# Patient Record
Sex: FEMALE | Race: BLACK OR AFRICAN AMERICAN | NOT HISPANIC OR LATINO | Employment: OTHER | URBAN - METROPOLITAN AREA
[De-identification: names, ages, dates, MRNs, and addresses within clinical notes are randomized per-mention and may not be internally consistent; named-entity substitution may affect disease eponyms.]

---

## 2017-01-02 ENCOUNTER — HOSPITAL ENCOUNTER (EMERGENCY)
Facility: OTHER | Age: 82
Discharge: HOME | End: 2017-01-02
Attending: EMERGENCY MEDICINE
Payer: MEDICARE

## 2017-01-02 ENCOUNTER — NURSE TRIAGE (OUTPATIENT)
Dept: ADMINISTRATIVE | Facility: CLINIC | Age: 82
End: 2017-01-02

## 2017-01-02 VITALS
SYSTOLIC BLOOD PRESSURE: 179 MMHG | RESPIRATION RATE: 18 BRPM | OXYGEN SATURATION: 100 % | BODY MASS INDEX: 28.22 KG/M2 | TEMPERATURE: 98 F | WEIGHT: 140 LBS | HEIGHT: 59 IN | HEART RATE: 80 BPM | DIASTOLIC BLOOD PRESSURE: 84 MMHG

## 2017-01-02 DIAGNOSIS — R05.9 COUGH: Primary | ICD-10-CM

## 2017-01-02 LAB
FLUAV AG SPEC QL IA: NEGATIVE
FLUBV AG SPEC QL IA: NEGATIVE
SPECIMEN SOURCE: NORMAL

## 2017-01-02 PROCEDURE — 25000242 PHARM REV CODE 250 ALT 637 W/ HCPCS: Performed by: EMERGENCY MEDICINE

## 2017-01-02 PROCEDURE — 94640 AIRWAY INHALATION TREATMENT: CPT

## 2017-01-02 PROCEDURE — 87400 INFLUENZA A/B EACH AG IA: CPT

## 2017-01-02 PROCEDURE — 99284 EMERGENCY DEPT VISIT MOD MDM: CPT | Mod: 25

## 2017-01-02 PROCEDURE — 25000242 PHARM REV CODE 250 ALT 637 W/ HCPCS: Performed by: PHYSICIAN ASSISTANT

## 2017-01-02 RX ORDER — BENZONATATE 100 MG/1
100 CAPSULE ORAL 3 TIMES DAILY PRN
Qty: 20 CAPSULE | Refills: 0 | Status: SHIPPED | OUTPATIENT
Start: 2017-01-02 | End: 2017-01-12

## 2017-01-02 RX ORDER — ALBUTEROL SULFATE 90 UG/1
1-2 AEROSOL, METERED RESPIRATORY (INHALATION) EVERY 6 HOURS PRN
Qty: 1 INHALER | Refills: 0 | Status: SHIPPED | OUTPATIENT
Start: 2017-01-02 | End: 2017-01-25

## 2017-01-02 RX ORDER — IPRATROPIUM BROMIDE AND ALBUTEROL SULFATE 2.5; .5 MG/3ML; MG/3ML
3 SOLUTION RESPIRATORY (INHALATION)
Status: COMPLETED | OUTPATIENT
Start: 2017-01-02 | End: 2017-01-02

## 2017-01-02 RX ORDER — ALBUTEROL SULFATE 2.5 MG/.5ML
1.25 SOLUTION RESPIRATORY (INHALATION)
Status: DISCONTINUED | OUTPATIENT
Start: 2017-01-02 | End: 2017-01-02

## 2017-01-02 RX ADMIN — IPRATROPIUM BROMIDE AND ALBUTEROL SULFATE 3 ML: .5; 3 SOLUTION RESPIRATORY (INHALATION) at 12:01

## 2017-01-02 RX ADMIN — ALBUTEROL SULFATE 1.25 MG: 2.5 SOLUTION RESPIRATORY (INHALATION) at 12:01

## 2017-01-02 NOTE — ED NOTES
Two patient identifiers have been checked and are correct.    Pt denies chest pain or SOB at this time.   Appearance: Pt awake, alert & oriented to person, place & time. Pt in no acute distress at present time. Pt is clean and well groomed with clothes appropriately fastened. Pt reports + productive cough, sore throat w/ chest congestion.  Skin: Skin warm, dry & intact. Color consistent with ethnicity. Mucous membranes moist. No breakdown or brusing noted.   Musculoskeletal: Patient moving all extremities well, no obvious swelling or deformities noted.   Respiratory: Respirations spontaneous, even, and non-labored. Visible chest rise noted. Airway is open and patent. No accessory muscle use noted.   Neurologic: Sensation is intact. Speech is clear and appropriate. Eyes open spontaneously, behavior appropriate to situation, follows commands, facial expression symmetrical, bilateral hand grasp equal and even, purposeful motor response noted.  Cardiac: All peripheral pulses present. No Bilateral lower extremity edema. Cap refill is <3 seconds.  Abdomen: Abdomen soft, non-tender to palpation.   : Pt reports no dysuria or hematuria.

## 2017-01-02 NOTE — DISCHARGE INSTRUCTIONS
We have provided you with an inhaler and cough medication. Please fill and take as directed.    Please return to the ER if you have chest pain, difficulty breathing, fevers, altered mental status, dizziness, weakness, or any other concerns.      Follow up with your primary care physician.

## 2017-01-02 NOTE — ED AVS SNAPSHOT
[facility]  [address]  [facility] [address]               Ernestine Luna   2017 11:57 AM   ED    Description:  Female : 1929   Department:  [facility]           Your Care was Coordinated By:     Provider Role From To    Tameka Vargas MD Attending Provider 17 1213 --    Ilana Moran PA-C Physician Assistant 17 1157 17 1201      Reason for Visit     Cough           Diagnoses this Visit        Comments    Cough    -  Primary       ED Disposition     None           To Do List           Follow-up Information     Follow up with Kev Macias MD.    Specialty:  Internal Medicine    Contact information:    [address]  [facility] [address]  [phone]         These Medications        Disp Refills Start End    albuterol 90 mcg/actuation inhaler 1 Inhaler 0 2017     Inhale 1-2 puffs into the lungs every 6 (six) hours as needed for Wheezing. - Inhalation    [facility] [id number] [facility] [address] AT [facility] Ph #: [phone]       benzonatate (TESSALON) 100 MG capsule 20 capsule 0 2017    Take 1 capsule (100 mg total) by mouth 3 (three) times daily as needed for Cough. - Oral    Pharmacy: [facility] [id number] [facility] [address] AT [facility] [address] Ph #: [phone]         [facility] On [facility]     [facility] -  Assistance  Registered nurses in the [facility] provide clinical advisement, health education, appointment booking, and other advisory services.  Call for this free service at [phone].             Medications           Message regarding Medications     Verify the changes and/or additions to your medication regime listed below are the same as discussed with your clinician today.  If any of these changes or additions are incorrect, please notify your  healthcare provider.        START taking these NEW medications        Refills    albuterol 90 mcg/actuation inhaler 0    Sig: Inhale 1-2 puffs into the lungs every 6 (six) hours as needed for Wheezing.    Class: Print    Route: Inhalation    benzonatate (TESSALON) 100 MG capsule 0    Sig: Take 1 capsule (100 mg total) by mouth 3 (three) times daily as needed for Cough.    Class: Print    Route: Oral      These medications were administered today        Dose Freq    albuterol-ipratropium 2.5mg-0.5mg/3mL nebulizer solution 3 mL 3 mL Every 5 min    Sig: Take 3 mLs by nebulization every 5 (five) minutes.    Class: Normal    Route: Nebulization           Verify that the below list of medications is an accurate representation of the medications you are currently taking.  If none reported, the list may be blank. If incorrect, please contact your healthcare provider. Carry this list with you in case of emergency.           Current Medications     amlodipine (NORVASC) 10 MG tablet TAKE 1 TABLET BY MOUTH ONCE DAILY.    atenolol (TENORMIN) 25 MG tablet TAKE 1 TABLET BY MOUTH EVERY DAY    benazepril (LOTENSIN) 40 MG tablet TAKE 1 TABLET BY MOUTH ONCE DAILY.    guaifenesin-codeine 100-10 mg/5 ml (TUSSI-ORGANIDIN NR)  mg/5 mL syrup Take 5 mLs by mouth every 6 (six) hours as needed for Cough.    meloxicam (MOBIC) 7.5 MG tablet Take 1 tablet (7.5 mg total) by mouth once daily.    MULTIVITAMIN W-MINERALS/LUTEIN (CENTRUM SILVER ORAL) Take 1 tablet by mouth Daily.    omega 3-calcium-vit [id number] [phone] mg Cmpk Take by mouth once daily.    vitamin D 1000 units Tab Take 185 mg by mouth once daily.    ADACEL,TDAP ADOLESN/ADULT,,PF, 2 Lf-(2.5-5-3-5 mcg)-5Lf/0.5 mL Syrg     albuterol 90 mcg/actuation inhaler Inhale 1-2 puffs into the lungs every 6 (six) hours as needed for Wheezing.    benzonatate (TESSALON) 100 MG capsule Take 1 capsule (100 mg total) by mouth 3 (three) times daily as needed for Cough.           Clinical  "Reference Information           Your Vitals Were     BP Pulse Temp Resp Height Weight    179/84 80 98 °F (36.7 °C) (Oral) 18 4' 11" (1.499 m) 63.5 kg (140 lb)    SpO2 BMI             100% 28.28 kg/m2         Allergies as of 1/2/2017        Reactions    Acetaminophen Nausea And Vomiting    Aspirin Nausea And Vomiting    Sulfa (Sulfonamide Antibiotics) Nausea And Vomiting    Prednisone Other (See Comments)    Stomach problems       Immunizations Administered on Date of Encounter - 1/2/2017     None      ED Micro, Lab, POCT     Start Ordered       Status Ordering Provider    01/02/17 1224 01/02/17 1223  Influenza antigen Nasopharyngeal Swab  Once      Final result       ED Imaging Orders     Start Ordered       Status Ordering Provider    01/02/17 1224 01/02/17 1223  X-Ray Chest 1 View  1 time imaging      Final result     01/02/17 1202 01/02/17 1201    1 time imaging,   Status:  Canceled      Canceled         Discharge Instructions       We have provided you with an inhaler and cough medication. Please fill and take as directed.    Please return to the ER if you have chest pain, difficulty breathing, fevers, altered mental status, dizziness, weakness, or any other concerns.      Follow up with your primary care physician.        Discharge References/Attachments     BRONCHITIS, NO ANTIBIOTIC (ADULT) (ENGLISH)      Your Scheduled Appointments     Sam 10, 2017 10:45 AM CST   Procedure with ODILIA Moore - Podiatry ([facility] )    [address]  [place] [address] [phone]            Jan 25, 2017 11:00 AM CST   Health Assessment with ENMANUEL, NOM 2   [facility] - Internal Medicine ([facility])    [address]  [place] [address] [phone]            Mar 08, 2017 11:00 AM CST   Established Patient Visit with Kristine Sequeira NP   [facility])    [address] [facility] [address] [phone]            "   [facility] Sign-Up     Activating your [facility] account is as easy as 1-2-3!     1) Visit [email or web address], select Sign Up Now, enter this activation code and your date of birth, then select Next.  [email or web address]  Expires: 2/4/2017  3:23 PM      2) Create a username and password to use when you visit [facility] in the future and select a security question in case you lose your password and select Next.    3) Enter your e-mail address and click Sign Up!    Additional Information  If you have questions, please e-mail [email or web address] or call [phone] to talk to our [facility] staff. Remember, [facility] is NOT to be used for urgent needs. For medical emergencies, dial 911.          [facility] complies with applicable Federal civil rights laws and does not discriminate on the basis of race, color, national origin, age, disability, or sex.        Language Assistance Services     ATTENTION: Language assistance services are available, free of charge. Please call [phone].      ATENCIÓN: Si habla español, tiene a burch disposición servicios gratuitos de asistencia lingüística. Llame al [phone].     CHÚ Ý: N?u b?n nói Ti?ng Vi?t, có các d?ch v? h? tr? ngôn ng? mi?n phí dành cho b?n. G?i s? [phone].

## 2017-01-02 NOTE — PROVIDER PROGRESS NOTES - EMERGENCY DEPT.
Encounter Date: 1/2/2017    ED Physician Progress Notes        Physician Note:   I evaluated the patient in triage and performed medical screening exam. Patient stable at this time and awaiting bed. Chief complaint and vital signs reviewed.

## 2017-01-02 NOTE — ED PROVIDER NOTES
Encounter Date: 1/2/2017    SCRIBE #1 NOTE: I, Brandy Cortes, am scribing for, and in the presence of,  Dr. Saul AYOUB have scribed the entire note.       History     Chief Complaint   Patient presents with    Cough     cough since the 21st of December after receiving the pneumonia vaccine     Review of patient's allergies indicates:   Allergen Reactions    Acetaminophen Nausea And Vomiting    Aspirin Nausea And Vomiting    Sulfa (sulfonamide antibiotics) Nausea And Vomiting    Prednisone Other (See Comments)     Stomach problems      HPI Comments: Time seen by provider: 12:18 PM    This is a 87 y.o. female who presents with complaint of cough. She reports onset of symptoms was about 12 days ago. The patient notes the symptoms began after receiving the pneumonia vaccine. She notes associated nasal congestion, runny nose, and sneezing but denies any sore throat, body aches, or chills. She denies N/V/D. Denies urinary symptoms.  The patient does note she had temperature a few days ago of 101. She denies any sick contacts. The patient reports the use of OTC medications has provided minimal relief of symptoms.     The history is provided by the patient.     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    Family history of breast cancer 5/24/2016    Hypertension     Osteoarthritis      Past Medical History Pertinent Negatives   Diagnosis Date Noted    Acute leukemia 11/24/2015    Acute pancreatitis 11/24/2015    Alcohol dependence 11/24/2015    Alzheimer's disease 11/24/2015    Angina pectoris 11/24/2015    Anxiety 11/24/2015    Asthma 11/24/2015    Atrial fibrillation 11/24/2015    Back pain 11/24/2015    Bladder cancer 11/24/2015    Bone cancer 11/24/2015    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    Brain cancer 11/24/2015     Cancer of lymphatic and hematopoietic tissue 11/24/2015    Cerebral palsy 11/24/2015    Cervical cancer 11/24/2015    Chronic bronchitis 11/24/2015    Chronic hepatitis 11/24/2015    Cirrhosis 11/24/2015    Colon cancer 11/24/2015    Complications of reattached extremity 11/24/2015    Coronary artery disease 11/24/2015    Cystic fibrosis 11/24/2015    Dependence on renal dialysis 11/24/2015    Depression 11/24/2015    Diabetes mellitus 8/19/2015    Emphysema of lung 11/24/2015    Endometrial cancer 11/24/2015    Esophageal cancer 11/24/2015    Fallopian tube cancer, carcinoma 11/24/2015    Fibromyalgia 11/24/2015    Gastrostomy status 11/24/2015    Glaucoma 8/19/2015    Glomerulonephritis 11/24/2015    Heart failure 11/24/2015    Heart transplanted 11/24/2015    Hepatitis B 11/24/2015    Hepatitis C 11/24/2015    HIV infection 11/24/2015    Hyperlipidemia 11/24/2015    Hypothyroidism 11/24/2015    Immune deficiency disorder 11/24/2015    Immune disorder 11/24/2015    Inflammatory bowel disease 11/24/2015    Interstitial nephritis chronic 11/24/2015    Intracranial hemorrhage 11/24/2015    Late complications of amputation stump 11/24/2015    Leukemia 11/24/2015    Liver cancer 11/24/2015    Liver transplanted 11/24/2015    Lung cancer 11/24/2015    Lung transplanted 11/24/2015    Malnutrition 11/24/2015    Melanoma 11/24/2015    Multiple sclerosis 11/24/2015    Myocardial infarction 11/24/2015    Neoplasm of lip 11/24/2015    Obesity 11/24/2015    Osteoporosis 11/24/2015    Ovarian cancer 11/24/2015    Pancreatic cancer 11/24/2015    Pancreatic disease 11/24/2015    Parkinson disease 11/24/2015    Peripheral vascular disease 11/24/2015    Phantom limb syndrome 11/24/2015    Pneumonia 11/24/2015    Pneumonia due to other staphylococcus 11/24/2015    Polyneuropathy 11/24/2015    Pulmonary embolism 11/24/2015    Rectal cancer 11/24/2015    Renal cancer  11/24/2015    Respiratory failure 11/24/2015    Rheumatoid arthritis 11/24/2015    Seizures 11/24/2015    Septic shock 11/24/2015    Sickle cell anemia 11/24/2015    Skin ulcer 11/24/2015    Sleep apnea 11/24/2015    Small intestine cancer 11/24/2015    Spinal cord disease 11/24/2015    Squamous cell carcinoma 11/24/2015    Stomach cancer 11/24/2015    Stroke 11/24/2015    Testicular cancer 11/24/2015    Thyroid cancer 11/24/2015    Tobacco dependence 11/24/2015    Trouble in sleeping 11/24/2015    Type 2 diabetes mellitus 11/24/2015    Type 2 diabetes mellitus with ophthalmic manifestations 11/24/2015    Urinary incontinence 11/24/2015    Uterine cancer 11/24/2015    Vaginal cancer 11/24/2015    Vulvar cancer 11/24/2015     Past Surgical History   Procedure Laterality Date    Bunionectomy       Left foot (x2)    Total knee arthroplasty       Bilateral    Carpal tunnel release Right      38 years old    Cataract extraction Left      with lens     Cataract extraction w/  intraocular lens implant Right 10/12/2015     Dr. Wilkins    Joint replacement       bilateral knees    Breast surgery Right 1992     S/P Right breast lumpectomy- radiation followed by Tamoxifen x 5 years    Colonoscopy w/ polypectomy  08/08/2013    Enma   06/24/2014    Eye surgery      Hysterectomy  age 38     ALISTAIR; ovaries in place     Family History   Problem Relation Age of Onset    Stroke Mother     Breast cancer Sister 82    Heart disease Sister     Diabetes Sister     Cancer Sister      breast cancer    Asthma Sister     Breast cancer Daughter 46    Cancer Daughter      breast    Breast cancer Daughter 45     negative genetic testing 2015    Cancer Daughter      breast    Breast cancer Daughter 37    Cancer Daughter      breast    Coronary artery disease Father     Diabetes Father     Heart disease Father     Heart attack Father     Heart disease Sister     Diabetes Sister     Diabetes Sister      Heart disease Sister     Hyperlipidemia Sister     Heart attack Brother     No Known Problems Son     Cancer Cousin      colon    Cancer Cousin     Breast cancer Cousin     Breast cancer Other     Diabetes Other     Blindness Other      One eye is blind from complications of diabetes    Breast cancer Other     Diabetes Other     Breast cancer Other     Diabetes Other     Ovarian cancer Neg Hx     Endometrial cancer Neg Hx     Vaginal cancer Neg Hx     Cervical cancer Neg Hx      Social History   Substance Use Topics    Smoking status: Never Smoker    Smokeless tobacco: Never Used    Alcohol use 0.6 oz/week     1 Glasses of wine, 0 Standard drinks or equivalent per week      Comment: Rare, every other week     Review of Systems   Constitutional: Positive for chills and fever.   HENT: Positive for congestion, rhinorrhea and sneezing.    Eyes: Negative for visual disturbance.   Respiratory: Positive for cough. Negative for shortness of breath.    Cardiovascular: Negative for chest pain.   Gastrointestinal: Negative for abdominal pain, diarrhea, nausea and vomiting.   Genitourinary: Negative for difficulty urinating, dysuria, flank pain and hematuria.   Musculoskeletal: Negative for myalgias, neck pain and neck stiffness.   Skin: Negative for pallor.   Neurological: Negative for dizziness, weakness and headaches.       Physical Exam   Initial Vitals   BP Pulse Resp Temp SpO2   01/02/17 1110 01/02/17 1110 01/02/17 1110 01/02/17 1110 01/02/17 1110   160/70 76 18 98 °F (36.7 °C) 98 %     Physical Exam    Nursing note and vitals reviewed.  Constitutional: She appears well-developed and well-nourished. She is not diaphoretic. No distress.   HENT:   Head: Normocephalic and atraumatic.   Right Ear: External ear normal.   Left Ear: External ear normal.   Eyes: Conjunctivae and EOM are normal.   Neck: Normal range of motion. Neck supple.   Cardiovascular: Normal rate, regular rhythm and normal heart sounds.    Pulmonary/Chest: She has wheezes. She has no rhonchi. She has no rales.   Diffuse end expiratory wheezes.    Abdominal: Soft. Bowel sounds are normal. She exhibits no distension. There is no tenderness. There is no rebound and no guarding.   Musculoskeletal: Normal range of motion. She exhibits no edema or tenderness.   No peripheral edema   Lymphadenopathy:     She has no cervical adenopathy.   Neurological: She is alert and oriented to person, place, and time. She has normal strength.   Skin: Skin is warm and dry. No rash noted.         ED Course   Procedures  Labs Reviewed   INFLUENZA A AND B ANTIGEN        Imaging Results         X-Ray Chest 1 View (Final result) Result time:  01/02/17 12:56:11    Final result by Jessica Martin MD (01/02/17 12:56:11)    Impression:      Unchanged appearance of the chest as above.  No acute findings.              Electronically signed by: JESSICA MARTIN MD  Date:     01/02/17  Time:    12:56     Narrative:    Comparison: 01/31/2014    Technique: Single AP view of the chest was obtained    Findings: The cardiac and mediastinal silhouettes are unchanged.  Elevation of the right hemidiaphragm is unchanged with mild associated subsegmental atelectasis in the right lung base.  Lungs otherwise clear bilaterally.Degenerative changes noted throughout the spine and at the bilateral shoulder girdles.            X-Rays:   Independently Interpreted Readings:   Chest X-Ray: Portable Chest X-ray Reading (1:36 PM): Trachea midline. No cardiomegaly. Slight elevation of right hemidiaphragm. No infiltrate. No effusion. No pneumothorax.      Medical Decision Making:   History:   Old Medical Records: I decided to obtain old medical records.  Old Records Summarized: records from clinic visits, records from previous admission(s) and other records.  Initial Assessment:   12:18PM:  Pt is a 86 y/o F who presents to ED with cough/congestion. Pt appears well, nontoxic. She does have diffuse  end-expiratory wheezes.  Will plan for CXR, influenza, along with breathing treatments. Will continue to follow and reassess.    Independently Interpreted Test(s):   I have ordered and independently interpreted X-rays - see prior notes.  Clinical Tests:   Lab Tests: Reviewed and Ordered  Radiological Study: Reviewed and Ordered    Additional MDM:   X-Rays: I have independently interpreted X-Ray(s) - see notes.     1:40 PM:  Pt doing well, she is feeling much better after the breathing treatments.  Her CXR and influenza are negative for any acute findings.  She likely has bronchitis. Will plan for an albuterol inhaler along with cough medication.  I updated pt regarding results and counseled pt regarding supportive care measures.  I have discussed with the pt ED return warnings and need for close PCP f/u.  Pt agreeable to plan and all questions answered.  I feel that pt is stable for discharge and management as an outpatient and no further intervention is needed at this time.  Pt is comfortable returning to the ED if needed.  Will DC home in stable condition.           Scribe Attestation:   Scribe #1: I performed the above scribed service and the documentation accurately describes the services I performed. I attest to the accuracy of the note.    Attending Attestation:           Physician Attestation for Scribe:  Physician Attestation Statement for Scribe #1: I, Dr. Vargas, reviewed documentation, as scribed by Brandy Cortes in my presence, and it is both accurate and complete.                 ED Course     Clinical Impression:     1. Cough                Tameka Vargas MD  01/02/17 [id number]

## 2017-01-02 NOTE — ED TRIAGE NOTES
Pt presents to Er w/ reports of productive cough, sore throat and chest congestion since receiving Pneumoia shot on 12/21. Pt reports fever and chills but did not take temperature at home.

## 2017-01-03 ENCOUNTER — TELEPHONE (OUTPATIENT)
Dept: INTERNAL MEDICINE | Facility: CLINIC | Age: 82
End: 2017-01-03

## 2017-01-03 NOTE — TELEPHONE ENCOUNTER
"  Reason for Disposition   Caller has medication question, adult has minor symptoms, caller declines triage, and triager answers question    Answer Assessment - Initial Assessment Questions  1. SYMPTOMS: "Do you have any symptoms?"      Pt has been seen several times in past 1 1/2 wks- seen in ER today. Stated she was dx with viral bronchitis. Was given albuterol and tessalon perles - reported the medication made her very shaky which she doesn't like.stated she was shaking like she has parkinsons. She lives alone and doesn't like how that makes her feel. Is   Has green nasal discharge when she blows her nose. Wants to know if she can take the cough medication prescribed earlier in the week with the perles.    Protocols used: ST MEDICATION QUESTION CALL-A-AH  advised on home care. F/u with pcp for any further questions/recommendations.  "

## 2017-01-03 NOTE — TELEPHONE ENCOUNTER
----- Message from Elena Carrington sent at 1/3/2017  9:15 AM CST -----  Good Morning,    Please call Ms. Jeremy.  She went to the ED on 1/2/16 & she would like to talk to a nurse about symptoms/appointment.  She has concerns about the albuterol Rx.    Thank you,    Elena Carrington  [facility]

## 2017-01-04 ENCOUNTER — OFFICE VISIT (OUTPATIENT)
Dept: INTERNAL MEDICINE | Facility: CLINIC | Age: 82
End: 2017-01-04
Payer: MEDICARE

## 2017-01-04 VITALS
SYSTOLIC BLOOD PRESSURE: 166 MMHG | HEART RATE: 78 BPM | HEIGHT: 59 IN | TEMPERATURE: 98 F | DIASTOLIC BLOOD PRESSURE: 67 MMHG | OXYGEN SATURATION: 99 % | WEIGHT: 142.63 LBS | BODY MASS INDEX: 28.76 KG/M2

## 2017-01-04 DIAGNOSIS — J32.9 SINUSITIS, UNSPECIFIED CHRONICITY, UNSPECIFIED LOCATION: ICD-10-CM

## 2017-01-04 DIAGNOSIS — J45.31 ASTHMATIC BRONCHITIS, MILD PERSISTENT, WITH ACUTE EXACERBATION: Primary | ICD-10-CM

## 2017-01-04 PROCEDURE — 99999 PR PBB SHADOW E&M-EST. PATIENT-LVL III: CPT | Mod: PBBFAC,,, | Performed by: INTERNAL MEDICINE

## 2017-01-04 PROCEDURE — 1160F RVW MEDS BY RX/DR IN RCRD: CPT | Mod: S$GLB,,, | Performed by: INTERNAL MEDICINE

## 2017-01-04 PROCEDURE — 96372 THER/PROPH/DIAG INJ SC/IM: CPT | Mod: S$GLB,,, | Performed by: INTERNAL MEDICINE

## 2017-01-04 PROCEDURE — 99214 OFFICE O/P EST MOD 30 MIN: CPT | Mod: 25,S$GLB,, | Performed by: INTERNAL MEDICINE

## 2017-01-04 PROCEDURE — 1126F AMNT PAIN NOTED NONE PRSNT: CPT | Mod: S$GLB,,, | Performed by: INTERNAL MEDICINE

## 2017-01-04 PROCEDURE — 1159F MED LIST DOCD IN RCRD: CPT | Mod: S$GLB,,, | Performed by: INTERNAL MEDICINE

## 2017-01-04 PROCEDURE — 1157F ADVNC CARE PLAN IN RCRD: CPT | Mod: S$GLB,,, | Performed by: INTERNAL MEDICINE

## 2017-01-04 PROCEDURE — 99499 UNLISTED E&M SERVICE: CPT | Mod: S$GLB,,, | Performed by: INTERNAL MEDICINE

## 2017-01-04 RX ORDER — TRIAMCINOLONE ACETONIDE 40 MG/ML
40 INJECTION, SUSPENSION INTRA-ARTICULAR; INTRAMUSCULAR
Status: COMPLETED | OUTPATIENT
Start: 2017-01-04 | End: 2017-01-04

## 2017-01-04 RX ORDER — AMOXICILLIN 875 MG/1
875 TABLET, FILM COATED ORAL 2 TIMES DAILY
Qty: 20 TABLET | Refills: 0 | Status: SHIPPED | OUTPATIENT
Start: 2017-01-04 | End: 2017-01-25

## 2017-01-04 RX ORDER — PREDNISONE 20 MG/1
TABLET ORAL
Qty: 12 TABLET | Refills: 0 | Status: SHIPPED | OUTPATIENT
Start: 2017-01-04 | End: 2017-01-25

## 2017-01-04 RX ADMIN — TRIAMCINOLONE ACETONIDE 40 MG: 40 INJECTION, SUSPENSION INTRA-ARTICULAR; INTRAMUSCULAR at 10:01

## 2017-01-04 NOTE — PROGRESS NOTES
REASON FOR VISIT:  This is an 87-year-old female who I recently saw on December 21, but then after that visit for three days she had a nonproductive cough and   sneezing.  Then her cough seemed to have gotten worse with nasal congestion,   blowing out yellow mucus, and sometimes coughing up clear phlegm.  She was   actually seen in an Urgent Care Clinic on December 29 and then in the Emergency   Room yesterday, January 3.  In the Emergency Room, at first guaifenesin with   codeine was given, and then later albuterol treatment and Tessalon Perles were   given.  She did not like taking albuterol because it made her jittery.    Presently she is blowing out yellow-green mucus, can hear wheezing, and still   has a cough.    PAST MEDICAL HISTORY:  History of breast cancer.  Hypertension.  Details of medical history are recently outlined in December 21 note.    MEDICATIONS:  List per [employer].    PHYSICAL EXAMINATION:  VITAL SIGNS:  Weight is 142, pulse 68, blood pressure 160/82.  HEENT:  Tympanic membranes normal.  Nose is congested with yellow mucus.    Oropharynx, no abnormal findings.  LUNGS:  Clear breath sounds, with diffuse coarse rhonchi and wheezing.  HEART:  Regular rate and rhythm.    IMPRESSION:  Sinusitis with asthmatic bronchitis.    PLAN:  Amoxicillin 875 mg twice a day for 10 days, Kenalog 40 mg IM, and   prednisone taper to take for the next eight days starting tomorrow.  She can   still take Mucinex or guaifenesin with codeine as needed.    /sc [id number] review      JAM/MALCOLM  dd: 01/04/2017 10:05:24 (CST)  td: 01/04/2017 22:52:13 (CST)  Doc ID   #[id number]  Job ID #[id number]    CC:

## 2017-01-04 NOTE — MR AVS SNAPSHOT
[facility] [address]  [address]  [address]  [facility] [address]  Phone: [phone]                  Ernestine Luna   2017 9:30 AM   Office Visit    Description:  Female : 1929   Provider:  Kev Macias MD   Department:  [facility] [address]           Reason for Visit     Cough           Diagnoses this Visit        Comments    Asthmatic bronchitis, mild persistent, with acute exacerbation    -  Primary     Sinusitis, unspecified chronicity, unspecified location                To Do List           Future Appointments        Provider Department Dept Phone    1/10/2017 10:45 AM ODILIA Moore [facility] - Podiatry [phone]    2017 11:00 AM MEJIA SINGER 2 [facility] - Internal Medicine [phone]    3/8/2017 11:00 AM Kristine Sequeira NP [facility] [phone]      Goals (5 Years of Data)     None       These Medications        Disp Refills Start End    amoxicillin (AMOXIL) 875 MG tablet 20 tablet 0 2017     Take 1 tablet (875 mg total) by mouth 2 (two) times daily. - Oral    Pharmacy: [facility] [id number] [place] [address] [address] AT [facility] Ph #: [phone]       predniSONE (DELTASONE) 20 MG tablet 12 tablet 0 2017     2 pills po daily for 4 days, then 1 pill po day for 4 days    Pharmacy: [facility] [address] AT [facility] Ph #: [phone]         [facility]     [facility] -  Assistance  Registered nurses in the [facility] provide clinical advisement, health education, appointment booking, and other advisory services.  Call for this free service at [phone].             Medications           Message regarding Medications     Verify the changes and/or additions to your medication regime listed below are the same as discussed with your clinician today.  If any  of these changes or additions are incorrect, please notify your healthcare provider.        START taking these NEW medications        Refills    amoxicillin (AMOXIL) 875 MG tablet 0    Sig: Take 1 tablet (875 mg total) by mouth 2 (two) times daily.    Class: Normal    Route: Oral    predniSONE (DELTASONE) 20 MG tablet 0    Si pills po daily for 4 days, then 1 pill po day for 4 days    Class: Normal      These medications were administered today        Dose Freq    triamcinolone acetonide injection 40 mg 40 mg Clinic/HOD 1 time    Sig: Inject 1 mL (40 mg total) into the muscle one time.    Class: Normal    Route: Intramuscular           Verify that the below list of medications is an accurate representation of the medications you are currently taking.  If none reported, the list may be blank. If incorrect, please contact your healthcare provider. Carry this list with you in case of emergency.           Current Medications     ADACEL,TDAP ADOLESN/ADULT,,PF, 2 Lf-(2.5-5-3-5 mcg)-5Lf/0.5 mL Syrg     albuterol 90 mcg/actuation inhaler Inhale 1-2 puffs into the lungs every 6 (six) hours as needed for Wheezing.    amlodipine (NORVASC) 10 MG tablet TAKE 1 TABLET BY MOUTH ONCE DAILY.    atenolol (TENORMIN) 25 MG tablet TAKE 1 TABLET BY MOUTH EVERY DAY    benazepril (LOTENSIN) 40 MG tablet TAKE 1 TABLET BY MOUTH ONCE DAILY.    benzonatate (TESSALON) 100 MG capsule Take 1 capsule (100 mg total) by mouth 3 (three) times daily as needed for Cough.    guaifenesin-codeine 100-10 mg/5 ml (TUSSI-ORGANIDIN NR)  mg/5 mL syrup Take 5 mLs by mouth every 6 (six) hours as needed for Cough.    meloxicam (MOBIC) 7.5 MG tablet Take 1 tablet (7.5 mg total) by mouth once daily.    MULTIVITAMIN W-MINERALS/LUTEIN (CENTRUM SILVER ORAL) Take 1 tablet by mouth Daily.    omega 3-calcium-vit D3-FA-mv13 [phone] mg Cmpk Take by mouth once daily.    vitamin D 1000 units Tab Take 185 mg by mouth once daily.    amoxicillin (AMOXIL) 875 MG  "tablet Take 1 tablet (875 mg total) by mouth 2 (two) times daily.    predniSONE (DELTASONE) 20 MG tablet 2 pills po daily for 4 days, then 1 pill po day for 4 days           Clinical Reference Information           Vital Signs - Last Recorded  Most recent update: 1/4/2017  9:24 AM by Trang Brandon MA    BP Pulse Temp Ht Wt SpO2    (!) 166/67 (BP Location: Left arm, Patient Position: Sitting, BP Method: Automatic) 78 97.9 °F (36.6 °C) (Oral) 4' 11" (1.499 m) 64.7 kg (142 lb 9.6 oz) 99%    BMI                28.8 kg/m2          Blood Pressure          Most Recent Value    BP  (!)  166/67      Allergies as of 1/4/2017     Acetaminophen    Aspirin    Sulfa (Sulfonamide Antibiotics)    Prednisone      Immunizations Administered on Date of Encounter - 1/4/2017     None      Administrations This Visit     triamcinolone acetonide injection 40 mg     Admin Date Action Dose Route Administered By             01/04/2017 Given 40 mg Intramuscular Summer Joshi LPN                      [facility] Sign-Up     Activating your [facility] account is as easy as 1-2-3!     1) Visit [email or web address], select Sign Up Now, enter this activation code and your date of birth, then select Next.  [email or web address]  Expires: 2/4/2017  3:23 PM      2) Create a username and password to use when you visit [facility] in the future and select a security question in case you lose your password and select Next.    3) Enter your e-mail address and click Sign Up!    Additional Information  If you have questions, please e-mail [email or web address] or call [phone] to talk to our [facility] staff. Remember, [facility] is NOT to be used for urgent needs. For medical emergencies, dial 911.         "

## 2017-01-10 ENCOUNTER — OFFICE VISIT (OUTPATIENT)
Dept: PODIATRY | Facility: CLINIC | Age: 82
End: 2017-01-10
Payer: MEDICARE

## 2017-01-10 VITALS
BODY MASS INDEX: 29.43 KG/M2 | HEART RATE: 67 BPM | RESPIRATION RATE: 18 BRPM | WEIGHT: 146 LBS | SYSTOLIC BLOOD PRESSURE: 163 MMHG | HEIGHT: 59 IN | DIASTOLIC BLOOD PRESSURE: 78 MMHG

## 2017-01-10 DIAGNOSIS — M20.40 HAMMER TOE, UNSPECIFIED LATERALITY: ICD-10-CM

## 2017-01-10 DIAGNOSIS — L60.2 ONYCHAUXIS: Primary | ICD-10-CM

## 2017-01-10 DIAGNOSIS — L84 CORN OR CALLUS: ICD-10-CM

## 2017-01-10 DIAGNOSIS — Z41.1 ENCOUNTER FOR COSMETIC PROCEDURE: ICD-10-CM

## 2017-01-10 PROCEDURE — 17999 UNLISTD PX SKN MUC MEMB SUBQ: CPT | Mod: CSM,,, | Performed by: PODIATRIST

## 2017-01-10 PROCEDURE — 99499 UNLISTED E&M SERVICE: CPT | Mod: CSM,,, | Performed by: PODIATRIST

## 2017-01-10 PROCEDURE — 99999 PR PBB SHADOW E&M-EST. PATIENT-LVL III: CPT | Mod: PBBFAC,,, | Performed by: PODIATRIST

## 2017-01-10 NOTE — MR AVS SNAPSHOT
[facility]  [address]  [facility] [address]  Phone: [phone]                  Ernestine Luna   1/10/2017 10:45 AM   Office Visit    Description:  Female : 1929   Provider:  Marilyn Pappas DPM   [facility]:  [facility]           Reason for Visit     PCP     Nail Care     Callouses     Nail Problem     Toe Pain                To Do List           Future Appointments        Provider Department Dept Phone    2017 11:00 AM HRA NOM 2 [facility] - Internal Medicine [phone]    3/8/2017 11:00 AM Kristine Sequeira NP [facility] [phone]    3/14/2017 11:00 AM ODILIA Moore - Podiatrvioleta [phone]      Goals (5 Years of Data)     None      [facility]     [facility] -  Assistance  Registered nurses in the [facility] provide clinical advisement, health education, appointment booking, and other advisory services.  Call for this free service at [phone].             Medications           Message regarding Medications     Verify the changes and/or additions to your medication regime listed below are the same as discussed with your clinician today.  If any of these changes or additions are incorrect, please notify your healthcare provider.             Verify that the below list of medications is an accurate representation of the medications you are currently taking.  If none reported, the list may be blank. If incorrect, please contact your healthcare provider. Carry this list with you in case of emergency.           Current Medications     ADACEL,TDAP ADOLESN/ADULT,,PF, 2 Lf-(2.5-5-3-5 mcg)-5Lf/0.5 mL Syrg     albuterol 90 mcg/actuation inhaler Inhale 1-2 puffs into the lungs every 6 (six) hours as needed for Wheezing.    amlodipine (NORVASC) 10 MG tablet TAKE 1 TABLET BY MOUTH ONCE DAILY.    amoxicillin (AMOXIL) 875 MG tablet Take 1 tablet (875 mg total) by mouth 2 (two) times daily.     "atenolol (TENORMIN) 25 MG tablet TAKE 1 TABLET BY MOUTH EVERY DAY    benazepril (LOTENSIN) 40 MG tablet TAKE 1 TABLET BY MOUTH ONCE DAILY.    benzonatate (TESSALON) 100 MG capsule Take 1 capsule (100 mg total) by mouth 3 (three) times daily as needed for Cough.    meloxicam (MOBIC) 7.5 MG tablet Take 1 tablet (7.5 mg total) by mouth once daily.    MULTIVITAMIN W-MINERALS/LUTEIN (CENTRUM SILVER ORAL) Take 1 tablet by mouth Daily.    omega 3-calcium-vit D3-FA-mv13 [phone] mg Cmpk Take by mouth once daily.    predniSONE (DELTASONE) 20 MG tablet 2 pills po daily for 4 days, then 1 pill po day for 4 days    vitamin D 1000 units Tab Take 185 mg by mouth once daily.           Clinical Reference Information           Vital Signs - Last Recorded  Most recent update: 1/10/2017 10:42 AM by Geena Cordero    BP Pulse Resp Ht Wt BMI    (!) 163/78 67 18 4' 11" (1.499 m) 66.2 kg (146 lb) 29.49 kg/m2      Blood Pressure          Most Recent Value    BP  (!)  163/78      Allergies as of 1/10/2017     Acetaminophen    Aspirin    Sulfa (Sulfonamide Antibiotics)    Prednisone      Immunizations Administered on Date of Encounter - 1/10/2017     None      [facility] Sign-Up     Activating your [facility] account is as easy as 1-2-3!     1) Visit [email or web address], select Sign Up Now, enter this activation code and your date of birth, then select Next.  [email or web address]  Expires: 2/4/2017  3:23 PM      2) Create a username and password to use when you visit [facility] in the future and select a security question in case you lose your password and select Next.    3) Enter your e-mail address and click Sign Up!    Additional Information  If you have questions, please e-mail [email or web address] or call [phone] to talk to our [facility] staff. Remember, [facility] is NOT to be used for urgent needs. For medical emergencies, dial 911.         "

## 2017-01-19 RX ORDER — MELOXICAM 7.5 MG/1
TABLET ORAL
Qty: 30 TABLET | Refills: 0 | Status: SHIPPED | OUTPATIENT
Start: 2017-01-19 | End: 2017-05-16

## 2017-01-20 RX ORDER — AMLODIPINE BESYLATE 10 MG/1
TABLET ORAL
Qty: 90 TABLET | Refills: 1 | Status: SHIPPED | OUTPATIENT
Start: 2017-01-20 | End: 2017-07-05 | Stop reason: SDUPTHER

## 2017-01-25 ENCOUNTER — OFFICE VISIT (OUTPATIENT)
Dept: INTERNAL MEDICINE | Facility: CLINIC | Age: 82
End: 2017-01-25
Payer: MEDICARE

## 2017-01-25 VITALS
BODY MASS INDEX: 29.16 KG/M2 | WEIGHT: 144.63 LBS | HEIGHT: 59 IN | HEART RATE: 60 BPM | SYSTOLIC BLOOD PRESSURE: 148 MMHG | DIASTOLIC BLOOD PRESSURE: 84 MMHG

## 2017-01-25 DIAGNOSIS — M51.36 DDD (DEGENERATIVE DISC DISEASE), LUMBAR: ICD-10-CM

## 2017-01-25 DIAGNOSIS — I77.810 ECTATIC THORACIC AORTA: ICD-10-CM

## 2017-01-25 DIAGNOSIS — I77.9 MILD CAROTID ARTERY DISEASE: ICD-10-CM

## 2017-01-25 DIAGNOSIS — M19.90 INFLAMMATORY ARTHRITIS: ICD-10-CM

## 2017-01-25 DIAGNOSIS — Z00.00 ENCOUNTER FOR PREVENTIVE HEALTH EXAMINATION: Primary | ICD-10-CM

## 2017-01-25 DIAGNOSIS — I70.0 ATHEROSCLEROSIS OF AORTA: ICD-10-CM

## 2017-01-25 DIAGNOSIS — N81.10 PROLAPSE OF ANTERIOR VAGINAL WALL: ICD-10-CM

## 2017-01-25 DIAGNOSIS — I10 ESSENTIAL HYPERTENSION: ICD-10-CM

## 2017-01-25 DIAGNOSIS — Z85.3 HISTORY OF BREAST CANCER: ICD-10-CM

## 2017-01-25 PROCEDURE — 99499 UNLISTED E&M SERVICE: CPT | Mod: S$GLB,,, | Performed by: NURSE PRACTITIONER

## 2017-01-25 PROCEDURE — G0439 PPPS, SUBSEQ VISIT: HCPCS | Mod: S$GLB,,, | Performed by: NURSE PRACTITIONER

## 2017-01-25 PROCEDURE — 99999 PR PBB SHADOW E&M-EST. PATIENT-LVL IV: CPT | Mod: PBBFAC,,, | Performed by: NURSE PRACTITIONER

## 2017-01-25 NOTE — MR AVS SNAPSHOT
[facility] - [facility]  [address]  [place] [address]  Phone: [phone]  Fax: [phone]                  Ernestine Luna   2017 11:00 AM   Office Visit    Description:  Female : 1929   Provider:  MEJIA SINGER 2   Department:  [facility] Internal Medicine           Reason for Visit     Health Risk Assessment           Diagnoses this Visit        Comments    Encounter for preventive health examination    -  Primary            To Do List           Future Appointments        Provider Department Dept Phone    3/8/2017 11:00 AM Kristine Sequeira NP [facility] [phone]    3/14/2017 11:00 AM Marilyn Pappas DPM [facility] - Podiatry [phone]      Goals (5 Years of Data)     None      Follow-Up and Disposition     Return in about 5 months (around 2017) for routine visit with your primary care provider or sooner if problems arise. .      [facility]     [facility] -  Assistance  Registered nurses in the [facility] provide clinical advisement, health education, appointment booking, and other advisory services.  Call for this free service at [phone].             Medications           Message regarding Medications     Verify the changes and/or additions to your medication regime listed below are the same as discussed with your clinician today.  If any of these changes or additions are incorrect, please notify your healthcare provider.        STOP taking these medications     ADACEL,TDAP ADOLESN/ADULT,,PF, 2 Lf-(2.5-5-3-5 mcg)-5Lf/0.5 mL Syrg     albuterol 90 mcg/actuation inhaler Inhale 1-2 puffs into the lungs every 6 (six) hours as needed for Wheezing.    amoxicillin (AMOXIL) 875 MG tablet Take 1 tablet (875 mg total) by mouth 2 (two) times daily.    predniSONE (DELTASONE) 20 MG tablet 2 pills po daily for 4 days, then 1 pill po day for 4 days           Verify that the below list of medications is an accurate  "representation of the medications you are currently taking.  If none reported, the list may be blank. If incorrect, please contact your healthcare provider. Carry this list with you in case of emergency.           Current Medications     amlodipine (NORVASC) 10 MG tablet TAKE 1 TABLET BY MOUTH ONCE DAILY    atenolol (TENORMIN) 25 MG tablet TAKE 1 TABLET BY MOUTH EVERY DAY    benazepril (LOTENSIN) 40 MG tablet TAKE 1 TABLET BY MOUTH ONCE DAILY.    MULTIVITAMIN W-MINERALS/LUTEIN (CENTRUM SILVER ORAL) Take 1 tablet by mouth Daily.    omega 3-calcium-vit D3-FA-mv13 [phone] mg Cmpk Take by mouth once daily.    vitamin D 1000 units Tab Take 185 mg by mouth once daily.    meloxicam (MOBIC) 7.5 MG tablet TAKE 1 TABLET(7.5 MG) BY MOUTH EVERY DAY           Clinical Reference Information           Vital Signs - Last Recorded  Most recent update: 1/25/2017 11:59 AM by Marce Alberto NP    BP Pulse Ht Wt BMI    (!) 148/84 (BP Location: Left arm, Patient Position: Sitting) 60 4' 11" (1.499 m) 65.6 kg (144 lb 10 oz) 29.21 kg/m2      Blood Pressure          Most Recent Value    BP  (!)  148/84      Allergies as of 1/25/2017     Acetaminophen    Aspirin    Sulfa (Sulfonamide Antibiotics)    Prednisone      Immunizations Administered on Date of Encounter - 1/25/2017     None      [facility] Sign-Up     Activating your [facility] account is as easy as 1-2-3!     1) Visit [email or web address], select Sign Up Now, enter this activation code and your date of birth, then select Next.  [email or web address]  Expires: 2/4/2017  3:23 PM      2) Create a username and password to use when you visit [facility] in the future and select a security question in case you lose your password and select Next.    3) Enter your e-mail address and click Sign Up!    Additional Information  If you have questions, please e-mail [email or web address] or call [phone] to talk to our [facility] staff. Remember, [facility] is NOT to be used for urgent needs. For " medical emergencies, dial 911.         Instructions      Counseling and Referral of Other Preventative  (Italic type indicates deductible and co-insurance are waived)    Patient Name: Ernestine Luna  Today's Date: 1/25/2017      SERVICE LIMITATIONS RECOMMENDATION    Vaccines    · Pneumococcal (once after 65)    · Influenza (annually)    · Hepatitis B (if medium/high risk)    · Prevnar 13      Hepatitis B medium/high risk factors:       - End-stage renal disease       - Hemophiliacs who received Factor VII or         IX concentrates       - Clients of institutions for the mentally             retarded       - Persons who live in the same house as          a HepB carrier       - Homosexual men       - Illicit injectable drug abusers     Pneumococcal: Done, no repeat necessary     Influenza: Done, repeat in one year     Hepatitis B: N/A : n/a     Prevnar 13: Done, repeat at next scheduled date    Mammogram (biennial age 50-74)  Annually (age 40 or over)  Done this year, repeat every year    Pap (up to age 70 and after 70 if unknown history or abnormal study last 10 years)    N/A : defer to gyn     The USPSTF recommends against screening for cervical cancer in women older than age 65 years who have had adequate prior screening and are not otherwise at high risk for cervical cancer.      Colorectal cancer screening (to age 75)    · Fecal occult blood test (annual)  · Flexible sigmoidoscopy (5y)  · Screening colonoscopy (10y)  · Barium enema   Last done 2013, recommend to repeat every 5-10  years as needed    Diabetes self-management training (no USPSTF recommendations)  Requires referral by treating physician for patient with diabetes or renal disease. 10 hours of initial DSMT sessions of no less than 30 minutes each in a continuous 12-month period. 2 hours of follow-up DSMT in subsequent years.  N/A : n/a    Bone mass measurements (age 65 & older, biennial)  Requires diagnosis related to osteoporosis or estrogen  deficiency. Biennial benefit unless patient has history of long-term glucocorticoid  Last done 06/24/16, recommend to repeat every 2  years    Glaucoma screening (no USPSTF recommendation)  Diabetes mellitus, family history   , age 50 or over    American, age 65 or over  Recommended to patient, will call to schedule    Medical nutrition therapy for diabetes or renal disease (no recommended schedule)  Requires referral by treating physician for patient with diabetes or renal disease or kidney transplant within the past 3 years.  Can be provided in same year as diabetes self-management training (DSMT), and CMS recommends medical nutrition therapy take place after DSMT. Up to 3 hours for initial year and 2 hours in subsequent years.  N/A ; n/a    Cardiovascular screening blood tests (every 5 years)  · Fasting lipid panel  Order as a panel if possible  Done this year, repeat every year    Diabetes screening tests (at least every 3 years, Medicare covers annually or at 6-month intervals for prediabetic patients)  · Fasting blood sugar (FBS) or glucose tolerance test (GTT)  Patient must be diagnosed with one of the following:       - Hypertension       - Dyslipidemia       - Obesity (BMI 30kg/m2)       - Previous elevated impaired FBS or GTT       ... or any two of the following:       - Overweight (BMI 25 but <30)       - Family history of diabetes       - Age 65 or older       - History of gestational diabetes or birth of baby weighing more than 9 pounds  Done this year, repeat every year    Abdominal aortic aneurysm screening (once)  · Sonogram   Limited to patients who meet one of the following criteria:       - Men who are 65-75 years old and have smoked more than 100 cigarette in their lifetime       - Anyone with a family history of abdominal aortic aneurysm       - Anyone recommended for screening by the USPSTF  N/A : n/a    HIV screening (annually for increased risk patients)  · HIV-1 and  HIV-2 by EIA, or TIMI, rapid antibody test or oral mucosa transudate  Patients must be at increased risk for HIV infection per USPSTF guidelines or pregnant. Tests covered annually for patient at increased risk or as requested by the patient. Pregnant patients may receive up to 3 tests during pregnancy.  Risks discussed, screening is not recommended    Smoking cessation counseling (up to 8 sessions per year)  Patients must be asymptomatic of tobacco-related conditions to receive as a preventative service.  does not smoke    Subsequent annual wellness visit  At least 12 months since last AWV  Return in one year     The following information is provided to all patients.  This information is to help you find resources for any of the problems found today that may be affecting your health:                Living healthy guide: www.[facility].[place].[facility]      Understanding Diabetes: www.diabetes.org      Eating healthy: www.cdc.gov/healthyweight      CDC home safety checklist: www.cdc.gov/steadi/patient.html      Agency on Aging: www.goea.[place].[facility]      Alcoholics anonymous (AA): www.aa.org      Physical Activity: www.johnie.nih.gov/vs0tpih      Tobacco use: www.quitwithusla.org       Call to schedule eye doctor appointment.  [phone]

## 2017-01-25 NOTE — PROGRESS NOTES
"Ernestine Luna presented for a  Medicare AWV and comprehensive Health Risk Assessment today. The following components were reviewed and updated:    · Medical history  · Family History  · Social history  · Allergies and Current Medications  · Health Risk Assessment  · Health Maintenance  · Care Team     ** See Completed Assessments for Annual Wellness Visit within the encounter summary.**     The following assessments were completed:  · Living Situation  · CAGE  · Depression Screening  · Timed Get Up and Go  · Whisper Test  · Cognitive Function Screening  · Nutrition Screening  · ADL Screening  · PAQ Screening            Vitals:    01/25/17 1110   BP: (!) 148/84   BP Location: Left arm   Patient Position: Sitting   Pulse: 60   Weight: 65.6 kg (144 lb 10 oz)   Height: 4' 11" (1.499 m)     Body mass index is 29.21 kg/(m^2).  Physical Exam   Constitutional: She is oriented to person, place, and time. She appears well-developed and well-nourished. No distress.   HENT:   Head: Normocephalic and atraumatic.   Eyes: Conjunctivae are normal. No scleral icterus.   Neck: Normal range of motion.   Cardiovascular: Normal rate, regular rhythm and intact distal pulses.    Murmur heard.  Pulmonary/Chest: Effort normal and breath sounds normal. No respiratory distress.   Abdominal: Soft. Bowel sounds are normal.   Musculoskeletal: Normal range of motion. She exhibits no edema.   Neurological: She is alert and oriented to person, place, and time.   Skin: Skin is warm and dry. She is not diaphoretic.   Psychiatric: She has a normal mood and affect. Her behavior is normal.         Diagnoses and health risks identified today and associated recommendations/orders:    1. Encounter for preventive health examination  Assessments completed  Preventative health recommendations reviewed    2. Atherosclerosis of aorta  Stable. Seen on cxr from 01/31/13.   Controlled with current medical therapy  Followed by PCP.     3. Ectatic thoracic " aorta  Stable. Seen on cxr from 01/31/13.   Controlled with current medical therapy  Followed by PCP.     4. Mild carotid artery disease  Stable. (1-39% bilaterally)  Controlled with current medical therapy  Followed by PCP.     5. Prolapse of anterior vaginal wall  Stable. Wears a pessary  Followed by urogyn.     6. Essential hypertension  Elevated at today's visit.  Patient denies severe headache, chest pain, sob, sudden vision changes.  Patient says she checks her bps at home and keeps a log.  At home she states that they run from 120-125/60s. She will continue to monitor this and call her PCP if her blood pressures remains greater than 140/90.  Instructed to go to the ER if any of the above symptoms occur.    Taking her bp medications consistently.   Followed by PCP.     7. DDD (degenerative disc disease), lumbar  Stable.   Followed by PCP.  Has seen pain management in the past.     8. Inflammatory arthritis  Stable.   Controlled with current medical therapy  Followed by PCP.     9. History of breast cancer  Stable. Hx of lumpectomy, radiation and tamoxifen.  UTD on mammograms.    Followed by breast surgery.      Provided Ernestine with a 5-10 year written screening schedule and personal prevention plan. Recommendations were developed using the USPSTF age appropriate recommendations. Education, counseling, and referrals were provided as needed. After Visit Summary printed and given to patient which includes a list of additional screenings\tests needed.    Return in about 5 months (around 6/25/2017) for routine visit with your primary care provider or sooner if problems arise. .    Marce Alberto NP

## 2017-01-25 NOTE — PATIENT INSTRUCTIONS
Counseling and Referral of Other Preventative  (Italic type indicates deductible and co-insurance are waived)    Patient Name: Ernestine Luna  Today's Date: 1/25/2017      SERVICE LIMITATIONS RECOMMENDATION    Vaccines    · Pneumococcal (once after 65)    · Influenza (annually)    · Hepatitis B (if medium/high risk)    · Prevnar 13      Hepatitis B medium/high risk factors:       - End-stage renal disease       - Hemophiliacs who received Factor VII or         IX concentrates       - Clients of institutions for the mentally             retarded       - Persons who live in the same house as          a HepB carrier       - Homosexual men       - Illicit injectable drug abusers     Pneumococcal: Done, no repeat necessary     Influenza: Done, repeat in one year     Hepatitis B: N/A : n/a     Prevnar 13: Done, repeat at next scheduled date    Mammogram (biennial age 50-74)  Annually (age 40 or over)  Done this year, repeat every year    Pap (up to age 70 and after 70 if unknown history or abnormal study last 10 years)    N/A : defer to gyn     The USPSTF recommends against screening for cervical cancer in women older than age 65 years who have had adequate prior screening and are not otherwise at high risk for cervical cancer.      Colorectal cancer screening (to age 75)    · Fecal occult blood test (annual)  · Flexible sigmoidoscopy (5y)  · Screening colonoscopy (10y)  · Barium enema   Last done 2013, recommend to repeat every 5-10  years as needed    Diabetes self-management training (no USPSTF recommendations)  Requires referral by treating physician for patient with diabetes or renal disease. 10 hours of initial DSMT sessions of no less than 30 minutes each in a continuous 12-month period. 2 hours of follow-up DSMT in subsequent years.  N/A : n/a    Bone mass measurements (age 65 & older, biennial)  Requires diagnosis related to osteoporosis or estrogen deficiency. Biennial benefit unless patient has history of  long-term glucocorticoid  Last done 06/24/16, recommend to repeat every 2  years    Glaucoma screening (no USPSTF recommendation)  Diabetes mellitus, family history   , age 50 or over    American, age 65 or over  Recommended to patient, will call to schedule    Medical nutrition therapy for diabetes or renal disease (no recommended schedule)  Requires referral by treating physician for patient with diabetes or renal disease or kidney transplant within the past 3 years.  Can be provided in same year as diabetes self-management training (DSMT), and CMS recommends medical nutrition therapy take place after DSMT. Up to 3 hours for initial year and 2 hours in subsequent years.  N/A ; n/a    Cardiovascular screening blood tests (every 5 years)  · Fasting lipid panel  Order as a panel if possible  Done this year, repeat every year    Diabetes screening tests (at least every 3 years, Medicare covers annually or at 6-month intervals for prediabetic patients)  · Fasting blood sugar (FBS) or glucose tolerance test (GTT)  Patient must be diagnosed with one of the following:       - Hypertension       - Dyslipidemia       - Obesity (BMI 30kg/m2)       - Previous elevated impaired FBS or GTT       ... or any two of the following:       - Overweight (BMI 25 but <30)       - Family history of diabetes       - Age 65 or older       - History of gestational diabetes or birth of baby weighing more than 9 pounds  Done this year, repeat every year    Abdominal aortic aneurysm screening (once)  · Sonogram   Limited to patients who meet one of the following criteria:       - Men who are 65-75 years old and have smoked more than 100 cigarette in their lifetime       - Anyone with a family history of abdominal aortic aneurysm       - Anyone recommended for screening by the USPSTF  N/A : n/a    HIV screening (annually for increased risk patients)  · HIV-1 and HIV-2 by EIA, or TIMI, rapid antibody test or oral mucosa  transudate  Patients must be at increased risk for HIV infection per USPSTF guidelines or pregnant. Tests covered annually for patient at increased risk or as requested by the patient. Pregnant patients may receive up to 3 tests during pregnancy.  Risks discussed, screening is not recommended    Smoking cessation counseling (up to 8 sessions per year)  Patients must be asymptomatic of tobacco-related conditions to receive as a preventative service.  does not smoke    Subsequent annual wellness visit  At least 12 months since last AWV  Return in one year     The following information is provided to all patients.  This information is to help you find resources for any of the problems found today that may be affecting your health:                Living healthy guide: www.[facility].[place].[facility]      Understanding Diabetes: www.diabetes.org      Eating healthy: www.cdc.gov/healthyweight      CDC home safety checklist: www.cdc.gov/steadi/patient.html      Agency on Aging: www.goea.[place].[facility]      Alcoholics anonymous (AA): www.aa.org      Physical Activity: www.johnie.nih.gov/uv3gwzb      Tobacco use: www.quitwithusla.org       Call to schedule eye doctor appointment.  [phone]

## 2017-01-30 ENCOUNTER — TELEPHONE (OUTPATIENT)
Dept: SURGERY | Facility: CLINIC | Age: 82
End: 2017-01-30

## 2017-01-30 NOTE — TELEPHONE ENCOUNTER
----- Message from Kristie Lezama sent at 1/30/2017 12:41 PM CST -----  Contact: self  Pt is calling in ref to letter she received after mammo. She states the letter says she should follow up with someone but, she isn't sure who she needs to see. Please call Ernestine back @ [phone].Thank you.

## 2017-01-30 NOTE — TELEPHONE ENCOUNTER
Called the patient regarding message below, the patient is scheduled to come in on Thursday 2/2/17 at 1:30 pm with Corrie Chandler NP.  The patient voiced understanding of appointment date and time.

## 2017-02-02 ENCOUNTER — OFFICE VISIT (OUTPATIENT)
Dept: SURGERY | Facility: CLINIC | Age: 82
End: 2017-02-02
Payer: MEDICARE

## 2017-02-02 ENCOUNTER — HOSPITAL ENCOUNTER (OUTPATIENT)
Dept: RADIOLOGY | Facility: HOSPITAL | Age: 82
Discharge: HOME | End: 2017-02-02
Attending: NURSE PRACTITIONER
Payer: MEDICARE

## 2017-02-02 VITALS
HEART RATE: 66 BPM | TEMPERATURE: 98 F | DIASTOLIC BLOOD PRESSURE: 87 MMHG | SYSTOLIC BLOOD PRESSURE: 193 MMHG | BODY MASS INDEX: 28.76 KG/M2 | WEIGHT: 142.63 LBS | HEIGHT: 59 IN

## 2017-02-02 DIAGNOSIS — N63.0 BREAST MASS: ICD-10-CM

## 2017-02-02 DIAGNOSIS — Z80.3 FAMILY HISTORY OF BREAST CANCER: ICD-10-CM

## 2017-02-02 DIAGNOSIS — Z85.3 PERSONAL HISTORY OF BREAST CANCER: Primary | ICD-10-CM

## 2017-02-02 DIAGNOSIS — N64.4 BREAST PAIN: ICD-10-CM

## 2017-02-02 PROCEDURE — 99999 PR PBB SHADOW E&M-EST. PATIENT-LVL IV: CPT | Mod: PBBFAC,,, | Performed by: NURSE PRACTITIONER

## 2017-02-02 PROCEDURE — 1160F RVW MEDS BY RX/DR IN RCRD: CPT | Mod: S$GLB,,, | Performed by: NURSE PRACTITIONER

## 2017-02-02 PROCEDURE — 77061 BREAST TOMOSYNTHESIS UNI: CPT | Mod: TC

## 2017-02-02 PROCEDURE — 99213 OFFICE O/P EST LOW 20 MIN: CPT | Mod: S$GLB,,, | Performed by: NURSE PRACTITIONER

## 2017-02-02 PROCEDURE — 77065 DX MAMMO INCL CAD UNI: CPT | Mod: TC

## 2017-02-02 PROCEDURE — 77061 BREAST TOMOSYNTHESIS UNI: CPT | Mod: 26,,, | Performed by: RADIOLOGY

## 2017-02-02 PROCEDURE — 76642 ULTRASOUND BREAST LIMITED: CPT | Mod: 26,RT,, | Performed by: RADIOLOGY

## 2017-02-02 PROCEDURE — 1159F MED LIST DOCD IN RCRD: CPT | Mod: S$GLB,,, | Performed by: NURSE PRACTITIONER

## 2017-02-02 PROCEDURE — 77065 DX MAMMO INCL CAD UNI: CPT | Mod: 26,,, | Performed by: RADIOLOGY

## 2017-02-02 PROCEDURE — 1157F ADVNC CARE PLAN IN RCRD: CPT | Mod: S$GLB,,, | Performed by: NURSE PRACTITIONER

## 2017-02-02 PROCEDURE — 76642 ULTRASOUND BREAST LIMITED: CPT | Mod: TC,RT

## 2017-02-02 PROCEDURE — 1126F AMNT PAIN NOTED NONE PRSNT: CPT | Mod: S$GLB,,, | Performed by: NURSE PRACTITIONER

## 2017-02-02 NOTE — PROGRESS NOTES
"Subjective:      Patient ID: Ernestine Luna is a 87 y.o. female.    Chief Complaint: Breast Mass (Right Breast) and Breast Cancer Screening (CBE/Hx of Breast Cancer)      HPI: (PF, EPF - 1-3) (Detailed, Comp, - 4) returning patient presents with concerns about letter she received about her mammogram "it said I had dense breast and maybe I need something else and I have never got a report like that before and now I am worried". Also reports soreness right upper breast "where my bra is", onset at time of her mammogram in September, states this is better now but remains sore, also reports possible lump in this area. "I am very nervous because there is so much cancer in my family ".  Denies nipple discharge or skin changes, unexplained weight loss, new onset bone pain.     She remains very active and independent , lives alone , drives.     Extensive family history of breast cancer, 3/3 daughters with breast cancer, one of which also had negative genetic testing. [facility].       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       Review of Systems   Constitutional: Negative for appetite change and fatigue.   Respiratory: Negative for cough and shortness of breath.    Cardiovascular: Negative for chest pain.   Musculoskeletal: Negative for back pain.     Objective:   Physical Exam   Pulmonary/Chest: She exhibits no mass, no tenderness, no edema, no deformity, no swelling and no retraction. Right breast exhibits tenderness. Right breast exhibits no inverted nipple, no mass, no nipple discharge and no skin change. Left breast exhibits no inverted nipple, no mass, no nipple discharge, no skin change and no tenderness. Breasts are symmetrical. There is no breast swelling.   She reports tenderness and possible lump in the right axillary " tail, area marked for imaging today, no discrete mass appreciated    Lymphadenopathy:     She has no cervical adenopathy.     She has no axillary adenopathy.        Right: No supraclavicular adenopathy present.        Left: No supraclavicular adenopathy present.     Assessment:       1. Personal history of breast cancer    2. Breast mass    3. Breast pain        Plan:       Right diag mmg and US today , no abnormality reported, this correlates with lack of abnormality appreciated on exam today. Reassurance provided.   She can return in September for CBE and bilat diag mmg  Call for any interval palpable breast mass, pain, nipple discharge, skin changes or other breast related concerns

## 2017-02-08 ENCOUNTER — TELEPHONE (OUTPATIENT)
Dept: HEMATOLOGY/ONCOLOGY | Facility: CLINIC | Age: 82
End: 2017-02-08

## 2017-02-08 NOTE — TELEPHONE ENCOUNTER
"Returned call to patient.   Patient stated that she had a right lumpectomy 24 years ago---patient stated she had a mammo 9/26/16 and was clear.   In the meantime, patient stated she has discovered a lump in her right breast---patient stated she had a mammo done 2/2/17 and they informed her it was just a fatty tumor and was not cancerous.   Patient becomes very upset and stated that she does not trust this---I informed her I could get an appt with Kathleen (as the original message stated she needed to speak with Kathleen) and patient refused and said "she needed to see an actual oncologist but she also refuses to see Dr. Woods" for assorted reasons.   I informed patient I would fwd message to Kathleen so she could consult with another doctor (Dada) as she was the last doctor to see her and see if a plan could be created for her.   Patient verbalized understanding.       Message routed to Kathleen Nava.      ----- Message from Ramila Whitman sent at 2/7/2017  3:59 PM CST -----  Contact: self  Pt has a lump in her right breast and would like to speak to Kathleen regarding this.  She says she does not want to see Chuck.    Contact number [phone]    "

## 2017-02-10 ENCOUNTER — DOCUMENTATION ONLY (OUTPATIENT)
Dept: HEMATOLOGY/ONCOLOGY | Facility: CLINIC | Age: 82
End: 2017-02-10

## 2017-02-10 RX ORDER — ATENOLOL 25 MG/1
TABLET ORAL
Qty: 30 TABLET | Refills: 11 | Status: SHIPPED | OUTPATIENT
Start: 2017-02-10 | End: 2017-09-07 | Stop reason: SDUPTHER

## 2017-02-13 ENCOUNTER — DOCUMENTATION ONLY (OUTPATIENT)
Dept: SURGERY | Facility: CLINIC | Age: 82
End: 2017-02-13

## 2017-02-13 NOTE — PROGRESS NOTES
Phoned patient, she had questions regarding results of her recent mammogram and ultrasound, her questions were addressed and she request f/u appt regarding breast tenderness, discussed her returning in one month for CBE, she did not want to schedule this appt today as she had to check her calendar , she will call back to schedule CBE

## 2017-03-08 ENCOUNTER — OFFICE VISIT (OUTPATIENT)
Dept: UROGYNECOLOGY | Facility: CLINIC | Age: 82
End: 2017-03-08
Payer: MEDICARE

## 2017-03-08 VITALS
HEIGHT: 59 IN | DIASTOLIC BLOOD PRESSURE: 70 MMHG | BODY MASS INDEX: 28.93 KG/M2 | SYSTOLIC BLOOD PRESSURE: 134 MMHG | WEIGHT: 143.5 LBS

## 2017-03-08 DIAGNOSIS — Z46.89 PESSARY MAINTENANCE: ICD-10-CM

## 2017-03-08 DIAGNOSIS — N81.11 MIDLINE CYSTOCELE: ICD-10-CM

## 2017-03-08 DIAGNOSIS — N39.41 URGE INCONTINENCE: ICD-10-CM

## 2017-03-08 DIAGNOSIS — N81.6 RECTOCELE: ICD-10-CM

## 2017-03-08 DIAGNOSIS — N99.3 VAGINAL VAULT PROLAPSE AFTER HYSTERECTOMY: Primary | ICD-10-CM

## 2017-03-08 DIAGNOSIS — N95.2 VAGINAL ATROPHY: ICD-10-CM

## 2017-03-08 PROCEDURE — 1160F RVW MEDS BY RX/DR IN RCRD: CPT | Mod: S$GLB,,, | Performed by: NURSE PRACTITIONER

## 2017-03-08 PROCEDURE — 99999 PR PBB SHADOW E&M-EST. PATIENT-LVL III: CPT | Mod: PBBFAC,,, | Performed by: NURSE PRACTITIONER

## 2017-03-08 PROCEDURE — 1126F AMNT PAIN NOTED NONE PRSNT: CPT | Mod: S$GLB,,, | Performed by: NURSE PRACTITIONER

## 2017-03-08 PROCEDURE — 1159F MED LIST DOCD IN RCRD: CPT | Mod: S$GLB,,, | Performed by: NURSE PRACTITIONER

## 2017-03-08 PROCEDURE — 99213 OFFICE O/P EST LOW 20 MIN: CPT | Mod: S$GLB,,, | Performed by: NURSE PRACTITIONER

## 2017-03-08 PROCEDURE — 1157F ADVNC CARE PLAN IN RCRD: CPT | Mod: S$GLB,,, | Performed by: NURSE PRACTITIONER

## 2017-03-08 NOTE — PATIENT INSTRUCTIONS
1. Prolapse: Stage 2 apical prolapse, Stage 3 anterior and posterior vaginal wall prolapse   --Pessary # 3 ring with support  --Daily pelvic floor exercises as assigned, consider Pelvic floor PT in future  --Non surgical options discussed with patient      2. Urge urinary incontinence: not too bothersome   --Bladder diary for 3-7 days, will bring at the next visit.   --Empty bladder every 3 hours. Empty well: wait a minute, lean forward on toilet.   --Avoid dietary irritants (see sheet). Keep diary x 3-5 days to determine your irritants.  --KEGELS: do 10 in AM and 10 in PM, holding each x 10 seconds. When you feel urge to go, STOP, KEGEL, and when urge has passed, then go to bathroom. Consider PT in future.   --URGE: Consider anticholinergic. Takes 2-4 weeks to see if will have effect. For dry mouth: get sour, sugar free lozenge or gum.   --STRESS: Pessary vs. Sling.      3. Incomplete bladder emptying:improved   --Double void and Crede maneuvers discussed  --Improved with pessary    4. Vaginal atrophy (dryness):   Use REPLENS or REFRESH OTC: 1/2 applicator full in vagina twice a week.        5. RTC 3 months

## 2017-03-08 NOTE — PROGRESS NOTES
Urogyn follow up  03/08/2017  .  [facility]  [address] [address]    Ernestine Luna  [id number]  8/13/1929      Ernestine Luna is a 87 y.o. here for a urogyn follow up.    Last HPI from 12/07/2016  1)  UI:  (--) NELI   (--) UUI (--) pads Daytime frequency: Q 15 minutes- 3 hours.  Nocturia: Yes: 2/night.   (--) dysuria,  (--) hematuria,  (--) frequent UTIs.  (+) complete bladder emptying.   2)  POP:  Absent.   Symptoms:(--)  .  (--) vaginal bleeding. (--) vaginal discharge. (--) sexually active.  (--) dyspareunia.   (+)  Vaginal dryness.  (--) vaginal estrogen use.   3)  BM:  (--) constipation/straining.  (--) chronic diarrhea. (--) hematochezia.  (--) fecal incontinence.  (--) fecal smearing/urgency.  (--) incomplete evacuation.      Changes from last visit:  1)  UI:  (--) NELI   (--) UUI (--) pads Daytime frequency: Q 15 minutes- 3 hours.  Nocturia: Yes: 2/night.   (--) dysuria,  (--) hematuria,  (--) frequent UTIs.  (+) complete bladder emptying.     2)  POP:  Absent.   Symptoms:(--)  .  (--) vaginal bleeding. (--) vaginal discharge. (--) sexually active.  (--) dyspareunia.   (+)  Vaginal dryness.  (--) vaginal estrogen use.     3)  BM:  (--) constipation/straining.  (--) chronic diarrhea. (--) hematochezia.  (--) fecal incontinence.  (--) fecal smearing/urgency.  (--) incomplete evacuation.      4)Pessary:  Denies pain, bleeding or dischage. Using #3 ring with support pessary.    Past Medical History:   Diagnosis Date    Anemia     s/p knee surgery since surgery has resolved    Breast cancer, stage 0     lumpectomy in 1992    Cataract     DCIS (ductal carcinoma in situ) of breast 12/12/2013    Family history of breast cancer 5/24/2016    Genetic testing 2016    negative Integrated [employer] with [facility]    Hypertension     Osteoarthritis        Past Surgical History:   Procedure Laterality Date    BREAST SURGERY Right 1992    S/P Right breast lumpectomy- radiation  "followed by Tamoxifen x 5 years    BUNIONECTOMY      Left foot (x2)    CARPAL TUNNEL RELEASE Right     38 years old    CATARACT EXTRACTION Left     with lens     CATARACT EXTRACTION W/  INTRAOCULAR LENS IMPLANT Right 10/12/2015    Dr. Wilkins    COLONOSCOPY W/ POLYPECTOMY  08/08/2013    RASHEED   06/24/2014    EYE SURGERY      HYSTERECTOMY  age 38    ALISTAIR; ovaries in place    JOINT REPLACEMENT      bilateral knees    TOTAL KNEE ARTHROPLASTY      Bilateral       Current Outpatient Prescriptions   Medication Sig    amlodipine (NORVASC) 10 MG tablet TAKE 1 TABLET BY MOUTH ONCE DAILY    atenolol (TENORMIN) 25 MG tablet TAKE 1 TABLET BY MOUTH EVERY DAY    atenolol (TENORMIN) 25 MG tablet TAKE 1 TABLET BY MOUTH EVERY DAY    benazepril (LOTENSIN) 40 MG tablet TAKE 1 TABLET BY MOUTH ONCE DAILY.    meloxicam (MOBIC) 7.5 MG tablet TAKE 1 TABLET(7.5 MG) BY MOUTH EVERY DAY    MULTIVITAMIN W-MINERALS/LUTEIN (CENTRUM SILVER ORAL) Take 1 tablet by mouth Daily.    omega 3-calcium-vit D3-FA-mv13 [id number] mg Cmpk Take by mouth once daily.    vitamin D 1000 units Tab Take 185 mg by mouth once daily.     No current facility-administered medications for this visit.        ROS:  As per HPI.      Exam  /70  Ht 4' 11" (1.499 m)  Wt 65.1 kg (143 lb 8.3 oz)  BMI 28.99 kg/m2  General: alert and oriented, no acute distress  Respiratory: normal respiratory effort  Abd: soft, non-tender, non-distended    Pelvic  Ext. Genitalia: normal external genitalia. Normal bartholin's and skeens glands  Vagina: + atrophy. Normal vaginal mucosa without lesions. No discharge noted.   Non-tender bladder base without palpable mass.  #3 ring with support in place  Cervix: absent  Uterus:  absent   Urethra: no masses or tenderness  Urethral meatus: no lesions, caruncle or prolapse.      Pessary removed without difficulty.  Washed with soap and water.  Reinserted without difficulty.        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 Midline cystocele     3. Rectocele     4. Urge incontinence     5. Vaginal atrophy     6. Pessary maintenance       We reviewed the above issues and discussed options for short-term versus long-term management of her problems.   Plan:   1. Prolapse: Stage 2 apical prolapse, Stage 3 anterior and posterior vaginal wall prolapse   --Pessary # 3 ring with support  --Daily pelvic floor exercises as assigned, consider Pelvic floor PT in future  --Non surgical options discussed with patient      2. Urge urinary incontinence: not too bothersome   --Bladder diary for 3-7 days, will bring at the next visit.   --Empty bladder every 3 hours. Empty well: wait a minute, lean forward on toilet.   --Avoid dietary irritants (see sheet). Keep diary x 3-5 days to determine your irritants.  --KEGELS: do 10 in AM and 10 in PM, holding each x 10 seconds. When you feel urge to go, STOP, KEGEL, and when urge has passed, then go to bathroom. Consider PT in future.   --URGE: Consider anticholinergic. Takes 2-4 weeks to see if will have effect. For dry mouth: get sour, sugar free lozenge or gum.   --STRESS: Pessary vs. Sling.      3. Incomplete bladder emptying:improved   --Double void and Crede maneuvers discussed  --Improved with pessary    4. Vaginal atrophy (dryness):   Use REPLENS or REFRESH OTC: 1/2 applicator full in vagina twice a week.        5. RTC 3 months    30 minutes were spent in face to face time with this patient  75 % of this time was spent in counseling and/or coordination of care    [facility]  Division of Female Pelvic Medicine and Reconstructive Surgery  Department of Obstetrics & Gynecology

## 2017-03-08 NOTE — MR AVS SNAPSHOT
[facility]  [address] [address]  Phone: [phone]                  Ernestine Luna   3/8/2017 11:00 AM   Office Visit    Description:  Female : 1929   Provider:  Kristine Sequeira NP   Department:  [facility]           Reason for Visit     Pessary Check                To Do List           Future Appointments        Provider Department Dept Phone    3/14/2017 11:00 AM Marilyn Pappas DPM [facility] - Podiatry [phone]    2017 11:00 AM Kristine Sequeira NP [facility] [phone]      Goals (5 Years of Data)     None      [facility]     [facility] -  Assistance  Registered nurses in the [facility] provide clinical advisement, health education, appointment booking, and other advisory services.  Call for this free service at [phone].             Medications           Message regarding Medications     Verify the changes and/or additions to your medication regime listed below are the same as discussed with your clinician today.  If any of these changes or additions are incorrect, please notify your healthcare provider.             Verify that the below list of medications is an accurate representation of the medications you are currently taking.  If none reported, the list may be blank. If incorrect, please contact your healthcare provider. Carry this list with you in case of emergency.           Current Medications     amlodipine (NORVASC) 10 MG tablet TAKE 1 TABLET BY MOUTH ONCE DAILY    atenolol (TENORMIN) 25 MG tablet TAKE 1 TABLET BY MOUTH EVERY DAY    atenolol (TENORMIN) 25 MG tablet TAKE 1 TABLET BY MOUTH EVERY DAY    benazepril (LOTENSIN) 40 MG tablet TAKE 1 TABLET BY MOUTH ONCE DAILY.    meloxicam (MOBIC) 7.5 MG tablet TAKE 1 TABLET(7.5 MG) BY MOUTH EVERY DAY    MULTIVITAMIN W-MINERALS/LUTEIN (CENTRUM SILVER ORAL) Take 1 tablet by mouth Daily.    omega  "3-calcium-vit D3-FA-mv13 [id number] mg Cmpk Take by mouth once daily.    vitamin D 1000 units Tab Take 185 mg by mouth once daily.           Clinical Reference Information           Your Vitals Were     BP Height Weight BMI       134/70 4' 11" (1.499 m) 65.1 kg (143 lb 8.3 oz) 28.99 kg/m2       Blood Pressure          Most Recent Value    BP  134/70      Allergies as of 3/8/2017     Acetaminophen    Aspirin    Sulfa (Sulfonamide Antibiotics)    Prednisone      Immunizations Administered on Date of Encounter - 3/8/2017     None      [facility] Sign-Up     Activating your [facility] account is as easy as 1-2-3!     1) Visit [email or web address], select Sign Up Now, enter this activation code and your date of birth, then select Next.  [email or web address]  Expires: 4/22/2017 11:36 AM      2) Create a username and password to use when you visit [facility] in the future and select a security question in case you lose your password and select Next.    3) Enter your e-mail address and click Sign Up!    Additional Information  If you have questions, please e-mail [email or web address] or call [phone] to talk to our [facility] staff. Remember, [facility] is NOT to be used for urgent needs. For medical emergencies, dial 911.         Instructions    1. Prolapse: Stage 2 apical prolapse, Stage 3 anterior and posterior vaginal wall prolapse   --Pessary # 3 ring with support  --Daily pelvic floor exercises as assigned, consider Pelvic floor PT in future  --Non surgical options discussed with patient      2. Urge urinary incontinence: not too bothersome   --Bladder diary for 3-7 days, will bring at the next visit.   --Empty bladder every 3 hours. Empty well: wait a minute, lean forward on toilet.   --Avoid dietary irritants (see sheet). Keep diary x 3-5 days to determine your irritants.  --KEGELS: do 10 in AM and 10 in PM, holding each x 10 seconds. When you feel urge to go, STOP, KEGEL, and when urge has passed, then go to bathroom. " Consider PT in future.   --URGE: Consider anticholinergic. Takes 2-4 weeks to see if will have effect. For dry mouth: get sour, sugar free lozenge or gum.   --STRESS: Pessary vs. Sling.      3. Incomplete bladder emptying:improved   --Double void and Crede maneuvers discussed  --Improved with pessary    4. Vaginal atrophy (dryness):   Use REPLENS or REFRESH OTC: 1/2 applicator full in vagina twice a week.        5. RTC 3 months       Language Assistance Services     ATTENTION: Language assistance services are available, free of charge. Please call [phone].      ATENCIÓN: Si habla claudia, tiene a burch disposición servicios gratuitos de asistencia lingüística. Llame al [phone].     OSIRIS Ý: N?u b?n nói Ti?ng Vi?t, có các d?ch v? h? tr? ngôn ng? mi?n phí dành cho b?n. G?i s? [phone].         [facility] complies with applicable Federal civil rights laws and does not discriminate on the basis of race, color, national origin, age, disability, or sex.

## 2017-03-14 ENCOUNTER — OFFICE VISIT (OUTPATIENT)
Dept: PODIATRY | Facility: CLINIC | Age: 82
End: 2017-03-14
Payer: MEDICARE

## 2017-03-14 VITALS
HEART RATE: 70 BPM | BODY MASS INDEX: 28.83 KG/M2 | SYSTOLIC BLOOD PRESSURE: 163 MMHG | WEIGHT: 143 LBS | HEIGHT: 59 IN | DIASTOLIC BLOOD PRESSURE: 76 MMHG

## 2017-03-14 DIAGNOSIS — L60.3 ONYCHODYSTROPHY: Primary | ICD-10-CM

## 2017-03-14 DIAGNOSIS — L84 CORN OR CALLUS: ICD-10-CM

## 2017-03-14 DIAGNOSIS — Z41.1 ENCOUNTER FOR COSMETIC PROCEDURE: ICD-10-CM

## 2017-03-14 PROCEDURE — 17999 UNLISTD PX SKN MUC MEMB SUBQ: CPT | Mod: CSM,,, | Performed by: PODIATRIST

## 2017-03-14 PROCEDURE — 99999 PR PBB SHADOW E&M-EST. PATIENT-LVL III: CPT | Mod: PBBFAC,,, | Performed by: PODIATRIST

## 2017-03-14 PROCEDURE — 99499 UNLISTED E&M SERVICE: CPT | Mod: CSM,,, | Performed by: PODIATRIST

## 2017-03-15 NOTE — PROGRESS NOTES
Pt presents for routine non-covered foot care. Pt. does not have high risk feet. Pedal pulses are palpable. Nails are elongated, thickened Bilaterally. Diagnosis is onychauxis and calluses. Nails and calluses were reduced Bilaterally. Patient tolerated well and related relief. RTC p.r.n. as PROC B

## 2017-04-28 ENCOUNTER — OFFICE VISIT (OUTPATIENT)
Dept: OPTOMETRY | Facility: CLINIC | Age: 82
End: 2017-04-28
Payer: MEDICARE

## 2017-04-28 DIAGNOSIS — I10 ESSENTIAL HYPERTENSION: Primary | ICD-10-CM

## 2017-04-28 DIAGNOSIS — H04.203 EPIPHORA, BILATERAL: ICD-10-CM

## 2017-04-28 DIAGNOSIS — H43.393 VITREOUS FLOATERS OF BOTH EYES: ICD-10-CM

## 2017-04-28 DIAGNOSIS — Z96.1 PSEUDOPHAKIA OF BOTH EYES: ICD-10-CM

## 2017-04-28 PROCEDURE — 99499 UNLISTED E&M SERVICE: CPT | Mod: S$PBB,,, | Performed by: OPTOMETRIST

## 2017-04-28 PROCEDURE — 99999 PR PBB SHADOW E&M-EST. PATIENT-LVL II: CPT | Mod: PBBFAC,,, | Performed by: OPTOMETRIST

## 2017-04-28 PROCEDURE — 92014 COMPRE OPH EXAM EST PT 1/>: CPT | Mod: S$GLB,,, | Performed by: OPTOMETRIST

## 2017-04-28 NOTE — PROGRESS NOTES
HPI     Patient's last dilated exam was: 11/13/2015  Pt hx: orbital blowout  Fracture 2013// Dr. Rangel  Pt states: very sensitive to glare, says the streetlights look like   chandeliers since 2015 after CE. Floaters ou Patient denies flashes,pain   and double vision. C/o excessive tearing ou. Occasional redness ou, using   AT's ou prn         Last edited by Dodie Mccormick, PCT on 4/28/2017  1:35 PM.     ROS     Negative for: Constitutional, Gastrointestinal, Neurological, Skin,   Genitourinary, Musculoskeletal, HENT, Endocrine, Cardiovascular, Eyes,   Respiratory, Psychiatric, Allergic/Imm, Heme/Lymph    Last edited by Saima Mccormick, OD on 4/28/2017  3:27 PM. (History)        Assessment /Plan     For exam results, see Encounter Report.    Essential hypertension    Epiphora, bilateral    Vitreous floaters of both eyes    Pseudophakia of both eyes            1.  No retinopathy--monitor yearly.  BP control.  2.  Educated pt to wear sunglasses outside and use artificial tears daily.  3.  Retina normal OU-no holes, tears, or breaks.  4.  Pt doing well since surgery.  Continue w/ current rx          RTC 1 year for routine exam.

## 2017-05-16 ENCOUNTER — OFFICE VISIT (OUTPATIENT)
Dept: PODIATRY | Facility: CLINIC | Age: 82
End: 2017-05-16
Payer: MEDICARE

## 2017-05-16 VITALS
WEIGHT: 143 LBS | DIASTOLIC BLOOD PRESSURE: 79 MMHG | BODY MASS INDEX: 28.83 KG/M2 | SYSTOLIC BLOOD PRESSURE: 152 MMHG | HEIGHT: 59 IN | HEART RATE: 68 BPM

## 2017-05-16 DIAGNOSIS — L84 CORN OR CALLUS: ICD-10-CM

## 2017-05-16 DIAGNOSIS — L60.2 ONYCHAUXIS: Primary | ICD-10-CM

## 2017-05-16 DIAGNOSIS — Z41.1 ENCOUNTER FOR COSMETIC PROCEDURE: ICD-10-CM

## 2017-05-16 PROCEDURE — 99999 PR PBB SHADOW E&M-EST. PATIENT-LVL III: CPT | Mod: PBBFAC,,, | Performed by: PODIATRIST

## 2017-05-16 PROCEDURE — 99499 UNLISTED E&M SERVICE: CPT | Mod: S$GLB,,, | Performed by: PODIATRIST

## 2017-05-16 PROCEDURE — 17999 UNLISTD PX SKN MUC MEMB SUBQ: CPT | Mod: CSM,S$GLB,, | Performed by: PODIATRIST

## 2017-05-16 NOTE — MR AVS SNAPSHOT
[facility]  [address]  [facility] [address]  Phone: [phone]                  Ernestine Luna   2017 11:45 AM   Office Visit    Description:  Female : 1929   Provider:  Marilyn Pappas DPM   Department:  [facility]           Reason for Visit     PCP     Follow-up                To Do List           Future Appointments        Provider Department Dept Phone    2017 11:00 AM Kristine Sequeira NP [facility] [phone]    2017 11:45 AM ODIL[facility] - Pod[facility] [phone]      Goals (5 Years of Data)     None      [facility]     [facility] Nurse Care Line -  Assistance  Unless otherwise directed by your provider, please contact [facility], our nurse care line that is available for  assistance.     Registered nurses in the [facility] provide: appointment scheduling, clinical advisement, health education, and other advisory services.  Call: [phone] (toll free)               Medications           Message regarding Medications     Verify the changes and/or additions to your medication regime listed below are the same as discussed with your clinician today.  If any of these changes or additions are incorrect, please notify your healthcare provider.        STOP taking these medications     meloxicam (MOBIC) 7.5 MG tablet TAKE 1 TABLET(7.5 MG) BY MOUTH EVERY DAY           Verify that the below list of medications is an accurate representation of the medications you are currently taking.  If none reported, the list may be blank. If incorrect, please contact your healthcare provider. Carry this list with you in case of emergency.           Current Medications     amlodipine (NORVASC) 10 MG tablet TAKE 1 TABLET BY MOUTH ONCE DAILY    atenolol (TENORMIN) 25 MG tablet TAKE 1 TABLET BY MOUTH EVERY DAY    atenolol (TENORMIN) 25 MG tablet TAKE 1 TABLET BY MOUTH EVERY DAY    benazepril (LOTENSIN) 40  "MG tablet TAKE 1 TABLET BY MOUTH ONCE DAILY.    MULTIVITAMIN W-MINERALS/LUTEIN (CENTRUM SILVER ORAL) Take 1 tablet by mouth Daily.    omega 3-calcium-vit D3-FA-mv13 [phone] mg Cmpk Take by mouth once daily.    vitamin D 1000 units Tab Take 185 mg by mouth once daily.           Clinical Reference Information           Your Vitals Were     BP Pulse Height Weight BMI    152/79 68 4' 11" (1.499 m) 64.9 kg (143 lb) 28.88 kg/m2      Blood Pressure          Most Recent Value    BP  (!)  152/79      Allergies as of 5/16/2017     Acetaminophen    Aspirin    Sulfa (Sulfonamide Antibiotics)    Prednisone      Immunizations Administered on Date of Encounter - 5/16/2017     None      [facility] Sign-Up     Activating your [facility] account is as easy as 1-2-3!     1) Visit [email or web address], select Sign Up Now, enter this activation code and your date of birth, then select Next.  [email or web address]  Expires: 6/30/2017 12:22 PM      2) Create a username and password to use when you visit [facility] in the future and select a security question in case you lose your password and select Next.    3) Enter your e-mail address and click Sign Up!    Additional Information  If you have questions, please e-mail [email or web address] or call [phone] to talk to our [facility] staff. Remember, [facility] is NOT to be used for urgent needs. For medical emergencies, dial 911.         Language Assistance Services     ATTENTION: Language assistance services are available, free of charge. Please call [phone].      ATENCIÓN: Si elsala claudia, tiene a burch disposición servicios gratuitos de asistencia lingüística. Llame al [phone].     OSIRIS Ý: N?u b?n nói Ti?ng Vi?t, có các d?ch v? h? tr? ngôn ng? mi?n phí dành cho b?n. G?i s? [phone].         [facility] complies with applicable Federal civil rights laws and does not discriminate on the basis of race, color, national origin, age, disability, or sex.        "

## 2017-06-07 ENCOUNTER — OFFICE VISIT (OUTPATIENT)
Dept: UROGYNECOLOGY | Facility: CLINIC | Age: 82
End: 2017-06-07
Payer: MEDICARE

## 2017-06-07 VITALS
BODY MASS INDEX: 28.76 KG/M2 | DIASTOLIC BLOOD PRESSURE: 80 MMHG | WEIGHT: 142.63 LBS | SYSTOLIC BLOOD PRESSURE: 140 MMHG | HEIGHT: 59 IN

## 2017-06-07 DIAGNOSIS — Z46.89 PESSARY MAINTENANCE: ICD-10-CM

## 2017-06-07 DIAGNOSIS — N95.2 VAGINAL ATROPHY: ICD-10-CM

## 2017-06-07 DIAGNOSIS — N81.11 MIDLINE CYSTOCELE: ICD-10-CM

## 2017-06-07 DIAGNOSIS — N99.3 VAGINAL VAULT PROLAPSE AFTER HYSTERECTOMY: Primary | ICD-10-CM

## 2017-06-07 DIAGNOSIS — N81.6 RECTOCELE: ICD-10-CM

## 2017-06-07 DIAGNOSIS — N39.46 MIXED STRESS AND URGE URINARY INCONTINENCE: ICD-10-CM

## 2017-06-07 LAB
BILIRUB SERPL-MCNC: NORMAL MG/DL
BLOOD URINE, POC: NORMAL
COLOR, POC UA: YELLOW
GLUCOSE UR QL STRIP: NORMAL
KETONES UR QL STRIP: NORMAL
LEUKOCYTE ESTERASE URINE, POC: NORMAL
NITRITE, POC UA: NORMAL
PH, POC UA: 9
PROTEIN, POC: NORMAL
SPECIFIC GRAVITY, POC UA: 1
UROBILINOGEN, POC UA: NORMAL

## 2017-06-07 PROCEDURE — 1159F MED LIST DOCD IN RCRD: CPT | Mod: S$GLB,,, | Performed by: NURSE PRACTITIONER

## 2017-06-07 PROCEDURE — 81002 URINALYSIS NONAUTO W/O SCOPE: CPT | Mod: S$GLB,,, | Performed by: NURSE PRACTITIONER

## 2017-06-07 PROCEDURE — 99999 PR PBB SHADOW E&M-EST. PATIENT-LVL III: CPT | Mod: PBBFAC,,, | Performed by: NURSE PRACTITIONER

## 2017-06-07 PROCEDURE — 1125F AMNT PAIN NOTED PAIN PRSNT: CPT | Mod: S$GLB,,, | Performed by: NURSE PRACTITIONER

## 2017-06-07 PROCEDURE — 99213 OFFICE O/P EST LOW 20 MIN: CPT | Mod: 25,S$GLB,, | Performed by: NURSE PRACTITIONER

## 2017-06-07 NOTE — PROGRESS NOTES
Urogyn follow up  06/07/2017  .  [facility]  [address] [address]    Ernestine Luna  [id number]  8/13/1929      Ernestine Luna is a 87 y.o. here for a urogyn follow up.    Last HPI from 03/08/2017  1)  UI:  (--) NELI   (--) UUI (--) pads Daytime frequency: Q 15 minutes- 3 hours.  Nocturia: Yes: 2/night.   (--) dysuria,  (--) hematuria,  (--) frequent UTIs.  (+) complete bladder emptying.   2)  POP:  Absent.   Symptoms:(--)  .  (--) vaginal bleeding. (--) vaginal discharge. (--) sexually active.  (--) dyspareunia.   (+)  Vaginal dryness.  (--) vaginal estrogen use.   3)  BM:  (--) constipation/straining.  (--) chronic diarrhea. (--) hematochezia.  (--) fecal incontinence.  (--) fecal smearing/urgency.  (--) incomplete evacuation.    4)Pessary:  Denies pain, bleeding or dischage. Using #3 ring with support pessary.     Changes from last visit:  1)  UI:  (--) NELI   (--) UUI (--) pads Daytime frequency: Q 30 minutes- 3 hours.  Nocturia: Yes: 0- 2/night.   (--) dysuria,  (--) hematuria,  (--) frequent UTIs.  (+) complete bladder emptying.     2)  POP:  Absent.   Symptoms:(--)  .  (--) vaginal bleeding. (--) vaginal discharge. (--) sexually active.  (--) dyspareunia.   (+)  Vaginal dryness.  (--) vaginal estrogen use.     3)  BM:  (--) constipation/straining.  (--) chronic diarrhea. (--) hematochezia.  (--) fecal incontinence.  (--) fecal smearing/urgency.  (--) incomplete evacuation.      4)Pessary:  Denies pain, bleeding or dischage. Using #3 ring with support pessary.    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 Wilkins    COLONOSCOPY W/ POLYPECTOMY  08/08/2013    RASHEED   06/24/2014    EYE SURGERY      HYSTERECTOMY  age 38    ALISTAIR; ovaries in place    JOINT REPLACEMENT      bilateral knees    TOTAL KNEE ARTHROPLASTY      Bilateral       Current Outpatient Prescriptions   Medication Sig    amlodipine (NORVASC) 10 MG tablet TAKE 1 TABLET BY MOUTH ONCE DAILY    atenolol (TENORMIN) 25 MG tablet TAKE 1 TABLET BY MOUTH EVERY DAY    atenolol (TENORMIN) 25 MG tablet TAKE 1 TABLET BY MOUTH EVERY DAY    benazepril (LOTENSIN) 40 MG tablet TAKE 1 TABLET BY MOUTH ONCE DAILY.    MULTIVITAMIN W-MINERALS/LUTEIN (CENTRUM SILVER ORAL) Take 1 tablet by mouth Daily.    omega 3-calcium-vit D3-FA-mv13 [phone] mg Cmpk Take by mouth once daily.    vitamin D 1000 units Tab Take 185 mg by mouth once daily.     No current facility-administered medications for this visit.        ROS:  As per HPI.      Well Woman:  Pap:denies history of abnormal pap smear-- post hyst  Mammo:02/02/2017   No mammographic evidence of malignancy.  Of note, with negative imaging, any decision to biopsy a palpable finding should  ultimately be made on a clinical basis.  Mammogram in 1 year is recommended.  Colonoscopy:08/2013 Diverticulosis in the sigmoid colon.                        - One 3 mm polyp in the transverse colon. Resected and retrieved.  Dexa:06/2016Osteoporosis of the femoral neck.     Recommendations:  1) Adequate calcium and Vitamin D therapy  2) Appropriate exercise  3) Consider medical therapy including bisphosphonates, denosumab.  4) Consider repeat BMD in 2 years.      Exam  BP (!) 140/80 (BP Location: Left arm, Patient  "Position: Sitting)   Ht 4' 11" (1.499 m)   Wt 64.7 kg (142 lb 10.2 oz)   BMI 28.81 kg/m²   General: alert and oriented, no acute distress  Respiratory: normal respiratory effort  Abd: soft, non-tender, non-distended    Pelvic  Ext. Genitalia: normal external genitalia. Normal bartholin's and skeens glands  Vagina: + atrophy. Normal vaginal mucosa without lesions. No discharge noted.   Non-tender bladder base without palpable mass.  #3 ring with support in place  Cervix: absent  Uterus:  absent   Urethra: no masses or tenderness  Urethral meatus: no lesions, caruncle or prolapse.      Pessary removed without difficulty.  Washed with soap and water.  Reinserted without difficulty.        Impression  1. Vaginal vault prolapse after hysterectomy     2. Midline cystocele     3. Rectocele     4. Mixed stress and urge urinary incontinence  POCT URINE DIPSTICK WITHOUT MICROSCOPE   5. Vaginal atrophy     6. Pessary maintenance       We reviewed the above issues and discussed options for short-term versus long-term management of her problems.   Plan:   1. Prolapse: Stage 2 apical prolapse, Stage 3 anterior and posterior vaginal wall prolapse   --Pessary # 3 ring with support  --Daily pelvic floor exercises as assigned, consider Pelvic floor PT in future  --Non surgical options discussed with patient      2. Urge urinary incontinence: not too bothersome   --Bladder diary for 3-7 days, will bring at the next visit.   --Empty bladder every 3 hours. Empty well: wait a minute, lean forward on toilet.   --Avoid dietary irritants (see sheet). Keep diary x 3-5 days to determine your irritants.  --KEGELS: do 10 in AM and 10 in PM, holding each x 10 seconds. When you feel urge to go, STOP, KEGEL, and when urge has passed, then go to bathroom. Consider PT in future.   --URGE: Consider anticholinergic. Takes 2-4 weeks to see if will have effect. For dry mouth: get sour, sugar free lozenge or gum.   --STRESS: Pessary vs. Sling.      3. " Incomplete bladder emptying:improved   --Double void and Crede maneuvers discussed  --Improved with pessary    4. Vaginal atrophy (dryness):   Use REPLENS or REFRESH OTC: 1/2 applicator full in vagina twice a week.        5. RTC 3 months    30 minutes were spent in face to face time with this patient  75 % of this time was spent in counseling and/or coordination of care    [facility]  Division of Female Pelvic Medicine and Reconstructive Surgery  Department of Obstetrics & Gynecology

## 2017-06-22 ENCOUNTER — OFFICE VISIT (OUTPATIENT)
Dept: PODIATRY | Facility: CLINIC | Age: 82
End: 2017-06-22
Payer: MEDICARE

## 2017-06-22 ENCOUNTER — HOSPITAL ENCOUNTER (OUTPATIENT)
Dept: RADIOLOGY | Facility: HOSPITAL | Age: 82
Discharge: HOME | End: 2017-06-22
Attending: PODIATRIST
Payer: MEDICARE

## 2017-06-22 VITALS
BODY MASS INDEX: 28.63 KG/M2 | SYSTOLIC BLOOD PRESSURE: 134 MMHG | HEIGHT: 59 IN | DIASTOLIC BLOOD PRESSURE: 81 MMHG | WEIGHT: 142 LBS | HEART RATE: 68 BPM

## 2017-06-22 DIAGNOSIS — M79.672 FOOT PAIN, BILATERAL: ICD-10-CM

## 2017-06-22 DIAGNOSIS — M20.42 HAMMER TOES OF BOTH FEET: ICD-10-CM

## 2017-06-22 DIAGNOSIS — L84 CORNS/CALLOSITIES: ICD-10-CM

## 2017-06-22 DIAGNOSIS — M20.41 HAMMER TOES OF BOTH FEET: ICD-10-CM

## 2017-06-22 DIAGNOSIS — M20.12 HAV (HALLUX ABDUCTO VALGUS), LEFT: ICD-10-CM

## 2017-06-22 DIAGNOSIS — M79.671 FOOT PAIN, BILATERAL: ICD-10-CM

## 2017-06-22 DIAGNOSIS — M20.11 HAV (HALLUX ABDUCTO VALGUS), RIGHT: ICD-10-CM

## 2017-06-22 DIAGNOSIS — Z41.1 ENCOUNTER FOR COSMETIC PROCEDURE: ICD-10-CM

## 2017-06-22 DIAGNOSIS — B35.1 ONYCHOMYCOSIS DUE TO DERMATOPHYTE: Primary | ICD-10-CM

## 2017-06-22 PROCEDURE — 73630 X-RAY EXAM OF FOOT: CPT | Mod: 26,50,, | Performed by: RADIOLOGY

## 2017-06-22 PROCEDURE — 99999 PR PBB SHADOW E&M-EST. PATIENT-LVL III: CPT | Mod: PBBFAC,,, | Performed by: PODIATRIST

## 2017-06-22 PROCEDURE — 99499 UNLISTED E&M SERVICE: CPT | Mod: S$GLB,,, | Performed by: PODIATRIST

## 2017-06-22 PROCEDURE — 73630 X-RAY EXAM OF FOOT: CPT | Mod: 50,TC

## 2017-06-22 RX ORDER — DICLOFENAC SODIUM 10 MG/G
2 GEL TOPICAL 3 TIMES DAILY
Qty: 1 TUBE | Refills: 4 | Status: SHIPPED | OUTPATIENT
Start: 2017-06-22 | End: 2018-06-21

## 2017-06-22 NOTE — PROGRESS NOTES
Pt presents for routine non-covered foot care. Pt. does not have high risk feet. Pedal pulses are palpable. Nails are elongated, thickened Bilaterally. Calluses present to both feet/toes. Diagnosis is onychauxis and calluses. Nails and calluses were reduced Bilaterally. Patient tolerated well and related relief. RTC p.r.n. as PROC B       Patient had several questions regarding correction of her deformed hammertoes of the left foot. Given condition and orientation of toes, surgery will be very difficult to straighten out the toes. Will obtain xrays today and discuss with patient at next visit.

## 2017-07-07 RX ORDER — AMLODIPINE BESYLATE 10 MG/1
TABLET ORAL
Qty: 90 TABLET | Refills: 1 | Status: SHIPPED | OUTPATIENT
Start: 2017-07-07 | End: 2017-12-31 | Stop reason: SDUPTHER

## 2017-07-27 ENCOUNTER — OFFICE VISIT (OUTPATIENT)
Dept: PODIATRY | Facility: CLINIC | Age: 82
End: 2017-07-27
Payer: MEDICARE

## 2017-07-27 VITALS
HEART RATE: 66 BPM | SYSTOLIC BLOOD PRESSURE: 146 MMHG | WEIGHT: 142 LBS | DIASTOLIC BLOOD PRESSURE: 76 MMHG | HEIGHT: 59 IN | BODY MASS INDEX: 28.63 KG/M2

## 2017-07-27 DIAGNOSIS — B35.1 ONYCHOMYCOSIS DUE TO DERMATOPHYTE: Primary | ICD-10-CM

## 2017-07-27 DIAGNOSIS — Z41.1 ENCOUNTER FOR COSMETIC PROCEDURE: ICD-10-CM

## 2017-07-27 DIAGNOSIS — L84 CORN OR CALLUS: ICD-10-CM

## 2017-07-27 PROCEDURE — 17999 UNLISTD PX SKN MUC MEMB SUBQ: CPT | Mod: CSM,,, | Performed by: PODIATRIST

## 2017-07-27 PROCEDURE — 99999 PR PBB SHADOW E&M-EST. PATIENT-LVL III: CPT | Mod: PBBFAC,,, | Performed by: PODIATRIST

## 2017-07-27 PROCEDURE — 99499 UNLISTED E&M SERVICE: CPT | Mod: CSM,,, | Performed by: PODIATRIST

## 2017-07-27 NOTE — PROGRESS NOTES
Pt presents for routine non-covered foot care. Pt. does not have high risk feet. Pedal pulses are palpable. Nails are elongated, thickened Bilaterally. Calluses present to both feet/toes. Diagnosis is onychomycosis and calluses. Nails and calluses were reduced Bilaterally. Patient tolerated well and related relief. RTC p.r.n. as PROC B

## 2017-07-31 RX ORDER — BENAZEPRIL HYDROCHLORIDE 40 MG/1
TABLET ORAL
Qty: 90 TABLET | Refills: 1 | Status: SHIPPED | OUTPATIENT
Start: 2017-07-31 | End: 2017-12-31 | Stop reason: SDUPTHER

## 2017-08-31 ENCOUNTER — OFFICE VISIT (OUTPATIENT)
Dept: PODIATRY | Facility: CLINIC | Age: 82
End: 2017-08-31
Payer: MEDICARE

## 2017-08-31 VITALS
WEIGHT: 142 LBS | HEART RATE: 67 BPM | DIASTOLIC BLOOD PRESSURE: 76 MMHG | BODY MASS INDEX: 28.63 KG/M2 | SYSTOLIC BLOOD PRESSURE: 133 MMHG | HEIGHT: 59 IN

## 2017-08-31 DIAGNOSIS — B35.1 ONYCHOMYCOSIS DUE TO DERMATOPHYTE: Primary | ICD-10-CM

## 2017-08-31 DIAGNOSIS — L84 CORN OR CALLUS: ICD-10-CM

## 2017-08-31 DIAGNOSIS — Z41.1 ENCOUNTER FOR COSMETIC PROCEDURE: ICD-10-CM

## 2017-08-31 PROCEDURE — 99499 UNLISTED E&M SERVICE: CPT | Mod: CSM,,, | Performed by: PODIATRIST

## 2017-08-31 PROCEDURE — 17999 UNLISTD PX SKN MUC MEMB SUBQ: CPT | Mod: CSM,,, | Performed by: PODIATRIST

## 2017-08-31 PROCEDURE — 99999 PR PBB SHADOW E&M-EST. PATIENT-LVL III: CPT | Mod: PBBFAC,,, | Performed by: PODIATRIST

## 2017-09-07 ENCOUNTER — OFFICE VISIT (OUTPATIENT)
Dept: UROGYNECOLOGY | Facility: CLINIC | Age: 82
End: 2017-09-07
Payer: MEDICARE

## 2017-09-07 VITALS
WEIGHT: 141.31 LBS | DIASTOLIC BLOOD PRESSURE: 60 MMHG | SYSTOLIC BLOOD PRESSURE: 120 MMHG | BODY MASS INDEX: 28.49 KG/M2 | HEIGHT: 59 IN

## 2017-09-07 DIAGNOSIS — N81.11 MIDLINE CYSTOCELE: ICD-10-CM

## 2017-09-07 DIAGNOSIS — R39.15 URINARY URGENCY: ICD-10-CM

## 2017-09-07 DIAGNOSIS — N99.3 VAGINAL VAULT PROLAPSE AFTER HYSTERECTOMY: Primary | ICD-10-CM

## 2017-09-07 DIAGNOSIS — Z46.89 PESSARY MAINTENANCE: ICD-10-CM

## 2017-09-07 DIAGNOSIS — N95.2 VAGINAL ATROPHY: ICD-10-CM

## 2017-09-07 DIAGNOSIS — N81.6 RECTOCELE: ICD-10-CM

## 2017-09-07 PROCEDURE — 1159F MED LIST DOCD IN RCRD: CPT | Mod: S$GLB,,, | Performed by: NURSE PRACTITIONER

## 2017-09-07 PROCEDURE — 99213 OFFICE O/P EST LOW 20 MIN: CPT | Mod: S$GLB,,, | Performed by: NURSE PRACTITIONER

## 2017-09-07 PROCEDURE — 3008F BODY MASS INDEX DOCD: CPT | Mod: S$GLB,,, | Performed by: NURSE PRACTITIONER

## 2017-09-07 PROCEDURE — 99999 PR PBB SHADOW E&M-EST. PATIENT-LVL III: CPT | Mod: PBBFAC,,, | Performed by: NURSE PRACTITIONER

## 2017-09-07 PROCEDURE — 1126F AMNT PAIN NOTED NONE PRSNT: CPT | Mod: S$GLB,,, | Performed by: NURSE PRACTITIONER

## 2017-09-07 NOTE — PROGRESS NOTES
Urogyn follow up  09/07/2017  .  [facility]  [address] [address]    Ernestine Luna  [id number]  8/13/1929      Ernestine Luna is a 88 y.o. here for a urogyn follow up.    Last HPI from 06/07/217  1)  UI:  (--) NELI   (--) UUI (--) pads Daytime frequency: Q 30 minutes- 3 hours.  Nocturia: Yes: 0- 2/night.   (--) dysuria,  (--) hematuria,  (--) frequent UTIs.  (+) complete bladder emptying.   2)  POP:  Absent.   Symptoms:(--)  .  (--) vaginal bleeding. (--) vaginal discharge. (--) sexually active.  (--) dyspareunia.   (+)  Vaginal dryness.  (--) vaginal estrogen use.   3)  BM:  (--) constipation/straining.  (--) chronic diarrhea. (--) hematochezia.  (--) fecal incontinence.  (--) fecal smearing/urgency.  (--) incomplete evacuation.    4)Pessary:  Denies pain, bleeding or dischage. Using #3 ring with support pessary..     Changes from last visit:  1)  UI:  (--) NELI   (--) UUI (--) pads Daytime frequency: Q 30 minutes- 3 hours.  Nocturia: Yes: 0- 2/night.   (--) dysuria,  (--) hematuria,  (--) frequent UTIs.  (+) complete bladder emptying.     2)  POP:  Absent.   Symptoms:(--)  .  (--) vaginal bleeding. (--) vaginal discharge. (--) sexually active.  (--) dyspareunia.   (+)  Vaginal dryness.  (--) vaginal estrogen use.     3)  BM:  (--) constipation/straining.  (--) chronic diarrhea. (--) hematochezia.  (--) fecal incontinence.  (--) fecal smearing/urgency.  (--) incomplete evacuation.      4)Pessary:  Denies pain, bleeding or dischage. Using #3 ring with support pessary.    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 Wilkins    COLONOSCOPY W/ POLYPECTOMY  08/08/2013    RASHEED   06/24/2014    EYE SURGERY      HYSTERECTOMY  age 38    ALISTAIR; ovaries in place    JOINT REPLACEMENT      bilateral knees    TOTAL KNEE ARTHROPLASTY      Bilateral       Current Outpatient Prescriptions   Medication Sig    amlodipine (NORVASC) 10 MG tablet TAKE 1 TABLET BY MOUTH ONCE DAILY    atenolol (TENORMIN) 25 MG tablet TAKE 1 TABLET BY MOUTH EVERY DAY    benazepril (LOTENSIN) 40 MG tablet TAKE 1 TABLET BY MOUTH ONCE DAILY.    diclofenac sodium (VOLTAREN) 1 % Gel Apply 2 g topically 3 (three) times daily.    MULTIVITAMIN W-MINERALS/LUTEIN (CENTRUM SILVER ORAL) Take 1 tablet by mouth Daily.    omega 3-calcium-vit D3-FA-mv13 [phone] mg Cmpk Take by mouth once daily.    polycarbophil (REPLENS) Gel Place vaginally.    vitamin D 1000 units Tab Take 185 mg by mouth once daily.     No current facility-administered medications for this visit.        ROS:  As per HPI.      Well Woman:  Pap:denies history of abnormal pap smear-- post hyst  Mammo:02/02/2017   No mammographic evidence of malignancy.  Of note, with negative imaging, any decision to biopsy a palpable finding should  ultimately be made on a clinical basis.  Mammogram in 1 year is recommended.  Colonoscopy:08/2013 Diverticulosis in the sigmoid colon.                        - One 3 mm polyp in the transverse colon. Resected and retrieved.  Dexa:06/2016Osteoporosis of the femoral neck.     Recommendations:  1) Adequate calcium and Vitamin D therapy  2) Appropriate exercise  3) Consider medical therapy including bisphosphonates, denosumab.  4) Consider repeat BMD in 2  "years.      Exam  /60 (BP Location: Right arm, Patient Position: Sitting, BP Method: Medium (Manual))   Ht 4' 11" (1.499 m)   Wt 64.1 kg (141 lb 5 oz)   BMI 28.54 kg/m²   General: alert and oriented, no acute distress  Respiratory: normal respiratory effort  Abd: soft, non-tender, non-distended    Pelvic  Ext. Genitalia: normal external genitalia. Normal bartholin's and skeens glands  Vagina: + atrophy. Normal vaginal mucosa without lesions. No discharge noted.   Non-tender bladder base without palpable mass.  #3 ring with support in place  Cervix: absent  Uterus:  absent   Urethra: no masses or tenderness  Urethral meatus: no lesions, caruncle or prolapse.      Pessary removed without difficulty.  Washed with soap and water.  Reinserted without difficulty.        Impression  1. Vaginal vault prolapse after hysterectomy     2. Midline cystocele     3. Rectocele     4. Urinary urgency     5. Vaginal atrophy     6. Pessary maintenance       We reviewed the above issues and discussed options for short-term versus long-term management of her problems.   Plan:   1. Prolapse: Stage 2 apical prolapse, Stage 3 anterior and posterior vaginal wall prolapse   --Pessary # 3 ring with support  --Daily pelvic floor exercises as assigned, consider Pelvic floor PT in future  --Non surgical options discussed with patient      2. Urge urinary incontinence: not too bothersome   --Bladder diary for 3-7 days, will bring at the next visit.   --Empty bladder every 3 hours. Empty well: wait a minute, lean forward on toilet.   --Avoid dietary irritants (see sheet). Keep diary x 3-5 days to determine your irritants.  --KEGELS: do 10 in AM and 10 in PM, holding each x 10 seconds. When you feel urge to go, STOP, KEGEL, and when urge has passed, then go to bathroom. Consider PT in future.   --URGE: Consider anticholinergic. Takes 2-4 weeks to see if will have effect. For dry mouth: get sour, sugar free lozenge or gum.   --STRESS: " Pessary vs. Sling.      3. Incomplete bladder emptying:improved   --Double void and Crede maneuvers discussed  --Improved with pessary    4. Vaginal atrophy (dryness):   Use REPLENS or REFRESH OTC: 1/2 applicator full in vagina twice a week.      5. RTC 3 months    30 minutes were spent in face to face time with this patient  75 % of this time was spent in counseling and/or coordination of care    [facility]  Division of Female Pelvic Medicine and Reconstructive Surgery  Department of Obstetrics & Gynecology

## 2017-09-26 ENCOUNTER — TELEPHONE (OUTPATIENT)
Dept: OPHTHALMOLOGY | Facility: CLINIC | Age: 82
End: 2017-09-26

## 2017-09-26 NOTE — TELEPHONE ENCOUNTER
Returned pt. Call. She couldn't remember the name of AT that Dr Rangel told her to get. The ones she purchased. Causes blurred vision.I gave her the name of several different brands. Advised the pt. To come in if she continues to have problems with the dryness.

## 2017-09-27 ENCOUNTER — TELEPHONE (OUTPATIENT)
Dept: INTERNAL MEDICINE | Facility: CLINIC | Age: 82
End: 2017-09-27

## 2017-09-27 NOTE — TELEPHONE ENCOUNTER
----- Message from Isabel Dinh sent at 9/26/2017  2:11 PM CDT -----  Contact: patient- [phone]  Patient needs a letter written from  to say she needs tint on windows of car due to her eyes or skin problems. Please call to advise.

## 2017-09-27 NOTE — LETTER
October 2, 2017    Ernestine Luna  [address] [place] [address]             [facility]  [address] [address]  Phone: [phone] To Whom It May Concern,    Ms Ernestine Luna is an 88 year old patient of mine. It is in my medical opinion that she should have tint on her car due to

## 2017-09-27 NOTE — TELEPHONE ENCOUNTER
----- Message from Meagan Gomes sent at 9/27/2017 11:23 AM CDT -----  Contact: Patient [phone] cell  Patient cannot get a brake tag because her windows are tinted. She needs a letter from you stating that she has an eye condition and she needs tinted windows.    Please call and advise.    Thank You

## 2017-09-28 ENCOUNTER — OFFICE VISIT (OUTPATIENT)
Dept: SURGERY | Facility: CLINIC | Age: 82
End: 2017-09-28
Payer: MEDICARE

## 2017-09-28 ENCOUNTER — HOSPITAL ENCOUNTER (OUTPATIENT)
Dept: RADIOLOGY | Facility: HOSPITAL | Age: 82
Discharge: HOME | End: 2017-09-28
Attending: NURSE PRACTITIONER
Payer: MEDICARE

## 2017-09-28 VITALS
TEMPERATURE: 98 F | BODY MASS INDEX: 28.43 KG/M2 | WEIGHT: 141 LBS | HEART RATE: 62 BPM | SYSTOLIC BLOOD PRESSURE: 163 MMHG | DIASTOLIC BLOOD PRESSURE: 81 MMHG | HEIGHT: 59 IN

## 2017-09-28 VITALS — WEIGHT: 141 LBS | BODY MASS INDEX: 28.43 KG/M2 | HEIGHT: 59 IN

## 2017-09-28 DIAGNOSIS — Z85.3 PERSONAL HISTORY OF BREAST CANCER: Primary | ICD-10-CM

## 2017-09-28 DIAGNOSIS — Z85.3 PERSONAL HISTORY OF BREAST CANCER: ICD-10-CM

## 2017-09-28 PROCEDURE — 99999 PR PBB SHADOW E&M-EST. PATIENT-LVL III: CPT | Mod: PBBFAC,,, | Performed by: NURSE PRACTITIONER

## 2017-09-28 PROCEDURE — 3008F BODY MASS INDEX DOCD: CPT | Mod: S$GLB,,, | Performed by: NURSE PRACTITIONER

## 2017-09-28 PROCEDURE — 77062 BREAST TOMOSYNTHESIS BI: CPT | Mod: 26,,, | Performed by: RADIOLOGY

## 2017-09-28 PROCEDURE — 1126F AMNT PAIN NOTED NONE PRSNT: CPT | Mod: S$GLB,,, | Performed by: NURSE PRACTITIONER

## 2017-09-28 PROCEDURE — 77066 DX MAMMO INCL CAD BI: CPT | Mod: 26,,, | Performed by: RADIOLOGY

## 2017-09-28 PROCEDURE — 99213 OFFICE O/P EST LOW 20 MIN: CPT | Mod: S$GLB,,, | Performed by: NURSE PRACTITIONER

## 2017-09-28 PROCEDURE — 1159F MED LIST DOCD IN RCRD: CPT | Mod: S$GLB,,, | Performed by: NURSE PRACTITIONER

## 2017-09-28 PROCEDURE — 77066 DX MAMMO INCL CAD BI: CPT | Mod: TC

## 2017-09-28 NOTE — PROGRESS NOTES
Subjective:      Patient ID: Ernestine Luna is a 88 y.o. female.    Chief Complaint: Breast Cancer Screening (CBE/Hx of Breast Cancer)      HPI: (PF, EPF - 1-3) (Detailed, Comp, - 4) returning patient presents for breast cancer surveillance. Patient denies palpable breast mass, pain, nipple discharge, redness, increased warmth, unexplained weight loss, new onset bone pain. Her main complaint today surrounds her difficulty with getting a brake tag secondary to tinted windows on her new car. She remains very active and independent , lives alone , drives.      Extensive family history of breast cancer, 3/3 daughters with breast cancer, one of which also had negative genetic testing. Negative Integrated BRACAnalysis myRisk 2016 in this patient.       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       Review of Systems   Constitutional: Negative for appetite change and fatigue.   Respiratory: Negative for cough and shortness of breath.    Cardiovascular: Negative for chest pain.   Musculoskeletal: Negative for back pain.     Objective:   Physical Exam   Pulmonary/Chest: She exhibits no mass, no tenderness, no laceration, no edema, no deformity, no swelling and no retraction. Right breast exhibits no inverted nipple, no nipple discharge, no skin change and no tenderness. Left breast exhibits no inverted nipple, no mass, no nipple discharge, no skin change and no tenderness. Breasts are symmetrical. There is no breast swelling.   Focal fibrosis associated with right lumpectomy site, patient states this is unchanged. The right nipple is slightly flattened compared to the left, again patient states unchanged for years   Lymphadenopathy:     She has no cervical adenopathy.     She has no axillary adenopathy.        Right: No supraclavicular adenopathy present.        Left: No  supraclavicular adenopathy present.     Assessment:       1. Personal history of breast cancer        Plan:       mmg today, no changes or abnormality reported  Return in one year CBE only, will discuss continued mmg at that time based on her health status

## 2017-10-03 ENCOUNTER — OFFICE VISIT (OUTPATIENT)
Dept: PODIATRY | Facility: CLINIC | Age: 82
End: 2017-10-03
Payer: MEDICARE

## 2017-10-03 ENCOUNTER — TELEPHONE (OUTPATIENT)
Dept: OPTOMETRY | Facility: CLINIC | Age: 82
End: 2017-10-03

## 2017-10-03 VITALS
DIASTOLIC BLOOD PRESSURE: 69 MMHG | SYSTOLIC BLOOD PRESSURE: 157 MMHG | HEART RATE: 64 BPM | HEIGHT: 59 IN | WEIGHT: 140.88 LBS | BODY MASS INDEX: 28.4 KG/M2

## 2017-10-03 DIAGNOSIS — L84 CORN OR CALLUS: ICD-10-CM

## 2017-10-03 DIAGNOSIS — B35.1 ONYCHOMYCOSIS DUE TO DERMATOPHYTE: Primary | ICD-10-CM

## 2017-10-03 PROCEDURE — 99499 UNLISTED E&M SERVICE: CPT | Mod: CSM,,, | Performed by: PODIATRIST

## 2017-10-03 PROCEDURE — 17999 UNLISTD PX SKN MUC MEMB SUBQ: CPT | Mod: CSM,,, | Performed by: PODIATRIST

## 2017-10-03 PROCEDURE — 99999 PR PBB SHADOW E&M-EST. PATIENT-LVL II: CPT | Mod: PBBFAC,,, | Performed by: PODIATRIST

## 2017-10-03 NOTE — TELEPHONE ENCOUNTER
Called patient and let her know Dr. Mccormick can not write her  A letter to get her car windows tinted. No answer, left vm

## 2017-11-02 ENCOUNTER — OFFICE VISIT (OUTPATIENT)
Dept: PODIATRY | Facility: CLINIC | Age: 82
End: 2017-11-02
Payer: MEDICARE

## 2017-11-02 VITALS
WEIGHT: 138.88 LBS | SYSTOLIC BLOOD PRESSURE: 164 MMHG | HEIGHT: 59 IN | BODY MASS INDEX: 28 KG/M2 | DIASTOLIC BLOOD PRESSURE: 80 MMHG | HEART RATE: 69 BPM

## 2017-11-02 DIAGNOSIS — L84 CORN OR CALLUS: ICD-10-CM

## 2017-11-02 DIAGNOSIS — B35.1 ONYCHOMYCOSIS DUE TO DERMATOPHYTE: Primary | ICD-10-CM

## 2017-11-02 PROCEDURE — 99999 PR PBB SHADOW E&M-EST. PATIENT-LVL III: CPT | Mod: PBBFAC,,, | Performed by: PODIATRIST

## 2017-11-02 PROCEDURE — 99499 UNLISTED E&M SERVICE: CPT | Mod: S$GLB,,, | Performed by: PODIATRIST

## 2017-11-02 PROCEDURE — 17999 UNLISTD PX SKN MUC MEMB SUBQ: CPT | Mod: CSM,S$GLB,, | Performed by: PODIATRIST

## 2017-11-02 NOTE — PROGRESS NOTES
Pt presents for routine non-covered foot care. Pt. does not have high risk feet. Pedal pulses are palpable. Nails are elongated, thickened Bilaterally. Calluses present to both feet/toes. Diagnosis is onychomycosis and calluses. Nails and calluses were reduced Bilaterally. Patient tolerated well and related relief. RTC 4 weeks as PROC B

## 2017-11-15 ENCOUNTER — TELEPHONE (OUTPATIENT)
Dept: INTERNAL MEDICINE | Facility: CLINIC | Age: 82
End: 2017-11-15

## 2017-11-15 NOTE — TELEPHONE ENCOUNTER
----- Message from Kitty Rios sent at 11/14/2017  2:11 PM CST -----  Contact: patient [phone]  Pt scheduled her annual physical for 1/03/18 at 1pm, Pt can't come earlier and couldn't figure out a time/day she could come fasting. She prefers not to come twice because she lives too far away. Can you schedule labs that won't require fasting? Please call to yeny pt.

## 2017-11-15 NOTE — TELEPHONE ENCOUNTER
Pt scheduled her annual physical for 1/03/18 at 1pm, Pt can't come earlier and couldn't figure out a time/day she could come fasting. She prefers not to come twice because she lives too far away. Can you schedule labs that won't require fasting? Please call to adivse pt.

## 2017-11-16 RX ORDER — METOPROLOL SUCCINATE 25 MG/1
25 TABLET, EXTENDED RELEASE ORAL DAILY
Qty: 90 TABLET | Refills: 3 | Status: SHIPPED | OUTPATIENT
Start: 2017-11-16 | End: 2018-10-17

## 2017-11-16 RX ORDER — ATENOLOL 25 MG/1
25 TABLET ORAL DAILY
Qty: 90 TABLET | Refills: 3 | Status: SHIPPED | OUTPATIENT
Start: 2017-11-16 | End: 2017-11-16

## 2017-11-16 NOTE — TELEPHONE ENCOUNTER
"----- Message from Vineet Bolden sent at 11/16/2017 12:11 PM CST -----  Contact: self [phone]  RX request - refill or new RX.  Is this a refill or new RX:  Refill   RX name and strength: atenolol (TENORMIN) 25 MG tablet  Directions: TAKE 1 TABLET BY MOUTH EVERY DAY  Is this a 30 day or 90 day RX:  90  Pharmacy name and phone # (DON'T enter "on file" or "in chart"): [facility] [id number]  [phone] (Phone)  [phone] (Fax)  Comments:  Per pt was told RX not in stock please advise/ pt only has 2 left         "

## 2017-11-17 NOTE — TELEPHONE ENCOUNTER
Pt wants to get her labs done the day before now. Please put in pre visit labs so I can link them to her appt

## 2017-12-05 ENCOUNTER — OFFICE VISIT (OUTPATIENT)
Dept: PODIATRY | Facility: CLINIC | Age: 82
End: 2017-12-05

## 2017-12-05 VITALS
BODY MASS INDEX: 28.02 KG/M2 | SYSTOLIC BLOOD PRESSURE: 166 MMHG | DIASTOLIC BLOOD PRESSURE: 85 MMHG | HEIGHT: 59 IN | WEIGHT: 139 LBS | HEART RATE: 65 BPM

## 2017-12-05 DIAGNOSIS — B35.1 ONYCHOMYCOSIS DUE TO DERMATOPHYTE: Primary | ICD-10-CM

## 2017-12-05 DIAGNOSIS — L84 CORN OR CALLUS: ICD-10-CM

## 2017-12-05 PROCEDURE — 99999 PR PBB SHADOW E&M-EST. PATIENT-LVL III: CPT | Mod: PBBFAC,,, | Performed by: PODIATRIST

## 2017-12-05 PROCEDURE — 17999 UNLISTD PX SKN MUC MEMB SUBQ: CPT | Mod: CSM,,, | Performed by: PODIATRIST

## 2017-12-05 PROCEDURE — 99499 UNLISTED E&M SERVICE: CPT | Mod: CSM,,, | Performed by: PODIATRIST

## 2017-12-15 ENCOUNTER — TELEPHONE (OUTPATIENT)
Dept: OPTOMETRY | Facility: CLINIC | Age: 82
End: 2017-12-15

## 2017-12-15 NOTE — TELEPHONE ENCOUNTER
Pt c/o excessive tearing and asking for the name of the medication Dr. Rangel has recommended. Told pt to use Systane ultra OU QID and either lacrilube (as Dr. Rangel had recommended) Refresh pm or systane or Refresh gel gtts OU QHS. Will mail pt a coupon for both

## 2017-12-18 ENCOUNTER — OFFICE VISIT (OUTPATIENT)
Dept: UROGYNECOLOGY | Facility: CLINIC | Age: 82
End: 2017-12-18
Payer: MEDICARE

## 2017-12-18 VITALS
DIASTOLIC BLOOD PRESSURE: 72 MMHG | SYSTOLIC BLOOD PRESSURE: 130 MMHG | WEIGHT: 141.31 LBS | HEIGHT: 59 IN | BODY MASS INDEX: 28.49 KG/M2

## 2017-12-18 DIAGNOSIS — Z46.89 PESSARY MAINTENANCE: ICD-10-CM

## 2017-12-18 DIAGNOSIS — N99.3 VAGINAL VAULT PROLAPSE AFTER HYSTERECTOMY: Primary | ICD-10-CM

## 2017-12-18 DIAGNOSIS — N39.41 URGE INCONTINENCE: ICD-10-CM

## 2017-12-18 DIAGNOSIS — N81.11 MIDLINE CYSTOCELE: ICD-10-CM

## 2017-12-18 DIAGNOSIS — N95.2 VAGINAL ATROPHY: ICD-10-CM

## 2017-12-18 DIAGNOSIS — N81.6 RECTOCELE: ICD-10-CM

## 2017-12-18 PROCEDURE — 99999 PR PBB SHADOW E&M-EST. PATIENT-LVL III: CPT | Mod: PBBFAC,,, | Performed by: NURSE PRACTITIONER

## 2017-12-18 PROCEDURE — 99213 OFFICE O/P EST LOW 20 MIN: CPT | Mod: S$GLB,,, | Performed by: NURSE PRACTITIONER

## 2017-12-18 NOTE — PROGRESS NOTES
Urogyn follow up  12/18/2017  .  [facility] [address] [place] [address]    Ernestine Luna  [id number]  8/13/1929      Ernestine Luna is a 88 y.o. here for a urogyn follow up.    Last HPI from 09/07/217  1)  UI:  (--) NELI   (--) UUI (--) pads Daytime frequency: Q 30 minutes- 3 hours.  Nocturia: Yes: 0- 2/night.   (--) dysuria,  (--) hematuria,  (--) frequent UTIs.  (+) complete bladder emptying.   2)  POP:  Absent.   Symptoms:(--)  .  (--) vaginal bleeding. (--) vaginal discharge. (--) sexually active.  (--) dyspareunia.   (+)  Vaginal dryness.  (--) vaginal estrogen use.   3)  BM:  (--) constipation/straining.  (--) chronic diarrhea. (--) hematochezia.  (--) fecal incontinence.  (--) fecal smearing/urgency.  (--) incomplete evacuation.    4)Pessary:  Denies pain, bleeding or dischage. Using #3 ring with support pessary.    Changes from last visit:  1)  UI:  (--) NELI   (--) UUI (--) pads Daytime frequency: Q 30 minutes- 3 hours.  Nocturia: Yes: 0- 2/night.   (--) dysuria,  (--) hematuria,  (--) frequent UTIs.  (+) complete bladder emptying.     2)  POP:  Absent.   Symptoms:(--)  .  (--) vaginal bleeding. (--) vaginal discharge. (--) sexually active.  (--) dyspareunia.   (+)  Vaginal dryness.  (--) vaginal estrogen use.     3)  BM:  (--) constipation/straining.  (--) chronic diarrhea. (--) hematochezia.  (--) fecal incontinence.  (--) fecal smearing/urgency.  (--) incomplete evacuation.      4)Pessary:  Denies pain, bleeding or dischage. Using #3 ring with support pessary.    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 Wilkins    COLONOSCOPY W/ POLYPECTOMY  08/08/2013    RASHEED   06/24/2014    EYE SURGERY      HYSTERECTOMY  age 38    ALISTAIR; ovaries in place    JOINT REPLACEMENT      bilateral knees    TOTAL KNEE ARTHROPLASTY      Bilateral       Current Outpatient Prescriptions   Medication Sig    amlodipine (NORVASC) 10 MG tablet TAKE 1 TABLET BY MOUTH ONCE DAILY    benazepril (LOTENSIN) 40 MG tablet TAKE 1 TABLET BY MOUTH ONCE DAILY.    diclofenac sodium (VOLTAREN) 1 % Gel Apply 2 g topically 3 (three) times daily.    FLUZONE HIGH-DOSE 2017-18, PF, 180 mcg/0.5 mL vaccine     metoprolol succinate (TOPROL-XL) 25 MG 24 hr tablet Take 1 tablet (25 mg total) by mouth once daily.    MULTIVITAMIN W-MINERALS/LUTEIN (CENTRUM SILVER ORAL) Take 1 tablet by mouth Daily.    omega 3-calcium-vit D3-FA-mv13 353-9060-143 mg Cmpk Take by mouth once daily.    polycarbophil (REPLENS) Gel Place vaginally.    vitamin D 1000 units Tab Take 185 mg by mouth once daily.     No current facility-administered medications for this visit.        ROS:  As per HPI.      Well Woman:  Pap:denies history of abnormal pap smear-- post hyst  Mammo:02/02/2017   No mammographic evidence of malignancy.  Of note, with negative imaging, any decision to biopsy a palpable finding should  ultimately be made on a clinical basis.  Mammogram in 1 year is recommended.  Colonoscopy:08/2013 Diverticulosis in the sigmoid colon.                        - One 3 mm polyp in the transverse colon. Resected and retrieved.  Dexa:06/2016Osteoporosis of the femoral neck.     Recommendations:  1) Adequate calcium and Vitamin D therapy  2) Appropriate exercise  3) Consider medical therapy including  "bisphosphonates, denosumab.  4) Consider repeat BMD in 2 years.      Exam  /72 (BP Location: Right arm, Patient Position: Sitting)   Ht 4' 11" (1.499 m)   Wt 64.1 kg (141 lb 5 oz)   LMP  (LMP Unknown)   BMI 28.54 kg/m²   General: alert and oriented, no acute distress  Respiratory: normal respiratory effort  Abd: soft, non-tender, non-distended    Pelvic  Ext. Genitalia: normal external genitalia. Normal bartholin's and skeens glands  Vagina: + atrophy. Normal vaginal mucosa without lesions. No discharge noted.   Non-tender bladder base without palpable mass.  #3 ring with support in place  Cervix: absent  Uterus:  absent   Urethra: no masses or tenderness  Urethral meatus: no lesions, caruncle or prolapse.      Pessary removed without difficulty.  Washed with soap and water.  Reinserted without difficulty.        Impression  1. Vaginal vault prolapse after hysterectomy     2. Midline cystocele     3. Rectocele     4. Vaginal atrophy     5. Pessary maintenance     6. Urge incontinence       We reviewed the above issues and discussed options for short-term versus long-term management of her problems.   Plan:   1. Prolapse: Stage 2 apical prolapse, Stage 3 anterior and posterior vaginal wall prolapse   --Pessary # 3 ring with support  --Daily pelvic floor exercises as assigned, consider Pelvic floor PT in future  --Non surgical options discussed with patient      2. Urge urinary incontinence: not too bothersome   --Bladder diary for 3-7 days, will bring at the next visit.   --Empty bladder every 3 hours. Empty well: wait a minute, lean forward on toilet.   --Avoid dietary irritants (see sheet). Keep diary x 3-5 days to determine your irritants.  --KEGELS: do 10 in AM and 10 in PM, holding each x 10 seconds. When you feel urge to go, STOP, KEGEL, and when urge has passed, then go to bathroom. Consider PT in future.   --URGE: Consider anticholinergic. Takes 2-4 weeks to see if will have effect. For dry mouth: " get sour, sugar free lozenge or gum.   --STRESS: Pessary vs. Sling.      3. Incomplete bladder emptying:improved   --Double void and Crede maneuvers discussed  --Improved with pessary    4. Vaginal atrophy (dryness):   Use REPLENS or REFRESH OTC: 1/2 applicator full in vagina twice a week.      5. RTC 3 months    30 minutes were spent in face to face time with this patient  75 % of this time was spent in counseling and/or coordination of care    [facility]  Division of Female Pelvic Medicine and Reconstructive Surgery  Department of Obstetrics & Gynecology

## 2017-12-27 ENCOUNTER — TELEPHONE (OUTPATIENT)
Dept: OPTOMETRY | Facility: CLINIC | Age: 82
End: 2017-12-27

## 2017-12-27 NOTE — TELEPHONE ENCOUNTER
Pt c/o excessive tearing. Scheduled pt to see Dr. Ceja 3/1/2018. She says the tearing makes her vision very blurry and AT's are not helping. She is using Systane OU TID, Refresh Optive OU BID and an ointment QHS w/ no relief

## 2017-12-29 ENCOUNTER — TELEPHONE (OUTPATIENT)
Dept: INTERNAL MEDICINE | Facility: CLINIC | Age: 82
End: 2017-12-29

## 2017-12-29 ENCOUNTER — LAB VISIT (OUTPATIENT)
Dept: LAB | Facility: HOSPITAL | Age: 82
End: 2017-12-29
Attending: INTERNAL MEDICINE
Payer: MEDICARE

## 2017-12-29 DIAGNOSIS — Z00.00 ANNUAL PHYSICAL EXAM: Primary | ICD-10-CM

## 2017-12-29 DIAGNOSIS — I10 ESSENTIAL HYPERTENSION: Primary | ICD-10-CM

## 2017-12-29 DIAGNOSIS — I10 ESSENTIAL HYPERTENSION: ICD-10-CM

## 2017-12-29 DIAGNOSIS — Z00.00 ANNUAL PHYSICAL EXAM: ICD-10-CM

## 2017-12-29 DIAGNOSIS — I10 HYPERTENSION, UNSPECIFIED TYPE: ICD-10-CM

## 2017-12-29 LAB
ALBUMIN SERPL BCP-MCNC: 4.3 G/DL
ALP SERPL-CCNC: 71 U/L
ALT SERPL W/O P-5'-P-CCNC: 19 U/L
ANION GAP SERPL CALC-SCNC: 9 MMOL/L
AST SERPL-CCNC: 26 U/L
BASOPHILS # BLD AUTO: 0.02 K/UL
BASOPHILS NFR BLD: 0.3 %
BILIRUB SERPL-MCNC: 0.7 MG/DL
BUN SERPL-MCNC: 17 MG/DL
CALCIUM SERPL-MCNC: 10.1 MG/DL
CHLORIDE SERPL-SCNC: 106 MMOL/L
CHOLEST SERPL-MCNC: 172 MG/DL
CHOLEST/HDLC SERPL: 2.6 {RATIO}
CO2 SERPL-SCNC: 28 MMOL/L
CREAT SERPL-MCNC: 0.7 MG/DL
DIFFERENTIAL METHOD: NORMAL
EOSINOPHIL # BLD AUTO: 0.3 K/UL
EOSINOPHIL NFR BLD: 4.2 %
ERYTHROCYTE [DISTWIDTH] IN BLOOD BY AUTOMATED COUNT: 13.3 %
EST. GFR  (AFRICAN AMERICAN): >60 ML/MIN/1.73 M^2
EST. GFR  (NON AFRICAN AMERICAN): >60 ML/MIN/1.73 M^2
GLUCOSE SERPL-MCNC: 87 MG/DL
HCT VFR BLD AUTO: 39.7 %
HDLC SERPL-MCNC: 65 MG/DL
HDLC SERPL: 37.8 %
HGB BLD-MCNC: 13.3 G/DL
LDLC SERPL CALC-MCNC: 98 MG/DL
LYMPHOCYTES # BLD AUTO: 1.7 K/UL
LYMPHOCYTES NFR BLD: 25.6 %
MCH RBC QN AUTO: 30.9 PG
MCHC RBC AUTO-ENTMCNC: 33.5 G/DL
MCV RBC AUTO: 92 FL
MONOCYTES # BLD AUTO: 0.5 K/UL
MONOCYTES NFR BLD: 7.6 %
NEUTROPHILS # BLD AUTO: 4 K/UL
NEUTROPHILS NFR BLD: 62.1 %
NONHDLC SERPL-MCNC: 107 MG/DL
PLATELET # BLD AUTO: 183 K/UL
PMV BLD AUTO: 10.5 FL
POTASSIUM SERPL-SCNC: 4.5 MMOL/L
PROT SERPL-MCNC: 7.4 G/DL
RBC # BLD AUTO: 4.31 M/UL
SODIUM SERPL-SCNC: 143 MMOL/L
TRIGL SERPL-MCNC: 45 MG/DL
TSH SERPL DL<=0.005 MIU/L-ACNC: 0.72 UIU/ML
WBC # BLD AUTO: 6.48 K/UL

## 2017-12-29 PROCEDURE — 80053 COMPREHEN METABOLIC PANEL: CPT

## 2017-12-29 PROCEDURE — 80061 LIPID PANEL: CPT

## 2017-12-29 PROCEDURE — 84443 ASSAY THYROID STIM HORMONE: CPT

## 2017-12-29 PROCEDURE — 36415 COLL VENOUS BLD VENIPUNCTURE: CPT

## 2017-12-29 PROCEDURE — 85025 COMPLETE CBC W/AUTO DIFF WBC: CPT

## 2017-12-29 NOTE — TELEPHONE ENCOUNTER
----- Message from Meagan Gomes sent at 12/29/2017  7:19 AM CST -----  Contact: Patient [phone]  Patient had labs scheduled for 7:10am this morning but when she came here today no orders were linked and she was unable to have her labs drawn. Patient was very upset and would like to speak to you.    Please call and advise.    Thank You

## 2018-01-01 RX ORDER — AMLODIPINE BESYLATE 10 MG/1
TABLET ORAL
Qty: 90 TABLET | Refills: 1 | Status: SHIPPED | OUTPATIENT
Start: 2018-01-01 | End: 2018-07-05 | Stop reason: SDUPTHER

## 2018-01-01 RX ORDER — BENAZEPRIL HYDROCHLORIDE 40 MG/1
TABLET ORAL
Qty: 90 TABLET | Refills: 1 | Status: SHIPPED | OUTPATIENT
Start: 2018-01-01 | End: 2018-08-02 | Stop reason: SDUPTHER

## 2018-01-03 ENCOUNTER — OFFICE VISIT (OUTPATIENT)
Dept: INTERNAL MEDICINE | Facility: CLINIC | Age: 83
End: 2018-01-03
Payer: MEDICARE

## 2018-01-03 VITALS
SYSTOLIC BLOOD PRESSURE: 155 MMHG | HEART RATE: 64 BPM | HEIGHT: 59 IN | DIASTOLIC BLOOD PRESSURE: 72 MMHG | WEIGHT: 140 LBS | BODY MASS INDEX: 28.22 KG/M2

## 2018-01-03 DIAGNOSIS — Z85.3 HISTORY OF BREAST CANCER: ICD-10-CM

## 2018-01-03 DIAGNOSIS — M15.9 PRIMARY OSTEOARTHRITIS INVOLVING MULTIPLE JOINTS: ICD-10-CM

## 2018-01-03 DIAGNOSIS — I10 ESSENTIAL HYPERTENSION: ICD-10-CM

## 2018-01-03 DIAGNOSIS — N99.3 VAGINAL VAULT PROLAPSE, POSTHYSTERECTOMY: ICD-10-CM

## 2018-01-03 DIAGNOSIS — Z00.00 ANNUAL PHYSICAL EXAM: Primary | ICD-10-CM

## 2018-01-03 PROCEDURE — 99397 PER PM REEVAL EST PAT 65+ YR: CPT | Mod: S$GLB,,, | Performed by: INTERNAL MEDICINE

## 2018-01-03 PROCEDURE — 99999 PR PBB SHADOW E&M-EST. PATIENT-LVL III: CPT | Mod: PBBFAC,,, | Performed by: INTERNAL MEDICINE

## 2018-01-03 PROCEDURE — 99499 UNLISTED E&M SERVICE: CPT | Mod: S$GLB,,, | Performed by: INTERNAL MEDICINE

## 2018-01-03 NOTE — PROGRESS NOTES
PAST MEDICAL HISTORY:  Hypertension.  Breast cancer of the right breast, status post lumpectomy and had been on   tamoxifen.  Osteoarthritis of the knees and shoulder.  Hysterectomy with oophorectomy.  Carpal tunnel syndrome release.  Bilateral knee arthroscopic surgery.  Bunionectomy.    SOCIAL HISTORY:  Tobacco use and alcohol use, none.  , has one living   daughter, two daughters  from breast cancer.  She has a son with   hypertension and hyperlipidemia.    FAMILY HISTORY:  Father is , hypertension.  Mother is , stroke.    Sister  of diabetes and heart disease.  Another sister  of   breast cancer, heart disease, diabetes.  One sister alive, has diabetes and   heart disease.    SCREENING:  Colonoscopy in 2013 revealed an adenomatous polyp.    MEDICATIONS:  Atenolol 50 mg daily.  Amlodipine 10 mg daily.  Benazepril 20 mg daily.  Vitamin D.  Omega-3.  Calcium.  Multivitamin.          REASON FOR VISIT:  This is an 88-year-old female who comes in for an annual   routine visit.  Overall in general, she has felt well.  Also noted is that blood   pressure readings at home are with systolics in the 120s and diastolics in the   70s.  They tend to be elevated in the office readings.    MAIN MEDICAL CONDITIONS:  Hypertension.  History of breast cancer and strong family history of breast cancer, which is   outlined above.    RECENT LABS:  Labs on  showed CBC, chemistry, and TSH normal.    Cholesterol 172 with HDL 65 and LDL 98.    REVIEW OF SYMPTOMS:  Reports no pains in the chest, palpitations, shortness of   breath, or abdominal pain.  Has regular bowel function.  As far as urination, at   most she will have nocturia x1.  She urinates frequently in the morning, but as   the day goes on, it is less.  Right now she is not having incontinence or   incomplete emptying.  Reports no headaches or arthralgias.  The only pain she   may have is a reoccurring callus  underneath her left foot; she sees Podiatry   periodically to have it reduced.    She also sees Urogynecology every three months for change of pessary, and she is   using Replens twice a week.  [facility]    PHYSICAL EXAMINATION:  VITAL SIGNS:  Her weight is 139 pounds, blood pressure taken today 158/72, pulse   64.  HEENT:  Tympanic membranes normal.  Nasal mucosa is clear.  Oropharynx, no   abnormal findings.  NECK:  No thyromegaly.  No masses.  LUNGS:  Clear breath sounds, good effort.  HEART:  Regular rate and rhythm.  No murmurs or gallops.  Today I did not detect   a cardiac murmur.  ABDOMEN:  Active bowel sounds, soft, nontender.  No hepatosplenomegaly or   abdominal masses.  PULSES:  2+ carotid, 2+ pedal pulses.  EXTREMITIES:  No edema.  LYMPH GLAND:  No palpable adenopathy.  DIGITAL RECTAL:  Stool is brown, heme negative.  MUSCULOSKELETAL:  Accentuated lumbar lordosis.  She has bilateral knee   enlargement and some crepitation and surgical scars.    IMPRESSION:  1.  General examination.  2.  Hypertension.  3.  Overactive bladder.  4.  History of breast cancer.  5.  Family history of breast cancer.    PLAN:  We will continue, at her request, yearly examinations.  We will not   change any of her medications.  She is adamant that she has better blood   pressure readings at home, and later on in the year we will discuss about doing   a colonoscopy.  Her last colonoscopy in 2013 revealed an adenomatous polyp, but   it was recommended by CRS just to come back only as needed.      JAM/HN  dd: 01/03/2018 13:40:08 (CST)  td: 01/04/2018 04:43:47 (CST)  Doc ID   #[id number]  Job ID #[id number]    CC:

## 2018-01-10 ENCOUNTER — PES CALL (OUTPATIENT)
Dept: ADMINISTRATIVE | Facility: CLINIC | Age: 83
End: 2018-01-10

## 2018-01-22 ENCOUNTER — TELEPHONE (OUTPATIENT)
Dept: PODIATRY | Facility: CLINIC | Age: 83
End: 2018-01-22

## 2018-01-22 NOTE — TELEPHONE ENCOUNTER
----- Message from Meliton Alcantara sent at 1/19/2018  4:10 PM CST -----  Contact: [email or web address] number  Pt called in advising that she was offered an appt for Thursday, but needs to know what time the appt will be for. Please call Pt and advise if she can still come in on Thursday.     Thank you       I return patient call but was not able to leave a message that patient has appointment on 02/01/2019 at 2:45

## 2018-01-30 ENCOUNTER — TELEPHONE (OUTPATIENT)
Dept: INTERNAL MEDICINE | Facility: CLINIC | Age: 83
End: 2018-01-30

## 2018-01-30 DIAGNOSIS — Z12.31 ENCOUNTER FOR SCREENING MAMMOGRAM FOR BREAST CANCER: Primary | ICD-10-CM

## 2018-01-30 NOTE — TELEPHONE ENCOUNTER
----- Message from Kitty Rios sent at 1/30/2018  1:49 PM CST -----  Contact: [phone] cell   Pt is due for her annual mammogram and called to ask for orders to be entered. Pt prefers afternoon appts.Please schedule and call pt .

## 2018-01-30 NOTE — TELEPHONE ENCOUNTER
Pt is due for her annual mammogram and called to ask for orders to be entered. Pt prefers afternoon appts.Please schedule and call pt .

## 2018-02-01 ENCOUNTER — OFFICE VISIT (OUTPATIENT)
Dept: PODIATRY | Facility: CLINIC | Age: 83
End: 2018-02-01
Payer: MEDICARE

## 2018-02-01 VITALS
BODY MASS INDEX: 28.02 KG/M2 | HEIGHT: 59 IN | DIASTOLIC BLOOD PRESSURE: 82 MMHG | HEART RATE: 67 BPM | WEIGHT: 139 LBS | SYSTOLIC BLOOD PRESSURE: 154 MMHG

## 2018-02-01 DIAGNOSIS — B35.1 ONYCHOMYCOSIS DUE TO DERMATOPHYTE: Primary | ICD-10-CM

## 2018-02-01 DIAGNOSIS — L84 CORN OR CALLUS: ICD-10-CM

## 2018-02-01 PROCEDURE — 99999 PR PBB SHADOW E&M-EST. PATIENT-LVL III: CPT | Mod: PBBFAC,,, | Performed by: PODIATRIST

## 2018-02-01 PROCEDURE — 99499 UNLISTED E&M SERVICE: CPT | Mod: CSM,,, | Performed by: PODIATRIST

## 2018-02-01 PROCEDURE — 17999 UNLISTD PX SKN MUC MEMB SUBQ: CPT | Mod: CSM,,, | Performed by: PODIATRIST

## 2018-02-07 ENCOUNTER — TELEPHONE (OUTPATIENT)
Dept: SURGERY | Facility: CLINIC | Age: 83
End: 2018-02-07

## 2018-02-07 NOTE — TELEPHONE ENCOUNTER
----- Message from Domonique Encarnacion sent at 2/7/2018  1:18 PM CST -----  Contact: Pt:[phone]  Pt called and states she would like to speak with  of his nurse to get colonoscopy results from 08/2013.

## 2018-02-15 ENCOUNTER — HOSPITAL ENCOUNTER (OUTPATIENT)
Dept: RADIOLOGY | Facility: HOSPITAL | Age: 83
Discharge: HOME | End: 2018-02-15
Attending: INTERNAL MEDICINE
Payer: MEDICARE

## 2018-02-15 DIAGNOSIS — Z12.31 ENCOUNTER FOR SCREENING MAMMOGRAM FOR BREAST CANCER: ICD-10-CM

## 2018-02-20 ENCOUNTER — TELEPHONE (OUTPATIENT)
Dept: UROGYNECOLOGY | Facility: CLINIC | Age: 83
End: 2018-02-20

## 2018-02-20 NOTE — TELEPHONE ENCOUNTER
----- Message from Jb Clark sent at 2/20/2018 12:30 PM CST -----  Contact: Pt  x_  1st Request  _  2nd Request  _  3rd Request        Who: PAKO LAMB [[id number]]    Why: Requesting a call back in regards to discussing concerns she has regarding bladder.     What Number to Call Back:[phone]    When to Expect a call back: (Within 24 hours)    Please return the call at earliest convenience. Thanks!

## 2018-02-20 NOTE — TELEPHONE ENCOUNTER
Pt stated it's hard for her to use/open the replens/refresh due to her having rheumatoid arthritis and wanted to know is something else that's easier to open she can use. Informed pt I'll relay this message to JOLLY Sequeira. Pt voiced understanding and call ended.

## 2018-03-01 ENCOUNTER — OFFICE VISIT (OUTPATIENT)
Dept: PODIATRY | Facility: CLINIC | Age: 83
End: 2018-03-01
Payer: MEDICARE

## 2018-03-01 ENCOUNTER — PES CALL (OUTPATIENT)
Dept: ADMINISTRATIVE | Facility: CLINIC | Age: 83
End: 2018-03-01

## 2018-03-01 ENCOUNTER — INITIAL CONSULT (OUTPATIENT)
Dept: OPHTHALMOLOGY | Facility: CLINIC | Age: 83
End: 2018-03-01
Payer: MEDICARE

## 2018-03-01 VITALS
BODY MASS INDEX: 28.02 KG/M2 | DIASTOLIC BLOOD PRESSURE: 89 MMHG | WEIGHT: 139 LBS | HEIGHT: 59 IN | HEART RATE: 74 BPM | SYSTOLIC BLOOD PRESSURE: 193 MMHG

## 2018-03-01 DIAGNOSIS — H04.209 EPIPHORA, UNSPECIFIED LATERALITY: Primary | ICD-10-CM

## 2018-03-01 DIAGNOSIS — B35.1 ONYCHOMYCOSIS DUE TO DERMATOPHYTE: ICD-10-CM

## 2018-03-01 DIAGNOSIS — L84 CORN OR CALLUS: Primary | ICD-10-CM

## 2018-03-01 PROCEDURE — 92012 INTRM OPH EXAM EST PATIENT: CPT | Mod: S$GLB,,, | Performed by: OPHTHALMOLOGY

## 2018-03-01 PROCEDURE — 17999 UNLISTD PX SKN MUC MEMB SUBQ: CPT | Mod: CMS,,, | Performed by: PODIATRIST

## 2018-03-01 PROCEDURE — 99499 UNLISTED E&M SERVICE: CPT | Mod: CSM,,, | Performed by: PODIATRIST

## 2018-03-01 PROCEDURE — 99999 PR PBB SHADOW E&M-EST. PATIENT-LVL II: CPT | Mod: PBBFAC,,, | Performed by: OPHTHALMOLOGY

## 2018-03-01 PROCEDURE — 99999 PR PBB SHADOW E&M-EST. PATIENT-LVL III: CPT | Mod: PBBFAC,,, | Performed by: PODIATRIST

## 2018-03-01 NOTE — LETTER
March 1, 2018      Saima Mccormick, OD  [address]  [facility] [address]           [facility]  [address]  [place] [address]  Phone: [phone]  Fax: [phone]          Patient: Ernestine Luna   MR Number: [id number]   YOB: 1929   Date of Visit: 3/1/2018       Dear Dr. Saima Mccormick:    Thank you for referring Ernestine Luna to me for evaluation. Attached you will find relevant portions of my assessment and plan of care.    If you have questions, please do not hesitate to call me. I look forward to following Ernestine Luna along with you.    Sincerely,    Lillian Ceja MD    Enclosure  CC:  No Recipients    If you would like to receive this communication electronically, please contact [email or web address] or [phone] to request more information on [facility] access.    For providers and/or their staff who would like to refer a patient to [facility], please contact us through our one-stop-shop provider referral line, [facility], at [phone].    If you feel you have received this communication in error or would no longer like to receive these types of communications, please e-mail [email or web address]

## 2018-03-01 NOTE — PROGRESS NOTES
HPI     Pt. States eyes water constantly    DLS 4/2017 Dr Mccormick  Has tried using systane, thera tears, eye relief. No improvement  Pseudophakia ou    Last edited by Madeleine Herrera on 3/1/2018  9:11 AM. (History)            Assessment /Plan     For exam results, see Encounter Report.    Epiphora, unspecified laterality    Other orders  -     Cancel: External/Slit Lamp Photography      Patient with bilateral epiphora. Recommended irrigation. Patient refused but would consider irrigation under mild sedation.    Return for irrigation and probing with valium. Patient to bring grandson, so she does not have to drive.

## 2018-03-02 ENCOUNTER — TELEPHONE (OUTPATIENT)
Dept: OPHTHALMOLOGY | Facility: CLINIC | Age: 83
End: 2018-03-02

## 2018-03-05 DIAGNOSIS — N95.2 VAGINAL ATROPHY: Primary | ICD-10-CM

## 2018-03-05 NOTE — TELEPHONE ENCOUNTER
----- Message from Brendan Nava sent at 3/5/2018  3:22 PM CST -----  Contact: patient  x_ 1st Request   _ 2nd Request   _ 3rd Request     Who: PAKO LAMB [[id number]]    Why: patient is requesting a call back in reference to her question from 02/20/18.  Patient stated she hasn't heard back about alternatives to her problem with the lubricant in tube.    What Number to Call Back: [phone]    When to Expect a call back: (Before the end of the day)   -- if call after 3:00 call back will be tomorrow.

## 2018-03-05 NOTE — TELEPHONE ENCOUNTER
Informed pt JOLLY Sequeira will send in an rx for Fem Ph to her pharmacy and I will send a coupon in the mail. Pt voiced understanding and call ended.

## 2018-03-07 ENCOUNTER — TELEPHONE (OUTPATIENT)
Dept: UROGYNECOLOGY | Facility: CLINIC | Age: 83
End: 2018-03-07

## 2018-03-07 NOTE — TELEPHONE ENCOUNTER
Informed pt I spoke with Rachelle regarding rx Fem PH, they were waiting on the shipment to come in, which should be in today. Pt will be notified once rx is ready for . Pt voiced understanding and call ended.

## 2018-03-07 NOTE — TELEPHONE ENCOUNTER
----- Message from Irene Jose sent at 3/7/2018 10:22 AM CST -----  Contact: pt  X_  1st Request  _  2nd Request  _  3rd Request    Who:PAKO LAMB [[id number]]    Why: Patient states she would like to speak with the staff in regards to her medication (acetic acid-oxyquinoline (FEM PH) 0.9-0.025 % Gel)........ Please contact to further discuss and advise     What Number to Call Back: [phone]    When to Expect a call back: (Before the end of the day)   -- if call after 3:00 call back will be tomorrow.

## 2018-03-14 ENCOUNTER — TELEPHONE (OUTPATIENT)
Dept: UROGYNECOLOGY | Facility: CLINIC | Age: 83
End: 2018-03-14

## 2018-03-14 NOTE — TELEPHONE ENCOUNTER
----- Message from Louise Thomas sent at 3/14/2018  1:05 PM CDT -----  Contact: PAKO LAMB [[id number]]  x_  1st Request  _  2nd Request  _  3rd Request        Who: PAKO LAMB [[id number]]    Why: Requesting a call back in regards to a cream that she use she have questions, please call her.   Please return the call at earliest convenience. Thanks!    What Number to Call Back: [phone]    When to Expect a call back: (Within 24 hours)

## 2018-03-14 NOTE — TELEPHONE ENCOUNTER
Called pt who stated she received 2 prescription in the mail but she wasn't sure which one she needed in order to get her gel, explain to pt the Fem Ph is the gel she was prescribed and that's what she needs to bring to the pharmacy, pt voiced understanding and call was ended.

## 2018-03-15 ENCOUNTER — TELEPHONE (OUTPATIENT)
Dept: UROGYNECOLOGY | Facility: CLINIC | Age: 83
End: 2018-03-15

## 2018-03-15 NOTE — TELEPHONE ENCOUNTER
Pt called and stated she still has not received her Fem Ph, she spoke with the pharmacy today who stated they were waiting on a fax from the doctor's pending approval. Pt states she has an appointment on 3/19/18 and she will just wait until did to find out what she need or if this can be changed confirmed date and time for pt she voiced understanding and call was ended.

## 2018-03-15 NOTE — TELEPHONE ENCOUNTER
----- Message from Brendan Nava sent at 3/15/2018 11:42 AM CDT -----  Contact: patient  x_ 1st Request   _ 2nd Request   _ 3rd Request     Who: PAKO LAMB [[id number]]    Why: patient called is requesting a call back in reference to the Rx acetic acid-oxyquinoline (FEM PH) 0.9-0.025 % Gel.  Patient stated she still hasn't received it yet.  Please call the patient and see how you can help her.    What Number to Call Back: [phone]    When to Expect a call back: (Before the end of the day)   -- if call after 3:00 call back will be tomorrow.

## 2018-03-22 ENCOUNTER — OFFICE VISIT (OUTPATIENT)
Dept: UROGYNECOLOGY | Facility: CLINIC | Age: 83
End: 2018-03-22
Payer: MEDICARE

## 2018-03-22 VITALS
WEIGHT: 138.44 LBS | SYSTOLIC BLOOD PRESSURE: 134 MMHG | HEIGHT: 59 IN | DIASTOLIC BLOOD PRESSURE: 60 MMHG | BODY MASS INDEX: 27.91 KG/M2

## 2018-03-22 DIAGNOSIS — N95.2 VAGINAL ATROPHY: ICD-10-CM

## 2018-03-22 DIAGNOSIS — N81.11 MIDLINE CYSTOCELE: ICD-10-CM

## 2018-03-22 DIAGNOSIS — Z46.89 PESSARY MAINTENANCE: ICD-10-CM

## 2018-03-22 DIAGNOSIS — N99.3 VAGINAL VAULT PROLAPSE AFTER HYSTERECTOMY: Primary | ICD-10-CM

## 2018-03-22 DIAGNOSIS — N39.41 URGE INCONTINENCE: ICD-10-CM

## 2018-03-22 DIAGNOSIS — N81.6 RECTOCELE: ICD-10-CM

## 2018-03-22 PROCEDURE — 99213 OFFICE O/P EST LOW 20 MIN: CPT | Mod: S$GLB,,, | Performed by: NURSE PRACTITIONER

## 2018-03-22 PROCEDURE — 99999 PR PBB SHADOW E&M-EST. PATIENT-LVL III: CPT | Mod: PBBFAC,,, | Performed by: NURSE PRACTITIONER

## 2018-03-22 NOTE — PROGRESS NOTES
Urogyn follow up  03/22/2018  .  [facility] [address] [place] [address]    Ernestine Luna  [id number]  8/13/1929      Ernestine Luna is a 88 y.o. here for a urogyn follow up.    Last HPI from 12/18/2017  1)  UI:  (--) NELI   (--) UUI (--) pads Daytime frequency: Q 30 minutes- 3 hours.  Nocturia: Yes: 0- 2/night.   (--) dysuria,  (--) hematuria,  (--) frequent UTIs.  (+) complete bladder emptying.   2)  POP:  Absent.   Symptoms:(--)  .  (--) vaginal bleeding. (--) vaginal discharge. (--) sexually active.  (--) dyspareunia.   (+)  Vaginal dryness.  (--) vaginal estrogen use.   3)  BM:  (--) constipation/straining.  (--) chronic diarrhea. (--) hematochezia.  (--) fecal incontinence.  (--) fecal smearing/urgency.  (--) incomplete evacuation.    4)Pessary:  Denies pain, bleeding or dischage. Using #3 ring with support pessary.    Changes from last visit:  1)  UI:  (--) NELI   (--) UUI (--) pads Daytime frequency: Q 30 minutes- 3 hours.  Nocturia: Yes: 0- 2/night.   (--) dysuria,  (--) hematuria,  (--) frequent UTIs.  (+) complete bladder emptying.     2)  POP:  Absent.   Symptoms:(--)  .  (--) vaginal bleeding. (--) vaginal discharge. (--) sexually active.  (--) dyspareunia.   (+)  Vaginal dryness.  (--) vaginal estrogen use.     3)  BM:  (--) constipation/straining.  (--) chronic diarrhea. (--) hematochezia.  (--) fecal incontinence.  (--) fecal smearing/urgency.  (--) incomplete evacuation.      4)Pessary:  Denies pain, bleeding or dischage. Using #3 ring with support pessary.    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 Wilkins    COLONOSCOPY W/ POLYPECTOMY  08/08/2013    RASHEED   06/24/2014    EYE SURGERY      HYSTERECTOMY  age 38    ALISTAIR; ovaries in place    JOINT REPLACEMENT      bilateral knees    TOTAL KNEE ARTHROPLASTY      Bilateral       Current Outpatient Prescriptions   Medication Sig    amLODIPine (NORVASC) 10 MG tablet TAKE 1 TABLET BY MOUTH ONCE DAILY    benazepril (LOTENSIN) 40 MG tablet TAKE 1 TABLET BY MOUTH ONCE DAILY.    diclofenac sodium (VOLTAREN) 1 % Gel Apply 2 g topically 3 (three) times daily.    FLUZONE HIGH-DOSE 2017-18, PF, 180 mcg/0.5 mL vaccine     metoprolol succinate (TOPROL-XL) 25 MG 24 hr tablet Take 1 tablet (25 mg total) by mouth once daily.    MULTIVITAMIN W-MINERALS/LUTEIN (CENTRUM SILVER ORAL) Take 1 tablet by mouth Daily.    omega 3-calcium-vit D3-FA-mv13 [id number] mg Cmpk Take by mouth once daily.    polycarbophil (REPLENS) Gel Place vaginally.    vitamin D 1000 units Tab Take 185 mg by mouth once daily.    acetic acid-oxyquinoline (FEM PH) 0.9-0.025 % Gel Place 1 applicator vaginally twice a week.     No current facility-administered medications for this visit.        ROS:  As per HPI.      Well Woman:  Pap:denies history of abnormal pap smear-- post hyst  Mammo:02/02/2017   No mammographic evidence of malignancy.  Of note, with negative imaging, any decision to biopsy a palpable finding should  ultimately be made on a clinical basis.  Mammogram in 1 year is recommended.  Colonoscopy:08/2013 Diverticulosis in the sigmoid colon.                        - One 3 mm polyp in the transverse colon. Resected and retrieved.  Dexa:06/2016Osteoporosis of the femoral neck.     Recommendations:  1) Adequate  "calcium and Vitamin D therapy  2) Appropriate exercise  3) Consider medical therapy including bisphosphonates, denosumab.  4) Consider repeat BMD in 2 years.      Exam  /60 (BP Location: Right arm)   Ht 4' 11" (1.499 m)   Wt 62.8 kg (138 lb 7.2 oz)   LMP  (LMP Unknown)   BMI 27.96 kg/m²   General: alert and oriented, no acute distress  Respiratory: normal respiratory effort  Abd: soft, non-tender, non-distended    Pelvic  Ext. Genitalia: normal external genitalia. Normal bartholin's and skeens glands  Vagina: + atrophy. Normal vaginal mucosa without lesions. No discharge noted.   Non-tender bladder base without palpable mass.  #3 ring with support in place  Cervix: absent  Uterus:  absent   Urethra: no masses or tenderness  Urethral meatus: no lesions, caruncle or prolapse.      Pessary removed without difficulty.  Washed with soap and water.  Reinserted without difficulty.        Impression  1. Vaginal vault prolapse after hysterectomy     2. Midline cystocele     3. Rectocele     4. Vaginal atrophy     5. Pessary maintenance     6. Urge incontinence       We reviewed the above issues and discussed options for short-term versus long-term management of her problems.   Plan:   1. Prolapse: Stage 2 apical prolapse, Stage 3 anterior and posterior vaginal wall prolapse   --Pessary # 3 ring with support  --Daily pelvic floor exercises as assigned, consider Pelvic floor PT in future  --Non surgical options discussed with patient      2. Urge urinary incontinence: not too bothersome   --Bladder diary for 3-7 days, will bring at the next visit.   --Empty bladder every 3 hours. Empty well: wait a minute, lean forward on toilet.   --Avoid dietary irritants (see sheet). Keep diary x 3-5 days to determine your irritants.  --KEGELS: do 10 in AM and 10 in PM, holding each x 10 seconds. When you feel urge to go, STOP, KEGEL, and when urge has passed, then go to bathroom. Consider PT in future.   --URGE: Consider " anticholinergic. Takes 2-4 weeks to see if will have effect. For dry mouth: get sour, sugar free lozenge or gum.   --STRESS: Pessary vs. Sling.      3. Incomplete bladder emptying:improved   --Double void and Crede maneuvers discussed  --Improved with pessary    4. Vaginal atrophy (dryness):  Fem ph 1 applicator twice weekly    5. RTC 3 months    30 minutes were spent in face to face time with this patient  75 % of this time was spent in counseling and/or coordination of care    MAEGAN [facility]  Division of Female Pelvic Medicine and Reconstructive Surgery  Department of Obstetrics & Gynecology

## 2018-03-22 NOTE — PATIENT INSTRUCTIONS
1. Prolapse: Stage 2 apical prolapse, Stage 3 anterior and posterior vaginal wall prolapse   --Pessary # 3 ring with support  --Daily pelvic floor exercises as assigned, consider Pelvic floor PT in future  --Non surgical options discussed with patient      2. Urge urinary incontinence: not too bothersome   --Bladder diary for 3-7 days, will bring at the next visit.   --Empty bladder every 3 hours. Empty well: wait a minute, lean forward on toilet.   --Avoid dietary irritants (see sheet). Keep diary x 3-5 days to determine your irritants.  --KEGELS: do 10 in AM and 10 in PM, holding each x 10 seconds. When you feel urge to go, STOP, KEGEL, and when urge has passed, then go to bathroom. Consider PT in future.   --URGE: Consider anticholinergic. Takes 2-4 weeks to see if will have effect. For dry mouth: get sour, sugar free lozenge or gum.   --STRESS: Pessary vs. Sling.      3. Incomplete bladder emptying:improved   --Double void and Crede maneuvers discussed  --Improved with pessary    4. Vaginal atrophy (dryness):  Fem ph 1 applicator twice weekly    5. RTC 3 months

## 2018-03-29 ENCOUNTER — OFFICE VISIT (OUTPATIENT)
Dept: PODIATRY | Facility: CLINIC | Age: 83
End: 2018-03-29
Payer: MEDICARE

## 2018-03-29 VITALS
BODY MASS INDEX: 27.82 KG/M2 | HEIGHT: 59 IN | HEART RATE: 63 BPM | DIASTOLIC BLOOD PRESSURE: 80 MMHG | SYSTOLIC BLOOD PRESSURE: 176 MMHG | WEIGHT: 138 LBS

## 2018-03-29 DIAGNOSIS — B35.1 ONYCHOMYCOSIS DUE TO DERMATOPHYTE: Primary | ICD-10-CM

## 2018-03-29 DIAGNOSIS — L84 CORN OR CALLUS: ICD-10-CM

## 2018-03-29 PROCEDURE — 17999 UNLISTD PX SKN MUC MEMB SUBQ: CPT | Mod: CSM,,, | Performed by: PODIATRIST

## 2018-03-29 PROCEDURE — 99999 PR PBB SHADOW E&M-EST. PATIENT-LVL III: CPT | Mod: PBBFAC,,, | Performed by: PODIATRIST

## 2018-03-29 PROCEDURE — 99499 UNLISTED E&M SERVICE: CPT | Mod: CSM,,, | Performed by: PODIATRIST

## 2018-04-09 ENCOUNTER — TELEPHONE (OUTPATIENT)
Dept: PODIATRY | Facility: CLINIC | Age: 83
End: 2018-04-09

## 2018-04-09 NOTE — TELEPHONE ENCOUNTER
----- Message from Curly Sauceda sent at 4/9/2018  9:49 AM CDT -----  Contact: self  Patient ask for a call in regards to scheduling surgery on left foot Patient states she is in pain and cannot walk. Patient can be reached at

## 2018-04-09 NOTE — TELEPHONE ENCOUNTER
Called and spoke with pt made aware  Dr. vieyra would like ot see pt earlier then 4/27/2018 schedule for 4/19/2018 @1130am

## 2018-04-09 NOTE — TELEPHONE ENCOUNTER
----- Message from Sue Garrison sent at 4/9/2018  4:39 PM CDT -----  Contact: self  Pt called requesting a call back from Dr. Pappas or assistant regarding her toe that he knocked a few days ago. Pt stated that she was suppose to set up a surgery appt for her toe but never got a call back. Pt would like an immediate call back at [phone]

## 2018-04-19 ENCOUNTER — OFFICE VISIT (OUTPATIENT)
Dept: PODIATRY | Facility: CLINIC | Age: 83
End: 2018-04-19
Payer: MEDICARE

## 2018-04-19 ENCOUNTER — TELEPHONE (OUTPATIENT)
Dept: PODIATRY | Facility: CLINIC | Age: 83
End: 2018-04-19

## 2018-04-19 VITALS
HEIGHT: 55 IN | BODY MASS INDEX: 31.94 KG/M2 | SYSTOLIC BLOOD PRESSURE: 184 MMHG | DIASTOLIC BLOOD PRESSURE: 92 MMHG | HEART RATE: 67 BPM | WEIGHT: 138 LBS

## 2018-04-19 DIAGNOSIS — M79.89 SOFT TISSUE MASS: Primary | ICD-10-CM

## 2018-04-19 DIAGNOSIS — M79.675 TOE PAIN, LEFT: ICD-10-CM

## 2018-04-19 PROCEDURE — 99999 PR PBB SHADOW E&M-EST. PATIENT-LVL III: CPT | Mod: PBBFAC,,, | Performed by: PODIATRIST

## 2018-04-19 PROCEDURE — 99213 OFFICE O/P EST LOW 20 MIN: CPT | Mod: 25,S$GLB,, | Performed by: PODIATRIST

## 2018-04-19 PROCEDURE — 88305 TISSUE EXAM BY PATHOLOGIST: CPT | Mod: 26,,, | Performed by: PATHOLOGY

## 2018-04-19 PROCEDURE — 88305 TISSUE EXAM BY PATHOLOGIST: CPT | Performed by: PATHOLOGY

## 2018-04-19 PROCEDURE — 11400 EXC TR-EXT B9+MARG 0.5 CM<: CPT | Mod: S$GLB,,, | Performed by: PODIATRIST

## 2018-04-19 RX ORDER — TRAMADOL HYDROCHLORIDE 50 MG/1
50 TABLET ORAL EVERY 6 HOURS PRN
Qty: 30 TABLET | Refills: 0 | Status: SHIPPED | OUTPATIENT
Start: 2018-04-19 | End: 2018-05-19

## 2018-04-19 NOTE — TELEPHONE ENCOUNTER
----- Message from Trina Moreland sent at 4/19/2018  4:01 PM CDT -----  Contact: Daughter- Denisse Luna   Need someone to call her regarding getting an excuse for today.     She was with her mom during her procedure today, and need an excuse for her employer.     She would like someone to call her as soon as possible. She is in need of the excuse as soon as possible.    Please call her @ [phone]

## 2018-04-19 NOTE — TELEPHONE ENCOUNTER
----- Message from Trina Moreland sent at 4/19/2018  4:01 PM CDT -----  Contact: Daughter- Denisse Luna   Need someone to call her regarding getting an excuse for today.     She was with her mom during her procedure today, and need an excuse for her employer.     She would like someone to call her as soon as possible. She is in need of the excuse as soon as possible.    Please call her @ [phone]

## 2018-04-19 NOTE — TELEPHONE ENCOUNTER
Patient need to be called granddaughter came with her to her appt for her procedure she need a work excuse

## 2018-04-20 ENCOUNTER — TELEPHONE (OUTPATIENT)
Dept: PODIATRY | Facility: CLINIC | Age: 83
End: 2018-04-20

## 2018-04-20 NOTE — TELEPHONE ENCOUNTER
----- Message from Donald Nation sent at 4/20/2018  8:48 AM CDT -----  Contact: Daughter/Denisse Salcedo states a note is needed for work regarding pt's appointment on yesterday. Please date note for 4.19.18. Contact once ready for  at [phone].    Thanks-

## 2018-04-21 NOTE — PROGRESS NOTES
Subjective:      Patient ID: Ernestine Luna is a 88 y.o. female.    Chief Complaint: Foot Pain (toe nail mass/ dr. murray 01/03/2018)    Ernestine is a 88 y.o. female who presents to the clinic complaining of painful soft tissue mass on the tip of left 4th toe. This has been present for years. She is not sure what it is or when it started however states that she stubbed her toe a few days ago and it has been hurting a lot since then. She has been contemplating getting it removed for months and now due to the pain she would like to have it removed. Hurts with shoes and direct touch. No other pedal complaints today. Here for procedure removal of lesion with biopsy.     Review of Systems   Constitution: Negative for chills and fever.   Cardiovascular: Negative for chest pain, claudication and leg swelling.   Respiratory: Negative for cough and shortness of breath.    Skin: Positive for suspicious lesions (pointy soft tissue mass left 4th toe).   Musculoskeletal:        Pain along soft tissue mass left 4th toe.     Severe deformities of previously corrected hammertoes both feet.    Gastrointestinal: Negative for nausea and vomiting.   Neurological: Negative for numbness and paresthesias.   Psychiatric/Behavioral: Negative for altered mental status.           Objective:      Physical Exam   Constitutional: She is oriented to person, place, and time. She appears well-developed and well-nourished.   HENT:   Head: Normocephalic.   Cardiovascular: Intact distal pulses.    Pulses:       Dorsalis pedis pulses are 2+ on the right side, and 2+ on the left side.        Posterior tibial pulses are 2+ on the right side, and 2+ on the left side.   CRT < 3 sec to tips of toes. No edema noted to b/l LE. No vericosities noted to b/l LEs.      Pulmonary/Chest: No respiratory distress.   Musculoskeletal:   Gastrocnemius equinus noted to b/l ankles with decreased DF noted on exam. MMT 5/5 in DF/PF/Inv/Ev resistance with no reproduction of  pain in any direction. Passive range of motion of ankle and pedal joints is painless. Adequate pedal joint ROM.     Severe deformities of toes 2-5 b/l foot secondary to unsuccessful hammertoe repairs years ago.     There is + pain with palpation of left 4th toe soft tissue mass.    Neurological: She is alert and oriented to person, place, and time. She has normal strength. No sensory deficit.   Light touch, proprioception, and sharp/dull sensation are all intact bilaterally.      Skin: Skin is warm, dry and intact. Lesion noted. No bruising and no ecchymosis noted. No erythema.   No open lesions, lacerations or wounds noted. Nails are dystrophic but well trimmed to R 1-5 and L 1-5. Interdigital spaces clean, dry and intact b/l. No erythema noted to b/l foot. Skin texture normal. Pedal hair normal. Skin temperature normal b/l foot.     Small 4-5mm in length soft tissue mass, pointy and pedunculated at the tip of the left 4th toe just distal to toenail. No discoloration, no erythema, no fluctuance to suggest infection. No drainage. No open wound. Stable.      Psychiatric: She has a normal mood and affect. Her behavior is normal. Judgment and thought content normal.   Vitals reviewed.            Assessment:       Encounter Diagnoses   Name Primary?    Soft tissue mass - Left Foot Yes    Toe pain, left          Plan:       Ernestine was seen today for foot pain.    Diagnoses and all orders for this visit:    Soft tissue mass - Left Foot  -     Tissue Specimen To Pathology, Podiatry    Toe pain, left  -     Tissue Specimen To Pathology, Podiatry    Other orders  -     traMADol (ULTRAM) 50 mg tablet; Take 1 tablet (50 mg total) by mouth every 6 (six) hours as needed for Pain.      I counseled the patient on her conditions, their implications and medical management.    Soft tissue mass assessed and examined. Possible warty appearance vs simple skin tag. Patient would like to have this removed and tested for identification.      Long discussion with patient regarding the procedure including all alternatives, risks and complications. I advised her that with this procedure there may be bleeding, pain, swelling, recurrence of the lesion and need for further surgery if lesion returns or if any other problems arise including infection. There is also a possibility of toe--partial or total--loss with this procedure if infection ensues and affects the bone. She verbalized understanding. I reiterated the fact that the lesion may return and she may have to have it removed again in the future. I advised her that I will send it to Pathology for identification and if it some form of skin cancer, will refer her to oncology. She verbalized understanding. Informed consent discussed and signed/witnessed. All alternatives risks and complications were discussed, not limited to those found on consent form.     Procedure note below.     Home care instructions provided including: leave dressing clean, dry and intact for 1 week.     Rx tramadol 50mg q6h for pain.  Advised to take as directed only when pain is severe.     Follow up in 1 week, sooner PRN      Procedure note: primary excision of soft tissue mass left 4th toe  Surgeon: ODILIA Pappas  Preop diagnosis: soft tissue mass  Post op diagnosis: soft tissue mass with wart like characteristics (spongy white core, pinpoint bleeding  Procedure: Attention was directed to the left 4th toe distal aspect. A #15 scalpel was used to make two converging semi elliptical incisions encompassing the base of the soft tissue mass. The incisions were extended down to subcutaneous tissue. The ellipse of skin containing the mass was grasped with forceps and dissected free from all soft tissue attachments. 1mm of normal skin was excised with the soft tissue mass. This mass was removed from the operative field and sent as a specimen. It was noted that the base of the lesion had a spongy white core with pinpoint bleeding. This  entire base was scraped with a curette down to the basement membrane. Adequate bleeding from the area. The wound was then copiously irrigated with normal sterile saline. The base of the wound was then cauterized using a silver nitrate stick. Applied triple abx ointment, wound foam, cast padding and ACE to entire foot from toes to ankle.   Anesthesia: 1.5ml of 1:1 mix of 0.5% marcaine plain + 2% lidocaine plain injected just proximal and deep to the lesion.   Hemostasis: silver nitrate and direct pressure  Condition: stable    F/u 1 week

## 2018-04-24 ENCOUNTER — TELEPHONE (OUTPATIENT)
Dept: PODIATRY | Facility: CLINIC | Age: 83
End: 2018-04-24

## 2018-04-24 NOTE — TELEPHONE ENCOUNTER
Spoke to pt mention spoke with  Someone  To let dr. vieyra know tramadol  Made her very sick had shortness of breath ,felt weak , vomiting , Felt fainting made aware please do not take medication  Per pt I am not taking tramadol  Asked if needed a different medication per pt no I am okay  I wanted to know If I needed to change m,y bandage  Before seeing dr. vieyra made aware to keep bandage clean and intact and will let dr. vieyra  Know her question  Per pt  Please let dr. vieyra know spoke with dr. vieyra    Made aware of above mention per dr. vieyra  Would like pt to be seen this week  For dressig change  Called pt made aware  Have appt for 4/25/2018 @ 915am with dr. Rodas per pt okay  Thank you

## 2018-04-24 NOTE — TELEPHONE ENCOUNTER
Called to inform patient about pathology results of soft tissue mass left 4th toe came back as wart. No malignancy. She related allergic type symptoms after taking Tramadol--chest tightness, listlessness, dizziness, difficulty breathing. Advised her to stop taking and to just limit weightbearing for pain relief.. She states her toe does not hurt. She will follow up with Dr. Rodas tomorrow for post op as I am out of clinic until next week.

## 2018-04-25 ENCOUNTER — OFFICE VISIT (OUTPATIENT)
Dept: PODIATRY | Facility: CLINIC | Age: 83
End: 2018-04-25
Payer: MEDICARE

## 2018-04-25 VITALS
HEART RATE: 62 BPM | HEIGHT: 55 IN | DIASTOLIC BLOOD PRESSURE: 73 MMHG | SYSTOLIC BLOOD PRESSURE: 138 MMHG | BODY MASS INDEX: 31.47 KG/M2 | WEIGHT: 136 LBS

## 2018-04-25 DIAGNOSIS — M79.675 TOE PAIN, LEFT: Primary | ICD-10-CM

## 2018-04-25 PROCEDURE — 99999 PR PBB SHADOW E&M-EST. PATIENT-LVL III: CPT | Mod: PBBFAC,,, | Performed by: PODIATRIST

## 2018-04-25 PROCEDURE — 99024 POSTOP FOLLOW-UP VISIT: CPT | Mod: S$GLB,,, | Performed by: PODIATRIST

## 2018-04-25 NOTE — PROGRESS NOTES
Subjective:      Patient ID: Ernestine Luna is a 88 y.o. female.    Chief Complaint: Soft tissue mass (left foot)    Ernestine is a 88 y.o. female who presents to the clinic complaining of painful soft tissue mass on the tip of left 4th toe. This has been present for years. She is not sure what it is or when it started however states that she stubbed her toe a few days ago and it has been hurting a lot since then. She has been contemplating getting it removed for months and now due to the pain she would like to have it removed. Hurts with shoes and direct touch. No other pedal complaints today. Here for procedure removal of lesion with biopsy.     4/25/18:  Post op f/u s/p mass excision (wart) on the left toe.    Continues to have pain but no signs of infection noted to the area.         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 50 g 11    amLODIPine (NORVASC) 10 MG tablet TAKE 1 TABLET BY MOUTH ONCE DAILY 90 tablet 1    benazepril (LOTENSIN) 40 MG tablet TAKE 1 TABLET BY MOUTH ONCE DAILY. 90 tablet 1    diclofenac sodium (VOLTAREN) 1 % Gel Apply 2 g topically 3 (three) times daily. 1 Tube 4    FLUZONE HIGH-DOSE 2017-18, PF, 180 mcg/0.5 mL vaccine       metoprolol succinate (TOPROL-XL) 25 MG 24 hr tablet Take 1 tablet (25 mg total) by mouth once daily. 90 tablet 3    MULTIVITAMIN W-MINERALS/LUTEIN (CENTRUM SILVER ORAL) Take 1 tablet by mouth Daily.      omega 3-calcium-vit D3-FA-mv13  [id number] mg Cmpk Take by mouth once daily.      polycarbophil (REPLENS) Gel Place vaginally.      traMADol (ULTRAM) 50 mg tablet Take 1 tablet (50 mg total) by mouth every 6 (six) hours as needed for Pain. 30 tablet 0    vitamin D 1000 units Tab Take 185 mg by mouth once daily.       No current facility-administered medications on file prior to visit.          Review of patient's allergies indicates:   Allergen Reactions    Tramadol Shortness Of Breath, Diarrhea and Nausea And Vomiting    Acetaminophen Nausea And Vomiting    Aspirin Nausea And Vomiting    Sulfa (sulfonamide antibiotics) Nausea And Vomiting    Prednisone Other (See Comments)     Stomach problems          Social History     Social History    Marital status:      Spouse name: N/A    Number of children: N/A    Years of education: N/A     Occupational History    Not on file.     Social History Main Topics    Smoking status: Never Smoker    Smokeless tobacco: Never Used    Alcohol use 0.6 oz/week     1 Glasses of wine per week      Comment: Rare, every other week    Drug use: No    Sexual activity: Not Currently     Birth control/ protection: None      Comment:      Other Topics Concern    Not on file     Social History Narrative    No narrative on file               Review of Systems   Constitution: Negative for chills and fever.   Cardiovascular: Negative for chest pain, claudication and leg swelling.   Respiratory: Negative for cough and shortness of breath.    Skin: Positive for suspicious lesions (pointy soft tissue mass left 4th toe).   Musculoskeletal:        Pain along soft tissue mass left 4th toe.     Severe deformities of previously corrected hammertoes both feet.    Gastrointestinal: Negative for nausea and vomiting.   Neurological: Negative for numbness and paresthesias.   Psychiatric/Behavioral: Negative for altered mental status.               Objective:        Vitals:    04/25/18 0944   BP: 138/73  "  Pulse: 62   Weight: 61.7 kg (136 lb)   Height: 4' 1" (1.245 m)         Physical Exam   Constitutional: She is oriented to person, place, and time. She appears well-developed and well-nourished.   HENT:   Head: Normocephalic.   Cardiovascular: Intact distal pulses.    Pulses:       Dorsalis pedis pulses are 2+ on the right side, and 2+ on the left side.        Posterior tibial pulses are 2+ on the right side, and 2+ on the left side.   CRT < 3 sec to tips of toes. No edema noted to b/l LE. No vericosities noted to b/l LEs.      Pulmonary/Chest: No respiratory distress.   Musculoskeletal:   Gastrocnemius equinus noted to b/l ankles with decreased DF noted on exam. MMT 5/5 in DF/PF/Inv/Ev resistance with no reproduction of pain in any direction. Passive range of motion of ankle and pedal joints is painless. Adequate pedal joint ROM.     Severe deformities of toes 2-5 b/l foot secondary to unsuccessful hammertoe repairs years ago.     There is + pain with palpation of left 4th toe soft tissue mass.    Neurological: She is alert and oriented to person, place, and time. She has normal strength. No sensory deficit.   Light touch, proprioception, and sharp/dull sensation are all intact bilaterally.      Skin: Skin is warm, dry and intact. Lesion noted. No bruising and no ecchymosis noted. No erythema.       left 4th toe s/p mass excision. With overlying scab  The area is tender with direct palpation.  Patient does not want me to clean the area due to pain.       Psychiatric: She has a normal mood and affect. Her behavior is normal. Judgment and thought content normal.   Vitals reviewed.            Assessment:       Encounter Diagnosis   Name Primary?    Toe pain, left Yes         Plan:       Ernestine was seen today for soft tissue mass.    Diagnoses and all orders for this visit:    Toe pain, left      I counseled the patient on her conditions, their implications and medical management.    I applied a thin layer of " antibiotics ointment and a foam dressing.  Keep clean, dry, and intact.   Darco shoe.    Do not wear regular close toe shoes  Follow up as scheduled with Dr. Pappas, or sooner if any concerns.

## 2018-05-01 ENCOUNTER — OFFICE VISIT (OUTPATIENT)
Dept: PODIATRY | Facility: CLINIC | Age: 83
End: 2018-05-01
Payer: MEDICARE

## 2018-05-01 VITALS
DIASTOLIC BLOOD PRESSURE: 82 MMHG | HEIGHT: 55 IN | SYSTOLIC BLOOD PRESSURE: 143 MMHG | HEART RATE: 69 BPM | BODY MASS INDEX: 31.47 KG/M2 | WEIGHT: 136 LBS

## 2018-05-01 DIAGNOSIS — B35.1 ONYCHOMYCOSIS DUE TO DERMATOPHYTE: Primary | ICD-10-CM

## 2018-05-01 DIAGNOSIS — L84 CORN OR CALLUS: ICD-10-CM

## 2018-05-01 PROCEDURE — 17999 UNLISTD PX SKN MUC MEMB SUBQ: CPT | Mod: CSM,S$GLB,, | Performed by: PODIATRIST

## 2018-05-01 PROCEDURE — 99999 PR PBB SHADOW E&M-EST. PATIENT-LVL III: CPT | Mod: PBBFAC,,, | Performed by: PODIATRIST

## 2018-05-01 PROCEDURE — 99499 UNLISTED E&M SERVICE: CPT | Mod: S$GLB,,, | Performed by: PODIATRIST

## 2018-05-01 NOTE — PROGRESS NOTES
Pt presents for routine non-covered foot care. Pt. does not have high risk feet. Pedal pulses are palpable. Nails are elongated, thickened Bilaterally. Calluses present to both feet/toes. Diagnosis is onychomycosis and calluses. Nails and calluses were reduced Bilaterally. Patient tolerated well and related relief. RTC 4 weeks as PROC B     Left 4th toe soft tissue mass site healing well. Mild scabbing otherwise no SOI. Applied triple abx ointment and bandaid on this. Continue at home, keep cushioned and protected. May return, may require additional procedure or surgery. She verbalized understanding. Pain nearly resolved. Follow up in 2-4 weeks for any problems and for Proc B in 4 weeks, sooner PRN

## 2018-05-07 RX ORDER — ATENOLOL 25 MG/1
TABLET ORAL
Qty: 30 TABLET | Refills: 5 | Status: ON HOLD | OUTPATIENT
Start: 2018-05-07 | End: 2019-10-01 | Stop reason: HOSPADM

## 2018-05-15 ENCOUNTER — OFFICE VISIT (OUTPATIENT)
Dept: PODIATRY | Facility: CLINIC | Age: 83
End: 2018-05-15
Payer: MEDICARE

## 2018-05-15 VITALS
SYSTOLIC BLOOD PRESSURE: 152 MMHG | HEIGHT: 55 IN | HEART RATE: 73 BPM | DIASTOLIC BLOOD PRESSURE: 75 MMHG | BODY MASS INDEX: 32.4 KG/M2 | WEIGHT: 140 LBS

## 2018-05-15 DIAGNOSIS — B07.0 PLANTAR WART: Primary | ICD-10-CM

## 2018-05-15 PROCEDURE — 99999 PR PBB SHADOW E&M-EST. PATIENT-LVL III: CPT | Mod: PBBFAC,,, | Performed by: PODIATRIST

## 2018-05-15 PROCEDURE — 99212 OFFICE O/P EST SF 10 MIN: CPT | Mod: S$GLB,,, | Performed by: PODIATRIST

## 2018-05-15 NOTE — PROGRESS NOTES
Subjective:      Patient ID: Ernestine Luna is a 88 y.o. female.    Chief Complaint: Onychomycosis due to dermatophyte and toe ulcer (rt 3 rd toe)    Ernestine is a 88 y.o. female who presents to the clinic complaining of painful soft tissue mass on the tip of left 4th toe. This has been present for years. She is not sure what it is or when it started however states that she stubbed her toe a few days ago and it has been hurting a lot since then. She has been contemplating getting it removed for months and now due to the pain she would like to have it removed. Hurts with shoes and direct touch. No other pedal complaints today. Here for procedure removal of lesion with biopsy.     4/25/18:  Post op f/u s/p mass excision (wart) on the left toe.    Continues to have pain but no signs of infection noted to the area.     05/15/2018: follow up for left 4th toe wart excision. Doing well. Minor pain. Has been careful not to stub the toe. No new concerns.         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 50 g 11    amLODIPine (NORVASC) 10 MG tablet TAKE 1 TABLET BY MOUTH ONCE DAILY 90 tablet 1    atenolol (TENORMIN) 25 MG tablet TAKE 1 TABLET BY MOUTH EVERY DAY 30 tablet 5    benazepril (LOTENSIN) 40 MG tablet TAKE 1 TABLET BY MOUTH ONCE DAILY. 90 tablet 1    diclofenac sodium (VOLTAREN) 1 % Gel Apply 2 g topically 3 (three) times daily. 1 Tube 4    FLUZONE HIGH-DOSE 2017-18, PF, 180 mcg/0.5 mL vaccine        metoprolol succinate (TOPROL-XL) 25 MG 24 hr tablet Take 1 tablet (25 mg total) by mouth once daily. 90 tablet 3    MULTIVITAMIN W-MINERALS/LUTEIN (CENTRUM SILVER ORAL) Take 1 tablet by mouth Daily.      omega 3-calcium-vit D3-FA-mv13 [phone] mg Cmpk Take by mouth once daily.      polycarbophil (REPLENS) Gel Place vaginally.      traMADol (ULTRAM) 50 mg tablet Take 1 tablet (50 mg total) by mouth every 6 (six) hours as needed for Pain. 30 tablet 0    vitamin D 1000 units Tab Take 185 mg by mouth once daily.       No current facility-administered medications on file prior to visit.          Review of patient's allergies indicates:   Allergen Reactions    Tramadol Shortness Of Breath, Diarrhea and Nausea And Vomiting    Acetaminophen Nausea And Vomiting    Aspirin Nausea And Vomiting    Sulfa (sulfonamide antibiotics) Nausea And Vomiting    Prednisone Other (See Comments)     Stomach problems          Social History     Social History    Marital status:      Spouse name: N/A    Number of children: N/A    Years of education: N/A     Occupational History    Not on file.     Social History Main Topics    Smoking status: Never Smoker    Smokeless tobacco: Never Used    Alcohol use 0.6 oz/week     1 Glasses of wine per week      Comment: Rare, every other week    Drug use: No    Sexual activity: Not Currently     Birth control/ protection: None      Comment:      Other Topics Concern    Not on file     Social History Narrative    No narrative on file               Review of Systems   Constitution: Negative for chills and fever.   Cardiovascular: Negative for chest pain, claudication and leg swelling.   Respiratory: Negative for cough and shortness of breath.    Skin: Positive for suspicious lesions (pointy soft tissue mass left 4th toe).   Musculoskeletal:        Pain along soft tissue mass left 4th toe.     Severe deformities of previously corrected hammertoes both feet.   "  Gastrointestinal: Negative for nausea and vomiting.   Neurological: Negative for numbness and paresthesias.   Psychiatric/Behavioral: Negative for altered mental status.               Objective:        Vitals:    05/15/18 1036   BP: (!) 152/75   Pulse: 73   Weight: 63.5 kg (140 lb)   Height: 4' 1" (1.245 m)         Physical Exam   Constitutional: She is oriented to person, place, and time. She appears well-developed and well-nourished.   HENT:   Head: Normocephalic.   Cardiovascular: Intact distal pulses.    Pulses:       Dorsalis pedis pulses are 2+ on the right side, and 2+ on the left side.        Posterior tibial pulses are 2+ on the right side, and 2+ on the left side.   CRT < 3 sec to tips of toes. No edema noted to b/l LE. No vericosities noted to b/l LEs.      Pulmonary/Chest: No respiratory distress.   Musculoskeletal:   Gastrocnemius equinus noted to b/l ankles with decreased DF noted on exam. MMT 5/5 in DF/PF/Inv/Ev resistance with no reproduction of pain in any direction. Passive range of motion of ankle and pedal joints is painless. Adequate pedal joint ROM.     Severe deformities of toes 2-5 b/l foot secondary to unsuccessful hammertoe repairs years ago.     There is + pain with palpation of left 4th toe soft tissue mass.    Neurological: She is alert and oriented to person, place, and time. She has normal strength. No sensory deficit.   Light touch, proprioception, and sharp/dull sensation are all intact bilaterally.      Skin: Skin is warm, dry and intact. No bruising, no ecchymosis and no lesion noted. No erythema.   left 4th toe s/p mass excision. Fully healed. Skin in area of previous wart bulging out slightly by 1mm suggesting possible recurrence. No spongy core, skin is 100% epithelial aside from minor bulging.    Psychiatric: She has a normal mood and affect. Her behavior is normal. Judgment and thought content normal.   Vitals reviewed.            Assessment:       Encounter Diagnosis   Name " Primary?    Plantar wart - Left Foot Yes         Plan:       Ernestine was seen today for onychomycosis due to dermatophyte and toe ulcer.    Diagnoses and all orders for this visit:    Plantar wart - Left Foot      I counseled the patient on her conditions, their implications and medical management.    Fully healed with questionable recurrence. Too early to tell at this time. Will monitor.     Ok to resume Vicks Vaprorub to toenails. Monitor lesion for any problems including open wound, bleeding, drainage and SOI. I warned patient of signs and symptoms of infection including redness, drainage, purulence, odor, streaking, fever, chills, etc and I advised her to seek medical attention (ER or urgent car) if these symptoms arise.     RTC in 2 weeks for routine Proc B, sooner PRN

## 2018-05-29 ENCOUNTER — OFFICE VISIT (OUTPATIENT)
Dept: PODIATRY | Facility: CLINIC | Age: 83
End: 2018-05-29
Payer: MEDICARE

## 2018-05-29 VITALS
HEART RATE: 69 BPM | SYSTOLIC BLOOD PRESSURE: 135 MMHG | DIASTOLIC BLOOD PRESSURE: 74 MMHG | WEIGHT: 140 LBS | HEIGHT: 55 IN | BODY MASS INDEX: 32.4 KG/M2

## 2018-05-29 DIAGNOSIS — L84 CORN OR CALLUS: ICD-10-CM

## 2018-05-29 DIAGNOSIS — B35.1 ONYCHOMYCOSIS DUE TO DERMATOPHYTE: Primary | ICD-10-CM

## 2018-05-29 PROCEDURE — 99999 PR PBB SHADOW E&M-EST. PATIENT-LVL III: CPT | Mod: PBBFAC,,, | Performed by: PODIATRIST

## 2018-05-29 PROCEDURE — 99499 UNLISTED E&M SERVICE: CPT | Mod: S$GLB,,, | Performed by: PODIATRIST

## 2018-05-29 PROCEDURE — 17999 UNLISTD PX SKN MUC MEMB SUBQ: CPT | Mod: CSM,S$GLB,, | Performed by: PODIATRIST

## 2018-06-21 ENCOUNTER — OFFICE VISIT (OUTPATIENT)
Dept: UROGYNECOLOGY | Facility: CLINIC | Age: 83
End: 2018-06-21
Payer: MEDICARE

## 2018-06-21 VITALS
HEIGHT: 55 IN | BODY MASS INDEX: 31.63 KG/M2 | DIASTOLIC BLOOD PRESSURE: 80 MMHG | WEIGHT: 136.69 LBS | SYSTOLIC BLOOD PRESSURE: 140 MMHG

## 2018-06-21 DIAGNOSIS — N99.3 VAGINAL VAULT PROLAPSE, POSTHYSTERECTOMY: Primary | ICD-10-CM

## 2018-06-21 DIAGNOSIS — N81.10 PROLAPSE OF ANTERIOR VAGINAL WALL: ICD-10-CM

## 2018-06-21 DIAGNOSIS — N95.2 VAGINAL ATROPHY: ICD-10-CM

## 2018-06-21 DIAGNOSIS — N81.6 RECTOCELE: ICD-10-CM

## 2018-06-21 DIAGNOSIS — Z46.89 PESSARY MAINTENANCE: ICD-10-CM

## 2018-06-21 PROCEDURE — 99999 PR PBB SHADOW E&M-EST. PATIENT-LVL III: CPT | Mod: PBBFAC,,, | Performed by: NURSE PRACTITIONER

## 2018-06-21 PROCEDURE — 99213 OFFICE O/P EST LOW 20 MIN: CPT | Mod: S$GLB,,, | Performed by: NURSE PRACTITIONER

## 2018-06-21 RX ORDER — CALCIUM CARBONATE 600 MG
600 TABLET ORAL DAILY
COMMUNITY

## 2018-06-21 NOTE — PROGRESS NOTES
Urogyn follow up  03/22/2018  .  [facility] [address] [place] [address]    Ernestine Luna  [id number]  8/13/1929      Ernestine Luna is a 88 y.o. here for a urogyn follow up.    Last HPI from  03/22/2018  1)  UI:  (--) NELI   (--) UUI (--) pads Daytime frequency: Q 30 minutes- 3 hours.  Nocturia: Yes: 0- 2/night.   (--) dysuria,  (--) hematuria,  (--) frequent UTIs.  (+) complete bladder emptying.   2)  POP:  Absent.   Symptoms:(--)  .  (--) vaginal bleeding. (--) vaginal discharge. (--) sexually active.  (--) dyspareunia.   (+)  Vaginal dryness.  (--) vaginal estrogen use.   3)  BM:  (--) constipation/straining.  (--) chronic diarrhea. (--) hematochezia.  (--) fecal incontinence.  (--) fecal smearing/urgency.  (--) incomplete evacuation.    4)Pessary:  Denies pain, bleeding or dischage. Using #3 ring with support pessary.    Changes from last visit:  1)  UI:  (--) NELI   (--) UUI (--) pads Daytime frequency: Q 30 minutes- 3 hours.  Nocturia: Yes: 0- 2/night.   (--) dysuria,  (--) hematuria,  (--) frequent UTIs.  (+) complete bladder emptying.     2)  POP:  Absent.   Symptoms:(--)  .  (--) vaginal bleeding. (--) vaginal discharge. (--) sexually active.  (--) dyspareunia.   (+)  Vaginal dryness.  (--) vaginal estrogen use.     3)  BM:  (--) constipation/straining.  (--) chronic diarrhea. (--) hematochezia.  (--) fecal incontinence.  (--) fecal smearing/urgency.  (--) incomplete evacuation.      4)Pessary:  Denies pain, bleeding or dischage. Using #3 ring with support pessary.    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 Wilkins    COLONOSCOPY W/ POLYPECTOMY  08/08/2013    RASHEED   06/24/2014    EYE SURGERY      HYSTERECTOMY  age 38    ALISTAIR; ovaries in place    JOINT REPLACEMENT      bilateral knees    TOTAL KNEE ARTHROPLASTY      Bilateral       Current Outpatient Prescriptions   Medication Sig    acetic acid-oxyquinoline (FEM PH) 0.9-0.025 % Gel Place 1 applicator vaginally twice a week.    amLODIPine (NORVASC) 10 MG tablet TAKE 1 TABLET BY MOUTH ONCE DAILY    atenolol (TENORMIN) 25 MG tablet TAKE 1 TABLET BY MOUTH EVERY DAY    benazepril (LOTENSIN) 40 MG tablet TAKE 1 TABLET BY MOUTH ONCE DAILY.    calcium carbonate (OS-DHRUV) 600 mg calcium (1,500 mg) Tab Take 600 mg by mouth once.    MULTIVITAMIN W-MINERALS/LUTEIN (CENTRUM SILVER ORAL) Take 1 tablet by mouth Daily.    omega 3-calcium-vit D3-FA-mv13 [phone] mg Cmpk Take by mouth once daily.    vitamin D 1000 units Tab Take 185 mg by mouth once daily.    metoprolol succinate (TOPROL-XL) 25 MG 24 hr tablet Take 1 tablet (25 mg total) by mouth once daily.    polycarbophil (REPLENS) Gel Place vaginally.     No current facility-administered medications for this visit.        ROS:  As per HPI.      Well Woman:  Pap:denies history of abnormal pap smear-- post hyst  Mammo:09/28/2018  No mammographic evidence of malignancy.  Mammogram in 1 year is recommended.  Colonoscopy:08/2013 Diverticulosis in the sigmoid colon.                        - One 3 mm polyp in the transverse colon. Resected and retrieved.  Dexa:06/2016Osteoporosis of the femoral neck.     Recommendations:  1) Adequate calcium and Vitamin D therapy  2) Appropriate exercise  3) Consider medical therapy including bisphosphonates,  "denosumab.  4) Consider repeat BMD in 2 years.      Exam  BP (!) 140/80 (BP Location: Left arm, Patient Position: Sitting, BP Method: Medium (Manual))   Ht 4' 1" (1.245 m)   Wt 62 kg (136 lb 11 oz)   LMP  (LMP Unknown)   BMI 40.03 kg/m²   General: alert and oriented, no acute distress  Respiratory: normal respiratory effort  Abd: soft, non-tender, non-distended    Pelvic  Ext. Genitalia: normal external genitalia. Normal bartholin's and skeens glands  Vagina: + atrophy. Normal vaginal mucosa without lesions. No discharge noted.   Non-tender bladder base without palpable mass.  #3 ring with support in place  Cervix: absent  Uterus:  absent   Urethra: no masses or tenderness  Urethral meatus: no lesions, caruncle or prolapse.      Pessary removed without difficulty.  Washed with soap and water.  Reinserted without difficulty.        Impression  1. Vaginal vault prolapse, posthysterectomy     2. Prolapse of anterior vaginal wall     3. Rectocele     4. Vaginal atrophy     5. Pessary maintenance       We reviewed the above issues and discussed options for short-term versus long-term management of her problems.   Plan:   1. Prolapse: Stage 2 apical prolapse, Stage 3 anterior and posterior vaginal wall prolapse   --Pessary # 3 ring with support  --Daily pelvic floor exercises as assigned, consider Pelvic floor PT in future  --Non surgical options discussed with patient      2. Urge urinary incontinence: not too bothersome   --Empty bladder every 3 hours. Empty well: wait a minute, lean forward on toilet.   --Avoid dietary irritants (see sheet). Keep diary x 3-5 days to determine your irritants.  --KEGELS: do 10 in AM and 10 in PM, holding each x 10 seconds. When you feel urge to go, STOP, KEGEL, and when urge has passed, then go to bathroom. Consider PT in future.   --URGE: Consider anticholinergic. Takes 2-4 weeks to see if will have effect. For dry mouth: get sour, sugar free lozenge or gum.   --STRESS: Pessary vs. " Sling.      3. Incomplete bladder emptying:improved   --Double void and Crede maneuvers discussed  --Improved with pessary    4. Vaginal atrophy (dryness):  Fem ph quarter size with your finger twice weekly    5. RTC 3 months    30 minutes were spent in face to face time with this patient  75 % of this time was spent in counseling and/or coordination of care    MAEGAN [facility]  Division of Female Pelvic Medicine and Reconstructive Surgery  Department of Obstetrics & Gynecology

## 2018-06-21 NOTE — PATIENT INSTRUCTIONS
1. Prolapse: Stage 2 apical prolapse, Stage 3 anterior and posterior vaginal wall prolapse   --Pessary # 3 ring with support  --Daily pelvic floor exercises as assigned, consider Pelvic floor PT in future  --Non surgical options discussed with patient      2. Urge urinary incontinence: not too bothersome   --Empty bladder every 3 hours. Empty well: wait a minute, lean forward on toilet.   --Avoid dietary irritants (see sheet). Keep diary x 3-5 days to determine your irritants.  --KEGELS: do 10 in AM and 10 in PM, holding each x 10 seconds. When you feel urge to go, STOP, KEGEL, and when urge has passed, then go to bathroom. Consider PT in future.   --URGE: Consider anticholinergic. Takes 2-4 weeks to see if will have effect. For dry mouth: get sour, sugar free lozenge or gum.   --STRESS: Pessary vs. Sling.      3. Incomplete bladder emptying:improved   --Double void and Crede maneuvers discussed  --Improved with pessary    4. Vaginal atrophy (dryness):  Fem ph quarter size with your finger twice weekly    5. RTC 3 months

## 2018-07-03 ENCOUNTER — TELEPHONE (OUTPATIENT)
Dept: INTERNAL MEDICINE | Facility: CLINIC | Age: 83
End: 2018-07-03

## 2018-07-03 ENCOUNTER — OFFICE VISIT (OUTPATIENT)
Dept: PODIATRY | Facility: CLINIC | Age: 83
End: 2018-07-03

## 2018-07-03 VITALS
SYSTOLIC BLOOD PRESSURE: 148 MMHG | HEART RATE: 65 BPM | WEIGHT: 137 LBS | DIASTOLIC BLOOD PRESSURE: 80 MMHG | BODY MASS INDEX: 31.7 KG/M2 | HEIGHT: 55 IN

## 2018-07-03 DIAGNOSIS — B35.1 ONYCHOMYCOSIS DUE TO DERMATOPHYTE: Primary | ICD-10-CM

## 2018-07-03 DIAGNOSIS — L84 CORN OR CALLUS: ICD-10-CM

## 2018-07-03 PROCEDURE — 17999 UNLISTD PX SKN MUC MEMB SUBQ: CPT | Mod: CSM,,, | Performed by: PODIATRIST

## 2018-07-03 PROCEDURE — 99999 PR PBB SHADOW E&M-EST. PATIENT-LVL III: CPT | Mod: PBBFAC,,, | Performed by: PODIATRIST

## 2018-07-03 NOTE — TELEPHONE ENCOUNTER
----- Message from Chi Ricci sent at 7/2/2018  3:42 PM CDT -----  Contact: Patient [phone]  She was scratch by a stray cat today 7/2/18 and would like to know if she need to come in. It is not infected.    Thank you

## 2018-07-07 RX ORDER — AMLODIPINE BESYLATE 10 MG/1
TABLET ORAL
Qty: 90 TABLET | Refills: 3 | Status: SHIPPED | OUTPATIENT
Start: 2018-07-07 | End: 2019-07-07 | Stop reason: SDUPTHER

## 2018-07-11 ENCOUNTER — TELEPHONE (OUTPATIENT)
Dept: PODIATRY | Facility: CLINIC | Age: 83
End: 2018-07-11

## 2018-07-11 NOTE — TELEPHONE ENCOUNTER
----- Message from Jb Malagon sent at 7/11/2018 11:46 AM CDT -----  Contact: Self @ [phone]  Pt would like a call back @ [phone] in regards to pain and RX

## 2018-07-11 NOTE — TELEPHONE ENCOUNTER
Called patient cell phone  she ask me to call her  house phone and leave a voicemail to give [facility] the [facility] to check out the status for her script and pain meds

## 2018-07-11 NOTE — TELEPHONE ENCOUNTER
----- Message from Frannie Martines sent at 7/11/2018  2:47 PM CDT -----  Contact: self  Pt call Needing a call back regarding the prescription was not sent over  [phone]

## 2018-07-11 NOTE — TELEPHONE ENCOUNTER
----- Message from Jb Malagon sent at 7/11/2018 11:46 AM CDT -----  Contact: Self @ [phone]  Pt would like a call back @ [phone] in regards to pain and RX

## 2018-08-01 ENCOUNTER — OFFICE VISIT (OUTPATIENT)
Dept: PODIATRY | Facility: CLINIC | Age: 83
End: 2018-08-01

## 2018-08-01 VITALS
BODY MASS INDEX: 31.24 KG/M2 | WEIGHT: 135 LBS | DIASTOLIC BLOOD PRESSURE: 80 MMHG | SYSTOLIC BLOOD PRESSURE: 129 MMHG | HEIGHT: 55 IN | HEART RATE: 65 BPM

## 2018-08-01 DIAGNOSIS — L84 CORN OR CALLUS: Primary | ICD-10-CM

## 2018-08-01 PROCEDURE — 17999 UNLISTD PX SKN MUC MEMB SUBQ: CPT | Mod: CSM,,, | Performed by: PODIATRIST

## 2018-08-01 PROCEDURE — 99999 PR PBB SHADOW E&M-EST. PATIENT-LVL III: CPT | Mod: PBBFAC,,, | Performed by: PODIATRIST

## 2018-08-01 PROCEDURE — 99499 UNLISTED E&M SERVICE: CPT | Mod: CSM,,, | Performed by: PODIATRIST

## 2018-08-01 NOTE — PROGRESS NOTES
Pt presents for routine non-covered foot care. Pt. does not have high risk feet. Pedal pulses are palpable. Nails are elongated, thickened Bilaterally. Calluses present to both feet/toes. Diagnosis is calluses. Calluses/corns were reduced Bilaterally. Patient tolerated well and related relief. RTC 4 weeks as PROC B

## 2018-08-02 RX ORDER — BENAZEPRIL HYDROCHLORIDE 40 MG/1
TABLET ORAL
Qty: 90 TABLET | Refills: 1 | Status: SHIPPED | OUTPATIENT
Start: 2018-08-02 | End: 2019-02-01 | Stop reason: SDUPTHER

## 2018-08-29 ENCOUNTER — OFFICE VISIT (OUTPATIENT)
Dept: PODIATRY | Facility: CLINIC | Age: 83
End: 2018-08-29
Payer: MEDICARE

## 2018-08-29 VITALS
BODY MASS INDEX: 31.47 KG/M2 | DIASTOLIC BLOOD PRESSURE: 76 MMHG | SYSTOLIC BLOOD PRESSURE: 117 MMHG | HEART RATE: 69 BPM | HEIGHT: 55 IN | WEIGHT: 136 LBS

## 2018-08-29 DIAGNOSIS — B35.1 ONYCHOMYCOSIS DUE TO DERMATOPHYTE: Primary | ICD-10-CM

## 2018-08-29 DIAGNOSIS — L84 CORN OR CALLUS: ICD-10-CM

## 2018-08-29 PROCEDURE — 17999 UNLISTD PX SKN MUC MEMB SUBQ: CPT | Mod: CSM,,, | Performed by: PODIATRIST

## 2018-08-29 PROCEDURE — 99499 UNLISTED E&M SERVICE: CPT | Mod: CSM,,, | Performed by: PODIATRIST

## 2018-08-29 PROCEDURE — 99999 PR PBB SHADOW E&M-EST. PATIENT-LVL III: CPT | Mod: PBBFAC,,, | Performed by: PODIATRIST

## 2018-08-29 NOTE — PROGRESS NOTES
Pt presents for routine non-covered foot care. Pt. does not have high risk feet. Pedal pulses are palpable. Nails are elongated, thickened Bilaterally. Calluses present to both feet/toes. Diagnosis is calluses. Calluses/corns were reduced Bilaterally. Patient tolerated well and related relief. RTC 4 weeks as PROC B     Has pain in the left 4th toe. Will apply Lidocaine gel today. Additional lidocaine provided for use at home. Rx for Lidocaine gel sent to pharmacy.

## 2018-08-31 ENCOUNTER — TELEPHONE (OUTPATIENT)
Dept: PODIATRY | Facility: CLINIC | Age: 83
End: 2018-08-31

## 2018-08-31 RX ORDER — LIDOCAINE HYDROCHLORIDE 20 MG/ML
JELLY TOPICAL
Qty: 30 ML | Refills: 3 | Status: ON HOLD | OUTPATIENT
Start: 2018-08-31 | End: 2024-03-13 | Stop reason: CLARIF

## 2018-08-31 NOTE — TELEPHONE ENCOUNTER
----- Message from Flaco Emmanuel sent at 8/31/2018 12:58 PM CDT -----  Contact: self   Pt is requesting a call back regarding a cream that was given to her at appt on Wednesday. Pt can be reached at [phone].

## 2018-08-31 NOTE — TELEPHONE ENCOUNTER
Spoke with patient concerning cream she obtained at last visit.  She does not know the name of the cream she is asking about.  Told her we have many creams and if she contacts us when she returns home with the name of the cream we will assist her to the best of our ability

## 2018-09-14 ENCOUNTER — OFFICE VISIT (OUTPATIENT)
Dept: UROGYNECOLOGY | Facility: CLINIC | Age: 83
End: 2018-09-14
Payer: MEDICARE

## 2018-09-14 ENCOUNTER — OFFICE VISIT (OUTPATIENT)
Dept: INTERNAL MEDICINE | Facility: CLINIC | Age: 83
End: 2018-09-14
Payer: MEDICARE

## 2018-09-14 VITALS
WEIGHT: 134.25 LBS | HEIGHT: 59 IN | DIASTOLIC BLOOD PRESSURE: 70 MMHG | BODY MASS INDEX: 27.06 KG/M2 | SYSTOLIC BLOOD PRESSURE: 140 MMHG

## 2018-09-14 VITALS
DIASTOLIC BLOOD PRESSURE: 78 MMHG | OXYGEN SATURATION: 97 % | WEIGHT: 134.06 LBS | SYSTOLIC BLOOD PRESSURE: 138 MMHG | HEART RATE: 64 BPM | HEIGHT: 59 IN | BODY MASS INDEX: 27.03 KG/M2

## 2018-09-14 DIAGNOSIS — M47.819 FACET ARTHROPATHY: ICD-10-CM

## 2018-09-14 DIAGNOSIS — M51.36 DDD (DEGENERATIVE DISC DISEASE), LUMBAR: ICD-10-CM

## 2018-09-14 DIAGNOSIS — I77.9 MILD CAROTID ARTERY DISEASE: ICD-10-CM

## 2018-09-14 DIAGNOSIS — I77.810 ECTATIC THORACIC AORTA: ICD-10-CM

## 2018-09-14 DIAGNOSIS — M19.012 PRIMARY OSTEOARTHRITIS OF BOTH SHOULDERS: ICD-10-CM

## 2018-09-14 DIAGNOSIS — I10 ESSENTIAL HYPERTENSION: ICD-10-CM

## 2018-09-14 DIAGNOSIS — Z00.00 ENCOUNTER FOR PREVENTIVE HEALTH EXAMINATION: Primary | ICD-10-CM

## 2018-09-14 DIAGNOSIS — N81.6 RECTOCELE: ICD-10-CM

## 2018-09-14 DIAGNOSIS — H25.11 NUCLEAR SENILE CATARACT OF RIGHT EYE: ICD-10-CM

## 2018-09-14 DIAGNOSIS — R35.1 NOCTURIA: ICD-10-CM

## 2018-09-14 DIAGNOSIS — N81.10 PROLAPSE OF ANTERIOR VAGINAL WALL: ICD-10-CM

## 2018-09-14 DIAGNOSIS — M19.90 INFLAMMATORY ARTHRITIS: ICD-10-CM

## 2018-09-14 DIAGNOSIS — N99.3 VAGINAL VAULT PROLAPSE AFTER HYSTERECTOMY: Primary | ICD-10-CM

## 2018-09-14 DIAGNOSIS — I70.0 ATHEROSCLEROSIS OF AORTA: ICD-10-CM

## 2018-09-14 DIAGNOSIS — N99.3 VAGINAL VAULT PROLAPSE, POSTHYSTERECTOMY: ICD-10-CM

## 2018-09-14 DIAGNOSIS — N39.41 URGE INCONTINENCE: ICD-10-CM

## 2018-09-14 DIAGNOSIS — M19.011 PRIMARY OSTEOARTHRITIS OF BOTH SHOULDERS: ICD-10-CM

## 2018-09-14 DIAGNOSIS — N95.2 VAGINAL ATROPHY: ICD-10-CM

## 2018-09-14 DIAGNOSIS — Z46.89 PESSARY MAINTENANCE: ICD-10-CM

## 2018-09-14 DIAGNOSIS — Z85.3 HISTORY OF BREAST CANCER: ICD-10-CM

## 2018-09-14 DIAGNOSIS — N81.11 MIDLINE CYSTOCELE: ICD-10-CM

## 2018-09-14 PROCEDURE — 99214 OFFICE O/P EST MOD 30 MIN: CPT | Mod: PBBFAC | Performed by: NURSE PRACTITIONER

## 2018-09-14 PROCEDURE — 99999 PR PBB SHADOW E&M-EST. PATIENT-LVL III: CPT | Mod: PBBFAC,,, | Performed by: NURSE PRACTITIONER

## 2018-09-14 PROCEDURE — 87088 URINE BACTERIA CULTURE: CPT

## 2018-09-14 PROCEDURE — 99999 PR PBB SHADOW E&M-EST. PATIENT-LVL IV: CPT | Mod: PBBFAC,,, | Performed by: NURSE PRACTITIONER

## 2018-09-14 PROCEDURE — G0439 PPPS, SUBSEQ VISIT: HCPCS | Mod: S$GLB,,, | Performed by: NURSE PRACTITIONER

## 2018-09-14 PROCEDURE — 99213 OFFICE O/P EST LOW 20 MIN: CPT | Mod: S$PBB,,, | Performed by: NURSE PRACTITIONER

## 2018-09-14 PROCEDURE — 87086 URINE CULTURE/COLONY COUNT: CPT

## 2018-09-14 PROCEDURE — 87077 CULTURE AEROBIC IDENTIFY: CPT

## 2018-09-14 PROCEDURE — 1101F PT FALLS ASSESS-DOCD LE1/YR: CPT | Mod: CPTII,,, | Performed by: NURSE PRACTITIONER

## 2018-09-14 PROCEDURE — 99499 UNLISTED E&M SERVICE: CPT | Mod: S$GLB,,, | Performed by: NURSE PRACTITIONER

## 2018-09-14 PROCEDURE — 99213 OFFICE O/P EST LOW 20 MIN: CPT | Mod: PBBFAC,27 | Performed by: NURSE PRACTITIONER

## 2018-09-14 PROCEDURE — 87186 SC STD MICRODIL/AGAR DIL: CPT

## 2018-09-14 NOTE — PROGRESS NOTES
Urogyn follow up  09/14/2018  .  [facility] [address] [place] [address]    Ernestine Luna  [id number]  8/13/1929      Ernestine Luna is a 89 y.o. here for a urogyn follow up.    Last HPI from  06/21/2018  1)  UI:  (--) NELI   (--) UUI (--) pads Daytime frequency: Q 30 minutes- 3 hours.  Nocturia: Yes: 0- 2/night.   (--) dysuria,  (--) hematuria,  (--) frequent UTIs.  (+) complete bladder emptying.   2)  POP:  Absent.   Symptoms:(--)  .  (--) vaginal bleeding. (--) vaginal discharge. (--) sexually active.  (--) dyspareunia.   (+)  Vaginal dryness.  (--) vaginal estrogen use.   3)  BM:  (--) constipation/straining.  (--) chronic diarrhea. (--) hematochezia.  (--) fecal incontinence.  (--) fecal smearing/urgency.  (--) incomplete evacuation.    4)Pessary:  Denies pain, bleeding or dischage. Using #3 ring with support pessary.    Changes from last visit:  1)  UI:  (--) NELI   (--) UUI (--) pads Daytime frequency: Q 30 minutes- 3 hours.  Nocturia: Yes 3-4/night.   (--) dysuria,  (--) hematuria,  (--) frequent UTIs.  (+) complete bladder emptying.     2)  POP:  Absent.   Symptoms:(--)  .  (--) vaginal bleeding. (--) vaginal discharge. (--) sexually active.  (--) dyspareunia.   (+)  Vaginal dryness.  (--) vaginal estrogen use.     3)  BM:  (--) constipation/straining.  (--) chronic diarrhea. (--) hematochezia.  (--) fecal incontinence.  (--) fecal smearing/urgency.  (--) incomplete evacuation.      4)Pessary:  Denies pain, bleeding or dischage. Using #3 ring with support pessary.    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 Wilkins    COLONOSCOPY N/A 8/8/2013    [facility] (4TH [facility]    COLONOSCOPY W/ POLYPECTOMY  08/08/2013    RASHEED   06/24/2014    RASHEED-TRANSFORAMINAL @ L4 Right 6/24/2014    [facility] at [facility] PAIN MGT    EYE SURGERY      HYSTERECTOMY  age 38    ALISTAIR; ovaries in place    INSERTION-INTRAOCULAR LENS (IOL) Right 10/12/2015    [facility] at [facility] OR    JOINT REPLACEMENT      bilateral knees    PHACOEMULSIFICATION-ASPIRATION-CATARACT Right 10/12/2015    [facility] Lalita [facility] at [facility] OR    TOTAL KNEE ARTHROPLASTY      Bilateral       Current Outpatient Medications   Medication Sig    acetic acid-oxyquinoline (FEM PH) 0.9-0.025 % Gel Place 1 applicator vaginally twice a week.    amLODIPine (NORVASC) 10 MG tablet TAKE 1 TABLET BY MOUTH ONCE DAILY    atenolol (TENORMIN) 25 MG tablet TAKE 1 TABLET BY MOUTH EVERY DAY    benazepril (LOTENSIN) 40 MG tablet TAKE 1 TABLET BY MOUTH ONCE DAILY.    calcium carbonate (OS-DHRUV) 600 mg calcium (1,500 mg) Tab Take 600 mg by mouth once.    lidocaine HCL 2% (XYLOCAINE) 2 % jelly Apply topically as needed. For toe pain, apply up to twice daily as needed for pain.    metoprolol succinate (TOPROL-XL) 25 MG 24 hr tablet Take 1 tablet (25 mg total) by mouth once daily.    MULTIVITAMIN W-MINERALS/LUTEIN (CENTRUM SILVER ORAL) Take 1 tablet by mouth Daily.    omega 3-calcium-vit D3-FA-mv13 [phone] mg Cmpk Take by mouth once daily.    polycarbophil (REPLENS) Gel Place vaginally.    vitamin D 1000 units Tab Take 185 mg by mouth once daily.    cephALEXin (KEFLEX) 500 MG capsule Take 1 capsule (500 mg total) by  "mouth every 12 (twelve) hours. for 5 days     No current facility-administered medications for this visit.        ROS:  As per HPI.      Well Woman:  Pap:denies history of abnormal pap smear-- post hyst  Mammo:09/28/2018  No mammographic evidence of malignancy.  Mammogram in 1 year is recommended.  Colonoscopy:08/2013 Diverticulosis in the sigmoid colon.                        - One 3 mm polyp in the transverse colon. Resected and retrieved.  Dexa:06/2016Osteoporosis of the femoral neck.     Recommendations:  1) Adequate calcium and Vitamin D therapy  2) Appropriate exercise  3) Consider medical therapy including bisphosphonates, denosumab.  4) Consider repeat BMD in 2 years.      Exam  BP (!) 140/70 (BP Location: Left arm, Patient Position: Sitting, BP Method: Medium (Manual))   Ht 4' 11" (1.499 m)   Wt 60.9 kg (134 lb 4.2 oz)   LMP  (LMP Unknown)   BMI 27.12 kg/m²   General: alert and oriented, no acute distress  Respiratory: normal respiratory effort  Abd: soft, non-tender, non-distended    Pelvic  Ext. Genitalia: normal external genitalia. Normal bartholin's and skeens glands  Vagina: + atrophy. Normal vaginal mucosa without lesions. No discharge noted.   Non-tender bladder base without palpable mass.  #3 ring with support in place  Cervix: absent  Uterus:  absent   Urethra: no masses or tenderness  Urethral meatus: no lesions, caruncle or prolapse.      Pessary removed without difficulty.  Washed with soap and water.  Reinserted without difficulty.        Impression  1. Vaginal vault prolapse after hysterectomy     2. Midline cystocele     3. Rectocele     4. Nocturia  Urine culture   5. Urge incontinence     6. Vaginal atrophy     7. Pessary maintenance       We reviewed the above issues and discussed options for short-term versus long-term management of her problems.   Plan:   1. Prolapse: Stage 2 apical prolapse, Stage 3 anterior and posterior vaginal wall prolapse   --Pessary # 3 ring with " support  --Daily pelvic floor exercises as assigned, consider Pelvic floor PT in future  --Non surgical options discussed with patient      2. Urge urinary incontinence: not too bothersome   --Empty bladder every 3 hours. Empty well: wait a minute, lean forward on toilet.   --Avoid dietary irritants (see sheet). Keep diary x 3-5 days to determine your irritants.  --KEGELS: do 10 in AM and 10 in PM, holding each x 10 seconds. When you feel urge to go, STOP, KEGEL, and when urge has passed, then go to bathroom. Consider PT in future.   --URGE: Consider anticholinergic. Takes 2-4 weeks to see if will have effect. For dry mouth: get sour, sugar free lozenge or gum.   --STRESS: Pessary vs. Sling.      3. Incomplete bladder emptying:improved   --Double void and Crede maneuvers discussed  --Improved with pessary    4. Vaginal atrophy (dryness):  Fem ph quarter size with your finger twice weekly    5. RTC 3 months    30 minutes were spent in face to face time with this patient  75 % of this time was spent in counseling and/or coordination of care    MAEGAN [facility]  Division of Female Pelvic Medicine and Reconstructive Surgery  Department of Obstetrics & Gynecology

## 2018-09-14 NOTE — PROGRESS NOTES
"Ernestine Luna presented for a  Medicare AWV and comprehensive Health Risk Assessment today. The following components were reviewed and updated:    · Medical history  · Family History  · Social history  · Allergies and Current Medications  · Health Risk Assessment  · Health Maintenance  · Care Team     ** See Completed Assessments for Annual Wellness Visit within the encounter summary.**       The following assessments were completed:  · Living Situation  · CAGE  · Depression Screening  · Timed Get Up and Go  · Whisper Test  · Cognitive Function Screening  · Nutrition Screening  · ADL Screening  · PAQ Screening          Vitals:    09/14/18 1415   BP: 138/78   BP Location: Left arm   Patient Position: Sitting   Pulse: 64   SpO2: 97%   Weight: 60.8 kg (134 lb 0.6 oz)   Height: 4' 11" (1.499 m)     Body mass index is 27.07 kg/m².     Physical Exam   Constitutional: She is oriented to person, place, and time. She appears well-developed and well-nourished.        HENT:   Head: Normocephalic and atraumatic. Not macrocephalic and not microcephalic. Head is without raccoon's eyes, without Keller's sign, without abrasion, without contusion, without laceration, without right periorbital erythema and without left periorbital erythema. Hair is normal.   Right Ear: No lacerations. No drainage, swelling or tenderness. No foreign bodies. No mastoid tenderness. Tympanic membrane is not injected, not scarred, not perforated, not erythematous, not retracted and not bulging. Tympanic membrane mobility is normal. No middle ear effusion. No hemotympanum. No decreased hearing is noted.   Left Ear: No lacerations. No drainage, swelling or tenderness. No foreign bodies. No mastoid tenderness. Tympanic membrane is not injected, not scarred, not perforated, not erythematous, not retracted and not bulging. Tympanic membrane mobility is normal.  No middle ear effusion. No hemotympanum. No decreased hearing is noted.   Nose: Nose normal. No " mucosal edema, rhinorrhea, nose lacerations, sinus tenderness or nasal deformity.   Mouth/Throat: Uvula is midline.   Eyes: Conjunctivae and lids are normal. No scleral icterus.   Neck: Trachea normal. Neck supple. No spinous process tenderness and no muscular tenderness present. No neck rigidity. No edema, no erythema and normal range of motion present. No thyroid mass and no thyromegaly present.   Cardiovascular: Normal rate, regular rhythm, normal heart sounds and intact distal pulses. Exam reveals no gallop and no friction rub.   No murmur heard.  Pulmonary/Chest: Effort normal and breath sounds normal. No stridor. No respiratory distress. She has no wheezes. She has no rales. She exhibits no tenderness.   Abdominal: Soft. Bowel sounds are normal.   Musculoskeletal: Normal range of motion.   Lymphadenopathy:        Head (right side): No submental, no submandibular, no tonsillar, no preauricular and no posterior auricular adenopathy present.        Head (left side): No submental, no submandibular, no tonsillar, no preauricular, no posterior auricular and no occipital adenopathy present.   Neurological: She is alert and oriented to person, place, and time.   Skin: Skin is warm and dry.   Psychiatric: She has a normal mood and affect. Her behavior is normal. Judgment and thought content normal.   Vitals reviewed.      Diagnoses and health risks identified today and associated recommendations/orders:    1. Encounter for preventive health examination  Annual Health Risk Assessment (HRA) visit today.  Counseling and referral of health maintenance and preventative health measures performed.  Patient given annual wellness paperwork to take home.  Encouraged to return in 1 year for subsequent HRA visit.    -Declined Influenza Vaccine    2. Facet arthropathy  Chronic. Stable. Noted on MRI L-Spine from 6/16/14. Continue current treatment plan as previously prescribed by PCP.    3. Atherosclerosis of aorta  Chronic. Stable.  Noted on Chest x-ray from 01/31/14. Monitored by PCP.    4. Ectatic thoracic aorta  Chronic. Stable. Noted on Chest x-ray from 01/31/14. Monitored by PCP.    5. Mild carotid artery disease  Chronic. Stable. Continue current treatment plan. Monitored by PCP.    6. Essential hypertension  Chronic. Stable. Uncontrolled. Encouraged to increase exercise as tolerated (moderate-intensity aerobic activity and muscle-strengthening activities) improve diet to heart healthy, low sodium diet. Continue current treatment plan. Monitored by PCP.    7. Nuclear senile cataract of right eye  Chronic. Stable. Continue current treatment plan. Monitored by Ophthalmology.    8. DDD (degenerative disc disease), lumbar  Chronic. Stable. Monitored by Ortho.    9. Vaginal vault prolapse, posthysterectomy  Chronic. Stable. Monitored by Urogyn.    10. Prolapse of anterior vaginal wall  Chronic. Stable. Monitored by Urogyn.    11. History of breast cancer  Chronic. Stable. Monitored by Hem/Onc.    12. Primary osteoarthritis of both shoulders  Chronic. Stable. Continue current treatment plan. Monitored by Ortho.    13. Inflammatory arthritis  Chronic. Stable. Continue current treatment plan. Monitored by Ortho.        Provided Ernestine with a 5-10 year written screening schedule and personal prevention plan. Recommendations were developed using the USPSTF age appropriate recommendations. Education, counseling, and referrals were provided as needed. After Visit Summary printed and given to patient which includes a list of additional screenings\tests needed.    No Follow-up on file.    Riki Delarosa NP

## 2018-09-14 NOTE — PATIENT INSTRUCTIONS
Counseling and Referral of Other Preventative  (Italic type indicates deductible and co-insurance are waived)    Patient Name: Ernestine Luna  Today's Date: 9/14/2018    Health Maintenance       Date Due Completion Date    Influenza Vaccine 11/02/2018 (Originally 8/1/2018) 9/28/2017    Override on 10/1/2016: Done    Lipid Panel 12/29/2022 12/29/2017    TETANUS VACCINE 06/09/2026 6/9/2016        No orders of the defined types were placed in this encounter.    The following information is provided to all patients.  This information is to help you find resources for any of the problems found today that may be affecting your health:                Living healthy guide: www.[facility].[place].gov      Understanding Diabetes: www.diabetes.org      Eating healthy: www.cdc.gov/healthyweight      CDC home safety checklist: www.cdc.gov/steadi/patient.html      Agency on Aging: www.goea.[place].[facility]      Alcoholics anonymous (AA): www.aa.org      Physical Activity: www.johnie.nih.gov/yj8oizr      Tobacco use: www.quitwithusla.org

## 2018-09-18 ENCOUNTER — TELEPHONE (OUTPATIENT)
Dept: UROGYNECOLOGY | Facility: CLINIC | Age: 83
End: 2018-09-18

## 2018-09-18 DIAGNOSIS — N30.00 ACUTE CYSTITIS WITHOUT HEMATURIA: Primary | ICD-10-CM

## 2018-09-18 LAB — BACTERIA UR CULT: NORMAL

## 2018-09-18 RX ORDER — CEPHALEXIN 500 MG/1
500 CAPSULE ORAL EVERY 12 HOURS
Qty: 10 CAPSULE | Refills: 0 | Status: SHIPPED | OUTPATIENT
Start: 2018-09-18 | End: 2018-09-23

## 2018-09-27 ENCOUNTER — OFFICE VISIT (OUTPATIENT)
Dept: PODIATRY | Facility: CLINIC | Age: 83
End: 2018-09-27
Payer: MEDICARE

## 2018-09-27 VITALS — HEIGHT: 59 IN | BODY MASS INDEX: 27.01 KG/M2 | WEIGHT: 134 LBS

## 2018-09-27 DIAGNOSIS — L84 CORN OR CALLUS: Primary | ICD-10-CM

## 2018-09-27 PROCEDURE — 99999 PR PBB SHADOW E&M-EST. PATIENT-LVL III: CPT | Mod: PBBFAC,,, | Performed by: PODIATRIST

## 2018-09-27 PROCEDURE — 17999 UNLISTD PX SKN MUC MEMB SUBQ: CPT | Mod: CSM,,, | Performed by: PODIATRIST

## 2018-09-27 PROCEDURE — 99213 OFFICE O/P EST LOW 20 MIN: CPT | Mod: PBBFAC | Performed by: PODIATRIST

## 2018-09-27 PROCEDURE — 99499 UNLISTED E&M SERVICE: CPT | Mod: S$PBB,,, | Performed by: PODIATRIST

## 2018-10-01 ENCOUNTER — HOSPITAL ENCOUNTER (OUTPATIENT)
Dept: RADIOLOGY | Facility: HOSPITAL | Age: 83
Discharge: HOME | End: 2018-10-01
Attending: INTERNAL MEDICINE
Payer: MEDICARE

## 2018-10-01 DIAGNOSIS — Z12.31 ENCOUNTER FOR SCREENING MAMMOGRAM FOR BREAST CANCER: ICD-10-CM

## 2018-10-01 PROCEDURE — 77067 SCR MAMMO BI INCL CAD: CPT | Mod: TC

## 2018-10-01 PROCEDURE — 77067 SCR MAMMO BI INCL CAD: CPT | Mod: 26,,, | Performed by: RADIOLOGY

## 2018-10-01 PROCEDURE — 77063 BREAST TOMOSYNTHESIS BI: CPT | Mod: 26,,, | Performed by: RADIOLOGY

## 2018-10-17 ENCOUNTER — OFFICE VISIT (OUTPATIENT)
Dept: INTERNAL MEDICINE | Facility: CLINIC | Age: 83
End: 2018-10-17
Payer: MEDICARE

## 2018-10-17 ENCOUNTER — LAB VISIT (OUTPATIENT)
Dept: LAB | Facility: HOSPITAL | Age: 83
End: 2018-10-17
Attending: INTERNAL MEDICINE
Payer: MEDICARE

## 2018-10-17 VITALS
BODY MASS INDEX: 26.66 KG/M2 | DIASTOLIC BLOOD PRESSURE: 70 MMHG | SYSTOLIC BLOOD PRESSURE: 121 MMHG | HEART RATE: 62 BPM | OXYGEN SATURATION: 95 % | HEIGHT: 59 IN | WEIGHT: 132.25 LBS

## 2018-10-17 DIAGNOSIS — Z85.3 HISTORY OF BREAST CANCER: ICD-10-CM

## 2018-10-17 DIAGNOSIS — K64.4 ANAL SKIN TAG: ICD-10-CM

## 2018-10-17 DIAGNOSIS — I10 ESSENTIAL HYPERTENSION: Primary | ICD-10-CM

## 2018-10-17 DIAGNOSIS — I10 ESSENTIAL HYPERTENSION: ICD-10-CM

## 2018-10-17 LAB
ANION GAP SERPL CALC-SCNC: 7 MMOL/L
BASOPHILS # BLD AUTO: 0.03 K/UL
BASOPHILS NFR BLD: 0.5 %
BUN SERPL-MCNC: 12 MG/DL
CALCIUM SERPL-MCNC: 10.1 MG/DL
CHLORIDE SERPL-SCNC: 103 MMOL/L
CHOLEST SERPL-MCNC: 175 MG/DL
CHOLEST/HDLC SERPL: 2.7 {RATIO}
CO2 SERPL-SCNC: 30 MMOL/L
CREAT SERPL-MCNC: 0.7 MG/DL
DIFFERENTIAL METHOD: NORMAL
EOSINOPHIL # BLD AUTO: 0.3 K/UL
EOSINOPHIL NFR BLD: 4.1 %
ERYTHROCYTE [DISTWIDTH] IN BLOOD BY AUTOMATED COUNT: 13.2 %
EST. GFR  (AFRICAN AMERICAN): >60 ML/MIN/1.73 M^2
EST. GFR  (NON AFRICAN AMERICAN): >60 ML/MIN/1.73 M^2
GLUCOSE SERPL-MCNC: 90 MG/DL
HCT VFR BLD AUTO: 38.9 %
HDLC SERPL-MCNC: 66 MG/DL
HDLC SERPL: 37.7 %
HGB BLD-MCNC: 12.5 G/DL
IMM GRANULOCYTES # BLD AUTO: 0 K/UL
IMM GRANULOCYTES NFR BLD AUTO: 0 %
LDLC SERPL CALC-MCNC: 97.8 MG/DL
LYMPHOCYTES # BLD AUTO: 2.4 K/UL
LYMPHOCYTES NFR BLD: 38.7 %
MCH RBC QN AUTO: 30.6 PG
MCHC RBC AUTO-ENTMCNC: 32.1 G/DL
MCV RBC AUTO: 95 FL
MONOCYTES # BLD AUTO: 0.5 K/UL
MONOCYTES NFR BLD: 8 %
NEUTROPHILS # BLD AUTO: 3 K/UL
NEUTROPHILS NFR BLD: 48.7 %
NONHDLC SERPL-MCNC: 109 MG/DL
NRBC BLD-RTO: 0 /100 WBC
PLATELET # BLD AUTO: 180 K/UL
PMV BLD AUTO: 10.6 FL
POTASSIUM SERPL-SCNC: 4.4 MMOL/L
RBC # BLD AUTO: 4.09 M/UL
SODIUM SERPL-SCNC: 140 MMOL/L
TRIGL SERPL-MCNC: 56 MG/DL
WBC # BLD AUTO: 6.13 K/UL

## 2018-10-17 PROCEDURE — 99213 OFFICE O/P EST LOW 20 MIN: CPT | Mod: PBBFAC,PO | Performed by: INTERNAL MEDICINE

## 2018-10-17 PROCEDURE — 85025 COMPLETE CBC W/AUTO DIFF WBC: CPT

## 2018-10-17 PROCEDURE — 1101F PT FALLS ASSESS-DOCD LE1/YR: CPT | Mod: CPTII,,, | Performed by: INTERNAL MEDICINE

## 2018-10-17 PROCEDURE — 99999 PR PBB SHADOW E&M-EST. PATIENT-LVL III: CPT | Mod: PBBFAC,,, | Performed by: INTERNAL MEDICINE

## 2018-10-17 PROCEDURE — 99214 OFFICE O/P EST MOD 30 MIN: CPT | Mod: S$PBB,,, | Performed by: INTERNAL MEDICINE

## 2018-10-17 PROCEDURE — 80048 BASIC METABOLIC PNL TOTAL CA: CPT

## 2018-10-17 PROCEDURE — 36415 COLL VENOUS BLD VENIPUNCTURE: CPT | Mod: PO

## 2018-10-17 PROCEDURE — 80061 LIPID PANEL: CPT

## 2018-10-17 NOTE — PROGRESS NOTES
PAST MEDICAL HISTORY:  Hypertension.  Breast cancer of the right breast, status post lumpectomy and had been on   tamoxifen.  Osteoarthritis of the knees and shoulder.  Hysterectomy with oophorectomy.  Carpal tunnel syndrome release.  Bilateral knee arthroscopic surgery.  Bunionectomy.    SOCIAL HISTORY:  Tobacco use and alcohol use, none.    MEDICATIONS:  Atenolol 25 mg daily.  Amlodipine 10 mg daily.  Benazepril 20 mg daily.  Vitamin D.  Omega-3.  Calcium.  Multivitamin.        REASON FOR VISIT:  This is an 89-year-old female who comes in for a followup   visit.    The only thing she stated that for the past three months, she has been noticing   some rectal tissue protruding out.  There has been no pain.  There are no   problems with having a bowel function.  She may have one to two formed stools a   day and there is no pain or difficulty in defecating.  She actually saw   Urogynecology on September 28th where she is noted to have vaginal vault   prolapse and evidence for rectocele and cystocele.  She gets her pessary ring   changed every few months.    REVIEW OF SYMPTOMS:  She endorses no chest pain, shortness of breath, or   abdominal pain.  As far as urination at times, it is frequent and urgent.    MEDICATIONS: As noted per MedCard, which also includes a vaginal applicator   called Fem pH one applicator twice a week.    PHYSICAL EXAMINATION:  VITAL SIGNS:  Weight is 132 pounds, pulse 62, blood pressure by me 158/72.  We   will be noted that when she checks her blood pressure at home, she emphatically   states that she gets 110-120s/70.  NECK:  No thyromegaly.  LUNGS:  Clear breath sounds.  HEART:  Regular rate and rhythm.  ABDOMEN:  Active bowel sounds, soft, nontender.  No hepatosplenomegaly or   abdominal masses.  PULSES:  2+ carotid pulses, no bruits.  EXTREMITIES:  No edema.  RECTAL:  There is excess anal skin, but no evidence of hemorrhoids.  Stool is   brown, heme negative.  No rectal masses on digital  rectal exam.    IMPRESSION:  1.  Hypertension.  2.  Anal tag.  3.  History of breast cancer.    PLAN:  Today, we will get routine labs.  Continue to monitor the blood pressure.    She is up to date, got the flu vaccine and has had two pneumonia vaccines.            /juan jose [id number] review          JAM/HN  dd: 10/17/2018 14:32:29 (CDT)  td: 10/18/2018 05:50:44 (CDT)  Doc ID   #[id number]  Job ID #[id number]    CC:

## 2018-10-25 ENCOUNTER — TELEPHONE (OUTPATIENT)
Dept: UROGYNECOLOGY | Facility: CLINIC | Age: 83
End: 2018-10-25

## 2018-10-25 NOTE — TELEPHONE ENCOUNTER
Spoke with pt who states she has recently notice that she has an extra piece of skin growing on the outside of her rectum that she would like to discuss with you pt states it is new and was recently discovered by her internal medicine doctor who and retain told pt to discuss with please advise, explain to pt I will forward information to JOLLY Sequeira and she will be in contact with pt, pt voiced understanding and call was ended.

## 2018-10-25 NOTE — TELEPHONE ENCOUNTER
----- Message from Keysha King sent at 10/25/2018  9:49 AM CDT -----  Contact: pt   Name of Who is Calling: PAKO LAMB [[id number]]       What is the request in detail:patient is requesting a call in regards to her upcoming doctors visit.... Please contact to further discuss and advise.       Can the clinic reply by [facility]: no       What Number to Call Back if not in [facility]: [phone]

## 2018-10-26 ENCOUNTER — OFFICE VISIT (OUTPATIENT)
Dept: PODIATRY | Facility: CLINIC | Age: 83
End: 2018-10-26
Payer: MEDICARE

## 2018-10-26 VITALS
BODY MASS INDEX: 26.61 KG/M2 | WEIGHT: 132 LBS | HEART RATE: 79 BPM | SYSTOLIC BLOOD PRESSURE: 146 MMHG | DIASTOLIC BLOOD PRESSURE: 80 MMHG | HEIGHT: 59 IN

## 2018-10-26 DIAGNOSIS — L84 CORN OR CALLUS: Primary | ICD-10-CM

## 2018-10-26 PROCEDURE — 17999 UNLISTD PX SKN MUC MEMB SUBQ: CPT | Mod: CSM,,, | Performed by: PODIATRIST

## 2018-10-26 PROCEDURE — 99213 OFFICE O/P EST LOW 20 MIN: CPT | Mod: PBBFAC | Performed by: PODIATRIST

## 2018-10-26 PROCEDURE — 99999 PR PBB SHADOW E&M-EST. PATIENT-LVL III: CPT | Mod: PBBFAC,,, | Performed by: PODIATRIST

## 2018-10-27 ENCOUNTER — TELEPHONE (OUTPATIENT)
Dept: INTERNAL MEDICINE | Facility: CLINIC | Age: 83
End: 2018-10-27

## 2018-10-27 DIAGNOSIS — M19.012 PRIMARY OSTEOARTHRITIS OF BOTH SHOULDERS: ICD-10-CM

## 2018-10-27 DIAGNOSIS — Z85.3 HISTORY OF BREAST CANCER: ICD-10-CM

## 2018-10-27 DIAGNOSIS — M19.011 PRIMARY OSTEOARTHRITIS OF BOTH SHOULDERS: ICD-10-CM

## 2018-10-27 DIAGNOSIS — Z00.00 ANNUAL PHYSICAL EXAM: Primary | ICD-10-CM

## 2018-10-27 DIAGNOSIS — I10 ESSENTIAL HYPERTENSION: ICD-10-CM

## 2018-10-27 NOTE — TELEPHONE ENCOUNTER
Physical in 6 months with pre labs    Okay to cancel out any follow-up visitsor lab in January 2019

## 2018-10-29 ENCOUNTER — OFFICE VISIT (OUTPATIENT)
Dept: UROGYNECOLOGY | Facility: CLINIC | Age: 83
End: 2018-10-29
Payer: MEDICARE

## 2018-10-29 VITALS
SYSTOLIC BLOOD PRESSURE: 120 MMHG | BODY MASS INDEX: 26.94 KG/M2 | HEIGHT: 59 IN | WEIGHT: 133.63 LBS | DIASTOLIC BLOOD PRESSURE: 60 MMHG

## 2018-10-29 DIAGNOSIS — K64.9 HEMORRHOIDS, UNSPECIFIED HEMORRHOID TYPE: Primary | ICD-10-CM

## 2018-10-29 PROCEDURE — 99999 PR PBB SHADOW E&M-EST. PATIENT-LVL III: CPT | Mod: PBBFAC,,, | Performed by: NURSE PRACTITIONER

## 2018-10-29 PROCEDURE — 99213 OFFICE O/P EST LOW 20 MIN: CPT | Mod: PBBFAC | Performed by: NURSE PRACTITIONER

## 2018-10-29 PROCEDURE — 1101F PT FALLS ASSESS-DOCD LE1/YR: CPT | Mod: CPTII,,, | Performed by: NURSE PRACTITIONER

## 2018-10-29 PROCEDURE — 99213 OFFICE O/P EST LOW 20 MIN: CPT | Mod: S$PBB,,, | Performed by: NURSE PRACTITIONER

## 2018-10-29 NOTE — PROGRESS NOTES
Urogyn follow up  10/29/2018  .  [facility] [address] [place] [address]    Ernestine Luna  [id number]  8/13/1929      Ernestine Luna is a 89 y.o. here for a urogyn follow up.    Last HPI from 10/29/2018  1)  UI:  (--) NELI   (--) UUI (--) pads Daytime frequency: Q 30 minutes- 3 hours.  Nocturia: Yes 3-4/night.   (--) dysuria,  (--) hematuria,  (--) frequent UTIs.  (+) complete bladder emptying.   2)  POP:  Absent.   Symptoms:(--)  .  (--) vaginal bleeding. (--) vaginal discharge. (--) sexually active.  (--) dyspareunia.   (+)  Vaginal dryness.  (--) vaginal estrogen use.   3)  BM:  (--) constipation/straining.  (--) chronic diarrhea. (--) hematochezia.  (--) fecal incontinence.  (--) fecal smearing/urgency.  (--) incomplete evacuation.    4)Pessary:  Denies pain, bleeding or dischage. Using #3 ring with support pessary..    Changes from last visit:  1)  UI:  (--) NELI   (--) UUI (--) pads Daytime frequency: Q 30 minutes- 3 hours.  Nocturia: Yes 3-4/night.   (--) dysuria,  (--) hematuria,  (--) frequent UTIs.  (+) complete bladder emptying.     2)  POP:  Absent.   Symptoms:(--)  .  (--) vaginal bleeding. (--) vaginal discharge. (--) sexually active.  (--) dyspareunia.   (+)  Vaginal dryness.  (--) vaginal estrogen use.     3)  BM:  (--) constipation/straining.  (--) chronic diarrhea. (--) hematochezia.  (--) fecal incontinence.  (--) fecal smearing/urgency.  (--) incomplete evacuation.      4)Pessary:  Denies pain, bleeding or dischage. Using #3 ring with support pessary.    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 Wilkins    COLONOSCOPY N/A 8/8/2013    [facility] (4TH FLR)    COLONOSCOPY W/ POLYPECTOMY  08/08/2013    RASHEED   06/24/2014    RASHEED-TRANSFORAMINAL @ L4 Right 6/24/2014    Performed by [facility] at [facility] PAIN MGT    EYE SURGERY      HYSTERECTOMY  age 38    ALISTAIR; ovaries in place    INSERTION-INTRAOCULAR LENS (IOL) Right 10/12/2015    [facility] by Lal[facility] at [facility] OR    JOINT REPLACEMENT      bilateral knees    PHACOEMULSIFICATION-ASPIRATION-CATARACT Right 10/12/2015    [facility] at [facility] OR    TOTAL KNEE ARTHROPLASTY      Bilateral       Current Outpatient Medications   Medication Sig    amLODIPine (NORVASC) 10 MG tablet TAKE 1 TABLET BY MOUTH ONCE DAILY    atenolol (TENORMIN) 25 MG tablet TAKE 1 TABLET BY MOUTH EVERY DAY    benazepril (LOTENSIN) 40 MG tablet TAKE 1 TABLET BY MOUTH ONCE DAILY.    calcium carbonate (OS-DHRUV) 600 mg calcium (1,500 mg) Tab Take 600 mg by mouth once.    lidocaine HCL 2% (XYLOCAINE) 2 % jelly Apply topically as needed. For toe pain, apply up to twice daily as needed for pain.    MULTIVITAMIN W-MINERALS/LUTEIN (CENTRUM SILVER ORAL) Take 1 tablet by mouth Daily.    omega 3-calcium-vit D3-FA-mv13 [phone] mg Cmpk Take by mouth once daily.    polycarbophil (REPLENS) Gel Place vaginally.    vitamin D 1000 units Tab Take 185 mg by mouth once daily.    acetic acid-oxyquinoline (FEM PH) 0.9-0.025 % Gel Place 1 applicator vaginally twice a week.     No current facility-administered medications for this visit.        ROS:  As per HPI.      Well Woman:  Pap:denies history of  "abnormal pap smear-- post hyst  Mammo:09/28/2018  No mammographic evidence of malignancy.  Mammogram in 1 year is recommended.  Colonoscopy:08/2013 Diverticulosis in the sigmoid colon.                        - One 3 mm polyp in the transverse colon. Resected and retrieved.  Dexa:06/2016Osteoporosis of the femoral neck.     Recommendations:  1) Adequate calcium and Vitamin D therapy  2) Appropriate exercise  3) Consider medical therapy including bisphosphonates, denosumab.  4) Consider repeat BMD in 2 years.      Exam  /60 (BP Location: Right arm, Patient Position: Sitting, BP Method: Medium (Manual))   Ht 4' 11" (1.499 m)   Wt 60.6 kg (133 lb 9.6 oz)   LMP  (LMP Unknown)   BMI 26.98 kg/m²   General: alert and oriented, no acute distress  Respiratory: normal respiratory effort  Abd: soft, non-tender, non-distended    Pelvic  Ext. Genitalia: normal external genitalia. Normal bartholin's and skeens glands  Vagina: + atrophy. Normal vaginal mucosa without lesions. No discharge noted.   Non-tender bladder base without palpable mass.  #3 ring with support in place  Cervix: absent  Uterus:  absent   Urethra: no masses or tenderness  Urethral meatus: no lesions, caruncle or prolapse.  Rectum: nonthrombosed hemorrhoid noted at 12 o'clock position at anus        Impression  1. Hemorrhoids, unspecified hemorrhoid type       We reviewed the above issues and discussed options for short-term versus long-term management of her problems.   Plan:   1. Prolapse: Stage 2 apical prolapse, Stage 3 anterior and posterior vaginal wall prolapse   --Pessary # 3 ring with support  --Daily pelvic floor exercises as assigned, consider Pelvic floor PT in future  --Non surgical options discussed with patient      2. Urge urinary incontinence: not too bothersome   --Empty bladder every 3 hours. Empty well: wait a minute, lean forward on toilet.   --Avoid dietary irritants (see sheet). Keep diary x 3-5 days to determine your " irritants.  --KEGELS: do 10 in AM and 10 in PM, holding each x 10 seconds. When you feel urge to go, STOP, KEGEL, and when urge has passed, then go to bathroom. Consider PT in future.   --URGE: Consider anticholinergic. Takes 2-4 weeks to see if will have effect. For dry mouth: get sour, sugar free lozenge or gum.   --STRESS: Pessary vs. Sling.      3. Incomplete bladder emptying:improved   --Double void and Crede maneuvers discussed  --Improved with pessary    4. Vaginal atrophy (dryness):  Fem ph quarter size with your finger twice weekly    5. Hemorrhoid  --not inflammed at this time    6.RTC 3 months    25 minutes were spent in face to face time with this patient  75 % of this time was spent in counseling and/or coordination of care    [facility]  Division of Female Pelvic Medicine and Reconstructive Surgery  Department of Obstetrics & Gynecology

## 2018-10-30 NOTE — PATIENT INSTRUCTIONS
1. Prolapse: Stage 2 apical prolapse, Stage 3 anterior and posterior vaginal wall prolapse   --Pessary # 3 ring with support  --Daily pelvic floor exercises as assigned, consider Pelvic floor PT in future  --Non surgical options discussed with patient      2. Urge urinary incontinence: not too bothersome   --Empty bladder every 3 hours. Empty well: wait a minute, lean forward on toilet.   --Avoid dietary irritants (see sheet). Keep diary x 3-5 days to determine your irritants.  --KEGELS: do 10 in AM and 10 in PM, holding each x 10 seconds. When you feel urge to go, STOP, KEGEL, and when urge has passed, then go to bathroom. Consider PT in future.   --URGE: Consider anticholinergic. Takes 2-4 weeks to see if will have effect. For dry mouth: get sour, sugar free lozenge or gum.   --STRESS: Pessary vs. Sling.      3. Incomplete bladder emptying:improved   --Double void and Crede maneuvers discussed  --Improved with pessary    4. Vaginal atrophy (dryness):  Fem ph quarter size with your finger twice weekly    5. Hemorrhoid  --not inflammed at this time    6.RTC 3 months

## 2018-11-27 ENCOUNTER — OFFICE VISIT (OUTPATIENT)
Dept: PODIATRY | Facility: CLINIC | Age: 83
End: 2018-11-27
Payer: MEDICARE

## 2018-11-27 VITALS
DIASTOLIC BLOOD PRESSURE: 81 MMHG | SYSTOLIC BLOOD PRESSURE: 135 MMHG | HEART RATE: 68 BPM | WEIGHT: 133 LBS | BODY MASS INDEX: 26.81 KG/M2 | HEIGHT: 59 IN

## 2018-11-27 DIAGNOSIS — L84 CORN OR CALLUS: Primary | ICD-10-CM

## 2018-11-27 DIAGNOSIS — L60.3 ONYCHODYSTROPHY: ICD-10-CM

## 2018-11-27 PROCEDURE — 99499 UNLISTED E&M SERVICE: CPT | Mod: ,,, | Performed by: PODIATRIST

## 2018-11-27 PROCEDURE — 99999 PR PBB SHADOW E&M-EST. PATIENT-LVL III: CPT | Mod: PBBFAC,,, | Performed by: PODIATRIST

## 2018-11-27 PROCEDURE — 17999 UNLISTD PX SKN MUC MEMB SUBQ: CPT | Mod: CSM,S$GLB,, | Performed by: PODIATRIST

## 2018-11-27 NOTE — PROGRESS NOTES
Pt presents for routine non-covered foot care. Pt. does not have high risk feet. Pedal pulses are palpable. Nails are elongated, thickened Bilaterally. Calluses present to both feet/toes. Diagnosis is calluses and onychodystrophy. Nails as well as Calluses/corns were reduced Bilaterally. Patient tolerated well and related relief. RTC 4 weeks as PROC B

## 2018-12-21 ENCOUNTER — OFFICE VISIT (OUTPATIENT)
Dept: UROGYNECOLOGY | Facility: CLINIC | Age: 83
End: 2018-12-21
Payer: MEDICARE

## 2018-12-21 VITALS
DIASTOLIC BLOOD PRESSURE: 80 MMHG | BODY MASS INDEX: 27.42 KG/M2 | WEIGHT: 136 LBS | SYSTOLIC BLOOD PRESSURE: 140 MMHG | HEIGHT: 59 IN

## 2018-12-21 DIAGNOSIS — N81.10 PROLAPSE OF ANTERIOR VAGINAL WALL: Primary | ICD-10-CM

## 2018-12-21 DIAGNOSIS — N95.2 VAGINAL ATROPHY: ICD-10-CM

## 2018-12-21 DIAGNOSIS — N99.3 VAGINAL VAULT PROLAPSE, POSTHYSTERECTOMY: ICD-10-CM

## 2018-12-21 PROCEDURE — 1101F PT FALLS ASSESS-DOCD LE1/YR: CPT | Mod: CPTII,S$GLB,, | Performed by: OBSTETRICS & GYNECOLOGY

## 2018-12-21 PROCEDURE — 99999 PR PBB SHADOW E&M-EST. PATIENT-LVL III: CPT | Mod: PBBFAC,,, | Performed by: OBSTETRICS & GYNECOLOGY

## 2018-12-21 PROCEDURE — 99213 OFFICE O/P EST LOW 20 MIN: CPT | Mod: S$GLB,,, | Performed by: OBSTETRICS & GYNECOLOGY

## 2018-12-21 NOTE — PROGRESS NOTES
Urogyn follow up  10/29/2018  .  [facility] [address] [place] [address]    Ernestine Luna  [id number]  8/13/1929      Ernestine Luna is a 89 y.o. here for a urogyn follow up.    Last HPI from 10/29/2018  1)  UI:  (--) NELI   (--) UUI (--) pads Daytime frequency: Q 30 minutes- 3 hours.  Nocturia: Yes 3-4/night.   (--) dysuria,  (--) hematuria,  (--) frequent UTIs.  (+) complete bladder emptying.   2)  POP:  Absent.   Symptoms:(--)  .  (--) vaginal bleeding. (--) vaginal discharge. (--) sexually active.  (--) dyspareunia.   (+)  Vaginal dryness.  (--) vaginal estrogen use.   3)  BM:  (--) constipation/straining.  (--) chronic diarrhea. (--) hematochezia.  (--) fecal incontinence.  (--) fecal smearing/urgency.  (--) incomplete evacuation.    4)Pessary:  Denies pain, bleeding or dischage. Using #3 ring with support pessary..    Changes from last visit:  1)  UI:  (--) NELI   (--) UUI (--) pads Daytime frequency: Q 30 minutes- 3 hours.  Nocturia: Yes 3-4/night.   (--) dysuria,  (--) hematuria,  (--) frequent UTIs.  (+) complete bladder emptying.     2)  POP:  Absent.   Symptoms:(--)  .  (--) vaginal bleeding. (--) vaginal discharge. (--) sexually active.  (--) dyspareunia.   (+)  Vaginal dryness.  (--) vaginal estrogen use. Was using femPH but burns.  Difficult to squeeze replens applicator.     3)  BM:  (--) constipation/straining.  (--) chronic diarrhea. (--) hematochezia.  (--) fecal incontinence.  (--) fecal smearing/urgency.  (--) incomplete evacuation.      4)Pessary:  Denies pain, bleeding or dischage. Using #3 ring with support pessary.    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 Wilkins    COLONOSCOPY N/A 8/8/2013    [facility] ([facility])    COLONOSCOPY W/ POLYPECTOMY  08/08/2013    RASHEED   06/24/2014    RASHEED-TRANSFORAMINAL @ L4 Right 6/24/2014    [facility] PAIN MGT    EYE SURGERY      HYSTERECTOMY  age 38    ALISTAIR; ovaries in place    INSERTION-INTRAOCULAR LENS (IOL) Right 10/12/2015    [facility] by [facility] at [facility] OR    JOINT REPLACEMENT      bilateral knees    PHACOEMULSIFICATION-ASPIRATION-CATARACT Right 10/12/2015    Per[facility] at [facility] OR    TOTAL KNEE ARTHROPLASTY      Bilateral       Current Outpatient Medications   Medication Sig    acetic acid-oxyquinoline (FEM PH) 0.9-0.025 % Gel Place 1 applicator vaginally twice a week.    amLODIPine (NORVASC) 10 MG tablet TAKE 1 TABLET BY MOUTH ONCE DAILY    atenolol (TENORMIN) 25 MG tablet TAKE 1 TABLET BY MOUTH EVERY DAY    benazepril (LOTENSIN) 40 MG tablet TAKE 1 TABLET BY MOUTH ONCE DAILY.    calcium carbonate (OS-DHRUV) 600 mg calcium (1,500 mg) Tab Take 600 mg by mouth once.    lidocaine HCL 2% (XYLOCAINE) 2 % jelly Apply topically as needed. For toe pain, apply up to twice daily as needed for pain.    MULTIVITAMIN W-MINERALS/LUTEIN (CENTRUM SILVER ORAL) Take 1 tablet by mouth Daily.    omega 3-calcium-vit D3-FA-mv13 [phone] mg Cmpk Take by mouth once daily.    vitamin D 1000 units Tab Take 185 mg by mouth once daily.     No current facility-administered medications for this visit.        ROS:  As per HPI.      Well  "Woman:  Pap:denies history of abnormal pap smear-- post hyst  Mammo:09/28/2018  No mammographic evidence of malignancy.  Mammogram in 1 year is recommended.  Colonoscopy:08/2013 Diverticulosis in the sigmoid colon.                        - One 3 mm polyp in the transverse colon. Resected and retrieved.  Dexa:06/2016Osteoporosis of the femoral neck.     Recommendations:  1) Adequate calcium and Vitamin D therapy  2) Appropriate exercise  3) Consider medical therapy including bisphosphonates, denosumab.  4) Consider repeat BMD in 2 years.      Exam  BP (!) 140/80   Ht 4' 11" (1.499 m)   Wt 61.7 kg (136 lb 0.4 oz)   LMP  (LMP Unknown)   BMI 27.47 kg/m²   General: alert and oriented, no acute distress  Respiratory: normal respiratory effort  Abd: soft, non-tender, non-distended    Pelvic  Ext. Genitalia: normal external genitalia. Normal bartholin's and skeens glands  Vagina: + atrophy. Normal vaginal mucosa without lesions. No discharge noted.   Non-tender bladder base without palpable mass.  #3 ring with support in place  Cervix: absent  Uterus:  absent   Urethra: no masses or tenderness  Urethral meatus: no lesions, caruncle or prolapse.  Rectum: nonthrombosed hemorrhoid noted at 12 o'clock position at anus    Impression  1. Prolapse of anterior vaginal wall     2. Vaginal vault prolapse, posthysterectomy     3. Vaginal atrophy       We reviewed the above issues and discussed options for short-term versus long-term management of her problems.   Plan:   1. Prolapse: Stage 2 apical prolapse, Stage 3 anterior and posterior vaginal wall prolapse   --Pessary # 3 ring with support  --Daily pelvic floor exercises as assigned, consider Pelvic floor PT in future  --Non surgical options discussed with patient      2. Urge urinary incontinence: not too bothersome   --Empty bladder every 3 hours. Empty well: wait a minute, lean forward on toilet.   --Avoid dietary irritants (see sheet). Keep diary x 3-5 days to determine " your irritants.  --KEGELS: do 10 in AM and 10 in PM, holding each x 10 seconds. When you feel urge to go, STOP, KEGEL, and when urge has passed, then go to bathroom. Consider PT in future.   --URGE: Consider anticholinergic. Takes 2-4 weeks to see if will have effect. For dry mouth: get sour, sugar free lozenge or gum.   --STRESS: Pessary vs. Sling.      3. Incomplete bladder emptying:improved   --Double void and Crede maneuvers discussed  --Improved with pessary    4. Vaginal atrophy (dryness):  Use KY jelly, astroglide, or other water-based lube quarter size with your finger twice weekly    5. Hemorrhoid  --not inflammed at this time    6.RTC 3 months    30 minutes were spent in face to face time with this patient  75 % of this time was spent in counseling and/or coordination of care    Division of Female Pelvic Medicine and Reconstructive Surgery  Department of Obstetrics & Gynecology

## 2018-12-27 ENCOUNTER — OFFICE VISIT (OUTPATIENT)
Dept: PODIATRY | Facility: CLINIC | Age: 83
End: 2018-12-27
Payer: MEDICARE

## 2018-12-27 VITALS
DIASTOLIC BLOOD PRESSURE: 73 MMHG | HEIGHT: 59 IN | BODY MASS INDEX: 27.42 KG/M2 | HEART RATE: 72 BPM | WEIGHT: 136 LBS | SYSTOLIC BLOOD PRESSURE: 162 MMHG

## 2018-12-27 DIAGNOSIS — L60.3 ONYCHODYSTROPHY: Primary | ICD-10-CM

## 2018-12-27 DIAGNOSIS — L84 CORN OR CALLUS: ICD-10-CM

## 2018-12-27 PROCEDURE — 17999 UNLISTD PX SKN MUC MEMB SUBQ: CPT | Mod: CSM,S$GLB,, | Performed by: PODIATRIST

## 2018-12-27 PROCEDURE — 99999 PR PBB SHADOW E&M-EST. PATIENT-LVL III: CPT | Mod: PBBFAC,,, | Performed by: PODIATRIST

## 2018-12-27 PROCEDURE — 99499 UNLISTED E&M SERVICE: CPT | Mod: ,,, | Performed by: PODIATRIST

## 2018-12-28 PROBLEM — N95.2 VAGINAL ATROPHY: Status: ACTIVE | Noted: 2018-12-28

## 2019-01-24 ENCOUNTER — OFFICE VISIT (OUTPATIENT)
Dept: PODIATRY | Facility: CLINIC | Age: 84
End: 2019-01-24
Payer: MEDICARE

## 2019-01-24 VITALS — BODY MASS INDEX: 27.42 KG/M2 | WEIGHT: 136 LBS | HEIGHT: 59 IN

## 2019-01-24 DIAGNOSIS — L60.3 ONYCHODYSTROPHY: Primary | ICD-10-CM

## 2019-01-24 DIAGNOSIS — L84 CORN OR CALLUS: ICD-10-CM

## 2019-01-24 PROCEDURE — 17999 UNLISTD PX SKN MUC MEMB SUBQ: CPT | Mod: CSM,S$GLB,, | Performed by: PODIATRIST

## 2019-01-24 PROCEDURE — 99499 NO LOS: ICD-10-PCS | Mod: ,,, | Performed by: PODIATRIST

## 2019-01-24 PROCEDURE — 17999 PR NON-COVERED FOOT CARE: ICD-10-PCS | Mod: CSM,S$GLB,, | Performed by: PODIATRIST

## 2019-01-24 PROCEDURE — 99999 PR PBB SHADOW E&M-EST. PATIENT-LVL III: CPT | Mod: PBBFAC,,, | Performed by: PODIATRIST

## 2019-01-24 PROCEDURE — 99999 PR PBB SHADOW E&M-EST. PATIENT-LVL III: ICD-10-PCS | Mod: PBBFAC,,, | Performed by: PODIATRIST

## 2019-01-24 PROCEDURE — 99499 UNLISTED E&M SERVICE: CPT | Mod: ,,, | Performed by: PODIATRIST

## 2019-01-24 NOTE — PROGRESS NOTES
"Vitals:    01/24/19 1158   Weight: 61.7 kg (136 lb)   Height: 4' 11" (1.499 m)       Problem List Items Addressed This Visit     None      Visit Diagnoses     Onychodystrophy    -  Primary    Corn or callus                Pt presents for routine non-covered foot care. Pt. does not have high risk feet. Pedal pulses are palpable. Nails are elongated, thickened Bilaterally. Calluses present to both feet/toes. Diagnosis is calluses and onychodystrophy. Nails as well as Calluses/corns were reduced Bilaterally. Patient tolerated well and related relief. RTC 4 weeks as PROC B             "

## 2019-01-31 ENCOUNTER — TELEPHONE (OUTPATIENT)
Dept: ORTHOPEDICS | Facility: CLINIC | Age: 84
End: 2019-01-31

## 2019-01-31 ENCOUNTER — TELEPHONE (OUTPATIENT)
Dept: PODIATRY | Facility: CLINIC | Age: 84
End: 2019-01-31

## 2019-01-31 NOTE — TELEPHONE ENCOUNTER
Nicho patient wasn't;t satisfied with clinic visit patient  Is still in pain patient doesn't;t want to pay another $42.00 for another visit patient will come to main [place] please advise

## 2019-01-31 NOTE — TELEPHONE ENCOUNTER
----- Message from Curly Sauceda sent at 1/31/2019  8:17 AM CST -----  Contact: self  Needs Advice    Reason for call: Prescription         Communication Preference: Phone      Additional Information: Pt states that Dr Pappas was suppose to leave a prescription for her feet at the office

## 2019-02-01 RX ORDER — ATENOLOL 25 MG/1
TABLET ORAL
Qty: 90 TABLET | Refills: 3 | Status: SHIPPED | OUTPATIENT
Start: 2019-02-01 | End: 2020-02-13 | Stop reason: SDUPTHER

## 2019-02-01 RX ORDER — BENAZEPRIL HYDROCHLORIDE 40 MG/1
TABLET ORAL
Qty: 90 TABLET | Refills: 1 | Status: SHIPPED | OUTPATIENT
Start: 2019-02-01 | End: 2019-07-27 | Stop reason: SDUPTHER

## 2019-02-07 ENCOUNTER — TELEPHONE (OUTPATIENT)
Dept: PODIATRY | Facility: CLINIC | Age: 84
End: 2019-02-07

## 2019-02-07 NOTE — TELEPHONE ENCOUNTER
----- Message from Pamella Quintana sent at 2/7/2019  3:28 PM CST -----  Contact: patient   Please call pt at [phone]    Patient is complaining about the care she received from Dr Rodas recently    Previously treated by Dr Pappas and was satisfied    Patient has called 4 times with no response and was forwarded to the Patient Relation Dept today because she was very frustrated     No one in clinic was available to take the call    Thank you

## 2019-02-11 ENCOUNTER — OFFICE VISIT (OUTPATIENT)
Dept: PODIATRY | Facility: CLINIC | Age: 84
End: 2019-02-11
Payer: MEDICARE

## 2019-02-11 ENCOUNTER — TELEPHONE (OUTPATIENT)
Dept: PODIATRY | Facility: CLINIC | Age: 84
End: 2019-02-11

## 2019-02-11 VITALS
SYSTOLIC BLOOD PRESSURE: 153 MMHG | WEIGHT: 136 LBS | DIASTOLIC BLOOD PRESSURE: 73 MMHG | HEIGHT: 59 IN | HEART RATE: 67 BPM | BODY MASS INDEX: 27.42 KG/M2

## 2019-02-11 DIAGNOSIS — L60.0 INGROWN NAIL: Primary | ICD-10-CM

## 2019-02-11 PROCEDURE — 1101F PR PT FALLS ASSESS DOC 0-1 FALLS W/OUT INJ PAST YR: ICD-10-PCS | Mod: CPTII,S$GLB,, | Performed by: PODIATRIST

## 2019-02-11 PROCEDURE — 99999 PR PBB SHADOW E&M-EST. PATIENT-LVL III: CPT | Mod: PBBFAC,,, | Performed by: PODIATRIST

## 2019-02-11 PROCEDURE — 11750 EXCISION NAIL&NAIL MATRIX: CPT | Mod: T5,S$GLB,, | Performed by: PODIATRIST

## 2019-02-11 PROCEDURE — 11750 PR REMOVAL OF NAIL BED: ICD-10-PCS | Mod: T5,S$GLB,, | Performed by: PODIATRIST

## 2019-02-11 PROCEDURE — 99999 PR PBB SHADOW E&M-EST. PATIENT-LVL III: ICD-10-PCS | Mod: PBBFAC,,, | Performed by: PODIATRIST

## 2019-02-11 PROCEDURE — 99213 PR OFFICE/OUTPT VISIT, EST, LEVL III, 20-29 MIN: ICD-10-PCS | Mod: 25,S$GLB,, | Performed by: PODIATRIST

## 2019-02-11 PROCEDURE — 1101F PT FALLS ASSESS-DOCD LE1/YR: CPT | Mod: CPTII,S$GLB,, | Performed by: PODIATRIST

## 2019-02-11 PROCEDURE — 99213 OFFICE O/P EST LOW 20 MIN: CPT | Mod: 25,S$GLB,, | Performed by: PODIATRIST

## 2019-02-11 NOTE — TELEPHONE ENCOUNTER
----- Message from Vicky Franklin MA sent at 2/6/2019  1:11 PM CST -----  Contact: Self      ----- Message -----  From: Suri Sue  Sent: 2/6/2019  12:30 PM  To: [facility] Staff    Patient says that her ingrown toenail is very painful and she is upset that Dr. Rodas did not remove her ingrown toenail during the prev visit on 1/24.    Says she paid $42 when she went in and her feet aren't fixed, she says that she isn't paying that fee again. Says the nails on her fused toes weren't clipped.    She is very upset and looking to speak with the supervisor.    Patient can be reached at [phone].      Spoke with patient and apologized for any inconvience, patient stated that she had an appointment today and would follow up at a later date.

## 2019-02-12 NOTE — PROGRESS NOTES
Subjective:      Patient ID: Ernestine Luna is a 89 y.o. female.    Chief Complaint:   Ernestine is a 89 y.o. female who presents to the clinic complaining of painful ingrown toenail on the right foot.    Review of Systems   Constitution: Negative for chills and fever.   HENT: Negative for congestion and tinnitus.    Eyes: Negative for double vision and visual disturbance.   Cardiovascular: Negative for chest pain and claudication.   Respiratory: Negative for hemoptysis and shortness of breath.    Endocrine: Negative for cold intolerance and heat intolerance.   Hematologic/Lymphatic: Negative for adenopathy and bleeding problem.   Skin: Positive for color change, dry skin and nail changes.   Musculoskeletal: Positive for stiffness. Negative for myalgias.   Gastrointestinal: Negative for nausea and vomiting.   Genitourinary: Negative for dysuria and hematuria.   Neurological: Positive for numbness and paresthesias.   Psychiatric/Behavioral: Negative for altered mental status and suicidal ideas.   Allergic/Immunologic: Negative for environmental allergies and persistent infections.           Objective:      Physical Exam   Constitutional: She is oriented to person, place, and time. She appears well-developed and well-nourished.   Cardiovascular:   Pulses:       Dorsalis pedis pulses are 1+ on the right side, and 1+ on the left side.        Posterior tibial pulses are 1+ on the right side, and 1+ on the left side.   Pulmonary/Chest: Effort normal.   Musculoskeletal: Normal range of motion.   Anterior, lateral, and posterior muscle groups bilateral lower extremities show strength 4 over 5 symmetrically. Inspection and palpation of the joints and bones reveal no crepitus or joint effusion. No tenderness upon palpation. Mild plantar flexor contractures noted to digits 2 through 5 bilaterally.  Angle and base of gait are normal.   Feet:   Right Foot:   Skin Integrity: Positive for callus and dry skin.   Left Foot:   Skin  Integrity: Positive for callus and dry skin.   Neurological: She is alert and oriented to person, place, and time. She displays atrophy and abnormal reflex. A sensory deficit is present.   Reflex Scores:       Patellar reflexes are 1+ on the right side and 1+ on the left side.       Achilles reflexes are 1+ on the right side and 1+ on the left side.  Sharp, dull, light touch, and vibratory sensation are diminished bilaterally. Proprioceptive sensation is intact to both lower extremities. [place] Neal monofilament exam shows loss of protective sensation to plantar toes 1 through 5 bilaterally. Deep tendon reflexes to the patellar tendons is 1 over 4 bilaterally symmetrical. Deep tendon reflexes to the Achilles tendon is 0 over 4 bilaterally symmetrical. No ankle clonus or Babinski reflex noted bilaterally. Coordination is fair to both lower extremities.  Patient admits to intermittent burning and tingling in the feet.   Skin: Skin is warm and dry. Capillary refill takes 2 to 3 seconds. There is pallor.   Skin bilateral lower extremities noted to be thin, dry, and atrophic.  Right hallux nail, incurvated and tender and erythematous.  Hyperkeratotic lesions noted to both feet plantarly with tenderness.   Psychiatric: She has a normal mood and affect.   Vitals reviewed.            Assessment:       No diagnosis found.      Plan:       There are no diagnoses linked to this encounter.  I counseled the patient on her conditions, their implications and medical management.      Digital nerve block applied to the affected toe. Toe was prepped and draped in a sterile fashion and penrose drain applied. Anesthesia was confirmed and the offending portion of nail plate was freed from the nail bed and eponychium, and was then excised. 89% phenol was applied to the nail matrix for 3 consecutive periods of 30 seconds and was then lavaged with copious amounts of 70% isopropyl alcohol. Penrose drain was removed and betadine  ointment and dry sterile dressing applied. Post op care instructions were discussed and dispensed to patient.  Follow up in 2 weeks.

## 2019-02-13 ENCOUNTER — NURSE TRIAGE (OUTPATIENT)
Dept: ADMINISTRATIVE | Facility: CLINIC | Age: 84
End: 2019-02-13

## 2019-02-14 NOTE — TELEPHONE ENCOUNTER
"    Reason for Disposition   [1] Caller requesting NON-URGENT health information AND [2] PCP's office is the best resource    Answer Assessment - Initial Assessment Questions  1. REASON FOR CALL or QUESTION: "What is your reason for calling today?" or "How can I best help you?" or "What question do you have that I can help answer?"      Pt had ingrown toenail removed Monday. Was advised to soak it bid. She has called clinic several times with no contact to find out what to do after soaking it. Wanted to know if it needs to be covered. She currently has neosporin on it.    Protocols used: ST INFORMATION ONLY CALL-A-AH      "

## 2019-02-21 ENCOUNTER — HOSPITAL ENCOUNTER (OUTPATIENT)
Dept: RADIOLOGY | Facility: HOSPITAL | Age: 84
Discharge: HOME | End: 2019-02-21
Attending: INTERNAL MEDICINE
Payer: MEDICARE

## 2019-02-21 ENCOUNTER — OFFICE VISIT (OUTPATIENT)
Dept: RHEUMATOLOGY | Facility: CLINIC | Age: 84
End: 2019-02-21
Payer: MEDICARE

## 2019-02-21 VITALS
WEIGHT: 140.19 LBS | DIASTOLIC BLOOD PRESSURE: 74 MMHG | SYSTOLIC BLOOD PRESSURE: 174 MMHG | HEIGHT: 59 IN | BODY MASS INDEX: 28.26 KG/M2 | HEART RATE: 65 BPM

## 2019-02-21 DIAGNOSIS — M25.50 POLYARTHRALGIA: ICD-10-CM

## 2019-02-21 DIAGNOSIS — M25.541 ARTHRALGIA OF HANDS, BILATERAL: ICD-10-CM

## 2019-02-21 DIAGNOSIS — M25.542 ARTHRALGIA OF HANDS, BILATERAL: ICD-10-CM

## 2019-02-21 DIAGNOSIS — M25.541 ARTHRALGIA OF HANDS, BILATERAL: Primary | ICD-10-CM

## 2019-02-21 DIAGNOSIS — M25.542 ARTHRALGIA OF HANDS, BILATERAL: Primary | ICD-10-CM

## 2019-02-21 PROCEDURE — 99203 PR OFFICE/OUTPT VISIT, NEW, LEVL III, 30-44 MIN: ICD-10-PCS | Mod: S$GLB,,, | Performed by: INTERNAL MEDICINE

## 2019-02-21 PROCEDURE — 99999 PR PBB SHADOW E&M-EST. PATIENT-LVL III: CPT | Mod: PBBFAC,,, | Performed by: INTERNAL MEDICINE

## 2019-02-21 PROCEDURE — 73110 X-RAY EXAM OF WRIST: CPT | Mod: 26,50,, | Performed by: RADIOLOGY

## 2019-02-21 PROCEDURE — 72100 X-RAY EXAM L-S SPINE 2/3 VWS: CPT | Mod: 26,,, | Performed by: RADIOLOGY

## 2019-02-21 PROCEDURE — 73521 X-RAY EXAM HIPS BI 2 VIEWS: CPT | Mod: 26,,, | Performed by: RADIOLOGY

## 2019-02-21 PROCEDURE — 73110 XR WRIST COMPLETE 3 VIEWS BILATERAL: ICD-10-PCS | Mod: 26,50,, | Performed by: RADIOLOGY

## 2019-02-21 PROCEDURE — 73030 X-RAY EXAM OF SHOULDER: CPT | Mod: TC,50

## 2019-02-21 PROCEDURE — 1101F PT FALLS ASSESS-DOCD LE1/YR: CPT | Mod: CPTII,S$GLB,, | Performed by: INTERNAL MEDICINE

## 2019-02-21 PROCEDURE — 73521 XR HIPS BILATERAL 2 VIEW INCL AP PELVIS: ICD-10-PCS | Mod: 26,,, | Performed by: RADIOLOGY

## 2019-02-21 PROCEDURE — 1101F PR PT FALLS ASSESS DOC 0-1 FALLS W/OUT INJ PAST YR: ICD-10-PCS | Mod: CPTII,S$GLB,, | Performed by: INTERNAL MEDICINE

## 2019-02-21 PROCEDURE — 73130 X-RAY EXAM OF HAND: CPT | Mod: 26,50,, | Performed by: RADIOLOGY

## 2019-02-21 PROCEDURE — 73130 XR HAND COMPLETE 3 VIEWS BILATERAL: ICD-10-PCS | Mod: 26,50,, | Performed by: RADIOLOGY

## 2019-02-21 PROCEDURE — 99203 OFFICE O/P NEW LOW 30 MIN: CPT | Mod: S$GLB,,, | Performed by: INTERNAL MEDICINE

## 2019-02-21 PROCEDURE — 73030 X-RAY EXAM OF SHOULDER: CPT | Mod: 26,50,, | Performed by: RADIOLOGY

## 2019-02-21 PROCEDURE — 73110 X-RAY EXAM OF WRIST: CPT | Mod: 50,TC

## 2019-02-21 PROCEDURE — 99999 PR PBB SHADOW E&M-EST. PATIENT-LVL III: ICD-10-PCS | Mod: PBBFAC,,, | Performed by: INTERNAL MEDICINE

## 2019-02-21 PROCEDURE — 73130 X-RAY EXAM OF HAND: CPT | Mod: 50,TC

## 2019-02-21 PROCEDURE — 72100 X-RAY EXAM L-S SPINE 2/3 VWS: CPT | Mod: TC

## 2019-02-21 PROCEDURE — 73030 XR SHOULDER COMPLETE 2 OR MORE VIEWS BILATERAL: ICD-10-PCS | Mod: 26,50,, | Performed by: RADIOLOGY

## 2019-02-21 PROCEDURE — 72100 XR LUMBAR SPINE AP AND LATERAL: ICD-10-PCS | Mod: 26,,, | Performed by: RADIOLOGY

## 2019-02-21 PROCEDURE — 73521 X-RAY EXAM HIPS BI 2 VIEWS: CPT | Mod: TC

## 2019-02-21 NOTE — PROGRESS NOTES
Chief Complaint   Patient presents with    Disease Management    Pain       Patient was referred by : self     History of presenting illness    89 year old black female has pain in her hands    She has symptoms for 2 years    She has arthralgias in the MCPs,DIPs    Pain + swelling and stiffness+    Pain gets worse at nights  Warm water helps  She can use the hands     Right thigh/lateral hip is sore everyday    She has had high inflammatory markers in the past    Two knees replaced  2008 and 2010    Past history : HTN,OA,cataracts    Family history : cancers    Social history : not a smoker      Review of Systems   Constitutional: Negative for activity change, appetite change, chills, diaphoresis, fatigue, fever and unexpected weight change.   HENT: Negative for congestion, dental problem, drooling, ear discharge, ear pain, facial swelling, hearing loss, mouth sores, nosebleeds, postnasal drip, rhinorrhea, sinus pressure, sinus pain, sneezing, sore throat, tinnitus, trouble swallowing and voice change.    Eyes: Negative for photophobia, pain, discharge, redness, itching and visual disturbance.   Respiratory: Negative for apnea, cough, choking, chest tightness, shortness of breath, wheezing and stridor.    Cardiovascular: Negative for chest pain, palpitations and leg swelling.   Gastrointestinal: Negative for abdominal distention, abdominal pain, anal bleeding, blood in stool, constipation, diarrhea, nausea, rectal pain and vomiting.   Endocrine: Negative for cold intolerance, heat intolerance, polydipsia, polyphagia and polyuria.   Genitourinary: Negative for decreased urine volume, difficulty urinating, dysuria, enuresis, flank pain, frequency, genital sores, hematuria and urgency.   Musculoskeletal: Positive for arthralgias. Negative for back pain, gait problem, joint swelling, myalgias, neck pain and neck stiffness.   Skin: Negative for color change, pallor, rash and wound.   Allergic/Immunologic: Negative for  environmental allergies, food allergies and immunocompromised state.   Neurological: Negative for dizziness, tremors, seizures, syncope, facial asymmetry, speech difficulty, weakness, light-headedness, numbness and headaches.   Hematological: Negative for adenopathy. Does not bruise/bleed easily.   Psychiatric/Behavioral: Negative for agitation, behavioral problems, confusion, decreased concentration, dysphoric mood, hallucinations, self-injury, sleep disturbance and suicidal ideas. The patient is not nervous/anxious and is not hyperactive.      Physical Exam     Tenderness:   RUE: glenohumeral, wrist and carpometacarpal  LUE: glenohumeral and wrist  Right hand: 5th DIP  Left hand: 2nd MCP and 3rd MCP    Swelling:   RUE: wrist and carpometacarpal  LUE: wrist  Left hand: 2nd MCP and 3rd MCP    GREEN-28 tender joint count: 6  GREEN-28 swollen joint count: 4    Reduced ROM in the shoulders    Hand osteophytes worse in the DIPs    Physical Exam   Constitutional: She is oriented to person, place, and time and well-developed, well-nourished, and in no distress. No distress.   HENT:   Head: Normocephalic.   Mouth/Throat: Oropharynx is clear and moist.   Eyes: Conjunctivae are normal. Pupils are equal, round, and reactive to light. Right eye exhibits no discharge. Left eye exhibits no discharge. No scleral icterus.   Neck: Normal range of motion. No thyromegaly present.   Cardiovascular: Normal rate, regular rhythm, normal heart sounds and intact distal pulses.    Pulmonary/Chest: Effort normal and breath sounds normal. No stridor.   Abdominal: Soft. Bowel sounds are normal.   Lymphadenopathy:     She has no cervical adenopathy.   Neurological: She is alert and oriented to person, place, and time.   Skin: Skin is warm. No rash noted. She is not diaphoretic.     Psychiatric: Affect and judgment normal.   Musculoskeletal: Normal range of motion.           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 Arthralgia of hands, bilateral    2. Polyarthralgia            New problem     Plan    RA panel     Hand,wrist,shoulder xrays    Ernestine was seen today for disease management and pain.    Diagnoses and all orders for this visit:    Arthralgia of hands, bilateral  -     CBC auto differential; Future  -     Comprehensive metabolic panel; Future  -     Sedimentation rate; Future  -     C-reactive protein; Standing  -     Rheumatoid factor; Future  -     Cyclic citrul peptide antibody, IgG; Future  -     Uric acid; Future  -     HLA B27 Antigen; Future  -     HIV 1/2 Ag/Ab (4th Gen); Future  -     Hepatitis B surface antigen; Future  -     HBcAB; Future  -     Hepatitis B surface antibody; Future  -     Hepatitis C antibody; Future  -     Hepatitis A antibody, IgG; Future  -     Strongyloides IgG Antibodies; Future  -     Quantiferon Gold TB; Future  -     RPR; Future  -     Varicella zoster antibody, IgG; Future  -     X-Ray Hand Complete Bilateral; Future  -     X-Ray Hips Bilateral [employer] AP Pelvis; Future  -     X-Ray Lumbar Spine AP And Lateral; Future  -     X-Ray Wrist Complete Bilateral; Future    Polyarthralgia  -     X-Ray Hand Complete Bilateral; Future  -     X-Ray Hips Bilateral [employer] AP Pelvis; Future  -     X-Ray Lumbar Spine AP And Lateral; Future  -     X-Ray Shoulder Complete Bilateral; Future

## 2019-02-25 ENCOUNTER — OFFICE VISIT (OUTPATIENT)
Dept: PODIATRY | Facility: CLINIC | Age: 84
End: 2019-02-25
Payer: MEDICARE

## 2019-02-25 ENCOUNTER — HOSPITAL ENCOUNTER (OUTPATIENT)
Dept: RADIOLOGY | Facility: HOSPITAL | Age: 84
Discharge: HOME | End: 2019-02-25
Attending: PODIATRIST
Payer: MEDICARE

## 2019-02-25 VITALS
BODY MASS INDEX: 28.22 KG/M2 | SYSTOLIC BLOOD PRESSURE: 153 MMHG | DIASTOLIC BLOOD PRESSURE: 83 MMHG | HEIGHT: 59 IN | HEART RATE: 69 BPM | WEIGHT: 140 LBS

## 2019-02-25 DIAGNOSIS — M20.42 HAMMER TOE OF LEFT FOOT: Primary | ICD-10-CM

## 2019-02-25 DIAGNOSIS — M20.42 HAMMER TOE OF LEFT FOOT: ICD-10-CM

## 2019-02-25 PROCEDURE — 99999 PR PBB SHADOW E&M-EST. PATIENT-LVL III: ICD-10-PCS | Mod: PBBFAC,,, | Performed by: PODIATRIST

## 2019-02-25 PROCEDURE — 99213 OFFICE O/P EST LOW 20 MIN: CPT | Mod: S$GLB,,, | Performed by: PODIATRIST

## 2019-02-25 PROCEDURE — 73630 XR FOOT COMPLETE 3 VIEW LEFT: ICD-10-PCS | Mod: 26,LT,, | Performed by: RADIOLOGY

## 2019-02-25 PROCEDURE — 73630 X-RAY EXAM OF FOOT: CPT | Mod: TC,LT

## 2019-02-25 PROCEDURE — 73630 X-RAY EXAM OF FOOT: CPT | Mod: 26,LT,, | Performed by: RADIOLOGY

## 2019-02-25 PROCEDURE — 99213 PR OFFICE/OUTPT VISIT, EST, LEVL III, 20-29 MIN: ICD-10-PCS | Mod: S$GLB,,, | Performed by: PODIATRIST

## 2019-02-25 PROCEDURE — 1101F PR PT FALLS ASSESS DOC 0-1 FALLS W/OUT INJ PAST YR: ICD-10-PCS | Mod: CPTII,S$GLB,, | Performed by: PODIATRIST

## 2019-02-25 PROCEDURE — 1101F PT FALLS ASSESS-DOCD LE1/YR: CPT | Mod: CPTII,S$GLB,, | Performed by: PODIATRIST

## 2019-02-25 PROCEDURE — 99999 PR PBB SHADOW E&M-EST. PATIENT-LVL III: CPT | Mod: PBBFAC,,, | Performed by: PODIATRIST

## 2019-02-26 ENCOUNTER — TELEPHONE (OUTPATIENT)
Dept: PODIATRY | Facility: CLINIC | Age: 84
End: 2019-02-26

## 2019-02-26 NOTE — TELEPHONE ENCOUNTER
Returned patient's phone call regarding 2/25/19 visit with Dr. Wellington. Patient presented to clinic on [facility] for non-covered cosmetic  routine nail care. Patient previously saw Huong Pappas and Adrianna. Patient is accustomed to provider (Dr. Pappas) using dremmel to file nails/callouses. Patient states that during her visit with Huong Rodas (1/31/19) and [facility] (2/25/2019) that both providers refused to use dremmel to file her nails/callouses and she now wants a refund. Explained to patient that use of a dremmel is discretionary to the attending provider's practice. Offered to book patient with another provider (DBMICHELLE) who does use this tool as part of their practice and the patient refused stating that she only wanted to see Dr. Coates (unknown) or Dr. Pappas (has since left practice). Explained to patient that during her visit with Dr. Wellington, he did perform the requisite nail care and that a refund of the $42 was not substantiated at this time. Patient was not satisfied and stated she would to continue to pursue a refund. I apologized to the patient that I was not able to find a mutual resolution for her today but advised her to call me if there was anything that I could do for her in the future.

## 2019-02-27 NOTE — PROGRESS NOTES
Subjective:      Patient ID: Ernestine Luna is a 89 y.o. female.    Chief Complaint:   Ernestine is a 89 y.o. female who presents to the clinic complaining of painful ingrown toenail on the right foot. She is doing well 2 weeks status post ingrowing nail procedure.    Review of Systems   Constitution: Negative for chills and fever.   HENT: Negative for congestion and tinnitus.    Eyes: Negative for double vision and visual disturbance.   Cardiovascular: Negative for chest pain and claudication.   Respiratory: Negative for hemoptysis and shortness of breath.    Endocrine: Negative for cold intolerance and heat intolerance.   Hematologic/Lymphatic: Negative for adenopathy and bleeding problem.   Skin: Positive for color change, dry skin and nail changes.   Musculoskeletal: Positive for stiffness. Negative for myalgias.   Gastrointestinal: Negative for nausea and vomiting.   Genitourinary: Negative for dysuria and hematuria.   Neurological: Positive for numbness and paresthesias.   Psychiatric/Behavioral: Negative for altered mental status and suicidal ideas.   Allergic/Immunologic: Negative for environmental allergies and persistent infections.           Objective:      Physical Exam   Constitutional: She is oriented to person, place, and time. She appears well-developed and well-nourished.   Cardiovascular:   Pulses:       Dorsalis pedis pulses are 1+ on the right side, and 1+ on the left side.        Posterior tibial pulses are 1+ on the right side, and 1+ on the left side.   Pulmonary/Chest: Effort normal.   Musculoskeletal: Normal range of motion.   Anterior, lateral, and posterior muscle groups bilateral lower extremities show strength 4 over 5 symmetrically. Inspection and palpation of the joints and bones reveal no crepitus or joint effusion. No tenderness upon palpation. Mild plantar flexor contractures noted to digits 2 through 5 bilaterally.  Angle and base of gait are normal.  Tenderness noted to the dorsal  left foot specifically at the 2nd 3rd and 4th metatarsal phalangeal joints which are prominent dorsally.  The 2nd 3rd digit are noted to be in a plantar flexed but rectus position. These are tender with palpation dorsally.  Palpable bone spurring noted as well plantarly with prominent proximal phalangeal heads.   Feet:   Right Foot:   Skin Integrity: Positive for callus and dry skin.   Left Foot:   Skin Integrity: Positive for callus and dry skin.   Neurological: She is alert and oriented to person, place, and time. She displays atrophy and abnormal reflex. A sensory deficit is present.   Reflex Scores:       Patellar reflexes are 1+ on the right side and 1+ on the left side.       Achilles reflexes are 1+ on the right side and 1+ on the left side.  Sharp, dull, light touch, and vibratory sensation are diminished bilaterally. Proprioceptive sensation is intact to both lower extremities. [place] Neal monofilament exam shows loss of protective sensation to plantar toes 1 through 5 bilaterally. Deep tendon reflexes to the patellar tendons is 1 over 4 bilaterally symmetrical. Deep tendon reflexes to the Achilles tendon is 0 over 4 bilaterally symmetrical. No ankle clonus or Babinski reflex noted bilaterally. Coordination is fair to both lower extremities.  Patient admits to intermittent burning and tingling in the feet.   Skin: Skin is warm and dry. Capillary refill takes 2 to 3 seconds. There is pallor.   Skin bilateral lower extremities noted to be thin, dry, and atrophic.  Right hallux nail, healing well no signs of infection.  Hyperkeratotic lesions noted to both feet plantarly with tenderness.   Psychiatric: She has a normal mood and affect.   Vitals reviewed.            Assessment:       Encounter Diagnosis   Name Primary?    Hammer toe of left foot Yes         Plan:       Ernestine was seen today for [facility].    Diagnoses and all orders for this visit:    Hammer toe of left foot  -     X-Ray Foot Complete  Left; Future      I counseled the patient on her conditions, their implications and medical management.    Radiographs ordered of the left foot to further evaluate the discomfort and deformity of the left 2nd 3rd and 4th metatarsophalangeal joints. We discussed conservative and surgical measures for this.  Her ingrowing nail site is healing very well with no signs of infection.  She is in agreement and will follow up to review radiographs at her next visit.

## 2019-03-06 RX ORDER — IVERMECTIN 3 MG/1
3 TABLET ORAL ONCE
Qty: 1 TABLET | Refills: 0 | Status: SHIPPED | OUTPATIENT
Start: 2019-03-06 | End: 2019-03-06

## 2019-03-06 RX ORDER — MELOXICAM 7.5 MG/1
7.5 TABLET ORAL DAILY
Qty: 30 TABLET | Refills: 2 | Status: SHIPPED | OUTPATIENT
Start: 2019-03-06 | End: 2019-04-05

## 2019-03-13 ENCOUNTER — TELEPHONE (OUTPATIENT)
Dept: PODIATRY | Facility: CLINIC | Age: 84
End: 2019-03-13

## 2019-03-13 NOTE — TELEPHONE ENCOUNTER
----- Message from Suri Rogers sent at 3/13/2019  1:21 PM CDT -----  Contact: PT  Attn: Madeline Bernabe wants to speak with you to find out who she can schedule with to get callous shaved.    Says Dr. Pappas used to do it but can not find a Dr who does it now.     Cell phone [phone]

## 2019-03-13 NOTE — TELEPHONE ENCOUNTER
Call placed topatient , patient wants services to include dremeling of her calluses.  These services are not available through our providers. Referred to contact her insurance carrier for other area podiatrists that may provide her the services she requests.

## 2019-04-03 ENCOUNTER — PES CALL (OUTPATIENT)
Dept: ADMINISTRATIVE | Facility: CLINIC | Age: 84
End: 2019-04-03

## 2019-04-12 ENCOUNTER — TELEPHONE (OUTPATIENT)
Dept: INTERNAL MEDICINE | Facility: CLINIC | Age: 84
End: 2019-04-12

## 2019-04-12 NOTE — TELEPHONE ENCOUNTER
----- Message from Louise Pelaez sent at 4/12/2019  8:30 AM CDT -----  Contact: self/[phone]  Patient called in regards needing to talk with Dr Macias medical assistant about medication called Alpha lipoic acid. Please call and advise. Thank you

## 2019-04-18 ENCOUNTER — OFFICE VISIT (OUTPATIENT)
Dept: UROGYNECOLOGY | Facility: CLINIC | Age: 84
End: 2019-04-18
Payer: MEDICARE

## 2019-04-18 VITALS
WEIGHT: 135.38 LBS | HEIGHT: 59 IN | DIASTOLIC BLOOD PRESSURE: 72 MMHG | SYSTOLIC BLOOD PRESSURE: 130 MMHG | BODY MASS INDEX: 27.29 KG/M2

## 2019-04-18 DIAGNOSIS — N81.10 PROLAPSE OF ANTERIOR VAGINAL WALL: Primary | ICD-10-CM

## 2019-04-18 DIAGNOSIS — N99.3 VAGINAL VAULT PROLAPSE, POSTHYSTERECTOMY: ICD-10-CM

## 2019-04-18 DIAGNOSIS — N39.41 URGE URINARY INCONTINENCE: ICD-10-CM

## 2019-04-18 DIAGNOSIS — N95.2 VAGINAL ATROPHY: ICD-10-CM

## 2019-04-18 PROCEDURE — 99999 PR PBB SHADOW E&M-EST. PATIENT-LVL III: ICD-10-PCS | Mod: PBBFAC,HCNC,, | Performed by: OBSTETRICS & GYNECOLOGY

## 2019-04-18 PROCEDURE — 99999 PR PBB SHADOW E&M-EST. PATIENT-LVL III: CPT | Mod: PBBFAC,HCNC,, | Performed by: OBSTETRICS & GYNECOLOGY

## 2019-04-18 PROCEDURE — 99213 PR OFFICE/OUTPT VISIT, EST, LEVL III, 20-29 MIN: ICD-10-PCS | Mod: HCNC,S$GLB,, | Performed by: OBSTETRICS & GYNECOLOGY

## 2019-04-18 PROCEDURE — 1101F PR PT FALLS ASSESS DOC 0-1 FALLS W/OUT INJ PAST YR: ICD-10-PCS | Mod: HCNC,CPTII,S$GLB, | Performed by: OBSTETRICS & GYNECOLOGY

## 2019-04-18 PROCEDURE — 1101F PT FALLS ASSESS-DOCD LE1/YR: CPT | Mod: HCNC,CPTII,S$GLB, | Performed by: OBSTETRICS & GYNECOLOGY

## 2019-04-18 PROCEDURE — 99213 OFFICE O/P EST LOW 20 MIN: CPT | Mod: HCNC,S$GLB,, | Performed by: OBSTETRICS & GYNECOLOGY

## 2019-04-18 RX ORDER — IVERMECTIN 3 MG/1
TABLET ORAL
Refills: 0 | COMMUNITY
Start: 2019-03-06 | End: 2019-04-29

## 2019-04-18 NOTE — PATIENT INSTRUCTIONS
1. Prolapse: Stage 2 apical prolapse, Stage 3 anterior and posterior vaginal wall prolapse   --Pessary # 3 ring with support  --Daily pelvic floor exercises as assigned, consider Pelvic floor PT in future  --Non surgical options discussed with patient      2. Urge urinary incontinence: not too bothersome   --Empty bladder every 3 hours. Empty well: wait a minute, lean forward on toilet.   --Avoid dietary irritants (see sheet). Keep diary x 3-5 days to determine your irritants.  --KEGELS: do 10 in AM and 10 in PM, holding each x 10 seconds. When you feel urge to go, STOP, KEGEL, and when urge has passed, then go to bathroom. Consider PT in future.   --URGE: Consider anticholinergic. Takes 2-4 weeks to see if will have effect. For dry mouth: get sour, sugar free lozenge or gum.   --STRESS: Pessary vs. Sling.      3. Incomplete bladder emptying:improved   --Double void and Crede maneuvers discussed  --Improved with pessary    4. Vaginal atrophy (dryness):  Use KY jelly, astroglide, or other water-based lube quarter size with your finger twice weekly    5. Hemorrhoid  --not inflammed at this time    6. Well-woman:  --Pap:denies history of abnormal pap smear-- post hyst; no further needed  --Mammo:09/28/2018; due 9/28/19 or after  --Colonoscopy:08/2013 Diverticulosis in the sigmoid colon.                        - One 3 mm polyp in the transverse colon. Resected and retrieved.  --Dexa:06/2016Osteoporosis of the femoral neck. Has not takenTx. Talk with Dr. Ugalde about need for repeat bone density and/or treatment for osteoporosis.     7.  RTC 3 months for pessary check.

## 2019-04-18 NOTE — PROGRESS NOTES
Urogyn follow up  10/29/2018  .  [facility]   [address] [id number]    Ernestine Luna  [id number]  8/13/1929      Ernestine Luna is a 89 y.o. here for a urogyn follow up.    Last HPI from 10/29/2018  1)  UI:  (--) NELI   (--) UUI (--) pads Daytime frequency: Q 30 minutes- 3 hours.  Nocturia: Yes 3-4/night.   (--) dysuria,  (--) hematuria,  (--) frequent UTIs.  (+) complete bladder emptying.   2)  POP:  Absent.   Symptoms:(--)  .  (--) vaginal bleeding. (--) vaginal discharge. (--) sexually active.  (--) dyspareunia.   (+)  Vaginal dryness.  (--) vaginal estrogen use.   3)  BM:  (--) constipation/straining.  (--) chronic diarrhea. (--) hematochezia.  (--) fecal incontinence.  (--) fecal smearing/urgency.  (--) incomplete evacuation.    4)Pessary:  Denies pain, bleeding or dischage. Using #3 ring with support pessary..    Changes from last visit:  1)  UI:  (--) NELI   (--) UUI (--) pads Daytime frequency: Q 30 minutes (after BP meds)- 3 hours.  Nocturia: Yes 3-4/night.   (--) dysuria,  (--) hematuria,  (--) frequent UTIs.  (+) complete bladder emptying.     2)  POP:  Absent.   Symptoms:(--)  .  (--) vaginal bleeding. (--) vaginal discharge. (--) sexually active.  (--) dyspareunia.   (+)  Vaginal dryness.  (--) vaginal estrogen use. Was using femPH but burns.  Difficult to squeeze replens applicator.     3)  BM:  (--) constipation/straining.  (--) chronic diarrhea. (--) hematochezia.  (--) fecal incontinence.  (--) fecal smearing/urgency.  (--) incomplete evacuation.      4)Pessary:  Denies pain, bleeding.  Has some scant discharge. Using #3 ring with support pessary.    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 Wilkins    COLONOSCOPY N/A 8/8/2013    [facility]    COLONOSCOPY W/ POLYPECTOMY  08/08/2013    RASHEED   06/24/2014    RASHEED-TRANSFORAMINAL @ L4 Right 6/24/2014    [facility] Raya Knight [facility]GT    EYE SURGERY      HYSTERECTOMY  age 38    ALISTAIR; ovaries in place    INSERTION-INTRAOCULAR LENS (IOL) Right 10/12/2015    Performed by Lalita Wilkins MD at [facility] OR    JOINT REPLACEMENT      bilateral knees    PHACOEMULSIFICATION-ASPIRATION-CATARACT Right 10/12/2015    Performed by Lalita Wilkins [facility] at [facility] OR    TOTAL KNEE ARTHROPLASTY      Bilateral       Current Outpatient Medications   Medication Sig    acetic acid-oxyquinoline (FEM PH) 0.9-0.025 % Gel Place 1 applicator vaginally twice a week.    amLODIPine (NORVASC) 10 MG tablet TAKE 1 TABLET BY MOUTH ONCE DAILY    atenolol (TENORMIN) 25 MG tablet TAKE 1 TABLET BY MOUTH EVERY DAY    atenolol (TENORMIN) 25 MG tablet TAKE 1 TABLET BY MOUTH ONCE DAILY    benazepril (LOTENSIN) 40 MG tablet TAKE 1 TABLET BY MOUTH ONCE DAILY.    calcium carbonate (OS-DHRUV) 600 mg calcium (1,500 mg) Tab Take 600 mg by mouth once.    ivermectin (STROMECTOL) 3 mg Tab     lidocaine HCL 2% (XYLOCAINE) 2 % jelly Apply topically as needed. For toe pain, apply up to twice daily as needed for pain.    MULTIVITAMIN W-MINERALS/LUTEIN (CENTRUM SILVER ORAL) Take 1 tablet by mouth Daily.    omega 3-calcium-vit D3-FA-mv13 [id number] mg Cmpk Take by mouth  "once daily.    vitamin D 1000 units Tab Take 185 mg by mouth once daily.     No current facility-administered medications for this visit.        ROS:  As per HPI.      Well Woman:  Pap:denies history of abnormal pap smear-- post hyst  Mammo:09/28/2018  No mammographic evidence of malignancy.  Mammogram in 1 year is recommended.  Colonoscopy:08/2013 Diverticulosis in the sigmoid colon.                        - One 3 mm polyp in the transverse colon. Resected and retrieved.  Dexa:06/2016Osteoporosis of the femoral neck. Has not takenTx.     Recommendations:  1) Adequate calcium and Vitamin D therapy  2) Appropriate exercise  3) Consider medical therapy including bisphosphonates, denosumab.  4) Consider repeat BMD in 2 years.      Exam  /72 (BP Location: Left arm, Patient Position: Sitting)   Ht 4' 11" (1.499 m)   Wt 61.4 kg (135 lb 5.8 oz)   LMP  (LMP Unknown)   BMI 27.34 kg/m²   General: alert and oriented, no acute distress  Respiratory: normal respiratory effort  Abd: soft, non-tender, non-distended    Pelvic  Ext. Genitalia: normal external genitalia. Normal bartholin's and skeens glands  Vagina: + atrophy. Normal vaginal mucosa without lesions. No discharge noted.   Non-tender bladder base without palpable mass.  #3 ring with support in place  Cervix: absent  Uterus:  absent   Urethra: no masses or tenderness  Urethral meatus: no lesions, caruncle or prolapse.  Rectum: nonthrombosed hemorrhoid noted at 12 o'clock position at anus    Impression  1. Prolapse of anterior vaginal wall     2. Vaginal atrophy     3. Vaginal vault prolapse, posthysterectomy     4. Urge urinary incontinence       We reviewed the above issues and discussed options for short-term versus long-term management of her problems.   Plan:   1. Prolapse: Stage 2 apical prolapse, Stage 3 anterior and posterior vaginal wall prolapse   --Pessary # 3 ring with support  --Daily pelvic floor exercises as assigned, consider Pelvic floor PT in " future  --Non surgical options discussed with patient      2. Urge urinary incontinence: not too bothersome   --Empty bladder every 3 hours. Empty well: wait a minute, lean forward on toilet.   --Avoid dietary irritants (see sheet). Keep diary x 3-5 days to determine your irritants.  --KEGELS: do 10 in AM and 10 in PM, holding each x 10 seconds. When you feel urge to go, STOP, KEGEL, and when urge has passed, then go to bathroom. Consider PT in future.   --URGE: Consider anticholinergic. Takes 2-4 weeks to see if will have effect. For dry mouth: get sour, sugar free lozenge or gum.   --STRESS: Pessary vs. Sling.      3. Incomplete bladder emptying:improved   --Double void and Crede maneuvers discussed  --Improved with pessary    4. Vaginal atrophy (dryness):  Use KY jelly, astroglide, or other water-based lube quarter size with your finger twice weekly    5. Hemorrhoid  --not inflammed at this time    6. Well-woman:  --Pap:denies history of abnormal pap smear-- post hyst; no further needed  --Mammo:09/28/2018; due 9/28/19 or after  --Colonoscopy:08/2013 Diverticulosis in the sigmoid colon.                        - One 3 mm polyp in the transverse colon. Resected and retrieved.  --Dexa:06/2016Osteoporosis of the femoral neck. Has not takenTx. Talk with Dr. Ugalde about need for repeat bone density and/or treatment for osteoporosis.     7.  RTC 3 months for pessary check.     Division of Female Pelvic Medicine and Reconstructive Surgery  Department of Obstetrics & Gynecology

## 2019-04-23 ENCOUNTER — TELEPHONE (OUTPATIENT)
Dept: PODIATRY | Facility: CLINIC | Age: 84
End: 2019-04-23

## 2019-04-23 NOTE — TELEPHONE ENCOUNTER
Patient c/o regarding being  Billed no one answered hr called I gave her management number to assist the complaint

## 2019-04-28 NOTE — PROGRESS NOTES
PAST MEDICAL HISTORY:  Hypertension.  Breast cancer of the right breast, status post lumpectomy and had been on tamoxifen.  Osteoarthritis of the knees and shoulder.  Hysterectomy with oophorectomy.  Carpal tunnel syndrome release.  Bilateral knee arthroscopic surgery.  Bunionectomy.     SOCIAL HISTORY:  Tobacco use and alcohol use, none.  , has one living daughter, two daughters  from breast cancer.  She has a son with hypertension and hyperlipidemia.     FAMILY HISTORY:  Father is , hypertension.  Mother is , stroke.  Sister  of diabetes and heart disease.  Another sister  of breast cancer, heart disease, diabetes.  One sister alive, has diabetes and heart disease.     SCREENING:  Colonoscopy in 2013 revealed an adenomatous polyp.     MEDICATIONS:  Atenolol 25 mg daily.  Amlodipine 10 mg daily.  Benazepril 20 mg daily.  Vitamin D.  Omega-3.  Calcium.  Multivitamin    REASON FOR VISIT:  The patient is an 89-year-old female who comes in for an   annual routine visit.    She does complain of pain that involves her right posterior buttocks, extending   down the posterolateral aspect of the leg.  She also has some arthralgias   involving both knees, her left shoulder, left wrist and hands.  She went to go   see Rheumatology in February where an extensive amount of lab testing and x-rays   were done, which did show degenerative joint changes involving most joints.    She also had routine labs to check for a number of rheumatologic conditions.    The only thing that was tested positive was an antibody to Strongyloides.  She   was then later called in a prescription for Meloxicam and ivermectin; however,   she refused to take both of the medications.  She did not understand why it was   prescribed and read the side effects.  She actually states that she really does   not take any medications because she has abdominal pain and just gets upset   stomach, taking Advil or  Aleve, tramadol and Tylenol.    REVIEW OF THE CHART:  In February, also CBC and chemistry tests were normal.    REVIEW OF SYMPTOMS:  She has no pains in the chest or palpitations.  No   difficulty breathing.  Currently, no abdominal pain.  Has regular bowel   function.  She reports no difficulty urinating, although she has a pessary and   there is some degree of urgency, but is controlled.  No chronic headaches.    On review of the chart, screening colonoscopy in 2013 revealed a tubular   adenoma.    PHYSICAL EXAMINATION:  VITAL SIGNS:  Weight is 134 pounds, pulse 76, blood pressure taken by me 166/72   (she emphatically states that she always gets readings in the 120s/70s at home).  HEENT:  Tympanic membranes normal.  Nasal mucosa is clear.  Oropharynx, no   abnormal findings.  NECK:  No thyromegaly.  No masses.  LUNGS:  Clear breath sounds, good effort.  HEART:  Regular rate and rhythm, 2/6 systolic murmur from the apex to the base   and this is a chronic finding.  ABDOMEN:  Active bowel sounds, soft, nontender.  No hepatosplenomegaly or   abdominal masses.  PULSES:  2+ carotid pulses.  2+ pedal pulses.  EXTREMITIES:  Trace edema.  MUSCULOSKELETAL:  Nodular deformity involving all DIP joints.  Bilateral knee   surgical scars, enlargement.    IMPRESSION:  1. General exam.  2. Hypertension.  3. Osteoarthritis, multiple joints.  4. History of breast cancer.  5. Lumbar radiculopathy.    PLAN:  We will arrange for a screening colonoscopy.  Labs today repeating the   CBC, basic metabolic profile, TSH and lipid profile.  I recommended trying to   take the Meloxicam once a day for a couple of weeks to see if this is help.    Otherwise, I can also give a trial of prednisone over eight days.        PABLO  dd: 04/29/2019 13:24:27 (CDT)  td: 04/30/2019 04:40:24 (CDT)  Doc ID   #[id number]  Job ID #[id number]    CC:     .

## 2019-04-29 ENCOUNTER — OFFICE VISIT (OUTPATIENT)
Dept: INTERNAL MEDICINE | Facility: CLINIC | Age: 84
End: 2019-04-29
Payer: MEDICARE

## 2019-04-29 ENCOUNTER — LAB VISIT (OUTPATIENT)
Dept: LAB | Facility: HOSPITAL | Age: 84
End: 2019-04-29
Attending: INTERNAL MEDICINE
Payer: MEDICARE

## 2019-04-29 VITALS
BODY MASS INDEX: 27.01 KG/M2 | DIASTOLIC BLOOD PRESSURE: 70 MMHG | SYSTOLIC BLOOD PRESSURE: 120 MMHG | HEIGHT: 59 IN | WEIGHT: 134 LBS | HEART RATE: 78 BPM | OXYGEN SATURATION: 98 %

## 2019-04-29 DIAGNOSIS — Z85.3 HISTORY OF BREAST CANCER: ICD-10-CM

## 2019-04-29 DIAGNOSIS — I10 ESSENTIAL HYPERTENSION: ICD-10-CM

## 2019-04-29 DIAGNOSIS — Z00.00 ANNUAL PHYSICAL EXAM: Primary | ICD-10-CM

## 2019-04-29 DIAGNOSIS — M15.9 PRIMARY OSTEOARTHRITIS INVOLVING MULTIPLE JOINTS: ICD-10-CM

## 2019-04-29 DIAGNOSIS — M54.16 LUMBAR RADICULOPATHY: ICD-10-CM

## 2019-04-29 DIAGNOSIS — Z00.00 ANNUAL PHYSICAL EXAM: ICD-10-CM

## 2019-04-29 DIAGNOSIS — Z12.11 SCREENING FOR COLON CANCER: ICD-10-CM

## 2019-04-29 DIAGNOSIS — Z86.010 HISTORY OF ADENOMATOUS POLYP OF COLON: ICD-10-CM

## 2019-04-29 LAB
ANION GAP SERPL CALC-SCNC: 9 MMOL/L (ref 8–16)
BASOPHILS # BLD AUTO: 0.04 K/UL (ref 0–0.2)
BASOPHILS NFR BLD: 0.6 % (ref 0–1.9)
BUN SERPL-MCNC: 12 MG/DL (ref 8–23)
CALCIUM SERPL-MCNC: 10 MG/DL (ref 8.7–10.5)
CHLORIDE SERPL-SCNC: 105 MMOL/L (ref 95–110)
CHOLEST SERPL-MCNC: 168 MG/DL (ref 120–199)
CHOLEST/HDLC SERPL: 2.6 {RATIO} (ref 2–5)
CO2 SERPL-SCNC: 27 MMOL/L (ref 23–29)
CREAT SERPL-MCNC: 0.7 MG/DL (ref 0.5–1.4)
DIFFERENTIAL METHOD: NORMAL
EOSINOPHIL # BLD AUTO: 0.3 K/UL (ref 0–0.5)
EOSINOPHIL NFR BLD: 4.8 % (ref 0–8)
ERYTHROCYTE [DISTWIDTH] IN BLOOD BY AUTOMATED COUNT: 13.3 % (ref 11.5–14.5)
EST. GFR  (AFRICAN AMERICAN): >60 ML/MIN/1.73 M^2
EST. GFR  (NON AFRICAN AMERICAN): >60 ML/MIN/1.73 M^2
GLUCOSE SERPL-MCNC: 88 MG/DL (ref 70–110)
HCT VFR BLD AUTO: 39.4 % (ref 37–48.5)
HDLC SERPL-MCNC: 64 MG/DL (ref 40–75)
HDLC SERPL: 38.1 % (ref 20–50)
HGB BLD-MCNC: 12.8 G/DL (ref 12–16)
IMM GRANULOCYTES # BLD AUTO: 0.01 K/UL (ref 0–0.04)
IMM GRANULOCYTES NFR BLD AUTO: 0.2 % (ref 0–0.5)
LDLC SERPL CALC-MCNC: 93.4 MG/DL (ref 63–159)
LYMPHOCYTES # BLD AUTO: 2.2 K/UL (ref 1–4.8)
LYMPHOCYTES NFR BLD: 36.1 % (ref 18–48)
MCH RBC QN AUTO: 30.8 PG (ref 27–31)
MCHC RBC AUTO-ENTMCNC: 32.5 G/DL (ref 32–36)
MCV RBC AUTO: 95 FL (ref 82–98)
MONOCYTES # BLD AUTO: 0.5 K/UL (ref 0.3–1)
MONOCYTES NFR BLD: 8.4 % (ref 4–15)
NEUTROPHILS # BLD AUTO: 3.1 K/UL (ref 1.8–7.7)
NEUTROPHILS NFR BLD: 49.9 % (ref 38–73)
NONHDLC SERPL-MCNC: 104 MG/DL
NRBC BLD-RTO: 0 /100 WBC
PLATELET # BLD AUTO: 191 K/UL (ref 150–350)
PMV BLD AUTO: 10.5 FL (ref 9.2–12.9)
POTASSIUM SERPL-SCNC: 4 MMOL/L (ref 3.5–5.1)
RBC # BLD AUTO: 4.15 M/UL (ref 4–5.4)
SODIUM SERPL-SCNC: 141 MMOL/L (ref 136–145)
TRIGL SERPL-MCNC: 53 MG/DL (ref 30–150)
TSH SERPL DL<=0.005 MIU/L-ACNC: 0.73 UIU/ML (ref 0.4–4)
WBC # BLD AUTO: 6.2 K/UL (ref 3.9–12.7)

## 2019-04-29 PROCEDURE — 80061 LIPID PANEL: CPT | Mod: HCNC

## 2019-04-29 PROCEDURE — 80048 BASIC METABOLIC PNL TOTAL CA: CPT | Mod: HCNC

## 2019-04-29 PROCEDURE — 84443 ASSAY THYROID STIM HORMONE: CPT | Mod: HCNC

## 2019-04-29 PROCEDURE — 99397 PR PREVENTIVE VISIT,EST,65 & OVER: ICD-10-PCS | Mod: HCNC,S$GLB,, | Performed by: INTERNAL MEDICINE

## 2019-04-29 PROCEDURE — 99999 PR PBB SHADOW E&M-EST. PATIENT-LVL III: CPT | Mod: PBBFAC,HCNC,, | Performed by: INTERNAL MEDICINE

## 2019-04-29 PROCEDURE — 99397 PER PM REEVAL EST PAT 65+ YR: CPT | Mod: HCNC,S$GLB,, | Performed by: INTERNAL MEDICINE

## 2019-04-29 PROCEDURE — 85025 COMPLETE CBC W/AUTO DIFF WBC: CPT | Mod: HCNC

## 2019-04-29 PROCEDURE — 36415 COLL VENOUS BLD VENIPUNCTURE: CPT | Mod: HCNC,PO

## 2019-04-29 PROCEDURE — 99999 PR PBB SHADOW E&M-EST. PATIENT-LVL III: ICD-10-PCS | Mod: PBBFAC,HCNC,, | Performed by: INTERNAL MEDICINE

## 2019-04-29 RX ORDER — CELECOXIB 200 MG/1
200 CAPSULE ORAL DAILY
Qty: 30 CAPSULE | Refills: 0 | Status: ON HOLD | OUTPATIENT
Start: 2019-04-29 | End: 2019-10-01 | Stop reason: HOSPADM

## 2019-05-15 ENCOUNTER — OFFICE VISIT (OUTPATIENT)
Dept: PODIATRY | Facility: CLINIC | Age: 84
End: 2019-05-15
Payer: MEDICARE

## 2019-05-15 ENCOUNTER — OFFICE VISIT (OUTPATIENT)
Dept: INTERNAL MEDICINE | Facility: CLINIC | Age: 84
End: 2019-05-15
Payer: MEDICARE

## 2019-05-15 VITALS
DIASTOLIC BLOOD PRESSURE: 70 MMHG | WEIGHT: 135 LBS | HEIGHT: 59 IN | SYSTOLIC BLOOD PRESSURE: 140 MMHG | BODY MASS INDEX: 27.21 KG/M2

## 2019-05-15 VITALS
BODY MASS INDEX: 27.33 KG/M2 | HEART RATE: 63 BPM | SYSTOLIC BLOOD PRESSURE: 140 MMHG | DIASTOLIC BLOOD PRESSURE: 70 MMHG | WEIGHT: 135.56 LBS | HEIGHT: 59 IN | OXYGEN SATURATION: 98 %

## 2019-05-15 DIAGNOSIS — I70.0 ATHEROSCLEROSIS OF AORTA: ICD-10-CM

## 2019-05-15 DIAGNOSIS — N99.3 VAGINAL VAULT PROLAPSE, POSTHYSTERECTOMY: ICD-10-CM

## 2019-05-15 DIAGNOSIS — M47.819 FACET ARTHROPATHY: ICD-10-CM

## 2019-05-15 DIAGNOSIS — I77.810 ECTATIC THORACIC AORTA: ICD-10-CM

## 2019-05-15 DIAGNOSIS — I77.9 MILD CAROTID ARTERY DISEASE: ICD-10-CM

## 2019-05-15 DIAGNOSIS — N81.10 PROLAPSE OF ANTERIOR VAGINAL WALL: ICD-10-CM

## 2019-05-15 DIAGNOSIS — M19.90 INFLAMMATORY ARTHRITIS: ICD-10-CM

## 2019-05-15 DIAGNOSIS — M79.675 TOE PAIN, LEFT: ICD-10-CM

## 2019-05-15 DIAGNOSIS — I10 ESSENTIAL HYPERTENSION: ICD-10-CM

## 2019-05-15 DIAGNOSIS — Z00.00 ENCOUNTER FOR PREVENTIVE HEALTH EXAMINATION: Primary | ICD-10-CM

## 2019-05-15 DIAGNOSIS — B35.1 ONYCHOMYCOSIS DUE TO DERMATOPHYTE: Primary | ICD-10-CM

## 2019-05-15 DIAGNOSIS — Z85.3 HISTORY OF BREAST CANCER: ICD-10-CM

## 2019-05-15 DIAGNOSIS — N39.41 URGE URINARY INCONTINENCE: ICD-10-CM

## 2019-05-15 DIAGNOSIS — H25.11 NUCLEAR SENILE CATARACT OF RIGHT EYE: ICD-10-CM

## 2019-05-15 DIAGNOSIS — M19.012 PRIMARY OSTEOARTHRITIS OF BOTH SHOULDERS: ICD-10-CM

## 2019-05-15 DIAGNOSIS — M51.36 DDD (DEGENERATIVE DISC DISEASE), LUMBAR: ICD-10-CM

## 2019-05-15 DIAGNOSIS — N95.2 VAGINAL ATROPHY: ICD-10-CM

## 2019-05-15 DIAGNOSIS — M19.011 PRIMARY OSTEOARTHRITIS OF BOTH SHOULDERS: ICD-10-CM

## 2019-05-15 PROCEDURE — 99999 PR PBB SHADOW E&M-EST. PATIENT-LVL III: CPT | Mod: PBBFAC,HCNC,, | Performed by: NURSE PRACTITIONER

## 2019-05-15 PROCEDURE — 99499 UNLISTED E&M SERVICE: CPT | Mod: HCNC,S$GLB,, | Performed by: NURSE PRACTITIONER

## 2019-05-15 PROCEDURE — 99999 PR PBB SHADOW E&M-EST. PATIENT-LVL III: ICD-10-PCS | Mod: PBBFAC,HCNC,, | Performed by: PODIATRIST

## 2019-05-15 PROCEDURE — 99999 PR PBB SHADOW E&M-EST. PATIENT-LVL III: CPT | Mod: PBBFAC,HCNC,, | Performed by: PODIATRIST

## 2019-05-15 PROCEDURE — G0439 PR MEDICARE ANNUAL WELLNESS SUBSEQUENT VISIT: ICD-10-PCS | Mod: HCNC,S$GLB,, | Performed by: NURSE PRACTITIONER

## 2019-05-15 PROCEDURE — 99499 RISK ADDL DX/OHS AUDIT: ICD-10-PCS | Mod: HCNC,S$GLB,, | Performed by: NURSE PRACTITIONER

## 2019-05-15 PROCEDURE — 1101F PR PT FALLS ASSESS DOC 0-1 FALLS W/OUT INJ PAST YR: ICD-10-PCS | Mod: HCNC,CPTII,, | Performed by: PODIATRIST

## 2019-05-15 PROCEDURE — 99213 OFFICE O/P EST LOW 20 MIN: CPT | Mod: HCNC,,, | Performed by: PODIATRIST

## 2019-05-15 PROCEDURE — 17999 UNLISTD PX SKN MUC MEMB SUBQ: CPT | Mod: CSM,,, | Performed by: PODIATRIST

## 2019-05-15 PROCEDURE — 1101F PT FALLS ASSESS-DOCD LE1/YR: CPT | Mod: HCNC,CPTII,, | Performed by: PODIATRIST

## 2019-05-15 PROCEDURE — 99999 PR PBB SHADOW E&M-EST. PATIENT-LVL III: ICD-10-PCS | Mod: PBBFAC,HCNC,, | Performed by: NURSE PRACTITIONER

## 2019-05-15 PROCEDURE — 17999 PR NON-COVERED FOOT CARE: ICD-10-PCS | Mod: CSM,,, | Performed by: PODIATRIST

## 2019-05-15 PROCEDURE — G0439 PPPS, SUBSEQ VISIT: HCPCS | Mod: HCNC,S$GLB,, | Performed by: NURSE PRACTITIONER

## 2019-05-15 PROCEDURE — 99213 PR OFFICE/OUTPT VISIT, EST, LEVL III, 20-29 MIN: ICD-10-PCS | Mod: HCNC,,, | Performed by: PODIATRIST

## 2019-05-15 RX ORDER — CICLOPIROX 80 MG/ML
SOLUTION TOPICAL NIGHTLY
Qty: 6.6 ML | Refills: 11 | Status: ON HOLD | OUTPATIENT
Start: 2019-05-15 | End: 2024-03-13 | Stop reason: CLARIF

## 2019-05-15 NOTE — PATIENT INSTRUCTIONS
Counseling and Referral of Other Preventative  (Italic type indicates deductible and co-insurance are waived)    Patient Name: Ernestine Luna  Today's Date: 5/15/2019    Health Maintenance       Date Due Completion Date    Shingles Vaccine (2 of 3) 02/12/2016 12/18/2015    Influenza Vaccine 08/01/2019 10/1/2018    Override on 10/1/2016: Done    Lipid Panel 04/29/2024 4/29/2019    TETANUS VACCINE 06/09/2026 6/9/2016        No orders of the defined types were placed in this encounter.    The following information is provided to all patients.  This information is to help you find resources for any of the problems found today that may be affecting your health:                Living healthy guide: www.[facility].[place].gov      Understanding Diabetes: www.diabetes.org      Eating healthy: www.cdc.gov/healthyweight      [facility] home safety checklist: www.cdc.gov/steadi/patient.html      Agency on Aging: www.goea.[place].[facility]      Alcoholics anonymous (AA): www.aa.org      Physical Activity: www.johnie.nih.gov/ui1zqkw      Tobacco use: www.quitwithusla.org

## 2019-05-15 NOTE — PROGRESS NOTES
Subjective:      Patient ID: Ernestine Luna is a 89 y.o. female.    Chief Complaint: Ingrown Toenail    Ernestine is a 89 y.o. female who presents to the clinic complaining of thick and discolored toenails on both feet.  Gradual onset, worsening over past several weeks, aggravated by increased weight bearing, shoe gear, pressure.  Periodic debridement helps symptoms. Denies trauma, surgery. Ernestine is inquiring about treatment options.      Review of Systems   Constitution: Negative for chills, diaphoresis, fever, malaise/fatigue and night sweats.   Cardiovascular: Negative for claudication, cyanosis, leg swelling and syncope.   Skin: Positive for nail changes. Negative for color change, dry skin, rash, suspicious lesions and unusual hair distribution.   Musculoskeletal: Negative for falls, joint pain, joint swelling, muscle cramps, muscle weakness and stiffness.   Gastrointestinal: Negative for constipation, diarrhea, nausea and vomiting.   Neurological: Negative for brief paralysis, disturbances in coordination, focal weakness, numbness, paresthesias, sensory change and tremors.           Objective:      Physical Exam   Constitutional: She is oriented to person, place, and time. She appears well-developed and well-nourished. She is cooperative.   Oriented to time, place, and person.   Cardiovascular:   Pulses:       Dorsalis pedis pulses are 1+ on the right side, and 1+ on the left side.        Posterior tibial pulses are 1+ on the right side, and 1+ on the left side.   Capillary fill time 3-5 seconds.  All toes warm to touch.      Negative lower extremity edema bilateral.    Negative elevational pallor and dependent rubor bilateral.     Musculoskeletal:   Normal angle, base, station of gait. Decreased stride length, early heel off, moderately propulsive toe off bilateral.    All ten toes without clubbing, cyanosis, or signs of ischemia.      Bunions and toe deformity bilateral without current symptom.    No pain to  palpation bilateral lower extremities.      Range of motion, stability, muscle strength, and muscle tone are age and health appropriate normal bilateral feet and legs.       Lymphadenopathy:   Negative lymphadenopathy bilateral popliteal fossa and tarsal tunnel.  Negative lymphangitic streaking bilateral foot/ankle bilateral.     Neurological: She is alert and oriented to person, place, and time. She has normal strength. She is not disoriented. She displays no atrophy and no tremor. She exhibits normal muscle tone.   Reflex Scores:       Patellar reflexes are 2+ on the right side and 2+ on the left side.       Achilles reflexes are 2+ on the right side and 2+ on the left side.  Negative tinel sign to percussion sural, superficial peroneal, deep peroneal, saphenous, and posterior tibial nerves right and left ankles and feet.         Skin: Skin is warm, dry and intact. No abrasion, no bruising, no burn, no ecchymosis, no laceration, no lesion, no petechiae and no rash noted. She is not diaphoretic. No cyanosis or erythema. No pallor. Nails show no clubbing.   Skin thin, atrophic, with decreased density and distribution of pedal hair bilateral, but without hyperpigmentation, annamarie discoloration,  ulcers, masses, nodules or cords palpated bilateral feet and legs.      Toenails 1st, 2nd, 3rd, 4th, 5th  bilateral are hypertrophic thickened 2-3 mm, dystrophic, discolored tanish brown with tan, gray crumbly subungual debris.  Tender to distal nail plate pressure, without periungual skin abnormality of each.               Assessment:       Encounter Diagnoses   Name Primary?    Onychomycosis due to dermatophyte Yes    Toe pain, left          Plan:       Ernestine was seen today for ingrown toenail.    Diagnoses and all orders for this visit:    Onychomycosis due to dermatophyte    Toe pain, left    Other orders  -     ciclopirox (PENLAC) 8 % Soln; Apply topically nightly.      I counseled the patient on her conditions, their  implications and medical management.        - Shoe inspection. Patient instructed on proper foot hygeine. We discussed wearing proper shoe gear, daily foot inspections, never walking without protective shoe gear, never putting sharp instruments to feet, routine podiatric visits as needed.    Discussed conservative treatment with shoes of adequate dimensions, material, and style to alleviate symptoms and delay or prevent surgical intervention.    Rx penlac    Non covered foot care:  - With patient's permission, nails were aggressively reduced and debrided x 10 to their soft tissue attachment mechanically and with electric , removing all offending nail and debris. Patient relates relief following the procedure. She will continue to monitor the areas daily, inspect her feet, wear protective shoe gear when ambulatory, moisturizer to maintain skin integrity and follow in this office p.r.n.          Follow up if symptoms worsen or fail to improve.

## 2019-05-15 NOTE — PROGRESS NOTES
"Ernestine Luna presented for a  Medicare AWV and comprehensive Health Risk Assessment today. The following components were reviewed and updated:    · Medical history  · Family History  · Social history  · Allergies and Current Medications  · Health Risk Assessment  · Health Maintenance  · Care Team     ** See Completed Assessments for Annual Wellness Visit within the encounter summary.**       The following assessments were completed:  · Living Situation  · CAGE  · Depression Screening  · Timed Get Up and Go  · Whisper Test  · Cognitive Function Screening  · Nutrition Screening  · ADL Screening  · PAQ Screening          Vitals:    05/15/19 1114   BP: (!) 140/70   BP Location: Left arm   Patient Position: Sitting   Pulse: 63   SpO2: 98%   Weight: 61.5 kg (135 lb 9.3 oz)   Height: 4' 11" (1.499 m)     Body mass index is 27.38 kg/m².     Physical Exam   Constitutional: She is oriented to person, place, and time. She appears well-developed and well-nourished.   HENT:   Head: Normocephalic and atraumatic. Not macrocephalic and not microcephalic. Head is without raccoon's eyes, without Keller's sign, without abrasion, without contusion, without laceration, without right periorbital erythema and without left periorbital erythema. Hair is normal.   Right Ear: No lacerations. No drainage, swelling or tenderness. No foreign bodies. No mastoid tenderness. Tympanic membrane is not injected, not scarred, not perforated, not erythematous, not retracted and not bulging. Tympanic membrane mobility is normal. No middle ear effusion. No hemotympanum. No decreased hearing is noted.   Left Ear: No lacerations. No drainage, swelling or tenderness. No foreign bodies. No mastoid tenderness. Tympanic membrane is not injected, not scarred, not perforated, not erythematous, not retracted and not bulging. Tympanic membrane mobility is normal.  No middle ear effusion. No hemotympanum. No decreased hearing is noted.   Nose: Nose normal. No " mucosal edema, rhinorrhea, nose lacerations, sinus tenderness or nasal deformity.   Mouth/Throat: Uvula is midline.   Eyes: Conjunctivae and lids are normal. No scleral icterus.   Neck: Trachea normal. Neck supple. No spinous process tenderness and no muscular tenderness present. No neck rigidity. No edema, no erythema and normal range of motion present. No thyroid mass and no thyromegaly present.   Cardiovascular: Normal rate, regular rhythm, normal heart sounds and intact distal pulses. Exam reveals no gallop and no friction rub.   No murmur heard.  Pulmonary/Chest: Effort normal and breath sounds normal. No stridor. No respiratory distress. She has no wheezes. She has no rales. She exhibits no tenderness.   Abdominal: Soft. Bowel sounds are normal. She exhibits no distension.   Musculoskeletal: Normal range of motion.   Lymphadenopathy:        Head (right side): No submental, no submandibular, no tonsillar, no preauricular and no posterior auricular adenopathy present.        Head (left side): No submental, no submandibular, no tonsillar, no preauricular, no posterior auricular and no occipital adenopathy present.   Neurological: She is alert and oriented to person, place, and time.   Skin: Skin is warm and dry.   Psychiatric: She has a normal mood and affect. Her behavior is normal. Judgment and thought content normal.   Vitals reviewed.      Diagnoses and health risks identified today and associated recommendations/orders:    1. Encounter for preventive health examination  Annual Health Risk Assessment (HRA) visit today.  Counseling and referral of health maintenance and preventative health measures performed.  Patient given annual wellness paperwork to take home.  Encouraged to return in 1 year for subsequent HRA visit.     2. Ectatic thoracic aorta  Chronic. Stable. Noted on CXR from 1/31/14. Continue current treatment plan as previously prescribed by PCP.    3. Atherosclerosis of aorta  Chronic. Stable.  Noted on CXR from 1/31/14. Continue current treatment plan as previously prescribed by PCP.    4. Mild carotid artery disease  Chronic. Stable. Continue current treatment plan as previously prescribed by PCP.    5. Essential hypertension  Chronic. Stable. Controlled. Encouraged to increase exercise as tolerated (moderate-intensity aerobic activity and muscle-strengthening activities) improve diet to heart healthy, low sodium diet. Continue current treatment plan as previously prescribed by PCP.'    6. Vaginal vault prolapse, posthysterectomy  Chronic. Stable. Continue current treatment plan as previously prescribed by PCP.    7. Prolapse of anterior vaginal wall  Chronic. Stable. Continue current treatment plan as previously prescribed by PCP.    8. Vaginal atrophy  Chronic. Stable. Continue current treatment plan as previously prescribed by PCP.    9. Urge urinary incontinence  Chronic. Stable. Continue current treatment plan as previously prescribed by PCP.    10. Nuclear senile cataract of right eye  Chronic. Stable. Continue current treatment plan as previously prescribed by PCP.    11. DDD (degenerative disc disease), lumbar  Chronic. Stable. Continue current treatment plan as previously prescribed by PCP.    12. History of breast cancer  Chronic. Stable. Continue current treatment plan as previously prescribed by PCP.    13. Inflammatory arthritis  Chronic. Stable. Continue current treatment plan as previously prescribed by PCP.    14. Primary osteoarthritis of both shoulders  Chronic. Stable. Continue current treatment plan as previously prescribed by PCP.    15. Facet arthropathy  Chronic. Stable. Continue current treatment plan as previously prescribed by PCP.        Provided Ernestine with a 5-10 year written screening schedule and personal prevention plan. Recommendations were developed using the USPSTF age appropriate recommendations. Education, counseling, and referrals were provided as needed. After Visit  Summary printed and given to patient which includes a list of additional screenings\tests needed.    Follow up in about 1 year (around 5/15/2020).    Riki Delarosa NP  I offered to discuss end of life issues, including information on how to make advance directives that the patient could use to name someone who would make medical decisions on their behalf if they became too ill to make themselves.    ___Patient declined  _X_Patient is interested, I provided paper work and offered to discuss.

## 2019-06-12 ENCOUNTER — OFFICE VISIT (OUTPATIENT)
Dept: PODIATRY | Facility: CLINIC | Age: 84
End: 2019-06-12
Payer: MEDICARE

## 2019-06-12 VITALS
HEIGHT: 59 IN | HEART RATE: 62 BPM | WEIGHT: 135 LBS | BODY MASS INDEX: 27.21 KG/M2 | DIASTOLIC BLOOD PRESSURE: 73 MMHG | SYSTOLIC BLOOD PRESSURE: 148 MMHG

## 2019-06-12 DIAGNOSIS — B35.1 ONYCHOMYCOSIS DUE TO DERMATOPHYTE: Primary | ICD-10-CM

## 2019-06-12 PROCEDURE — 17999 PR NON-COVERED FOOT CARE: ICD-10-PCS | Mod: CSM,,, | Performed by: PODIATRIST

## 2019-06-12 PROCEDURE — 99999 PR PBB SHADOW E&M-EST. PATIENT-LVL III: ICD-10-PCS | Mod: PBBFAC,HCNC,, | Performed by: PODIATRIST

## 2019-06-12 PROCEDURE — 17999 UNLISTD PX SKN MUC MEMB SUBQ: CPT | Mod: CSM,,, | Performed by: PODIATRIST

## 2019-06-12 PROCEDURE — 99999 PR PBB SHADOW E&M-EST. PATIENT-LVL III: CPT | Mod: PBBFAC,HCNC,, | Performed by: PODIATRIST

## 2019-06-12 PROCEDURE — 99499 UNLISTED E&M SERVICE: CPT | Mod: HCNC,,, | Performed by: PODIATRIST

## 2019-06-12 PROCEDURE — 99499 NO LOS: ICD-10-PCS | Mod: HCNC,,, | Performed by: PODIATRIST

## 2019-06-12 NOTE — PROGRESS NOTES
Subjective:      Patient ID: Ernestine Luna is a 89 y.o. female.    Chief Complaint: Nail Care    Thick dark misshapen toenails all toes.  Gradual onset, worsening over past several weeks, aggravated by increased weight bearing, shoe gear, pressure.  Periodic debridement helps symptoms.     Review of Systems   Constitution: Negative for chills, diaphoresis, fever and malaise/fatigue.   Skin: Positive for nail changes.           Objective:      Physical Exam   Constitutional: She is oriented to person, place, and time. She appears well-developed and well-nourished. She is cooperative. No distress.   Cardiovascular:   Pulses:       Dorsalis pedis pulses are 1+ on the right side, and 1+ on the left side.        Posterior tibial pulses are 1+ on the right side, and 1+ on the left side.   Capillary refill 3 seconds all toes/distal feet, all toes/both feet warm to touch.      Negative lymphadenopathy bilateral popliteal fossa and tarsal tunnel.      Negavie lower extremity edema bilateral.     Musculoskeletal:        Right ankle: She exhibits normal range of motion, no swelling, no ecchymosis, no deformity, no laceration and normal pulse. Achilles tendon normal. Achilles tendon exhibits no pain, no defect and normal García's test results.   Lymphadenopathy: No inguinal adenopathy noted on the right or left side.   Negative lymphadenopathy bilateral popliteal fossa and tarsal tunnel.    Negative lymphangitic streaking bilateral feet/ankles/legs.   Neurological: She is alert and oriented to person, place, and time. She has normal strength. She displays no atrophy and no tremor. No sensory deficit. She exhibits normal muscle tone. Gait normal.   Reflex Scores:       Patellar reflexes are 2+ on the right side and 2+ on the left side.       Achilles reflexes are 2+ on the right side and 2+ on the left side.  Skin: Skin is warm, dry and intact. Capillary refill takes 2 to 3 seconds. No abrasion, no bruising, no burn, no  ecchymosis, no laceration, no lesion and no rash noted. She is not diaphoretic. No cyanosis or erythema. No pallor. Nails show no clubbing.     Toenails 1st, 2nd, 3rd, 4th, 5th  bilateral are hypertrophic thickened 2-3 mm, dystrophic, discolored tanish brown with tan, gray crumbly subungual debris.  Tender to distal nail plate pressure, without periungual skin abnormality of each.     Psychiatric: She has a normal mood and affect.             Assessment:       Encounter Diagnosis   Name Primary?    Onychomycosis due to dermatophyte Yes         Plan:       Ernestine was seen today for nail care.    Diagnoses and all orders for this visit:    Onychomycosis due to dermatophyte      I counseled the patient on her conditions, their implications and medical management.        - Shoe inspection. Patient instructed on proper foot hygeine. We discussed wearing proper shoe gear, daily foot inspections, never walking without protective shoe gear, never putting sharp instruments to feet, routine podiatric visits as needed.    Discussed conservative treatment with shoes of adequate dimensions, material, and style to alleviate symptoms and delay or prevent surgical intervention.    Non covered foot care:    - With patient's permission, nails were aggressively reduced and debrided x 10 to their soft tissue attachment mechanically and with electric , removing all offending nail and debris. Patient relates relief following the procedure. She will continue to monitor the areas daily, inspect her feet, wear protective shoe gear when ambulatory, moisturizer to maintain skin integrity and follow in this office p.r.n.          Follow up if symptoms worsen or fail to improve.

## 2019-06-19 ENCOUNTER — TELEPHONE (OUTPATIENT)
Dept: PODIATRY | Facility: CLINIC | Age: 84
End: 2019-06-19

## 2019-06-19 NOTE — TELEPHONE ENCOUNTER
"Received escalated request via email from Scheduling [SUN Alvarez] to contact patient.    From email dated 6/19/2019:  Ms. Luna saw Dr. Velez on 05/15/19.  She stated that she was only charged $25 initially although she is accustomed to paying $42 for her nail care.  Dr. Velez removed an ingrown toenail during this visit, but did not clip her nails, which she stated that she needed.  She did return to the check-in desk after her appointment and paid the additional $17 to total $42.  She has receipts for both transactions.    She stated that when she came in on 06/12/19 for her follow up,  Dr. Velez did clip her toenails at this appointment, for which she also paid her $42.    Ms. Luna received a bill in the mail today stating that the total charges from her visit (no date listed) was $113 and that she has a balance due for $25 to Dr. Velez for "professional service," of which she is disputing.  She did speak with someone in billing on today, but the issue remains unresolved as she stated that the person she spoke with in billing was dismissive of her and hung up on her.    She called in to the department twice, once at 1:00PM and again at 1:09PM, both times speaking to phone agents at [facility] [place].     There is some additional concern due to Ms. Luna voicing her frustrations as she mentioned that she wants the issue resolved or she would result to using her [employer] as an alternative solution as she feels shes being charged unjustly as shes paid the amount she owed for these visits and subsequent services.  I felt it necessary to loop you in with as much information possible in case its needed for any further discussion with Patient Relations or whomever.    Please give Ms. Luna a call to see if theres a way that this issue can be resolved as soon as possible.  She can be reached at [phone] (c) or [phone]  (h) regarding this message.    Manager Comments:  I called the patient back as requested " "and explained to the best of my ability that the patient was charged for a visit on 5/15/19 and not routine nail care as previously. Based on the nature of the visit with Dr. Velez on that day, the patient was asked to remit a $25 copayment which was appropriate for the visit. The patient disagreed and stated that Dr. Velez told her the visit cost was $42 and that she should not have received a bill since he did not trim her toenails as desired and that she went back out to the  after the visit to pay an additional $17. I advised the patient that her concerns regarding her billing are understandable, they would be best addressed by our PACS team as well as her insurance carrier, [employer]. Patient stated that she reached out the billing team but the rep did not listen and eventually disconnected the call. During the course of our conversation, the patient became irate and threatened to get her "[employer]" and "come up and blow her brains out" however I am unsure if patient is referencing the clinic or . In response, I advised patient that statements of that nature are considered a threat of violence and can result in the organization refusing future care for safety and security reasons. Eventually, the patient calmed down and we were able to speak rationally about next steps. I advised that we would escalate her billing concerns to the PACS team for further handling and follow up. Patient seemed satisfied with this statement however patient stated that she would not return Podiatry for her care and that she would find a new insurance carrier.     "

## 2019-06-28 ENCOUNTER — TELEPHONE (OUTPATIENT)
Dept: INTERNAL MEDICINE | Facility: CLINIC | Age: 84
End: 2019-06-28

## 2019-06-28 NOTE — TELEPHONE ENCOUNTER
Pt be having left shoulder pain she states she woke up this morning and she was not able to do her chores around the house because of the pain pt is requesting some advisement

## 2019-06-28 NOTE — TELEPHONE ENCOUNTER
----- Message from Tia Brady sent at 6/28/2019  2:56 PM CDT -----  Contact: [phone]  Patient is requesting a call from the office regarding shoulder pain toward her back.    Please advise, thank you.

## 2019-07-02 ENCOUNTER — TELEPHONE (OUTPATIENT)
Dept: INTERNAL MEDICINE | Facility: CLINIC | Age: 84
End: 2019-07-02

## 2019-07-07 RX ORDER — AMLODIPINE BESYLATE 10 MG/1
10 TABLET ORAL DAILY
Qty: 90 TABLET | Refills: 1 | Status: SHIPPED | OUTPATIENT
Start: 2019-07-07 | End: 2019-12-21

## 2019-07-18 ENCOUNTER — OFFICE VISIT (OUTPATIENT)
Dept: UROGYNECOLOGY | Facility: CLINIC | Age: 84
End: 2019-07-18
Payer: MEDICARE

## 2019-07-18 VITALS
BODY MASS INDEX: 27.2 KG/M2 | HEIGHT: 59 IN | SYSTOLIC BLOOD PRESSURE: 120 MMHG | DIASTOLIC BLOOD PRESSURE: 76 MMHG | WEIGHT: 134.94 LBS

## 2019-07-18 DIAGNOSIS — N81.10 PROLAPSE OF ANTERIOR VAGINAL WALL: ICD-10-CM

## 2019-07-18 DIAGNOSIS — N99.3 VAGINAL VAULT PROLAPSE, POSTHYSTERECTOMY: ICD-10-CM

## 2019-07-18 DIAGNOSIS — N39.41 URGE URINARY INCONTINENCE: ICD-10-CM

## 2019-07-18 DIAGNOSIS — N95.2 VAGINAL ATROPHY: ICD-10-CM

## 2019-07-18 DIAGNOSIS — R35.1 NOCTURIA: ICD-10-CM

## 2019-07-18 DIAGNOSIS — Z12.31 ENCOUNTER FOR SCREENING MAMMOGRAM FOR BREAST CANCER: Primary | ICD-10-CM

## 2019-07-18 DIAGNOSIS — Z85.3 HISTORY OF BREAST CANCER: ICD-10-CM

## 2019-07-18 PROCEDURE — 1101F PR PT FALLS ASSESS DOC 0-1 FALLS W/OUT INJ PAST YR: ICD-10-PCS | Mod: HCNC,CPTII,S$GLB, | Performed by: OBSTETRICS & GYNECOLOGY

## 2019-07-18 PROCEDURE — 99213 PR OFFICE/OUTPT VISIT, EST, LEVL III, 20-29 MIN: ICD-10-PCS | Mod: HCNC,S$GLB,, | Performed by: OBSTETRICS & GYNECOLOGY

## 2019-07-18 PROCEDURE — 99213 OFFICE O/P EST LOW 20 MIN: CPT | Mod: HCNC,S$GLB,, | Performed by: OBSTETRICS & GYNECOLOGY

## 2019-07-18 PROCEDURE — 99999 PR PBB SHADOW E&M-EST. PATIENT-LVL III: CPT | Mod: PBBFAC,HCNC,, | Performed by: OBSTETRICS & GYNECOLOGY

## 2019-07-18 PROCEDURE — 99499 RISK ADDL DX/OHS AUDIT: ICD-10-PCS | Mod: HCNC,S$GLB,, | Performed by: OBSTETRICS & GYNECOLOGY

## 2019-07-18 PROCEDURE — 1101F PT FALLS ASSESS-DOCD LE1/YR: CPT | Mod: HCNC,CPTII,S$GLB, | Performed by: OBSTETRICS & GYNECOLOGY

## 2019-07-18 PROCEDURE — 99499 UNLISTED E&M SERVICE: CPT | Mod: HCNC,S$GLB,, | Performed by: OBSTETRICS & GYNECOLOGY

## 2019-07-18 PROCEDURE — 87086 URINE CULTURE/COLONY COUNT: CPT | Mod: HCNC

## 2019-07-18 PROCEDURE — 99999 PR PBB SHADOW E&M-EST. PATIENT-LVL III: ICD-10-PCS | Mod: PBBFAC,HCNC,, | Performed by: OBSTETRICS & GYNECOLOGY

## 2019-07-18 NOTE — PROGRESS NOTES
Urogyn follow up  10/29/2018  .  [facility]   [address] [address]    Ernestine Luna  [id number]  8/13/1929      Ernestine Luna is a 89 y.o. here for a urogyn follow up.    Last HPI from 10/29/2018  1)  UI:  (--) NELI   (--) UUI (--) pads Daytime frequency: Q 30 minutes- 3 hours.  Nocturia: Yes 3-4/night.   (--) dysuria,  (--) hematuria,  (--) frequent UTIs.  (+) complete bladder emptying.   2)  POP:  Absent.   Symptoms:(--)  .  (--) vaginal bleeding. (--) vaginal discharge. (--) sexually active.  (--) dyspareunia.   (+)  Vaginal dryness.  (--) vaginal estrogen use.   3)  BM:  (--) constipation/straining.  (--) chronic diarrhea. (--) hematochezia.  (--) fecal incontinence.  (--) fecal smearing/urgency.  (--) incomplete evacuation.    4)Pessary:  Denies pain, bleeding or dischage. Using #3 ring with support pessary..    Changes from last visit:  1)  UI:  (--) NELI   (--) UUI (--) pads Daytime frequency: Q 30 minutes (after BP meds)- 3 hours.  Nocturia: Yes 3-4/night.  Wakes with urge to urinate. Sounds like dog can keep her awake, too.  (--) dysuria,  (--) hematuria,  (--) frequent UTIs.  (+) complete bladder emptying.     2)  POP:  Absent.   Symptoms:(--).  (--) vaginal bleeding. (--) vaginal discharge. (--) sexually active.  (--) dyspareunia.   (+)  Vaginal dryness.  (--) vaginal estrogen use. Using replens 2x/week.  Difficult to squeeze replens applicator.     3)  BM:  (--) constipation/straining.  (--) chronic diarrhea. (--) hematochezia.  (--) fecal incontinence.  (--) fecal smearing/urgency.  (--) incomplete evacuation.      4) Pessary:  Denies pain, bleeding.  Has some scant discharge. Using #3 ring with support pessary.    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 Wilkins    COLONOSCOPY N/A 8/8/2013    [facility] (4TH FLR)    COLONOSCOPY W/ POLYPECTOMY  08/08/2013    RASHEED   06/24/2014    RASHEED-TRANSFORAMINAL @ L4 Right 6/24/2014    Per[facility] Raya Knight [facility] at [facility]    EYE SURGERY      HYSTERECTOMY  age 38    ALISTAIR; ovaries in place    INSERTION-INTRAOCULAR LENS (IOL) Right 10/12/2015    Performed by Lalita Wilkins MD at [facility] OR    JOINT REPLACEMENT      bilateral knees    PHACOEMULSIFICATION-ASPIRATION-CATARACT Right 10/12/2015    Performed by Lalita Wilkins MD at [facility] OR    TOTAL KNEE ARTHROPLASTY      Bilateral       Current Outpatient Medications   Medication Sig    acetic acid-oxyquinoline (FEM PH) 0.9-0.025 % Gel Place 1 applicator vaginally twice a week.    amLODIPine (NORVASC) 10 MG tablet Take 1 tablet (10 mg total) by mouth once daily.    atenolol (TENORMIN) 25 MG tablet TAKE 1 TABLET BY MOUTH EVERY DAY    atenolol (TENORMIN) 25 MG tablet TAKE 1 TABLET BY MOUTH ONCE DAILY    benazepril (LOTENSIN) 40 MG tablet TAKE 1 TABLET BY MOUTH ONCE DAILY.    calcium carbonate (OS-DHRUV) 600 mg calcium (1,500 mg) Tab Take 600 mg by mouth once.    celecoxib (CELEBREX) 200 MG capsule Take 1 capsule (200 mg total) by mouth once daily.    ciclopirox (PENLAC) 8 % Soln Apply topically nightly.    lidocaine HCL 2% (XYLOCAINE) 2 % jelly Apply topically as needed. For toe pain, apply up to  "twice daily as needed for pain.    MULTIVITAMIN W-MINERALS/LUTEIN (CENTRUM SILVER ORAL) Take 1 tablet by mouth Daily.    omega 3-calcium-vit D3-FA-mv13 [id number] mg Cmpk Take by mouth once daily.    vitamin D 1000 units Tab Take 185 mg by mouth once daily.     No current facility-administered medications for this visit.        ROS:  As per HPI.      Well Woman:  --Pap:denies history of abnormal pap smear-- post hyst; no further needed  --Mammo:09/28/2018; due 9/28/19 or after  --Colonoscopy:Scheduled 8/19/19.  Last 08/2013 Diverticulosis in the sigmoid colon.  One 3 mm polyp in the transverse colon. Resected and retrieved.   --Dexa:06/2016Osteoporosis of the femoral neck. Has not takenTx. Talk with Dr. Ugalde about need for repeat bone density and/or treatment for osteoporosis.   Recommendations:  1) Adequate calcium and Vitamin D therapy  2) Appropriate exercise  3) Consider medical therapy including bisphosphonates, denosumab.  4) Consider repeat BMD in 2 years.    Exam  /76 (BP Location: Left arm, Patient Position: Sitting, BP Method: Large (Automatic))   Ht 4' 11" (1.499 m)   Wt 61.2 kg (134 lb 14.7 oz)   LMP  (LMP Unknown)   BMI 27.25 kg/m²   General: alert and oriented, no acute distress  Respiratory: normal respiratory effort  Abd: soft, non-tender, non-distended    Pelvic  Ext. Genitalia: normal external genitalia. Normal bartholin's and skeens glands  Vagina: + atrophy. Normal vaginal mucosa without lesions. No discharge noted.   Non-tender bladder base without palpable mass.  #3 ring with support in place  Cervix: absent  Uterus:  absent   Urethra: no masses or tenderness  Urethral meatus: no lesions, caruncle or prolapse.  Rectum: nonthrombosed hemorrhoid noted at 12 o'clock position at anus    Impression  1. Encounter for screening mammogram for breast cancer  Mammo Digital Screening Bilat With CAD   2. Nocturia  Urine culture    Urine culture   3. Prolapse of anterior vaginal wall   "   4. Urge urinary incontinence     5. Vaginal atrophy     6. Vaginal vault prolapse, posthysterectomy     7. History of breast cancer       We reviewed the above issues and discussed options for short-term versus long-term management of her problems.   Plan:   1. Prolapse: Stage 2 apical prolapse, Stage 3 anterior and posterior vaginal wall prolapse   --Pessary # 3 ring with support  --Daily pelvic floor exercises as assigned, consider Pelvic floor PT in future  --Non surgical options discussed with patient      2. Urge urinary incontinence: not too bothersome   --Empty bladder every 3 hours. Empty well: wait a minute, lean forward on toilet.   --Avoid dietary irritants (see sheet). Keep diary x 3-5 days to determine your irritants.  --KEGELS: do 10 in AM and 10 in PM, holding each x 10 seconds. When you feel urge to go, STOP, KEGEL, and when urge has passed, then go to bathroom. Consider PT in future.   --URGE: Consider anticholinergic. Takes 2-4 weeks to see if will have effect. For dry mouth: get sour, sugar free lozenge or gum.   --STRESS: Pessary vs. Sling.      3. Incomplete bladder emptying:improved with pessary   --Double void and Crede maneuvers discussed  --Improved with pessary    4. Vaginal atrophy (dryness):  Use KY jelly, astroglide, or other water-based lube quarter size with your finger twice weekly    5. Hemorrhoid  --not inflammed at this time    6. Well-woman:  --Pap:denies history of abnormal pap smear-- post hyst; no further needed  --Mammo:09/28/2018; due 9/28/19 or after  --Colonoscopy:Scheduled 8/19/19.  Last 08/2013 Diverticulosis in the sigmoid colon.  One 3 mm polyp in the transverse colon. Resected and retrieved.   --Dexa:06/2016Osteoporosis of the femoral neck. Has not takenTx. Talk with Dr. Ugalde about need for repeat bone density and/or treatment for osteoporosis.     7.  RTC 3 months for pessary check.     30 min spent during visit, 75% in discussion.   Division of Female Pelvic  Medicine and Reconstructive Surgery  Department of Obstetrics & Gynecology

## 2019-07-18 NOTE — PATIENT INSTRUCTIONS
1. Prolapse: Stage 2 apical prolapse, Stage 3 anterior and posterior vaginal wall prolapse   --Pessary # 3 ring with support  --Daily pelvic floor exercises as assigned, consider Pelvic floor PT in future  --Non surgical options discussed with patient      2. Urge urinary incontinence: not too bothersome   --Empty bladder every 3 hours. Empty well: wait a minute, lean forward on toilet.   --Avoid dietary irritants (see sheet). Keep diary x 3-5 days to determine your irritants.  --KEGELS: do 10 in AM and 10 in PM, holding each x 10 seconds. When you feel urge to go, STOP, KEGEL, and when urge has passed, then go to bathroom. Consider PT in future.   --URGE: Consider anticholinergic. Takes 2-4 weeks to see if will have effect. For dry mouth: get sour, sugar free lozenge or gum.   --STRESS: Pessary vs. Sling.      3. Incomplete bladder emptying:improved with pessary   --Double void and Crede maneuvers discussed  --Improved with pessary    4. Vaginal atrophy (dryness):  Use KY jelly, astroglide, or other water-based lube quarter size with your finger twice weekly    5. Hemorrhoid  --not inflammed at this time    6. Well-woman:  --Pap:denies history of abnormal pap smear-- post hyst; no further needed  --Mammo:09/28/2018; due 9/28/19 or after  --Colonoscopy:Scheduled 8/19/19.  Last 08/2013 Diverticulosis in the sigmoid colon.  One 3 mm polyp in the transverse colon. Resected and retrieved.   --Dexa:06/2016Osteoporosis of the femoral neck. Has not takenTx. Talk with Dr. Ugalde about need for repeat bone density and/or treatment for osteoporosis.     7.  RTC 3 months for pessary check.

## 2019-07-19 LAB
BACTERIA UR CULT: NORMAL
BACTERIA UR CULT: NORMAL

## 2019-07-21 PROBLEM — R35.1 NOCTURIA: Status: ACTIVE | Noted: 2019-07-21

## 2019-07-22 ENCOUNTER — TELEPHONE (OUTPATIENT)
Dept: UROGYNECOLOGY | Facility: CLINIC | Age: 84
End: 2019-07-22

## 2019-07-22 NOTE — TELEPHONE ENCOUNTER
Spoke with pt and relayed message that urine culture showed know obvious signs of infection, pt voiced understanding and call was ended.

## 2019-07-22 NOTE — TELEPHONE ENCOUNTER
----- Message from Samira Britt MD sent at 7/20/2019  8:14 PM CDT -----  Please let patient know urine c&s was negative for obvious infection. Thx!

## 2019-07-27 RX ORDER — BENAZEPRIL HYDROCHLORIDE 40 MG/1
TABLET ORAL
Qty: 90 TABLET | Refills: 1 | Status: SHIPPED | OUTPATIENT
Start: 2019-07-27 | End: 2020-01-24

## 2019-07-29 ENCOUNTER — OFFICE VISIT (OUTPATIENT)
Dept: PODIATRY | Facility: CLINIC | Age: 84
End: 2019-07-29
Payer: MEDICARE

## 2019-07-29 VITALS
RESPIRATION RATE: 18 BRPM | HEART RATE: 69 BPM | HEIGHT: 59 IN | BODY MASS INDEX: 27.01 KG/M2 | WEIGHT: 134 LBS | SYSTOLIC BLOOD PRESSURE: 156 MMHG | DIASTOLIC BLOOD PRESSURE: 82 MMHG

## 2019-07-29 DIAGNOSIS — B35.1 ONYCHOMYCOSIS DUE TO DERMATOPHYTE: Primary | ICD-10-CM

## 2019-07-29 PROCEDURE — 99999 PR PBB SHADOW E&M-EST. PATIENT-LVL III: ICD-10-PCS | Mod: PBBFAC,HCNC,, | Performed by: PODIATRIST

## 2019-07-29 PROCEDURE — 17999 PR NON-COVERED FOOT CARE: ICD-10-PCS | Mod: CSM,HCNC,S$GLB, | Performed by: PODIATRIST

## 2019-07-29 PROCEDURE — 99999 PR PBB SHADOW E&M-EST. PATIENT-LVL III: CPT | Mod: PBBFAC,HCNC,, | Performed by: PODIATRIST

## 2019-07-29 PROCEDURE — 17999 UNLISTD PX SKN MUC MEMB SUBQ: CPT | Mod: CSM,HCNC,S$GLB, | Performed by: PODIATRIST

## 2019-07-29 PROCEDURE — 99499 NO LOS: ICD-10-PCS | Mod: HCNC,,, | Performed by: PODIATRIST

## 2019-07-29 PROCEDURE — 99499 UNLISTED E&M SERVICE: CPT | Mod: HCNC,,, | Performed by: PODIATRIST

## 2019-07-29 NOTE — PROGRESS NOTES
Pt presents for routine non-covered foot care. Pt. does not have high risk feet. Pedal pulses are palpable. Nails are elongated, thickened Bilaterally. Diagnosis is onychauxis. Nails were reduced Bilaterally. Patient tolerated well and related relief. RTC p.r.n. as PROC B

## 2019-09-10 ENCOUNTER — OFFICE VISIT (OUTPATIENT)
Dept: OPTOMETRY | Facility: CLINIC | Age: 84
End: 2019-09-10
Payer: MEDICARE

## 2019-09-10 DIAGNOSIS — Z96.1 PSEUDOPHAKIA OF BOTH EYES: ICD-10-CM

## 2019-09-10 DIAGNOSIS — H52.203 ASTIGMATISM WITH PRESBYOPIA, BILATERAL: ICD-10-CM

## 2019-09-10 DIAGNOSIS — H52.4 ASTIGMATISM WITH PRESBYOPIA, BILATERAL: ICD-10-CM

## 2019-09-10 DIAGNOSIS — I10 ESSENTIAL HYPERTENSION: Primary | ICD-10-CM

## 2019-09-10 PROCEDURE — 99999 PR PBB SHADOW E&M-EST. PATIENT-LVL II: CPT | Mod: PBBFAC,HCNC,, | Performed by: OPTOMETRIST

## 2019-09-10 PROCEDURE — 99999 PR PBB SHADOW E&M-EST. PATIENT-LVL II: ICD-10-PCS | Mod: PBBFAC,HCNC,, | Performed by: OPTOMETRIST

## 2019-09-10 PROCEDURE — 92014 PR EYE EXAM, EST PATIENT,COMPREHESV: ICD-10-PCS | Mod: HCNC,S$GLB,, | Performed by: OPTOMETRIST

## 2019-09-10 PROCEDURE — 92014 COMPRE OPH EXAM EST PT 1/>: CPT | Mod: HCNC,S$GLB,, | Performed by: OPTOMETRIST

## 2019-09-10 NOTE — PROGRESS NOTES
HPI     Last eye exam was 4/28/17 with     Pt states that her eyes are doing good, she just is here for her yearly   exam.  Pt states that she has had cataract sx OU, and pt states does not use eye   drops.  Patient denies diplopia, headaches, flashes/floaters, and pain.          Last edited by Kellie Bolden on 9/10/2019 10:01 AM. (History)            Assessment /Plan     For exam results, see Encounter Report.    Essential hypertension    Pseudophakia of both eyes    Astigmatism with presbyopia, bilateral            1.  No retinopathy--monitor yearly.  BP control.  Eye health normal OU.  2.  Bifocal rx given

## 2019-09-16 ENCOUNTER — TELEPHONE (OUTPATIENT)
Dept: INTERNAL MEDICINE | Facility: CLINIC | Age: 84
End: 2019-09-16

## 2019-09-16 NOTE — TELEPHONE ENCOUNTER
----- Message from Louise Pelaez sent at 9/16/2019  2:23 PM CDT -----  Contact: self/[phone]  Patient called in regards needing to inform Dr Macias's that the Murelax is not working for her. That she can not do it not more. Please call and advise. Thank you!!!

## 2019-09-24 ENCOUNTER — TELEPHONE (OUTPATIENT)
Dept: PODIATRY | Facility: CLINIC | Age: 84
End: 2019-09-24

## 2019-09-24 NOTE — TELEPHONE ENCOUNTER
----- Message from Pamella Quintana sent at 9/24/2019  2:27 PM CDT -----  Contact: patient   Please call pt at [phone]    Patient would like to cancel her future nail procedure    Patient has a colonoscopy scheduled and drinking solution    Thank you

## 2019-10-01 ENCOUNTER — HOSPITAL ENCOUNTER (OUTPATIENT)
Facility: HOSPITAL | Age: 84
Discharge: HOME | End: 2019-10-01
Attending: INTERNAL MEDICINE | Admitting: INTERNAL MEDICINE
Payer: MEDICARE

## 2019-10-01 ENCOUNTER — ANESTHESIA EVENT (OUTPATIENT)
Dept: ENDOSCOPY | Facility: HOSPITAL | Age: 84
End: 2019-10-01
Payer: MEDICARE

## 2019-10-01 ENCOUNTER — ANESTHESIA (OUTPATIENT)
Dept: ENDOSCOPY | Facility: HOSPITAL | Age: 84
End: 2019-10-01
Payer: MEDICARE

## 2019-10-01 VITALS
SYSTOLIC BLOOD PRESSURE: 186 MMHG | HEART RATE: 78 BPM | DIASTOLIC BLOOD PRESSURE: 75 MMHG | HEIGHT: 59 IN | BODY MASS INDEX: 27.42 KG/M2 | OXYGEN SATURATION: 100 % | TEMPERATURE: 98 F | WEIGHT: 136 LBS | RESPIRATION RATE: 16 BRPM

## 2019-10-01 DIAGNOSIS — K63.5 COLON POLYPS: ICD-10-CM

## 2019-10-01 DIAGNOSIS — Z86.010 HISTORY OF COLONIC POLYPS: Primary | ICD-10-CM

## 2019-10-01 PROCEDURE — G0105 COLORECTAL SCRN; HI RISK IND: ICD-10-PCS | Mod: HCNC,,, | Performed by: INTERNAL MEDICINE

## 2019-10-01 PROCEDURE — G0105 COLORECTAL SCRN; HI RISK IND: HCPCS | Mod: HCNC,,, | Performed by: INTERNAL MEDICINE

## 2019-10-01 PROCEDURE — 37000009 HC ANESTHESIA EA ADD 15 MINS: Mod: HCNC | Performed by: INTERNAL MEDICINE

## 2019-10-01 PROCEDURE — 37000008 HC ANESTHESIA 1ST 15 MINUTES: Mod: HCNC | Performed by: INTERNAL MEDICINE

## 2019-10-01 PROCEDURE — E9220 PRA ENDO ANESTHESIA: ICD-10-PCS | Mod: HCNC,,, | Performed by: NURSE ANESTHETIST, CERTIFIED REGISTERED

## 2019-10-01 PROCEDURE — E9220 PRA ENDO ANESTHESIA: HCPCS | Mod: HCNC,,, | Performed by: NURSE ANESTHETIST, CERTIFIED REGISTERED

## 2019-10-01 PROCEDURE — 63600175 PHARM REV CODE 636 W HCPCS: Mod: HCNC | Performed by: INTERNAL MEDICINE

## 2019-10-01 PROCEDURE — G0105 COLORECTAL SCRN; HI RISK IND: HCPCS | Mod: HCNC | Performed by: INTERNAL MEDICINE

## 2019-10-01 PROCEDURE — 63600175 PHARM REV CODE 636 W HCPCS: Mod: HCNC | Performed by: NURSE ANESTHETIST, CERTIFIED REGISTERED

## 2019-10-01 RX ORDER — PROPOFOL 10 MG/ML
VIAL (ML) INTRAVENOUS
Status: DISCONTINUED | OUTPATIENT
Start: 2019-10-01 | End: 2019-10-01

## 2019-10-01 RX ORDER — SODIUM CHLORIDE 9 MG/ML
INJECTION, SOLUTION INTRAVENOUS CONTINUOUS
Status: DISCONTINUED | OUTPATIENT
Start: 2019-10-01 | End: 2019-10-01 | Stop reason: HOSPADM

## 2019-10-01 RX ORDER — PROPOFOL 10 MG/ML
VIAL (ML) INTRAVENOUS CONTINUOUS PRN
Status: DISCONTINUED | OUTPATIENT
Start: 2019-10-01 | End: 2019-10-01

## 2019-10-01 RX ORDER — LIDOCAINE HCL/PF 100 MG/5ML
SYRINGE (ML) INTRAVENOUS
Status: DISCONTINUED | OUTPATIENT
Start: 2019-10-01 | End: 2019-10-01

## 2019-10-01 RX ADMIN — PROPOFOL 100 MCG/KG/MIN: 10 INJECTION, EMULSION INTRAVENOUS at 01:10

## 2019-10-01 RX ADMIN — SODIUM CHLORIDE: 0.9 INJECTION, SOLUTION INTRAVENOUS at 12:10

## 2019-10-01 RX ADMIN — LIDOCAINE HYDROCHLORIDE 40 MG: 20 INJECTION, SOLUTION INTRAVENOUS at 01:10

## 2019-10-01 RX ADMIN — PROPOFOL 40 MG: 10 INJECTION, EMULSION INTRAVENOUS at 01:10

## 2019-10-01 NOTE — ANESTHESIA PREPROCEDURE EVALUATION
10/01/2019  Ernestine Luna is a 89 y.o., female.    Pre-op Assessment         Review of Systems

## 2019-10-01 NOTE — ANESTHESIA PREPROCEDURE EVALUATION
10/01/2019  Ernestine Luna is a 89 y.o., female.    Anesthesia Evaluation    I have reviewed the Patient Summary Reports.     I have reviewed the Medications.     Review of Systems  Anesthesia Hx:  No problems with previous Anesthesia  Denies Family Hx of Anesthesia complications.   Denies Personal Hx of Anesthesia complications.   Hematology/Oncology:  Hematology Normal   Oncology Normal     EENT/Dental:EENT/Dental Normal   Cardiovascular:   Exercise tolerance: good Hypertension    Pulmonary:  Pulmonary Normal    Renal/:  Renal/ Normal     Hepatic/GI:  Hepatic/GI Normal Bowel Prep.    Musculoskeletal:   Arthritis     Neurological:  Neurology Normal    Endocrine:  Endocrine Normal    Dermatological:  Skin Normal    Psych:  Psychiatric Normal           Physical Exam  General:  Well nourished    Airway/Jaw/Neck:  Airway Findings: Mouth Opening: Normal Tongue: Normal  General Airway Assessment: Adult  TM Distance: Normal, at least 6 cm       Chest/Lungs:  Chest/Lungs Clear    Heart/Vascular:  Heart Findings: Normal Heart murmur: negative            Anesthesia Plan  Type of Anesthesia, risks & benefits discussed:  Anesthesia Type:  general  Patient's Preference:   Intra-op Monitoring Plan: standard ASA monitors  Intra-op Monitoring Plan Comments:   Post Op Pain Control Plan:   Post Op Pain Control Plan Comments:   Induction:   IV  Beta Blocker:  Patient is on a Beta-Blocker and has received one dose within the past 24 hours (No further documentation required).       Informed Consent: Patient understands risks and agrees with Anesthesia plan.  Questions answered. Anesthesia consent signed with patient.  ASA Score: 3     Day of Surgery Review of History & Physical:    H&P update referred to the surgeon.         Ready For Surgery From Anesthesia Perspective.

## 2019-10-01 NOTE — TRANSFER OF CARE
"Anesthesia Transfer of Care Note    Patient: Ernestine Luna    Procedure(s) Performed: Procedure(s) (LRB):  COLONOSCOPY (N/A)    Patient location: PACU    Anesthesia Type: general    Transport from OR: Transported from OR on room air with adequate spontaneous ventilation    Post pain: adequate analgesia    Post assessment: no apparent anesthetic complications and tolerated procedure well    Post vital signs: stable    Level of consciousness: sedated    Nausea/Vomiting: no nausea/vomiting    Complications: none    Transfer of care protocol was followed      Last vitals:   Visit Vitals  BP (!) 183/88 (BP Location: Left arm, Patient Position: Sitting)   Pulse 79   Temp 36.6 °C (97.9 °F) (Temporal)   Resp 16   Ht 4' 11" (1.499 m)   Wt 61.7 kg (136 lb)   LMP  (LMP Unknown)   SpO2 100%   Breastfeeding? No   BMI 27.47 kg/m²     "

## 2019-10-01 NOTE — ANESTHESIA POSTPROCEDURE EVALUATION
Anesthesia Post Evaluation    Patient: Ernestine Luna    Procedure(s) Performed: Procedure(s) (LRB):  COLONOSCOPY (N/A)    Final Anesthesia Type: general  Patient location during evaluation: PACU  Patient participation: Yes- Able to Participate  Level of consciousness: awake and alert  Post-procedure vital signs: reviewed and stable  Pain management: adequate  Airway patency: patent  PONV status at discharge: No PONV  Anesthetic complications: no      Cardiovascular status: blood pressure returned to baseline  Respiratory status: unassisted  Hydration status: euvolemic  Follow-up not needed.          Vitals Value Taken Time   /75 10/1/2019  1:55 PM   Temp 36.5 °C (97.7 °F) 10/1/2019  1:20 PM   Pulse 78 10/1/2019  1:55 PM   Resp 16 10/1/2019  1:55 PM   SpO2 100 % 10/1/2019  1:55 PM         Event Time     Out of Recovery 13:57:22          Pain/Javed Score: Javed Score: 10 (10/1/2019  1:39 PM)

## 2019-10-01 NOTE — H&P
Short Stay Endoscopy History and Physical    PCP - Kev Macias MD    Procedure - Colonoscopy  ASA - per anesthesia  Mallampati - per anesthesia  History of Anesthesia problems - no  Family history Anesthesia problems - no   Plan of anesthesia - General    HPI:  This is a 89 y.o. female here for evaluation of : personal history of colon polyps. 2nd degree relatives with colon ca      ROS:  Constitutional: No fevers, chills, No weight loss  CV: No chest pain  Pulm: No cough, No shortness of breath  GI: see HPI  Derm: No rash    Medical History:  has a past medical history of Anemia, Breast cancer, stage 0, Cataract, DCIS (ductal carcinoma in situ) of breast (12/12/2013), Family history of breast cancer (5/24/2016), Genetic testing (2016), Hypertension, Osteoarthritis, and Urge urinary incontinence (4/18/2019).    Surgical History:  has a past surgical history that includes Bunionectomy; Total knee arthroplasty; Carpal tunnel release (Right); Cataract extraction (Left); Cataract extraction w/  intraocular lens implant (Right, 10/12/2015); Joint replacement; Colonoscopy w/ polypectomy (08/08/2013); RASHEED  (06/24/2014); Eye surgery; Hysterectomy (age 38); Breast lumpectomy (Right); and Breast biopsy (Right).    Family History: family history includes Asthma in her sister; Blindness in her other; Breast cancer in her cousin, other, other, and other; Breast cancer (age of onset: 37) in her daughter; Breast cancer (age of onset: 45) in her daughter; Breast cancer (age of onset: 46) in her daughter; Breast cancer (age of onset: 82) in her sister; Cancer in her cousin, cousin, daughter, daughter, daughter, and sister; Coronary artery disease in her father; Diabetes in her father, other, other, other, sister, sister, and sister; Heart attack in her brother and father; Heart disease in her father, sister, sister, and sister; Hyperlipidemia in her sister; No Known Problems in her son; Stroke in her mother.. Otherwise no  colon cancer, inflammatory bowel disease, or GI malignancies.    Social History:  reports that she has never smoked. She has never used smokeless tobacco. She reports that she drinks about 1.0 standard drinks of alcohol per week. She reports that she does not use drugs.    Review of patient's allergies indicates:   Allergen Reactions    Tramadol Shortness Of Breath, Diarrhea and Nausea And Vomiting    Acetaminophen Nausea And Vomiting    Aspirin Nausea And Vomiting    Sulfa (sulfonamide antibiotics) Nausea And Vomiting    Prednisone Other (See Comments)     Stomach problems        Medications:   Medications Prior to Admission   Medication Sig Dispense Refill Last Dose    acetic acid-oxyquinoline (FEM PH) 0.9-0.025 % Gel Place 1 applicator vaginally twice a week. 50 g 11 Past Week at Unknown time    amLODIPine (NORVASC) 10 MG tablet Take 1 tablet (10 mg total) by mouth once daily. 90 tablet 1 Past Week at Unknown time    atenolol (TENORMIN) 25 MG tablet TAKE 1 TABLET BY MOUTH ONCE DAILY 90 tablet 3 Past Week at Unknown time    benazepril (LOTENSIN) 40 MG tablet TAKE 1 TABLET BY MOUTH ONCE DAILY. 90 tablet 1 Past Week at Unknown time    calcium carbonate (OS-DHRUV) 600 mg calcium (1,500 mg) Tab Take 600 mg by mouth once.   Past Week at Unknown time    MULTIVITAMIN W-MINERALS/LUTEIN (CENTRUM SILVER ORAL) Take 1 tablet by mouth Daily.   Past Week at Unknown time    omega 3-calcium-vit D3-FA-mv13 [phone] mg Cmpk Take by mouth once daily.   Past Week at Unknown time    vitamin D 1000 units Tab Take 185 mg by mouth once daily.   Past Week at Unknown time    atenolol (TENORMIN) 25 MG tablet TAKE 1 TABLET BY MOUTH EVERY DAY 30 tablet 5 Taking    celecoxib (CELEBREX) 200 MG capsule Take 1 capsule (200 mg total) by mouth once daily. 30 capsule 0 Not Taking    ciclopirox (PENLAC) 8 % Soln Apply topically nightly. 6.6 mL 11 Not Taking    lidocaine HCL 2% (XYLOCAINE) 2 % jelly Apply topically as needed. For  toe pain, apply up to twice daily as needed for pain. 30 mL 3 Not Taking         Physical Exam:    Vital Signs:   Vitals:    10/01/19 1223   BP: (!) 183/88   Pulse: 79   Resp: 16   Temp: 97.9 °F (36.6 °C)       General Appearance: Well appearing in no acute distress  Eyes:    No scleral icterus  ENT: Neck supple, Lips, mucosa, and tongue normal; teeth and gums normal  Lungs: CTA bilaterally  Heart:  S1, S2 normal, no murmurs heard  Abdomen: Soft, non tender, non distended with positive bowel sounds. No hepatosplenomegaly, ascites, or mass.  Extremities: 2+ pulses, no clubbing, cyanosis or edema  Skin: No rash      Labs:  Lab Results   Component Value Date    WBC 6.20 04/29/2019    HGB 12.8 04/29/2019    HCT 39.4 04/29/2019     04/29/2019    CHOL 168 04/29/2019    TRIG 53 04/29/2019    HDL 64 04/29/2019    ALT 17 02/21/2019    AST 25 02/21/2019     04/29/2019    K 4.0 04/29/2019     04/29/2019    CREATININE 0.7 04/29/2019    BUN 12 04/29/2019    CO2 27 04/29/2019    TSH 0.731 04/29/2019       I have explained the risks and benefits of endoscopy procedures to the patient including but not limited to bleeding, perforation, infection, and death.  The patient was asked if they understand and allowed to ask any further questions to their satisfaction.    Jarad Chavira MD

## 2019-10-01 NOTE — [employer]OVATION PATIENT INSTRUCTIONS
Discharge Summary/Instructions after an Endoscopic Procedure  Patient Name: Ernestine Luna  Patient MRN: [id number]  Patient YOB: 1929 Tuesday, October 01, 2019  Jarad Chavira MD  RESTRICTIONS:  During your procedure today, you received medications for sedation.  These   medications may affect your judgment, balance and coordination.  Therefore,   for 24 hours, you have the following restrictions:   - DO NOT drive a car, operate machinery, make legal/financial decisions,   sign important papers or drink alcohol.    ACTIVITY:  Today: no heavy lifting, straining or running due to procedural   sedation/anesthesia.  The following day: return to full activity including work.  DIET:  Eat and drink normally unless instructed otherwise.     TREATMENT FOR COMMON SIDE EFFECTS:  - Mild abdominal pain, nausea, belching, bloating or excessive gas:  rest,   eat lightly and use a heating pad.  - Sore Throat: treat with throat lozenges and/or gargle with warm salt   water.  - Because air was used during the procedure, expelling large amounts of air   from your rectum or belching is normal.  - If a bowel prep was taken, you may not have a bowel movement for 1-3 days.    This is normal.  SYMPTOMS TO WATCH FOR AND REPORT TO YOUR PHYSICIAN:  1. Abdominal pain or bloating, other than gas cramps.  2. Chest pain.  3. Back pain.  4. Signs of infection such as: chills or fever occurring within 24 hours   after the procedure.  5. Rectal bleeding, which would show as bright red, maroon, or black stools.   (A tablespoon of blood from the rectum is not serious, especially if   hemorrhoids are present.)  6. Vomiting.  7. Weakness or dizziness.  GO DIRECTLY TO THE NEAREST EMERGENCY ROOM IF YOU HAVE ANY OF THE FOLLOWING:      Difficulty breathing              Chills and/or fever over 101 F   Persistent vomiting and/or vomiting blood   Severe abdominal pain   Severe chest pain   Black, tarry stools   Bleeding- more than one tablespoon   Any  other symptom or condition that you feel may need urgent attention  Your doctor recommends these additional instructions:  If any biopsies were taken, your doctors clinic will contact you in 1 to 2   weeks with any results.  - Patient has a contact number available for emergencies.  The signs and   symptoms of potential delayed complications were discussed with the   patient.  Return to normal activities tomorrow.  Written discharge   instructions were provided to the patient.   - Discharge patient to home.   - Repeat colonoscopy is not recommended due to current age (66 years or   older) for surveillance.   - The findings and recommendations were discussed with the designated   responsible adult.  For questions, problems or results please call your physician - Jarad Chavira MD at Work:  [phone].  [facility] [place], EMERGENCY ROOM PHONE NUMBER: [phone]  IF A COMPLICATION OR EMERGENCY SITUATION ARISES AND YOU ARE UNABLE TO REACH   YOUR PHYSICIAN - GO DIRECTLY TO THE EMERGENCY ROOM.  Jarad Chavira MD  10/1/2019 1:20:34 PM  This report has been verified and signed electronically.  [employer]

## 2019-10-08 ENCOUNTER — TELEPHONE (OUTPATIENT)
Dept: ENDOSCOPY | Facility: HOSPITAL | Age: 84
End: 2019-10-08

## 2019-10-17 ENCOUNTER — OFFICE VISIT (OUTPATIENT)
Dept: UROGYNECOLOGY | Facility: CLINIC | Age: 84
End: 2019-10-17
Payer: MEDICARE

## 2019-10-17 ENCOUNTER — HOSPITAL ENCOUNTER (OUTPATIENT)
Dept: RADIOLOGY | Facility: OTHER | Age: 84
Discharge: HOME | End: 2019-10-17
Attending: OBSTETRICS & GYNECOLOGY
Payer: MEDICARE

## 2019-10-17 VITALS
WEIGHT: 128.5 LBS | HEIGHT: 59 IN | BODY MASS INDEX: 25.91 KG/M2 | SYSTOLIC BLOOD PRESSURE: 140 MMHG | DIASTOLIC BLOOD PRESSURE: 82 MMHG

## 2019-10-17 DIAGNOSIS — N39.41 URGE URINARY INCONTINENCE: ICD-10-CM

## 2019-10-17 DIAGNOSIS — N99.3 VAGINAL VAULT PROLAPSE, POSTHYSTERECTOMY: ICD-10-CM

## 2019-10-17 DIAGNOSIS — N95.2 VAGINAL ATROPHY: ICD-10-CM

## 2019-10-17 DIAGNOSIS — N81.10 PROLAPSE OF ANTERIOR VAGINAL WALL: ICD-10-CM

## 2019-10-17 DIAGNOSIS — Z12.31 ENCOUNTER FOR SCREENING MAMMOGRAM FOR BREAST CANCER: ICD-10-CM

## 2019-10-17 DIAGNOSIS — R35.1 NOCTURIA: Primary | ICD-10-CM

## 2019-10-17 PROCEDURE — 77067 SCR MAMMO BI INCL CAD: CPT | Mod: 26,HCNC,, | Performed by: RADIOLOGY

## 2019-10-17 PROCEDURE — 1101F PR PT FALLS ASSESS DOC 0-1 FALLS W/OUT INJ PAST YR: ICD-10-PCS | Mod: HCNC,CPTII,S$GLB, | Performed by: OBSTETRICS & GYNECOLOGY

## 2019-10-17 PROCEDURE — 77063 BREAST TOMOSYNTHESIS BI: CPT | Mod: 26,HCNC,, | Performed by: RADIOLOGY

## 2019-10-17 PROCEDURE — 1101F PT FALLS ASSESS-DOCD LE1/YR: CPT | Mod: HCNC,CPTII,S$GLB, | Performed by: OBSTETRICS & GYNECOLOGY

## 2019-10-17 PROCEDURE — 99213 OFFICE O/P EST LOW 20 MIN: CPT | Mod: HCNC,S$GLB,, | Performed by: OBSTETRICS & GYNECOLOGY

## 2019-10-17 PROCEDURE — 99213 PR OFFICE/OUTPT VISIT, EST, LEVL III, 20-29 MIN: ICD-10-PCS | Mod: HCNC,S$GLB,, | Performed by: OBSTETRICS & GYNECOLOGY

## 2019-10-17 PROCEDURE — 77063 MAMMO DIGITAL SCREENING BILAT WITH TOMOSYNTHESIS_CAD: ICD-10-PCS | Mod: 26,HCNC,, | Performed by: RADIOLOGY

## 2019-10-17 PROCEDURE — 77067 SCR MAMMO BI INCL CAD: CPT | Mod: TC,HCNC

## 2019-10-17 PROCEDURE — 77067 MAMMO DIGITAL SCREENING BILAT WITH TOMOSYNTHESIS_CAD: ICD-10-PCS | Mod: 26,HCNC,, | Performed by: RADIOLOGY

## 2019-10-17 PROCEDURE — 99999 PR PBB SHADOW E&M-EST. PATIENT-LVL III: CPT | Mod: PBBFAC,HCNC,, | Performed by: OBSTETRICS & GYNECOLOGY

## 2019-10-17 PROCEDURE — 99999 PR PBB SHADOW E&M-EST. PATIENT-LVL III: ICD-10-PCS | Mod: PBBFAC,HCNC,, | Performed by: OBSTETRICS & GYNECOLOGY

## 2019-10-17 NOTE — PATIENT INSTRUCTIONS
1. Prolapse: Stage 2 apical prolapse, Stage 3 anterior and posterior vaginal wall prolapse   --Pessary # 3 ring with support  --Daily pelvic floor exercises as assigned, consider Pelvic floor PT in future  --Non surgical options discussed with patient      2. Urge urinary incontinence: not too bothersome   --Empty bladder every 3 hours. Empty well: wait a minute, lean forward on toilet.   --Avoid dietary irritants (see sheet). Keep diary x 3-5 days to determine your irritants.  --KEGELS: do 10 in AM and 10 in PM, holding each x 10 seconds. When you feel urge to go, STOP, KEGEL, and when urge has passed, then go to bathroom. Consider PT in future.   --URGE: Consider anticholinergic. Takes 2-4 weeks to see if will have effect. For dry mouth: get sour, sugar free lozenge or gum.   --STRESS: Pessary vs. Sling.      3. Incomplete bladder emptying:improved with pessary   --Double void and Crede maneuvers discussed  --Improved with pessary    4. Vaginal atrophy (dryness):  Use KY jelly, astroglide, or other water-based lube quarter size with your finger twice weekly    5. Hemorrhoid  --not inflammed at this time    6. Well-woman:  --Pap:denies history of abnormal pap smear-- post hyst; no further needed  --Mammo:09/28/2018; done today, results pending.   --Colonoscopy: 10/2019 Diverticulosis in the sigmoid colon.  No further recommended.   --Dexa:06/2016Osteoporosis of the femoral neck. Has not takenTx. Talk with Dr. Ugalde about need for repeat bone density and/or treatment for osteoporosis.     7.  RTC 3 months for pessary check.

## 2019-10-17 NOTE — PROGRESS NOTES
Urogyn follow up  10/29/2018  .  [facility]   [address] [address]    Ernestine Luna  [id number]  8/13/1929      Ernestine Luna is a 89 y.o. here for a urogyn follow up.    Last HPI from 10/29/2018  1)  UI:  (--) NELI   (--) UUI (--) pads Daytime frequency: Q 30 minutes- 3 hours.  Nocturia: Yes 3-4/night.   (--) dysuria,  (--) hematuria,  (--) frequent UTIs.  (+) complete bladder emptying.   2)  POP:  Absent.   Symptoms:(--)  .  (--) vaginal bleeding. (--) vaginal discharge. (--) sexually active.  (--) dyspareunia.   (+)  Vaginal dryness.  (--) vaginal estrogen use.   3)  BM:  (--) constipation/straining.  (--) chronic diarrhea. (--) hematochezia.  (--) fecal incontinence.  (--) fecal smearing/urgency.  (--) incomplete evacuation.    4)Pessary:  Denies pain, bleeding or dischage. Using #3 ring with support pessary..    Changes from last visit:  1)  UI:  (--) NELI   (--) UUI (--) pads Daytime frequency: Q 30 minutes (after BP meds)- 3 hours.  Nocturia: Yes 3-4/night.  Wakes with urge to urinate. Sounds like dog can keep her awake, too.  (--) dysuria,  (--) hematuria,  (--) frequent UTIs.  (+) complete bladder emptying.     2)  POP:  Absent.   Symptoms:(--).  (--) vaginal bleeding. (--) vaginal discharge. (--) sexually active.  (--) dyspareunia.   (+)  Vaginal dryness.  (--) vaginal estrogen use. Using replens 2x/week.  Difficult to squeeze replens applicator. Uses 2x/week.     3)  BM:  (--) constipation/straining.  (--) chronic diarrhea. (--) hematochezia.  (--) fecal incontinence.  (--) fecal smearing/urgency.  (--) incomplete evacuation.      4) Pessary:  Denies pain, bleeding.  Has some scant discharge. Using #3 ring with support pessary.    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 Wilkins    COLONOSCOPY N/A 10/1/2019    Procedure: COLONOSCOPY;  Surgeon: Jarad Chavira MD;  Location: [address]);  Service: Endoscopy;  Laterality: N/A;  miralax prep (used miralax for last colonoscopy) - [facility]    COLONOSCOPY W/ POLYPECTOMY  08/08/2013    RASHEED   06/24/2014    EYE SURGERY      HYSTERECTOMY  age 38    ALISTAIR; ovaries in place    JOINT REPLACEMENT      bilateral knees    TOTAL KNEE ARTHROPLASTY      Bilateral       Current Outpatient Medications   Medication Sig    acetic acid-oxyquinoline (FEM PH) 0.9-0.025 % Gel Place 1 applicator vaginally twice a week.    amLODIPine (NORVASC) 10 MG tablet Take 1 tablet (10 mg total) by mouth once daily.    atenolol (TENORMIN) 25 MG tablet TAKE 1 TABLET BY MOUTH ONCE DAILY    benazepril (LOTENSIN) 40 MG tablet TAKE 1 TABLET BY MOUTH ONCE DAILY.    calcium carbonate (OS-DHRUV) 600 mg calcium (1,500 mg) Tab Take 600 mg by mouth once.    ciclopirox (PENLAC) 8 % Soln Apply topically nightly.    FLUAD 2887-1161, 65 YR UP,,PF, 45 mcg (15 mcg x 3)/0.5 mL Syrg ADM 0.5ML IM UTD    lidocaine HCL 2% (XYLOCAINE) 2 % jelly Apply topically as needed. For toe pain, apply up to twice daily as needed for pain.    MULTIVITAMIN W-MINERALS/LUTEIN (CENTRUM SILVER ORAL) Take 1 tablet by mouth Daily.    omega 3-calcium-vit D3-FA-mv13 917-2767-648 mg Cmpk Take by mouth once daily.    vitamin D 1000 units Tab Take 185 mg by  "mouth once daily.     No current facility-administered medications for this visit.        ROS:  As per HPI.      Well Woman:  --Pap:denies history of abnormal pap smear-- post hyst; no further needed  --Mammo:10/17/19 normal  --Colonoscopy: 10/2019 Diverticulosis in the sigmoid colon.  No further recommended.   --Dexa:06/2016Osteoporosis of the femoral neck. Has not takenTx. Talk with Dr. Ugalde about need for repeat bone density and/or treatment for osteoporosis.   Recommendations:  1) Adequate calcium and Vitamin D therapy  2) Appropriate exercise  3) Consider medical therapy including bisphosphonates, denosumab.  4) Consider repeat BMD in 2 years.    Exam  BP (!) 140/82 (BP Location: Left arm, Patient Position: Sitting)   Ht 4' 11" (1.499 m)   Wt 58.3 kg (128 lb 8.5 oz)   LMP  (LMP Unknown)   BMI 25.96 kg/m²   General: alert and oriented, no acute distress  Respiratory: normal respiratory effort  Abd: soft, non-tender, non-distended    Pelvic  Ext. Genitalia: normal external genitalia. Normal bartholin's and skeens glands  Vagina: + atrophy. Normal vaginal mucosa without lesions. No discharge noted.   Non-tender bladder base without palpable mass.  #3 ring with support in place  Cervix: absent  Uterus:  absent   Urethra: no masses or tenderness  Urethral meatus: no lesions, caruncle or prolapse.  Rectum: nonthrombosed hemorrhoid noted at 12 o'clock position at anus    Impression  1. Nocturia     2. Prolapse of anterior vaginal wall     3. Urge urinary incontinence     4. Vaginal atrophy     5. Vaginal vault prolapse, posthysterectomy       We reviewed the above issues and discussed options for short-term versus long-term management of her problems.   Plan:   1. Prolapse: Stage 2 apical prolapse, Stage 3 anterior and posterior vaginal wall prolapse   --Pessary # 3 ring with support  --Daily pelvic floor exercises as assigned, consider Pelvic floor PT in future  --Non surgical options discussed with patient "      2. Urge urinary incontinence: not too bothersome   --Empty bladder every 3 hours. Empty well: wait a minute, lean forward on toilet.   --Avoid dietary irritants (see sheet). Keep diary x 3-5 days to determine your irritants.  --KEGELS: do 10 in AM and 10 in PM, holding each x 10 seconds. When you feel urge to go, STOP, KEGEL, and when urge has passed, then go to bathroom. Consider PT in future.   --URGE: Consider anticholinergic. Takes 2-4 weeks to see if will have effect. For dry mouth: get sour, sugar free lozenge or gum.   --STRESS: Pessary vs. Sling.      3. Incomplete bladder emptying:improved with pessary   --Double void and Crede maneuvers discussed  --Improved with pessary    4. Vaginal atrophy (dryness):  Use KY jelly, astroglide, or other water-based lube quarter size with your finger twice weekly    5. Hemorrhoid  --not inflammed at this time    6. Well-woman:  --Pap:denies history of abnormal pap smear-- post hyst; no further needed  --Mammo:09/28/2018; done today, results pending.   --Colonoscopy: 10/2019 Diverticulosis in the sigmoid colon.  No further recommended.   --Dexa:06/2016Osteoporosis of the femoral neck. Has not takenTx. Talk with Dr. Ugalde about need for repeat bone density and/or treatment for osteoporosis.     7.  RTC 3 months for pessary check.     30 min spent during visit, 75% in discussion.   Division of Female Pelvic Medicine and Reconstructive Surgery  Department of Obstetrics & Gynecology

## 2019-10-18 ENCOUNTER — TELEPHONE (OUTPATIENT)
Dept: UROGYNECOLOGY | Facility: CLINIC | Age: 84
End: 2019-10-18

## 2019-10-21 ENCOUNTER — PATIENT OUTREACH (OUTPATIENT)
Dept: ADMINISTRATIVE | Facility: HOSPITAL | Age: 84
End: 2019-10-21

## 2019-10-29 ENCOUNTER — PATIENT OUTREACH (OUTPATIENT)
Dept: ADMINISTRATIVE | Facility: HOSPITAL | Age: 84
End: 2019-10-29

## 2019-11-02 NOTE — PROGRESS NOTES
PAST MEDICAL HISTORY:  Hypertension.  Breast cancer of the right breast, status post lumpectomy and had been on tamoxifen.  Osteoarthritis of the knees and shoulder.  Hysterectomy with oophorectomy.  Carpal tunnel syndrome release.  Bilateral knee arthroscopic surgery.  Bunionectomy.     SOCIAL HISTORY:  Tobacco use and alcohol use, none.       MEDICATIONS:  Atenolol 25 mg daily.  Amlodipine 10 mg daily.  Benazepril 20 mg daily.  Vitamin D.  Omega-3.  Calcium.  Multivitamin      REASON FOR VISIT:  This is a 89-year-old female who comes in for regular routine   visit.  Overall in general, she feels well.  The only issue that might be   bothering at times is discomfort involving the left foot where she has a bunion.    She is meeting with a podiatrist.  She may consider surgery for it.    She continues to follow with Urogynecology.  She has a pessary.  Her urination   is frequent, but she is not having incontinence, maybe nocturia x1 to 2.    She had a colonoscopy on October 1st, this was normal other than diverticulosis.    There is no need for any further follow up on this.    PAST MEDICAL HISTORY AND MEDICATIONS:  Outlined above.  She is noted from a   previous bone density scan back in June 2016 of having osteoporosis involving   the femoral neck.    REVIEW OF SYMPTOMS:  No chest pain, palpitations, shortness of breath, or   abdominal pain.  Regular bowel function.    MEDICATIONS:  List per MedCard.    PHYSICAL EXAMINATION:  VITAL SIGNS:  Weight is 127 pounds, pulse 68, blood pressure 152/70.  LUNGS:  Clear.  HEART:  Regular rate and rhythm, 1/6 systolic murmur at the apex.  ABDOMEN:  Active bowel sounds, soft, nontender.  No hepatosplenomegaly or   abdominal masses.  PULSES:  2+ carotid pulses, no bruits.    IMPRESSION:  1. Hypertension.  2. Osteoporosis.  3. Hallux valgus bunion deformity of the feet.  4. Osteoarthritis.  5. History of breast cancer.    PLAN:  Discussed about pursuing a bone density test.  Today  while she is here, a   CBC and basic metabolic profile and vitamin D.  In the past, her lipid profile   has always been good.  She has had the flu vaccine and she is up-to-date on   pneumonia vaccine.        JAM/MALCOLM  dd: 11/04/2019 13:26:06 (CST)  td: 11/05/2019 00:27:26 (CST)  Doc ID   #[id number]  Job ID #[id number]    CC:

## 2019-11-04 ENCOUNTER — LAB VISIT (OUTPATIENT)
Dept: LAB | Facility: HOSPITAL | Age: 84
End: 2019-11-04
Attending: INTERNAL MEDICINE
Payer: MEDICARE

## 2019-11-04 ENCOUNTER — OFFICE VISIT (OUTPATIENT)
Dept: INTERNAL MEDICINE | Facility: CLINIC | Age: 84
End: 2019-11-04
Payer: MEDICARE

## 2019-11-04 VITALS
SYSTOLIC BLOOD PRESSURE: 120 MMHG | HEIGHT: 59 IN | OXYGEN SATURATION: 99 % | BODY MASS INDEX: 25.6 KG/M2 | HEART RATE: 68 BPM | WEIGHT: 127 LBS | DIASTOLIC BLOOD PRESSURE: 68 MMHG

## 2019-11-04 DIAGNOSIS — M81.0 OSTEOPOROSIS, UNSPECIFIED OSTEOPOROSIS TYPE, UNSPECIFIED PATHOLOGICAL FRACTURE PRESENCE: ICD-10-CM

## 2019-11-04 DIAGNOSIS — M19.90 OSTEOARTHRITIS, UNSPECIFIED OSTEOARTHRITIS TYPE, UNSPECIFIED SITE: ICD-10-CM

## 2019-11-04 DIAGNOSIS — Z85.3 HISTORY OF BREAST CANCER: ICD-10-CM

## 2019-11-04 DIAGNOSIS — I10 ESSENTIAL HYPERTENSION: Primary | ICD-10-CM

## 2019-11-04 DIAGNOSIS — I10 ESSENTIAL HYPERTENSION: ICD-10-CM

## 2019-11-04 LAB
25(OH)D3+25(OH)D2 SERPL-MCNC: 84 NG/ML (ref 30–96)
ANION GAP SERPL CALC-SCNC: 7 MMOL/L (ref 8–16)
BASOPHILS # BLD AUTO: 0.02 K/UL (ref 0–0.2)
BASOPHILS NFR BLD: 0.3 % (ref 0–1.9)
BUN SERPL-MCNC: 17 MG/DL (ref 8–23)
CALCIUM SERPL-MCNC: 9.3 MG/DL (ref 8.7–10.5)
CHLORIDE SERPL-SCNC: 105 MMOL/L (ref 95–110)
CO2 SERPL-SCNC: 29 MMOL/L (ref 23–29)
CREAT SERPL-MCNC: 0.7 MG/DL (ref 0.5–1.4)
DIFFERENTIAL METHOD: ABNORMAL
EOSINOPHIL # BLD AUTO: 0.2 K/UL (ref 0–0.5)
EOSINOPHIL NFR BLD: 3.9 % (ref 0–8)
ERYTHROCYTE [DISTWIDTH] IN BLOOD BY AUTOMATED COUNT: 12.9 % (ref 11.5–14.5)
EST. GFR  (AFRICAN AMERICAN): >60 ML/MIN/1.73 M^2
EST. GFR  (NON AFRICAN AMERICAN): >60 ML/MIN/1.73 M^2
GLUCOSE SERPL-MCNC: 89 MG/DL (ref 70–110)
HCT VFR BLD AUTO: 36.9 % (ref 37–48.5)
HGB BLD-MCNC: 12.3 G/DL (ref 12–16)
IMM GRANULOCYTES # BLD AUTO: 0.01 K/UL (ref 0–0.04)
IMM GRANULOCYTES NFR BLD AUTO: 0.2 % (ref 0–0.5)
LYMPHOCYTES # BLD AUTO: 2 K/UL (ref 1–4.8)
LYMPHOCYTES NFR BLD: 32.1 % (ref 18–48)
MCH RBC QN AUTO: 31.1 PG (ref 27–31)
MCHC RBC AUTO-ENTMCNC: 33.3 G/DL (ref 32–36)
MCV RBC AUTO: 93 FL (ref 82–98)
MONOCYTES # BLD AUTO: 0.6 K/UL (ref 0.3–1)
MONOCYTES NFR BLD: 9.2 % (ref 4–15)
NEUTROPHILS # BLD AUTO: 3.4 K/UL (ref 1.8–7.7)
NEUTROPHILS NFR BLD: 54.3 % (ref 38–73)
NRBC BLD-RTO: 0 /100 WBC
PLATELET # BLD AUTO: 183 K/UL (ref 150–350)
PMV BLD AUTO: 10.6 FL (ref 9.2–12.9)
POTASSIUM SERPL-SCNC: 4.1 MMOL/L (ref 3.5–5.1)
RBC # BLD AUTO: 3.96 M/UL (ref 4–5.4)
SODIUM SERPL-SCNC: 141 MMOL/L (ref 136–145)
WBC # BLD AUTO: 6.19 K/UL (ref 3.9–12.7)

## 2019-11-04 PROCEDURE — 99214 PR OFFICE/OUTPT VISIT, EST, LEVL IV, 30-39 MIN: ICD-10-PCS | Mod: HCNC,S$GLB,, | Performed by: INTERNAL MEDICINE

## 2019-11-04 PROCEDURE — 82306 VITAMIN D 25 HYDROXY: CPT | Mod: HCNC

## 2019-11-04 PROCEDURE — 99999 PR PBB SHADOW E&M-EST. PATIENT-LVL III: CPT | Mod: PBBFAC,HCNC,, | Performed by: INTERNAL MEDICINE

## 2019-11-04 PROCEDURE — 1101F PT FALLS ASSESS-DOCD LE1/YR: CPT | Mod: HCNC,CPTII,S$GLB, | Performed by: INTERNAL MEDICINE

## 2019-11-04 PROCEDURE — 99214 OFFICE O/P EST MOD 30 MIN: CPT | Mod: HCNC,S$GLB,, | Performed by: INTERNAL MEDICINE

## 2019-11-04 PROCEDURE — 36415 COLL VENOUS BLD VENIPUNCTURE: CPT | Mod: HCNC

## 2019-11-04 PROCEDURE — 80048 BASIC METABOLIC PNL TOTAL CA: CPT | Mod: HCNC

## 2019-11-04 PROCEDURE — 85025 COMPLETE CBC W/AUTO DIFF WBC: CPT | Mod: HCNC

## 2019-11-04 PROCEDURE — 99999 PR PBB SHADOW E&M-EST. PATIENT-LVL III: ICD-10-PCS | Mod: PBBFAC,HCNC,, | Performed by: INTERNAL MEDICINE

## 2019-11-04 PROCEDURE — 1101F PR PT FALLS ASSESS DOC 0-1 FALLS W/OUT INJ PAST YR: ICD-10-PCS | Mod: HCNC,CPTII,S$GLB, | Performed by: INTERNAL MEDICINE

## 2019-11-08 ENCOUNTER — HOSPITAL ENCOUNTER (OUTPATIENT)
Dept: RADIOLOGY | Facility: OTHER | Age: 84
Discharge: HOME | End: 2019-11-08
Attending: INTERNAL MEDICINE
Payer: MEDICARE

## 2019-11-08 DIAGNOSIS — M81.0 OSTEOPOROSIS, UNSPECIFIED OSTEOPOROSIS TYPE, UNSPECIFIED PATHOLOGICAL FRACTURE PRESENCE: ICD-10-CM

## 2019-11-08 PROCEDURE — 77080 DXA BONE DENSITY AXIAL: CPT | Mod: 26,HCNC,, | Performed by: RADIOLOGY

## 2019-11-08 PROCEDURE — 77080 DXA BONE DENSITY AXIAL: CPT | Mod: TC,HCNC

## 2019-11-08 PROCEDURE — 77080 DEXA BONE DENSITY SPINE HIP: ICD-10-PCS | Mod: 26,HCNC,, | Performed by: RADIOLOGY

## 2019-11-21 ENCOUNTER — TELEPHONE (OUTPATIENT)
Dept: PODIATRY | Facility: CLINIC | Age: 84
End: 2019-11-21

## 2019-11-21 NOTE — TELEPHONE ENCOUNTER
----- Message from Santhosh Engle sent at 11/21/2019  1:26 PM CST -----  Contact: Self   Pt would like a call back.     [phone]

## 2019-12-21 DIAGNOSIS — Z00.00 ANNUAL PHYSICAL EXAM: Primary | ICD-10-CM

## 2019-12-21 DIAGNOSIS — Z85.3 HISTORY OF BREAST CANCER: ICD-10-CM

## 2019-12-21 DIAGNOSIS — I10 ESSENTIAL HYPERTENSION: ICD-10-CM

## 2019-12-21 DIAGNOSIS — M19.90 OSTEOARTHRITIS, UNSPECIFIED OSTEOARTHRITIS TYPE, UNSPECIFIED SITE: ICD-10-CM

## 2019-12-21 RX ORDER — AMLODIPINE BESYLATE 10 MG/1
TABLET ORAL
Qty: 90 TABLET | Refills: 1 | Status: SHIPPED | OUTPATIENT
Start: 2019-12-21 | End: 2020-04-25

## 2019-12-23 ENCOUNTER — TELEPHONE (OUTPATIENT)
Dept: INTERNAL MEDICINE | Facility: CLINIC | Age: 84
End: 2019-12-23

## 2019-12-23 NOTE — TELEPHONE ENCOUNTER
MD Lindsay Montano MA   Caller: Unspecified (2 days ago,  9:51 AM)             Physical in 6 months with prelab orders.      Patient place on 6 month hold list.

## 2020-01-03 ENCOUNTER — TELEPHONE (OUTPATIENT)
Dept: UROGYNECOLOGY | Facility: CLINIC | Age: 85
End: 2020-01-03

## 2020-01-03 NOTE — TELEPHONE ENCOUNTER
----- Message from Alessandra Zurita sent at 1/3/2020 12:10 PM CST -----  Contact: Pt    Name of Who is Calling:PAKO LAMB [[id number]]      What is the request in detail: Patient would like a call back regarding a sooner appointment first available 03/2020..... Please contact to further discuss and advise       Can the clinic reply by [facility]: no      What [facility]: [phone]

## 2020-01-07 ENCOUNTER — TELEPHONE (OUTPATIENT)
Dept: UROGYNECOLOGY | Facility: CLINIC | Age: 85
End: 2020-01-07

## 2020-01-07 NOTE — TELEPHONE ENCOUNTER
Returned pt call no answer, Left voice message for pt to give the office a call back at [phone].

## 2020-01-07 NOTE — TELEPHONE ENCOUNTER
----- Message from Hailee Aj sent at 1/7/2020  9:56 AM CST -----  Contact: PAKO LAMB [[id number]]  Name of Who is Calling: PAKO LAMB [[id number]]    What is the request in detail: Would like to speak with staff to reschedule 1/10 appointment. Patient declined 2/20 appointment and states she wants something sooner. Please contact to further discuss and advise      Can the clinic reply by [facility]: no     What Number to [facility] Back [facility]: [phone]

## 2020-01-20 ENCOUNTER — TELEPHONE (OUTPATIENT)
Dept: INTERNAL MEDICINE | Facility: CLINIC | Age: 85
End: 2020-01-20

## 2020-01-20 RX ORDER — OSELTAMIVIR PHOSPHATE 75 MG/1
75 CAPSULE ORAL 2 TIMES DAILY
Qty: 10 CAPSULE | Refills: 0 | Status: SHIPPED | OUTPATIENT
Start: 2020-01-20 | End: 2020-01-25

## 2020-01-20 NOTE — TELEPHONE ENCOUNTER
----- Message from Selene Alvarez sent at 1/20/2020  3:29 PM CST -----  Contact: [phone]  Patient would like to get medical advice.  Symptoms (please be specific): fever, body aches    How long has patient had these symptoms:  2 days   Pharmacy name and phone # (copy/paste from chart):  [id number] [address] [place] [facility] [facility]   [phone] (Phone)  [phone] (Fax)    Would the patient rather a call back or a response via [facility]?:call back     Comments:  Please advise, thanks .

## 2020-01-20 NOTE — TELEPHONE ENCOUNTER
Pt want you to give her a call she decline appt she is experiencing body aches for 3 days she feel feverish , she has no cough no congestion , no chills. She said it feel like somebody beating on her it has been for 3 days she want to speak with you she had the flu shot she don't have fever she just feel feverish , she was in [employer] her [employer] member coughed and hugged

## 2020-01-24 RX ORDER — BENAZEPRIL HYDROCHLORIDE 40 MG/1
TABLET ORAL
Qty: 90 TABLET | Refills: 1 | Status: SHIPPED | OUTPATIENT
Start: 2020-01-24 | End: 2020-08-06 | Stop reason: SDUPTHER

## 2020-01-30 ENCOUNTER — OFFICE VISIT (OUTPATIENT)
Dept: UROGYNECOLOGY | Facility: CLINIC | Age: 85
End: 2020-01-30
Payer: MEDICARE

## 2020-01-30 VITALS
DIASTOLIC BLOOD PRESSURE: 82 MMHG | SYSTOLIC BLOOD PRESSURE: 186 MMHG | HEIGHT: 59 IN | WEIGHT: 129.44 LBS | BODY MASS INDEX: 26.09 KG/M2

## 2020-01-30 DIAGNOSIS — R35.1 NOCTURIA: ICD-10-CM

## 2020-01-30 DIAGNOSIS — N99.3 VAGINAL VAULT PROLAPSE, POSTHYSTERECTOMY: ICD-10-CM

## 2020-01-30 DIAGNOSIS — N81.10 PROLAPSE OF ANTERIOR VAGINAL WALL: ICD-10-CM

## 2020-01-30 DIAGNOSIS — N95.2 VAGINAL ATROPHY: ICD-10-CM

## 2020-01-30 DIAGNOSIS — N39.41 URINARY INCONTINENCE, URGE: Primary | ICD-10-CM

## 2020-01-30 DIAGNOSIS — N39.41 URGE URINARY INCONTINENCE: ICD-10-CM

## 2020-01-30 PROCEDURE — 99213 PR OFFICE/OUTPT VISIT, EST, LEVL III, 20-29 MIN: ICD-10-PCS | Mod: HCNC,S$GLB,, | Performed by: OBSTETRICS & GYNECOLOGY

## 2020-01-30 PROCEDURE — 1101F PT FALLS ASSESS-DOCD LE1/YR: CPT | Mod: HCNC,CPTII,S$GLB, | Performed by: OBSTETRICS & GYNECOLOGY

## 2020-01-30 PROCEDURE — 99999 PR PBB SHADOW E&M-EST. PATIENT-LVL III: CPT | Mod: PBBFAC,HCNC,, | Performed by: OBSTETRICS & GYNECOLOGY

## 2020-01-30 PROCEDURE — 99213 OFFICE O/P EST LOW 20 MIN: CPT | Mod: HCNC,S$GLB,, | Performed by: OBSTETRICS & GYNECOLOGY

## 2020-01-30 PROCEDURE — 87086 URINE CULTURE/COLONY COUNT: CPT | Mod: HCNC

## 2020-01-30 PROCEDURE — 1159F PR MEDICATION LIST DOCUMENTED IN MEDICAL RECORD: ICD-10-PCS | Mod: HCNC,S$GLB,, | Performed by: OBSTETRICS & GYNECOLOGY

## 2020-01-30 PROCEDURE — 1159F MED LIST DOCD IN RCRD: CPT | Mod: HCNC,S$GLB,, | Performed by: OBSTETRICS & GYNECOLOGY

## 2020-01-30 PROCEDURE — 99999 PR PBB SHADOW E&M-EST. PATIENT-LVL III: ICD-10-PCS | Mod: PBBFAC,HCNC,, | Performed by: OBSTETRICS & GYNECOLOGY

## 2020-01-30 PROCEDURE — 1126F AMNT PAIN NOTED NONE PRSNT: CPT | Mod: HCNC,S$GLB,, | Performed by: OBSTETRICS & GYNECOLOGY

## 2020-01-30 PROCEDURE — 1126F PR PAIN SEVERITY QUANTIFIED, NO PAIN PRESENT: ICD-10-PCS | Mod: HCNC,S$GLB,, | Performed by: OBSTETRICS & GYNECOLOGY

## 2020-01-30 PROCEDURE — 1101F PR PT FALLS ASSESS DOC 0-1 FALLS W/OUT INJ PAST YR: ICD-10-PCS | Mod: HCNC,CPTII,S$GLB, | Performed by: OBSTETRICS & GYNECOLOGY

## 2020-01-30 NOTE — PROGRESS NOTES
Urogyn follow up  10/29/2018  .  [facility] 4   [address] [address]    Ernestine Luna  [id number]  8/13/1929      Ernestine Luna is a 89 y.o. here for a urogyn follow up.    Last HPI from 10/29/2018  1)  UI:  (--) NELI   (--) UUI (--) pads Daytime frequency: Q 30 minutes- 3 hours.  Nocturia: Yes 3-4/night.   (--) dysuria,  (--) hematuria,  (--) frequent UTIs.  (+) complete bladder emptying.   2)  POP:  Absent.   Symptoms:(--)  .  (--) vaginal bleeding. (--) vaginal discharge. (--) sexually active.  (--) dyspareunia.   (+)  Vaginal dryness.  (--) vaginal estrogen use.   3)  BM:  (--) constipation/straining.  (--) chronic diarrhea. (--) hematochezia.  (--) fecal incontinence.  (--) fecal smearing/urgency.  (--) incomplete evacuation.    4)Pessary:  Denies pain, bleeding or dischage. Using #3 ring with support pessary..    Changes from last visit:  1)  UI:  (--) NELI   (--) UUI (--) pads Daytime frequency: Q 30 minutes (after BP meds)- 3 hours.  Nocturia: Yes 3-4/night.  Wakes with urge to urinate. Sounds like dog can keep her awake, too.  (--) dysuria,  (--) hematuria,  (--) frequent UTIs.  (+) complete bladder emptying.     2)  POP:  Absent.   Symptoms:(--).  (--) vaginal bleeding. (--) vaginal discharge. (--) sexually active.  (--) dyspareunia.   (+)  Vaginal dryness.  (--) vaginal estrogen use. Using replens 2x/week.  Difficult to squeeze replens applicator. Uses 2x/week.     3)  BM:  (--) constipation/straining.  (--) chronic diarrhea. (--) hematochezia.  (--) fecal incontinence.  (--) fecal smearing/urgency.  (--) incomplete evacuation.      4) Pessary:  Denies pain, bleeding.  Has some scant discharge. Using #3 ring with support pessary.    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 Wilkins    COLONOSCOPY N/A 10/1/2019    Procedure: COLONOSCOPY;  Surgeon: Jarad Chavira MD;  Location: [address]);  Service: Endoscopy;  Laterality: N/A;  miralax prep (used miralax for last colonoscopy) - ERW    COLONOSCOPY W/ POLYPECTOMY  08/08/2013    RASHEED   06/24/2014    EYE SURGERY      HYSTERECTOMY  age 38    ALISTAIR; ovaries in place    JOINT REPLACEMENT      bilateral knees    TOTAL KNEE ARTHROPLASTY      Bilateral       Current Outpatient Medications   Medication Sig    acetic acid-oxyquinoline (FEM PH) 0.9-0.025 % Gel Place 1 applicator vaginally twice a week.    amLODIPine (NORVASC) 10 MG tablet TAKE 1 TABLET(10 MG) BY MOUTH EVERY DAY    atenolol (TENORMIN) 25 MG tablet TAKE 1 TABLET BY MOUTH ONCE DAILY    benazepril (LOTENSIN) 40 MG tablet TAKE 1 TABLET BY MOUTH ONCE DAILY.    calcium carbonate (OS-DHRUV) 600 mg calcium (1,500 mg) Tab Take 600 mg by mouth once.    ciclopirox (PENLAC) 8 % Soln Apply topically nightly.    FLUAD 8932-8585, 65 YR UP,,PF, 45 mcg (15 mcg x 3)/0.5 mL Syrg ADM 0.5ML IM UTD    lidocaine HCL 2% (XYLOCAINE) 2 % jelly Apply topically as needed. For toe pain, apply up to twice daily as needed for pain.    MULTIVITAMIN W-MINERALS/LUTEIN (CENTRUM SILVER ORAL) Take 1 tablet by mouth Daily.    omega 3-calcium-vit D3-FA-mv13 [id number] mg Cmpk Take by mouth once daily.    vitamin D 1000 units Tab Take 185 mg by mouth  "once daily.     No current facility-administered medications for this visit.        ROS:  As per HPI.      Well Woman:  --Pap:denies history of abnormal pap smear-- post hyst; no further needed  --Mammo:10/2019, normal  --Colonoscopy: 10/2019 Diverticulosis in the sigmoid colon.  No further recommended.   --Dexa:06/2016Osteoporosis of the femoral neck. Has not takenTx. Talk with Dr. Ugalde about need for repeat bone density and/or treatment for osteoporosis.     Exam  BP (!) 186/82 (BP Location: Left arm, Patient Position: Sitting, BP Method: Medium (Automatic))   Ht 4' 11" (1.499 m)   Wt 58.7 kg (129 lb 6.6 oz)   LMP  (LMP Unknown)   BMI 26.14 kg/m²   General: alert and oriented, no acute distress  Respiratory: normal respiratory effort  Abd: soft, non-tender, non-distended    Pelvic  Ext. Genitalia: normal external genitalia. Normal bartholin's and skenes glands  Vagina: + atrophy. Normal vaginal mucosa without lesions. No discharge noted.   Non-tender bladder base without palpable mass.  #3 ring with support in place--removed and cleaned.   Cervix: absent  Uterus:  absent   Urethra: no masses or tenderness  Urethral meatus: no lesions, caruncle or prolapse.  Rectum: nonthrombosed hemorrhoid noted at 12 o'clock position at anus.  Internal deferred.     Pessary replaced without issue.     Impression  1. Urinary incontinence, urge  Ambulatory consult to Physical Therapy    Urine culture   2. Nocturia  Ambulatory consult to Physical Therapy   3. Prolapse of anterior vaginal wall     4. Urge urinary incontinence     5. Vaginal atrophy     6. Vaginal vault prolapse, posthysterectomy       We reviewed the above issues and discussed options for short-term versus long-term management of her problems.   Plan:   1. Prolapse: Stage 2 apical prolapse, Stage 3 anterior and posterior vaginal wall prolapse   --Pessary # 3 ring with support  --Daily pelvic floor exercises as assigned, consider Pelvic floor PT in " future  --Non surgical options discussed with patient      2. Urge urinary incontinence:  --urine C&S  --Empty bladder every 3 hours. Empty well: wait a minute, lean forward on toilet.   --Avoid dietary irritants (see sheet). Keep diary x 3-5 days to determine your irritants.  --start pelvic floor PT.  Call in a few days to make appt:  SHREYA Fontenot (Donna OhManpreet or Melisa Alejo): (p) [phone].  Or [phone].   --URGE: Consider mirabegron in the future.  Takes 2-4 weeks to see if will have effect. For dry mouth: get sour, sugar free lozenge or gum.   --STRESS: Pessary vs. Sling.      3. Incomplete bladder emptying:improved with pessary   --Double void and Crede maneuvers discussed  --Improved with pessary    4. Vaginal atrophy (dryness):  Use KY jelly, astroglide, or other water-based lube quarter size with your finger twice weekly    5. Hemorrhoid  --not inflammed at this time    6. Nocturia (nighttime urination): stop fluids 2 hours before bed. No water by the bed. If have leg swelling:  Elevate feet above chest x 1 hour before bed to get excess fluid off.  Can also use support hose (knee highs).    --start pelvic floor PT  --urine C&S    7.  Well-woman:  --Pap:denies history of abnormal pap smear-- post hyst; no further needed  --Mammo:10/2019, normal  --Colonoscopy: 10/2019 Diverticulosis in the sigmoid colon.  No further recommended.   --Dexa:06/2016Osteoporosis of the femoral neck. Has not takenTx. Talk with Dr. Ugalde about need for repeat bone density and/or treatment for osteoporosis.     8.  RTC 3 months for pessary check.     30 min spent during visit, 75% in discussion.   Division of Female Pelvic Medicine and Reconstructive Surgery  Department of Obstetrics & Gynecology

## 2020-01-30 NOTE — PATIENT INSTRUCTIONS
1. Prolapse: Stage 2 apical prolapse, Stage 3 anterior and posterior vaginal wall prolapse   --Pessary # 3 ring with support  --Daily pelvic floor exercises as assigned, consider Pelvic floor PT in future  --Non surgical options discussed with patient      2. Urge urinary incontinence:  --urine C&S  --Empty bladder every 3 hours. Empty well: wait a minute, lean forward on toilet.   --Avoid dietary irritants (see sheet). Keep diary x 3-5 days to determine your irritants.  --start pelvic floor PT.  Call in a few days to make appt:  SHREYA Fontenot (Donna [place] or Melisa Alejo): (p) [phone].  Or [phone].   --URGE: Consider mirabegron in the future.  Takes 2-4 weeks to see if will have effect. For dry mouth: get sour, sugar free lozenge or gum.   --STRESS: Pessary vs. Sling.      3. Incomplete bladder emptying:improved with pessary   --Double void and Crede maneuvers discussed  --Improved with pessary    4. Vaginal atrophy (dryness):  Use KY jelly, astroglide, or other water-based lube quarter size with your finger twice weekly    5. Hemorrhoid  --not inflammed at this time    6. Nocturia (nighttime urination): stop fluids 2 hours before bed. No water by the bed. If have leg swelling:  Elevate feet above chest x 1 hour before bed to get excess fluid off.  Can also use support hose (knee highs).    --start pelvic floor PT  --urine C&S    7.  Well-woman:  --Pap:denies history of abnormal pap smear-- post hyst; no further needed  --Mammo:10/2019, normal  --Colonoscopy: 10/2019 Diverticulosis in the sigmoid colon.  No further recommended.   --Dexa:06/2016Osteoporosis of the femoral neck. Has not takenTx. Talk with Dr. Ugalde about need for repeat bone density and/or treatment for osteoporosis.     8.  RTC 3 months for pessary check.

## 2020-02-02 LAB
BACTERIA UR CULT: NORMAL
BACTERIA UR CULT: NORMAL

## 2020-02-04 ENCOUNTER — OFFICE VISIT (OUTPATIENT)
Dept: PODIATRY | Facility: CLINIC | Age: 85
End: 2020-02-04
Payer: MEDICARE

## 2020-02-04 VITALS
HEIGHT: 59 IN | BODY MASS INDEX: 26 KG/M2 | DIASTOLIC BLOOD PRESSURE: 80 MMHG | WEIGHT: 129 LBS | RESPIRATION RATE: 18 BRPM | HEART RATE: 67 BPM | SYSTOLIC BLOOD PRESSURE: 158 MMHG

## 2020-02-04 DIAGNOSIS — B35.1 ONYCHOMYCOSIS DUE TO DERMATOPHYTE: Primary | ICD-10-CM

## 2020-02-04 PROCEDURE — 99499 UNLISTED E&M SERVICE: CPT | Mod: HCNC,,, | Performed by: PODIATRIST

## 2020-02-04 PROCEDURE — 17999 PR NON-COVERED FOOT CARE: ICD-10-PCS | Mod: CSM,HCNC,S$GLB, | Performed by: PODIATRIST

## 2020-02-04 PROCEDURE — 99999 PR PBB SHADOW E&M-EST. PATIENT-LVL III: CPT | Mod: PBBFAC,HCNC,, | Performed by: PODIATRIST

## 2020-02-04 PROCEDURE — 17999 UNLISTD PX SKN MUC MEMB SUBQ: CPT | Mod: CSM,HCNC,S$GLB, | Performed by: PODIATRIST

## 2020-02-04 PROCEDURE — 99499 NO LOS: ICD-10-PCS | Mod: HCNC,,, | Performed by: PODIATRIST

## 2020-02-04 PROCEDURE — 99999 PR PBB SHADOW E&M-EST. PATIENT-LVL III: ICD-10-PCS | Mod: PBBFAC,HCNC,, | Performed by: PODIATRIST

## 2020-02-06 ENCOUNTER — PES CALL (OUTPATIENT)
Dept: ADMINISTRATIVE | Facility: CLINIC | Age: 85
End: 2020-02-06

## 2020-04-08 ENCOUNTER — TELEPHONE (OUTPATIENT)
Dept: INTERNAL MEDICINE | Facility: CLINIC | Age: 85
End: 2020-04-08

## 2020-04-08 ENCOUNTER — TELEPHONE (OUTPATIENT)
Dept: PODIATRY | Facility: CLINIC | Age: 85
End: 2020-04-08

## 2020-04-08 DIAGNOSIS — M19.90 OSTEOARTHRITIS, UNSPECIFIED OSTEOARTHRITIS TYPE, UNSPECIFIED SITE: Primary | ICD-10-CM

## 2020-04-08 DIAGNOSIS — M81.0 OSTEOPOROSIS, UNSPECIFIED OSTEOPOROSIS TYPE, UNSPECIFIED PATHOLOGICAL FRACTURE PRESENCE: ICD-10-CM

## 2020-04-08 NOTE — TELEPHONE ENCOUNTER
Pt states she lost her can she is requesting a new cane order to be signed please sign pending order

## 2020-04-08 NOTE — TELEPHONE ENCOUNTER
Spoke with patient she is wanting to move her Proc B appt up.  Explained to her that we were cancelling elective procedures and appointments until after covid mandates lifted

## 2020-04-08 NOTE — TELEPHONE ENCOUNTER
----- Message from Candice Burris sent at 4/8/2020  2:00 PM CDT -----  Contact: Pt self Home [phone] or Mobile [phone]  Patient is calling in regards to her saying that she lost her cane and she would like to put in an order for another one with the round handle please. Patient would like for you to give her a call please.

## 2020-04-08 NOTE — TELEPHONE ENCOUNTER
----- Message from Katarina Thorpe sent at 4/8/2020  2:18 PM CDT -----  Contact: self  Pt is asking for a call back toe is giving her problems. Asking for a call back.      Contact info [phone]

## 2020-04-25 RX ORDER — AMLODIPINE BESYLATE 10 MG/1
TABLET ORAL
Qty: 90 TABLET | Refills: 1 | Status: SHIPPED | OUTPATIENT
Start: 2020-04-25 | End: 2020-10-16

## 2020-04-29 ENCOUNTER — LAB VISIT (OUTPATIENT)
Dept: LAB | Facility: HOSPITAL | Age: 85
End: 2020-04-29
Attending: INTERNAL MEDICINE
Payer: MEDICARE

## 2020-04-29 ENCOUNTER — OFFICE VISIT (OUTPATIENT)
Dept: INTERNAL MEDICINE | Facility: CLINIC | Age: 85
End: 2020-04-29
Payer: MEDICARE

## 2020-04-29 VITALS
BODY MASS INDEX: 26.22 KG/M2 | SYSTOLIC BLOOD PRESSURE: 130 MMHG | HEIGHT: 59 IN | WEIGHT: 130.06 LBS | OXYGEN SATURATION: 97 % | HEART RATE: 64 BPM | DIASTOLIC BLOOD PRESSURE: 64 MMHG

## 2020-04-29 DIAGNOSIS — M19.90 OSTEOARTHRITIS, UNSPECIFIED OSTEOARTHRITIS TYPE, UNSPECIFIED SITE: ICD-10-CM

## 2020-04-29 DIAGNOSIS — Z00.00 ANNUAL PHYSICAL EXAM: Primary | ICD-10-CM

## 2020-04-29 DIAGNOSIS — I10 ESSENTIAL HYPERTENSION: ICD-10-CM

## 2020-04-29 DIAGNOSIS — Z85.3 HISTORY OF BREAST CANCER: ICD-10-CM

## 2020-04-29 DIAGNOSIS — M81.0 OSTEOPOROSIS, UNSPECIFIED OSTEOPOROSIS TYPE, UNSPECIFIED PATHOLOGICAL FRACTURE PRESENCE: ICD-10-CM

## 2020-04-29 DIAGNOSIS — Z00.00 ANNUAL PHYSICAL EXAM: ICD-10-CM

## 2020-04-29 LAB
ALBUMIN SERPL BCP-MCNC: 3.9 G/DL (ref 3.5–5.2)
ALP SERPL-CCNC: 64 U/L (ref 55–135)
ALT SERPL W/O P-5'-P-CCNC: 15 U/L (ref 10–44)
ANION GAP SERPL CALC-SCNC: 8 MMOL/L (ref 8–16)
AST SERPL-CCNC: 24 U/L (ref 10–40)
BASOPHILS # BLD AUTO: 0.03 K/UL (ref 0–0.2)
BASOPHILS NFR BLD: 0.4 % (ref 0–1.9)
BILIRUB SERPL-MCNC: 0.4 MG/DL (ref 0.1–1)
BUN SERPL-MCNC: 19 MG/DL (ref 8–23)
CALCIUM SERPL-MCNC: 10 MG/DL (ref 8.7–10.5)
CHLORIDE SERPL-SCNC: 105 MMOL/L (ref 95–110)
CHOLEST SERPL-MCNC: 161 MG/DL (ref 120–199)
CHOLEST/HDLC SERPL: 2.6 {RATIO} (ref 2–5)
CO2 SERPL-SCNC: 29 MMOL/L (ref 23–29)
CREAT SERPL-MCNC: 0.7 MG/DL (ref 0.5–1.4)
DIFFERENTIAL METHOD: ABNORMAL
EOSINOPHIL # BLD AUTO: 0.2 K/UL (ref 0–0.5)
EOSINOPHIL NFR BLD: 2.7 % (ref 0–8)
ERYTHROCYTE [DISTWIDTH] IN BLOOD BY AUTOMATED COUNT: 13 % (ref 11.5–14.5)
EST. GFR  (AFRICAN AMERICAN): >60 ML/MIN/1.73 M^2
EST. GFR  (NON AFRICAN AMERICAN): >60 ML/MIN/1.73 M^2
GLUCOSE SERPL-MCNC: 93 MG/DL (ref 70–110)
HCT VFR BLD AUTO: 40.7 % (ref 37–48.5)
HDLC SERPL-MCNC: 62 MG/DL (ref 40–75)
HDLC SERPL: 38.5 % (ref 20–50)
HGB BLD-MCNC: 12.7 G/DL (ref 12–16)
IMM GRANULOCYTES # BLD AUTO: 0.02 K/UL (ref 0–0.04)
IMM GRANULOCYTES NFR BLD AUTO: 0.3 % (ref 0–0.5)
LDLC SERPL CALC-MCNC: 87.6 MG/DL (ref 63–159)
LYMPHOCYTES # BLD AUTO: 2 K/UL (ref 1–4.8)
LYMPHOCYTES NFR BLD: 27.6 % (ref 18–48)
MCH RBC QN AUTO: 30.2 PG (ref 27–31)
MCHC RBC AUTO-ENTMCNC: 31.2 G/DL (ref 32–36)
MCV RBC AUTO: 97 FL (ref 82–98)
MONOCYTES # BLD AUTO: 0.5 K/UL (ref 0.3–1)
MONOCYTES NFR BLD: 6.9 % (ref 4–15)
NEUTROPHILS # BLD AUTO: 4.4 K/UL (ref 1.8–7.7)
NEUTROPHILS NFR BLD: 62.1 % (ref 38–73)
NONHDLC SERPL-MCNC: 99 MG/DL
NRBC BLD-RTO: 0 /100 WBC
PLATELET # BLD AUTO: 171 K/UL (ref 150–350)
PMV BLD AUTO: 10.7 FL (ref 9.2–12.9)
POTASSIUM SERPL-SCNC: 4.3 MMOL/L (ref 3.5–5.1)
PROT SERPL-MCNC: 7.6 G/DL (ref 6–8.4)
RBC # BLD AUTO: 4.21 M/UL (ref 4–5.4)
SODIUM SERPL-SCNC: 142 MMOL/L (ref 136–145)
TRIGL SERPL-MCNC: 57 MG/DL (ref 30–150)
TSH SERPL DL<=0.005 MIU/L-ACNC: 0.66 UIU/ML (ref 0.4–4)
WBC # BLD AUTO: 7.07 K/UL (ref 3.9–12.7)

## 2020-04-29 PROCEDURE — 85025 COMPLETE CBC W/AUTO DIFF WBC: CPT | Mod: HCNC

## 2020-04-29 PROCEDURE — 99397 PER PM REEVAL EST PAT 65+ YR: CPT | Mod: HCNC,S$GLB,, | Performed by: INTERNAL MEDICINE

## 2020-04-29 PROCEDURE — 99397 PR PREVENTIVE VISIT,EST,65 & OVER: ICD-10-PCS | Mod: HCNC,S$GLB,, | Performed by: INTERNAL MEDICINE

## 2020-04-29 PROCEDURE — 36415 COLL VENOUS BLD VENIPUNCTURE: CPT | Mod: HCNC

## 2020-04-29 PROCEDURE — 84443 ASSAY THYROID STIM HORMONE: CPT | Mod: HCNC

## 2020-04-29 PROCEDURE — 99999 PR PBB SHADOW E&M-EST. PATIENT-LVL III: CPT | Mod: PBBFAC,HCNC,, | Performed by: INTERNAL MEDICINE

## 2020-04-29 PROCEDURE — 99999 PR PBB SHADOW E&M-EST. PATIENT-LVL III: ICD-10-PCS | Mod: PBBFAC,HCNC,, | Performed by: INTERNAL MEDICINE

## 2020-04-29 PROCEDURE — 80053 COMPREHEN METABOLIC PANEL: CPT | Mod: HCNC

## 2020-04-29 PROCEDURE — 99499 UNLISTED E&M SERVICE: CPT | Mod: HCNC,,, | Performed by: INTERNAL MEDICINE

## 2020-04-29 PROCEDURE — 99499 UNLISTED E&M SERVICE: CPT | Mod: HCNC,S$GLB,, | Performed by: INTERNAL MEDICINE

## 2020-04-29 PROCEDURE — 80061 LIPID PANEL: CPT | Mod: HCNC

## 2020-04-29 PROCEDURE — 99499 RISK ADDL DX/OHS AUDIT: ICD-10-PCS | Mod: HCNC,S$GLB,, | Performed by: INTERNAL MEDICINE

## 2020-04-29 PROCEDURE — 99499 RISK ADDL DX/OHS AUDIT: ICD-10-PCS | Mod: HCNC,,, | Performed by: INTERNAL MEDICINE

## 2020-04-29 RX ORDER — ALENDRONATE SODIUM 70 MG/1
70 TABLET ORAL
Qty: 4 TABLET | Refills: 0 | Status: SHIPPED | OUTPATIENT
Start: 2020-04-29 | End: 2020-04-29

## 2020-04-29 NOTE — PROGRESS NOTES
PAST MEDICAL HISTORY:  Hypertension.  Breast cancer of the right breast, status post lumpectomy and had been on tamoxifen.  Osteoarthritis of the knees and shoulder.  Hysterectomy with oophorectomy.  Carpal tunnel syndrome release.  Bilateral knee arthroscopic surgery.  Bunionectomy.     SOCIAL HISTORY:  Tobacco use and alcohol use, none.  , has one living daughter, two daughters  from breast cancer.  She has a son with hypertension and hyperlipidemia.     FAMILY HISTORY:  Father is , hypertension.  Mother is , stroke.  Sister  of diabetes and heart disease.  Another sister  of breast cancer, heart disease, diabetes.  One sister alive, has diabetes and heart disease.     SCREENING:  Colonoscopy in 2013 revealed an adenomatous polyp.  Colonoscopy 2019 normal other than diverticulosis     MEDICATIONS:  Atenolol 25 mg daily.  Amlodipine 10 mg daily.  Benazepril 20 mg daily.  Vitamin D.  Omega-3.  Calcium.  Multivitamin      89-year-old female  Annual visit  Overall in general feels well  The only recent issue is that her little puppy dog of 17 years recently passed and has cause sadness    She will be following up with Urogynecology  Has a pessary  The main urine complain is nocturia x3    Has bilateral feet pain and will be having appointment with podiatry      Review of symptoms  No chest pain, shortness of breath, abdominal pain, heartburn indigestion, headaches, or any other arthralgia  Bowel function is regular    Examination  Weight 130 lb  Pulse 66  Blood pressure 140/62   HEENT exam normal normal findings  Neck no thyromegaly no masses  Lungs clear breath sounds  Heart regular rate and rhythm no murmurs  Abdominal exam nontender, soft no hepatosplenomegaly abdominal masses  Pulses 2+ carotid pulses 2+ pedal pulses  Extremities no edema  Feet hallux valgus deformity  Musculoskeletal surgical scars both knees    In chart review  Bone density scan October  2019 did reveal osteoporosis lumbar spine and have    Impression  General exam  Hypertension  History of breast cancer  Osteoarthritis  Osteoporosis    Plan  Routine labs  Vitamin-D supplementation  To discuss use of Fosamax once a week

## 2020-05-05 ENCOUNTER — TELEPHONE (OUTPATIENT)
Dept: INTERNAL MEDICINE | Facility: CLINIC | Age: 85
End: 2020-05-05

## 2020-05-05 DIAGNOSIS — M19.90 OSTEOARTHRITIS, UNSPECIFIED OSTEOARTHRITIS TYPE, UNSPECIFIED SITE: Primary | ICD-10-CM

## 2020-05-05 NOTE — TELEPHONE ENCOUNTER
----- Message from Marcela Lema sent at 5/5/2020  1:58 PM CDT -----  Contact: Self   Pt states that new cane w/ 4 feet keeps tripping her and would like to get one like her one cane. Please advise.

## 2020-05-05 NOTE — TELEPHONE ENCOUNTER
Pt states that she left her cane in the basket at the grocery. She is requesting a walking cane with 1 leg.

## 2020-05-14 ENCOUNTER — PES CALL (OUTPATIENT)
Dept: ADMINISTRATIVE | Facility: CLINIC | Age: 85
End: 2020-05-14

## 2020-05-19 ENCOUNTER — OFFICE VISIT (OUTPATIENT)
Dept: PODIATRY | Facility: CLINIC | Age: 85
End: 2020-05-19
Payer: MEDICARE

## 2020-05-19 VITALS
HEIGHT: 59 IN | DIASTOLIC BLOOD PRESSURE: 77 MMHG | WEIGHT: 130.06 LBS | HEART RATE: 66 BPM | BODY MASS INDEX: 26.22 KG/M2 | SYSTOLIC BLOOD PRESSURE: 140 MMHG

## 2020-05-19 DIAGNOSIS — B35.1 ONYCHOMYCOSIS DUE TO DERMATOPHYTE: Primary | ICD-10-CM

## 2020-05-19 PROCEDURE — 99499 NO LOS: ICD-10-PCS | Mod: HCNC,,, | Performed by: PODIATRIST

## 2020-05-19 PROCEDURE — 99499 UNLISTED E&M SERVICE: CPT | Mod: HCNC,,, | Performed by: PODIATRIST

## 2020-05-19 PROCEDURE — 99999 PR PBB SHADOW E&M-EST. PATIENT-LVL III: CPT | Mod: PBBFAC,HCNC,, | Performed by: PODIATRIST

## 2020-05-19 PROCEDURE — 17999 PR NON-COVERED FOOT CARE: ICD-10-PCS | Mod: CSM,HCNC,S$GLB, | Performed by: PODIATRIST

## 2020-05-19 PROCEDURE — 99999 PR PBB SHADOW E&M-EST. PATIENT-LVL III: ICD-10-PCS | Mod: PBBFAC,HCNC,, | Performed by: PODIATRIST

## 2020-05-19 PROCEDURE — 17999 UNLISTD PX SKN MUC MEMB SUBQ: CPT | Mod: CSM,HCNC,S$GLB, | Performed by: PODIATRIST

## 2020-06-10 ENCOUNTER — OFFICE VISIT (OUTPATIENT)
Dept: UROGYNECOLOGY | Facility: CLINIC | Age: 85
End: 2020-06-10
Payer: MEDICARE

## 2020-06-10 VITALS
SYSTOLIC BLOOD PRESSURE: 124 MMHG | HEIGHT: 59 IN | WEIGHT: 129.88 LBS | DIASTOLIC BLOOD PRESSURE: 70 MMHG | BODY MASS INDEX: 26.18 KG/M2

## 2020-06-10 DIAGNOSIS — R35.1 NOCTURIA: Primary | ICD-10-CM

## 2020-06-10 DIAGNOSIS — N39.41 URINARY INCONTINENCE, URGE: ICD-10-CM

## 2020-06-10 DIAGNOSIS — N99.3 VAGINAL VAULT PROLAPSE, POSTHYSTERECTOMY: ICD-10-CM

## 2020-06-10 DIAGNOSIS — N95.2 VAGINAL ATROPHY: ICD-10-CM

## 2020-06-10 DIAGNOSIS — N81.10 PROLAPSE OF ANTERIOR VAGINAL WALL: ICD-10-CM

## 2020-06-10 DIAGNOSIS — Z85.3 HISTORY OF BREAST CANCER: ICD-10-CM

## 2020-06-10 PROCEDURE — 99213 PR OFFICE/OUTPT VISIT, EST, LEVL III, 20-29 MIN: ICD-10-PCS | Mod: HCNC,S$GLB,, | Performed by: OBSTETRICS & GYNECOLOGY

## 2020-06-10 PROCEDURE — 99999 PR PBB SHADOW E&M-EST. PATIENT-LVL III: CPT | Mod: PBBFAC,HCNC,, | Performed by: OBSTETRICS & GYNECOLOGY

## 2020-06-10 PROCEDURE — 1159F MED LIST DOCD IN RCRD: CPT | Mod: HCNC,S$GLB,, | Performed by: OBSTETRICS & GYNECOLOGY

## 2020-06-10 PROCEDURE — 99999 PR PBB SHADOW E&M-EST. PATIENT-LVL III: ICD-10-PCS | Mod: PBBFAC,HCNC,, | Performed by: OBSTETRICS & GYNECOLOGY

## 2020-06-10 PROCEDURE — 1126F PR PAIN SEVERITY QUANTIFIED, NO PAIN PRESENT: ICD-10-PCS | Mod: HCNC,S$GLB,, | Performed by: OBSTETRICS & GYNECOLOGY

## 2020-06-10 PROCEDURE — 1126F AMNT PAIN NOTED NONE PRSNT: CPT | Mod: HCNC,S$GLB,, | Performed by: OBSTETRICS & GYNECOLOGY

## 2020-06-10 PROCEDURE — 1101F PR PT FALLS ASSESS DOC 0-1 FALLS W/OUT INJ PAST YR: ICD-10-PCS | Mod: HCNC,CPTII,S$GLB, | Performed by: OBSTETRICS & GYNECOLOGY

## 2020-06-10 PROCEDURE — 1101F PT FALLS ASSESS-DOCD LE1/YR: CPT | Mod: HCNC,CPTII,S$GLB, | Performed by: OBSTETRICS & GYNECOLOGY

## 2020-06-10 PROCEDURE — 1159F PR MEDICATION LIST DOCUMENTED IN MEDICAL RECORD: ICD-10-PCS | Mod: HCNC,S$GLB,, | Performed by: OBSTETRICS & GYNECOLOGY

## 2020-06-10 PROCEDURE — 99213 OFFICE O/P EST LOW 20 MIN: CPT | Mod: HCNC,S$GLB,, | Performed by: OBSTETRICS & GYNECOLOGY

## 2020-06-10 NOTE — PATIENT INSTRUCTIONS
1. Prolapse: Stage 2 apical prolapse, Stage 3 anterior and posterior vaginal wall prolapse   --Pessary # 3 ring with support  --Daily pelvic floor exercises as assigned, consider Pelvic floor PT in future  --Non surgical options discussed with patient      2. Urge urinary incontinence:  --urine C&S  --Empty bladder every 3 hours. Empty well: wait a minute, lean forward on toilet.   --Avoid dietary irritants (see sheet). Keep diary x 3-5 days to determine your irritants.  --consider PT in future  --URGE: Consider mirabegron in the future.  Takes 2-4 weeks to see if will have effect. For dry mouth: get sour, sugar free lozenge or gum.   --STRESS: Pessary vs. Sling.      3. Incomplete bladder emptying:improved with pessary   --Double void and Crede maneuvers discussed  --Improved with pessary    4. Vaginal atrophy (dryness):  Use KY jelly, astroglide, or other water-based lube quarter size with your finger twice weekly    5. Hemorrhoid  --not inflammed at this time    6. Nocturia (nighttime urination): stop fluids 2 hours before bed. No water by the bed. If have leg swelling:  Elevate feet above chest x 1 hour before bed to get excess fluid off.  Can also use support hose (knee highs).    --start pelvic floor PT  --urine C&S    7.  Well-woman:  --Pap:denies history of abnormal pap smear-- post hyst; no further needed  --Mammo:10/2019, normal  --Colonoscopy: 10/2019 Diverticulosis in the sigmoid colon.  No further recommended.   --Dexa:06/2016Osteoporosis of the femoral neck. Has not takenTx. Talk with Dr. Ugalde about need for repeat bone density and/or treatment for osteoporosis.     8.  RTC 3 months for pessary check.

## 2020-06-10 NOTE — PROGRESS NOTES
Urogyn follow up  10/29/2018  .  [facility] [id number]   [address] [address]    Ernestine Luna  [id number]  8/13/1929      Ernestine Luna is a 89 y.o. here for a urogyn pessary check.  Last visit 1/2020.      Last HPI from 10/29/2018  1)  UI:  (--) NELI   (--) UUI (--) pads Daytime frequency: Q 30 minutes- 3 hours.  Nocturia: Yes 3-4/night.   (--) dysuria,  (--) hematuria,  (--) frequent UTIs.  (+) complete bladder emptying.   2)  POP:  Absent.   Symptoms:(--)  .  (--) vaginal bleeding. (--) vaginal discharge. (--) sexually active.  (--) dyspareunia.   (+)  Vaginal dryness.  (--) vaginal estrogen use.   3)  BM:  (--) constipation/straining.  (--) chronic diarrhea. (--) hematochezia.  (--) fecal incontinence.  (--) fecal smearing/urgency.  (--) incomplete evacuation.    4)Pessary:  Denies pain, bleeding or dischage. Using #3 ring with support pessary..    Changes from last visit:  No /GYN changes.  Sad because her long-term dog passed away in March.  Is stable, has good support system, no SI/HI.     1)  UI:  (--) NELI   (--) UUI (--) pads Daytime frequency: Q 30 minutes (after BP meds)- 3 hours.  Nocturia: Yes 3-4/night.  Wakes with urge to urinate. Sounds like dog can keep her awake, too.  (--) dysuria,  (--) hematuria,  (--) frequent UTIs.  (+) complete bladder emptying.     2)  POP:  Absent.   Symptoms:(--).  (--) vaginal bleeding. (--) vaginal discharge. (--) sexually active.  (--) dyspareunia.   (+)  Vaginal dryness.  (--) vaginal estrogen use. Using replens 2x/week.  Difficult to squeeze replens applicator. Uses 2x/week.     3)  BM:  (--) constipation/straining.  (--) chronic diarrhea. (--) hematochezia.  (--) fecal incontinence.  (--) fecal smearing/urgency.  (--) incomplete evacuation.      4) Pessary:  Denies pain, bleeding.  Has some scant discharge. Using #3 ring with support pessary.    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 Wilkins    COLONOSCOPY N/A 10/1/2019    Procedure: COLONOSCOPY;  Surgeon: Jarad Chavira MD;  Location: [facility] ([address] [place] [address]);  Service: Endoscopy;  Laterality: N/A;  miralax prep (used miralax for last colonoscopy) - ERW    COLONOSCOPY W/ POLYPECTOMY  08/08/2013    RASHEED   06/24/2014    EYE SURGERY      HYSTERECTOMY  age 38    ALISTAIR; ovaries in place    JOINT REPLACEMENT      bilateral knees    TOTAL KNEE ARTHROPLASTY      Bilateral       Current Outpatient Medications   Medication Sig    acetic acid-oxyquinoline (FEM PH) 0.9-0.025 % Gel Place 1 applicator vaginally twice a week.    alendronate (FOSAMAX) 70 MG tablet TAKE 1 TABLET BY MOUTH EVERY 7 DAYS    amLODIPine (NORVASC) 10 MG tablet TAKE 1 TABLET BY MOUTH DAILY    atenoloL (TENORMIN) 25 MG tablet Take 1 tablet (25 mg total) by mouth once daily.    benazepril (LOTENSIN) 40 MG tablet TAKE 1 TABLET BY MOUTH ONCE DAILY.    calcium carbonate (OS-DHRUV) 600 mg calcium (1,500 mg) Tab Take 600 mg by mouth once.    ciclopirox (PENLAC) 8 % Soln Apply topically nightly.    lidocaine HCL 2% (XYLOCAINE) 2 % jelly Apply topically as needed. For toe pain, apply up to twice daily as needed for pain.    MULTIVITAMIN W-MINERALS/LUTEIN (CENTRUM SILVER ORAL) Take 1 tablet by mouth Daily.    omega 3-calcium-vit D3-FA-mv13  "[id number] mg Cmpk Take by mouth once daily.    vitamin D 1000 units Tab Take 185 mg by mouth once daily.    FLUAD 3692-7512, 65 YR UP,,PF, 45 mcg (15 mcg x 3)/0.5 mL Syrg ADM 0.5ML IM UTD     No current facility-administered medications for this visit.        ROS:  As per HPI.      Well Woman:  --Pap:denies history of abnormal pap smear-- post hyst; no further needed  --Mammo:10/2019, normal  --Colonoscopy: 10/2019 Diverticulosis in the sigmoid colon.  No further recommended.   --Dexa:06/2016Osteoporosis of the femoral neck. Has not takenTx. Talk with Dr. Ugalde about need for repeat bone density and/or treatment for osteoporosis.     Exam  /70 (BP Location: Left arm, Patient Position: Sitting, BP Method: Medium (Manual))   Ht 4' 11" (1.499 m)   Wt 58.9 kg (129 lb 13.6 oz)   LMP  (LMP Unknown)   BMI 26.23 kg/m²   General: alert and oriented, no acute distress  Respiratory: normal respiratory effort  Abd: soft, non-tender, non-distended    Pelvic  Ext. Genitalia: normal external genitalia. Normal bartholin's and skenes glands  Vagina: + atrophy. Normal vaginal mucosa without lesions. No discharge noted.   Non-tender bladder base without palpable mass.  #3 ring with support in place--removed and cleaned.   Cervix: absent  Uterus:  absent   Urethra: no masses or tenderness  Urethral meatus: no lesions, caruncle or prolapse.  Rectum: nonthrombosed hemorrhoid noted at 12 o'clock position at anus.  Internal deferred.     Pessary replaced without issue.     Impression  1. Nocturia     2. Prolapse of anterior vaginal wall     3. Urinary incontinence, urge     4. Vaginal atrophy     5. Vaginal vault prolapse, posthysterectomy     6. History of breast cancer       We reviewed the above issues and discussed options for short-term versus long-term management of her problems.   Plan:   1. Prolapse: Stage 2 apical prolapse, Stage 3 anterior and posterior vaginal wall prolapse   --Pessary # 3 ring with " support  --Daily pelvic floor exercises as assigned, consider Pelvic floor PT in future  --Non surgical options discussed with patient      2. Urge urinary incontinence:  --urine C&S  --Empty bladder every 3 hours. Empty well: wait a minute, lean forward on toilet.   --Avoid dietary irritants (see sheet). Keep diary x 3-5 days to determine your irritants.  --consider PT in future  --URGE: Consider mirabegron in the future.  Takes 2-4 weeks to see if will have effect. For dry mouth: get sour, sugar free lozenge or gum.   --STRESS: Pessary vs. Sling.      3. Incomplete bladder emptying:improved with pessary   --Double void and Crede maneuvers discussed  --Improved with pessary    4. Vaginal atrophy (dryness):  Use KY jelly, astroglide, or other water-based lube quarter size with your finger twice weekly    5. Hemorrhoid  --not inflammed at this time    6. Nocturia (nighttime urination): stop fluids 2 hours before bed. No water by the bed. If have leg swelling:  Elevate feet above chest x 1 hour before bed to get excess fluid off.  Can also use support hose (knee highs).    --start pelvic floor PT  --urine C&S    7.  Well-woman:  --Pap:denies history of abnormal pap smear-- post hyst; no further needed  --Mammo:10/2019, normal  --Colonoscopy: 10/2019 Diverticulosis in the sigmoid colon.  No further recommended.   --Dexa:06/2016Osteoporosis of the femoral neck. Has not takenTx. Talk with Dr. Ugalde about need for repeat bone density and/or treatment for osteoporosis.     8.  RTC 3 months for pessary check.     30 min spent during visit, 75% in discussion.   Division of Female Pelvic Medicine and Reconstructive Surgery  Department of Obstetrics & Gynecology

## 2020-07-02 ENCOUNTER — OFFICE VISIT (OUTPATIENT)
Dept: RHEUMATOLOGY | Facility: CLINIC | Age: 85
End: 2020-07-02
Payer: MEDICARE

## 2020-07-02 VITALS
DIASTOLIC BLOOD PRESSURE: 78 MMHG | WEIGHT: 133.63 LBS | TEMPERATURE: 97 F | BODY MASS INDEX: 26.94 KG/M2 | HEIGHT: 59 IN | SYSTOLIC BLOOD PRESSURE: 150 MMHG | HEART RATE: 66 BPM

## 2020-07-02 DIAGNOSIS — M15.9 PRIMARY OSTEOARTHRITIS INVOLVING MULTIPLE JOINTS: ICD-10-CM

## 2020-07-02 DIAGNOSIS — M47.816 SPONDYLOSIS OF LUMBAR REGION WITHOUT MYELOPATHY OR RADICULOPATHY: Primary | ICD-10-CM

## 2020-07-02 PROCEDURE — 99999 PR PBB SHADOW E&M-EST. PATIENT-LVL IV: ICD-10-PCS | Mod: PBBFAC,HCNC,, | Performed by: INTERNAL MEDICINE

## 2020-07-02 PROCEDURE — 1159F MED LIST DOCD IN RCRD: CPT | Mod: HCNC,S$GLB,, | Performed by: INTERNAL MEDICINE

## 2020-07-02 PROCEDURE — 1125F AMNT PAIN NOTED PAIN PRSNT: CPT | Mod: HCNC,S$GLB,, | Performed by: INTERNAL MEDICINE

## 2020-07-02 PROCEDURE — 1159F PR MEDICATION LIST DOCUMENTED IN MEDICAL RECORD: ICD-10-PCS | Mod: HCNC,S$GLB,, | Performed by: INTERNAL MEDICINE

## 2020-07-02 PROCEDURE — 1101F PT FALLS ASSESS-DOCD LE1/YR: CPT | Mod: HCNC,CPTII,S$GLB, | Performed by: INTERNAL MEDICINE

## 2020-07-02 PROCEDURE — 1101F PR PT FALLS ASSESS DOC 0-1 FALLS W/OUT INJ PAST YR: ICD-10-PCS | Mod: HCNC,CPTII,S$GLB, | Performed by: INTERNAL MEDICINE

## 2020-07-02 PROCEDURE — 99214 PR OFFICE/OUTPT VISIT, EST, LEVL IV, 30-39 MIN: ICD-10-PCS | Mod: HCNC,S$GLB,, | Performed by: INTERNAL MEDICINE

## 2020-07-02 PROCEDURE — 99214 OFFICE O/P EST MOD 30 MIN: CPT | Mod: HCNC,S$GLB,, | Performed by: INTERNAL MEDICINE

## 2020-07-02 PROCEDURE — 99999 PR PBB SHADOW E&M-EST. PATIENT-LVL IV: CPT | Mod: PBBFAC,HCNC,, | Performed by: INTERNAL MEDICINE

## 2020-07-02 PROCEDURE — 1125F PR PAIN SEVERITY QUANTIFIED, PAIN PRESENT: ICD-10-PCS | Mod: HCNC,S$GLB,, | Performed by: INTERNAL MEDICINE

## 2020-07-02 RX ORDER — GABAPENTIN 100 MG/1
100 CAPSULE ORAL DAILY
Qty: 30 CAPSULE | Refills: 5 | Status: SHIPPED | OUTPATIENT
Start: 2020-07-02 | End: 2021-01-05

## 2020-07-02 NOTE — PROGRESS NOTES
Chief Complaint   Patient presents with    Disease Management         History of presenting illness    89 year old black female has pain in her hands    She has symptoms for 2 years    She has arthralgias in the MCPs,DIPs    Pain + swelling and stiffness+    Pain gets worse at nights  Warm water helps  She can use the hands     Right thigh/lateral hip is sore everyday    She has had high inflammatory markers in the past    Two knees replaced  2008 and 2010    RF,CCP,ESR,CRP neg  Predmard panel neg  Uric acid nml  [id number] neg    Xrays    Hands   FINDINGS:  Right hand: No fracture, dislocation, or significant soft tissue abnormality.  Diffusely decreased bony mineralization.  Diffuse joint space narrowing with some subchondral sclerosis and marginal osteophytosis worst at the 1st carpometacarpal joint.  No erosive changes demonstrated  Left hand: No fracture, dislocation, or significant soft tissue abnormality.  Diffusely decreased bony mineralization.  Diffuse joint space narrowing with some subchondral sclerosis and marginal osteophytosis worse at the DIP of the index finger.  Remote fracture deformity of the proximal phalanx of the little finger.  Jewelry obscures the diaphysis of the proximal phalanx of the ring finger.  No erosive changes demonstrated  IMPRESSION:   Diffuse degenerative joint disease similar when compared to prior hand radiographs.  No fracture or dislocation.    Wrists  Right Wrist: No fracture or dislocation.  Widening of the scapholunate interval measuring approximately 4 mm with mild proximal migration of the capitate.  There is marginal osteophytosis and subchondral sclerosis predominantly at the distal scaphoid and trapezium.  Mild calcification of the TFCC.  No significant soft tissue abnormality.  Left Wrist: No fracture or dislocation.  Widening of the scapholunate interval measuring approximately 5 mm without significant proximal migration of the capitate.  Marginal osteophytosis and  subchondral sclerosis predominantly at the pisiform and trapezium.  Mild calcification of the TFCC.  No significant soft tissue abnormality.  IMPRESSION:   Degenerative joint disease the appearing similar when compared to the most recent prior wrist radiographs.  No fracture or dislocation.     Mild bilateral scapholunate dissociation.    Hip xrays  Degenerative joint disease without acute fracture or dislocation.  Possible remote fracture of the proximal right femoral diaphysis.  In the absence of such history, Paget's disease may demonstrates a similar appearance.  Recommend clinical correlation, as well as correlation with prior imaging if available.     Shoulder xrays  Bony mineralization is decreased.  Severe degenerative change at the bilateral shoulders with joint space narrowing, subchondral sclerosis, and marginal osteophytosis. There is periarticular soft tissue calcification (left greater than right).  Moderate bilateral acromioclavicular degenerative change.  No acute fracture or dislocation.  Remaining soft tissues demonstrate no significant abnormality.  IMPRESSION:   Severe glenohumeral degenerative joint disease with periarticular soft tissue calcification greater when compared to shoulder radiographs from 11/19/2014.  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    Lumbar spine xrays  FINDINGS:  Five non-rib-bearing lumbar type vertebral bodies are present.  Bony demineralization throughout the spine.  Coronal alignment is maintained.  Sagittal alignment demonstrates approximately 1.5 cm of anterolisthesis of L5 on S1 and bilateral pars interarticularis defects at L5.  There is intervertebral disc space narrowing from L3-S1, worst at L5-S1.  Vertebral body heights are satisfactorily maintained.  No acute fracture or dislocation.  There is degenerative joint disease including marginal osteophytosis and endplate sclerosis throughout the lumbar spine.  Severe facet  arthropathy is present at the lower lumbar spine contributing to neural foraminal narrowing at L4-5 and L5-S1.  Soft tissues demonstrate no significant abnormality.     Foot xrays   IMPRESSION:   Degenerative joint disease without acute fracture or dislocation.  Findings are mildly progressed when compared to radiograph from 05/22/2014.  Grossly stable spondylolisthesis of L5 on S1 with bilateral L5 pars defects and severe facet arthropathy.  There is a mild hallux valgus deformity.  There is DJD.  No fracture dislocation bone destruction seen.  There are toe deformities.  There is a spur on the calcaneus.     IMPRESSION:      See above     Chronic change     Past history : HTN,OA,cataracts    Family history : cancers    Social history : not a smoker      Review of Systems   Constitutional: Negative for activity change, appetite change, chills, diaphoresis, fatigue, fever and unexpected weight change.   HENT: Negative for congestion, dental problem, drooling, ear discharge, ear pain, facial swelling, hearing loss, mouth sores, nosebleeds, postnasal drip, rhinorrhea, sinus pressure, sinus pain, sneezing, sore throat, tinnitus, trouble swallowing and voice change.    Eyes: Negative for photophobia, pain, discharge, redness, itching and visual disturbance.   Respiratory: Negative for apnea, cough, choking, chest tightness, shortness of breath, wheezing and stridor.    Cardiovascular: Negative for chest pain, palpitations and leg swelling.   Gastrointestinal: Negative for abdominal distention, abdominal pain, anal bleeding, blood in stool, constipation, diarrhea, nausea, rectal pain and vomiting.   Endocrine: Negative for cold intolerance, heat intolerance, polydipsia, polyphagia and polyuria.   Genitourinary: Negative for decreased urine volume, difficulty urinating, dysuria, enuresis, flank pain, frequency, genital sores, hematuria and urgency.   Musculoskeletal: Positive for arthralgias. Negative for back pain, gait  problem, joint swelling, myalgias, neck pain and neck stiffness.   Skin: Negative for color change, pallor, rash and wound.   Allergic/Immunologic: Negative for environmental allergies, food allergies and immunocompromised state.   Neurological: Negative for dizziness, tremors, seizures, syncope, facial asymmetry, speech difficulty, weakness, light-headedness, numbness and headaches.   Hematological: Negative for adenopathy. Does not bruise/bleed easily.   Psychiatric/Behavioral: Negative for agitation, behavioral problems, confusion, decreased concentration, dysphoric mood, hallucinations, self-injury, sleep disturbance and suicidal ideas. The patient is not nervous/anxious and is not hyperactive.      Physical Exam     GREEN-28 tender joint count: 0  GREEN-28 swollen joint count: 0    Reduced ROM in the shoulders    Hand osteophytes worse in the DIPs    Physical Exam   Constitutional: She is oriented to person, place, and time and well-developed, well-nourished, and in no distress. No distress.   HENT:   Head: Normocephalic.   Mouth/Throat: Oropharynx is clear and moist.   Eyes: Conjunctivae are normal. Pupils are equal, round, and reactive to light. Right eye exhibits no discharge. Left eye exhibits no discharge. No scleral icterus.   Neck: Normal range of motion. No thyromegaly present.   Cardiovascular: Normal rate, regular rhythm, normal heart sounds and intact distal pulses.    Pulmonary/Chest: Effort normal and breath sounds normal. No stridor.   Abdominal: Soft. Bowel sounds are normal.   Lymphadenopathy:     She has no cervical adenopathy.   Neurological: She is alert and oriented to person, place, and time.   Skin: Skin is warm. No rash noted. She is not diaphoretic.     Psychiatric: Affect and judgment normal.   Musculoskeletal: Normal range of motion.           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 Spondylosis of lumbar region without myelopathy or radiculopathy    2. Primary osteoarthritis involving multiple joints            F/u problem      Will offer referral to [facility] healthy back program    Will offer gabapentin 100 mg bed time      Ernestine was seen today for disease management.    Diagnoses and all orders for this visit:    Spondylosis of lumbar region without myelopathy or radiculopathy  -     Ambulatory referral/consult to [facility]; Future  -     Ambulatory referral/consult to [facility]; Future    Primary osteoarthritis involving multiple joints  -     Ambulatory referral/consult to [facility]; Future    Other orders  -     gabapentin (NEURONTIN) 100 MG capsule; Take 1 capsule (100 mg total) by mouth once daily.

## 2020-07-07 ENCOUNTER — TELEPHONE (OUTPATIENT)
Dept: PAIN MEDICINE | Facility: OTHER | Age: 85
End: 2020-07-07

## 2020-07-13 ENCOUNTER — OFFICE VISIT (OUTPATIENT)
Dept: INTERNAL MEDICINE | Facility: CLINIC | Age: 85
End: 2020-07-13
Payer: MEDICARE

## 2020-07-13 VITALS
HEART RATE: 88 BPM | BODY MASS INDEX: 26.21 KG/M2 | WEIGHT: 130 LBS | OXYGEN SATURATION: 98 % | HEIGHT: 59 IN | DIASTOLIC BLOOD PRESSURE: 70 MMHG | SYSTOLIC BLOOD PRESSURE: 120 MMHG

## 2020-07-13 DIAGNOSIS — M11.20 PSEUDOGOUT: Primary | ICD-10-CM

## 2020-07-13 DIAGNOSIS — L03.113 CELLULITIS OF RIGHT UPPER EXTREMITY: ICD-10-CM

## 2020-07-13 PROCEDURE — 1101F PR PT FALLS ASSESS DOC 0-1 FALLS W/OUT INJ PAST YR: ICD-10-PCS | Mod: HCNC,CPTII,S$GLB, | Performed by: INTERNAL MEDICINE

## 2020-07-13 PROCEDURE — 99999 PR PBB SHADOW E&M-EST. PATIENT-LVL III: ICD-10-PCS | Mod: PBBFAC,HCNC,, | Performed by: INTERNAL MEDICINE

## 2020-07-13 PROCEDURE — 99999 PR PBB SHADOW E&M-EST. PATIENT-LVL III: CPT | Mod: PBBFAC,HCNC,, | Performed by: INTERNAL MEDICINE

## 2020-07-13 PROCEDURE — 1125F AMNT PAIN NOTED PAIN PRSNT: CPT | Mod: HCNC,S$GLB,, | Performed by: INTERNAL MEDICINE

## 2020-07-13 PROCEDURE — 1125F PR PAIN SEVERITY QUANTIFIED, PAIN PRESENT: ICD-10-PCS | Mod: HCNC,S$GLB,, | Performed by: INTERNAL MEDICINE

## 2020-07-13 PROCEDURE — 99214 OFFICE O/P EST MOD 30 MIN: CPT | Mod: HCNC,25,S$GLB, | Performed by: INTERNAL MEDICINE

## 2020-07-13 PROCEDURE — 96372 THER/PROPH/DIAG INJ SC/IM: CPT | Mod: HCNC,S$GLB,, | Performed by: INTERNAL MEDICINE

## 2020-07-13 PROCEDURE — 96372 PR INJECTION,THERAP/PROPH/DIAG2ST, IM OR SUBCUT: ICD-10-PCS | Mod: HCNC,S$GLB,, | Performed by: INTERNAL MEDICINE

## 2020-07-13 PROCEDURE — 1159F MED LIST DOCD IN RCRD: CPT | Mod: HCNC,S$GLB,, | Performed by: INTERNAL MEDICINE

## 2020-07-13 PROCEDURE — 99214 PR OFFICE/OUTPT VISIT, EST, LEVL IV, 30-39 MIN: ICD-10-PCS | Mod: HCNC,25,S$GLB, | Performed by: INTERNAL MEDICINE

## 2020-07-13 PROCEDURE — 1159F PR MEDICATION LIST DOCUMENTED IN MEDICAL RECORD: ICD-10-PCS | Mod: HCNC,S$GLB,, | Performed by: INTERNAL MEDICINE

## 2020-07-13 PROCEDURE — 1101F PT FALLS ASSESS-DOCD LE1/YR: CPT | Mod: HCNC,CPTII,S$GLB, | Performed by: INTERNAL MEDICINE

## 2020-07-13 RX ORDER — CEFDINIR 300 MG/1
300 CAPSULE ORAL 2 TIMES DAILY
Qty: 20 CAPSULE | Refills: 0 | Status: SHIPPED | OUTPATIENT
Start: 2020-07-13 | End: 2020-07-23

## 2020-07-13 RX ORDER — TRIAMCINOLONE ACETONIDE 40 MG/ML
80 INJECTION, SUSPENSION INTRA-ARTICULAR; INTRAMUSCULAR ONCE
Status: COMPLETED | OUTPATIENT
Start: 2020-07-13 | End: 2020-07-13

## 2020-07-13 RX ORDER — PREDNISONE 20 MG/1
TABLET ORAL
Qty: 12 TABLET | Refills: 0 | Status: SHIPPED | OUTPATIENT
Start: 2020-07-13 | End: 2021-04-05

## 2020-07-13 RX ADMIN — TRIAMCINOLONE ACETONIDE 80 MG: 40 INJECTION, SUSPENSION INTRA-ARTICULAR; INTRAMUSCULAR at 04:07

## 2020-07-13 NOTE — PROGRESS NOTES
PAST MEDICAL HISTORY:  Hypertension.  Breast cancer of the right breast, status post lumpectomy and had been on tamoxifen.  Osteoarthritis of the knees and shoulder.  Hysterectomy with oophorectomy.  Carpal tunnel syndrome release.  Bilateral knee arthroscopic surgery.  Bunionectomy      89-year-old female    Present cruise with progressive pain, swelling redness that involves the forearm of the right upper extremity extending into the dorsal aspect of the hand    She initially bumped her elbow on the surface Wednesday July 8th, had a little bit of a stinging    Since this time she has noted progressive pain erythema swelling extending from the of forearm near the elbow to the hand    It is noted that she was evaluated by rheumatology July 2nd in which is suspected that she may have a history of pseudogout not osteoarthritis involving the joints    Examination  Weight 130 lb  Pulse 88  Blood pressure 142/70    There is diffuse swelling warmth erythema over the dorsal aspect of the hand and diffuse swelling and erythema that extends the wrist and forearm on the ulnar aspect of the elbow    Impression  Pseudogout with acute inflammatory arthritis  Cellulitis    Plan  Kenalog 80 mg IM  Omnicef 300 mg twice a day for the next  10 days  Prednisone taper over the next 8 days starting tomorrow

## 2020-07-14 ENCOUNTER — TELEPHONE (OUTPATIENT)
Dept: INTERNAL MEDICINE | Facility: CLINIC | Age: 85
End: 2020-07-14

## 2020-07-21 ENCOUNTER — TELEPHONE (OUTPATIENT)
Dept: OPTOMETRY | Facility: CLINIC | Age: 85
End: 2020-07-21

## 2020-07-21 ENCOUNTER — OFFICE VISIT (OUTPATIENT)
Dept: PODIATRY | Facility: CLINIC | Age: 85
End: 2020-07-21
Payer: MEDICARE

## 2020-07-21 VITALS
BODY MASS INDEX: 26.21 KG/M2 | DIASTOLIC BLOOD PRESSURE: 78 MMHG | WEIGHT: 130 LBS | SYSTOLIC BLOOD PRESSURE: 170 MMHG | HEART RATE: 61 BPM | HEIGHT: 59 IN

## 2020-07-21 DIAGNOSIS — B35.1 ONYCHOMYCOSIS DUE TO DERMATOPHYTE: Primary | ICD-10-CM

## 2020-07-21 PROCEDURE — 99499 NO LOS: ICD-10-PCS | Mod: HCNC,,, | Performed by: PODIATRIST

## 2020-07-21 PROCEDURE — 99499 UNLISTED E&M SERVICE: CPT | Mod: HCNC,,, | Performed by: PODIATRIST

## 2020-07-21 PROCEDURE — 99999 PR PBB SHADOW E&M-EST. PATIENT-LVL III: CPT | Mod: PBBFAC,HCNC,, | Performed by: PODIATRIST

## 2020-07-21 PROCEDURE — 99999 PR PBB SHADOW E&M-EST. PATIENT-LVL III: ICD-10-PCS | Mod: PBBFAC,HCNC,, | Performed by: PODIATRIST

## 2020-07-21 PROCEDURE — 17999 UNLISTD PX SKN MUC MEMB SUBQ: CPT | Mod: CSM,HCNC,S$GLB, | Performed by: PODIATRIST

## 2020-07-21 PROCEDURE — 17999 PR NON-COVERED FOOT CARE: ICD-10-PCS | Mod: CSM,HCNC,S$GLB, | Performed by: PODIATRIST

## 2020-07-21 NOTE — TELEPHONE ENCOUNTER
----- Message from Tegan Franklin sent at 7/21/2020 11:26 AM CDT -----  Regarding: Eye exam  Contact: Ernestine  Pt calling because she wants to be seen before her year. I informed she will be charged for the visit but she wants to come anyway. She still has time on previous prescription but does not want the expense if her prescription has changed.  Insistent on coming as soon as possible because she cannot see as well and needs glasses to drive. Please call to discuss at a [phone]

## 2020-07-23 ENCOUNTER — TELEPHONE (OUTPATIENT)
Dept: RHEUMATOLOGY | Facility: CLINIC | Age: 85
End: 2020-07-23

## 2020-07-23 NOTE — TELEPHONE ENCOUNTER
Spoke to patient, suggested she take medicine at night.    ----- Message from Katarina Thorpe sent at 7/23/2020 10:34 AM CDT -----  Regarding: self  Pt wants to know what is she taking this medication for     gabapentin (NEURONTIN) 100 MG capsule    Asking for a call back.    Contact info [phone]

## 2020-08-06 RX ORDER — BENAZEPRIL HYDROCHLORIDE 40 MG/1
40 TABLET ORAL DAILY
Qty: 90 TABLET | Refills: 1 | Status: SHIPPED | OUTPATIENT
Start: 2020-08-06 | End: 2021-01-20

## 2020-08-06 NOTE — PROGRESS NOTES
Refill Routing Note     Medication(s) are not appropriate for processing by [facility]:    Non Participating Provider in Refill Center       Automatic Epic Protocol Generated Data:    Requested Prescriptions   Pending Prescriptions Disp Refills    benazepriL (LOTENSIN) 40 MG tablet 90 tablet 1     Sig: Take 1 tablet (40 mg total) by mouth once daily.       There is no refill protocol information for this order             Blood Pressure Readings:  Heart Rate:  bpm   BP Readings from Last 3 Encounters:   07/21/20 (!) 170/78   07/13/20 120/70   07/02/20 (!) 150/78    Pulse Readings from Last 3 Encounters:   07/21/20 61   07/13/20 88   07/02/20 66        Last Labs: 6 months: Diuretics-(Cr/K/Na) or 12 months: Non-Diuretics (Cr/K)   Lab Results   Component Value Date    CREATININE 0.7 04/29/2020    CREATININE 0.7 11/04/2019    CREATININE 0.7 04/29/2019       Lab Results   Component Value Date    K 4.3 04/29/2020    K 4.1 11/04/2019    K 4.0 04/29/2019    Lab Results   Component Value Date     04/29/2020     11/04/2019     04/29/2019        Digital Hypertension Data: values will display if enrolled   Last 5 Patient Entered Readings                                      Current 30 Day Average:      There is no flowsheet data to display.           Appointments     Date Provider   Last Visit   7/13/2020 Kev Macias MD   Next Visit   Visit date not found Kev Macias MD     ED Visits in past 90 days:    0     Note composed:11:53 AM 08/06/2020

## 2020-08-06 NOTE — TELEPHONE ENCOUNTER
----- Message from Sana Rosado sent at 8/6/2020 10:42 AM CDT -----  Type:  Needs Medical Advice - Refill         Pharmacy name and phone #:  [id number] - [place] [id number] AT [facility] [address] [phone] (Phone)  [phone] (Fax)          Additional Information: This medication need a refill benazepril (LOTENSIN) 40 MG tablet

## 2020-08-12 ENCOUNTER — CLINICAL SUPPORT (OUTPATIENT)
Dept: REHABILITATION | Facility: OTHER | Age: 85
End: 2020-08-12
Attending: INTERNAL MEDICINE
Payer: MEDICARE

## 2020-08-12 DIAGNOSIS — M47.816 SPONDYLOSIS OF LUMBAR REGION WITHOUT MYELOPATHY OR RADICULOPATHY: ICD-10-CM

## 2020-09-16 ENCOUNTER — PATIENT OUTREACH (OUTPATIENT)
Dept: ADMINISTRATIVE | Facility: OTHER | Age: 85
End: 2020-09-16

## 2020-09-17 ENCOUNTER — OFFICE VISIT (OUTPATIENT)
Dept: UROGYNECOLOGY | Facility: CLINIC | Age: 85
End: 2020-09-17
Payer: MEDICARE

## 2020-09-17 VITALS
SYSTOLIC BLOOD PRESSURE: 174 MMHG | BODY MASS INDEX: 25.64 KG/M2 | HEIGHT: 59 IN | WEIGHT: 127.19 LBS | DIASTOLIC BLOOD PRESSURE: 75 MMHG

## 2020-09-17 DIAGNOSIS — N39.41 URGE URINARY INCONTINENCE: ICD-10-CM

## 2020-09-17 DIAGNOSIS — K64.9 HEMORRHOIDS, UNSPECIFIED HEMORRHOID TYPE: ICD-10-CM

## 2020-09-17 DIAGNOSIS — Z85.3 HISTORY OF BREAST CANCER: ICD-10-CM

## 2020-09-17 DIAGNOSIS — N95.2 VAGINAL ATROPHY: ICD-10-CM

## 2020-09-17 DIAGNOSIS — N39.41 URINARY INCONTINENCE, URGE: ICD-10-CM

## 2020-09-17 DIAGNOSIS — N81.10 PROLAPSE OF ANTERIOR VAGINAL WALL: ICD-10-CM

## 2020-09-17 DIAGNOSIS — N99.3 VAGINAL VAULT PROLAPSE, POSTHYSTERECTOMY: ICD-10-CM

## 2020-09-17 DIAGNOSIS — R35.1 NOCTURIA: Primary | ICD-10-CM

## 2020-09-17 DIAGNOSIS — Z12.31 ENCOUNTER FOR SCREENING MAMMOGRAM FOR BREAST CANCER: ICD-10-CM

## 2020-09-17 PROCEDURE — 99999 PR PBB SHADOW E&M-EST. PATIENT-LVL IV: CPT | Mod: PBBFAC,HCNC,, | Performed by: OBSTETRICS & GYNECOLOGY

## 2020-09-17 PROCEDURE — 1125F AMNT PAIN NOTED PAIN PRSNT: CPT | Mod: HCNC,S$GLB,, | Performed by: OBSTETRICS & GYNECOLOGY

## 2020-09-17 PROCEDURE — 82270 PR BLOOD OCCULT, BY PEROX, FECES, SINGLE, COLORECT SCRN: ICD-10-PCS | Mod: HCNC,S$GLB,, | Performed by: OBSTETRICS & GYNECOLOGY

## 2020-09-17 PROCEDURE — 1101F PT FALLS ASSESS-DOCD LE1/YR: CPT | Mod: HCNC,CPTII,S$GLB, | Performed by: OBSTETRICS & GYNECOLOGY

## 2020-09-17 PROCEDURE — 1125F PR PAIN SEVERITY QUANTIFIED, PAIN PRESENT: ICD-10-PCS | Mod: HCNC,S$GLB,, | Performed by: OBSTETRICS & GYNECOLOGY

## 2020-09-17 PROCEDURE — 99213 OFFICE O/P EST LOW 20 MIN: CPT | Mod: HCNC,S$GLB,, | Performed by: OBSTETRICS & GYNECOLOGY

## 2020-09-17 PROCEDURE — 1159F PR MEDICATION LIST DOCUMENTED IN MEDICAL RECORD: ICD-10-PCS | Mod: HCNC,S$GLB,, | Performed by: OBSTETRICS & GYNECOLOGY

## 2020-09-17 PROCEDURE — 99999 PR PBB SHADOW E&M-EST. PATIENT-LVL IV: ICD-10-PCS | Mod: PBBFAC,HCNC,, | Performed by: OBSTETRICS & GYNECOLOGY

## 2020-09-17 PROCEDURE — 82270 OCCULT BLOOD FECES: CPT | Mod: HCNC,S$GLB,, | Performed by: OBSTETRICS & GYNECOLOGY

## 2020-09-17 PROCEDURE — 1159F MED LIST DOCD IN RCRD: CPT | Mod: HCNC,S$GLB,, | Performed by: OBSTETRICS & GYNECOLOGY

## 2020-09-17 PROCEDURE — 1101F PR PT FALLS ASSESS DOC 0-1 FALLS W/OUT INJ PAST YR: ICD-10-PCS | Mod: HCNC,CPTII,S$GLB, | Performed by: OBSTETRICS & GYNECOLOGY

## 2020-09-17 PROCEDURE — 99213 PR OFFICE/OUTPT VISIT, EST, LEVL III, 20-29 MIN: ICD-10-PCS | Mod: HCNC,S$GLB,, | Performed by: OBSTETRICS & GYNECOLOGY

## 2020-09-17 NOTE — PATIENT INSTRUCTIONS
1. Prolapse: Stage 2 apical prolapse, Stage 3 anterior and posterior vaginal wall prolapse   --Pessary # 3 ring with support  --Daily pelvic floor exercises as assigned, consider Pelvic floor PT in future  --Non surgical options discussed with patient      2. Urge urinary incontinence:  --urine C&S  --Empty bladder every 3 hours. Empty well: wait a minute, lean forward on toilet.   --Avoid dietary irritants (see sheet). Keep diary x 3-5 days to determine your irritants.  --consider PT in future  --URGE: Consider mirabegron in the future.  Takes 2-4 weeks to see if will have effect. For dry mouth: get sour, sugar free lozenge or gum.   --STRESS: Pessary vs. Sling.      3. Incomplete bladder emptying:improved with pessary   --Double void and Crede maneuvers discussed  --Improved with pessary    4. Vaginal atrophy (dryness):  Use KY jelly, astroglide, or other water-based lube quarter size with your finger twice weekly    5. Hemorrhoid  --not inflammed at this time  --rectal NEG    6. Nocturia (nighttime urination): stop fluids 2 hours before bed. No water by the bed. If have leg swelling:  Elevate feet above chest x 1 hour before bed to get excess fluid off.  Can also use support hose (knee highs).    --urine C&S    7.  Well-woman:  --Pap:denies history of abnormal pap smear-- post hyst; no further needed  --Mammo:10/2019, normal  --Colonoscopy: 10/2019 Diverticulosis in the sigmoid colon.  No further recommended.   --Dexa:11/2019 osteoporosis of the femoral neck. Has not takenTx. Talk with Dr. Ugalde about need for repeat bone density and/or treatment for osteoporosis.     8.  RTC 3 months for pessary check.

## 2020-09-17 NOTE — PROGRESS NOTES
Urogyn follow up  10/29/2018  .  [facility] UR[facility]YNECOLOGY-[id number]   [address] [id number]    Ernestine Luna  [id number]  8/13/1929      Ernestine Luna is a 89 y.o. here for a urogyn pessary check.  Last visit 1/2020.      Last HPI from 10/29/2018  1)  UI:  (--) NELI   (--) UUI (--) pads Daytime frequency: Q 30 minutes- 3 hours.  Nocturia: Yes 3-4/night.   (--) dysuria,  (--) hematuria,  (--) frequent UTIs.  (+) complete bladder emptying.   2)  POP:  Absent.   Symptoms:(--)  .  (--) vaginal bleeding. (--) vaginal discharge. (--) sexually active.  (--) dyspareunia.   (+)  Vaginal dryness.  (--) vaginal estrogen use.   3)  BM:  (--) constipation/straining.  (--) chronic diarrhea. (--) hematochezia.  (--) fecal incontinence.  (--) fecal smearing/urgency.  (--) incomplete evacuation.    4)Pessary:  Denies pain, bleeding or dischage. Using #3 ring with support pessary..    Changes from last visit:  No /GYN changes.  Still sad because her long-term dog passed away in March.  Is stable, has good support system, no SI/HI.     1)  UI:  (--) NELI   (--) UUI (--) pads Daytime frequency: Q 30 minutes (after BP meds)- 3 hours.  Nocturia: Yes 3-4/night.  Wakes with urge to urinate. Sounds like dog can keep her awake, too.  (--) dysuria,  (--) hematuria,  (--) frequent UTIs.  (+) complete bladder emptying.     2)  POP:  Absent.   Symptoms:(--).  (--) vaginal bleeding. (--) vaginal discharge. (--) sexually active.  (--) dyspareunia.   (+)  Vaginal dryness.  (--) vaginal estrogen use. Using replens 2x/week.  Difficult to squeeze replens applicator. Uses 2x/week.     3)  BM:  (--) constipation/straining.  (--) chronic diarrhea. (--) hematochezia.  (--) fecal incontinence.  (--) fecal smearing/urgency.  (--) incomplete evacuation.      4) Pessary:  Denies pain, bleeding.  Has some scant discharge. Using #3 ring with support pessary.    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 Wilkins    COLONOSCOPY N/A 10/1/2019    Procedure: COLONOSCOPY;  Surgeon: Jarad Chavira MD;  Location: [facility] ([address] [place] [address]);  Service: Endoscopy;  Laterality: N/A;  miralax prep (used miralax for last colonoscopy) - ER[facility]    COLONOSCOPY W/ POLYPECTOMY  08/08/2013    RASHEED   06/24/2014    EYE SURGERY      HYSTERECTOMY  age 38    ALISTAIR; ovaries in place    JOINT REPLACEMENT      bilateral knees    TOTAL KNEE ARTHROPLASTY      Bilateral       Current Outpatient Medications   Medication Sig    acetic acid-oxyquinoline (FEM PH) 0.9-0.025 % Gel Place 1 applicator vaginally twice a week.    alendronate (FOSAMAX) 70 MG tablet TAKE 1 TABLET BY MOUTH EVERY 7 DAYS    amLODIPine (NORVASC) 10 MG tablet TAKE 1 TABLET BY MOUTH DAILY    atenoloL (TENORMIN) 25 MG tablet Take 1 tablet (25 mg total) by mouth once daily.    benazepriL (LOTENSIN) 40 MG tablet Take 1 tablet (40 mg total) by mouth once daily.    calcium carbonate (OS-DHRUV) 600 mg calcium (1,500 mg) Tab Take 600 mg by mouth once.    ciclopirox (PENLAC) 8 % Soln Apply topically nightly.    FLUAD [id number], 65 YR UP,,PF, 45 mcg (15 mcg x 3)/0.5 mL Syrg ADM 0.5ML IM UTD    lidocaine HCL 2% (XYLOCAINE) 2 % jelly Apply topically as needed. For toe pain, apply up to twice daily as needed for pain.    MULTIVITAMIN  "W-MINERALS/LUTEIN (CENTRUM SILVER ORAL) Take 1 tablet by mouth Daily.    omega 3-calcium-vit [id number] mg Cmpk Take by mouth once daily.    predniSONE (DELTASONE) 20 MG tablet 2 pills po daily for 4 days, then 1 pill po day for 4 days    vitamin D 1000 units Tab Take 185 mg by mouth once daily.    gabapentin (NEURONTIN) 100 MG capsule Take 1 capsule (100 mg total) by mouth once daily.     No current facility-administered medications for this visit.        ROS:  As per HPI.      Well Woman:  --Pap:denies history of abnormal pap smear-- post hyst; no further needed  --Mammo:10/2019, normal  --Colonoscopy: 10/2019 Diverticulosis in the sigmoid colon.  No further recommended.   --Dexa:06/2016Osteoporosis of the femoral neck. Has not takenTx. Talk with Dr. Ugalde about need for repeat bone density and/or treatment for osteoporosis.     Exam  BP (!) 174/75 (BP Location: Left arm, Patient Position: Sitting, BP Method: Medium (Automatic))   Ht 4' 11" (1.499 m)   Wt 57.7 kg (127 lb 3.3 oz)   LMP  (LMP Unknown)   BMI 25.69 kg/m²   General: alert and oriented, no acute distress  Respiratory: normal respiratory effort  Abd: soft, non-tender, non-distended    Pelvic  Ext. Genitalia: normal external genitalia. Normal bartholin's and skenes glands  Vagina: + atrophy. Normal vaginal mucosa without lesions. No discharge noted.   Non-tender bladder base without palpable mass.  #3 ring with support in place--removed and cleaned.   Cervix: absent  Uterus:  absent   Urethra: no masses or tenderness  Urethral meatus: no lesions, caruncle or prolapse.  Rectum: nonthrombosed hemorrhoid noted at 12 o'clock position at anus.  Internal: normal, no masses/lesions, juice resting/active tone, HEME NEG,     Pessary replaced without issue.     Impression  1. Nocturia     2. Prolapse of anterior vaginal wall     3. Urinary incontinence, urge     4. Urge urinary incontinence     5. Vaginal atrophy     6. Vaginal vault " prolapse, posthysterectomy     7. History of breast cancer     8. Encounter for screening mammogram for breast cancer     9. Hemorrhoids, unspecified hemorrhoid type       We reviewed the above issues and discussed options for short-term versus long-term management of her problems.   Plan:   1. Prolapse: Stage 2 apical prolapse, Stage 3 anterior and posterior vaginal wall prolapse   --Pessary # 3 ring with support  --Daily pelvic floor exercises as assigned, consider Pelvic floor PT in future  --Non surgical options discussed with patient      2. Urge urinary incontinence:  --urine C&S  --Empty bladder every 3 hours. Empty well: wait a minute, lean forward on toilet.   --Avoid dietary irritants (see sheet). Keep diary x 3-5 days to determine your irritants.  --consider PT in future  --URGE: Consider mirabegron in the future.  Takes 2-4 weeks to see if will have effect. For dry mouth: get sour, sugar free lozenge or gum.   --STRESS: Pessary vs. Sling.      3. Incomplete bladder emptying:improved with pessary   --Double void and Crede maneuvers discussed  --Improved with pessary    4. Vaginal atrophy (dryness):  Use KY jelly, astroglide, or other water-based lube quarter size with your finger twice weekly    5. Hemorrhoid  --not inflammed at this time  --rectal NEG    6. Nocturia (nighttime urination): stop fluids 2 hours before bed. No water by the bed. If have leg swelling:  Elevate feet above chest x 1 hour before bed to get excess fluid off.  Can also use support hose (knee highs).    --urine C&S    7.  Well-woman:  --Pap:denies history of abnormal pap smear-- post hyst; no further needed  --Mammo:10/2019, normal  --Colonoscopy: 10/2019 Diverticulosis in the sigmoid colon.  No further recommended.   --Dexa:11/2019 osteoporosis of the femoral neck. Has not takenTx. Talk with Dr. Ugalde about need for repeat bone density and/or treatment for osteoporosis.     8.  RTC 3 months for pessary check.     30 min spent  during visit, 75% in discussion.   Division of Female Pelvic Medicine and Reconstructive Surgery  Department of Obstetrics & Gynecology

## 2020-09-22 ENCOUNTER — OFFICE VISIT (OUTPATIENT)
Dept: PODIATRY | Facility: CLINIC | Age: 85
End: 2020-09-22
Payer: MEDICARE

## 2020-09-22 VITALS
HEIGHT: 59 IN | SYSTOLIC BLOOD PRESSURE: 167 MMHG | DIASTOLIC BLOOD PRESSURE: 77 MMHG | WEIGHT: 127 LBS | HEART RATE: 66 BPM | BODY MASS INDEX: 25.6 KG/M2 | RESPIRATION RATE: 18 BRPM

## 2020-09-22 DIAGNOSIS — B35.1 ONYCHOMYCOSIS DUE TO DERMATOPHYTE: Primary | ICD-10-CM

## 2020-09-22 PROCEDURE — 99499 NO LOS: ICD-10-PCS | Mod: HCNC,,, | Performed by: PODIATRIST

## 2020-09-22 PROCEDURE — 99499 UNLISTED E&M SERVICE: CPT | Mod: HCNC,,, | Performed by: PODIATRIST

## 2020-09-22 PROCEDURE — 99999 PR PBB SHADOW E&M-EST. PATIENT-LVL IV: ICD-10-PCS | Mod: PBBFAC,HCNC,, | Performed by: PODIATRIST

## 2020-09-22 PROCEDURE — 17999 PR NON-COVERED FOOT CARE: ICD-10-PCS | Mod: CSM,HCNC,S$GLB, | Performed by: PODIATRIST

## 2020-09-22 PROCEDURE — 17999 UNLISTD PX SKN MUC MEMB SUBQ: CPT | Mod: CSM,HCNC,S$GLB, | Performed by: PODIATRIST

## 2020-09-22 PROCEDURE — 99999 PR PBB SHADOW E&M-EST. PATIENT-LVL IV: CPT | Mod: PBBFAC,HCNC,, | Performed by: PODIATRIST

## 2020-09-25 ENCOUNTER — PES CALL (OUTPATIENT)
Dept: ADMINISTRATIVE | Facility: CLINIC | Age: 85
End: 2020-09-25

## 2020-09-29 ENCOUNTER — TELEPHONE (OUTPATIENT)
Dept: UROGYNECOLOGY | Facility: CLINIC | Age: 85
End: 2020-09-29

## 2020-09-29 DIAGNOSIS — Z12.31 SCREENING MAMMOGRAM, ENCOUNTER FOR: Primary | ICD-10-CM

## 2020-09-29 NOTE — TELEPHONE ENCOUNTER
----- Message from Raul Stoll sent at 9/29/2020  4:33 PM CDT -----  Type:  Patient Returning Call    Who Called:     Who Left Message for Patient:     Does the patient know what this is regarding?: missed call     Best Call Back Number:  [phone]    Additional Information:  n/a

## 2020-09-29 NOTE — TELEPHONE ENCOUNTER
Attempted to contact pt to inform her that order were placed for her mammogram and to help her schedule, pt did not answer.

## 2020-09-29 NOTE — TELEPHONE ENCOUNTER
Spoke to pt, she requested orders for mammogram to schedule an appointment.    Orders placed and mammogram appointment scheduled.

## 2020-09-29 NOTE — TELEPHONE ENCOUNTER
----- Message from Yi Smith MA sent at 9/29/2020  2:45 PM CDT -----  Patient called to schedule mammo need orders patient contact number [phone].

## 2020-10-23 ENCOUNTER — HOSPITAL ENCOUNTER (OUTPATIENT)
Dept: RADIOLOGY | Facility: OTHER | Age: 85
Discharge: HOME | End: 2020-10-23
Attending: OBSTETRICS & GYNECOLOGY
Payer: MEDICARE

## 2020-10-23 DIAGNOSIS — Z12.31 SCREENING MAMMOGRAM, ENCOUNTER FOR: ICD-10-CM

## 2020-10-23 PROCEDURE — 77067 SCR MAMMO BI INCL CAD: CPT | Mod: TC,HCNC

## 2020-10-23 PROCEDURE — 77067 MAMMO DIGITAL SCREENING BILAT WITH TOMO: ICD-10-PCS | Mod: 26,HCNC,, | Performed by: RADIOLOGY

## 2020-10-23 PROCEDURE — 77063 BREAST TOMOSYNTHESIS BI: CPT | Mod: 26,HCNC,, | Performed by: RADIOLOGY

## 2020-10-23 PROCEDURE — 77063 MAMMO DIGITAL SCREENING BILAT WITH TOMO: ICD-10-PCS | Mod: 26,HCNC,, | Performed by: RADIOLOGY

## 2020-10-23 PROCEDURE — 77067 SCR MAMMO BI INCL CAD: CPT | Mod: 26,HCNC,, | Performed by: RADIOLOGY

## 2020-10-29 ENCOUNTER — TELEPHONE (OUTPATIENT)
Dept: UROGYNECOLOGY | Facility: CLINIC | Age: 85
End: 2020-10-29

## 2020-10-29 NOTE — TELEPHONE ENCOUNTER
----- Message from Samira Britt MD sent at 10/27/2020 10:01 PM CDT -----  Please let patient know that mammogram looks ok. Thanks!

## 2020-10-30 ENCOUNTER — OFFICE VISIT (OUTPATIENT)
Dept: OPTOMETRY | Facility: CLINIC | Age: 85
End: 2020-10-30
Payer: MEDICARE

## 2020-10-30 DIAGNOSIS — H52.13 MYOPIA OF BOTH EYES WITH REGULAR ASTIGMATISM: ICD-10-CM

## 2020-10-30 DIAGNOSIS — I10 ESSENTIAL HYPERTENSION: Primary | ICD-10-CM

## 2020-10-30 DIAGNOSIS — H52.223 MYOPIA OF BOTH EYES WITH REGULAR ASTIGMATISM: ICD-10-CM

## 2020-10-30 DIAGNOSIS — Z13.5 GLAUCOMA SCREENING: ICD-10-CM

## 2020-10-30 DIAGNOSIS — Z96.1 PSEUDOPHAKIA OF BOTH EYES: ICD-10-CM

## 2020-10-30 PROCEDURE — 92015 PR REFRACTION: ICD-10-PCS | Mod: HCNC,S$GLB,, | Performed by: OPTOMETRIST

## 2020-10-30 PROCEDURE — 99999 PR PBB SHADOW E&M-EST. PATIENT-LVL III: ICD-10-PCS | Mod: PBBFAC,HCNC,, | Performed by: OPTOMETRIST

## 2020-10-30 PROCEDURE — 92015 DETERMINE REFRACTIVE STATE: CPT | Mod: HCNC,S$GLB,, | Performed by: OPTOMETRIST

## 2020-10-30 PROCEDURE — 99999 PR PBB SHADOW E&M-EST. PATIENT-LVL III: CPT | Mod: PBBFAC,HCNC,, | Performed by: OPTOMETRIST

## 2020-10-30 PROCEDURE — 92014 PR EYE EXAM, EST PATIENT,COMPREHESV: ICD-10-PCS | Mod: HCNC,S$GLB,, | Performed by: OPTOMETRIST

## 2020-10-30 PROCEDURE — 92014 COMPRE OPH EXAM EST PT 1/>: CPT | Mod: HCNC,S$GLB,, | Performed by: OPTOMETRIST

## 2020-10-30 NOTE — PROGRESS NOTES
HPI     Annual eye exam Dr Mccormick patient CHELA 1 yr     Blurry vision OU, update RX  Denies itch, tear, burn. No gtts  Denies Flashes, Floaters        Last edited by Chas Stoll, OD on 10/30/2020 11:12 AM. (History)            Assessment /Plan     For exam results, see Encounter Report.    Essential hypertension  -No retinopathy noted today.  Continued control with primary care physician and annual comprehensive eye exam.    Pseudophakia of both eyes  -clear, centered    Glaucoma screening  -Monitor with annual eye exam and IOP check    Myopia of both eyes with regular astigmatism  Eyeglass Final Rx     Eyeglass Final Rx       Sphere Cylinder Axis Dist VA Add    Right -0.25 +2.25 005 20/25 +2.50    Left -1.25 +2.25 010 20/30 +2.50    Type: PAL    Expiration Date: 10/31/2021                  RTC 1 yr

## 2020-11-03 ENCOUNTER — PES CALL (OUTPATIENT)
Dept: ADMINISTRATIVE | Facility: CLINIC | Age: 85
End: 2020-11-03

## 2020-11-04 ENCOUNTER — PES CALL (OUTPATIENT)
Dept: ADMINISTRATIVE | Facility: CLINIC | Age: 85
End: 2020-11-04

## 2020-11-05 ENCOUNTER — TELEPHONE (OUTPATIENT)
Dept: OPHTHALMOLOGY | Facility: CLINIC | Age: 85
End: 2020-11-05

## 2020-11-05 NOTE — TELEPHONE ENCOUNTER
----- Message from Jackie Salinas sent at 11/5/2020 12:51 PM CST -----  Regarding: Ms. Luna needs a copy of her eye glass prescription mailed to her.  Contact: Ernestineroberto Luna needs a copy of her eye glass prescription mailed to her.  She can be reached at [phone] [address] [facility] [address]

## 2020-11-17 NOTE — PROGRESS NOTES
The patient called yesterday wanting an appointment medical oncology.  She had just been seen by the nurse practitioner and had imaging in the breast Center I called her to better understand what her desires were.  She has had multiple daughters with breast cancer cancer and is very concerned that she will get breast cancer again.  Her genetic testing has been negative  She would like to be evaluated by a physician to see if she needs a biopsy.  I recommended that she would be best served by seeing one of our breast surgeons.  We will help arrange that consultation.   Clofazimine Counseling:  I discussed with the patient the risks of clofazimine including but not limited to skin and eye pigmentation, liver damage, nausea/vomiting, gastrointestinal bleeding and allergy.

## 2020-11-19 ENCOUNTER — OFFICE VISIT (OUTPATIENT)
Dept: PODIATRY | Facility: CLINIC | Age: 85
End: 2020-11-19
Payer: MEDICARE

## 2020-11-19 VITALS
HEIGHT: 59 IN | DIASTOLIC BLOOD PRESSURE: 78 MMHG | HEART RATE: 67 BPM | WEIGHT: 127 LBS | SYSTOLIC BLOOD PRESSURE: 174 MMHG | BODY MASS INDEX: 25.6 KG/M2 | RESPIRATION RATE: 18 BRPM

## 2020-11-19 DIAGNOSIS — B35.1 ONYCHOMYCOSIS DUE TO DERMATOPHYTE: Primary | ICD-10-CM

## 2020-11-19 PROCEDURE — 99499 NO LOS: ICD-10-PCS | Mod: HCNC,,, | Performed by: PODIATRIST

## 2020-11-19 PROCEDURE — 1125F PR PAIN SEVERITY QUANTIFIED, PAIN PRESENT: ICD-10-PCS | Mod: HCNC,,, | Performed by: PODIATRIST

## 2020-11-19 PROCEDURE — 99999 PR PBB SHADOW E&M-EST. PATIENT-LVL IV: CPT | Mod: PBBFAC,HCNC,, | Performed by: PODIATRIST

## 2020-11-19 PROCEDURE — 17999 PR NON-COVERED FOOT CARE: ICD-10-PCS | Mod: CSM,HCNC,S$GLB, | Performed by: PODIATRIST

## 2020-11-19 PROCEDURE — 1125F AMNT PAIN NOTED PAIN PRSNT: CPT | Mod: HCNC,,, | Performed by: PODIATRIST

## 2020-11-19 PROCEDURE — 17999 UNLISTD PX SKN MUC MEMB SUBQ: CPT | Mod: CSM,HCNC,S$GLB, | Performed by: PODIATRIST

## 2020-11-19 PROCEDURE — 99499 UNLISTED E&M SERVICE: CPT | Mod: HCNC,,, | Performed by: PODIATRIST

## 2020-11-19 PROCEDURE — 99999 PR PBB SHADOW E&M-EST. PATIENT-LVL IV: ICD-10-PCS | Mod: PBBFAC,HCNC,, | Performed by: PODIATRIST

## 2020-11-19 RX ORDER — A/SINGAPORE/GP1908/2015 IVR-180 (AN A/MICHIGAN/45/2015 (H1N1)PDM09-LIKE VIRUS, A/HONG KONG/4801/2014, NYMC X-263B (H3N2) (AN A/HONG KONG/4801/2014-LIKE VIRUS), AND B/BRISBANE/60/2008, WILD TYPE (A B/BRISBANE/60/2008-LIKE VIRUS) 15; 15; 15 UG/.5ML; UG/.5ML; UG/.5ML
INJECTION, SUSPENSION INTRAMUSCULAR
COMMUNITY
Start: 2020-09-04 | End: 2021-10-26

## 2020-12-17 ENCOUNTER — OFFICE VISIT (OUTPATIENT)
Dept: UROGYNECOLOGY | Facility: CLINIC | Age: 85
End: 2020-12-17
Payer: MEDICARE

## 2020-12-17 VITALS
BODY MASS INDEX: 26.75 KG/M2 | SYSTOLIC BLOOD PRESSURE: 187 MMHG | DIASTOLIC BLOOD PRESSURE: 84 MMHG | WEIGHT: 132.69 LBS | HEIGHT: 59 IN

## 2020-12-17 DIAGNOSIS — R35.1 NOCTURIA: Primary | ICD-10-CM

## 2020-12-17 DIAGNOSIS — N39.41 URINARY INCONTINENCE, URGE: ICD-10-CM

## 2020-12-17 DIAGNOSIS — N95.2 VAGINAL ATROPHY: ICD-10-CM

## 2020-12-17 DIAGNOSIS — R45.89 DEPRESSED MOOD: ICD-10-CM

## 2020-12-17 DIAGNOSIS — N99.3 VAGINAL VAULT PROLAPSE, POSTHYSTERECTOMY: ICD-10-CM

## 2020-12-17 DIAGNOSIS — N81.10 PROLAPSE OF ANTERIOR VAGINAL WALL: ICD-10-CM

## 2020-12-17 PROCEDURE — 3288F PR FALLS RISK ASSESSMENT DOCUMENTED: ICD-10-PCS | Mod: HCNC,CPTII,S$GLB, | Performed by: OBSTETRICS & GYNECOLOGY

## 2020-12-17 PROCEDURE — 1125F AMNT PAIN NOTED PAIN PRSNT: CPT | Mod: HCNC,S$GLB,, | Performed by: OBSTETRICS & GYNECOLOGY

## 2020-12-17 PROCEDURE — 99999 PR PBB SHADOW E&M-EST. PATIENT-LVL III: CPT | Mod: PBBFAC,HCNC,, | Performed by: OBSTETRICS & GYNECOLOGY

## 2020-12-17 PROCEDURE — 1101F PT FALLS ASSESS-DOCD LE1/YR: CPT | Mod: HCNC,CPTII,S$GLB, | Performed by: OBSTETRICS & GYNECOLOGY

## 2020-12-17 PROCEDURE — 1125F PR PAIN SEVERITY QUANTIFIED, PAIN PRESENT: ICD-10-PCS | Mod: HCNC,S$GLB,, | Performed by: OBSTETRICS & GYNECOLOGY

## 2020-12-17 PROCEDURE — 1159F PR MEDICATION LIST DOCUMENTED IN MEDICAL RECORD: ICD-10-PCS | Mod: HCNC,S$GLB,, | Performed by: OBSTETRICS & GYNECOLOGY

## 2020-12-17 PROCEDURE — 99999 PR PBB SHADOW E&M-EST. PATIENT-LVL III: ICD-10-PCS | Mod: PBBFAC,HCNC,, | Performed by: OBSTETRICS & GYNECOLOGY

## 2020-12-17 PROCEDURE — 99213 OFFICE O/P EST LOW 20 MIN: CPT | Mod: HCNC,S$GLB,, | Performed by: OBSTETRICS & GYNECOLOGY

## 2020-12-17 PROCEDURE — 3288F FALL RISK ASSESSMENT DOCD: CPT | Mod: HCNC,CPTII,S$GLB, | Performed by: OBSTETRICS & GYNECOLOGY

## 2020-12-17 PROCEDURE — 1159F MED LIST DOCD IN RCRD: CPT | Mod: HCNC,S$GLB,, | Performed by: OBSTETRICS & GYNECOLOGY

## 2020-12-17 PROCEDURE — 99213 PR OFFICE/OUTPT VISIT, EST, LEVL III, 20-29 MIN: ICD-10-PCS | Mod: HCNC,S$GLB,, | Performed by: OBSTETRICS & GYNECOLOGY

## 2020-12-17 PROCEDURE — 1101F PR PT FALLS ASSESS DOC 0-1 FALLS W/OUT INJ PAST YR: ICD-10-PCS | Mod: HCNC,CPTII,S$GLB, | Performed by: OBSTETRICS & GYNECOLOGY

## 2020-12-20 PROBLEM — R45.89 DEPRESSED MOOD: Status: ACTIVE | Noted: 2020-12-20

## 2020-12-21 NOTE — PROGRESS NOTES
Urogyn follow up  10/29/2018  .  [facility] UROGYNECOLOGY-[id number]   [address] [id number]    Ernestine Luna  [id number]  8/13/1929      Ernestine Luna is a 89 y.o. here for a urogyn pessary check.  Last visit 1/2020.      Last HPI from 10/29/2018  1)  UI:  (--) NELI   (--) UUI (--) pads Daytime frequency: Q 30 minutes- 3 hours.  Nocturia: Yes 3-4/night.   (--) dysuria,  (--) hematuria,  (--) frequent UTIs.  (+) complete bladder emptying.   2)  POP:  Absent.   Symptoms:(--)  .  (--) vaginal bleeding. (--) vaginal discharge. (--) sexually active.  (--) dyspareunia.   (+)  Vaginal dryness.  (--) vaginal estrogen use.   3)  BM:  (--) constipation/straining.  (--) chronic diarrhea. (--) hematochezia.  (--) fecal incontinence.  (--) fecal smearing/urgency.  (--) incomplete evacuation.    4)Pessary:  Denies pain, bleeding or dischage. Using #3 ring with support pessary..    Changes from last visit:  No /GYN changes.  Still sad because her long-term dog passed away in March.  Is stable, has good support system, no SI/HI.     1)  UI:  (--) NELI   (--) UUI (--) pads Daytime frequency: Q 30 minutes (after BP meds)- 3 hours.  Nocturia: Yes 3-4/night.  Wakes with urge to urinate. Sounds like dog can keep her awake, too.  (--) dysuria,  (--) hematuria,  (--) frequent UTIs.  (+) complete bladder emptying.     2)  POP:  Absent.   Symptoms:(--).  (--) vaginal bleeding. (--) vaginal discharge. (--) sexually active.  (--) dyspareunia.   (+)  Vaginal dryness.  (--) vaginal estrogen use. Using replens 2x/week.  Difficult to squeeze replens applicator. Uses 2x/week.     3)  BM:  (--) constipation/straining.  (--) chronic diarrhea. (--) hematochezia.  (--) fecal incontinence.  (--) fecal smearing/urgency.  (--) incomplete evacuation.      4) Pessary:  Denies pain, bleeding.  Has some scant discharge. Using #3 ring with support pessary.    5) Depression: Patient is still very sad about the passing of her dog  in March 2020. She has had the dog stuffed.  She denies SI/HI but cannot wait to see her dog again one day.  She doesn't want to burden her family with her sadness. She feels that she has a good support system in her [employer].    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 Wilkins    COLONOSCOPY N/A 10/1/2019    Procedure: COLONOSCOPY;  Surgeon: Jarad Chavira MD;  Location: [address]);  Service: Endoscopy;  Laterality: N/A;  miralax prep (used miralax for last colonoscopy) - [facility]    COLONOSCOPY W/ POLYPECTOMY  08/08/2013    RASHEED   06/24/2014    EYE SURGERY      HYSTERECTOMY  age 38    ALISTAIR; ovaries in place    JOINT REPLACEMENT      bilateral knees    TOTAL KNEE ARTHROPLASTY      Bilateral       Current Outpatient Medications   Medication Sig    acetic acid-oxyquinoline (FEM PH) 0.9-0.025 % Gel Place 1 applicator vaginally twice a week.    alendronate (FOSAMAX) 70 MG tablet TAKE 1 TABLET BY MOUTH EVERY 7 DAYS    amLODIPine (NORVASC) 10 MG tablet TAKE 1 TABLET BY MOUTH DAILY    atenoloL (TENORMIN) 25 MG tablet Take 1 tablet (25 mg total) by mouth once daily.    benazepriL (LOTENSIN) 40 MG tablet Take 1 tablet (40 mg total) by mouth once daily.    calcium carbonate (OS-DHRUV) 600 mg calcium (1,500 mg) Tab Take 600 mg by mouth once.  "   ciclopirox (PENLAC) 8 % Soln Apply topically nightly.    FLUAD QUAD 2020-21,65Y UP,,PF, 60 mcg (15 mcg x 4)/0.5 mL Syrg ADM 0.5ML IM UTD    lidocaine HCL 2% (XYLOCAINE) 2 % jelly Apply topically as needed. For toe pain, apply up to twice daily as needed for pain.    MULTIVITAMIN W-MINERALS/LUTEIN (CENTRUM SILVER ORAL) Take 1 tablet by mouth Daily.    omega 3-calcium-vit D3-FA-mv13 [id number] mg Cmpk Take by mouth once daily.    predniSONE (DELTASONE) 20 MG tablet 2 pills po daily for 4 days, then 1 pill po day for 4 days    vitamin D 1000 units Tab Take 185 mg by mouth once daily.    FLUAD 0461-4805, 65 YR UP,,PF, 45 mcg (15 mcg x 3)/0.5 mL Syrg ADM 0.5ML IM UTD    gabapentin (NEURONTIN) 100 MG capsule Take 1 capsule (100 mg total) by mouth once daily.     No current facility-administered medications for this visit.        ROS:  As per HPI.      Well Woman:  --Pap:denies history of abnormal pap smear-- post hyst; no further needed  --Mammo:10/2019, normal  --Colonoscopy: 10/2019 Diverticulosis in the sigmoid colon.  No further recommended.   --Dexa:06/2016Osteoporosis of the femoral neck. Has not takenTx. Talk with Dr. Ugalde about need for repeat bone density and/or treatment for osteoporosis.     Exam  BP (!) 187/84 (BP Location: Left arm, Patient Position: Sitting, BP Method: Large (Automatic))   Ht 4' 11" (1.499 m)   Wt 60.2 kg (132 lb 11.5 oz)   LMP  (LMP Unknown)   BMI 26.81 kg/m²   General: alert and oriented, no acute distress  Respiratory: normal respiratory effort  Abd: soft, non-tender, non-distended    Pelvic  Ext. Genitalia: normal external genitalia. Normal bartholin's and skenes glands  Vagina: + atrophy. Normal vaginal mucosa without lesions. No discharge noted.   Non-tender bladder base without palpable mass.  #3 ring with support in place--removed and cleaned.   Cervix: absent  Uterus:  absent   Urethra: no masses or tenderness  Urethral meatus: no lesions, caruncle or " prolapse.  Rectum: External with old hemorrhoids, non-thrombosed.  Internal: normal, no masses/lesions, juice resting/active tone, HEME NEG,     Pessary replaced without issue.     Impression  1. Nocturia     2. Prolapse of anterior vaginal wall     3. Urinary incontinence, urge     4. Vaginal atrophy     5. Vaginal vault prolapse, posthysterectomy     6. Depressed mood       We reviewed the above issues and discussed options for short-term versus long-term management of her problems.   Plan:   1. Prolapse: Stage 2 apical prolapse, Stage 3 anterior and posterior vaginal wall prolapse   --Pessary # 3 ring with support  --Daily pelvic floor exercises as assigned, consider Pelvic floor PT in future  --Non surgical options discussed with patient      2. Urge urinary incontinence:  --urine C&S  --Empty bladder every 3 hours. Empty well: wait a minute, lean forward on toilet.   --Avoid dietary irritants (see sheet). Keep diary x 3-5 days to determine your irritants.  --consider PT in future  --URGE: Consider mirabegron in the future.  Takes 2-4 weeks to see if will have effect. For dry mouth: get sour, sugar free lozenge or gum.   --STRESS: Pessary vs. Sling.      3. Incomplete bladder emptying:improved with pessary   --Double void and Crede maneuvers discussed  --Improved with pessary    4. Vaginal atrophy (dryness):  Use KY jelly, astroglide, or other water-based lube quarter size with your finger twice weekly    5. Hemorrhoid  --not inflammed at this time  --rectal 12/2020 NEG    6. Nocturia (nighttime urination): stop fluids 2 hours before bed. No water by the bed. If have leg swelling:  Elevate feet above chest x 1 hour before bed to get excess fluid off.  Can also use support hose (knee highs).    --urine C&S    7.  Well-woman:  --Pap:denies history of abnormal pap smear-- post hyst; no further needed  --Mammo:10/2019, normal  --Colonoscopy: 10/2019 Diverticulosis in the sigmoid colon.  No further recommended.    --Dexa:11/2019 osteoporosis of the femoral neck. Has not takenTx. Talk with Dr. Ugalde about need for repeat bone density and/or treatment for osteoporosis.     8.  Situational depression:   --discussed therapy options but patient declines at this time  --discussed speaking with her family about this, but she doesn't want to bother them with issues; I encouraged her to let her family know  --no SI/HI    9.  RTC 3 months for pessary check.     30 min spent during visit, 75% in discussion.   Division of Female Pelvic Medicine and Reconstructive Surgery  Department of Obstetrics & Gynecology

## 2020-12-29 ENCOUNTER — PES CALL (OUTPATIENT)
Dept: ADMINISTRATIVE | Facility: CLINIC | Age: 85
End: 2020-12-29

## 2021-01-06 ENCOUNTER — OFFICE VISIT (OUTPATIENT)
Dept: OPTOMETRY | Facility: CLINIC | Age: 86
End: 2021-01-06
Payer: MEDICARE

## 2021-01-06 DIAGNOSIS — H52.13 MYOPIA OF BOTH EYES WITH REGULAR ASTIGMATISM: Primary | ICD-10-CM

## 2021-01-06 DIAGNOSIS — H52.223 MYOPIA OF BOTH EYES WITH REGULAR ASTIGMATISM: Primary | ICD-10-CM

## 2021-01-06 PROCEDURE — 99499 UNLISTED E&M SERVICE: CPT | Mod: HCNC,S$GLB,, | Performed by: OPTOMETRIST

## 2021-01-06 PROCEDURE — 99499 NO LOS: ICD-10-PCS | Mod: HCNC,S$GLB,, | Performed by: OPTOMETRIST

## 2021-01-09 ENCOUNTER — IMMUNIZATION (OUTPATIENT)
Dept: INTERNAL MEDICINE | Facility: CLINIC | Age: 86
End: 2021-01-09
Payer: MEDICARE

## 2021-01-09 DIAGNOSIS — Z23 NEED FOR VACCINATION: ICD-10-CM

## 2021-01-09 PROCEDURE — 91300 COVID-19, MRNA, LNP-S, PF, 30 MCG/0.3 ML DOSE VACCINE: CPT | Mod: PBBFAC

## 2021-01-12 ENCOUNTER — TELEPHONE (OUTPATIENT)
Dept: OPTOMETRY | Facility: CLINIC | Age: 86
End: 2021-01-12

## 2021-01-30 ENCOUNTER — IMMUNIZATION (OUTPATIENT)
Dept: INTERNAL MEDICINE | Facility: CLINIC | Age: 86
End: 2021-01-30
Payer: MEDICARE

## 2021-01-30 DIAGNOSIS — Z23 NEED FOR VACCINATION: Primary | ICD-10-CM

## 2021-01-30 PROCEDURE — 91300 COVID-19, MRNA, LNP-S, PF, 30 MCG/0.3 ML DOSE VACCINE: CPT | Mod: PBBFAC | Performed by: INTERNAL MEDICINE

## 2021-01-30 PROCEDURE — 0002A COVID-19, MRNA, LNP-S, PF, 30 MCG/0.3 ML DOSE VACCINE: CPT | Mod: PBBFAC | Performed by: INTERNAL MEDICINE

## 2021-02-01 ENCOUNTER — TELEPHONE (OUTPATIENT)
Dept: PODIATRY | Facility: CLINIC | Age: 86
End: 2021-02-01

## 2021-02-03 ENCOUNTER — OFFICE VISIT (OUTPATIENT)
Dept: PODIATRY | Facility: CLINIC | Age: 86
End: 2021-02-03
Payer: MEDICARE

## 2021-02-03 VITALS
SYSTOLIC BLOOD PRESSURE: 188 MMHG | HEART RATE: 69 BPM | DIASTOLIC BLOOD PRESSURE: 98 MMHG | WEIGHT: 133.19 LBS | BODY MASS INDEX: 26.89 KG/M2

## 2021-02-03 DIAGNOSIS — M79.675 TOE PAIN, LEFT: ICD-10-CM

## 2021-02-03 DIAGNOSIS — B35.1 ONYCHOMYCOSIS DUE TO DERMATOPHYTE: ICD-10-CM

## 2021-02-03 DIAGNOSIS — M20.42 HAMMER TOE OF LEFT FOOT: Primary | ICD-10-CM

## 2021-02-03 DIAGNOSIS — L84 CORN OR CALLUS: ICD-10-CM

## 2021-02-03 PROCEDURE — 99214 PR OFFICE/OUTPT VISIT, EST, LEVL IV, 30-39 MIN: ICD-10-PCS | Mod: S$GLB,,, | Performed by: PODIATRIST

## 2021-02-03 PROCEDURE — 99214 OFFICE O/P EST MOD 30 MIN: CPT | Mod: S$GLB,,, | Performed by: PODIATRIST

## 2021-02-03 PROCEDURE — 1159F MED LIST DOCD IN RCRD: CPT | Mod: S$GLB,,, | Performed by: PODIATRIST

## 2021-02-03 PROCEDURE — 1125F PR PAIN SEVERITY QUANTIFIED, PAIN PRESENT: ICD-10-PCS | Mod: S$GLB,,, | Performed by: PODIATRIST

## 2021-02-03 PROCEDURE — 99999 PR PBB SHADOW E&M-EST. PATIENT-LVL III: ICD-10-PCS | Mod: PBBFAC,,, | Performed by: PODIATRIST

## 2021-02-03 PROCEDURE — 1125F AMNT PAIN NOTED PAIN PRSNT: CPT | Mod: S$GLB,,, | Performed by: PODIATRIST

## 2021-02-03 PROCEDURE — 1159F PR MEDICATION LIST DOCUMENTED IN MEDICAL RECORD: ICD-10-PCS | Mod: S$GLB,,, | Performed by: PODIATRIST

## 2021-02-03 PROCEDURE — 99999 PR PBB SHADOW E&M-EST. PATIENT-LVL III: CPT | Mod: PBBFAC,,, | Performed by: PODIATRIST

## 2021-03-05 ENCOUNTER — OFFICE VISIT (OUTPATIENT)
Dept: HOME HEALTH SERVICES | Facility: CLINIC | Age: 86
End: 2021-03-05
Payer: MEDICARE

## 2021-03-05 VITALS
TEMPERATURE: 99 F | WEIGHT: 133 LBS | SYSTOLIC BLOOD PRESSURE: 159 MMHG | HEART RATE: 65 BPM | HEIGHT: 59 IN | DIASTOLIC BLOOD PRESSURE: 82 MMHG | BODY MASS INDEX: 26.81 KG/M2

## 2021-03-05 DIAGNOSIS — I70.0 ATHEROSCLEROSIS OF AORTA: ICD-10-CM

## 2021-03-05 DIAGNOSIS — Z74.09 OTHER REDUCED MOBILITY: ICD-10-CM

## 2021-03-05 DIAGNOSIS — M51.36 DDD (DEGENERATIVE DISC DISEASE), LUMBAR: ICD-10-CM

## 2021-03-05 DIAGNOSIS — N39.41 URINARY INCONTINENCE, URGE: ICD-10-CM

## 2021-03-05 DIAGNOSIS — Z00.00 ENCOUNTER FOR PREVENTIVE HEALTH EXAMINATION: Primary | ICD-10-CM

## 2021-03-05 DIAGNOSIS — I77.810 ECTATIC THORACIC AORTA: ICD-10-CM

## 2021-03-05 DIAGNOSIS — I77.9 MILD CAROTID ARTERY DISEASE: ICD-10-CM

## 2021-03-05 DIAGNOSIS — Z85.3 HISTORY OF BREAST CANCER: ICD-10-CM

## 2021-03-05 DIAGNOSIS — I10 ESSENTIAL HYPERTENSION: ICD-10-CM

## 2021-03-05 PROCEDURE — 1126F AMNT PAIN NOTED NONE PRSNT: CPT | Mod: S$GLB,,, | Performed by: NURSE PRACTITIONER

## 2021-03-05 PROCEDURE — 3288F FALL RISK ASSESSMENT DOCD: CPT | Mod: CPTII,S$GLB,, | Performed by: NURSE PRACTITIONER

## 2021-03-05 PROCEDURE — 1101F PT FALLS ASSESS-DOCD LE1/YR: CPT | Mod: CPTII,S$GLB,, | Performed by: NURSE PRACTITIONER

## 2021-03-05 PROCEDURE — 99499 UNLISTED E&M SERVICE: CPT | Mod: S$GLB,,, | Performed by: NURSE PRACTITIONER

## 2021-03-05 PROCEDURE — 1158F ADVNC CARE PLAN TLK DOCD: CPT | Mod: S$GLB,,, | Performed by: NURSE PRACTITIONER

## 2021-03-05 PROCEDURE — G0439 PR MEDICARE ANNUAL WELLNESS SUBSEQUENT VISIT: ICD-10-PCS | Mod: S$GLB,,, | Performed by: NURSE PRACTITIONER

## 2021-03-05 PROCEDURE — G0439 PPPS, SUBSEQ VISIT: HCPCS | Mod: S$GLB,,, | Performed by: NURSE PRACTITIONER

## 2021-03-05 PROCEDURE — 1101F PR PT FALLS ASSESS DOC 0-1 FALLS W/OUT INJ PAST YR: ICD-10-PCS | Mod: CPTII,S$GLB,, | Performed by: NURSE PRACTITIONER

## 2021-03-05 PROCEDURE — 1158F PR ADVANCE CARE PLANNING DISCUSS DOCUMENTED IN MEDICAL RECORD: ICD-10-PCS | Mod: S$GLB,,, | Performed by: NURSE PRACTITIONER

## 2021-03-05 PROCEDURE — 3288F PR FALLS RISK ASSESSMENT DOCUMENTED: ICD-10-PCS | Mod: CPTII,S$GLB,, | Performed by: NURSE PRACTITIONER

## 2021-03-05 PROCEDURE — 99499 RISK ADDL DX/OHS AUDIT: ICD-10-PCS | Mod: S$GLB,,, | Performed by: NURSE PRACTITIONER

## 2021-03-05 PROCEDURE — 1126F PR PAIN SEVERITY QUANTIFIED, NO PAIN PRESENT: ICD-10-PCS | Mod: S$GLB,,, | Performed by: NURSE PRACTITIONER

## 2021-03-17 ENCOUNTER — OFFICE VISIT (OUTPATIENT)
Dept: UROGYNECOLOGY | Facility: CLINIC | Age: 86
End: 2021-03-17
Payer: MEDICARE

## 2021-03-17 VITALS
HEART RATE: 65 BPM | BODY MASS INDEX: 25.28 KG/M2 | DIASTOLIC BLOOD PRESSURE: 74 MMHG | WEIGHT: 125.38 LBS | HEIGHT: 59 IN | SYSTOLIC BLOOD PRESSURE: 198 MMHG

## 2021-03-17 DIAGNOSIS — N95.2 VAGINAL ATROPHY: ICD-10-CM

## 2021-03-17 DIAGNOSIS — R45.89 DEPRESSED MOOD: ICD-10-CM

## 2021-03-17 DIAGNOSIS — N81.10 PROLAPSE OF ANTERIOR VAGINAL WALL: ICD-10-CM

## 2021-03-17 DIAGNOSIS — N39.41 URGE URINARY INCONTINENCE: ICD-10-CM

## 2021-03-17 DIAGNOSIS — K64.9 HEMORRHOIDS, UNSPECIFIED HEMORRHOID TYPE: ICD-10-CM

## 2021-03-17 DIAGNOSIS — R35.1 NOCTURIA: Primary | ICD-10-CM

## 2021-03-17 DIAGNOSIS — N39.41 URINARY INCONTINENCE, URGE: ICD-10-CM

## 2021-03-17 DIAGNOSIS — N99.3 VAGINAL VAULT PROLAPSE, POSTHYSTERECTOMY: ICD-10-CM

## 2021-03-17 DIAGNOSIS — Z85.3 HISTORY OF BREAST CANCER: ICD-10-CM

## 2021-03-17 PROCEDURE — 1159F PR MEDICATION LIST DOCUMENTED IN MEDICAL RECORD: ICD-10-PCS | Mod: S$GLB,,, | Performed by: OBSTETRICS & GYNECOLOGY

## 2021-03-17 PROCEDURE — 99213 PR OFFICE/OUTPT VISIT, EST, LEVL III, 20-29 MIN: ICD-10-PCS | Mod: S$GLB,,, | Performed by: OBSTETRICS & GYNECOLOGY

## 2021-03-17 PROCEDURE — 1159F MED LIST DOCD IN RCRD: CPT | Mod: S$GLB,,, | Performed by: OBSTETRICS & GYNECOLOGY

## 2021-03-17 PROCEDURE — 1126F AMNT PAIN NOTED NONE PRSNT: CPT | Mod: S$GLB,,, | Performed by: OBSTETRICS & GYNECOLOGY

## 2021-03-17 PROCEDURE — 99999 PR PBB SHADOW E&M-EST. PATIENT-LVL III: CPT | Mod: PBBFAC,,, | Performed by: OBSTETRICS & GYNECOLOGY

## 2021-03-17 PROCEDURE — 1126F PR PAIN SEVERITY QUANTIFIED, NO PAIN PRESENT: ICD-10-PCS | Mod: S$GLB,,, | Performed by: OBSTETRICS & GYNECOLOGY

## 2021-03-17 PROCEDURE — 3288F PR FALLS RISK ASSESSMENT DOCUMENTED: ICD-10-PCS | Mod: CPTII,S$GLB,, | Performed by: OBSTETRICS & GYNECOLOGY

## 2021-03-17 PROCEDURE — 1101F PR PT FALLS ASSESS DOC 0-1 FALLS W/OUT INJ PAST YR: ICD-10-PCS | Mod: CPTII,S$GLB,, | Performed by: OBSTETRICS & GYNECOLOGY

## 2021-03-17 PROCEDURE — 99213 OFFICE O/P EST LOW 20 MIN: CPT | Mod: S$GLB,,, | Performed by: OBSTETRICS & GYNECOLOGY

## 2021-03-17 PROCEDURE — 99999 PR PBB SHADOW E&M-EST. PATIENT-LVL III: ICD-10-PCS | Mod: PBBFAC,,, | Performed by: OBSTETRICS & GYNECOLOGY

## 2021-03-17 PROCEDURE — 3288F FALL RISK ASSESSMENT DOCD: CPT | Mod: CPTII,S$GLB,, | Performed by: OBSTETRICS & GYNECOLOGY

## 2021-03-17 PROCEDURE — 1101F PT FALLS ASSESS-DOCD LE1/YR: CPT | Mod: CPTII,S$GLB,, | Performed by: OBSTETRICS & GYNECOLOGY

## 2021-03-23 ENCOUNTER — TELEPHONE (OUTPATIENT)
Dept: RHEUMATOLOGY | Facility: CLINIC | Age: 86
End: 2021-03-23

## 2021-04-01 ENCOUNTER — HOSPITAL ENCOUNTER (EMERGENCY)
Facility: HOSPITAL | Age: 86
Discharge: HOME | End: 2021-04-02
Attending: EMERGENCY MEDICINE
Payer: MEDICARE

## 2021-04-01 DIAGNOSIS — M79.603 ARM PAIN: ICD-10-CM

## 2021-04-01 DIAGNOSIS — L03.113 CELLULITIS OF RIGHT UPPER EXTREMITY: Primary | ICD-10-CM

## 2021-04-01 LAB
ANION GAP SERPL CALC-SCNC: 8 MMOL/L (ref 8–16)
BASOPHILS # BLD AUTO: 0.02 K/UL (ref 0–0.2)
BASOPHILS NFR BLD: 0.3 % (ref 0–1.9)
BUN SERPL-MCNC: 17 MG/DL (ref 10–30)
BUN SERPL-MCNC: 18 MG/DL (ref 6–30)
CALCIUM SERPL-MCNC: 9.5 MG/DL (ref 8.7–10.5)
CHLORIDE SERPL-SCNC: 100 MMOL/L (ref 95–110)
CHLORIDE SERPL-SCNC: 102 MMOL/L (ref 95–110)
CO2 SERPL-SCNC: 27 MMOL/L (ref 23–29)
CREAT SERPL-MCNC: 0.4 MG/DL (ref 0.5–1.4)
CREAT SERPL-MCNC: 0.6 MG/DL (ref 0.5–1.4)
DIFFERENTIAL METHOD: ABNORMAL
EOSINOPHIL # BLD AUTO: 0.1 K/UL (ref 0–0.5)
EOSINOPHIL NFR BLD: 1.6 % (ref 0–8)
ERYTHROCYTE [DISTWIDTH] IN BLOOD BY AUTOMATED COUNT: 12.8 % (ref 11.5–14.5)
EST. GFR  (AFRICAN AMERICAN): >60 ML/MIN/1.73 M^2
EST. GFR  (NON AFRICAN AMERICAN): >60 ML/MIN/1.73 M^2
GLUCOSE SERPL-MCNC: 105 MG/DL (ref 70–110)
GLUCOSE SERPL-MCNC: 105 MG/DL (ref 70–110)
HCT VFR BLD AUTO: 36.1 % (ref 37–48.5)
HCT VFR BLD CALC: 36 %PCV (ref 36–54)
HGB BLD-MCNC: 12 G/DL (ref 12–16)
IMM GRANULOCYTES # BLD AUTO: 0.03 K/UL (ref 0–0.04)
IMM GRANULOCYTES NFR BLD AUTO: 0.4 % (ref 0–0.5)
LYMPHOCYTES # BLD AUTO: 1.8 K/UL (ref 1–4.8)
LYMPHOCYTES NFR BLD: 26.7 % (ref 18–48)
MCH RBC QN AUTO: 30.4 PG (ref 27–31)
MCHC RBC AUTO-ENTMCNC: 33.2 G/DL (ref 32–36)
MCV RBC AUTO: 91 FL (ref 82–98)
MONOCYTES # BLD AUTO: 0.6 K/UL (ref 0.3–1)
MONOCYTES NFR BLD: 8.1 % (ref 4–15)
NEUTROPHILS # BLD AUTO: 4.2 K/UL (ref 1.8–7.7)
NEUTROPHILS NFR BLD: 62.9 % (ref 38–73)
NRBC BLD-RTO: 0 /100 WBC
PLATELET # BLD AUTO: 183 K/UL (ref 150–450)
PMV BLD AUTO: 10 FL (ref 9.2–12.9)
POC IONIZED CALCIUM: 1.13 MMOL/L (ref 1.06–1.42)
POC TCO2 (MEASURED): 28 MMOL/L (ref 23–29)
POTASSIUM BLD-SCNC: 3.7 MMOL/L (ref 3.5–5.1)
POTASSIUM SERPL-SCNC: 3.9 MMOL/L (ref 3.5–5.1)
RBC # BLD AUTO: 3.95 M/UL (ref 4–5.4)
SAMPLE: ABNORMAL
SODIUM BLD-SCNC: 135 MMOL/L (ref 136–145)
SODIUM SERPL-SCNC: 137 MMOL/L (ref 136–145)
WBC # BLD AUTO: 6.75 K/UL (ref 3.9–12.7)

## 2021-04-01 PROCEDURE — 99284 EMERGENCY DEPT VISIT MOD MDM: CPT | Mod: ,,, | Performed by: EMERGENCY MEDICINE

## 2021-04-01 PROCEDURE — 80048 BASIC METABOLIC PNL TOTAL CA: CPT | Performed by: EMERGENCY MEDICINE

## 2021-04-01 PROCEDURE — 80047 BASIC METABLC PNL IONIZED CA: CPT

## 2021-04-01 PROCEDURE — 99284 PR EMERGENCY DEPT VISIT,LEVEL IV: ICD-10-PCS | Mod: ,,, | Performed by: EMERGENCY MEDICINE

## 2021-04-01 PROCEDURE — 99285 EMERGENCY DEPT VISIT HI MDM: CPT | Mod: 25

## 2021-04-01 PROCEDURE — 85025 COMPLETE CBC W/AUTO DIFF WBC: CPT | Performed by: EMERGENCY MEDICINE

## 2021-04-02 VITALS
SYSTOLIC BLOOD PRESSURE: 164 MMHG | OXYGEN SATURATION: 97 % | BODY MASS INDEX: 25.34 KG/M2 | WEIGHT: 125.44 LBS | HEART RATE: 74 BPM | RESPIRATION RATE: 18 BRPM | TEMPERATURE: 98 F | DIASTOLIC BLOOD PRESSURE: 80 MMHG

## 2021-04-02 PROCEDURE — 25000003 PHARM REV CODE 250: Performed by: EMERGENCY MEDICINE

## 2021-04-02 RX ORDER — CLINDAMYCIN HYDROCHLORIDE 150 MG/1
300 CAPSULE ORAL 4 TIMES DAILY
Qty: 56 CAPSULE | Refills: 0 | Status: SHIPPED | OUTPATIENT
Start: 2021-04-02 | End: 2021-04-09

## 2021-04-02 RX ORDER — CLINDAMYCIN HYDROCHLORIDE 150 MG/1
300 CAPSULE ORAL 4 TIMES DAILY
Qty: 56 CAPSULE | Refills: 0 | Status: SHIPPED | OUTPATIENT
Start: 2021-04-02 | End: 2021-04-02 | Stop reason: SDUPTHER

## 2021-04-02 RX ORDER — CLINDAMYCIN HYDROCHLORIDE 150 MG/1
300 CAPSULE ORAL
Status: COMPLETED | OUTPATIENT
Start: 2021-04-02 | End: 2021-04-02

## 2021-04-02 RX ADMIN — CLINDAMYCIN HYDROCHLORIDE 300 MG: 150 CAPSULE ORAL at 12:04

## 2021-04-05 ENCOUNTER — OFFICE VISIT (OUTPATIENT)
Dept: INTERNAL MEDICINE | Facility: CLINIC | Age: 86
End: 2021-04-05
Payer: MEDICARE

## 2021-04-05 VITALS
BODY MASS INDEX: 25.4 KG/M2 | HEIGHT: 59 IN | SYSTOLIC BLOOD PRESSURE: 124 MMHG | OXYGEN SATURATION: 98 % | WEIGHT: 126 LBS | HEART RATE: 60 BPM | DIASTOLIC BLOOD PRESSURE: 70 MMHG

## 2021-04-05 DIAGNOSIS — M67.431 GANGLION OF RIGHT WRIST: ICD-10-CM

## 2021-04-05 DIAGNOSIS — M19.90 INFLAMMATORY ARTHRITIS: Primary | ICD-10-CM

## 2021-04-05 PROCEDURE — 3288F PR FALLS RISK ASSESSMENT DOCUMENTED: ICD-10-PCS | Mod: CPTII,S$GLB,, | Performed by: INTERNAL MEDICINE

## 2021-04-05 PROCEDURE — 96372 THER/PROPH/DIAG INJ SC/IM: CPT | Mod: S$GLB,,, | Performed by: INTERNAL MEDICINE

## 2021-04-05 PROCEDURE — 1125F PR PAIN SEVERITY QUANTIFIED, PAIN PRESENT: ICD-10-PCS | Mod: S$GLB,,, | Performed by: INTERNAL MEDICINE

## 2021-04-05 PROCEDURE — 3288F FALL RISK ASSESSMENT DOCD: CPT | Mod: CPTII,S$GLB,, | Performed by: INTERNAL MEDICINE

## 2021-04-05 PROCEDURE — 99214 PR OFFICE/OUTPT VISIT, EST, LEVL IV, 30-39 MIN: ICD-10-PCS | Mod: 25,S$GLB,, | Performed by: INTERNAL MEDICINE

## 2021-04-05 PROCEDURE — 96372 PR INJECTION,THERAP/PROPH/DIAG2ST, IM OR SUBCUT: ICD-10-PCS | Mod: S$GLB,,, | Performed by: INTERNAL MEDICINE

## 2021-04-05 PROCEDURE — 99999 PR PBB SHADOW E&M-EST. PATIENT-LVL IV: ICD-10-PCS | Mod: PBBFAC,,, | Performed by: INTERNAL MEDICINE

## 2021-04-05 PROCEDURE — 99999 PR PBB SHADOW E&M-EST. PATIENT-LVL IV: CPT | Mod: PBBFAC,,, | Performed by: INTERNAL MEDICINE

## 2021-04-05 PROCEDURE — 99214 OFFICE O/P EST MOD 30 MIN: CPT | Mod: 25,S$GLB,, | Performed by: INTERNAL MEDICINE

## 2021-04-05 PROCEDURE — 1159F MED LIST DOCD IN RCRD: CPT | Mod: S$GLB,,, | Performed by: INTERNAL MEDICINE

## 2021-04-05 PROCEDURE — 1101F PT FALLS ASSESS-DOCD LE1/YR: CPT | Mod: CPTII,S$GLB,, | Performed by: INTERNAL MEDICINE

## 2021-04-05 PROCEDURE — 1125F AMNT PAIN NOTED PAIN PRSNT: CPT | Mod: S$GLB,,, | Performed by: INTERNAL MEDICINE

## 2021-04-05 PROCEDURE — 1101F PR PT FALLS ASSESS DOC 0-1 FALLS W/OUT INJ PAST YR: ICD-10-PCS | Mod: CPTII,S$GLB,, | Performed by: INTERNAL MEDICINE

## 2021-04-05 PROCEDURE — 1159F PR MEDICATION LIST DOCUMENTED IN MEDICAL RECORD: ICD-10-PCS | Mod: S$GLB,,, | Performed by: INTERNAL MEDICINE

## 2021-04-05 RX ORDER — TRIAMCINOLONE ACETONIDE 40 MG/ML
40 INJECTION, SUSPENSION INTRA-ARTICULAR; INTRAMUSCULAR
Status: COMPLETED | OUTPATIENT
Start: 2021-04-05 | End: 2021-04-05

## 2021-04-05 RX ADMIN — TRIAMCINOLONE ACETONIDE 40 MG: 40 INJECTION, SUSPENSION INTRA-ARTICULAR; INTRAMUSCULAR at 03:04

## 2021-04-06 RX ORDER — ALENDRONATE SODIUM 70 MG/1
70 TABLET ORAL
Qty: 12 TABLET | Refills: 3 | Status: SHIPPED | OUTPATIENT
Start: 2021-04-06 | End: 2022-05-04

## 2021-04-07 ENCOUNTER — OFFICE VISIT (OUTPATIENT)
Dept: PODIATRY | Facility: CLINIC | Age: 86
End: 2021-04-07
Payer: MEDICARE

## 2021-04-07 VITALS
BODY MASS INDEX: 26.58 KG/M2 | SYSTOLIC BLOOD PRESSURE: 172 MMHG | WEIGHT: 131.63 LBS | HEART RATE: 70 BPM | DIASTOLIC BLOOD PRESSURE: 82 MMHG | TEMPERATURE: 99 F

## 2021-04-07 DIAGNOSIS — B35.1 ONYCHOMYCOSIS DUE TO DERMATOPHYTE: Primary | ICD-10-CM

## 2021-04-07 PROCEDURE — 99999 PR PBB SHADOW E&M-EST. PATIENT-LVL III: ICD-10-PCS | Mod: PBBFAC,,, | Performed by: PODIATRIST

## 2021-04-07 PROCEDURE — 17999 PR NON-COVERED FOOT CARE: ICD-10-PCS | Mod: CSM,,, | Performed by: PODIATRIST

## 2021-04-07 PROCEDURE — 1126F AMNT PAIN NOTED NONE PRSNT: CPT | Mod: ,,, | Performed by: PODIATRIST

## 2021-04-07 PROCEDURE — 99499 NO LOS: ICD-10-PCS | Mod: ,,, | Performed by: PODIATRIST

## 2021-04-07 PROCEDURE — 1126F PR PAIN SEVERITY QUANTIFIED, NO PAIN PRESENT: ICD-10-PCS | Mod: ,,, | Performed by: PODIATRIST

## 2021-04-07 PROCEDURE — 99999 PR PBB SHADOW E&M-EST. PATIENT-LVL III: CPT | Mod: PBBFAC,,, | Performed by: PODIATRIST

## 2021-04-07 PROCEDURE — 17999 UNLISTD PX SKN MUC MEMB SUBQ: CPT | Mod: CSM,,, | Performed by: PODIATRIST

## 2021-04-07 PROCEDURE — 99499 UNLISTED E&M SERVICE: CPT | Mod: ,,, | Performed by: PODIATRIST

## 2021-04-12 ENCOUNTER — OFFICE VISIT (OUTPATIENT)
Dept: ORTHOPEDICS | Facility: CLINIC | Age: 86
End: 2021-04-12
Payer: MEDICARE

## 2021-04-12 VITALS — BODY MASS INDEX: 26.41 KG/M2 | WEIGHT: 131 LBS | HEIGHT: 59 IN

## 2021-04-12 DIAGNOSIS — M79.641 RIGHT HAND PAIN: ICD-10-CM

## 2021-04-12 DIAGNOSIS — M25.641 STIFFNESS OF FINGER JOINT OF RIGHT HAND: ICD-10-CM

## 2021-04-12 DIAGNOSIS — M65.4 DE QUERVAIN'S TENOSYNOVITIS, RIGHT: Primary | ICD-10-CM

## 2021-04-12 DIAGNOSIS — M67.431 GANGLION OF RIGHT WRIST: ICD-10-CM

## 2021-04-12 PROCEDURE — 99204 OFFICE O/P NEW MOD 45 MIN: CPT | Mod: S$GLB,,, | Performed by: ORTHOPAEDIC SURGERY

## 2021-04-12 PROCEDURE — 1101F PT FALLS ASSESS-DOCD LE1/YR: CPT | Mod: CPTII,S$GLB,, | Performed by: ORTHOPAEDIC SURGERY

## 2021-04-12 PROCEDURE — 1159F PR MEDICATION LIST DOCUMENTED IN MEDICAL RECORD: ICD-10-PCS | Mod: S$GLB,,, | Performed by: ORTHOPAEDIC SURGERY

## 2021-04-12 PROCEDURE — 1159F MED LIST DOCD IN RCRD: CPT | Mod: S$GLB,,, | Performed by: ORTHOPAEDIC SURGERY

## 2021-04-12 PROCEDURE — 1101F PR PT FALLS ASSESS DOC 0-1 FALLS W/OUT INJ PAST YR: ICD-10-PCS | Mod: CPTII,S$GLB,, | Performed by: ORTHOPAEDIC SURGERY

## 2021-04-12 PROCEDURE — 1125F PR PAIN SEVERITY QUANTIFIED, PAIN PRESENT: ICD-10-PCS | Mod: S$GLB,,, | Performed by: ORTHOPAEDIC SURGERY

## 2021-04-12 PROCEDURE — 3288F PR FALLS RISK ASSESSMENT DOCUMENTED: ICD-10-PCS | Mod: CPTII,S$GLB,, | Performed by: ORTHOPAEDIC SURGERY

## 2021-04-12 PROCEDURE — 99999 PR PBB SHADOW E&M-EST. PATIENT-LVL III: ICD-10-PCS | Mod: PBBFAC,,, | Performed by: ORTHOPAEDIC SURGERY

## 2021-04-12 PROCEDURE — 3288F FALL RISK ASSESSMENT DOCD: CPT | Mod: CPTII,S$GLB,, | Performed by: ORTHOPAEDIC SURGERY

## 2021-04-12 PROCEDURE — 1125F AMNT PAIN NOTED PAIN PRSNT: CPT | Mod: S$GLB,,, | Performed by: ORTHOPAEDIC SURGERY

## 2021-04-12 PROCEDURE — 99999 PR PBB SHADOW E&M-EST. PATIENT-LVL III: CPT | Mod: PBBFAC,,, | Performed by: ORTHOPAEDIC SURGERY

## 2021-04-12 PROCEDURE — 99204 PR OFFICE/OUTPT VISIT, NEW, LEVL IV, 45-59 MIN: ICD-10-PCS | Mod: S$GLB,,, | Performed by: ORTHOPAEDIC SURGERY

## 2021-04-20 ENCOUNTER — OFFICE VISIT (OUTPATIENT)
Dept: INTERNAL MEDICINE | Facility: CLINIC | Age: 86
End: 2021-04-20
Payer: MEDICARE

## 2021-04-20 DIAGNOSIS — A04.72 C. DIFFICILE DIARRHEA: Primary | ICD-10-CM

## 2021-04-20 PROCEDURE — 99441 PR PHYSICIAN TELEPHONE EVALUATION 5-10 MIN: CPT | Mod: 95,,, | Performed by: INTERNAL MEDICINE

## 2021-04-20 PROCEDURE — 1159F PR MEDICATION LIST DOCUMENTED IN MEDICAL RECORD: ICD-10-PCS | Mod: 95,,, | Performed by: INTERNAL MEDICINE

## 2021-04-20 PROCEDURE — 99441 PR PHYSICIAN TELEPHONE EVALUATION 5-10 MIN: ICD-10-PCS | Mod: 95,,, | Performed by: INTERNAL MEDICINE

## 2021-04-20 PROCEDURE — 1159F MED LIST DOCD IN RCRD: CPT | Mod: 95,,, | Performed by: INTERNAL MEDICINE

## 2021-04-20 RX ORDER — METRONIDAZOLE 500 MG/1
500 TABLET ORAL 3 TIMES DAILY
Qty: 30 TABLET | Refills: 0 | Status: SHIPPED | OUTPATIENT
Start: 2021-04-20 | End: 2021-04-30

## 2021-04-20 RX ORDER — BUTYROSPERMUM PARKII(SHEA BUTTER), SIMMONDSIA CHINENSIS (JOJOBA) SEED OIL, ALOE BARBADENSIS LEAF EXTRACT .01; 1; 3.5 G/100G; G/100G; G/100G
250 LIQUID TOPICAL 2 TIMES DAILY
Qty: 20 CAPSULE | Refills: 0 | Status: SHIPPED | OUTPATIENT
Start: 2021-04-20 | End: 2021-04-30

## 2021-05-10 ENCOUNTER — NURSE TRIAGE (OUTPATIENT)
Dept: ADMINISTRATIVE | Facility: CLINIC | Age: 86
End: 2021-05-10

## 2021-05-19 RX ORDER — AMLODIPINE BESYLATE 10 MG/1
10 TABLET ORAL DAILY
Qty: 90 TABLET | Refills: 1 | Status: SHIPPED | OUTPATIENT
Start: 2021-05-19 | End: 2021-10-17

## 2021-05-24 ENCOUNTER — OFFICE VISIT (OUTPATIENT)
Dept: ORTHOPEDICS | Facility: CLINIC | Age: 86
End: 2021-05-24
Payer: MEDICARE

## 2021-05-24 VITALS — BODY MASS INDEX: 26.41 KG/M2 | HEIGHT: 59 IN | WEIGHT: 131 LBS

## 2021-05-24 DIAGNOSIS — M25.641 STIFFNESS OF FINGER JOINT OF RIGHT HAND: Primary | ICD-10-CM

## 2021-05-24 PROCEDURE — 1101F PT FALLS ASSESS-DOCD LE1/YR: CPT | Mod: CPTII,S$GLB,, | Performed by: ORTHOPAEDIC SURGERY

## 2021-05-24 PROCEDURE — 1159F PR MEDICATION LIST DOCUMENTED IN MEDICAL RECORD: ICD-10-PCS | Mod: S$GLB,,, | Performed by: ORTHOPAEDIC SURGERY

## 2021-05-24 PROCEDURE — 99213 OFFICE O/P EST LOW 20 MIN: CPT | Mod: S$GLB,,, | Performed by: ORTHOPAEDIC SURGERY

## 2021-05-24 PROCEDURE — 1159F MED LIST DOCD IN RCRD: CPT | Mod: S$GLB,,, | Performed by: ORTHOPAEDIC SURGERY

## 2021-05-24 PROCEDURE — 99999 PR PBB SHADOW E&M-EST. PATIENT-LVL III: CPT | Mod: PBBFAC,,, | Performed by: ORTHOPAEDIC SURGERY

## 2021-05-24 PROCEDURE — 3288F PR FALLS RISK ASSESSMENT DOCUMENTED: ICD-10-PCS | Mod: CPTII,S$GLB,, | Performed by: ORTHOPAEDIC SURGERY

## 2021-05-24 PROCEDURE — 1126F AMNT PAIN NOTED NONE PRSNT: CPT | Mod: S$GLB,,, | Performed by: ORTHOPAEDIC SURGERY

## 2021-05-24 PROCEDURE — 1101F PR PT FALLS ASSESS DOC 0-1 FALLS W/OUT INJ PAST YR: ICD-10-PCS | Mod: CPTII,S$GLB,, | Performed by: ORTHOPAEDIC SURGERY

## 2021-05-24 PROCEDURE — 3288F FALL RISK ASSESSMENT DOCD: CPT | Mod: CPTII,S$GLB,, | Performed by: ORTHOPAEDIC SURGERY

## 2021-05-24 PROCEDURE — 1126F PR PAIN SEVERITY QUANTIFIED, NO PAIN PRESENT: ICD-10-PCS | Mod: S$GLB,,, | Performed by: ORTHOPAEDIC SURGERY

## 2021-05-24 PROCEDURE — 99999 PR PBB SHADOW E&M-EST. PATIENT-LVL III: ICD-10-PCS | Mod: PBBFAC,,, | Performed by: ORTHOPAEDIC SURGERY

## 2021-05-24 PROCEDURE — 99213 PR OFFICE/OUTPT VISIT, EST, LEVL III, 20-29 MIN: ICD-10-PCS | Mod: S$GLB,,, | Performed by: ORTHOPAEDIC SURGERY

## 2021-06-07 ENCOUNTER — OFFICE VISIT (OUTPATIENT)
Dept: PODIATRY | Facility: CLINIC | Age: 86
End: 2021-06-07
Payer: MEDICARE

## 2021-06-07 VITALS
BODY MASS INDEX: 26.4 KG/M2 | HEIGHT: 59 IN | HEART RATE: 69 BPM | WEIGHT: 130.94 LBS | SYSTOLIC BLOOD PRESSURE: 166 MMHG | DIASTOLIC BLOOD PRESSURE: 79 MMHG

## 2021-06-07 DIAGNOSIS — B35.1 ONYCHOMYCOSIS DUE TO DERMATOPHYTE: Primary | ICD-10-CM

## 2021-06-07 PROCEDURE — 1125F PR PAIN SEVERITY QUANTIFIED, PAIN PRESENT: ICD-10-PCS | Mod: S$GLB,,, | Performed by: PODIATRIST

## 2021-06-07 PROCEDURE — 17999 PR NON-COVERED FOOT CARE: ICD-10-PCS | Mod: CSM,,, | Performed by: PODIATRIST

## 2021-06-07 PROCEDURE — 99499 UNLISTED E&M SERVICE: CPT | Mod: ,,, | Performed by: PODIATRIST

## 2021-06-07 PROCEDURE — 99999 PR PBB SHADOW E&M-EST. PATIENT-LVL III: CPT | Mod: PBBFAC,,, | Performed by: PODIATRIST

## 2021-06-07 PROCEDURE — 1125F AMNT PAIN NOTED PAIN PRSNT: CPT | Mod: S$GLB,,, | Performed by: PODIATRIST

## 2021-06-07 PROCEDURE — 17999 UNLISTD PX SKN MUC MEMB SUBQ: CPT | Mod: CSM,,, | Performed by: PODIATRIST

## 2021-06-07 PROCEDURE — 99499 NO LOS: ICD-10-PCS | Mod: ,,, | Performed by: PODIATRIST

## 2021-06-07 PROCEDURE — 99999 PR PBB SHADOW E&M-EST. PATIENT-LVL III: ICD-10-PCS | Mod: PBBFAC,,, | Performed by: PODIATRIST

## 2021-06-23 ENCOUNTER — OFFICE VISIT (OUTPATIENT)
Dept: UROGYNECOLOGY | Facility: CLINIC | Age: 86
End: 2021-06-23
Payer: MEDICARE

## 2021-06-23 VITALS
HEART RATE: 65 BPM | HEIGHT: 59 IN | WEIGHT: 125.31 LBS | SYSTOLIC BLOOD PRESSURE: 179 MMHG | BODY MASS INDEX: 25.26 KG/M2 | DIASTOLIC BLOOD PRESSURE: 76 MMHG

## 2021-06-23 DIAGNOSIS — R45.89 DEPRESSED MOOD: ICD-10-CM

## 2021-06-23 DIAGNOSIS — N95.2 VAGINAL ATROPHY: ICD-10-CM

## 2021-06-23 DIAGNOSIS — N99.3 VAGINAL VAULT PROLAPSE, POSTHYSTERECTOMY: ICD-10-CM

## 2021-06-23 DIAGNOSIS — R35.1 NOCTURIA: Primary | ICD-10-CM

## 2021-06-23 DIAGNOSIS — N81.10 PROLAPSE OF ANTERIOR VAGINAL WALL: ICD-10-CM

## 2021-06-23 PROCEDURE — 1125F AMNT PAIN NOTED PAIN PRSNT: CPT | Mod: S$GLB,,, | Performed by: OBSTETRICS & GYNECOLOGY

## 2021-06-23 PROCEDURE — 99213 PR OFFICE/OUTPT VISIT, EST, LEVL III, 20-29 MIN: ICD-10-PCS | Mod: S$GLB,,, | Performed by: OBSTETRICS & GYNECOLOGY

## 2021-06-23 PROCEDURE — 3288F FALL RISK ASSESSMENT DOCD: CPT | Mod: CPTII,S$GLB,, | Performed by: OBSTETRICS & GYNECOLOGY

## 2021-06-23 PROCEDURE — 99999 PR PBB SHADOW E&M-EST. PATIENT-LVL IV: CPT | Mod: PBBFAC,,, | Performed by: OBSTETRICS & GYNECOLOGY

## 2021-06-23 PROCEDURE — 99999 PR PBB SHADOW E&M-EST. PATIENT-LVL IV: ICD-10-PCS | Mod: PBBFAC,,, | Performed by: OBSTETRICS & GYNECOLOGY

## 2021-06-23 PROCEDURE — 1159F PR MEDICATION LIST DOCUMENTED IN MEDICAL RECORD: ICD-10-PCS | Mod: S$GLB,,, | Performed by: OBSTETRICS & GYNECOLOGY

## 2021-06-23 PROCEDURE — 3288F PR FALLS RISK ASSESSMENT DOCUMENTED: ICD-10-PCS | Mod: CPTII,S$GLB,, | Performed by: OBSTETRICS & GYNECOLOGY

## 2021-06-23 PROCEDURE — 1125F PR PAIN SEVERITY QUANTIFIED, PAIN PRESENT: ICD-10-PCS | Mod: S$GLB,,, | Performed by: OBSTETRICS & GYNECOLOGY

## 2021-06-23 PROCEDURE — 99213 OFFICE O/P EST LOW 20 MIN: CPT | Mod: S$GLB,,, | Performed by: OBSTETRICS & GYNECOLOGY

## 2021-06-23 PROCEDURE — 1101F PT FALLS ASSESS-DOCD LE1/YR: CPT | Mod: CPTII,S$GLB,, | Performed by: OBSTETRICS & GYNECOLOGY

## 2021-06-23 PROCEDURE — 1159F MED LIST DOCD IN RCRD: CPT | Mod: S$GLB,,, | Performed by: OBSTETRICS & GYNECOLOGY

## 2021-06-23 PROCEDURE — 1101F PR PT FALLS ASSESS DOC 0-1 FALLS W/OUT INJ PAST YR: ICD-10-PCS | Mod: CPTII,S$GLB,, | Performed by: OBSTETRICS & GYNECOLOGY

## 2021-07-13 ENCOUNTER — PATIENT OUTREACH (OUTPATIENT)
Dept: ADMINISTRATIVE | Facility: OTHER | Age: 86
End: 2021-07-13

## 2021-07-14 ENCOUNTER — OFFICE VISIT (OUTPATIENT)
Dept: OPTOMETRY | Facility: CLINIC | Age: 86
End: 2021-07-14
Payer: MEDICARE

## 2021-07-14 DIAGNOSIS — H53.9 VISION DISTURBANCE: Primary | ICD-10-CM

## 2021-07-14 DIAGNOSIS — H52.13 MYOPIA OF BOTH EYES WITH REGULAR ASTIGMATISM: ICD-10-CM

## 2021-07-14 DIAGNOSIS — H52.223 MYOPIA OF BOTH EYES WITH REGULAR ASTIGMATISM: ICD-10-CM

## 2021-07-14 DIAGNOSIS — H53.15 VISUAL DISTORTIONS OF SHAPE AND SIZE: ICD-10-CM

## 2021-07-14 PROCEDURE — 99999 PR PBB SHADOW E&M-EST. PATIENT-LVL III: CPT | Mod: PBBFAC,,, | Performed by: OPTOMETRIST

## 2021-07-14 PROCEDURE — 92134 POSTERIOR SEGMENT OCT RETINA (OCULAR COHERENCE TOMOGRAPHY)-BOTH EYES: ICD-10-PCS | Mod: S$GLB,,, | Performed by: OPTOMETRIST

## 2021-07-14 PROCEDURE — 92134 CPTRZ OPH DX IMG PST SGM RTA: CPT | Mod: S$GLB,,, | Performed by: OPTOMETRIST

## 2021-07-14 PROCEDURE — 1126F AMNT PAIN NOTED NONE PRSNT: CPT | Mod: S$GLB,,, | Performed by: OPTOMETRIST

## 2021-07-14 PROCEDURE — 92015 DETERMINE REFRACTIVE STATE: CPT | Mod: S$GLB,,, | Performed by: OPTOMETRIST

## 2021-07-14 PROCEDURE — 1126F PR PAIN SEVERITY QUANTIFIED, NO PAIN PRESENT: ICD-10-PCS | Mod: S$GLB,,, | Performed by: OPTOMETRIST

## 2021-07-14 PROCEDURE — 99999 PR PBB SHADOW E&M-EST. PATIENT-LVL III: ICD-10-PCS | Mod: PBBFAC,,, | Performed by: OPTOMETRIST

## 2021-07-14 PROCEDURE — 92012 INTRM OPH EXAM EST PATIENT: CPT | Mod: S$GLB,,, | Performed by: OPTOMETRIST

## 2021-07-14 PROCEDURE — 92025 CPTRIZED CORNEAL TOPOGRAPHY: CPT | Mod: S$GLB,,, | Performed by: OPTOMETRIST

## 2021-07-14 PROCEDURE — 92025 COMPUTERIZED CORNEAL TOPOGRAPHY: ICD-10-PCS | Mod: S$GLB,,, | Performed by: OPTOMETRIST

## 2021-07-14 PROCEDURE — 3288F PR FALLS RISK ASSESSMENT DOCUMENTED: ICD-10-PCS | Mod: CPTII,S$GLB,, | Performed by: OPTOMETRIST

## 2021-07-14 PROCEDURE — 92015 PR REFRACTION: ICD-10-PCS | Mod: S$GLB,,, | Performed by: OPTOMETRIST

## 2021-07-14 PROCEDURE — 1101F PT FALLS ASSESS-DOCD LE1/YR: CPT | Mod: CPTII,S$GLB,, | Performed by: OPTOMETRIST

## 2021-07-14 PROCEDURE — 92012 PR EYE EXAM, EST PATIENT,INTERMED: ICD-10-PCS | Mod: S$GLB,,, | Performed by: OPTOMETRIST

## 2021-07-14 PROCEDURE — 1101F PR PT FALLS ASSESS DOC 0-1 FALLS W/OUT INJ PAST YR: ICD-10-PCS | Mod: CPTII,S$GLB,, | Performed by: OPTOMETRIST

## 2021-07-14 PROCEDURE — 3288F FALL RISK ASSESSMENT DOCD: CPT | Mod: CPTII,S$GLB,, | Performed by: OPTOMETRIST

## 2021-08-03 RX ORDER — GABAPENTIN 100 MG/1
100 CAPSULE ORAL NIGHTLY PRN
Qty: 30 CAPSULE | Refills: 0 | Status: SHIPPED | OUTPATIENT
Start: 2021-08-03 | End: 2021-09-09

## 2021-08-10 ENCOUNTER — OFFICE VISIT (OUTPATIENT)
Dept: PODIATRY | Facility: CLINIC | Age: 86
End: 2021-08-10

## 2021-08-10 VITALS
HEART RATE: 62 BPM | BODY MASS INDEX: 26.27 KG/M2 | RESPIRATION RATE: 18 BRPM | SYSTOLIC BLOOD PRESSURE: 132 MMHG | DIASTOLIC BLOOD PRESSURE: 77 MMHG | WEIGHT: 130.31 LBS | HEIGHT: 59 IN

## 2021-08-10 DIAGNOSIS — B35.1 ONYCHOMYCOSIS DUE TO DERMATOPHYTE: Primary | ICD-10-CM

## 2021-08-10 PROCEDURE — 99499 NO LOS: ICD-10-PCS | Mod: ,,, | Performed by: PODIATRIST

## 2021-08-10 PROCEDURE — 17999 PR NON-COVERED FOOT CARE: ICD-10-PCS | Mod: CSM,,, | Performed by: PODIATRIST

## 2021-08-10 PROCEDURE — 99999 PR PBB SHADOW E&M-EST. PATIENT-LVL III: CPT | Mod: PBBFAC,,, | Performed by: PODIATRIST

## 2021-08-10 PROCEDURE — 99999 PR PBB SHADOW E&M-EST. PATIENT-LVL III: ICD-10-PCS | Mod: PBBFAC,,, | Performed by: PODIATRIST

## 2021-08-10 PROCEDURE — 17999 UNLISTD PX SKN MUC MEMB SUBQ: CPT | Mod: CSM,,, | Performed by: PODIATRIST

## 2021-08-10 PROCEDURE — 99499 UNLISTED E&M SERVICE: CPT | Mod: ,,, | Performed by: PODIATRIST

## 2021-08-10 RX ORDER — TRIAMCINOLONE ACETONIDE 40 MG/ML
INJECTION, SUSPENSION INTRA-ARTICULAR; INTRAMUSCULAR
COMMUNITY
Start: 2021-04-05 | End: 2021-10-26

## 2021-09-09 RX ORDER — GABAPENTIN 100 MG/1
CAPSULE ORAL
Qty: 90 CAPSULE | Refills: 1 | Status: SHIPPED | OUTPATIENT
Start: 2021-09-09 | End: 2021-10-26

## 2021-10-05 ENCOUNTER — IMMUNIZATION (OUTPATIENT)
Dept: INTERNAL MEDICINE | Facility: CLINIC | Age: 86
End: 2021-10-05
Payer: MEDICARE

## 2021-10-05 DIAGNOSIS — Z23 NEED FOR VACCINATION: Primary | ICD-10-CM

## 2021-10-05 PROCEDURE — 0003A COVID-19, MRNA, LNP-S, PF, 30 MCG/0.3 ML DOSE VACCINE: CPT | Mod: HCNC,PBBFAC | Performed by: INTERNAL MEDICINE

## 2021-10-05 PROCEDURE — 91300 COVID-19, MRNA, LNP-S, PF, 30 MCG/0.3 ML DOSE VACCINE: CPT | Mod: HCNC,PBBFAC | Performed by: INTERNAL MEDICINE

## 2021-10-13 ENCOUNTER — OFFICE VISIT (OUTPATIENT)
Dept: PODIATRY | Facility: CLINIC | Age: 86
End: 2021-10-13
Payer: MEDICARE

## 2021-10-13 VITALS
BODY MASS INDEX: 26.32 KG/M2 | WEIGHT: 130.31 LBS | DIASTOLIC BLOOD PRESSURE: 78 MMHG | HEART RATE: 64 BPM | SYSTOLIC BLOOD PRESSURE: 169 MMHG

## 2021-10-13 DIAGNOSIS — B35.1 ONYCHOMYCOSIS DUE TO DERMATOPHYTE: Primary | ICD-10-CM

## 2021-10-13 PROCEDURE — 1159F MED LIST DOCD IN RCRD: CPT | Mod: HCNC,CPTII,S$GLB, | Performed by: PODIATRIST

## 2021-10-13 PROCEDURE — 99499 NO LOS: ICD-10-PCS | Mod: HCNC,,, | Performed by: PODIATRIST

## 2021-10-13 PROCEDURE — 99499 UNLISTED E&M SERVICE: CPT | Mod: HCNC,,, | Performed by: PODIATRIST

## 2021-10-13 PROCEDURE — 1125F PR PAIN SEVERITY QUANTIFIED, PAIN PRESENT: ICD-10-PCS | Mod: HCNC,CPTII,S$GLB, | Performed by: PODIATRIST

## 2021-10-13 PROCEDURE — 17999 UNLISTD PX SKN MUC MEMB SUBQ: CPT | Mod: CSM,HCNC,S$GLB, | Performed by: PODIATRIST

## 2021-10-13 PROCEDURE — 1125F AMNT PAIN NOTED PAIN PRSNT: CPT | Mod: HCNC,CPTII,S$GLB, | Performed by: PODIATRIST

## 2021-10-13 PROCEDURE — 17999 PR NON-COVERED FOOT CARE: ICD-10-PCS | Mod: CSM,HCNC,S$GLB, | Performed by: PODIATRIST

## 2021-10-13 PROCEDURE — 1159F PR MEDICATION LIST DOCUMENTED IN MEDICAL RECORD: ICD-10-PCS | Mod: HCNC,CPTII,S$GLB, | Performed by: PODIATRIST

## 2021-10-13 PROCEDURE — 99999 PR PBB SHADOW E&M-EST. PATIENT-LVL III: ICD-10-PCS | Mod: PBBFAC,HCNC,, | Performed by: PODIATRIST

## 2021-10-13 PROCEDURE — 99999 PR PBB SHADOW E&M-EST. PATIENT-LVL III: CPT | Mod: PBBFAC,HCNC,, | Performed by: PODIATRIST

## 2021-10-15 ENCOUNTER — TELEPHONE (OUTPATIENT)
Dept: PODIATRY | Facility: CLINIC | Age: 86
End: 2021-10-15

## 2021-10-18 ENCOUNTER — TELEPHONE (OUTPATIENT)
Dept: OPHTHALMOLOGY | Facility: CLINIC | Age: 86
End: 2021-10-18

## 2021-10-18 ENCOUNTER — TELEPHONE (OUTPATIENT)
Dept: UROGYNECOLOGY | Facility: CLINIC | Age: 86
End: 2021-10-18

## 2021-10-18 DIAGNOSIS — Z12.31 SCREENING MAMMOGRAM, ENCOUNTER FOR: Primary | ICD-10-CM

## 2021-10-26 ENCOUNTER — OFFICE VISIT (OUTPATIENT)
Dept: INTERNAL MEDICINE | Facility: CLINIC | Age: 86
End: 2021-10-26
Payer: MEDICARE

## 2021-10-26 ENCOUNTER — LAB VISIT (OUTPATIENT)
Dept: LAB | Facility: HOSPITAL | Age: 86
End: 2021-10-26
Attending: INTERNAL MEDICINE
Payer: MEDICARE

## 2021-10-26 VITALS
WEIGHT: 123 LBS | SYSTOLIC BLOOD PRESSURE: 118 MMHG | HEART RATE: 67 BPM | OXYGEN SATURATION: 99 % | HEIGHT: 59 IN | DIASTOLIC BLOOD PRESSURE: 70 MMHG | BODY MASS INDEX: 24.8 KG/M2

## 2021-10-26 DIAGNOSIS — I10 ESSENTIAL HYPERTENSION: ICD-10-CM

## 2021-10-26 DIAGNOSIS — M19.90 OSTEOARTHRITIS, UNSPECIFIED OSTEOARTHRITIS TYPE, UNSPECIFIED SITE: ICD-10-CM

## 2021-10-26 DIAGNOSIS — I35.8 AORTIC SYSTOLIC MURMUR ON EXAMINATION: ICD-10-CM

## 2021-10-26 DIAGNOSIS — R09.89 ABDOMINAL AORTIC PULSATION: ICD-10-CM

## 2021-10-26 DIAGNOSIS — Z00.00 ANNUAL PHYSICAL EXAM: Primary | ICD-10-CM

## 2021-10-26 DIAGNOSIS — Z00.00 ANNUAL PHYSICAL EXAM: ICD-10-CM

## 2021-10-26 LAB
ALBUMIN SERPL BCP-MCNC: 4.1 G/DL (ref 3.5–5.2)
ALP SERPL-CCNC: 53 U/L (ref 55–135)
ALT SERPL W/O P-5'-P-CCNC: 19 U/L (ref 10–44)
ANION GAP SERPL CALC-SCNC: 9 MMOL/L (ref 8–16)
AST SERPL-CCNC: 28 U/L (ref 10–40)
BILIRUB SERPL-MCNC: 0.8 MG/DL (ref 0.1–1)
BNP SERPL-MCNC: 155 PG/ML (ref 0–99)
BUN SERPL-MCNC: 16 MG/DL (ref 10–30)
CALCIUM SERPL-MCNC: 10.6 MG/DL (ref 8.7–10.5)
CHLORIDE SERPL-SCNC: 104 MMOL/L (ref 95–110)
CHOLEST SERPL-MCNC: 171 MG/DL (ref 120–199)
CHOLEST/HDLC SERPL: 2.4 {RATIO} (ref 2–5)
CO2 SERPL-SCNC: 28 MMOL/L (ref 23–29)
CREAT SERPL-MCNC: 0.6 MG/DL (ref 0.5–1.4)
EST. GFR  (AFRICAN AMERICAN): >60 ML/MIN/1.73 M^2
EST. GFR  (NON AFRICAN AMERICAN): >60 ML/MIN/1.73 M^2
GLUCOSE SERPL-MCNC: 83 MG/DL (ref 70–110)
HDLC SERPL-MCNC: 70 MG/DL (ref 40–75)
HDLC SERPL: 40.9 % (ref 20–50)
LDLC SERPL CALC-MCNC: 92.4 MG/DL (ref 63–159)
NONHDLC SERPL-MCNC: 101 MG/DL
POTASSIUM SERPL-SCNC: 4.6 MMOL/L (ref 3.5–5.1)
PROT SERPL-MCNC: 7.3 G/DL (ref 6–8.4)
SODIUM SERPL-SCNC: 141 MMOL/L (ref 136–145)
TRIGL SERPL-MCNC: 43 MG/DL (ref 30–150)
TSH SERPL DL<=0.005 MIU/L-ACNC: 0.47 UIU/ML (ref 0.4–4)

## 2021-10-26 PROCEDURE — 1159F PR MEDICATION LIST DOCUMENTED IN MEDICAL RECORD: ICD-10-PCS | Mod: HCNC,CPTII,S$GLB, | Performed by: INTERNAL MEDICINE

## 2021-10-26 PROCEDURE — 83880 ASSAY OF NATRIURETIC PEPTIDE: CPT | Mod: HCNC | Performed by: INTERNAL MEDICINE

## 2021-10-26 PROCEDURE — 99397 PER PM REEVAL EST PAT 65+ YR: CPT | Mod: HCNC,S$GLB,, | Performed by: INTERNAL MEDICINE

## 2021-10-26 PROCEDURE — 80061 LIPID PANEL: CPT | Mod: HCNC | Performed by: INTERNAL MEDICINE

## 2021-10-26 PROCEDURE — 1126F PR PAIN SEVERITY QUANTIFIED, NO PAIN PRESENT: ICD-10-PCS | Mod: HCNC,CPTII,S$GLB, | Performed by: INTERNAL MEDICINE

## 2021-10-26 PROCEDURE — 1126F AMNT PAIN NOTED NONE PRSNT: CPT | Mod: HCNC,CPTII,S$GLB, | Performed by: INTERNAL MEDICINE

## 2021-10-26 PROCEDURE — 84443 ASSAY THYROID STIM HORMONE: CPT | Mod: HCNC | Performed by: INTERNAL MEDICINE

## 2021-10-26 PROCEDURE — 1160F PR REVIEW ALL MEDS BY PRESCRIBER/CLIN PHARMACIST DOCUMENTED: ICD-10-PCS | Mod: HCNC,CPTII,S$GLB, | Performed by: INTERNAL MEDICINE

## 2021-10-26 PROCEDURE — 1159F MED LIST DOCD IN RCRD: CPT | Mod: HCNC,CPTII,S$GLB, | Performed by: INTERNAL MEDICINE

## 2021-10-26 PROCEDURE — 1101F PT FALLS ASSESS-DOCD LE1/YR: CPT | Mod: HCNC,CPTII,S$GLB, | Performed by: INTERNAL MEDICINE

## 2021-10-26 PROCEDURE — 1160F RVW MEDS BY RX/DR IN RCRD: CPT | Mod: HCNC,CPTII,S$GLB, | Performed by: INTERNAL MEDICINE

## 2021-10-26 PROCEDURE — 36415 COLL VENOUS BLD VENIPUNCTURE: CPT | Mod: HCNC | Performed by: INTERNAL MEDICINE

## 2021-10-26 PROCEDURE — 99999 PR PBB SHADOW E&M-EST. PATIENT-LVL IV: CPT | Mod: PBBFAC,HCNC,, | Performed by: INTERNAL MEDICINE

## 2021-10-26 PROCEDURE — 3288F FALL RISK ASSESSMENT DOCD: CPT | Mod: HCNC,CPTII,S$GLB, | Performed by: INTERNAL MEDICINE

## 2021-10-26 PROCEDURE — 99999 PR PBB SHADOW E&M-EST. PATIENT-LVL IV: ICD-10-PCS | Mod: PBBFAC,HCNC,, | Performed by: INTERNAL MEDICINE

## 2021-10-26 PROCEDURE — 3288F PR FALLS RISK ASSESSMENT DOCUMENTED: ICD-10-PCS | Mod: HCNC,CPTII,S$GLB, | Performed by: INTERNAL MEDICINE

## 2021-10-26 PROCEDURE — 1101F PR PT FALLS ASSESS DOC 0-1 FALLS W/OUT INJ PAST YR: ICD-10-PCS | Mod: HCNC,CPTII,S$GLB, | Performed by: INTERNAL MEDICINE

## 2021-10-26 PROCEDURE — 99397 PR PREVENTIVE VISIT,EST,65 & OVER: ICD-10-PCS | Mod: HCNC,S$GLB,, | Performed by: INTERNAL MEDICINE

## 2021-10-26 PROCEDURE — 80053 COMPREHEN METABOLIC PANEL: CPT | Mod: HCNC | Performed by: INTERNAL MEDICINE

## 2021-10-29 ENCOUNTER — HOSPITAL ENCOUNTER (OUTPATIENT)
Dept: RADIOLOGY | Facility: OTHER | Age: 86
Discharge: HOME | End: 2021-10-29
Attending: OBSTETRICS & GYNECOLOGY
Payer: MEDICARE

## 2021-10-29 DIAGNOSIS — Z12.31 SCREENING MAMMOGRAM, ENCOUNTER FOR: ICD-10-CM

## 2021-10-29 PROCEDURE — 77067 SCR MAMMO BI INCL CAD: CPT | Mod: 26,HCNC,, | Performed by: RADIOLOGY

## 2021-10-29 PROCEDURE — 77067 SCR MAMMO BI INCL CAD: CPT | Mod: TC,HCNC

## 2021-10-29 PROCEDURE — 77067 MAMMO DIGITAL SCREENING BILAT: ICD-10-PCS | Mod: 26,HCNC,, | Performed by: RADIOLOGY

## 2021-11-01 ENCOUNTER — HOSPITAL ENCOUNTER (OUTPATIENT)
Dept: VASCULAR SURGERY | Facility: CLINIC | Age: 86
Discharge: HOME | End: 2021-11-01
Attending: INTERNAL MEDICINE
Payer: MEDICARE

## 2021-11-01 ENCOUNTER — HOSPITAL ENCOUNTER (OUTPATIENT)
Dept: CARDIOLOGY | Facility: HOSPITAL | Age: 86
Discharge: HOME | End: 2021-11-01
Attending: INTERNAL MEDICINE
Payer: MEDICARE

## 2021-11-01 ENCOUNTER — DOCUMENTATION ONLY (OUTPATIENT)
Dept: INTERNAL MEDICINE | Facility: CLINIC | Age: 86
End: 2021-11-01
Payer: MEDICARE

## 2021-11-01 ENCOUNTER — OFFICE VISIT (OUTPATIENT)
Dept: OPTOMETRY | Facility: CLINIC | Age: 86
End: 2021-11-01
Payer: MEDICARE

## 2021-11-01 ENCOUNTER — TELEPHONE (OUTPATIENT)
Dept: INTERNAL MEDICINE | Facility: CLINIC | Age: 86
End: 2021-11-01
Payer: MEDICARE

## 2021-11-01 VITALS
BODY MASS INDEX: 24.8 KG/M2 | DIASTOLIC BLOOD PRESSURE: 89 MMHG | SYSTOLIC BLOOD PRESSURE: 187 MMHG | HEART RATE: 63 BPM | HEIGHT: 59 IN | WEIGHT: 123 LBS

## 2021-11-01 DIAGNOSIS — H53.9 VISION DISTURBANCE: Primary | ICD-10-CM

## 2021-11-01 DIAGNOSIS — H53.15 VISUAL DISTORTIONS OF SHAPE AND SIZE: ICD-10-CM

## 2021-11-01 DIAGNOSIS — H52.13 MYOPIA OF BOTH EYES WITH REGULAR ASTIGMATISM: ICD-10-CM

## 2021-11-01 DIAGNOSIS — H52.223 MYOPIA OF BOTH EYES WITH REGULAR ASTIGMATISM: ICD-10-CM

## 2021-11-01 DIAGNOSIS — R09.89 ABDOMINAL AORTIC PULSATION: ICD-10-CM

## 2021-11-01 DIAGNOSIS — Z96.1 PSEUDOPHAKIA OF BOTH EYES: ICD-10-CM

## 2021-11-01 DIAGNOSIS — I71.9: ICD-10-CM

## 2021-11-01 LAB
ASCENDING AORTA: 3.29 CM
AV INDEX (PROSTH): 0.59
AV MEAN GRADIENT: 8 MMHG
AV PEAK GRADIENT: 15 MMHG
AV VALVE AREA: 2.05 CM2
AV VELOCITY RATIO: 0.59
BSA FOR ECHO PROCEDURE: 1.52 M2
CV ECHO LV RWT: 0.5 CM
DOP CALC AO PEAK VEL: 1.92 M/S
DOP CALC AO VTI: 50.15 CM
DOP CALC LVOT AREA: 3.5 CM2
DOP CALC LVOT DIAMETER: 2.1 CM
DOP CALC LVOT PEAK VEL: 1.14 M/S
DOP CALC LVOT STROKE VOLUME: 102.78 CM3
DOP CALCLVOT PEAK VEL VTI: 29.69 CM
E WAVE DECELERATION TIME: 178.1 MSEC
E/A RATIO: 1.03
E/E' RATIO: 17.14 M/S
ECHO LV POSTERIOR WALL: 0.85 CM (ref 0.6–1.1)
EJECTION FRACTION: 65 %
FRACTIONAL SHORTENING: 38 % (ref 28–44)
INTERVENTRICULAR SEPTUM: 1.04 CM (ref 0.6–1.1)
LA MAJOR: 5.5 CM
LA MINOR: 5.2 CM
LA WIDTH: 4.8 CM
LEFT ATRIUM SIZE: 3.5 CM
LEFT ATRIUM VOLUME INDEX MOD: 37 ML/M2
LEFT ATRIUM VOLUME INDEX: 50.9 ML/M2
LEFT ATRIUM VOLUME MOD: 55.44 CM3
LEFT ATRIUM VOLUME: 76.34 CM3
LEFT INTERNAL DIMENSION IN SYSTOLE: 2.1 CM (ref 2.1–4)
LEFT VENTRICLE DIASTOLIC VOLUME INDEX: 31.52 ML/M2
LEFT VENTRICLE DIASTOLIC VOLUME: 47.28 ML
LEFT VENTRICLE MASS INDEX: 61 G/M2
LEFT VENTRICLE SYSTOLIC VOLUME INDEX: 9.6 ML/M2
LEFT VENTRICLE SYSTOLIC VOLUME: 14.41 ML
LEFT VENTRICULAR INTERNAL DIMENSION IN DIASTOLE: 3.4 CM (ref 3.5–6)
LEFT VENTRICULAR MASS: 91.06 G
LV LATERAL E/E' RATIO: 15 M/S
LV SEPTAL E/E' RATIO: 20 M/S
MV A" WAVE DURATION": 10.85 MSEC
MV PEAK A VEL: 1.16 M/S
MV PEAK E VEL: 1.2 M/S
MV STENOSIS PRESSURE HALF TIME: 51.65 MS
MV VALVE AREA P 1/2 METHOD: 4.26 CM2
PISA TR MAX VEL: 3.72 M/S
PULM VEIN S/D RATIO: 1.3
PV PEAK D VEL: 0.5 M/S
PV PEAK S VEL: 0.65 M/S
RA MAJOR: 5 CM
RA PRESSURE: 3 MMHG
RA WIDTH: 3.67 CM
RIGHT VENTRICULAR END-DIASTOLIC DIMENSION: 3.3 CM
RV TISSUE DOPPLER FREE WALL SYSTOLIC VELOCITY 1 (APICAL 4 CHAMBER VIEW): 16.17 CM/S
SINUS: 2.82 CM
STJ: 2.55 CM
TDI LATERAL: 0.08 M/S
TDI SEPTAL: 0.06 M/S
TDI: 0.07 M/S
TR MAX PG: 55 MMHG
TRICUSPID ANNULAR PLANE SYSTOLIC EXCURSION: 3.03 CM
TV REST PULMONARY ARTERY PRESSURE: 58 MMHG

## 2021-11-01 PROCEDURE — 93978 VASCULAR STUDY: CPT | Mod: HCNC,S$GLB,, | Performed by: SURGERY

## 2021-11-01 PROCEDURE — 99999 PR PBB SHADOW E&M-EST. PATIENT-LVL III: ICD-10-PCS | Mod: PBBFAC,HCNC,, | Performed by: OPTOMETRIST

## 2021-11-01 PROCEDURE — 93306 TTE W/DOPPLER COMPLETE: CPT | Mod: HCNC

## 2021-11-01 PROCEDURE — 1159F PR MEDICATION LIST DOCUMENTED IN MEDICAL RECORD: ICD-10-PCS | Mod: HCNC,CPTII,S$GLB, | Performed by: OPTOMETRIST

## 2021-11-01 PROCEDURE — 93306 TTE W/DOPPLER COMPLETE: CPT | Mod: 26,HCNC,, | Performed by: INTERNAL MEDICINE

## 2021-11-01 PROCEDURE — 1159F MED LIST DOCD IN RCRD: CPT | Mod: HCNC,CPTII,S$GLB, | Performed by: OPTOMETRIST

## 2021-11-01 PROCEDURE — 92134 CPTRZ OPH DX IMG PST SGM RTA: CPT | Mod: HCNC,S$GLB,, | Performed by: OPTOMETRIST

## 2021-11-01 PROCEDURE — 3288F PR FALLS RISK ASSESSMENT DOCUMENTED: ICD-10-PCS | Mod: HCNC,CPTII,S$GLB, | Performed by: OPTOMETRIST

## 2021-11-01 PROCEDURE — 92014 COMPRE OPH EXAM EST PT 1/>: CPT | Mod: HCNC,S$GLB,, | Performed by: OPTOMETRIST

## 2021-11-01 PROCEDURE — 93306 ECHO (CUPID ONLY): ICD-10-PCS | Mod: 26,HCNC,, | Performed by: INTERNAL MEDICINE

## 2021-11-01 PROCEDURE — 1101F PR PT FALLS ASSESS DOC 0-1 FALLS W/OUT INJ PAST YR: ICD-10-PCS | Mod: HCNC,CPTII,S$GLB, | Performed by: OPTOMETRIST

## 2021-11-01 PROCEDURE — 3288F FALL RISK ASSESSMENT DOCD: CPT | Mod: HCNC,CPTII,S$GLB, | Performed by: OPTOMETRIST

## 2021-11-01 PROCEDURE — 1126F PR PAIN SEVERITY QUANTIFIED, NO PAIN PRESENT: ICD-10-PCS | Mod: HCNC,CPTII,S$GLB, | Performed by: OPTOMETRIST

## 2021-11-01 PROCEDURE — 99999 PR PBB SHADOW E&M-EST. PATIENT-LVL III: CPT | Mod: PBBFAC,HCNC,, | Performed by: OPTOMETRIST

## 2021-11-01 PROCEDURE — 92134 POSTERIOR SEGMENT OCT RETINA (OCULAR COHERENCE TOMOGRAPHY)-BOTH EYES: ICD-10-PCS | Mod: HCNC,S$GLB,, | Performed by: OPTOMETRIST

## 2021-11-01 PROCEDURE — 92014 PR EYE EXAM, EST PATIENT,COMPREHESV: ICD-10-PCS | Mod: HCNC,S$GLB,, | Performed by: OPTOMETRIST

## 2021-11-01 PROCEDURE — 1101F PT FALLS ASSESS-DOCD LE1/YR: CPT | Mod: HCNC,CPTII,S$GLB, | Performed by: OPTOMETRIST

## 2021-11-01 PROCEDURE — 93978 PR DUPLEX LARGE VESSEL(S),COMPLETE: ICD-10-PCS | Mod: HCNC,S$GLB,, | Performed by: SURGERY

## 2021-11-01 PROCEDURE — 1126F AMNT PAIN NOTED NONE PRSNT: CPT | Mod: HCNC,CPTII,S$GLB, | Performed by: OPTOMETRIST

## 2021-11-02 ENCOUNTER — TELEPHONE (OUTPATIENT)
Dept: UROGYNECOLOGY | Facility: CLINIC | Age: 86
End: 2021-11-02
Payer: MEDICARE

## 2021-11-16 ENCOUNTER — OFFICE VISIT (OUTPATIENT)
Dept: PODIATRY | Facility: CLINIC | Age: 86
End: 2021-11-16
Payer: MEDICARE

## 2021-11-16 VITALS
BODY MASS INDEX: 24.8 KG/M2 | WEIGHT: 123 LBS | HEART RATE: 67 BPM | SYSTOLIC BLOOD PRESSURE: 175 MMHG | HEIGHT: 59 IN | DIASTOLIC BLOOD PRESSURE: 74 MMHG

## 2021-11-16 DIAGNOSIS — B35.1 ONYCHOMYCOSIS DUE TO DERMATOPHYTE: Primary | ICD-10-CM

## 2021-11-16 DIAGNOSIS — L84 CORN OR CALLUS: ICD-10-CM

## 2021-11-16 PROCEDURE — 99499 NO LOS: ICD-10-PCS | Mod: HCNC,,, | Performed by: PODIATRIST

## 2021-11-16 PROCEDURE — 1125F AMNT PAIN NOTED PAIN PRSNT: CPT | Mod: HCNC,CPTII,, | Performed by: PODIATRIST

## 2021-11-16 PROCEDURE — 1125F PR PAIN SEVERITY QUANTIFIED, PAIN PRESENT: ICD-10-PCS | Mod: HCNC,CPTII,, | Performed by: PODIATRIST

## 2021-11-16 PROCEDURE — 17999 PR NON-COVERED FOOT CARE: ICD-10-PCS | Mod: CSM,HCNC,S$GLB, | Performed by: PODIATRIST

## 2021-11-16 PROCEDURE — 1159F MED LIST DOCD IN RCRD: CPT | Mod: HCNC,CPTII,, | Performed by: PODIATRIST

## 2021-11-16 PROCEDURE — 17999 UNLISTD PX SKN MUC MEMB SUBQ: CPT | Mod: CSM,HCNC,S$GLB, | Performed by: PODIATRIST

## 2021-11-16 PROCEDURE — 1160F RVW MEDS BY RX/DR IN RCRD: CPT | Mod: HCNC,CPTII,, | Performed by: PODIATRIST

## 2021-11-16 PROCEDURE — 99499 UNLISTED E&M SERVICE: CPT | Mod: HCNC,,, | Performed by: PODIATRIST

## 2021-11-16 PROCEDURE — 1159F PR MEDICATION LIST DOCUMENTED IN MEDICAL RECORD: ICD-10-PCS | Mod: HCNC,CPTII,, | Performed by: PODIATRIST

## 2021-11-16 PROCEDURE — 99999 PR PBB SHADOW E&M-EST. PATIENT-LVL III: CPT | Mod: PBBFAC,HCNC,, | Performed by: PODIATRIST

## 2021-11-16 PROCEDURE — 99999 PR PBB SHADOW E&M-EST. PATIENT-LVL III: ICD-10-PCS | Mod: PBBFAC,HCNC,, | Performed by: PODIATRIST

## 2021-11-16 PROCEDURE — 1160F PR REVIEW ALL MEDS BY PRESCRIBER/CLIN PHARMACIST DOCUMENTED: ICD-10-PCS | Mod: HCNC,CPTII,, | Performed by: PODIATRIST

## 2021-11-23 ENCOUNTER — TELEPHONE (OUTPATIENT)
Dept: OPTOMETRY | Facility: CLINIC | Age: 86
End: 2021-11-23
Payer: MEDICARE

## 2021-11-29 ENCOUNTER — PATIENT OUTREACH (OUTPATIENT)
Dept: ADMINISTRATIVE | Facility: OTHER | Age: 86
End: 2021-11-29
Payer: MEDICARE

## 2021-11-30 ENCOUNTER — OFFICE VISIT (OUTPATIENT)
Dept: RHEUMATOLOGY | Facility: CLINIC | Age: 86
End: 2021-11-30
Payer: MEDICARE

## 2021-11-30 VITALS
HEART RATE: 68 BPM | SYSTOLIC BLOOD PRESSURE: 151 MMHG | WEIGHT: 130.5 LBS | BODY MASS INDEX: 26.36 KG/M2 | DIASTOLIC BLOOD PRESSURE: 71 MMHG

## 2021-11-30 DIAGNOSIS — M11.20 CALCIUM PYROPHOSPHATE DEPOSITION DISEASE (CPPD): ICD-10-CM

## 2021-11-30 DIAGNOSIS — M51.36 DDD (DEGENERATIVE DISC DISEASE), LUMBAR: ICD-10-CM

## 2021-11-30 DIAGNOSIS — M47.816 SPONDYLOSIS OF LUMBAR REGION WITHOUT MYELOPATHY OR RADICULOPATHY: ICD-10-CM

## 2021-11-30 DIAGNOSIS — M15.9 PRIMARY OSTEOARTHRITIS INVOLVING MULTIPLE JOINTS: Primary | ICD-10-CM

## 2021-11-30 PROCEDURE — 99999 PR PBB SHADOW E&M-EST. PATIENT-LVL III: ICD-10-PCS | Mod: PBBFAC,HCNC,, | Performed by: INTERNAL MEDICINE

## 2021-11-30 PROCEDURE — 99999 PR PBB SHADOW E&M-EST. PATIENT-LVL III: CPT | Mod: PBBFAC,HCNC,, | Performed by: INTERNAL MEDICINE

## 2021-11-30 PROCEDURE — 99214 OFFICE O/P EST MOD 30 MIN: CPT | Mod: HCNC,S$GLB,, | Performed by: INTERNAL MEDICINE

## 2021-11-30 PROCEDURE — 99214 PR OFFICE/OUTPT VISIT, EST, LEVL IV, 30-39 MIN: ICD-10-PCS | Mod: HCNC,S$GLB,, | Performed by: INTERNAL MEDICINE

## 2022-01-13 ENCOUNTER — OFFICE VISIT (OUTPATIENT)
Dept: OPTOMETRY | Facility: CLINIC | Age: 87
End: 2022-01-13
Payer: MEDICARE

## 2022-01-13 DIAGNOSIS — H53.9 VISION DISTURBANCE: Primary | ICD-10-CM

## 2022-01-13 DIAGNOSIS — H53.15 VISUAL DISTORTIONS OF SHAPE AND SIZE: ICD-10-CM

## 2022-01-13 PROCEDURE — 1159F MED LIST DOCD IN RCRD: CPT | Mod: HCNC,CPTII,S$GLB, | Performed by: OPTOMETRIST

## 2022-01-13 PROCEDURE — 92133 POSTERIOR SEGMENT OCT OPTIC NERVE(OCULAR COHERENCE TOMOGRAPHY) - OU - BOTH EYES: ICD-10-PCS | Mod: HCNC,S$GLB,, | Performed by: OPTOMETRIST

## 2022-01-13 PROCEDURE — 92014 COMPRE OPH EXAM EST PT 1/>: CPT | Mod: HCNC,S$GLB,, | Performed by: OPTOMETRIST

## 2022-01-13 PROCEDURE — 1101F PT FALLS ASSESS-DOCD LE1/YR: CPT | Mod: HCNC,CPTII,S$GLB, | Performed by: OPTOMETRIST

## 2022-01-13 PROCEDURE — 99999 PR PBB SHADOW E&M-EST. PATIENT-LVL III: CPT | Mod: PBBFAC,HCNC,, | Performed by: OPTOMETRIST

## 2022-01-13 PROCEDURE — 1159F PR MEDICATION LIST DOCUMENTED IN MEDICAL RECORD: ICD-10-PCS | Mod: HCNC,CPTII,S$GLB, | Performed by: OPTOMETRIST

## 2022-01-13 PROCEDURE — 3288F FALL RISK ASSESSMENT DOCD: CPT | Mod: HCNC,CPTII,S$GLB, | Performed by: OPTOMETRIST

## 2022-01-13 PROCEDURE — 1101F PR PT FALLS ASSESS DOC 0-1 FALLS W/OUT INJ PAST YR: ICD-10-PCS | Mod: HCNC,CPTII,S$GLB, | Performed by: OPTOMETRIST

## 2022-01-13 PROCEDURE — 3288F PR FALLS RISK ASSESSMENT DOCUMENTED: ICD-10-PCS | Mod: HCNC,CPTII,S$GLB, | Performed by: OPTOMETRIST

## 2022-01-13 PROCEDURE — 92014 PR EYE EXAM, EST PATIENT,COMPREHESV: ICD-10-PCS | Mod: HCNC,S$GLB,, | Performed by: OPTOMETRIST

## 2022-01-13 PROCEDURE — 99999 PR PBB SHADOW E&M-EST. PATIENT-LVL III: ICD-10-PCS | Mod: PBBFAC,HCNC,, | Performed by: OPTOMETRIST

## 2022-01-13 PROCEDURE — 92083 EXTENDED VISUAL FIELD XM: CPT | Mod: HCNC,S$GLB,, | Performed by: OPTOMETRIST

## 2022-01-13 PROCEDURE — 92083 HUMPHREY VISUAL FIELD - OU - BOTH EYES: ICD-10-PCS | Mod: HCNC,S$GLB,, | Performed by: OPTOMETRIST

## 2022-01-13 PROCEDURE — 1126F AMNT PAIN NOTED NONE PRSNT: CPT | Mod: HCNC,CPTII,S$GLB, | Performed by: OPTOMETRIST

## 2022-01-13 PROCEDURE — 1126F PR PAIN SEVERITY QUANTIFIED, NO PAIN PRESENT: ICD-10-PCS | Mod: HCNC,CPTII,S$GLB, | Performed by: OPTOMETRIST

## 2022-01-13 PROCEDURE — 92133 CPTRZD OPH DX IMG PST SGM ON: CPT | Mod: HCNC,S$GLB,, | Performed by: OPTOMETRIST

## 2022-01-13 NOTE — PROGRESS NOTES
HPI     Concerns About Ocular Health      Additional comments: 2 month HVF and color vision              Comments     Last eye exam was 11/1/21 with Dr. Mccormick.  Patient states still seeing black spots in center of vision OU since last   exam. Also, the spots will glitter only when she's driving.  Patient denies diplopia, headaches, and pain.            Last edited by Isabella Grady MA on 1/13/2022  1:23 PM. (History)            Assessment /Plan     For exam results, see Encounter Report.    Vision disturbance  -     OCT - Optic Nerve  -     Morse Visual Field - OU - Extended - Both Eyes    Visual distortions of shape and size  -     OCT - Optic Nerve  -     Morse Visual Field - OU - Extended - Both Eyes            1-2.  Patient still sees a small gray spot in each eye while driving and watching TV.  Does not see them in the dark.  Overall testing is normal--can not recreate spots.  Feels they are just floaters.  These spots are causing patient a lot of stress.  Reassured patient eye health is normal.  Gave patient sunglasses to wear while driving.  Suggested patient stop focusing on spots.  Will discuss with Dr. Calhoun.  RTC 2 weeks for follow-up.

## 2022-01-15 NOTE — TELEPHONE ENCOUNTER
No new care gaps identified.  Powered by [employer]. Reference number: [id number].   1/15/2022 5:50:28 AM CST

## 2022-01-17 RX ORDER — BENAZEPRIL HYDROCHLORIDE 40 MG/1
TABLET ORAL
Qty: 90 TABLET | Refills: 1 | Status: SHIPPED | OUTPATIENT
Start: 2022-01-17 | End: 2022-07-15

## 2022-01-26 ENCOUNTER — PATIENT OUTREACH (OUTPATIENT)
Dept: ADMINISTRATIVE | Facility: OTHER | Age: 87
End: 2022-01-26
Payer: MEDICARE

## 2022-01-27 ENCOUNTER — OFFICE VISIT (OUTPATIENT)
Dept: OPTOMETRY | Facility: CLINIC | Age: 87
End: 2022-01-27
Payer: MEDICARE

## 2022-01-27 DIAGNOSIS — H53.9 VISION DISTURBANCE: Primary | ICD-10-CM

## 2022-01-27 PROCEDURE — 1159F MED LIST DOCD IN RCRD: CPT | Mod: HCNC,CPTII,S$GLB, | Performed by: OPTOMETRIST

## 2022-01-27 PROCEDURE — 99999 PR PBB SHADOW E&M-EST. PATIENT-LVL I: CPT | Mod: PBBFAC,HCNC,, | Performed by: OPTOMETRIST

## 2022-01-27 PROCEDURE — 99499 NO LOS: ICD-10-PCS | Mod: HCNC,S$GLB,, | Performed by: OPTOMETRIST

## 2022-01-27 PROCEDURE — 99499 UNLISTED E&M SERVICE: CPT | Mod: HCNC,S$GLB,, | Performed by: OPTOMETRIST

## 2022-01-27 PROCEDURE — 99999 PR PBB SHADOW E&M-EST. PATIENT-LVL I: ICD-10-PCS | Mod: PBBFAC,HCNC,, | Performed by: OPTOMETRIST

## 2022-01-27 PROCEDURE — 1159F PR MEDICATION LIST DOCUMENTED IN MEDICAL RECORD: ICD-10-PCS | Mod: HCNC,CPTII,S$GLB, | Performed by: OPTOMETRIST

## 2022-01-27 NOTE — PROGRESS NOTES
Assessment /Plan     For exam results, see Encounter Report.    Vision disturbance            1.  Patient states condition has not improved--worse.  Along with the circles she now sees squiggly lines in both eyes.  Interferes with her watching TV at night and driving.  Recommended patient wear sunglasses while driving but not sure if patient complied.  Feel that she is seeing floaters.  Patient is scared she is going to go blind and just wants these spots out of her eyes.  Discussed with Dr. Calhoun and she does not feel this is a neurological problems.  Will refer to retina for further evaluation--pt needs reassurance.

## 2022-02-01 ENCOUNTER — OFFICE VISIT (OUTPATIENT)
Dept: PODIATRY | Facility: CLINIC | Age: 87
End: 2022-02-01
Payer: MEDICARE

## 2022-02-01 VITALS
WEIGHT: 130.5 LBS | DIASTOLIC BLOOD PRESSURE: 83 MMHG | HEART RATE: 74 BPM | BODY MASS INDEX: 26.36 KG/M2 | SYSTOLIC BLOOD PRESSURE: 180 MMHG

## 2022-02-01 DIAGNOSIS — B35.1 ONYCHOMYCOSIS DUE TO DERMATOPHYTE: Primary | ICD-10-CM

## 2022-02-01 DIAGNOSIS — L84 CORN OR CALLUS: ICD-10-CM

## 2022-02-01 PROCEDURE — 1125F PR PAIN SEVERITY QUANTIFIED, PAIN PRESENT: ICD-10-PCS | Mod: HCNC,CPTII,, | Performed by: PODIATRIST

## 2022-02-01 PROCEDURE — 1125F AMNT PAIN NOTED PAIN PRSNT: CPT | Mod: HCNC,CPTII,, | Performed by: PODIATRIST

## 2022-02-01 PROCEDURE — 99999 PR PBB SHADOW E&M-EST. PATIENT-LVL III: ICD-10-PCS | Mod: PBBFAC,HCNC,, | Performed by: PODIATRIST

## 2022-02-01 PROCEDURE — 1160F RVW MEDS BY RX/DR IN RCRD: CPT | Mod: HCNC,CPTII,, | Performed by: PODIATRIST

## 2022-02-01 PROCEDURE — 17999 UNLISTD PX SKN MUC MEMB SUBQ: CPT | Mod: CSM,HCNC,S$GLB, | Performed by: PODIATRIST

## 2022-02-01 PROCEDURE — 1159F PR MEDICATION LIST DOCUMENTED IN MEDICAL RECORD: ICD-10-PCS | Mod: HCNC,CPTII,, | Performed by: PODIATRIST

## 2022-02-01 PROCEDURE — 99499 UNLISTED E&M SERVICE: CPT | Mod: HCNC,,, | Performed by: PODIATRIST

## 2022-02-01 PROCEDURE — 99499 NO LOS: ICD-10-PCS | Mod: HCNC,,, | Performed by: PODIATRIST

## 2022-02-01 PROCEDURE — 17999 PR NON-COVERED FOOT CARE: ICD-10-PCS | Mod: CSM,HCNC,S$GLB, | Performed by: PODIATRIST

## 2022-02-01 PROCEDURE — 1159F MED LIST DOCD IN RCRD: CPT | Mod: HCNC,CPTII,, | Performed by: PODIATRIST

## 2022-02-01 PROCEDURE — 1160F PR REVIEW ALL MEDS BY PRESCRIBER/CLIN PHARMACIST DOCUMENTED: ICD-10-PCS | Mod: HCNC,CPTII,, | Performed by: PODIATRIST

## 2022-02-01 PROCEDURE — 99999 PR PBB SHADOW E&M-EST. PATIENT-LVL III: CPT | Mod: PBBFAC,HCNC,, | Performed by: PODIATRIST

## 2022-02-01 RX ORDER — DICLOFENAC SODIUM 10 MG/G
2 GEL TOPICAL 3 TIMES DAILY
Qty: 100 G | Refills: 3 | Status: ON HOLD | OUTPATIENT
Start: 2022-02-01 | End: 2024-03-13 | Stop reason: CLARIF

## 2022-02-01 NOTE — PROGRESS NOTES
Pt presents for routine non-covered foot care. Pt. does not have high risk feet. Pedal pulses are palpable. Nails are elongated, thickened Bilaterally. Diagnosis is onychauxis. Nails were reduced Bilaterally. Patient tolerated well and related relief. RTC 9 weeks as PROC B

## 2022-02-04 ENCOUNTER — OFFICE VISIT (OUTPATIENT)
Dept: OPHTHALMOLOGY | Facility: CLINIC | Age: 87
End: 2022-02-04
Payer: MEDICARE

## 2022-02-04 DIAGNOSIS — H04.123 DRY EYE SYNDROME OF BOTH EYES: ICD-10-CM

## 2022-02-04 DIAGNOSIS — Z96.1 PSEUDOPHAKIA OF BOTH EYES: ICD-10-CM

## 2022-02-04 DIAGNOSIS — H43.813 POSTERIOR VITREOUS DETACHMENT OF BOTH EYES: Primary | ICD-10-CM

## 2022-02-04 PROCEDURE — 1101F PT FALLS ASSESS-DOCD LE1/YR: CPT | Mod: HCNC,CPTII,S$GLB, | Performed by: OPHTHALMOLOGY

## 2022-02-04 PROCEDURE — 92134 OCT, RETINA - OU - BOTH EYES: ICD-10-PCS | Mod: HCNC,S$GLB,, | Performed by: OPHTHALMOLOGY

## 2022-02-04 PROCEDURE — 3288F PR FALLS RISK ASSESSMENT DOCUMENTED: ICD-10-PCS | Mod: HCNC,CPTII,S$GLB, | Performed by: OPHTHALMOLOGY

## 2022-02-04 PROCEDURE — 1160F RVW MEDS BY RX/DR IN RCRD: CPT | Mod: HCNC,CPTII,S$GLB, | Performed by: OPHTHALMOLOGY

## 2022-02-04 PROCEDURE — 1126F AMNT PAIN NOTED NONE PRSNT: CPT | Mod: HCNC,CPTII,S$GLB, | Performed by: OPHTHALMOLOGY

## 2022-02-04 PROCEDURE — 1160F PR REVIEW ALL MEDS BY PRESCRIBER/CLIN PHARMACIST DOCUMENTED: ICD-10-PCS | Mod: HCNC,CPTII,S$GLB, | Performed by: OPHTHALMOLOGY

## 2022-02-04 PROCEDURE — 1126F PR PAIN SEVERITY QUANTIFIED, NO PAIN PRESENT: ICD-10-PCS | Mod: HCNC,CPTII,S$GLB, | Performed by: OPHTHALMOLOGY

## 2022-02-04 PROCEDURE — 1101F PR PT FALLS ASSESS DOC 0-1 FALLS W/OUT INJ PAST YR: ICD-10-PCS | Mod: HCNC,CPTII,S$GLB, | Performed by: OPHTHALMOLOGY

## 2022-02-04 PROCEDURE — 92134 CPTRZ OPH DX IMG PST SGM RTA: CPT | Mod: HCNC,S$GLB,, | Performed by: OPHTHALMOLOGY

## 2022-02-04 PROCEDURE — 1159F MED LIST DOCD IN RCRD: CPT | Mod: HCNC,CPTII,S$GLB, | Performed by: OPHTHALMOLOGY

## 2022-02-04 PROCEDURE — 92201 OPSCPY EXTND RTA DRAW UNI/BI: CPT | Mod: HCNC,S$GLB,, | Performed by: OPHTHALMOLOGY

## 2022-02-04 PROCEDURE — 99999 PR PBB SHADOW E&M-EST. PATIENT-LVL III: ICD-10-PCS | Mod: PBBFAC,HCNC,, | Performed by: OPHTHALMOLOGY

## 2022-02-04 PROCEDURE — 99204 PR OFFICE/OUTPT VISIT, NEW, LEVL IV, 45-59 MIN: ICD-10-PCS | Mod: HCNC,S$GLB,, | Performed by: OPHTHALMOLOGY

## 2022-02-04 PROCEDURE — 99204 OFFICE O/P NEW MOD 45 MIN: CPT | Mod: HCNC,S$GLB,, | Performed by: OPHTHALMOLOGY

## 2022-02-04 PROCEDURE — 1159F PR MEDICATION LIST DOCUMENTED IN MEDICAL RECORD: ICD-10-PCS | Mod: HCNC,CPTII,S$GLB, | Performed by: OPHTHALMOLOGY

## 2022-02-04 PROCEDURE — 92201 PR OPHTHALMOSCOPY, EXT, W/RET DRAW/SCLERAL DEPR, I&R, UNI/BI: ICD-10-PCS | Mod: HCNC,S$GLB,, | Performed by: OPHTHALMOLOGY

## 2022-02-04 PROCEDURE — 99999 PR PBB SHADOW E&M-EST. PATIENT-LVL III: CPT | Mod: PBBFAC,HCNC,, | Performed by: OPHTHALMOLOGY

## 2022-02-04 PROCEDURE — 3288F FALL RISK ASSESSMENT DOCD: CPT | Mod: HCNC,CPTII,S$GLB, | Performed by: OPHTHALMOLOGY

## 2022-02-04 NOTE — PROGRESS NOTES
HPI     DLS 01/27/2022 Jace patient     Vision disturbance      Patient states condition has not improved--worse.  Along with the circles   she now sees squiggly lines in both eyes.  Interferes with her watching TV   at night and driving  Feel that she is seeing floaters.  Patient is scared   she is going to go blind and just wants these spots out of her eyes.    Discussed with Dr. Calhoun and she does not feel this is a neurological   problems.  Patient is here for second opinion      Last edited by ROLY Donaldson on 2/4/2022 12:56 PM. (History)          A/P    ICD-10-CM ICD-9-CM   1. Posterior vitreous detachment of both eyes  H43.813 379.21   2. Pseudophakia of both eyes  Z96.1 V43.1   3. Dry eye syndrome of both eyes  H04.123 375.15       1. Posterior vitreous detachment of both eyes  New referral from Dr Mccormick for floaters  Pt has had longstanding floaters OU, bothersome when she watches tv, no flashes of light, no trauma    Exam notable for prominent PVD OU, no RT/RD OU with   Plan: Observation   Pathology of PVD, Retinal Tear, Retinal Detachment reviewed in great detail  RD precautions discussed in detail, patient expressed understanding  RTC immediately PRN (especially ANY change flashes, floaters, vision, visual field)     2. Pseudophakia of both eyes  Good lens position  Plan: Observation     3. Dry eye syndrome of both eyes  Mild dry eye  Rec ATs prn    RTC 6 months DEF/OCTm OU    I saw and examined the patient and reviewed in detail the findings documented. The final examination findings, image interpretations, and plan as documented in the record represent my personal judgment and conclusions.    Ramesh Esteban MD  Vitreoretinal Surgery   [facility]

## 2022-02-07 ENCOUNTER — TELEPHONE (OUTPATIENT)
Dept: PODIATRY | Facility: CLINIC | Age: 87
End: 2022-02-07
Payer: MEDICARE
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
## 2022-02-07 NOTE — TELEPHONE ENCOUNTER
----- Message from Nory Walden sent at 2/7/2022  1:34 PM CST -----  Regarding: Orders  Contact: [phone]  Calling to x-ray of right ankle due to extreme pain. Please call and confirm or advise patient.

## 2022-02-08 ENCOUNTER — TELEPHONE (OUTPATIENT)
Dept: PODIATRY | Facility: CLINIC | Age: 87
End: 2022-02-08
Payer: MEDICARE

## 2022-02-08 NOTE — TELEPHONE ENCOUNTER
"Pt informed per Dr. Pappas she can try over the counter meds  like Bengay or Tiger Isabella for pain. Pt states "I have an appointment coming up I will just talk to him then"     "

## 2022-02-10 ENCOUNTER — HOSPITAL ENCOUNTER (OUTPATIENT)
Dept: RADIOLOGY | Facility: HOSPITAL | Age: 87
Discharge: HOME | End: 2022-02-10
Attending: PODIATRIST
Payer: MEDICARE

## 2022-02-10 ENCOUNTER — OFFICE VISIT (OUTPATIENT)
Dept: PODIATRY | Facility: CLINIC | Age: 87
End: 2022-02-10
Payer: MEDICARE

## 2022-02-10 VITALS
HEIGHT: 59 IN | BODY MASS INDEX: 26.31 KG/M2 | HEART RATE: 73 BPM | WEIGHT: 130.5 LBS | DIASTOLIC BLOOD PRESSURE: 87 MMHG | SYSTOLIC BLOOD PRESSURE: 175 MMHG

## 2022-02-10 DIAGNOSIS — M25.571 ACUTE RIGHT ANKLE PAIN: ICD-10-CM

## 2022-02-10 DIAGNOSIS — M76.61 ACHILLES TENDINITIS, RIGHT LEG: ICD-10-CM

## 2022-02-10 DIAGNOSIS — M76.61 ACHILLES TENDINITIS, RIGHT LEG: Primary | ICD-10-CM

## 2022-02-10 PROCEDURE — 99999 PR PBB SHADOW E&M-EST. PATIENT-LVL IV: CPT | Mod: PBBFAC,HCNC,, | Performed by: PODIATRIST

## 2022-02-10 PROCEDURE — 1125F PR PAIN SEVERITY QUANTIFIED, PAIN PRESENT: ICD-10-PCS | Mod: HCNC,CPTII,S$GLB, | Performed by: PODIATRIST

## 2022-02-10 PROCEDURE — 1160F PR REVIEW ALL MEDS BY PRESCRIBER/CLIN PHARMACIST DOCUMENTED: ICD-10-PCS | Mod: HCNC,CPTII,S$GLB, | Performed by: PODIATRIST

## 2022-02-10 PROCEDURE — 73610 X-RAY EXAM OF ANKLE: CPT | Mod: TC,HCNC,RT

## 2022-02-10 PROCEDURE — 99999 PR PBB SHADOW E&M-EST. PATIENT-LVL IV: ICD-10-PCS | Mod: PBBFAC,HCNC,, | Performed by: PODIATRIST

## 2022-02-10 PROCEDURE — 1159F MED LIST DOCD IN RCRD: CPT | Mod: HCNC,CPTII,S$GLB, | Performed by: PODIATRIST

## 2022-02-10 PROCEDURE — 99213 PR OFFICE/OUTPT VISIT, EST, LEVL III, 20-29 MIN: ICD-10-PCS | Mod: HCNC,S$GLB,, | Performed by: PODIATRIST

## 2022-02-10 PROCEDURE — 1160F RVW MEDS BY RX/DR IN RCRD: CPT | Mod: HCNC,CPTII,S$GLB, | Performed by: PODIATRIST

## 2022-02-10 PROCEDURE — 73610 XR ANKLE COMPLETE 3 VIEW RIGHT: ICD-10-PCS | Mod: 26,HCNC,RT, | Performed by: STUDENT IN AN ORGANIZED HEALTH CARE EDUCATION/TRAINING PROGRAM

## 2022-02-10 PROCEDURE — 1125F AMNT PAIN NOTED PAIN PRSNT: CPT | Mod: HCNC,CPTII,S$GLB, | Performed by: PODIATRIST

## 2022-02-10 PROCEDURE — 99213 OFFICE O/P EST LOW 20 MIN: CPT | Mod: HCNC,S$GLB,, | Performed by: PODIATRIST

## 2022-02-10 PROCEDURE — 73610 X-RAY EXAM OF ANKLE: CPT | Mod: 26,HCNC,RT, | Performed by: STUDENT IN AN ORGANIZED HEALTH CARE EDUCATION/TRAINING PROGRAM

## 2022-02-10 PROCEDURE — 1159F PR MEDICATION LIST DOCUMENTED IN MEDICAL RECORD: ICD-10-PCS | Mod: HCNC,CPTII,S$GLB, | Performed by: PODIATRIST
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
## 2022-02-15 ENCOUNTER — TELEPHONE (OUTPATIENT)
Dept: PODIATRY | Facility: CLINIC | Age: 87
End: 2022-02-15
Payer: MEDICARE

## 2022-02-15 NOTE — TELEPHONE ENCOUNTER
Nurse informed pt per Dr. Pappas    Please call her and let her know that her Xray of her ankle looks fine. No abnormalities noted. No fracture, dislocation and not even a bone spur was noted. Continue with current treatment plan and follow up as scheduled.  We can consider PT if she would like.   Pt states she is already scheduled for PT on March 4th.

## 2022-03-03 ENCOUNTER — OFFICE VISIT (OUTPATIENT)
Dept: PODIATRY | Facility: CLINIC | Age: 87
End: 2022-03-03
Payer: MEDICARE

## 2022-03-03 VITALS
HEART RATE: 64 BPM | BODY MASS INDEX: 27.03 KG/M2 | SYSTOLIC BLOOD PRESSURE: 152 MMHG | DIASTOLIC BLOOD PRESSURE: 74 MMHG | HEIGHT: 59 IN | WEIGHT: 134.06 LBS

## 2022-03-03 DIAGNOSIS — M76.61 ACHILLES TENDINITIS, RIGHT LEG: ICD-10-CM

## 2022-03-03 DIAGNOSIS — L84 CORN OR CALLUS: ICD-10-CM

## 2022-03-03 DIAGNOSIS — B35.1 ONYCHOMYCOSIS DUE TO DERMATOPHYTE: Primary | ICD-10-CM

## 2022-03-03 PROCEDURE — 1160F RVW MEDS BY RX/DR IN RCRD: CPT | Mod: HCNC,CPTII,, | Performed by: PODIATRIST

## 2022-03-03 PROCEDURE — 99213 PR OFFICE/OUTPT VISIT, EST, LEVL III, 20-29 MIN: ICD-10-PCS | Mod: HCNC,,, | Performed by: PODIATRIST

## 2022-03-03 PROCEDURE — 99213 OFFICE O/P EST LOW 20 MIN: CPT | Mod: HCNC,,, | Performed by: PODIATRIST

## 2022-03-03 PROCEDURE — 1159F MED LIST DOCD IN RCRD: CPT | Mod: HCNC,CPTII,, | Performed by: PODIATRIST

## 2022-03-03 PROCEDURE — 99999 PR PBB SHADOW E&M-EST. PATIENT-LVL III: ICD-10-PCS | Mod: PBBFAC,HCNC,, | Performed by: PODIATRIST

## 2022-03-03 PROCEDURE — 17999 UNLISTD PX SKN MUC MEMB SUBQ: CPT | Mod: 79,CSM,HCNC,S$GLB | Performed by: PODIATRIST

## 2022-03-03 PROCEDURE — 17999 PR NON-COVERED FOOT CARE: ICD-10-PCS | Mod: 79,CSM,HCNC,S$GLB | Performed by: PODIATRIST

## 2022-03-03 PROCEDURE — 99999 PR PBB SHADOW E&M-EST. PATIENT-LVL III: CPT | Mod: PBBFAC,HCNC,, | Performed by: PODIATRIST

## 2022-03-03 PROCEDURE — 1125F PR PAIN SEVERITY QUANTIFIED, PAIN PRESENT: ICD-10-PCS | Mod: HCNC,CPTII,, | Performed by: PODIATRIST

## 2022-03-03 PROCEDURE — 1160F PR REVIEW ALL MEDS BY PRESCRIBER/CLIN PHARMACIST DOCUMENTED: ICD-10-PCS | Mod: HCNC,CPTII,, | Performed by: PODIATRIST

## 2022-03-03 PROCEDURE — 1125F AMNT PAIN NOTED PAIN PRSNT: CPT | Mod: HCNC,CPTII,, | Performed by: PODIATRIST

## 2022-03-03 PROCEDURE — 1159F PR MEDICATION LIST DOCUMENTED IN MEDICAL RECORD: ICD-10-PCS | Mod: HCNC,CPTII,, | Performed by: PODIATRIST

## 2022-03-03 NOTE — PROGRESS NOTES
"   Subjective:      Patient ID: Ernestine Luna is a 89 y.o. female.    Chief Complaint: Tendonitis (Right leg)    Ernestine is a 89 y.o. female who returns to clinic for follow up of pain in the back of the R ankle as well as Proc B routine care. States ankle pain has been present for several months and not getting much better. Unable to use lace up ankle brace and using slip on compression sleeve. PT helping slowly. Would like to get it assessed for further recommendation.     Vitals:    03/03/22 1459   BP: (!) 152/74   Pulse: 64   Weight: 60.8 kg (134 lb 0.6 oz)   Height: 4' 11" (1.499 m)   PainSc:   3   PainLoc: Foot       Review of Systems   Constitutional: Negative for chills and fever.   Cardiovascular: Negative for chest pain, claudication and leg swelling.   Respiratory: Negative for cough and shortness of breath.    Skin: Positive for dry skin, nail changes (stable, recently trimmed) and suspicious lesions (corns/calluses, stable, recently trimmed).   Musculoskeletal: Positive for arthritis, joint pain, myalgias and stiffness.        R posterior ankle pain   Gastrointestinal: Negative for nausea and vomiting.   Neurological: Negative for numbness and paresthesias.   Psychiatric/Behavioral: Negative for altered mental status.           Objective:      Physical Exam  Vitals reviewed.   Constitutional:       Appearance: She is well-developed.   Cardiovascular:      Pulses:           Dorsalis pedis pulses are 1+ on the right side and 1+ on the left side.        Posterior tibial pulses are 1+ on the right side and 1+ on the left side.   Pulmonary:      Effort: Pulmonary effort is normal.   Musculoskeletal:         General: Normal range of motion.      Comments: Anterior, lateral, and posterior muscle groups bilateral lower extremities show strength 4+/ 5 symmetrically. There is pain with palpation of R posterior achilles tendon watershed region and proximally along the gastroc aponeurosis. Mild reproduction of " pain in this area with DF/PF resistance R. Inspection and palpation of the joints and bones reveal no crepitus or joint effusion. Diffuse toe deformities 1-5 b/l from previous failed surgeries. No tenderness upon palpation. Mild plantar flexor contractures noted to digits 2 through 5 bilaterally.  Angle and base of gait are normal.   Feet:      Right foot:      Skin integrity: Callus and dry skin present.      Left foot:      Skin integrity: Callus and dry skin present.   Skin:     General: Skin is warm and dry.      Capillary Refill: Capillary refill takes 2 to 3 seconds.      Coloration: Skin is pale.      Comments: Skin bilateral lower extremities noted to be thin, dry, and atrophic.  Toenails thickened, discolored, with subungual fungal debris and tenderness noted.  Hyperkeratotic lesions noted to both feet planetary and multiple toes with tenderness.   Neurological:      Mental Status: She is alert and oriented to person, place, and time.      Sensory: No sensory deficit.      Motor: Weakness (b/l Lower extremities) present. No atrophy.      Deep Tendon Reflexes: Reflexes normal.               Assessment:       Encounter Diagnoses   Name Primary?    Onychomycosis due to dermatophyte Yes    Corn or callus     Achilles tendinitis, right leg          Plan:       Ernestine was seen today for tendonitis.    Diagnoses and all orders for this visit:    Onychomycosis due to dermatophyte    Corn or callus    Achilles tendinitis, right leg      I counseled the patient on her conditions, their implications and medical management.     For onychomycosis and calluses/corns:  Patient presents to the clinic for non-covered routine foot care. Patient is not a high risk foot care patient. Patient understands this is not typically a covered service and patient is aware of responsibility of payment. Pedal pulses are palpable. No know risk factors requiring routine foot care. Nails are elongated and thickened on feet. Diagnosis is  onychauxis. Nails were reduced and trimmed bilaterally. Patient tolerated well.     For R achilles tendinitis and ankle pain:   Continue compression ankle sleeve daily as directed.     Xray of R ankle reviewed. Note plantar and posterior heel spurs, no fractures.     Continue PT for R ankle pain as ordered.     Ok to use topical Voltaren gel for pain relief as needed/directed.      Long discussion with patient regarding appropriate, supportive and comfortable shoes. Recommended SAS/Easy Spirit style shoe brands with adequate arch supports to alleviate abnormal pressure and improve stability of foot while walking. Avoid flat shoes and barefoot walking as these will exacerbate or worsen symptoms.     RTC 4 weeks, sooner PRN

## 2022-03-04 ENCOUNTER — CLINICAL SUPPORT (OUTPATIENT)
Dept: REHABILITATION | Facility: OTHER | Age: 87
End: 2022-03-04
Attending: PODIATRIST
Payer: MEDICARE

## 2022-03-04 DIAGNOSIS — M76.61 ACHILLES TENDINITIS, RIGHT LEG: ICD-10-CM

## 2022-03-04 DIAGNOSIS — R29.898 DECREASED STRENGTH OF LOWER EXTREMITY: ICD-10-CM

## 2022-03-04 DIAGNOSIS — Z74.09 IMPAIRED FUNCTIONAL MOBILITY, BALANCE, GAIT, AND ENDURANCE: ICD-10-CM

## 2022-03-04 DIAGNOSIS — M25.671 DECREASED RANGE OF MOTION OF RIGHT ANKLE: ICD-10-CM

## 2022-03-04 DIAGNOSIS — M25.571 ACUTE RIGHT ANKLE PAIN: ICD-10-CM

## 2022-03-04 PROCEDURE — 97161 PT EVAL LOW COMPLEX 20 MIN: CPT | Mod: HCNC,PN

## 2022-03-04 PROCEDURE — 97112 NEUROMUSCULAR REEDUCATION: CPT | Mod: HCNC,PN

## 2022-03-04 PROCEDURE — 97110 THERAPEUTIC EXERCISES: CPT | Mod: HCNC,PN

## 2022-03-07 PROBLEM — R29.898 DECREASED STRENGTH OF LOWER EXTREMITY: Status: ACTIVE | Noted: 2022-03-07

## 2022-03-07 PROBLEM — M25.671 DECREASED RANGE OF MOTION OF RIGHT ANKLE: Status: ACTIVE | Noted: 2022-03-07

## 2022-03-07 PROBLEM — Z74.09 IMPAIRED FUNCTIONAL MOBILITY, BALANCE, GAIT, AND ENDURANCE: Status: ACTIVE | Noted: 2022-03-07
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
## 2022-03-15 ENCOUNTER — DOCUMENTATION ONLY (OUTPATIENT)
Dept: REHABILITATION | Facility: OTHER | Age: 87
End: 2022-03-15
Payer: MEDICARE

## 2022-03-15 NOTE — PROGRESS NOTES
Physical Therapy No Show Note       Patient was scheduled for physical therapy at [facility] for 3/15/2022. Pt failed to appear for appointment without prior notification for today.    Marino Horan, PT, DPT  3/15/2022

## 2022-04-04 ENCOUNTER — OFFICE VISIT (OUTPATIENT)
Dept: PODIATRY | Facility: CLINIC | Age: 87
End: 2022-04-04
Payer: MEDICARE

## 2022-04-04 VITALS
DIASTOLIC BLOOD PRESSURE: 78 MMHG | HEIGHT: 59 IN | BODY MASS INDEX: 26.31 KG/M2 | SYSTOLIC BLOOD PRESSURE: 154 MMHG | HEART RATE: 66 BPM | WEIGHT: 130.5 LBS

## 2022-04-04 DIAGNOSIS — B35.1 ONYCHOMYCOSIS DUE TO DERMATOPHYTE: Primary | ICD-10-CM

## 2022-04-04 DIAGNOSIS — L84 CORN OR CALLUS: ICD-10-CM

## 2022-04-04 PROCEDURE — 99499 UNLISTED E&M SERVICE: CPT | Mod: ,,, | Performed by: PODIATRIST

## 2022-04-04 PROCEDURE — 1125F AMNT PAIN NOTED PAIN PRSNT: CPT | Mod: CPTII,,, | Performed by: PODIATRIST

## 2022-04-04 PROCEDURE — 17999 UNLISTD PX SKN MUC MEMB SUBQ: CPT | Mod: CSM,,, | Performed by: PODIATRIST

## 2022-04-04 PROCEDURE — 1160F RVW MEDS BY RX/DR IN RCRD: CPT | Mod: CPTII,,, | Performed by: PODIATRIST

## 2022-04-04 PROCEDURE — 1159F MED LIST DOCD IN RCRD: CPT | Mod: CPTII,,, | Performed by: PODIATRIST

## 2022-04-04 PROCEDURE — 1125F PR PAIN SEVERITY QUANTIFIED, PAIN PRESENT: ICD-10-PCS | Mod: CPTII,,, | Performed by: PODIATRIST

## 2022-04-04 PROCEDURE — 99999 PR PBB SHADOW E&M-EST. PATIENT-LVL III: CPT | Mod: PBBFAC,,, | Performed by: PODIATRIST

## 2022-04-04 PROCEDURE — 99499 NO LOS: ICD-10-PCS | Mod: ,,, | Performed by: PODIATRIST

## 2022-04-04 PROCEDURE — 1159F PR MEDICATION LIST DOCUMENTED IN MEDICAL RECORD: ICD-10-PCS | Mod: CPTII,,, | Performed by: PODIATRIST

## 2022-04-04 PROCEDURE — 1160F PR REVIEW ALL MEDS BY PRESCRIBER/CLIN PHARMACIST DOCUMENTED: ICD-10-PCS | Mod: CPTII,,, | Performed by: PODIATRIST

## 2022-04-04 PROCEDURE — 17999 PR NON-COVERED FOOT CARE: ICD-10-PCS | Mod: CSM,,, | Performed by: PODIATRIST

## 2022-04-04 PROCEDURE — 99999 PR PBB SHADOW E&M-EST. PATIENT-LVL III: ICD-10-PCS | Mod: PBBFAC,,, | Performed by: PODIATRIST

## 2022-04-04 NOTE — PROGRESS NOTES
Patient presents to the clinic for non-covered routine foot care. Patient is not a high risk foot care patient. Patient understands this is not typically a covered service and patient is aware of responsibility of payment. Pedal pulses are palpable. No know risk factors requiring routine foot care. Nails are elongated and thickened on feet. Diagnosis is onychauxis. Nails were reduced and trimmed bilaterally. Patient tolerated well.     Ernestine was seen today for follow-up and nail care.    Diagnoses and all orders for this visit:    Onychomycosis due to dermatophyte    Corn or callus        RTC 1 month Proc B, sooner PRN

## 2022-04-15 NOTE — TELEPHONE ENCOUNTER
No new care gaps identified.  Powered by [employer]. Reference number: [id number].   4/15/2022 5:51:54 AM CDT

## 2022-04-15 NOTE — TELEPHONE ENCOUNTER
Refill Routing Note   Medication(s) are not appropriate for processing by [facility] for the following reason(s):      - Required vitals are abnormal    [facility] action(s):  Defer          Medication reconciliation completed: No     Appointments  past 12m or future 3m with PCP    Date Provider   Last Visit   10/26/2021 Kev Macias MD   Next Visit   5/2/2022 Kev Macias MD   ED visits in past 90 days: 0        Note composed:4:32 PM 04/15/2022

## 2022-04-16 RX ORDER — AMLODIPINE BESYLATE 10 MG/1
TABLET ORAL
Qty: 90 TABLET | Refills: 1 | Status: SHIPPED | OUTPATIENT
Start: 2022-04-16 | End: 2022-11-04 | Stop reason: SDUPTHER

## 2022-04-27 ENCOUNTER — TELEPHONE (OUTPATIENT)
Dept: INTERNAL MEDICINE | Facility: CLINIC | Age: 87
End: 2022-04-27
Payer: MEDICARE

## 2022-04-27 DIAGNOSIS — I10 ESSENTIAL HYPERTENSION: Primary | ICD-10-CM

## 2022-04-27 DIAGNOSIS — M19.90 OSTEOARTHRITIS, UNSPECIFIED OSTEOARTHRITIS TYPE, UNSPECIFIED SITE: ICD-10-CM

## 2022-04-27 DIAGNOSIS — Z85.3 HISTORY OF BREAST CANCER: ICD-10-CM

## 2022-04-27 NOTE — TELEPHONE ENCOUNTER
----- Message from Lizeth Urbina sent at 4/27/2022 10:39 AM CDT -----  Contact: Pt @[phone]  [email or web address]     Patient is calling for Medical Advice regarding:    Labs     Would like response via [employer]:  call back     Comments:   Pt states that she has an appt on 5/2/2022 but wants to know if she has to come in fasting for her appt. Please call back to advise

## 2022-05-02 ENCOUNTER — LAB VISIT (OUTPATIENT)
Dept: LAB | Facility: HOSPITAL | Age: 87
End: 2022-05-02
Attending: INTERNAL MEDICINE
Payer: MEDICARE

## 2022-05-02 ENCOUNTER — OFFICE VISIT (OUTPATIENT)
Dept: INTERNAL MEDICINE | Facility: CLINIC | Age: 87
End: 2022-05-02
Payer: MEDICARE

## 2022-05-02 VITALS
HEART RATE: 70 BPM | OXYGEN SATURATION: 98 % | WEIGHT: 126.31 LBS | SYSTOLIC BLOOD PRESSURE: 160 MMHG | HEIGHT: 59 IN | DIASTOLIC BLOOD PRESSURE: 80 MMHG | BODY MASS INDEX: 25.46 KG/M2

## 2022-05-02 DIAGNOSIS — Z85.3 HISTORY OF BREAST CANCER: ICD-10-CM

## 2022-05-02 DIAGNOSIS — I10 ESSENTIAL HYPERTENSION: Primary | ICD-10-CM

## 2022-05-02 DIAGNOSIS — M19.90 OSTEOARTHRITIS, UNSPECIFIED OSTEOARTHRITIS TYPE, UNSPECIFIED SITE: ICD-10-CM

## 2022-05-02 DIAGNOSIS — M81.0 OSTEOPOROSIS, UNSPECIFIED OSTEOPOROSIS TYPE, UNSPECIFIED PATHOLOGICAL FRACTURE PRESENCE: ICD-10-CM

## 2022-05-02 DIAGNOSIS — I10 ESSENTIAL HYPERTENSION: ICD-10-CM

## 2022-05-02 LAB
ALBUMIN SERPL BCP-MCNC: 4 G/DL (ref 3.5–5.2)
ALP SERPL-CCNC: 47 U/L (ref 55–135)
ALT SERPL W/O P-5'-P-CCNC: 18 U/L (ref 10–44)
ANION GAP SERPL CALC-SCNC: 7 MMOL/L (ref 8–16)
AST SERPL-CCNC: 27 U/L (ref 10–40)
BASOPHILS # BLD AUTO: 0.02 K/UL (ref 0–0.2)
BASOPHILS NFR BLD: 0.4 % (ref 0–1.9)
BILIRUB SERPL-MCNC: 0.8 MG/DL (ref 0.1–1)
BUN SERPL-MCNC: 17 MG/DL (ref 10–30)
CALCIUM SERPL-MCNC: 9.9 MG/DL (ref 8.7–10.5)
CHLORIDE SERPL-SCNC: 105 MMOL/L (ref 95–110)
CHOLEST SERPL-MCNC: 169 MG/DL (ref 120–199)
CHOLEST/HDLC SERPL: 2.6 {RATIO} (ref 2–5)
CO2 SERPL-SCNC: 30 MMOL/L (ref 23–29)
CREAT SERPL-MCNC: 0.6 MG/DL (ref 0.5–1.4)
DIFFERENTIAL METHOD: ABNORMAL
EOSINOPHIL # BLD AUTO: 0.1 K/UL (ref 0–0.5)
EOSINOPHIL NFR BLD: 2.2 % (ref 0–8)
ERYTHROCYTE [DISTWIDTH] IN BLOOD BY AUTOMATED COUNT: 13.2 % (ref 11.5–14.5)
EST. GFR  (AFRICAN AMERICAN): >60 ML/MIN/1.73 M^2
EST. GFR  (NON AFRICAN AMERICAN): >60 ML/MIN/1.73 M^2
GLUCOSE SERPL-MCNC: 88 MG/DL (ref 70–110)
HCT VFR BLD AUTO: 39 % (ref 37–48.5)
HDLC SERPL-MCNC: 65 MG/DL (ref 40–75)
HDLC SERPL: 38.5 % (ref 20–50)
HGB BLD-MCNC: 12.5 G/DL (ref 12–16)
IMM GRANULOCYTES # BLD AUTO: 0.01 K/UL (ref 0–0.04)
IMM GRANULOCYTES NFR BLD AUTO: 0.2 % (ref 0–0.5)
LDLC SERPL CALC-MCNC: 95.2 MG/DL (ref 63–159)
LYMPHOCYTES # BLD AUTO: 1.9 K/UL (ref 1–4.8)
LYMPHOCYTES NFR BLD: 34.1 % (ref 18–48)
MCH RBC QN AUTO: 31.1 PG (ref 27–31)
MCHC RBC AUTO-ENTMCNC: 32.1 G/DL (ref 32–36)
MCV RBC AUTO: 97 FL (ref 82–98)
MONOCYTES # BLD AUTO: 0.4 K/UL (ref 0.3–1)
MONOCYTES NFR BLD: 7.6 % (ref 4–15)
NEUTROPHILS # BLD AUTO: 3 K/UL (ref 1.8–7.7)
NEUTROPHILS NFR BLD: 55.5 % (ref 38–73)
NONHDLC SERPL-MCNC: 104 MG/DL
NRBC BLD-RTO: 0 /100 WBC
PLATELET # BLD AUTO: 194 K/UL (ref 150–450)
PMV BLD AUTO: 10.6 FL (ref 9.2–12.9)
POTASSIUM SERPL-SCNC: 4.2 MMOL/L (ref 3.5–5.1)
PROT SERPL-MCNC: 7.1 G/DL (ref 6–8.4)
RBC # BLD AUTO: 4.02 M/UL (ref 4–5.4)
SODIUM SERPL-SCNC: 142 MMOL/L (ref 136–145)
TRIGL SERPL-MCNC: 44 MG/DL (ref 30–150)
WBC # BLD AUTO: 5.42 K/UL (ref 3.9–12.7)

## 2022-05-02 PROCEDURE — 99214 PR OFFICE/OUTPT VISIT, EST, LEVL IV, 30-39 MIN: ICD-10-PCS | Mod: S$GLB,,, | Performed by: INTERNAL MEDICINE

## 2022-05-02 PROCEDURE — 99999 PR PBB SHADOW E&M-EST. PATIENT-LVL III: CPT | Mod: PBBFAC,,, | Performed by: INTERNAL MEDICINE

## 2022-05-02 PROCEDURE — 1126F AMNT PAIN NOTED NONE PRSNT: CPT | Mod: CPTII,S$GLB,, | Performed by: INTERNAL MEDICINE

## 2022-05-02 PROCEDURE — 1101F PT FALLS ASSESS-DOCD LE1/YR: CPT | Mod: CPTII,S$GLB,, | Performed by: INTERNAL MEDICINE

## 2022-05-02 PROCEDURE — 80053 COMPREHEN METABOLIC PANEL: CPT | Performed by: INTERNAL MEDICINE

## 2022-05-02 PROCEDURE — 99999 PR PBB SHADOW E&M-EST. PATIENT-LVL III: ICD-10-PCS | Mod: PBBFAC,,, | Performed by: INTERNAL MEDICINE

## 2022-05-02 PROCEDURE — 80061 LIPID PANEL: CPT | Performed by: INTERNAL MEDICINE

## 2022-05-02 PROCEDURE — 1126F PR PAIN SEVERITY QUANTIFIED, NO PAIN PRESENT: ICD-10-PCS | Mod: CPTII,S$GLB,, | Performed by: INTERNAL MEDICINE

## 2022-05-02 PROCEDURE — 36415 COLL VENOUS BLD VENIPUNCTURE: CPT | Performed by: INTERNAL MEDICINE

## 2022-05-02 PROCEDURE — 1160F PR REVIEW ALL MEDS BY PRESCRIBER/CLIN PHARMACIST DOCUMENTED: ICD-10-PCS | Mod: CPTII,S$GLB,, | Performed by: INTERNAL MEDICINE

## 2022-05-02 PROCEDURE — 99214 OFFICE O/P EST MOD 30 MIN: CPT | Mod: S$GLB,,, | Performed by: INTERNAL MEDICINE

## 2022-05-02 PROCEDURE — 1159F MED LIST DOCD IN RCRD: CPT | Mod: CPTII,S$GLB,, | Performed by: INTERNAL MEDICINE

## 2022-05-02 PROCEDURE — 3288F PR FALLS RISK ASSESSMENT DOCUMENTED: ICD-10-PCS | Mod: CPTII,S$GLB,, | Performed by: INTERNAL MEDICINE

## 2022-05-02 PROCEDURE — 1160F RVW MEDS BY RX/DR IN RCRD: CPT | Mod: CPTII,S$GLB,, | Performed by: INTERNAL MEDICINE

## 2022-05-02 PROCEDURE — 85025 COMPLETE CBC W/AUTO DIFF WBC: CPT | Performed by: INTERNAL MEDICINE

## 2022-05-02 PROCEDURE — 1101F PR PT FALLS ASSESS DOC 0-1 FALLS W/OUT INJ PAST YR: ICD-10-PCS | Mod: CPTII,S$GLB,, | Performed by: INTERNAL MEDICINE

## 2022-05-02 PROCEDURE — 1159F PR MEDICATION LIST DOCUMENTED IN MEDICAL RECORD: ICD-10-PCS | Mod: CPTII,S$GLB,, | Performed by: INTERNAL MEDICINE

## 2022-05-02 PROCEDURE — 3288F FALL RISK ASSESSMENT DOCD: CPT | Mod: CPTII,S$GLB,, | Performed by: INTERNAL MEDICINE

## 2022-05-04 ENCOUNTER — OFFICE VISIT (OUTPATIENT)
Dept: PODIATRY | Facility: CLINIC | Age: 87
End: 2022-05-04
Payer: MEDICARE

## 2022-05-04 VITALS
RESPIRATION RATE: 18 BRPM | SYSTOLIC BLOOD PRESSURE: 153 MMHG | HEART RATE: 66 BPM | HEIGHT: 59 IN | BODY MASS INDEX: 25.4 KG/M2 | WEIGHT: 126 LBS | DIASTOLIC BLOOD PRESSURE: 72 MMHG

## 2022-05-04 DIAGNOSIS — M79.675 TOE PAIN, LEFT: ICD-10-CM

## 2022-05-04 DIAGNOSIS — B35.1 ONYCHOMYCOSIS DUE TO DERMATOPHYTE: Primary | ICD-10-CM

## 2022-05-04 DIAGNOSIS — L84 CORN OR CALLUS: ICD-10-CM

## 2022-05-04 PROCEDURE — 1125F AMNT PAIN NOTED PAIN PRSNT: CPT | Mod: CPTII,,, | Performed by: PODIATRIST

## 2022-05-04 PROCEDURE — 99499 UNLISTED E&M SERVICE: CPT | Mod: ,,, | Performed by: PODIATRIST

## 2022-05-04 PROCEDURE — 1160F RVW MEDS BY RX/DR IN RCRD: CPT | Mod: CPTII,,, | Performed by: PODIATRIST

## 2022-05-04 PROCEDURE — 1159F PR MEDICATION LIST DOCUMENTED IN MEDICAL RECORD: ICD-10-PCS | Mod: CPTII,,, | Performed by: PODIATRIST

## 2022-05-04 PROCEDURE — 1159F MED LIST DOCD IN RCRD: CPT | Mod: CPTII,,, | Performed by: PODIATRIST

## 2022-05-04 PROCEDURE — 17999 PR NON-COVERED FOOT CARE: ICD-10-PCS | Mod: CSM,S$GLB,, | Performed by: PODIATRIST

## 2022-05-04 PROCEDURE — 99999 PR PBB SHADOW E&M-EST. PATIENT-LVL IV: CPT | Mod: PBBFAC,,, | Performed by: PODIATRIST

## 2022-05-04 PROCEDURE — 99499 NO LOS: ICD-10-PCS | Mod: ,,, | Performed by: PODIATRIST

## 2022-05-04 PROCEDURE — 99999 PR PBB SHADOW E&M-EST. PATIENT-LVL IV: ICD-10-PCS | Mod: PBBFAC,,, | Performed by: PODIATRIST

## 2022-05-04 PROCEDURE — 17999 UNLISTD PX SKN MUC MEMB SUBQ: CPT | Mod: CSM,S$GLB,, | Performed by: PODIATRIST

## 2022-05-04 PROCEDURE — 1125F PR PAIN SEVERITY QUANTIFIED, PAIN PRESENT: ICD-10-PCS | Mod: CPTII,,, | Performed by: PODIATRIST

## 2022-05-04 PROCEDURE — 1160F PR REVIEW ALL MEDS BY PRESCRIBER/CLIN PHARMACIST DOCUMENTED: ICD-10-PCS | Mod: CPTII,,, | Performed by: PODIATRIST

## 2022-05-04 NOTE — PROGRESS NOTES
Patient presents to the clinic for non-covered routine foot care. Patient is not a high risk foot care patient. Patient understands this is not typically a covered service and patient is aware of responsibility of payment. Pedal pulses are palpable. No know risk factors requiring routine foot care. Nails are elongated and thickened on feet. Diagnosis is onychauxis. Nails were reduced and trimmed bilaterally. Patient tolerated well.     Ernestine was seen today for routine foot care, callouses and foot pain.    Diagnoses and all orders for this visit:    Onychomycosis due to dermatophyte    Corn or callus    Toe pain, left  -     X-Ray Foot Complete Left; Future        RTC 1 month Proc B, sooner PRN

## 2022-05-19 ENCOUNTER — HOSPITAL ENCOUNTER (OUTPATIENT)
Dept: RADIOLOGY | Facility: CLINIC | Age: 87
Discharge: HOME | End: 2022-05-19
Attending: INTERNAL MEDICINE
Payer: MEDICARE

## 2022-05-19 DIAGNOSIS — M81.0 OSTEOPOROSIS, UNSPECIFIED OSTEOPOROSIS TYPE, UNSPECIFIED PATHOLOGICAL FRACTURE PRESENCE: ICD-10-CM

## 2022-05-19 PROCEDURE — 77080 DEXA BONE DENSITY SPINE HIP: ICD-10-PCS | Mod: 26,,, | Performed by: INTERNAL MEDICINE

## 2022-05-19 PROCEDURE — 77080 DXA BONE DENSITY AXIAL: CPT | Mod: 26,,, | Performed by: INTERNAL MEDICINE

## 2022-05-19 PROCEDURE — 77080 DXA BONE DENSITY AXIAL: CPT | Mod: TC

## 2022-06-08 ENCOUNTER — OFFICE VISIT (OUTPATIENT)
Dept: PODIATRY | Facility: CLINIC | Age: 87
End: 2022-06-08
Payer: MEDICARE

## 2022-06-08 VITALS — BODY MASS INDEX: 25.4 KG/M2 | WEIGHT: 126 LBS | RESPIRATION RATE: 18 BRPM | HEIGHT: 59 IN

## 2022-06-08 DIAGNOSIS — L84 CORN OR CALLUS: ICD-10-CM

## 2022-06-08 DIAGNOSIS — B35.1 ONYCHOMYCOSIS DUE TO DERMATOPHYTE: Primary | ICD-10-CM

## 2022-06-08 PROCEDURE — 1125F AMNT PAIN NOTED PAIN PRSNT: CPT | Mod: CPTII,,, | Performed by: PODIATRIST

## 2022-06-08 PROCEDURE — 17999 UNLISTD PX SKN MUC MEMB SUBQ: CPT | Mod: CSM,S$GLB,, | Performed by: PODIATRIST

## 2022-06-08 PROCEDURE — 1125F PR PAIN SEVERITY QUANTIFIED, PAIN PRESENT: ICD-10-PCS | Mod: CPTII,,, | Performed by: PODIATRIST

## 2022-06-08 PROCEDURE — 1159F MED LIST DOCD IN RCRD: CPT | Mod: CPTII,,, | Performed by: PODIATRIST

## 2022-06-08 PROCEDURE — 99999 PR PBB SHADOW E&M-EST. PATIENT-LVL III: ICD-10-PCS | Mod: PBBFAC,,, | Performed by: PODIATRIST

## 2022-06-08 PROCEDURE — 17999 PR NON-COVERED FOOT CARE: ICD-10-PCS | Mod: CSM,S$GLB,, | Performed by: PODIATRIST

## 2022-06-08 PROCEDURE — 99499 NO LOS: ICD-10-PCS | Mod: ,,, | Performed by: PODIATRIST

## 2022-06-08 PROCEDURE — 99499 UNLISTED E&M SERVICE: CPT | Mod: ,,, | Performed by: PODIATRIST

## 2022-06-08 PROCEDURE — 1160F PR REVIEW ALL MEDS BY PRESCRIBER/CLIN PHARMACIST DOCUMENTED: ICD-10-PCS | Mod: CPTII,,, | Performed by: PODIATRIST

## 2022-06-08 PROCEDURE — 99999 PR PBB SHADOW E&M-EST. PATIENT-LVL III: CPT | Mod: PBBFAC,,, | Performed by: PODIATRIST

## 2022-06-08 PROCEDURE — 1159F PR MEDICATION LIST DOCUMENTED IN MEDICAL RECORD: ICD-10-PCS | Mod: CPTII,,, | Performed by: PODIATRIST

## 2022-06-08 PROCEDURE — 1160F RVW MEDS BY RX/DR IN RCRD: CPT | Mod: CPTII,,, | Performed by: PODIATRIST

## 2022-06-08 NOTE — PROGRESS NOTES
Patient presents to the clinic for non-covered routine foot care. Patient is not a high risk foot care patient. Patient understands this is not typically a covered service and patient is aware of responsibility of payment. Pedal pulses are palpable. No know risk factors requiring routine foot care. Nails are elongated and thickened on feet. Diagnosis is onychauxis. Nails were reduced and trimmed bilaterally. Patient tolerated well.     Ernestine was seen today for follow-up, callouses and toe pain.    Diagnoses and all orders for this visit:    Onychomycosis due to dermatophyte    Corn or callus        RTC 1 month Proc B, sooner PRN

## 2022-07-06 ENCOUNTER — OFFICE VISIT (OUTPATIENT)
Dept: PODIATRY | Facility: CLINIC | Age: 87
End: 2022-07-06
Payer: MEDICARE

## 2022-07-06 VITALS
DIASTOLIC BLOOD PRESSURE: 76 MMHG | RESPIRATION RATE: 18 BRPM | HEART RATE: 62 BPM | SYSTOLIC BLOOD PRESSURE: 152 MMHG | BODY MASS INDEX: 25.45 KG/M2 | HEIGHT: 59 IN

## 2022-07-06 DIAGNOSIS — L84 CORN OR CALLUS: ICD-10-CM

## 2022-07-06 DIAGNOSIS — B35.1 ONYCHOMYCOSIS DUE TO DERMATOPHYTE: Primary | ICD-10-CM

## 2022-07-06 PROCEDURE — 99499 UNLISTED E&M SERVICE: CPT | Mod: ,,, | Performed by: PODIATRIST

## 2022-07-06 PROCEDURE — 99999 PR PBB SHADOW E&M-EST. PATIENT-LVL III: ICD-10-PCS | Mod: PBBFAC,,, | Performed by: PODIATRIST

## 2022-07-06 PROCEDURE — 1160F RVW MEDS BY RX/DR IN RCRD: CPT | Mod: CPTII,,, | Performed by: PODIATRIST

## 2022-07-06 PROCEDURE — 1160F PR REVIEW ALL MEDS BY PRESCRIBER/CLIN PHARMACIST DOCUMENTED: ICD-10-PCS | Mod: CPTII,,, | Performed by: PODIATRIST

## 2022-07-06 PROCEDURE — 1125F PR PAIN SEVERITY QUANTIFIED, PAIN PRESENT: ICD-10-PCS | Mod: CPTII,,, | Performed by: PODIATRIST

## 2022-07-06 PROCEDURE — 1159F PR MEDICATION LIST DOCUMENTED IN MEDICAL RECORD: ICD-10-PCS | Mod: CPTII,,, | Performed by: PODIATRIST

## 2022-07-06 PROCEDURE — 17999 UNLISTD PX SKN MUC MEMB SUBQ: CPT | Mod: CSM,S$GLB,, | Performed by: PODIATRIST

## 2022-07-06 PROCEDURE — 99499 NO LOS: ICD-10-PCS | Mod: ,,, | Performed by: PODIATRIST

## 2022-07-06 PROCEDURE — 99999 PR PBB SHADOW E&M-EST. PATIENT-LVL III: CPT | Mod: PBBFAC,,, | Performed by: PODIATRIST

## 2022-07-06 PROCEDURE — 17999 PR NON-COVERED FOOT CARE: ICD-10-PCS | Mod: CSM,S$GLB,, | Performed by: PODIATRIST

## 2022-07-06 PROCEDURE — 1159F MED LIST DOCD IN RCRD: CPT | Mod: CPTII,,, | Performed by: PODIATRIST

## 2022-07-06 PROCEDURE — 1125F AMNT PAIN NOTED PAIN PRSNT: CPT | Mod: CPTII,,, | Performed by: PODIATRIST

## 2022-07-06 NOTE — PROGRESS NOTES
Patient presents to the clinic for non-covered routine foot care. Patient is not a high risk foot care patient. Patient understands this is not typically a covered service and patient is aware of responsibility of payment. Pedal pulses are palpable. No know risk factors requiring routine foot care. Nails are elongated and thickened on feet. Diagnosis is onychomycosis and corn/callus. Nails were reduced and trimmed bilaterally. Patient tolerated well.     Ernestine was seen today for routine foot care, callouses and foot pain.    Diagnoses and all orders for this visit:    Onychomycosis due to dermatophyte    Corn or callus        RTC 1 month Proc B, sooner PRN

## 2022-07-13 ENCOUNTER — IMMUNIZATION (OUTPATIENT)
Dept: INTERNAL MEDICINE | Facility: CLINIC | Age: 87
End: 2022-07-13
Payer: MEDICARE

## 2022-07-13 DIAGNOSIS — Z23 NEED FOR VACCINATION: Primary | ICD-10-CM

## 2022-07-13 PROCEDURE — 0054A COVID-19, MRNA, LNP-S, PF, 30 MCG/0.3 ML DOSE VACCINE (PFIZER): CPT | Mod: PBBFAC | Performed by: INTERNAL MEDICINE

## 2022-08-05 ENCOUNTER — TELEPHONE (OUTPATIENT)
Dept: PODIATRY | Facility: CLINIC | Age: 87
End: 2022-08-05
Payer: MEDICARE

## 2022-08-05 NOTE — TELEPHONE ENCOUNTER
Called patient back and she will like to know if possible to get a different ankle brace because she is catching hell trying to get it on her foot and she needs the support on her ankle in order to walk with out all the pain. Talked to Yudith howell to see if Mrs. Luna can get a different ankle brace and he said it will be $50.00 dollars for a new one since she just got one not to long ago. Mrs Luna said that's ok she will pick it up on her f/u appointment.

## 2022-08-05 NOTE — TELEPHONE ENCOUNTER
----- Message from Radha De Luna sent at 8/5/2022  3:56 PM CDT -----  Regarding: [place]  Contact: pt @ [phone]  Pt has a [place] that she cannot get on. Asking for an urgent call

## 2022-08-10 ENCOUNTER — OFFICE VISIT (OUTPATIENT)
Dept: PODIATRY | Facility: CLINIC | Age: 87
End: 2022-08-10
Payer: MEDICARE

## 2022-08-10 VITALS
BODY MASS INDEX: 25.43 KG/M2 | DIASTOLIC BLOOD PRESSURE: 78 MMHG | HEIGHT: 59 IN | SYSTOLIC BLOOD PRESSURE: 157 MMHG | WEIGHT: 126.13 LBS | HEART RATE: 66 BPM

## 2022-08-10 DIAGNOSIS — B35.1 ONYCHOMYCOSIS DUE TO DERMATOPHYTE: Primary | ICD-10-CM

## 2022-08-10 DIAGNOSIS — L84 CORN OR CALLUS: ICD-10-CM

## 2022-08-10 PROCEDURE — 99499 UNLISTED E&M SERVICE: CPT | Mod: ,,, | Performed by: PODIATRIST

## 2022-08-10 PROCEDURE — 99999 PR PBB SHADOW E&M-EST. PATIENT-LVL III: CPT | Mod: PBBFAC,,, | Performed by: PODIATRIST

## 2022-08-10 PROCEDURE — 17999 PR NON-COVERED FOOT CARE: ICD-10-PCS | Mod: CSM,S$GLB,, | Performed by: PODIATRIST

## 2022-08-10 PROCEDURE — 99999 PR PBB SHADOW E&M-EST. PATIENT-LVL III: ICD-10-PCS | Mod: PBBFAC,,, | Performed by: PODIATRIST

## 2022-08-10 PROCEDURE — 17999 UNLISTD PX SKN MUC MEMB SUBQ: CPT | Mod: CSM,S$GLB,, | Performed by: PODIATRIST

## 2022-08-10 PROCEDURE — 1126F AMNT PAIN NOTED NONE PRSNT: CPT | Mod: CPTII,,, | Performed by: PODIATRIST

## 2022-08-10 PROCEDURE — 1160F PR REVIEW ALL MEDS BY PRESCRIBER/CLIN PHARMACIST DOCUMENTED: ICD-10-PCS | Mod: CPTII,,, | Performed by: PODIATRIST

## 2022-08-10 PROCEDURE — 1159F PR MEDICATION LIST DOCUMENTED IN MEDICAL RECORD: ICD-10-PCS | Mod: CPTII,,, | Performed by: PODIATRIST

## 2022-08-10 PROCEDURE — 1126F PR PAIN SEVERITY QUANTIFIED, NO PAIN PRESENT: ICD-10-PCS | Mod: CPTII,,, | Performed by: PODIATRIST

## 2022-08-10 PROCEDURE — 99499 NO LOS: ICD-10-PCS | Mod: ,,, | Performed by: PODIATRIST

## 2022-08-10 PROCEDURE — 1159F MED LIST DOCD IN RCRD: CPT | Mod: CPTII,,, | Performed by: PODIATRIST

## 2022-08-10 PROCEDURE — 1160F RVW MEDS BY RX/DR IN RCRD: CPT | Mod: CPTII,,, | Performed by: PODIATRIST

## 2022-08-10 NOTE — PROGRESS NOTES
Patient presents to the clinic for non-covered routine foot care. Patient is not a high risk foot care patient. Patient understands this is not typically a covered service and patient is aware of responsibility of payment. Pedal pulses are palpable. No know risk factors requiring routine foot care. Nails are elongated and thickened on feet. Diagnosis is onychomycosis and corn/callus. Nails were reduced and trimmed bilaterally. Patient tolerated well.     Ernestine was seen today for nail care.    Diagnoses and all orders for this visit:    Onychomycosis due to dermatophyte    Corn or callus        RTC 1 month Proc B, sooner PRN

## 2022-08-16 NOTE — PROGRESS NOTES
MEDICAL HISTORY:  Hypertension.  Breast cancer of the right breast, status post lumpectomy and had been on tamoxifen.  Osteoarthritis of the knees and shoulder.  Hysterectomy with oophorectomy.  Carpal tunnel syndrome release.  Bilateral knee arthroscopic surgery.  Bunionectomy.      SOCIAL HISTORY:  Tobacco use and alcohol use, none.  , has one living daughter, two daughters  from breast cancer.  She has a son with hypertension and hyperlipidemia.     FAMILY HISTORY:  Father is , hypertension.  Mother is , stroke.  Sister  of diabetes and heart disease.  Another sister  of breast cancer, heart disease, diabetes.  One sister alive, has diabetes and heart disease.     SCREENING:  Colonoscopy in 2013 revealed an adenomatous polyp.  Colonoscopy 2019 normal other than diverticulosis        MEDICATIONS:   Fosamax  70 mg q week  Atenolol 25 mg daily.  Amlodipine 10 mg daily.  Benazepril 40 mg daily.  Vitamin D.  Omega-3.  Calcium.  Multivitamin    Component      Latest Ref Rng & Units 2022   WBC      3.90 - 12.70 K/uL 5.42   RBC      4.00 - 5.40 M/uL 4.02   Hemoglobin      12.0 - 16.0 g/dL 12.5   Hematocrit      37.0 - 48.5 % 39.0   MCV      82 - 98 fL 97   MCH      27.0 - 31.0 pg 31.1 (H)   MCHC      32.0 - 36.0 g/dL 32.1   RDW      11.5 - 14.5 % 13.2   Platelets      150 - 450 K/uL 194   MPV      9.2 - 12.9 fL 10.6   Immature Granulocytes      0.0 - 0.5 % 0.2   Gran # (ANC)      1.8 - 7.7 K/uL 3.0   Immature Grans (Abs)      0.00 - 0.04 K/uL 0.01   Lymph #      1.0 - 4.8 K/uL 1.9   Mono #      0.3 - 1.0 K/uL 0.4   Eos #      0.0 - 0.5 K/uL 0.1   Baso #      0.00 - 0.20 K/uL 0.02   nRBC      0 /100 WBC 0   Gran %      38.0 - 73.0 % 55.5   Lymph %      18.0 - 48.0 % 34.1   Mono %      4.0 - 15.0 % 7.6   Eosinophil %      0.0 - 8.0 % 2.2   Basophil %      0.0 - 1.9 % 0.4   Differential Method       Automated   Sodium      136 - 145 mmol/L 142   Potassium       3.5 - 5.1 mmol/L 4.2   Chloride      95 - 110 mmol/L 105   CO2      23 - 29 mmol/L 30 (H)   Glucose      70 - 110 mg/dL 88   BUN      10 - 30 mg/dL 17   Creatinine      0.5 - 1.4 mg/dL 0.6   Calcium      8.7 - 10.5 mg/dL 9.9   PROTEIN TOTAL      6.0 - 8.4 g/dL 7.1   Albumin      3.5 - 5.2 g/dL 4.0   BILIRUBIN TOTAL      0.1 - 1.0 mg/dL 0.8   Alkaline Phosphatase      55 - 135 U/L 47 (L)   AST      10 - 40 U/L 27   ALT      10 - 44 U/L 18   Anion Gap      8 - 16 mmol/L 7 (L)   eGFR if African American      >60 mL/min/1.73 m:2 >60.0   eGFR if non African American      >60 mL/min/1.73 m:2 >60.0   Cholesterol      120 - 199 mg/dL 169   Triglycerides      30 - 150 mg/dL 44   HDL      40 - 75 mg/dL 65   LDL Cholesterol External      63.0 - 159.0 mg/dL 95.2   HDL/Cholesterol Ratio      20.0 - 50.0 % 38.5   Total Cholesterol/HDL Ratio      2.0 - 5.0 2.6   Non-HDL Cholesterol      mg/dL 104         89-year-old female      This appears to be a follow-up visit.  I am not totally certain the reason for the follow-up today.  She has her regular follow-up and annual in November 2022.  She was last seen in in May.  It is noted that she will be having an appointment it September the meet with the orthopedic doctor regarding right knee pain.  She is had bilateral total knee arthroplasties.  She is been having pain involving the l medial aspect of the right knee.  It is which she does not go walking as much.  She does not want to create more damage.  expresses concern if she may have another knee replacement.    Her other complaint is that for 2 months she has a feeling in anterior neck or throat after swallowing food or liquids.  It is not a pain.  She is not aware of a postnasal drip or sinus congestion.  She has no difficulty in swallowing food or liquids.  There is no heartburn regurgitation      Presently she reports no chest pain, palpitations, shortness of breath, abdominal pain she has regular bowel function   Frequent  urination but no incontinence    Labs from May 2022 reviewed which looks fine    Exam   Weight 130 lb   Pulse 68   Blood pressure 138 to 140/72  Chest clear breath sounds  Heart regular rate rhythm with 2 6 systolic murmur at the base  Abdominal exam soft nontender no hepatosplenomegaly abdominal masses  2+ carotid pulses no bruits   Extremities 1+ edema   Some degree of tenderness when palpating over the medial aspect of the left knee joint line.  Neck no palpable masses no thyromegaly.    Impression   Hypertension  Throat sensation possibly secondary to LPR   Right knee pain    Plan   Recommend trial of Prilosec 20 mg twice a day for couple weeks to see if it helps with the throat sensation but if no improvement referred to ENT   Recommend trial of Voltaren ointment over the knee to see if this helps.    She states she states she is been using  Bio freeze

## 2022-08-17 ENCOUNTER — OFFICE VISIT (OUTPATIENT)
Dept: INTERNAL MEDICINE | Facility: CLINIC | Age: 87
End: 2022-08-17
Payer: MEDICARE

## 2022-08-17 VITALS
WEIGHT: 130 LBS | HEIGHT: 59 IN | BODY MASS INDEX: 26.21 KG/M2 | HEART RATE: 68 BPM | DIASTOLIC BLOOD PRESSURE: 72 MMHG | SYSTOLIC BLOOD PRESSURE: 138 MMHG

## 2022-08-17 DIAGNOSIS — M17.11 PRIMARY OSTEOARTHRITIS OF RIGHT KNEE: ICD-10-CM

## 2022-08-17 DIAGNOSIS — I10 ESSENTIAL HYPERTENSION: Primary | ICD-10-CM

## 2022-08-17 DIAGNOSIS — R09.A2 SENSATION OF LUMP IN THROAT: ICD-10-CM

## 2022-08-17 PROCEDURE — 1125F AMNT PAIN NOTED PAIN PRSNT: CPT | Mod: CPTII,S$GLB,, | Performed by: INTERNAL MEDICINE

## 2022-08-17 PROCEDURE — 99214 OFFICE O/P EST MOD 30 MIN: CPT | Mod: S$GLB,,, | Performed by: INTERNAL MEDICINE

## 2022-08-17 PROCEDURE — 99999 PR PBB SHADOW E&M-EST. PATIENT-LVL III: CPT | Mod: PBBFAC,,, | Performed by: INTERNAL MEDICINE

## 2022-08-17 PROCEDURE — 3288F FALL RISK ASSESSMENT DOCD: CPT | Mod: CPTII,S$GLB,, | Performed by: INTERNAL MEDICINE

## 2022-08-17 PROCEDURE — 3288F PR FALLS RISK ASSESSMENT DOCUMENTED: ICD-10-PCS | Mod: CPTII,S$GLB,, | Performed by: INTERNAL MEDICINE

## 2022-08-17 PROCEDURE — 99999 PR PBB SHADOW E&M-EST. PATIENT-LVL III: ICD-10-PCS | Mod: PBBFAC,,, | Performed by: INTERNAL MEDICINE

## 2022-08-17 PROCEDURE — 1159F MED LIST DOCD IN RCRD: CPT | Mod: CPTII,S$GLB,, | Performed by: INTERNAL MEDICINE

## 2022-08-17 PROCEDURE — 1125F PR PAIN SEVERITY QUANTIFIED, PAIN PRESENT: ICD-10-PCS | Mod: CPTII,S$GLB,, | Performed by: INTERNAL MEDICINE

## 2022-08-17 PROCEDURE — 1160F RVW MEDS BY RX/DR IN RCRD: CPT | Mod: CPTII,S$GLB,, | Performed by: INTERNAL MEDICINE

## 2022-08-17 PROCEDURE — 99214 PR OFFICE/OUTPT VISIT, EST, LEVL IV, 30-39 MIN: ICD-10-PCS | Mod: S$GLB,,, | Performed by: INTERNAL MEDICINE

## 2022-08-17 PROCEDURE — 1101F PR PT FALLS ASSESS DOC 0-1 FALLS W/OUT INJ PAST YR: ICD-10-PCS | Mod: CPTII,S$GLB,, | Performed by: INTERNAL MEDICINE

## 2022-08-17 PROCEDURE — 1101F PT FALLS ASSESS-DOCD LE1/YR: CPT | Mod: CPTII,S$GLB,, | Performed by: INTERNAL MEDICINE

## 2022-08-17 PROCEDURE — 1159F PR MEDICATION LIST DOCUMENTED IN MEDICAL RECORD: ICD-10-PCS | Mod: CPTII,S$GLB,, | Performed by: INTERNAL MEDICINE

## 2022-08-17 PROCEDURE — 1160F PR REVIEW ALL MEDS BY PRESCRIBER/CLIN PHARMACIST DOCUMENTED: ICD-10-PCS | Mod: CPTII,S$GLB,, | Performed by: INTERNAL MEDICINE

## 2022-08-31 ENCOUNTER — TELEPHONE (OUTPATIENT)
Dept: UROGYNECOLOGY | Facility: CLINIC | Age: 87
End: 2022-08-31
Payer: MEDICARE

## 2022-08-31 NOTE — TELEPHONE ENCOUNTER
----- Message from Sachi Flores sent at 8/31/2022 11:06 AM CDT -----  Regarding: appointment  Name of Who is Calling: Ernestine           What is the request in detail: Patient is requesting a call back to get an appoint to get Pessary checked.           Can the clinic reply by [facility]: No           What Number to Call Back if not in [facility]: [phone]

## 2022-09-08 ENCOUNTER — TELEPHONE (OUTPATIENT)
Dept: INTERNAL MEDICINE | Facility: CLINIC | Age: 87
End: 2022-09-08

## 2022-09-08 NOTE — TELEPHONE ENCOUNTER
----- Message from Kev Macias MD sent at 9/7/2022  6:04 PM CDT -----  Contact: [phone]  Per her message message it is okay for her to get off of Prilosec  ----- Message -----  From: Camila Hammer LPN  Sent: 9/7/2022   3:02 PM CDT  To: Kev Macias MD      ----- Message -----  From: Hanna Bae  Sent: 9/7/2022   2:59 PM CDT  To: [facility] Staff    Pt called to advise that she does not have heartburn and would like to get off the Prolisec. Please Advise

## 2022-09-14 ENCOUNTER — OFFICE VISIT (OUTPATIENT)
Dept: PODIATRY | Facility: CLINIC | Age: 87
End: 2022-09-14
Payer: MEDICARE

## 2022-09-14 VITALS — SYSTOLIC BLOOD PRESSURE: 155 MMHG | DIASTOLIC BLOOD PRESSURE: 73 MMHG

## 2022-09-14 DIAGNOSIS — B35.1 ONYCHOMYCOSIS DUE TO DERMATOPHYTE: ICD-10-CM

## 2022-09-14 DIAGNOSIS — M20.60 DEFORMITY OF TOE, UNSPECIFIED LATERALITY: ICD-10-CM

## 2022-09-14 DIAGNOSIS — M25.362 INSTABILITY OF BOTH KNEE JOINTS: Primary | ICD-10-CM

## 2022-09-14 DIAGNOSIS — R26.89 IMBALANCE: ICD-10-CM

## 2022-09-14 DIAGNOSIS — L84 CORN OR CALLUS: ICD-10-CM

## 2022-09-14 DIAGNOSIS — M25.361 INSTABILITY OF BOTH KNEE JOINTS: Primary | ICD-10-CM

## 2022-09-14 PROCEDURE — 99213 OFFICE O/P EST LOW 20 MIN: CPT | Mod: ,,, | Performed by: PODIATRIST

## 2022-09-14 PROCEDURE — 17999 PR NON-COVERED FOOT CARE: ICD-10-PCS | Mod: CSM,S$GLB,, | Performed by: PODIATRIST

## 2022-09-14 PROCEDURE — 1125F PR PAIN SEVERITY QUANTIFIED, PAIN PRESENT: ICD-10-PCS | Mod: CPTII,,, | Performed by: PODIATRIST

## 2022-09-14 PROCEDURE — 1159F MED LIST DOCD IN RCRD: CPT | Mod: CPTII,,, | Performed by: PODIATRIST

## 2022-09-14 PROCEDURE — 1160F PR REVIEW ALL MEDS BY PRESCRIBER/CLIN PHARMACIST DOCUMENTED: ICD-10-PCS | Mod: CPTII,,, | Performed by: PODIATRIST

## 2022-09-14 PROCEDURE — 1160F RVW MEDS BY RX/DR IN RCRD: CPT | Mod: CPTII,,, | Performed by: PODIATRIST

## 2022-09-14 PROCEDURE — 99999 PR PBB SHADOW E&M-EST. PATIENT-LVL III: CPT | Mod: PBBFAC,,, | Performed by: PODIATRIST

## 2022-09-14 PROCEDURE — 99213 PR OFFICE/OUTPT VISIT, EST, LEVL III, 20-29 MIN: ICD-10-PCS | Mod: ,,, | Performed by: PODIATRIST

## 2022-09-14 PROCEDURE — 1125F AMNT PAIN NOTED PAIN PRSNT: CPT | Mod: CPTII,,, | Performed by: PODIATRIST

## 2022-09-14 PROCEDURE — 17999 UNLISTD PX SKN MUC MEMB SUBQ: CPT | Mod: CSM,S$GLB,, | Performed by: PODIATRIST

## 2022-09-14 PROCEDURE — 1159F PR MEDICATION LIST DOCUMENTED IN MEDICAL RECORD: ICD-10-PCS | Mod: CPTII,,, | Performed by: PODIATRIST

## 2022-09-14 PROCEDURE — 99999 PR PBB SHADOW E&M-EST. PATIENT-LVL III: ICD-10-PCS | Mod: PBBFAC,,, | Performed by: PODIATRIST

## 2022-09-14 NOTE — PROGRESS NOTES
Subjective:      Patient ID: Ernestine Luna is a 89 y.o. female.    Chief Complaint: Nail Care    Ernestine is a 89 y.o. female who returns to clinic for follow up of pain in the back of the R ankle as well as Proc B routine care. States ankle pain has been present for several months and has gotten a little better. Uses cane to get around and needs new one as old one is loose and shortens when she applies pressure to it. Requesting new Rx for cane.     Vitals:    09/14/22 1501   BP: (!) 155/73   PainSc: 10-Worst pain ever   PainLoc: Foot       Review of Systems   Constitutional: Negative for chills and fever.   Cardiovascular:  Negative for chest pain, claudication and leg swelling.   Respiratory:  Negative for cough and shortness of breath.    Skin:  Positive for dry skin, nail changes (stable, recently trimmed) and suspicious lesions (corns/calluses, stable, recently trimmed).   Musculoskeletal:  Positive for arthritis, joint pain, myalgias and stiffness.        R leg and knee instability. R ankle pain, stable    Gastrointestinal:  Negative for nausea and vomiting.   Neurological:  Negative for numbness and paresthesias.   Psychiatric/Behavioral:  Negative for altered mental status.          Objective:      Physical Exam  Vitals reviewed.   Constitutional:       Appearance: She is well-developed.   Cardiovascular:      Pulses:           Dorsalis pedis pulses are 1+ on the right side and 1+ on the left side.        Posterior tibial pulses are 1+ on the right side and 1+ on the left side.   Pulmonary:      Effort: Pulmonary effort is normal.   Musculoskeletal:         General: Normal range of motion.      Comments: Anterior, lateral, and posterior muscle groups bilateral lower extremities show strength 4+/ 5 symmetrically. There is mild (improved) pain with palpation of R posterior achilles tendon watershed region and proximally along the gastroc aponeurosis. Mild reproduction of pain in this area with DF/PF  resistance R. Inspection and palpation of the joints and bones reveal no crepitus or joint effusion. Diffuse toe deformities 1-5 b/l from previous failed surgeries. No tenderness upon palpation. Mild plantar flexor contractures noted to digits 2 through 5 bilaterally.  Angle and base of gait are normal.   Feet:      Right foot:      Skin integrity: Callus and dry skin present.      Left foot:      Skin integrity: Callus and dry skin present.   Skin:     General: Skin is warm and dry.      Capillary Refill: Capillary refill takes 2 to 3 seconds.      Coloration: Skin is pale.      Comments: Skin bilateral lower extremities noted to be thin, dry, and atrophic.  Toenails thickened, discolored, with subungual fungal debris and tenderness noted.  Hyperkeratotic lesions noted to both feet planetary and multiple toes with tenderness.   Neurological:      Mental Status: She is alert and oriented to person, place, and time.      Sensory: No sensory deficit.      Motor: Weakness (b/l Lower extremities) present. No atrophy.      Deep Tendon Reflexes: Reflexes normal.             Assessment:       Encounter Diagnoses   Name Primary?    Instability of both knee joints Yes    Imbalance     Deformity of toe, unspecified laterality     Onychomycosis due to dermatophyte     Corn or callus          Plan:       Ernestine was seen today for nail care.    Diagnoses and all orders for this visit:    Instability of both knee joints  -     CANE FOR HOME USE    Imbalance  -     CANE FOR HOME USE    Deformity of toe, unspecified laterality  -     CANE FOR HOME USE    Onychomycosis due to dermatophyte    Corn or callus    I counseled the patient on her conditions, their implications and medical management.     For onychomycosis and calluses/corns:  Patient presents to the clinic for non-covered routine foot care. Patient is not a high risk foot care patient. Patient understands this is not typically a covered service and patient is aware of  responsibility of payment. Pedal pulses are palpable. No know risk factors requiring routine foot care. Nails are elongated and thickened on feet. Hyperkeratotic lesions noted to multiple toes on both feet. Diagnosis is onychomycosis and corn/callus. Calluses/corns and nails were reduced and trimmed bilaterally. Patient tolerated well.    RTC 1-2 months for Proc B, sooner PRN     For R ankle pain and overall instability/imbalance:    Continue compression ankle sleeve daily as directed.     Continue ankle brace as directed. Use with sock.     New Rx for cane provided.     Ok to use topical Voltaren gel for pain relief as needed/directed.      Long discussion with patient regarding appropriate, supportive and comfortable shoes. Recommended SAS/Easy Spirit style shoe brands with adequate arch supports to alleviate abnormal pressure and improve stability of foot while walking. Avoid flat shoes and barefoot walking as these will exacerbate or worsen symptoms.     RTC 4 weeks to resume Proc B, sooner PRN

## 2022-09-15 ENCOUNTER — OFFICE VISIT (OUTPATIENT)
Dept: UROGYNECOLOGY | Facility: CLINIC | Age: 87
End: 2022-09-15
Payer: MEDICARE

## 2022-09-15 VITALS — BODY MASS INDEX: 26.26 KG/M2 | DIASTOLIC BLOOD PRESSURE: 70 MMHG | SYSTOLIC BLOOD PRESSURE: 120 MMHG | HEIGHT: 59 IN

## 2022-09-15 DIAGNOSIS — N99.3 VAGINAL VAULT PROLAPSE, POSTHYSTERECTOMY: ICD-10-CM

## 2022-09-15 DIAGNOSIS — N95.2 VAGINAL ATROPHY: ICD-10-CM

## 2022-09-15 DIAGNOSIS — N39.41 URINARY INCONTINENCE, URGE: ICD-10-CM

## 2022-09-15 DIAGNOSIS — Z46.89 PESSARY MAINTENANCE: ICD-10-CM

## 2022-09-15 DIAGNOSIS — N81.10 PROLAPSE OF ANTERIOR VAGINAL WALL: Primary | ICD-10-CM

## 2022-09-15 PROCEDURE — 1160F PR REVIEW ALL MEDS BY PRESCRIBER/CLIN PHARMACIST DOCUMENTED: ICD-10-PCS | Mod: CPTII,S$GLB,, | Performed by: NURSE PRACTITIONER

## 2022-09-15 PROCEDURE — 3288F PR FALLS RISK ASSESSMENT DOCUMENTED: ICD-10-PCS | Mod: CPTII,S$GLB,, | Performed by: NURSE PRACTITIONER

## 2022-09-15 PROCEDURE — 1101F PT FALLS ASSESS-DOCD LE1/YR: CPT | Mod: CPTII,S$GLB,, | Performed by: NURSE PRACTITIONER

## 2022-09-15 PROCEDURE — 1126F PR PAIN SEVERITY QUANTIFIED, NO PAIN PRESENT: ICD-10-PCS | Mod: CPTII,S$GLB,, | Performed by: NURSE PRACTITIONER

## 2022-09-15 PROCEDURE — 99213 OFFICE O/P EST LOW 20 MIN: CPT | Mod: S$GLB,,, | Performed by: NURSE PRACTITIONER

## 2022-09-15 PROCEDURE — 1101F PR PT FALLS ASSESS DOC 0-1 FALLS W/OUT INJ PAST YR: ICD-10-PCS | Mod: CPTII,S$GLB,, | Performed by: NURSE PRACTITIONER

## 2022-09-15 PROCEDURE — 99999 PR PBB SHADOW E&M-EST. PATIENT-LVL III: ICD-10-PCS | Mod: PBBFAC,,, | Performed by: NURSE PRACTITIONER

## 2022-09-15 PROCEDURE — 1159F PR MEDICATION LIST DOCUMENTED IN MEDICAL RECORD: ICD-10-PCS | Mod: CPTII,S$GLB,, | Performed by: NURSE PRACTITIONER

## 2022-09-15 PROCEDURE — 1159F MED LIST DOCD IN RCRD: CPT | Mod: CPTII,S$GLB,, | Performed by: NURSE PRACTITIONER

## 2022-09-15 PROCEDURE — 1126F AMNT PAIN NOTED NONE PRSNT: CPT | Mod: CPTII,S$GLB,, | Performed by: NURSE PRACTITIONER

## 2022-09-15 PROCEDURE — 3288F FALL RISK ASSESSMENT DOCD: CPT | Mod: CPTII,S$GLB,, | Performed by: NURSE PRACTITIONER

## 2022-09-15 PROCEDURE — 1160F RVW MEDS BY RX/DR IN RCRD: CPT | Mod: CPTII,S$GLB,, | Performed by: NURSE PRACTITIONER

## 2022-09-15 PROCEDURE — 99999 PR PBB SHADOW E&M-EST. PATIENT-LVL III: CPT | Mod: PBBFAC,,, | Performed by: NURSE PRACTITIONER

## 2022-09-15 PROCEDURE — 99213 PR OFFICE/OUTPT VISIT, EST, LEVL III, 20-29 MIN: ICD-10-PCS | Mod: S$GLB,,, | Performed by: NURSE PRACTITIONER

## 2022-09-15 NOTE — PATIENT INSTRUCTIONS
1. Prolapse: Stage 2 apical prolapse, Stage 3 anterior and posterior vaginal wall prolapse   --Pessary # 3 ring with support  --Daily pelvic floor exercises as assigned, consider Pelvic floor PT in future  --Non surgical options discussed with patient      2. Urge urinary incontinence:  --stable  --Empty bladder every 3 hours. Empty well: wait a minute, lean forward on toilet.   --Avoid dietary irritants (see sheet). Keep diary x 3-5 days to determine your irritants.  --consider PT in future  --URGE: Consider mirabegron in the future.  Takes 2-4 weeks to see if will have effect. For dry mouth: get sour, sugar free lozenge or gum.   --STRESS: Pessary vs. Sling.      3. Incomplete bladder emptying:improved with pessary   --Double void and Crede maneuvers discussed  --Improved with pessary    4. Vaginal atrophy (dryness):  Use KY jelly, astroglide, or other water-based lube quarter size with your finger twice weekly    5. Hemorrhoid  --not inflammed at this time  --rectal 12/2020 NEG    6. Nocturia (nighttime urination): stop fluids 2 hours before bed. No water by the bed. If have leg swelling:  Elevate feet above chest x 1 hour before bed to get excess fluid off.  Can also use support hose (knee highs).      7.  Well-woman:  --Pap:denies history of abnormal pap smear-- post hyst; no further needed  --Mammo:10/2021 normal  --Colonoscopy: 10/2019 Diverticulosis in the sigmoid colon.  No further recommended.   --Dexa:11/2019 osteoporosis of the femoral neck. Has not takenTx. Talk with Dr. Ugalde about need for repeat bone density and/or treatment for osteoporosis.     8.  Situational depression:   --discussed therapy options but patient declines at this time  --discussed speaking with her family about this, but she doesn't want to bother them with issues; I encouraged her to let her family know  --no SI/HI    9.  RTC 3 months for pessary check.

## 2022-09-15 NOTE — PROGRESS NOTES
Urogyn follow up  09/15/2022  .  [facility] - UROGYNECOLOGY  [address] [id number]    Ernestine Luna  [id number]  8/13/1929      Ernestine Luna is a 89 y.o. here for a urogyn pessary check.  Last visit 06/2021.      Last HPI from 10/29/2018  1)  UI:  (--) NELI   (--) UUI (--) pads Daytime frequency: Q 30 minutes- 3 hours.  Nocturia: Yes 3-4/night.   (--) dysuria,  (--) hematuria,  (--) frequent UTIs.  (+) complete bladder emptying.   2)  POP:  Absent.   Symptoms:(--)  .  (--) vaginal bleeding. (--) vaginal discharge. (--) sexually active.  (--) dyspareunia.   (+)  Vaginal dryness.  (--) vaginal estrogen use.   3)  BM:  (--) constipation/straining.  (--) chronic diarrhea. (--) hematochezia.  (--) fecal incontinence.  (--) fecal smearing/urgency.  (--) incomplete evacuation.    4)Pessary:  Denies pain, bleeding or dischage. Using #3 ring with support pessary..    03/27/2021  No /GYN changes.  Still sad because her long-term dog passed away in March.  Is stable, has good support system, no SI/HI.     1)  UI:  (--) NELI   (--) UUI (--) pads Daytime frequency: Q 30 minutes (after BP meds)- 3 hours.  Nocturia: Yes 3-4/night.  Wakes with urge to urinate. Sounds like dog can keep her awake, too.  (--) dysuria,  (--) hematuria,  (--) frequent UTIs.  (+) complete bladder emptying.     2)  POP:  Absent.   Symptoms:(--).  (--) vaginal bleeding. (--) vaginal discharge. (--) sexually active.  (--) dyspareunia.   (+)  Vaginal dryness.  (--) vaginal estrogen use. Using replens 2x/week.  Difficult to squeeze replens applicator. Uses 2x/week.     3)  BM:  (--) constipation/straining.  (--) chronic diarrhea. (--) hematochezia.  (--) fecal incontinence.  (--) fecal smearing/urgency.  (--) incomplete evacuation.      4) Pessary:  Denies pain, bleeding.  Has some scant discharge. Using #3 ring with support pessary.    5) Depression: Patient is still very sad about the passing of her dog in March 2020. She has  had the dog stuffed.  She denies SI/HI but cannot wait to see her dog again one day.  She doesn't want to burden her family with her sadness. She feels that she has a good support system in her [employer].      06/23/2021   1)  UI:  (--) NELI   (--) UUI (+) liner pads just in case: Daytime frequency: Q 30 minutes (after BP meds)- 3 hours.  Nocturia: Yes 3/night.  o.  (--) dysuria,  (--) hematuria,  (--) frequent UTIs.  (+) complete bladder emptying.     2)  POP:  Absent with pessary in place.   Symptoms:(--).  (--) vaginal bleeding. (--) vaginal discharge. (--) sexually active.  (--) dyspareunia.   (+)  Vaginal dryness.  (--) vaginal estrogen use. Using replens 2x/week.  Difficult to squeeze replens applicator. Uses 2x/week.     3)  BM:  (--) constipation/straining.  (--) chronic diarrhea. (--) hematochezia.  (--) fecal incontinence.  (--) fecal smearing/urgency.  (--) incomplete evacuation.      4) Pessary:  Denies pain, bleeding.  Denies vaginal discharge. Using #3 ring with support pessary.    5) Depression Feels better since she has had the dog stuffed.  She denies SI/HI but cannot wait to see her dog again one day.  She doesn't want to burden her family with her sadness. She feels that she has a good support system in her [employer].    Changes since last visit:     1)  UI:  (--) NELI   (--) UUI (+) liner pads just in case: Daytime frequency: Q 30 minutes (after BP meds)- 3 hours.  Nocturia: Yes 3/night.  o.  (--) dysuria,  (--) hematuria,  (--) frequent UTIs.  (+) complete bladder emptying.     2)  POP:  Absent with pessary in place.   Symptoms:(--).  (--) vaginal bleeding. (--) vaginal discharge. (--) sexually active.  (--) dyspareunia.   (+)  Vaginal dryness.  (--) vaginal estrogen use. Using replens 2x/week.      3)  BM:  (--) constipation/straining.  (--) chronic diarrhea. (--) hematochezia.  (--) fecal incontinence.  (--) fecal smearing/urgency.  (--) incomplete evacuation.      4) Pessary:  Denies pain, bleeding.   Denies vaginal discharge. Using #3 ring with support pessary.    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 Wilkins    COLONOSCOPY N/A 10/1/2019    Procedure: COLONOSCOPY;  Surgeon: Jarad Chavira MD;  Location: [address]);  Service: Endoscopy;  Laterality: N/A;  miralax prep (used miralax for last colonoscopy) - [facility]    COLONOSCOPY W/ POLYPECTOMY  08/08/2013    RASHEED   06/24/2014    EYE SURGERY      HYSTERECTOMY  age 38    ALISTAIR; ovaries in place    JOINT REPLACEMENT      bilateral knees    TOTAL KNEE ARTHROPLASTY      Bilateral       Current Outpatient Medications   Medication Sig    acetic acid-oxyquinoline (FEM PH) 0.9-0.025 % Gel Place 1 applicator vaginally twice a week.    alendronate (FOSAMAX) 70 MG tablet TAKE 1 TABLET(70 MG) BY MOUTH EVERY 7 DAYS    amLODIPine (NORVASC) 10 MG tablet TAKE 1 TABLET(10 MG) BY MOUTH EVERY DAY    atenoloL (TENORMIN) 25 MG tablet TAKE 1 TABLET(25 MG) BY MOUTH EVERY DAY    benazepriL (LOTENSIN) 40 MG tablet TAKE 1 TABLET(40 MG) BY MOUTH EVERY DAY    calcium carbonate (OS-DHRUV) 600 mg calcium (1,500 mg) Tab Take 600 mg by mouth once.    ciclopirox (PENLAC) 8 % Soln Apply topically nightly.    diclofenac sodium (VOLTAREN) 1 % Gel Apply 2 g topically 3 (three) times daily.    glycerin/min oil/polycarbophil (REPLENS VAGL)  "Place vaginally.    lidocaine HCL 2% (XYLOCAINE) 2 % jelly Apply topically as needed. For toe pain, apply up to twice daily as needed for pain.    MULTIVITAMIN W-MINERALS/LUTEIN (CENTRUM SILVER ORAL) Take 1 tablet by mouth Daily.    omega 3-calcium-vit [id number] mg Cmpk Take by mouth once daily.    vitamin D 1000 units Tab Take 185 mg by mouth once daily.     No current facility-administered medications for this visit.       ROS:  As per HPI.      Well Woman:  --Pap:denies history of abnormal pap smear-- post hyst; no further needed  --Mammo:10/2021 normal  --Colonoscopy: 10/2019 Diverticulosis in the sigmoid colon.  No further recommended.   --Dexa:06/2016Osteoporosis of the femoral neck. Has not takenTx. Talk with Dr. Ugalde about need for repeat bone density and/or treatment for osteoporosis.     Exam  /70 (BP Location: Left arm, Patient Position: Sitting, BP Method: Medium (Manual))   Ht 4' 11" (1.499 m)   LMP  (LMP Unknown)   BMI 26.26 kg/m²   General: alert and oriented, no acute distress  Respiratory: normal respiratory effort  Abd: soft, non-tender, non-distended    Pelvic  Ext. Genitalia: normal external genitalia. Normal bartholin's and skenes glands  Vagina: + atrophy. Normal vaginal mucosa without lesions. No discharge noted.   Non-tender bladder base without palpable mass.  #3 ring with support in place--removed and cleaned.   Cervix: absent  Uterus:  absent   Urethra: no masses or tenderness  Urethral meatus: no lesions, caruncle or prolapse.    Pessary removed without difficulty. Washed with soap and water. Reinserted without difficulty    Impression  1. Prolapse of anterior vaginal wall        2. Vaginal vault prolapse, posthysterectomy        3. Urinary incontinence, urge        4. Vaginal atrophy        5. Pessary maintenance          We reviewed the above issues and discussed options for short-term versus long-term management of her problems.   Plan:   1. Prolapse: Stage 2 " apical prolapse, Stage 3 anterior and posterior vaginal wall prolapse   --Pessary # 3 ring with support  --Daily pelvic floor exercises as assigned, consider Pelvic floor PT in future  --Non surgical options discussed with patient      2. Urge urinary incontinence:  --stable  --Empty bladder every 3 hours. Empty well: wait a minute, lean forward on toilet.   --Avoid dietary irritants (see sheet). Keep diary x 3-5 days to determine your irritants.  --consider PT in future  --URGE: Consider mirabegron in the future.  Takes 2-4 weeks to see if will have effect. For dry mouth: get sour, sugar free lozenge or gum.   --STRESS: Pessary vs. Sling.      3. Incomplete bladder emptying:improved with pessary   --Double void and Crede maneuvers discussed  --Improved with pessary    4. Vaginal atrophy (dryness):  Use KY jelly, astroglide, or other water-based lube quarter size with your finger twice weekly    5. Hemorrhoid  --not inflammed at this time  --rectal 12/2020 NEG    6. Nocturia (nighttime urination): stop fluids 2 hours before bed. No water by the bed. If have leg swelling:  Elevate feet above chest x 1 hour before bed to get excess fluid off.  Can also use support hose (knee highs).      7.  Well-woman:  --Pap:denies history of abnormal pap smear-- post hyst; no further needed  --Mammo:10/2021 normal  --Colonoscopy: 10/2019 Diverticulosis in the sigmoid colon.  No further recommended.   --Dexa:11/2019 osteoporosis of the femoral neck. Has not takenTx. Talk with Dr. Ugalde about need for repeat bone density and/or treatment for osteoporosis.     8.  Situational depression:   --discussed therapy options but patient declines at this time  --discussed speaking with her family about this, but she doesn't want to bother them with issues; I encouraged her to let her family know  --no SI/HI    9.  RTC 3 months for pessary check.     I spent a total of 20 minutes on the day of the visit.  This includes face to face time and  non-face to face time preparing to see the patient (eg, review of tests), obtaining and/or reviewing separately obtained history, documenting clinical information in the electronic or other health record, independently interpreting results and communicating results to the patient/family/caregiver, or care coordinator.     Kristine Sequeira, MAEGAN-BC   Division of Female Pelvic Medicine and Reconstructive Surgery  Department of Obstetrics & Gynecology

## 2022-09-20 ENCOUNTER — HOSPITAL ENCOUNTER (EMERGENCY)
Facility: OTHER | Age: 87
Discharge: LEFT BEFORE DISCHARGE | End: 2022-09-20
Attending: EMERGENCY MEDICINE
Payer: MEDICARE

## 2022-09-20 VITALS
OXYGEN SATURATION: 97 % | TEMPERATURE: 98 F | WEIGHT: 125 LBS | BODY MASS INDEX: 25.2 KG/M2 | DIASTOLIC BLOOD PRESSURE: 78 MMHG | HEART RATE: 78 BPM | HEIGHT: 59 IN | SYSTOLIC BLOOD PRESSURE: 180 MMHG | RESPIRATION RATE: 17 BRPM

## 2022-09-20 DIAGNOSIS — M54.9 UPPER BACK PAIN: Primary | ICD-10-CM

## 2022-09-20 DIAGNOSIS — R07.9 CHEST PAIN: ICD-10-CM

## 2022-09-20 LAB
ALBUMIN SERPL BCP-MCNC: 4.2 G/DL (ref 3.5–5.2)
ALP SERPL-CCNC: 55 U/L (ref 55–135)
ALT SERPL W/O P-5'-P-CCNC: 20 U/L (ref 10–44)
ANION GAP SERPL CALC-SCNC: 10 MMOL/L (ref 8–16)
AST SERPL-CCNC: 29 U/L (ref 10–40)
BASOPHILS # BLD AUTO: 0.02 K/UL (ref 0–0.2)
BASOPHILS NFR BLD: 0.3 % (ref 0–1.9)
BILIRUB SERPL-MCNC: 0.6 MG/DL (ref 0.1–1)
BNP SERPL-MCNC: 76 PG/ML (ref 0–99)
BUN SERPL-MCNC: 21 MG/DL (ref 10–30)
CALCIUM SERPL-MCNC: 9.6 MG/DL (ref 8.7–10.5)
CHLORIDE SERPL-SCNC: 105 MMOL/L (ref 95–110)
CO2 SERPL-SCNC: 25 MMOL/L (ref 23–29)
CREAT SERPL-MCNC: 0.7 MG/DL (ref 0.5–1.4)
DIFFERENTIAL METHOD: ABNORMAL
EOSINOPHIL # BLD AUTO: 0.3 K/UL (ref 0–0.5)
EOSINOPHIL NFR BLD: 3.4 % (ref 0–8)
ERYTHROCYTE [DISTWIDTH] IN BLOOD BY AUTOMATED COUNT: 13 % (ref 11.5–14.5)
EST. GFR  (NO RACE VARIABLE): >60 ML/MIN/1.73 M^2
GLUCOSE SERPL-MCNC: 97 MG/DL (ref 70–110)
HCT VFR BLD AUTO: 36.5 % (ref 37–48.5)
HGB BLD-MCNC: 12.3 G/DL (ref 12–16)
IMM GRANULOCYTES # BLD AUTO: 0.02 K/UL (ref 0–0.04)
IMM GRANULOCYTES NFR BLD AUTO: 0.3 % (ref 0–0.5)
LYMPHOCYTES # BLD AUTO: 2.1 K/UL (ref 1–4.8)
LYMPHOCYTES NFR BLD: 27.3 % (ref 18–48)
MCH RBC QN AUTO: 31.5 PG (ref 27–31)
MCHC RBC AUTO-ENTMCNC: 33.7 G/DL (ref 32–36)
MCV RBC AUTO: 94 FL (ref 82–98)
MONOCYTES # BLD AUTO: 0.7 K/UL (ref 0.3–1)
MONOCYTES NFR BLD: 8.6 % (ref 4–15)
NEUTROPHILS # BLD AUTO: 4.6 K/UL (ref 1.8–7.7)
NEUTROPHILS NFR BLD: 60.1 % (ref 38–73)
NRBC BLD-RTO: 0 /100 WBC
PLATELET # BLD AUTO: 172 K/UL (ref 150–450)
PMV BLD AUTO: 9.6 FL (ref 9.2–12.9)
POTASSIUM SERPL-SCNC: 4.2 MMOL/L (ref 3.5–5.1)
PROT SERPL-MCNC: 7.6 G/DL (ref 6–8.4)
RBC # BLD AUTO: 3.9 M/UL (ref 4–5.4)
SODIUM SERPL-SCNC: 140 MMOL/L (ref 136–145)
TROPONIN I SERPL DL<=0.01 NG/ML-MCNC: 0.02 NG/ML (ref 0–0.03)
WBC # BLD AUTO: 7.59 K/UL (ref 3.9–12.7)

## 2022-09-20 PROCEDURE — 25500020 PHARM REV CODE 255: Performed by: EMERGENCY MEDICINE

## 2022-09-20 PROCEDURE — 99285 EMERGENCY DEPT VISIT HI MDM: CPT | Mod: 25

## 2022-09-20 PROCEDURE — 93005 ELECTROCARDIOGRAM TRACING: CPT

## 2022-09-20 PROCEDURE — 84484 ASSAY OF TROPONIN QUANT: CPT | Performed by: NURSE PRACTITIONER

## 2022-09-20 PROCEDURE — 93010 ELECTROCARDIOGRAM REPORT: CPT | Mod: ,,, | Performed by: INTERNAL MEDICINE

## 2022-09-20 PROCEDURE — 80053 COMPREHEN METABOLIC PANEL: CPT | Performed by: NURSE PRACTITIONER

## 2022-09-20 PROCEDURE — 85025 COMPLETE CBC W/AUTO DIFF WBC: CPT | Performed by: NURSE PRACTITIONER

## 2022-09-20 PROCEDURE — 83880 ASSAY OF NATRIURETIC PEPTIDE: CPT | Performed by: NURSE PRACTITIONER

## 2022-09-20 PROCEDURE — 93010 EKG 12-LEAD: ICD-10-PCS | Mod: ,,, | Performed by: INTERNAL MEDICINE

## 2022-09-20 RX ADMIN — IOHEXOL 100 ML: 350 INJECTION, SOLUTION INTRAVENOUS at 10:09

## 2022-09-21 NOTE — ED NOTES
Pt signed AMA paperwork and stated she did not want to wait for results. MD Lefort explained risks in leaving before results, patient agreed. Pt walked with steady gait towards exit

## 2022-09-21 NOTE — FIRST PROVIDER EVALUATION
" Emergency Department TeleTriage Encounter Note      CHIEF COMPLAINT    Chief Complaint   Patient presents with    Back Pain     C/o pain in back below shoulder blades without radiation "since this afternoon". Denies numbness/tingling in extremities.        VITAL SIGNS   Initial Vitals [09/20/22 2003]   BP Pulse Resp Temp SpO2   (!) 177/86 75 16 98 °F (36.7 °C) 97 %      MAP       --            ALLERGIES    Review of patient's allergies indicates:   Allergen Reactions    Tramadol Shortness Of Breath, Diarrhea and Nausea And Vomiting    Acetaminophen Nausea And Vomiting    Aspirin Nausea And Vomiting    Sulfa (sulfonamide antibiotics) Nausea And Vomiting    Prednisone Other (See Comments)     Stomach problems        PROVIDER TRIAGE NOTE  This is a teletriage evaluation of a 89 y.o. female presenting to the ED with c/o pain in back (shoulder blades) since waking from nap this afternoon. "I cant take anything over the counter for pain."       ROS: (-) fever, (-) rash  PE:  NAD    Initial orders will be placed and care will be transferred to an alternate provider when patient is roomed for a full evaluation. Any additional orders and the final disposition will be determined by that provider.         ORDERS  Labs Reviewed   CBC W/ AUTO DIFFERENTIAL   COMPREHENSIVE METABOLIC PANEL   TROPONIN I   B-TYPE NATRIURETIC PEPTIDE       ED Orders (720h ago, onward)      Start Ordered     Status Ordering Provider    09/20/22 2029 09/20/22 2028  Vital signs  Every 15 min         Ordered DEBI SONG    09/20/22 2029 09/20/22 2028  Pulse Oximetry Continuous  Continuous         Ordered DEBI SONG    09/20/22 2029 09/20/22 2028  Diet NPO  Diet effective now         Ordered DEBI SONG    09/20/22 2029 09/20/22 2028  Saline lock IV  Once         Ordered DEBI SONG    09/20/22 2029 09/20/22 2028  EKG 12-lead  Once        Comments: Do not perform if previously done during this visit/ triage    Ordered DUNCAN, " DEBI RUELAS    09/20/22 2029 09/20/22 2028  CBC auto differential  STAT         Ordered DEBI SONGDhara    09/20/22 2029 09/20/22 2028  Comprehensive metabolic panel  STAT         Ordered DUNCAN DEBI A.    09/20/22 2029 09/20/22 2028  Troponin I #1  STAT         Ordered DUNCAN DEBI A.    09/20/22 2029 09/20/22 2028  B-Type natriuretic peptide (BNP)  STAT         Ordered DUNCAN DEBI A.    09/20/22 2029 09/20/22 2028  X-Ray Chest PA And Lateral  1 time imaging         Ordered DEBI SONG              Virtual Visit Note: The provider triage portion of this emergency department evaluation and documentation was performed via [employer], a HIPAA-compliant telemedicine application, in concert with a tele-presenter in the room. A face to face patient evaluation with one of my colleagues will occur once the patient is placed in an emergency department room.      DISCLAIMER: This note was prepared with [employer] voice recognition transcription software. Garbled syntax, mangled pronouns, and other bizarre constructions may be attributed to that software system.

## 2022-09-21 NOTE — ED PROVIDER NOTES
"Encounter Date: 9/20/2022       History     Chief Complaint   Patient presents with    Back Pain     C/o pain in back below shoulder blades without radiation "since this afternoon". Denies numbness/tingling in extremities.      The history is provided by the patient.   Back Pain   This is a new problem. The current episode started yesterday. The problem occurs constantly. The problem has been unchanged. The pain is associated with no known injury. The pain is present in the thoracic spine. The quality of the pain is described as aching. Radiates to: Bilateral shoulder blades. The symptoms are aggravated by certain positions. Worse during: Worsened morning. Pertinent negatives include no chest pain, no fever, no abdominal pain, no pelvic pain, no paresthesias and no weakness. She has tried nothing for the symptoms. Risk factors: Advanced age.   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 Wilkins    COLONOSCOPY N/A 10/1/2019    Procedure: COLONOSCOPY;  " Surgeon: Jarad Chavira MD;  Location: [facility] ([facility]);  Service: Endoscopy;  Laterality: N/A;  miralax prep (used miralax for last colonoscopy) - [facility]    COLONOSCOPY W/ POLYPECTOMY  08/08/2013    RASHEED   06/24/2014    EYE SURGERY      HYSTERECTOMY  age 38    ALISTAIR; ovaries in place    JOINT REPLACEMENT      bilateral knees    TOTAL KNEE ARTHROPLASTY      Bilateral     Family History   Problem Relation Age of Onset    Stroke Mother     Breast cancer Sister 82    Heart disease Sister     Diabetes Sister     Cancer Sister         breast cancer    Asthma Sister     Breast cancer Daughter 46    Cancer Daughter         breast    Breast cancer Daughter 45        negative genetic testing 2015    Cancer Daughter         breast    Breast cancer Daughter 37    Cancer Daughter         breast    Coronary artery disease Father     Diabetes Father     Heart disease Father     Heart attack Father     Heart disease Sister     Diabetes Sister     Diabetes Sister     Heart disease Sister     Hyperlipidemia Sister     Heart attack Brother     No Known Problems Son     Cancer Cousin         colon    Cancer Cousin     Breast cancer Cousin     Breast cancer Other     Diabetes Other     Blindness Other         One eye is blind from complications of diabetes    Breast cancer Other     Diabetes Other     Breast cancer Other     Diabetes Other     Ovarian cancer Neg Hx     Endometrial cancer Neg Hx     Vaginal cancer Neg Hx     Cervical cancer Neg Hx      Social History     Tobacco Use    Smoking status: Never    Smokeless tobacco: Never   Substance Use Topics    Alcohol use: Yes     Alcohol/week: 1.0 standard drink     Types: 1 Glasses of wine per week     Comment: Rare, every other week    Drug use: No     Review of Systems   Constitutional:  Negative for fever.   Cardiovascular:  Negative for chest pain.   Gastrointestinal:  Negative for abdominal pain.   Genitourinary:  Negative for pelvic pain.   Musculoskeletal:  Positive for back pain.    Neurological:  Negative for weakness and paresthesias.   All other systems reviewed and are negative.    Physical Exam     Initial Vitals [09/20/22 2003]   BP Pulse Resp Temp SpO2   (!) 177/86 75 16 98 °F (36.7 °C) 97 %      MAP       --         Physical Exam    Nursing note and vitals reviewed.  Constitutional: She appears well-developed and well-nourished. She is not diaphoretic. No distress.   HENT:   Head: Normocephalic and atraumatic.   Mouth/Throat: Oropharynx is clear and moist.   Eyes: Conjunctivae and EOM are normal.   Neck: Neck supple.   Normal range of motion.  Cardiovascular:  Normal rate, regular rhythm and intact distal pulses.           Pulmonary/Chest: Breath sounds normal. No respiratory distress. She exhibits no tenderness.   Abdominal: Abdomen is soft. She exhibits no mass. There is no abdominal tenderness.   Musculoskeletal:         General: No tenderness or edema. Normal range of motion.      Cervical back: Normal range of motion and neck supple.     Neurological: She is alert and oriented to person, place, and time. She has normal strength.   Skin: Skin is warm and dry.       ED Course   Procedures  Labs Reviewed   CBC W/ AUTO DIFFERENTIAL - Abnormal; Notable for the following components:       Result Value    RBC 3.90 (*)     Hematocrit 36.5 (*)     MCH 31.5 (*)     All other components within normal limits   COMPREHENSIVE METABOLIC PANEL   TROPONIN I   B-TYPE NATRIURETIC PEPTIDE          Imaging Results              CTA Chest Abdomen Pelvis (Final result)  Result time 09/20/22 23:26:58   Procedure changed from CTA Chest Abdomen Non Coronary     Final result by Joselyn Hester MD (09/20/22 23:26:58)                   Impression:      1. No evidence of aortic aneurysm or dissection.  2. No acute intrathoracic or intra-abdominal abnormalities identified.  3. Bilateral thyroid nodules.  Clinical considerations should determine need for potential future outpatient thyroid ultrasound  follow-up.  4. Postsurgical changes and additional findings as detailed above.      Electronically signed by: Joselyn Hester MD  Date:    09/20/2022  Time:    23:26               Narrative:    EXAMINATION:  CTA CHEST ABDOMEN PELVIS    CLINICAL HISTORY:  Aortic dissection suspected;    TECHNIQUE:  CTA chest abdomen pelvis was obtained following administration of 100 cc Omnipaque 350 IV contrast.    COMPARISON:  None    FINDINGS:  No evidence of aortic aneurysm or dissection.  Branch vessels of the aortic arch are patent with mild atherosclerosis.  Abdominal aortic branch vessels are patent.  Mild to moderate atherosclerosis is seen involving the distal abdominal aorta.  Bilateral iliacs are patent.    Bilateral thyroid nodules are seen.  Largest nodule measures 2 cm on the left.  Heterogenous nodule is seen measuring 1.8 cm on the right.  Heart is normal in size with no pericardial effusion.  Pulmonary arteries are well opacified with no evidence of proximal PE.  No significant abnormalities are seen along the esophageal course.  Major airways are patent.  Lungs are symmetrically expanded with no consolidation, mass, pleural effusion, or pneumothorax.    Small right hepatic lobe earlier tibial enhancing focus is seen which is nonspecific but may represent possible small flash filling hemangioma.  There is no intra-or extrahepatic biliary ductal dilatation.  There is cholelithiasis.  The stomach, pancreas, spleen, and adrenal glands are unremarkable.    Kidneys are functioning.  No hydronephrosis.  Small bilateral renal hypodensities, probable cysts are seen.  No significant abnormalities are seen along the ureteral courses.  Urinary bladder is unremarkable.  Uterus has been removed.  Pessary device is seen within the pelvis.    Appendix is visualized and is unremarkable.  No evidence of bowel obstruction.  Moderate volume retained stool is seen throughout the colon.  No free air or free fluid.    No acute osseous  abnormality identified.  Extensive multilevel degenerative changes are seen throughout the spine with large anterior bridging marginal osteophytes.  There is grade 2 anterolisthesis of L5 on S1 secondary to bilateral L5 pars defects.  Advanced degenerative changes are also seen involving the bilateral shoulders.  Subcutaneous soft tissues show no significant abnormalities.                                       X-Ray Chest AP Portable (Final result)  Result time 09/20/22 22:13:15   Procedure changed from X-Ray Chest PA And Lateral     Final result by Ant Salinas MD (09/20/22 22:13:15)                   Impression:      Stable chronic appearing changes without evidence for superimposed acute intrathoracic process.      Electronically signed by: Ant Salinas  Date:    09/20/2022  Time:    22:13               Narrative:    EXAMINATION:  XR CHEST AP PORTABLE    CLINICAL HISTORY:  Chest Pain;    TECHNIQUE:  Single frontal view of the chest was performed.    COMPARISON:  Chest radiograph January 2, 2017    FINDINGS:  Single portable chest view is submitted.  The cardiomediastinal silhouette appears stable.  Aortic atherosclerotic change noted.  There is mild elevation of the right hemidiaphragm appearing similar to the prior examination.  Pulmonary bronchovascular markings appears stable, there is no evidence for confluent infiltrate or consolidation, significant pleural effusion or pneumothorax.    The osseous structures demonstrate chronic change, there is diminished mineralization and degenerative change with chronic appearing change at the shoulder joints bilaterally.                                       Medications   iohexoL (OMNIPAQUE 350) injection 100 mL (100 mLs Intravenous Given 9/20/22 2250)     Medical Decision Making:   Differential Diagnosis:   Differential Diagnosis includes, but is not limited to:  Cauda equina syndrome, diskitis/osteomyelitis, epidural/paraspinal abscess, AAA, aortic dissection,  post-op/hardware infection, trauma/vertebral fracture, spinal cord injury, disc herniation, spinal stenosis, sciatica, radiculopathy, neoplasm, lumbar muscle strain, muscle spasm, neuropathic pain, UTI/pyelonephritis, nephrolithiasis.    Independently Interpreted Test(s):   I have ordered and independently interpreted X-rays - see prior notes.  I have ordered and independently interpreted EKG Reading(s) - see prior notes  Clinical Tests:   Lab Tests: Ordered and Reviewed  The following lab test(s) were unremarkable: CBC  Radiological Study: Ordered and Reviewed  Medical Tests: Reviewed  ED Management:  This is a pleasant and apparently high functioning 89-year-old female with new complaint of thoracic back pain without clear etiology.  She has never had this pain before.  Given the patient's risk factors workup was initiated.  During the middle of the workup the patient felt compelled to go home due to time away from home and did not want complete the workup.  Counseled the patient regarding lack of completeness and concerning diagnoses that remain on checked, the patient agreed to have imaging performed and will sign out against medical advice after the imaging is performed and follow-up with her doctor.  She declined medication for pain states that it is mild in nature.  Discussed potential need for ED return and importance of continued follow-up.                        Clinical Impression:   Final diagnoses:  [R07.9] Chest pain  [M54.9] Upper back pain (Primary)        ED Disposition Condition    AMA Stable                Guy J. Lefort, MD  09/20/22 8525

## 2022-09-27 ENCOUNTER — TELEPHONE (OUTPATIENT)
Dept: PODIATRY | Facility: CLINIC | Age: 87
End: 2022-09-27
Payer: MEDICARE

## 2022-09-27 NOTE — TELEPHONE ENCOUNTER
Called Mrs Luna who wants us to fax her prescription for a cane to [employer] in order to get it delier to her house, Rx faxed per patient request.

## 2022-09-27 NOTE — TELEPHONE ENCOUNTER
----- Message from Gavi Delarosa sent at 9/27/2022  2:45 PM CDT -----  Regarding: Cane Replacement  Pt is requesting a call back regarding Orders for Cane .  Pt states that the insurance Company  is requesting a Call  From   Office for pt to receive the Cane please call to discuss further      Fax [employer]     Pt @[phone]

## 2022-09-28 ENCOUNTER — OFFICE VISIT (OUTPATIENT)
Dept: OPTOMETRY | Facility: CLINIC | Age: 87
End: 2022-09-28
Payer: MEDICARE

## 2022-09-28 DIAGNOSIS — H52.13 MYOPIA OF BOTH EYES WITH REGULAR ASTIGMATISM: ICD-10-CM

## 2022-09-28 DIAGNOSIS — H53.9 VISION DISTURBANCE: Primary | ICD-10-CM

## 2022-09-28 DIAGNOSIS — H52.223 MYOPIA OF BOTH EYES WITH REGULAR ASTIGMATISM: ICD-10-CM

## 2022-09-28 PROCEDURE — 92012 PR EYE EXAM, EST PATIENT,INTERMED: ICD-10-PCS | Mod: S$GLB,,, | Performed by: OPTOMETRIST

## 2022-09-28 PROCEDURE — 1159F PR MEDICATION LIST DOCUMENTED IN MEDICAL RECORD: ICD-10-PCS | Mod: CPTII,S$GLB,, | Performed by: OPTOMETRIST

## 2022-09-28 PROCEDURE — 1101F PR PT FALLS ASSESS DOC 0-1 FALLS W/OUT INJ PAST YR: ICD-10-PCS | Mod: CPTII,S$GLB,, | Performed by: OPTOMETRIST

## 2022-09-28 PROCEDURE — 3288F FALL RISK ASSESSMENT DOCD: CPT | Mod: CPTII,S$GLB,, | Performed by: OPTOMETRIST

## 2022-09-28 PROCEDURE — 1101F PT FALLS ASSESS-DOCD LE1/YR: CPT | Mod: CPTII,S$GLB,, | Performed by: OPTOMETRIST

## 2022-09-28 PROCEDURE — 1126F AMNT PAIN NOTED NONE PRSNT: CPT | Mod: CPTII,S$GLB,, | Performed by: OPTOMETRIST

## 2022-09-28 PROCEDURE — 92012 INTRM OPH EXAM EST PATIENT: CPT | Mod: S$GLB,,, | Performed by: OPTOMETRIST

## 2022-09-28 PROCEDURE — 99999 PR PBB SHADOW E&M-EST. PATIENT-LVL III: CPT | Mod: PBBFAC,,, | Performed by: OPTOMETRIST

## 2022-09-28 PROCEDURE — 1159F MED LIST DOCD IN RCRD: CPT | Mod: CPTII,S$GLB,, | Performed by: OPTOMETRIST

## 2022-09-28 PROCEDURE — 1126F PR PAIN SEVERITY QUANTIFIED, NO PAIN PRESENT: ICD-10-PCS | Mod: CPTII,S$GLB,, | Performed by: OPTOMETRIST

## 2022-09-28 PROCEDURE — 3288F PR FALLS RISK ASSESSMENT DOCUMENTED: ICD-10-PCS | Mod: CPTII,S$GLB,, | Performed by: OPTOMETRIST

## 2022-09-28 PROCEDURE — 99999 PR PBB SHADOW E&M-EST. PATIENT-LVL III: ICD-10-PCS | Mod: PBBFAC,,, | Performed by: OPTOMETRIST

## 2022-09-28 NOTE — PROGRESS NOTES
HPI    Last eye exam was 2/4/22 with Dr. Esteban.  Patient states still sees floaters but they aren't as bothersome as   before. Scheduled to see Dr. Esteban in November. Happy with current   glasses-not interested in updating them.   Patient denies diplopia, headaches, flashes, and pain.      Last edited by Isabella Grady MA on 9/28/2022  2:12 PM.            Assessment /Plan     For exam results, see Encounter Report.    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  Pt doing better. Still seeing floaters but aren't as bad.   Continue w/ current rx.  Has follow-up appointment scheduled with Dr. Esteban in November.

## 2022-09-29 ENCOUNTER — IMMUNIZATION (OUTPATIENT)
Dept: PHARMACY | Facility: CLINIC | Age: 87
End: 2022-09-29
Payer: MEDICARE

## 2022-10-04 DIAGNOSIS — R52 PAIN: Primary | ICD-10-CM

## 2022-10-05 ENCOUNTER — HOSPITAL ENCOUNTER (OUTPATIENT)
Dept: RADIOLOGY | Facility: HOSPITAL | Age: 87
Discharge: HOME | End: 2022-10-05
Attending: ORTHOPAEDIC SURGERY
Payer: MEDICARE

## 2022-10-05 ENCOUNTER — TELEPHONE (OUTPATIENT)
Dept: PODIATRY | Facility: CLINIC | Age: 87
End: 2022-10-05
Payer: MEDICARE

## 2022-10-05 ENCOUNTER — OFFICE VISIT (OUTPATIENT)
Dept: ORTHOPEDICS | Facility: CLINIC | Age: 87
End: 2022-10-05
Payer: MEDICARE

## 2022-10-05 VITALS — WEIGHT: 125.56 LBS | HEIGHT: 59 IN | BODY MASS INDEX: 25.31 KG/M2

## 2022-10-05 DIAGNOSIS — M25.561 CHRONIC PAIN OF RIGHT KNEE: Primary | ICD-10-CM

## 2022-10-05 DIAGNOSIS — R52 PAIN: ICD-10-CM

## 2022-10-05 DIAGNOSIS — Z96.651 HISTORY OF TOTAL RIGHT KNEE REPLACEMENT: ICD-10-CM

## 2022-10-05 DIAGNOSIS — G89.29 CHRONIC PAIN OF RIGHT KNEE: Primary | ICD-10-CM

## 2022-10-05 PROCEDURE — 3288F FALL RISK ASSESSMENT DOCD: CPT | Mod: CPTII,S$GLB,, | Performed by: ORTHOPAEDIC SURGERY

## 2022-10-05 PROCEDURE — 3288F PR FALLS RISK ASSESSMENT DOCUMENTED: ICD-10-PCS | Mod: CPTII,S$GLB,, | Performed by: ORTHOPAEDIC SURGERY

## 2022-10-05 PROCEDURE — 99203 OFFICE O/P NEW LOW 30 MIN: CPT | Mod: S$GLB,,, | Performed by: ORTHOPAEDIC SURGERY

## 2022-10-05 PROCEDURE — 73560 X-RAY EXAM OF KNEE 1 OR 2: CPT | Mod: 26,LT,, | Performed by: RADIOLOGY

## 2022-10-05 PROCEDURE — 1125F AMNT PAIN NOTED PAIN PRSNT: CPT | Mod: CPTII,S$GLB,, | Performed by: ORTHOPAEDIC SURGERY

## 2022-10-05 PROCEDURE — 73562 XR KNEE ORTHO RIGHT: ICD-10-PCS | Mod: 26,RT,, | Performed by: RADIOLOGY

## 2022-10-05 PROCEDURE — 1101F PT FALLS ASSESS-DOCD LE1/YR: CPT | Mod: CPTII,S$GLB,, | Performed by: ORTHOPAEDIC SURGERY

## 2022-10-05 PROCEDURE — 99999 PR PBB SHADOW E&M-EST. PATIENT-LVL III: CPT | Mod: PBBFAC,,, | Performed by: ORTHOPAEDIC SURGERY

## 2022-10-05 PROCEDURE — 73560 X-RAY EXAM OF KNEE 1 OR 2: CPT | Mod: TC,LT

## 2022-10-05 PROCEDURE — 1159F PR MEDICATION LIST DOCUMENTED IN MEDICAL RECORD: ICD-10-PCS | Mod: CPTII,S$GLB,, | Performed by: ORTHOPAEDIC SURGERY

## 2022-10-05 PROCEDURE — 73562 X-RAY EXAM OF KNEE 3: CPT | Mod: TC,RT

## 2022-10-05 PROCEDURE — 1159F MED LIST DOCD IN RCRD: CPT | Mod: CPTII,S$GLB,, | Performed by: ORTHOPAEDIC SURGERY

## 2022-10-05 PROCEDURE — 1125F PR PAIN SEVERITY QUANTIFIED, PAIN PRESENT: ICD-10-PCS | Mod: CPTII,S$GLB,, | Performed by: ORTHOPAEDIC SURGERY

## 2022-10-05 PROCEDURE — 73560 XR KNEE ORTHO RIGHT: ICD-10-PCS | Mod: 26,LT,, | Performed by: RADIOLOGY

## 2022-10-05 PROCEDURE — 99203 PR OFFICE/OUTPT VISIT, NEW, LEVL III, 30-44 MIN: ICD-10-PCS | Mod: S$GLB,,, | Performed by: ORTHOPAEDIC SURGERY

## 2022-10-05 PROCEDURE — 1101F PR PT FALLS ASSESS DOC 0-1 FALLS W/OUT INJ PAST YR: ICD-10-PCS | Mod: CPTII,S$GLB,, | Performed by: ORTHOPAEDIC SURGERY

## 2022-10-05 PROCEDURE — 99999 PR PBB SHADOW E&M-EST. PATIENT-LVL III: ICD-10-PCS | Mod: PBBFAC,,, | Performed by: ORTHOPAEDIC SURGERY

## 2022-10-05 PROCEDURE — 73562 X-RAY EXAM OF KNEE 3: CPT | Mod: 26,RT,, | Performed by: RADIOLOGY

## 2022-10-05 NOTE — TELEPHONE ENCOUNTER
----- Message from Brisa Doe sent at 10/5/2022  3:11 PM CDT -----  Contact: pt  Pt is trying to reach someone in the office about her cane, and is very upset. She stated that provider informed her that the order was placed, but the insurance company is stating that the provider has to reach out to the insurance company. Pt stated she has tried multiple times to reach out and has tried to go to the office and would like to get this resolved before he falls.     Confirmed contact below:  Contact Name:Ernestine Luna  Phone Number: [phone]
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
## 2022-10-06 ENCOUNTER — TELEPHONE (OUTPATIENT)
Dept: ORTHOPEDICS | Facility: CLINIC | Age: 87
End: 2022-10-06
Payer: MEDICARE

## 2022-10-06 ENCOUNTER — TELEPHONE (OUTPATIENT)
Dept: PODIATRY | Facility: CLINIC | Age: 87
End: 2022-10-06
Payer: MEDICARE

## 2022-10-06 NOTE — TELEPHONE ENCOUNTER
Staff called pt with her labs results in detail pt verbalized understanding and had no further questions or concerns         ----- Message from Rg Denson MD sent at 10/6/2022  6:32 AM CDT -----  Please call pt.  Labs are fine.  There is no infection.  She should continue her voltaren gel to that area.I will see her back if it gets worse.  GC

## 2022-10-06 NOTE — TELEPHONE ENCOUNTER
Spoke with patient, explained that new cane is NOT covered by her insurance as she got one within the last 3 years.  Will have DME contact her for purchase.

## 2022-10-12 ENCOUNTER — OFFICE VISIT (OUTPATIENT)
Dept: PODIATRY | Facility: CLINIC | Age: 87
End: 2022-10-12
Payer: MEDICARE

## 2022-10-12 VITALS
HEART RATE: 71 BPM | BODY MASS INDEX: 26.18 KG/M2 | SYSTOLIC BLOOD PRESSURE: 184 MMHG | DIASTOLIC BLOOD PRESSURE: 85 MMHG | WEIGHT: 129.63 LBS

## 2022-10-12 DIAGNOSIS — L84 CORN OR CALLUS: Primary | ICD-10-CM

## 2022-10-12 DIAGNOSIS — B35.1 ONYCHOMYCOSIS DUE TO DERMATOPHYTE: ICD-10-CM

## 2022-10-12 PROCEDURE — 1159F PR MEDICATION LIST DOCUMENTED IN MEDICAL RECORD: ICD-10-PCS | Mod: CPTII,,, | Performed by: PODIATRIST

## 2022-10-12 PROCEDURE — 1159F MED LIST DOCD IN RCRD: CPT | Mod: CPTII,,, | Performed by: PODIATRIST

## 2022-10-12 PROCEDURE — 99499 UNLISTED E&M SERVICE: CPT | Mod: ,,, | Performed by: PODIATRIST

## 2022-10-12 PROCEDURE — 99999 PR PBB SHADOW E&M-EST. PATIENT-LVL III: CPT | Mod: PBBFAC,,, | Performed by: PODIATRIST

## 2022-10-12 PROCEDURE — 1160F PR REVIEW ALL MEDS BY PRESCRIBER/CLIN PHARMACIST DOCUMENTED: ICD-10-PCS | Mod: CPTII,,, | Performed by: PODIATRIST

## 2022-10-12 PROCEDURE — 99499 NO LOS: ICD-10-PCS | Mod: ,,, | Performed by: PODIATRIST

## 2022-10-12 PROCEDURE — 1126F PR PAIN SEVERITY QUANTIFIED, NO PAIN PRESENT: ICD-10-PCS | Mod: CPTII,,, | Performed by: PODIATRIST

## 2022-10-12 PROCEDURE — 99999 PR PBB SHADOW E&M-EST. PATIENT-LVL III: ICD-10-PCS | Mod: PBBFAC,,, | Performed by: PODIATRIST

## 2022-10-12 PROCEDURE — 17999 PR NON-COVERED FOOT CARE: ICD-10-PCS | Mod: CSM,S$GLB,, | Performed by: PODIATRIST

## 2022-10-12 PROCEDURE — 1160F RVW MEDS BY RX/DR IN RCRD: CPT | Mod: CPTII,,, | Performed by: PODIATRIST

## 2022-10-12 PROCEDURE — 1126F AMNT PAIN NOTED NONE PRSNT: CPT | Mod: CPTII,,, | Performed by: PODIATRIST

## 2022-10-12 PROCEDURE — 17999 UNLISTD PX SKN MUC MEMB SUBQ: CPT | Mod: CSM,S$GLB,, | Performed by: PODIATRIST

## 2022-10-12 NOTE — PROGRESS NOTES
Patient presents to the clinic for non-covered routine foot care. Patient is not a high risk foot care patient. Patient understands this is not typically a covered service and patient is aware of responsibility of payment. Pedal pulses are palpable. No know risk factors requiring routine foot care. Nails are elongated and thickened on feet. Hyperkeratotic lesions noted to multiple toes. Diagnosis is onychomcyosis and corn/callus. Calluses/corns and nails were reduced and trimmed bilaterally. Patient tolerated well.    RTC 1-2 months for Proc B, sooner PRN

## 2022-10-14 ENCOUNTER — TELEPHONE (OUTPATIENT)
Dept: PODIATRY | Facility: CLINIC | Age: 87
End: 2022-10-14
Payer: MEDICARE

## 2022-10-14 NOTE — TELEPHONE ENCOUNTER
----- Message from Brisa Doe sent at 10/14/2022  2:48 PM CDT -----  Contact: pt  Pt is requesting a sooner appt. She stated that if she waits longer than one month to come in, she has trouble walking. The sooner appts that were available in the system did not align with her schedule. She wants to come in at 3pm. She would like a call back from the office.     Confirmed contact below:  Contact Name:Ernestine Luna  Phone Number: [phone]

## 2022-11-01 ENCOUNTER — OFFICE VISIT (OUTPATIENT)
Dept: OPHTHALMOLOGY | Facility: CLINIC | Age: 87
End: 2022-11-01
Payer: MEDICARE

## 2022-11-01 DIAGNOSIS — H04.123 DRY EYE SYNDROME OF BOTH EYES: ICD-10-CM

## 2022-11-01 DIAGNOSIS — H43.813 POSTERIOR VITREOUS DETACHMENT OF BOTH EYES: Primary | ICD-10-CM

## 2022-11-01 DIAGNOSIS — Z96.1 PSEUDOPHAKIA OF BOTH EYES: ICD-10-CM

## 2022-11-01 PROCEDURE — 99999 PR PBB SHADOW E&M-EST. PATIENT-LVL III: CPT | Mod: PBBFAC,,, | Performed by: OPHTHALMOLOGY

## 2022-11-01 PROCEDURE — 1159F PR MEDICATION LIST DOCUMENTED IN MEDICAL RECORD: ICD-10-PCS | Mod: CPTII,S$GLB,, | Performed by: OPHTHALMOLOGY

## 2022-11-01 PROCEDURE — 99214 OFFICE O/P EST MOD 30 MIN: CPT | Mod: S$GLB,,, | Performed by: OPHTHALMOLOGY

## 2022-11-01 PROCEDURE — 1101F PT FALLS ASSESS-DOCD LE1/YR: CPT | Mod: CPTII,S$GLB,, | Performed by: OPHTHALMOLOGY

## 2022-11-01 PROCEDURE — 3288F PR FALLS RISK ASSESSMENT DOCUMENTED: ICD-10-PCS | Mod: CPTII,S$GLB,, | Performed by: OPHTHALMOLOGY

## 2022-11-01 PROCEDURE — 1159F MED LIST DOCD IN RCRD: CPT | Mod: CPTII,S$GLB,, | Performed by: OPHTHALMOLOGY

## 2022-11-01 PROCEDURE — 3288F FALL RISK ASSESSMENT DOCD: CPT | Mod: CPTII,S$GLB,, | Performed by: OPHTHALMOLOGY

## 2022-11-01 PROCEDURE — 1126F PR PAIN SEVERITY QUANTIFIED, NO PAIN PRESENT: ICD-10-PCS | Mod: CPTII,S$GLB,, | Performed by: OPHTHALMOLOGY

## 2022-11-01 PROCEDURE — 99999 PR PBB SHADOW E&M-EST. PATIENT-LVL III: ICD-10-PCS | Mod: PBBFAC,,, | Performed by: OPHTHALMOLOGY

## 2022-11-01 PROCEDURE — 92134 CPTRZ OPH DX IMG PST SGM RTA: CPT | Mod: S$GLB,,, | Performed by: OPHTHALMOLOGY

## 2022-11-01 PROCEDURE — 92134 OCT, RETINA - OU - BOTH EYES: ICD-10-PCS | Mod: S$GLB,,, | Performed by: OPHTHALMOLOGY

## 2022-11-01 PROCEDURE — 1126F AMNT PAIN NOTED NONE PRSNT: CPT | Mod: CPTII,S$GLB,, | Performed by: OPHTHALMOLOGY

## 2022-11-01 PROCEDURE — 1101F PR PT FALLS ASSESS DOC 0-1 FALLS W/OUT INJ PAST YR: ICD-10-PCS | Mod: CPTII,S$GLB,, | Performed by: OPHTHALMOLOGY

## 2022-11-01 PROCEDURE — 99214 PR OFFICE/OUTPT VISIT, EST, LEVL IV, 30-39 MIN: ICD-10-PCS | Mod: S$GLB,,, | Performed by: OPHTHALMOLOGY

## 2022-11-01 NOTE — PROGRESS NOTES
oHPI    6m fu OCT/DFE    Pt states her va is stable. Pt c/o  of increased floaters OU. Pt does not   drive at night.     No flashes  Floaters OU  No pain  No gtts  Last edited by Mesha Brooks on 11/1/2022  9:49 AM.             A/P    ICD-10-CM ICD-9-CM   1. Posterior vitreous detachment of both eyes  H43.813 379.21   2. Pseudophakia of both eyes  Z96.1 V43.1   3. Dry eye syndrome of both eyes  H04.123 375.15       1. Posterior vitreous detachment of both eyes  F/u for floaters  Pt has had longstanding floaters OU, bothersome      Exam notable for prominent PVD OU, no RT/RD OU   Plan: Observation   Pathology of PVD, Retinal Tear, Retinal Detachment reviewed in great detail  RD precautions discussed in detail, patient expressed understanding  RTC immediately PRN (especially ANY change flashes, floaters, vision, visual field)     2. Pseudophakia of both eyes  Good lens position  Plan: Observation     3. Dry eye syndrome of both eyes  Mild dry eye  Rec to start PF Ats Qid OU    RTC 6 months DEF/OCTm OU    I saw and examined the patient and reviewed in detail the findings documented. The final examination findings, image interpretations, and plan as documented in the record represent my personal judgment and conclusions.    Ramesh Esteban MD  Vitreoretinal Surgery   [facility]

## 2022-11-01 NOTE — PROGRESS NOTES
MEDICAL HISTORY:  Hypertension.  Breast cancer of the right breast, status post lumpectomy and had been on tamoxifen.  Osteoarthritis of the knees and shoulder.  Hysterectomy with oophorectomy.  Carpal tunnel syndrome release.  Bilateral knee arthroscopic surgery.  Bunionectomy.     SOCIAL HISTORY:  Tobacco use and alcohol use, none.       MEDICATIONS:   Fosamax  70 mg q week  Atenolol 25 mg daily.  Amlodipine 10 mg daily.  Benazepril 40 mg daily.  Vitamin D.  Omega-3.  Calcium.  Multivitamin    89-year-old female  Follow-up visit    Only complaint is pain involving the callus formation involving 2nd toe right foot for which he has upcoming appointment with Podiatry.  Was seen by Podiatry 1 month ago    Still still function independently at home.  Walks around in her neighborhood with walking cane.    No reported chest pain palpitations shortness of breath abdominal pain  Regular bowel function  Urination still at times could be urgent infrequent.  No chronic headaches, heartburn ingestion    Labs are pending    Exam  Weight 126  Pulse 72  Blood pressure right arm 176/64  Chest clear breath sounds  Heart regular rate rhythm 2/6 systolic murmur from the base to the apex and carotids  Abdominal exam soft nontender no hepatosplenomegaly abdominal masses  Pulses 2+ carotid pulses no bruits 1+ pedal pulses  Extremities no edema    Reviewed the chart 2D echo November 2021 revealed mild moderate tricuspid regurgitation and pulmonary artery pressure 58    Impression  Hypertension  Hyperlipidemia  Osteoarthritis tricuspid regurgitation    Plan Labs of chemistry, CBC, lipid profile pending  Phone review to follow-up in disposition regarding management of blood pressure follow-up       Clofazimine Pregnancy And Lactation Text: This medication is Pregnancy Category C and isn't considered safe during pregnancy. It is excreted in breast milk.

## 2022-11-04 ENCOUNTER — TELEPHONE (OUTPATIENT)
Dept: OPTOMETRY | Facility: CLINIC | Age: 87
End: 2022-11-04
Payer: MEDICARE

## 2022-11-04 NOTE — TELEPHONE ENCOUNTER
----- Message from Isabella Grady MA sent at 11/1/2022  4:00 PM CDT -----  Regarding: FW: NEEDS APPT FOR MRx    ----- Message -----  From: Lisa Luna  Sent: 11/1/2022  10:25 AM CDT  To: [facility] Staff  Subject: NEEDS APPT FOR MRx                               PLEASE CALL PT AND TRANG AN APPT FOR Mrx. THANK YOU.

## 2022-11-09 ENCOUNTER — OFFICE VISIT (OUTPATIENT)
Dept: PODIATRY | Facility: CLINIC | Age: 87
End: 2022-11-09
Payer: MEDICARE

## 2022-11-09 VITALS
DIASTOLIC BLOOD PRESSURE: 79 MMHG | WEIGHT: 131.38 LBS | SYSTOLIC BLOOD PRESSURE: 134 MMHG | HEIGHT: 59 IN | BODY MASS INDEX: 26.48 KG/M2 | HEART RATE: 64 BPM

## 2022-11-09 DIAGNOSIS — B35.1 ONYCHOMYCOSIS DUE TO DERMATOPHYTE: ICD-10-CM

## 2022-11-09 DIAGNOSIS — L84 CORN OR CALLUS: Primary | ICD-10-CM

## 2022-11-09 PROCEDURE — 1125F PR PAIN SEVERITY QUANTIFIED, PAIN PRESENT: ICD-10-PCS | Mod: CPTII,,, | Performed by: PODIATRIST

## 2022-11-09 PROCEDURE — 1159F PR MEDICATION LIST DOCUMENTED IN MEDICAL RECORD: ICD-10-PCS | Mod: CPTII,,, | Performed by: PODIATRIST

## 2022-11-09 PROCEDURE — 99999 PR PBB SHADOW E&M-EST. PATIENT-LVL III: ICD-10-PCS | Mod: PBBFAC,,, | Performed by: PODIATRIST

## 2022-11-09 PROCEDURE — 17999 UNLISTD PX SKN MUC MEMB SUBQ: CPT | Mod: CSM,S$GLB,, | Performed by: PODIATRIST

## 2022-11-09 PROCEDURE — 1160F RVW MEDS BY RX/DR IN RCRD: CPT | Mod: CPTII,,, | Performed by: PODIATRIST

## 2022-11-09 PROCEDURE — 99499 UNLISTED E&M SERVICE: CPT | Mod: ,,, | Performed by: PODIATRIST

## 2022-11-09 PROCEDURE — 1125F AMNT PAIN NOTED PAIN PRSNT: CPT | Mod: CPTII,,, | Performed by: PODIATRIST

## 2022-11-09 PROCEDURE — 1159F MED LIST DOCD IN RCRD: CPT | Mod: CPTII,,, | Performed by: PODIATRIST

## 2022-11-09 PROCEDURE — 99999 PR PBB SHADOW E&M-EST. PATIENT-LVL III: CPT | Mod: PBBFAC,,, | Performed by: PODIATRIST

## 2022-11-09 PROCEDURE — 99499 NO LOS: ICD-10-PCS | Mod: ,,, | Performed by: PODIATRIST

## 2022-11-09 PROCEDURE — 17999 PR NON-COVERED FOOT CARE: ICD-10-PCS | Mod: CSM,S$GLB,, | Performed by: PODIATRIST

## 2022-11-09 PROCEDURE — 1160F PR REVIEW ALL MEDS BY PRESCRIBER/CLIN PHARMACIST DOCUMENTED: ICD-10-PCS | Mod: CPTII,,, | Performed by: PODIATRIST

## 2022-11-10 ENCOUNTER — IMMUNIZATION (OUTPATIENT)
Dept: INTERNAL MEDICINE | Facility: CLINIC | Age: 87
End: 2022-11-10
Payer: MEDICARE

## 2022-11-10 DIAGNOSIS — Z23 NEED FOR VACCINATION: Primary | ICD-10-CM

## 2022-11-10 PROCEDURE — 0124A COVID-19, MRNA, LNP-S, BIVALENT BOOSTER, PF, 30 MCG/0.3 ML DOSE: CPT | Mod: PBBFAC | Performed by: INTERNAL MEDICINE

## 2022-11-10 PROCEDURE — 91312 COVID-19, MRNA, LNP-S, BIVALENT BOOSTER, PF, 30 MCG/0.3 ML DOSE: CPT | Mod: S$GLB,,, | Performed by: INTERNAL MEDICINE

## 2022-11-10 PROCEDURE — 91312 COVID-19, MRNA, LNP-S, BIVALENT BOOSTER, PF, 30 MCG/0.3 ML DOSE: ICD-10-PCS | Mod: S$GLB,,, | Performed by: INTERNAL MEDICINE

## 2022-11-16 ENCOUNTER — OFFICE VISIT (OUTPATIENT)
Dept: OPTOMETRY | Facility: CLINIC | Age: 87
End: 2022-11-16
Payer: MEDICARE

## 2022-11-16 DIAGNOSIS — H52.13 MYOPIA OF BOTH EYES WITH REGULAR ASTIGMATISM: Primary | ICD-10-CM

## 2022-11-16 DIAGNOSIS — H52.223 MYOPIA OF BOTH EYES WITH REGULAR ASTIGMATISM: Primary | ICD-10-CM

## 2022-11-16 DIAGNOSIS — H04.123 DRY EYE SYNDROME OF BOTH EYES: ICD-10-CM

## 2022-11-16 PROCEDURE — 1159F MED LIST DOCD IN RCRD: CPT | Mod: CPTII,S$GLB,, | Performed by: OPTOMETRIST

## 2022-11-16 PROCEDURE — 99999 PR PBB SHADOW E&M-EST. PATIENT-LVL III: ICD-10-PCS | Mod: PBBFAC,,, | Performed by: OPTOMETRIST

## 2022-11-16 PROCEDURE — 1101F PR PT FALLS ASSESS DOC 0-1 FALLS W/OUT INJ PAST YR: ICD-10-PCS | Mod: CPTII,S$GLB,, | Performed by: OPTOMETRIST

## 2022-11-16 PROCEDURE — 1126F PR PAIN SEVERITY QUANTIFIED, NO PAIN PRESENT: ICD-10-PCS | Mod: CPTII,S$GLB,, | Performed by: OPTOMETRIST

## 2022-11-16 PROCEDURE — 1159F PR MEDICATION LIST DOCUMENTED IN MEDICAL RECORD: ICD-10-PCS | Mod: CPTII,S$GLB,, | Performed by: OPTOMETRIST

## 2022-11-16 PROCEDURE — 92015 PR REFRACTION: ICD-10-PCS | Mod: S$GLB,,, | Performed by: OPTOMETRIST

## 2022-11-16 PROCEDURE — 3288F PR FALLS RISK ASSESSMENT DOCUMENTED: ICD-10-PCS | Mod: CPTII,S$GLB,, | Performed by: OPTOMETRIST

## 2022-11-16 PROCEDURE — 1101F PT FALLS ASSESS-DOCD LE1/YR: CPT | Mod: CPTII,S$GLB,, | Performed by: OPTOMETRIST

## 2022-11-16 PROCEDURE — 3288F FALL RISK ASSESSMENT DOCD: CPT | Mod: CPTII,S$GLB,, | Performed by: OPTOMETRIST

## 2022-11-16 PROCEDURE — 99999 PR PBB SHADOW E&M-EST. PATIENT-LVL III: CPT | Mod: PBBFAC,,, | Performed by: OPTOMETRIST

## 2022-11-16 PROCEDURE — 1126F AMNT PAIN NOTED NONE PRSNT: CPT | Mod: CPTII,S$GLB,, | Performed by: OPTOMETRIST

## 2022-11-16 PROCEDURE — 92015 DETERMINE REFRACTIVE STATE: CPT | Mod: S$GLB,,, | Performed by: OPTOMETRIST

## 2022-11-16 NOTE — PROGRESS NOTES
HPI    Last eye exam was 11/1/22 with Dr. Esteban.  Patient states was given a piece of paper with eye drops by Dr. Esteban but   wasn't sure which ones she was supposed to take. OU are constantly   watering. Wanted to get an update glasses rx.      Last edited by Isabella Grady MA on 11/16/2022 10:52 AM.            Assessment /Plan     For exam results, see Encounter Report.    Myopia of both eyes with regular astigmatism    Dry eye syndrome of both eyes             Bifocal rx given   Recommend Systane 2-3x/day OU.    RTC as scheduled with Dr. Esteban.

## 2022-11-23 ENCOUNTER — LAB VISIT (OUTPATIENT)
Dept: LAB | Facility: HOSPITAL | Age: 87
End: 2022-11-23
Attending: INTERNAL MEDICINE
Payer: MEDICARE

## 2022-11-23 ENCOUNTER — OFFICE VISIT (OUTPATIENT)
Dept: INTERNAL MEDICINE | Facility: CLINIC | Age: 87
End: 2022-11-23
Payer: MEDICARE

## 2022-11-23 VITALS
HEART RATE: 56 BPM | OXYGEN SATURATION: 98 % | DIASTOLIC BLOOD PRESSURE: 80 MMHG | WEIGHT: 129 LBS | HEIGHT: 59 IN | BODY MASS INDEX: 26 KG/M2 | SYSTOLIC BLOOD PRESSURE: 162 MMHG

## 2022-11-23 DIAGNOSIS — I70.0 ATHEROSCLEROSIS OF AORTA: ICD-10-CM

## 2022-11-23 DIAGNOSIS — M81.0 OSTEOPOROSIS, UNSPECIFIED OSTEOPOROSIS TYPE, UNSPECIFIED PATHOLOGICAL FRACTURE PRESENCE: ICD-10-CM

## 2022-11-23 DIAGNOSIS — Z00.00 ANNUAL PHYSICAL EXAM: ICD-10-CM

## 2022-11-23 DIAGNOSIS — Z85.3 HISTORY OF BREAST CANCER: ICD-10-CM

## 2022-11-23 DIAGNOSIS — Z00.00 ANNUAL PHYSICAL EXAM: Primary | ICD-10-CM

## 2022-11-23 DIAGNOSIS — I10 ESSENTIAL HYPERTENSION: ICD-10-CM

## 2022-11-23 DIAGNOSIS — M19.90 OSTEOARTHRITIS, UNSPECIFIED OSTEOARTHRITIS TYPE, UNSPECIFIED SITE: ICD-10-CM

## 2022-11-23 LAB
25(OH)D3+25(OH)D2 SERPL-MCNC: 107 NG/ML (ref 30–96)
CHOLEST SERPL-MCNC: 155 MG/DL (ref 120–199)
CHOLEST/HDLC SERPL: 2.8 {RATIO} (ref 2–5)
HDLC SERPL-MCNC: 56 MG/DL (ref 40–75)
HDLC SERPL: 36.1 % (ref 20–50)
LDLC SERPL CALC-MCNC: 82.2 MG/DL (ref 63–159)
NONHDLC SERPL-MCNC: 99 MG/DL
TRIGL SERPL-MCNC: 84 MG/DL (ref 30–150)
TSH SERPL DL<=0.005 MIU/L-ACNC: 0.47 UIU/ML (ref 0.4–4)

## 2022-11-23 PROCEDURE — 99397 PER PM REEVAL EST PAT 65+ YR: CPT | Mod: S$GLB,,, | Performed by: INTERNAL MEDICINE

## 2022-11-23 PROCEDURE — 3288F FALL RISK ASSESSMENT DOCD: CPT | Mod: CPTII,S$GLB,, | Performed by: INTERNAL MEDICINE

## 2022-11-23 PROCEDURE — 99397 PR PREVENTIVE VISIT,EST,65 & OVER: ICD-10-PCS | Mod: S$GLB,,, | Performed by: INTERNAL MEDICINE

## 2022-11-23 PROCEDURE — 1101F PR PT FALLS ASSESS DOC 0-1 FALLS W/OUT INJ PAST YR: ICD-10-PCS | Mod: CPTII,S$GLB,, | Performed by: INTERNAL MEDICINE

## 2022-11-23 PROCEDURE — 1159F PR MEDICATION LIST DOCUMENTED IN MEDICAL RECORD: ICD-10-PCS | Mod: CPTII,S$GLB,, | Performed by: INTERNAL MEDICINE

## 2022-11-23 PROCEDURE — 3288F PR FALLS RISK ASSESSMENT DOCUMENTED: ICD-10-PCS | Mod: CPTII,S$GLB,, | Performed by: INTERNAL MEDICINE

## 2022-11-23 PROCEDURE — 1159F MED LIST DOCD IN RCRD: CPT | Mod: CPTII,S$GLB,, | Performed by: INTERNAL MEDICINE

## 2022-11-23 PROCEDURE — 1125F AMNT PAIN NOTED PAIN PRSNT: CPT | Mod: CPTII,S$GLB,, | Performed by: INTERNAL MEDICINE

## 2022-11-23 PROCEDURE — 99999 PR PBB SHADOW E&M-EST. PATIENT-LVL IV: CPT | Mod: PBBFAC,,, | Performed by: INTERNAL MEDICINE

## 2022-11-23 PROCEDURE — 82306 VITAMIN D 25 HYDROXY: CPT | Performed by: INTERNAL MEDICINE

## 2022-11-23 PROCEDURE — 99999 PR PBB SHADOW E&M-EST. PATIENT-LVL IV: ICD-10-PCS | Mod: PBBFAC,,, | Performed by: INTERNAL MEDICINE

## 2022-11-23 PROCEDURE — 1160F RVW MEDS BY RX/DR IN RCRD: CPT | Mod: CPTII,S$GLB,, | Performed by: INTERNAL MEDICINE

## 2022-11-23 PROCEDURE — 80061 LIPID PANEL: CPT | Performed by: INTERNAL MEDICINE

## 2022-11-23 PROCEDURE — 1160F PR REVIEW ALL MEDS BY PRESCRIBER/CLIN PHARMACIST DOCUMENTED: ICD-10-PCS | Mod: CPTII,S$GLB,, | Performed by: INTERNAL MEDICINE

## 2022-11-23 PROCEDURE — 1101F PT FALLS ASSESS-DOCD LE1/YR: CPT | Mod: CPTII,S$GLB,, | Performed by: INTERNAL MEDICINE

## 2022-11-23 PROCEDURE — 36415 COLL VENOUS BLD VENIPUNCTURE: CPT | Performed by: INTERNAL MEDICINE

## 2022-11-23 PROCEDURE — 1125F PR PAIN SEVERITY QUANTIFIED, PAIN PRESENT: ICD-10-PCS | Mod: CPTII,S$GLB,, | Performed by: INTERNAL MEDICINE

## 2022-11-23 PROCEDURE — 84443 ASSAY THYROID STIM HORMONE: CPT | Performed by: INTERNAL MEDICINE

## 2022-11-23 RX ORDER — ALENDRONATE SODIUM 70 MG/1
70 TABLET ORAL
Qty: 12 TABLET | Refills: 3 | Status: SHIPPED | OUTPATIENT
Start: 2022-11-23 | End: 2023-11-08

## 2022-11-23 NOTE — PROGRESS NOTES
MEDICAL HISTORY:  Hypertension.  Mitral regurgitation based on 2D echo  Aortic atherosclerosis based on imaging  Breast cancer of the right breast, status post lumpectomy and had been on tamoxifen.  Osteoporosis  Osteoarthritis of the knees and shoulder.  Hysterectomy with oophorectomy.  Carpal tunnel syndrome release.  Bilateral knee arthroscopic surgery.  Bunionectomy.      SOCIAL HISTORY:  Tobacco use and alcohol use, none.  , has one living daughter, two daughters  from breast cancer.  She has a son with hypertension and hyperlipidemia.     FAMILY HISTORY:  Father is , hypertension.  Mother is , stroke.  Sister  of diabetes and heart disease.  Another sister  of breast cancer, heart disease, diabetes.  One sister alive, has diabetes and heart disease.     SCREENING:  Colonoscopy in 2013 revealed an adenomatous polyp.  Colonoscopy 2019 normal other than diverticulosis        MEDICATIONS:   Atenolol 25 mg daily.  Amlodipine 10 mg daily.  Benazepril 40 mg daily.  Vitamin D.  Omega-3.  Calcium.  Multivitamin        89-year-old female  Routine follow-up visit   Overall in general she is felt well   In September met with Orthopedics regarding right knee pain over the medial aspect.  She is had a knee replacement.  X-ray shows stability.    Follows regularly with Podiatry regarding callus and corns involving right foot for which she gets debridement which helps with the pain    She follows regularly with Urogynecology.  She has a pessary.  And uses a pad as needed.  Her urination at times is frequent.  Presently there is no incontinence    Regarding osteoporosis, she had a bone density in May.  It confusing situation is that based on the med card appears that she is been being prescribed Fosamax once a week.  This was initiated back in  but at some point she states that the pharmacy was not prescribing it for so she is been off the medicine for months  although refills are being confirmed in the med card    She was seen in the emergency room in late September for upper thoracic pain.  She had a chemistry CBC which was normal and a CT the chest abdomen and pelvis which revealed no acute findings    Review of symptoms  Reports no shortness of breath palpitations chest pain.  Regular bowel function.  No abdominal pain, heartburn indigestion.  No headaches  She walks on a daily basis      Examination   Weight 128 lb   Pulse 56   Blood pressure 162/80   HEENT exam no abnormal findings  Chest clear breath sounds  Heart regular rate rhythm with 2 6 systolic murmur from the base to the apex  Abdominal exam soft nontender no hepatosplenomegaly abdominal masses  2+ carotid pulses no bruits, 1+ pedal pulses  Extremities trace edema   Musculoskeletal bilateral knee enlargement and hallux deformity feet    Impression  General exam   Hypertension  Osteoarthritis  Osteoporosis  Overactive bladder  Mitral regurgitation   Aorta atherosclerosis  History of breast    Plan   Lipid profile, vitamin-D TSH   Restart Fosamax 70 mg a week  Continue with attention appropriate diet and physical activity continue with current medications

## 2022-11-25 ENCOUNTER — PES CALL (OUTPATIENT)
Dept: ADMINISTRATIVE | Facility: CLINIC | Age: 87
End: 2022-11-25
Payer: MEDICARE

## 2022-11-28 ENCOUNTER — TELEPHONE (OUTPATIENT)
Dept: OPTOMETRY | Facility: CLINIC | Age: 87
End: 2022-11-28
Payer: MEDICARE

## 2022-11-28 NOTE — TELEPHONE ENCOUNTER
Patient states glasses rx has wrong expiration date-states expiration date is visit date. Confirmed in chart that expiration date states 11/16/2023. Patient unable to locate copy at home. Will mail duplicate rx to patient.

## 2022-11-28 NOTE — TELEPHONE ENCOUNTER
----- Message from Regan Dobbins MA sent at 11/25/2022  4:26 PM CST -----  Regarding: FW: Questions    ----- Message -----  From: Martina Pool  Sent: 11/25/2022   3:44 PM CST  To: [facility] Staff  Subject: Questions                                        Pt is requesting to speak with a tech. In regards to questions about their glasses prescription.      Ernestine @ [phone]

## 2022-12-07 ENCOUNTER — OFFICE VISIT (OUTPATIENT)
Dept: PODIATRY | Facility: CLINIC | Age: 87
End: 2022-12-07
Payer: MEDICARE

## 2022-12-07 VITALS
HEIGHT: 59 IN | BODY MASS INDEX: 27.12 KG/M2 | DIASTOLIC BLOOD PRESSURE: 86 MMHG | WEIGHT: 134.5 LBS | HEART RATE: 77 BPM | SYSTOLIC BLOOD PRESSURE: 177 MMHG

## 2022-12-07 DIAGNOSIS — B35.1 ONYCHOMYCOSIS DUE TO DERMATOPHYTE: Primary | ICD-10-CM

## 2022-12-07 DIAGNOSIS — L84 CORN OR CALLUS: ICD-10-CM

## 2022-12-07 PROCEDURE — 99999 PR PBB SHADOW E&M-EST. PATIENT-LVL III: CPT | Mod: PBBFAC,,, | Performed by: PODIATRIST

## 2022-12-07 PROCEDURE — 17999 UNLISTD PX SKN MUC MEMB SUBQ: CPT | Mod: CSM,S$GLB,, | Performed by: PODIATRIST

## 2022-12-07 PROCEDURE — 99999 PR PBB SHADOW E&M-EST. PATIENT-LVL III: ICD-10-PCS | Mod: PBBFAC,,, | Performed by: PODIATRIST

## 2022-12-07 PROCEDURE — 99499 UNLISTED E&M SERVICE: CPT | Mod: ,,, | Performed by: PODIATRIST

## 2022-12-07 PROCEDURE — 1160F RVW MEDS BY RX/DR IN RCRD: CPT | Mod: CPTII,,, | Performed by: PODIATRIST

## 2022-12-07 PROCEDURE — 1159F MED LIST DOCD IN RCRD: CPT | Mod: CPTII,,, | Performed by: PODIATRIST

## 2022-12-07 PROCEDURE — 1125F PR PAIN SEVERITY QUANTIFIED, PAIN PRESENT: ICD-10-PCS | Mod: CPTII,,, | Performed by: PODIATRIST

## 2022-12-07 PROCEDURE — 99499 NO LOS: ICD-10-PCS | Mod: ,,, | Performed by: PODIATRIST

## 2022-12-07 PROCEDURE — 1159F PR MEDICATION LIST DOCUMENTED IN MEDICAL RECORD: ICD-10-PCS | Mod: CPTII,,, | Performed by: PODIATRIST

## 2022-12-07 PROCEDURE — 1125F AMNT PAIN NOTED PAIN PRSNT: CPT | Mod: CPTII,,, | Performed by: PODIATRIST

## 2022-12-07 PROCEDURE — 1160F PR REVIEW ALL MEDS BY PRESCRIBER/CLIN PHARMACIST DOCUMENTED: ICD-10-PCS | Mod: CPTII,,, | Performed by: PODIATRIST

## 2022-12-07 PROCEDURE — 17999 PR NON-COVERED FOOT CARE: ICD-10-PCS | Mod: CSM,S$GLB,, | Performed by: PODIATRIST

## 2022-12-09 ENCOUNTER — TELEPHONE (OUTPATIENT)
Dept: INTERNAL MEDICINE | Facility: CLINIC | Age: 87
End: 2022-12-09
Payer: MEDICARE

## 2022-12-09 DIAGNOSIS — Z12.31 ENCOUNTER FOR SCREENING MAMMOGRAM FOR BREAST CANCER: Primary | ICD-10-CM

## 2022-12-09 NOTE — TELEPHONE ENCOUNTER
----- Message from Savana Zelaya sent at 12/9/2022 11:01 AM CST -----  Contact: [phone]  Pt is calling for her order for her mammo please give return call

## 2022-12-15 ENCOUNTER — OFFICE VISIT (OUTPATIENT)
Dept: UROGYNECOLOGY | Facility: CLINIC | Age: 87
End: 2022-12-15
Payer: MEDICARE

## 2022-12-15 VITALS
WEIGHT: 133.63 LBS | BODY MASS INDEX: 26.94 KG/M2 | SYSTOLIC BLOOD PRESSURE: 132 MMHG | DIASTOLIC BLOOD PRESSURE: 64 MMHG | HEIGHT: 59 IN

## 2022-12-15 DIAGNOSIS — N95.2 VAGINAL ATROPHY: ICD-10-CM

## 2022-12-15 DIAGNOSIS — N39.41 URINARY INCONTINENCE, URGE: ICD-10-CM

## 2022-12-15 DIAGNOSIS — Z46.89 PESSARY MAINTENANCE: ICD-10-CM

## 2022-12-15 DIAGNOSIS — N81.10 PROLAPSE OF ANTERIOR VAGINAL WALL: Primary | ICD-10-CM

## 2022-12-15 DIAGNOSIS — N99.3 VAGINAL VAULT PROLAPSE, POSTHYSTERECTOMY: ICD-10-CM

## 2022-12-15 PROCEDURE — 1101F PR PT FALLS ASSESS DOC 0-1 FALLS W/OUT INJ PAST YR: ICD-10-PCS | Mod: HCNC,CPTII,S$GLB, | Performed by: NURSE PRACTITIONER

## 2022-12-15 PROCEDURE — 3288F PR FALLS RISK ASSESSMENT DOCUMENTED: ICD-10-PCS | Mod: HCNC,CPTII,S$GLB, | Performed by: NURSE PRACTITIONER

## 2022-12-15 PROCEDURE — 99999 PR PBB SHADOW E&M-EST. PATIENT-LVL IV: CPT | Mod: PBBFAC,HCNC,, | Performed by: NURSE PRACTITIONER

## 2022-12-15 PROCEDURE — 3288F FALL RISK ASSESSMENT DOCD: CPT | Mod: HCNC,CPTII,S$GLB, | Performed by: NURSE PRACTITIONER

## 2022-12-15 PROCEDURE — 1126F PR PAIN SEVERITY QUANTIFIED, NO PAIN PRESENT: ICD-10-PCS | Mod: HCNC,CPTII,S$GLB, | Performed by: NURSE PRACTITIONER

## 2022-12-15 PROCEDURE — 99999 PR PBB SHADOW E&M-EST. PATIENT-LVL IV: ICD-10-PCS | Mod: PBBFAC,HCNC,, | Performed by: NURSE PRACTITIONER

## 2022-12-15 PROCEDURE — 99213 OFFICE O/P EST LOW 20 MIN: CPT | Mod: HCNC,S$GLB,, | Performed by: NURSE PRACTITIONER

## 2022-12-15 PROCEDURE — 1159F PR MEDICATION LIST DOCUMENTED IN MEDICAL RECORD: ICD-10-PCS | Mod: HCNC,CPTII,S$GLB, | Performed by: NURSE PRACTITIONER

## 2022-12-15 PROCEDURE — 1101F PT FALLS ASSESS-DOCD LE1/YR: CPT | Mod: HCNC,CPTII,S$GLB, | Performed by: NURSE PRACTITIONER

## 2022-12-15 PROCEDURE — 1160F PR REVIEW ALL MEDS BY PRESCRIBER/CLIN PHARMACIST DOCUMENTED: ICD-10-PCS | Mod: HCNC,CPTII,S$GLB, | Performed by: NURSE PRACTITIONER

## 2022-12-15 PROCEDURE — 1126F AMNT PAIN NOTED NONE PRSNT: CPT | Mod: HCNC,CPTII,S$GLB, | Performed by: NURSE PRACTITIONER

## 2022-12-15 PROCEDURE — 1160F RVW MEDS BY RX/DR IN RCRD: CPT | Mod: HCNC,CPTII,S$GLB, | Performed by: NURSE PRACTITIONER

## 2022-12-15 PROCEDURE — 99213 PR OFFICE/OUTPT VISIT, EST, LEVL III, 20-29 MIN: ICD-10-PCS | Mod: HCNC,S$GLB,, | Performed by: NURSE PRACTITIONER

## 2022-12-15 PROCEDURE — 1159F MED LIST DOCD IN RCRD: CPT | Mod: HCNC,CPTII,S$GLB, | Performed by: NURSE PRACTITIONER

## 2022-12-15 NOTE — PATIENT INSTRUCTIONS
1. Prolapse: Stage 2 apical prolapse, Stage 3 anterior and posterior vaginal wall prolapse   --Pessary # 3 ring with support  --Daily pelvic floor exercises as assigned, consider Pelvic floor PT in future  --Non surgical options discussed with patient      2. Urge urinary incontinence:  --stable  --Empty bladder every 3 hours. Empty well: wait a minute, lean forward on toilet.   --Avoid dietary irritants (see sheet). Keep diary x 3-5 days to determine your irritants.  --consider PT in future  --URGE: Consider mirabegron in the future.  Takes 2-4 weeks to see if will have effect. For dry mouth: get sour, sugar free lozenge or gum.   --STRESS: Pessary vs. Sling.      3. Incomplete bladder emptying:improved with pessary   --Double void and Crede maneuvers discussed  --Improved with pessary    4. Vaginal atrophy (dryness):  Use KY jelly, astroglide, or other water-based lube quarter size with your finger twice weekly    5. Hemorrhoid  --not inflammed at this time  --rectal 12/2020 NEG    6. Nocturia (nighttime urination): stop fluids 2 hours before bed. No water by the bed. If have leg swelling:  Elevate feet above chest x 1 hour before bed to get excess fluid off.  Can also use support hose (knee highs).      7.  RTC 3 months for pessary check.

## 2022-12-15 NOTE — PROGRESS NOTES
Urogyn follow up  12/15/2022  .  [facility] - UROGYNECOLOGY  [address] [address]    Ernestine Luna  [id number]  8/13/1929      Ernestine Luna is a 89 y.o. here for a urogyn pessary check.      Last HPI from 10/29/2018  1)  UI:  (--) NELI   (--) UUI (--) pads Daytime frequency: Q 30 minutes- 3 hours.  Nocturia: Yes 3-4/night.   (--) dysuria,  (--) hematuria,  (--) frequent UTIs.  (+) complete bladder emptying.   2)  POP:  Absent.   Symptoms:(--)  .  (--) vaginal bleeding. (--) vaginal discharge. (--) sexually active.  (--) dyspareunia.   (+)  Vaginal dryness.  (--) vaginal estrogen use.   3)  BM:  (--) constipation/straining.  (--) chronic diarrhea. (--) hematochezia.  (--) fecal incontinence.  (--) fecal smearing/urgency.  (--) incomplete evacuation.    4)Pessary:  Denies pain, bleeding or dischage. Using #3 ring with support pessary..    03/27/2021  No /GYN changes.  Still sad because her long-term dog passed away in March.  Is stable, has good support system, no SI/HI.     1)  UI:  (--) NELI   (--) UUI (--) pads Daytime frequency: Q 30 minutes (after BP meds)- 3 hours.  Nocturia: Yes 3-4/night.  Wakes with urge to urinate. Sounds like dog can keep her awake, too.  (--) dysuria,  (--) hematuria,  (--) frequent UTIs.  (+) complete bladder emptying.     2)  POP:  Absent.   Symptoms:(--).  (--) vaginal bleeding. (--) vaginal discharge. (--) sexually active.  (--) dyspareunia.   (+)  Vaginal dryness.  (--) vaginal estrogen use. Using replens 2x/week.  Difficult to squeeze replens applicator. Uses 2x/week.     3)  BM:  (--) constipation/straining.  (--) chronic diarrhea. (--) hematochezia.  (--) fecal incontinence.  (--) fecal smearing/urgency.  (--) incomplete evacuation.      4) Pessary:  Denies pain, bleeding.  Has some scant discharge. Using #3 ring with support pessary.    5) Depression: Patient is still very sad about the passing of her dog in March 2020. She has had the dog stuffed.   She denies SI/HI but cannot wait to see her dog again one day.  She doesn't want to burden her family with her sadness. She feels that she has a good support system in her [employer].      06/23/2021   1)  UI:  (--) NELI   (--) UUI (+) liner pads just in case: Daytime frequency: Q 30 minutes (after BP meds)- 3 hours.  Nocturia: Yes 3/night.  o.  (--) dysuria,  (--) hematuria,  (--) frequent UTIs.  (+) complete bladder emptying.     2)  POP:  Absent with pessary in place.   Symptoms:(--).  (--) vaginal bleeding. (--) vaginal discharge. (--) sexually active.  (--) dyspareunia.   (+)  Vaginal dryness.  (--) vaginal estrogen use. Using replens 2x/week.  Difficult to squeeze replens applicator. Uses 2x/week.     3)  BM:  (--) constipation/straining.  (--) chronic diarrhea. (--) hematochezia.  (--) fecal incontinence.  (--) fecal smearing/urgency.  (--) incomplete evacuation.      4) Pessary:  Denies pain, bleeding.  Denies vaginal discharge. Using #3 ring with support pessary.    5) Depression Feels better since she has had the dog stuffed.  She denies SI/HI but cannot wait to see her dog again one day.  She doesn't want to burden her family with her sadness. She feels that she has a good support system in her [employer].    Changes since last visit:     1)  UI:  (--) NELI   (--) UUI (+) liner pads just in case: Daytime frequency: Q 30 minutes (after BP meds)- 3 hours.  Nocturia: Yes 3/night.  Limits fluids prior to bedtme  (--) dysuria,  (--) hematuria,  (--) frequent UTIs.  (+) complete bladder emptying.     2)  POP:  Absent with pessary in place.   Symptoms:(--).  (--) vaginal bleeding. (--) vaginal discharge. (--) sexually active.  (--) dyspareunia.   (+)  Vaginal dryness.  (--) vaginal estrogen use. Using replens 2x/week.      3)  BM:  (--) constipation/straining.  (--) chronic diarrhea. (--) hematochezia.  (--) fecal incontinence.  (--) fecal smearing/urgency.  (--) incomplete evacuation.      4) Pessary:  Denies pain,  bleeding.  Denies vaginal discharge. Using #3 ring with support pessary.    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 Wilkins    COLONOSCOPY N/A 10/1/2019    Procedure: COLONOSCOPY;  Surgeon: Jarad Chavira MD;  Location: [address]);  Service: Endoscopy;  Laterality: N/A;  miralax prep (used miralax for last colonoscopy) - [facility]    COLONOSCOPY W/ POLYPECTOMY  08/08/2013    RASHEED   06/24/2014    EYE SURGERY      HYSTERECTOMY  age 38    ALISTAIR; ovaries in place    JOINT REPLACEMENT      bilateral knees    TOTAL KNEE ARTHROPLASTY      Bilateral       Current Outpatient Medications   Medication Sig    acetic acid-oxyquinoline (FEM PH) 0.9-0.025 % Gel Place 1 applicator vaginally twice a week.    alendronate (FOSAMAX) 70 MG tablet Take 1 tablet (70 mg total) by mouth every 7 days.    amLODIPine (NORVASC) 10 MG tablet Take 1 tablet (10 mg total) by mouth once daily.    atenoloL (TENORMIN) 25 MG tablet TAKE 1 TABLET(25 MG) BY MOUTH EVERY DAY    benazepriL (LOTENSIN) 40 MG tablet TAKE 1 TABLET(40 MG) BY MOUTH EVERY DAY    calcium carbonate (OS-DHRUV) 600 mg calcium (1,500 mg) Tab Take 600 mg by mouth once.    ciclopirox (PENLAC) 8 % Soln Apply topically nightly.    diclofenac sodium (VOLTAREN) 1 % Gel Apply 2 g topically 3 (three) times daily.    glycerin/min  "oil/polycarbophil (REPLENS VAGL) Place vaginally.    lidocaine HCL 2% (XYLOCAINE) 2 % jelly Apply topically as needed. For toe pain, apply up to twice daily as needed for pain.    MULTIVITAMIN W-MINERALS/LUTEIN (CENTRUM SILVER ORAL) Take 1 tablet by mouth Daily.    omega 3-calcium-vit D3-[id number] [id number] mg Cmpk Take by mouth once daily.    vitamin D 1000 units Tab Take 185 mg by mouth once daily.     No current facility-administered medications for this visit.       ROS:  As per HPI.      Well Woman:  --Pap:denies history of abnormal pap smear-- post hyst; no further needed  --Mammo:10/2021 normal--scheduled  --Colonoscopy: 10/2019 Diverticulosis in the sigmoid colon.  No further recommended.   --Dexa:06/2016Osteoporosis of the femoral neck. Has not takenTx. Talk with Dr. Ugalde about need for repeat bone density and/or treatment for osteoporosis.     Exam  /64 (BP Location: Left arm, Patient Position: Sitting, BP Method: Medium (Manual))   Ht 4' 11" (1.499 m)   Wt 60.6 kg (133 lb 9.6 oz)   LMP  (LMP Unknown)   BMI 26.98 kg/m²   General: alert and oriented, no acute distress  Respiratory: normal respiratory effort  Abd: soft, non-tender, non-distended    Pelvic  Ext. Genitalia: normal external genitalia. Normal bartholin's and skenes glands  Vagina: + atrophy. Normal vaginal mucosa without lesions. No discharge noted.   Non-tender bladder base without palpable mass.  #3 ring with support in place--removed and cleaned.   Cervix: absent  Uterus:  absent   Urethra: no masses or tenderness  Urethral meatus: no lesions, caruncle or prolapse.    Pessary removed without difficulty. Washed with soap and water. Reinserted without difficulty    Impression  1. Prolapse of anterior vaginal wall        2. Vaginal vault prolapse, posthysterectomy        3. Urinary incontinence, urge        4. Vaginal atrophy        5. Pessary maintenance            We reviewed the above issues and discussed options for short-term " versus long-term management of her problems.   Plan:   1. Prolapse: Stage 2 apical prolapse, Stage 3 anterior and posterior vaginal wall prolapse   --Pessary # 3 ring with support  --Daily pelvic floor exercises as assigned, consider Pelvic floor PT in future  --Non surgical options discussed with patient      2. Urge urinary incontinence:  --stable  --Empty bladder every 3 hours. Empty well: wait a minute, lean forward on toilet.   --Avoid dietary irritants (see sheet). Keep diary x 3-5 days to determine your irritants.  --consider PT in future  --URGE: Consider mirabegron in the future.  Takes 2-4 weeks to see if will have effect. For dry mouth: get sour, sugar free lozenge or gum.   --STRESS: Pessary vs. Sling.      3. Incomplete bladder emptying:improved with pessary   --Double void and Crede maneuvers discussed  --Improved with pessary    4. Vaginal atrophy (dryness):  Use KY jelly, astroglide, or other water-based lube quarter size with your finger twice weekly    5. Hemorrhoid  --not inflammed at this time  --rectal 12/2020 NEG    6. Nocturia (nighttime urination): stop fluids 2 hours before bed. No water by the bed. If have leg swelling:  Elevate feet above chest x 1 hour before bed to get excess fluid off.  Can also use support hose (knee highs).      7.  RTC 3 months for pessary check.     I spent a total of 20 minutes on the day of the visit.    This includes face to face time and non-face to face time preparing to see the patient (eg, review of tests), obtaining and/or reviewing separately obtained history, documenting clinical information in the electronic or other health record, independently interpreting results and communicating results to the patient/family/caregiver, or care coordinator.     Kristine Sequeira, SHIMA-BC   Division of Female Pelvic Medicine and Reconstructive Surgery  Department of Obstetrics & Gynecology

## 2022-12-20 ENCOUNTER — TELEPHONE (OUTPATIENT)
Dept: PODIATRY | Facility: CLINIC | Age: 87
End: 2022-12-20
Payer: MEDICARE

## 2022-12-20 NOTE — TELEPHONE ENCOUNTER
----- Message from Lauri Saldana sent at 12/20/2022  1:02 PM CST -----  Regarding: Appointment  Contact: [phone]  Pt is needing a call back in regards to her appointment on 01/17. She states that her appointment is too far out. Please call to discuss further @ [phone]

## 2022-12-21 ENCOUNTER — HOSPITAL ENCOUNTER (OUTPATIENT)
Dept: RADIOLOGY | Facility: OTHER | Age: 87
Discharge: HOME | End: 2022-12-21
Attending: INTERNAL MEDICINE
Payer: MEDICARE

## 2022-12-21 DIAGNOSIS — Z12.31 ENCOUNTER FOR SCREENING MAMMOGRAM FOR BREAST CANCER: ICD-10-CM

## 2022-12-21 PROCEDURE — 77063 BREAST TOMOSYNTHESIS BI: CPT | Mod: 26,HCNC,, | Performed by: RADIOLOGY

## 2022-12-21 PROCEDURE — 77063 BREAST TOMOSYNTHESIS BI: CPT | Mod: TC,HCNC

## 2022-12-21 PROCEDURE — 77067 MAMMO DIGITAL SCREENING BILAT WITH TOMO: ICD-10-PCS | Mod: 26,HCNC,, | Performed by: RADIOLOGY

## 2022-12-21 PROCEDURE — 77063 MAMMO DIGITAL SCREENING BILAT WITH TOMO: ICD-10-PCS | Mod: 26,HCNC,, | Performed by: RADIOLOGY

## 2022-12-21 PROCEDURE — 77067 SCR MAMMO BI INCL CAD: CPT | Mod: 26,HCNC,, | Performed by: RADIOLOGY

## 2022-12-21 PROCEDURE — 77067 SCR MAMMO BI INCL CAD: CPT | Mod: TC,HCNC

## 2022-12-28 ENCOUNTER — TELEPHONE (OUTPATIENT)
Dept: RADIOLOGY | Facility: OTHER | Age: 87
End: 2022-12-28
Payer: MEDICARE

## 2023-01-05 ENCOUNTER — OFFICE VISIT (OUTPATIENT)
Dept: PODIATRY | Facility: CLINIC | Age: 88
End: 2023-01-05
Payer: MEDICARE

## 2023-01-05 VITALS
HEART RATE: 60 BPM | HEIGHT: 59 IN | SYSTOLIC BLOOD PRESSURE: 140 MMHG | DIASTOLIC BLOOD PRESSURE: 88 MMHG | BODY MASS INDEX: 26.85 KG/M2 | WEIGHT: 133.19 LBS

## 2023-01-05 DIAGNOSIS — L84 CORN OR CALLUS: ICD-10-CM

## 2023-01-05 DIAGNOSIS — B35.1 ONYCHOMYCOSIS DUE TO DERMATOPHYTE: Primary | ICD-10-CM

## 2023-01-05 PROCEDURE — 1126F AMNT PAIN NOTED NONE PRSNT: CPT | Mod: HCNC,CPTII,, | Performed by: PODIATRIST

## 2023-01-05 PROCEDURE — 1159F MED LIST DOCD IN RCRD: CPT | Mod: HCNC,CPTII,, | Performed by: PODIATRIST

## 2023-01-05 PROCEDURE — 99999 PR PBB SHADOW E&M-EST. PATIENT-LVL III: CPT | Mod: PBBFAC,HCNC,, | Performed by: PODIATRIST

## 2023-01-05 PROCEDURE — 1159F PR MEDICATION LIST DOCUMENTED IN MEDICAL RECORD: ICD-10-PCS | Mod: HCNC,CPTII,, | Performed by: PODIATRIST

## 2023-01-05 PROCEDURE — 1160F RVW MEDS BY RX/DR IN RCRD: CPT | Mod: HCNC,CPTII,, | Performed by: PODIATRIST

## 2023-01-05 PROCEDURE — 99499 NO LOS: ICD-10-PCS | Mod: HCNC,,, | Performed by: PODIATRIST

## 2023-01-05 PROCEDURE — 1126F PR PAIN SEVERITY QUANTIFIED, NO PAIN PRESENT: ICD-10-PCS | Mod: HCNC,CPTII,, | Performed by: PODIATRIST

## 2023-01-05 PROCEDURE — 99499 UNLISTED E&M SERVICE: CPT | Mod: HCNC,,, | Performed by: PODIATRIST

## 2023-01-05 PROCEDURE — 17999 PR NON-COVERED FOOT CARE: ICD-10-PCS | Mod: CSM,HCNC,S$GLB, | Performed by: PODIATRIST

## 2023-01-05 PROCEDURE — 99999 PR PBB SHADOW E&M-EST. PATIENT-LVL III: ICD-10-PCS | Mod: PBBFAC,HCNC,, | Performed by: PODIATRIST

## 2023-01-05 PROCEDURE — 1160F PR REVIEW ALL MEDS BY PRESCRIBER/CLIN PHARMACIST DOCUMENTED: ICD-10-PCS | Mod: HCNC,CPTII,, | Performed by: PODIATRIST

## 2023-01-05 PROCEDURE — 17999 UNLISTD PX SKN MUC MEMB SUBQ: CPT | Mod: CSM,HCNC,S$GLB, | Performed by: PODIATRIST

## 2023-01-19 ENCOUNTER — HOSPITAL ENCOUNTER (OUTPATIENT)
Dept: RADIOLOGY | Facility: OTHER | Age: 88
Discharge: HOME | End: 2023-01-19
Attending: INTERNAL MEDICINE
Payer: MEDICARE

## 2023-01-19 DIAGNOSIS — R92.8 ABNORMAL MAMMOGRAM: ICD-10-CM

## 2023-01-19 PROCEDURE — 77061 BREAST TOMOSYNTHESIS UNI: CPT | Mod: TC,HCNC,LT

## 2023-01-19 PROCEDURE — 77061 MAMMO DIGITAL DIAGNOSTIC LEFT WITH TOMO: ICD-10-PCS | Mod: 26,HCNC,LT, | Performed by: RADIOLOGY

## 2023-01-19 PROCEDURE — 77061 BREAST TOMOSYNTHESIS UNI: CPT | Mod: 26,HCNC,LT, | Performed by: RADIOLOGY

## 2023-01-19 PROCEDURE — 76642 ULTRASOUND BREAST LIMITED: CPT | Mod: TC,HCNC,LT

## 2023-01-19 PROCEDURE — 77065 MAMMO DIGITAL DIAGNOSTIC LEFT WITH TOMO: ICD-10-PCS | Mod: 26,HCNC,LT, | Performed by: RADIOLOGY

## 2023-01-19 PROCEDURE — 76642 ULTRASOUND BREAST LIMITED: CPT | Mod: 26,HCNC,LT, | Performed by: RADIOLOGY

## 2023-01-19 PROCEDURE — 77065 DX MAMMO INCL CAD UNI: CPT | Mod: 26,HCNC,LT, | Performed by: RADIOLOGY

## 2023-01-19 PROCEDURE — 76642 US BREAST LEFT LIMITED: ICD-10-PCS | Mod: 26,HCNC,LT, | Performed by: RADIOLOGY

## 2023-01-26 ENCOUNTER — HOSPITAL ENCOUNTER (OUTPATIENT)
Dept: RADIOLOGY | Facility: OTHER | Age: 88
Discharge: HOME | End: 2023-01-26
Attending: INTERNAL MEDICINE
Payer: MEDICARE

## 2023-01-26 DIAGNOSIS — R92.8 ABNORMAL MAMMOGRAM: ICD-10-CM

## 2023-01-26 DIAGNOSIS — N63.0 BREAST MASS IN FEMALE: Primary | ICD-10-CM

## 2023-01-26 PROCEDURE — 77065 DX MAMMO INCL CAD UNI: CPT | Mod: 26,HCNC,LT, | Performed by: RADIOLOGY

## 2023-01-26 PROCEDURE — 19083 BX BREAST 1ST LESION US IMAG: CPT | Mod: HCNC,LT,, | Performed by: RADIOLOGY

## 2023-01-26 PROCEDURE — 25000003 PHARM REV CODE 250: Mod: HCNC | Performed by: INTERNAL MEDICINE

## 2023-01-26 PROCEDURE — 88377 M/PHMTRC ALYS ISHQUANT/SEMIQ: CPT | Mod: HCNC | Performed by: PATHOLOGY

## 2023-01-26 PROCEDURE — 19083 US BREAST BIOPSY WITH IMAGING 1ST SITE LEFT: ICD-10-PCS | Mod: HCNC,LT,, | Performed by: RADIOLOGY

## 2023-01-26 PROCEDURE — 88305 TISSUE EXAM BY PATHOLOGIST: CPT | Mod: HCNC | Performed by: PATHOLOGY

## 2023-01-26 PROCEDURE — 88305 TISSUE EXAM BY PATHOLOGIST: CPT | Mod: 26,HCNC,, | Performed by: PATHOLOGY

## 2023-01-26 PROCEDURE — 88360 PR  TUMOR IMMUNOHISTOCHEM/MANUAL: ICD-10-PCS | Mod: 26,HCNC,, | Performed by: PATHOLOGY

## 2023-01-26 PROCEDURE — 77065 DX MAMMO INCL CAD UNI: CPT | Mod: TC,HCNC,LT

## 2023-01-26 PROCEDURE — 27201068 US BREAST BIOPSY WITH IMAGING 1ST SITE LEFT: Mod: HCNC

## 2023-01-26 PROCEDURE — 88305 TISSUE EXAM BY PATHOLOGIST: ICD-10-PCS | Mod: 26,HCNC,, | Performed by: PATHOLOGY

## 2023-01-26 PROCEDURE — 77065 MAMMO DIGITAL DIAGNOSTIC LEFT: ICD-10-PCS | Mod: 26,HCNC,LT, | Performed by: RADIOLOGY

## 2023-01-26 PROCEDURE — 88360 TUMOR IMMUNOHISTOCHEM/MANUAL: CPT | Mod: HCNC | Performed by: PATHOLOGY

## 2023-01-26 PROCEDURE — 88360 TUMOR IMMUNOHISTOCHEM/MANUAL: CPT | Mod: 26,HCNC,, | Performed by: PATHOLOGY

## 2023-01-26 RX ORDER — LIDOCAINE HYDROCHLORIDE AND EPINEPHRINE 20; 10 MG/ML; UG/ML
10 INJECTION, SOLUTION INFILTRATION; PERINEURAL ONCE
Status: COMPLETED | OUTPATIENT
Start: 2023-01-26 | End: 2023-01-26

## 2023-01-26 RX ORDER — BENAZEPRIL HYDROCHLORIDE 40 MG/1
40 TABLET ORAL DAILY
Qty: 90 TABLET | Refills: 1 | Status: SHIPPED | OUTPATIENT
Start: 2023-01-26 | End: 2023-07-16

## 2023-01-26 RX ORDER — LIDOCAINE HYDROCHLORIDE 10 MG/ML
5 INJECTION INFILTRATION; PERINEURAL ONCE
Status: COMPLETED | OUTPATIENT
Start: 2023-01-26 | End: 2023-01-26

## 2023-01-26 RX ORDER — ATENOLOL 25 MG/1
25 TABLET ORAL DAILY
Qty: 90 TABLET | Refills: 1 | Status: SHIPPED | OUTPATIENT
Start: 2023-01-26 | End: 2023-07-16

## 2023-01-26 RX ORDER — AMLODIPINE BESYLATE 10 MG/1
10 TABLET ORAL DAILY
Qty: 90 TABLET | Refills: 1 | Status: SHIPPED | OUTPATIENT
Start: 2023-01-26 | End: 2023-10-18 | Stop reason: SDUPTHER

## 2023-01-26 RX ADMIN — LIDOCAINE HYDROCHLORIDE,EPINEPHRINE BITARTRATE 10 ML: 20; .01 INJECTION, SOLUTION INFILTRATION; PERINEURAL at 10:01

## 2023-01-26 RX ADMIN — LIDOCAINE HYDROCHLORIDE 5 ML: 10 INJECTION, SOLUTION EPIDURAL; INFILTRATION; INTRACAUDAL at 10:01

## 2023-01-26 NOTE — TELEPHONE ENCOUNTER
No new care gaps identified.  [facility] Embedded Care Gaps. Reference number: [id number]. 1/26/2023   1:30:42 PM CST

## 2023-01-27 ENCOUNTER — TELEPHONE (OUTPATIENT)
Dept: PODIATRY | Facility: CLINIC | Age: 88
End: 2023-01-27
Payer: MEDICARE

## 2023-01-27 NOTE — TELEPHONE ENCOUNTER
Spoke with patient's daughter(Denisse) concerning her mother's appointment being rescheduled w/Dr. Pappas(Podiatry)        ----- Message from Bay Bennett MA sent at 1/27/2023  9:48 AM CST -----  Contact: patient at   Ms Luna MRn [id number]  calling  upset that her  appt  was  moved to the  15 of Feb and  would like to know why stated that she  needed the appt  for  the 5th of  every month for her foot care Please call patient at

## 2023-02-01 ENCOUNTER — TELEPHONE (OUTPATIENT)
Dept: RADIOLOGY | Facility: OTHER | Age: 88
End: 2023-02-01
Payer: MEDICARE

## 2023-02-01 ENCOUNTER — TELEPHONE (OUTPATIENT)
Dept: HEMATOLOGY/ONCOLOGY | Facility: CLINIC | Age: 88
End: 2023-02-01
Payer: MEDICARE

## 2023-02-01 ENCOUNTER — DOCUMENTATION ONLY (OUTPATIENT)
Dept: HEMATOLOGY/ONCOLOGY | Facility: CLINIC | Age: 88
End: 2023-02-01
Payer: MEDICARE

## 2023-02-01 NOTE — TELEPHONE ENCOUNTER
Patient notified of left breast biopsy results per pathology reported on 1/31/2023. Diagnosis: INVASIVE DUCTAL CARCINOMA. Explained to patient results are positive for breast cancer. Instructed on need for consultation with breast surgeon. Questions answered. Appointment with breast surgeon requested. Office will call patient directly to confirm appointment. Patient verbalized understanding. Biopsy site WNL. Encouraged to call [phone] for further questions or concerns.

## 2023-02-06 ENCOUNTER — LAB VISIT (OUTPATIENT)
Dept: LAB | Facility: HOSPITAL | Age: 88
End: 2023-02-06
Attending: SURGERY
Payer: MEDICARE

## 2023-02-06 ENCOUNTER — OFFICE VISIT (OUTPATIENT)
Dept: SURGERY | Facility: CLINIC | Age: 88
End: 2023-02-06
Payer: MEDICARE

## 2023-02-06 ENCOUNTER — OFFICE VISIT (OUTPATIENT)
Dept: HEMATOLOGY/ONCOLOGY | Facility: CLINIC | Age: 88
End: 2023-02-06
Payer: MEDICARE

## 2023-02-06 VITALS
DIASTOLIC BLOOD PRESSURE: 70 MMHG | SYSTOLIC BLOOD PRESSURE: 154 MMHG | BODY MASS INDEX: 25.2 KG/M2 | HEIGHT: 59 IN | WEIGHT: 125 LBS | OXYGEN SATURATION: 99 % | HEART RATE: 65 BPM | TEMPERATURE: 98 F

## 2023-02-06 DIAGNOSIS — Z01.818 PRE-OP TESTING: ICD-10-CM

## 2023-02-06 DIAGNOSIS — C50.912 INVASIVE DUCTAL CARCINOMA OF LEFT BREAST: Primary | ICD-10-CM

## 2023-02-06 DIAGNOSIS — Z85.3 HISTORY OF BREAST CANCER: ICD-10-CM

## 2023-02-06 DIAGNOSIS — C50.912 INVASIVE DUCTAL CARCINOMA OF BREAST, LEFT: Primary | ICD-10-CM

## 2023-02-06 LAB
ALBUMIN SERPL BCP-MCNC: 3.9 G/DL (ref 3.5–5.2)
ALP SERPL-CCNC: 57 U/L (ref 55–135)
ALT SERPL W/O P-5'-P-CCNC: 18 U/L (ref 10–44)
ANION GAP SERPL CALC-SCNC: 6 MMOL/L (ref 8–16)
AST SERPL-CCNC: 28 U/L (ref 10–40)
BASOPHILS # BLD AUTO: 0.02 K/UL (ref 0–0.2)
BASOPHILS NFR BLD: 0.3 % (ref 0–1.9)
BILIRUB SERPL-MCNC: 0.4 MG/DL (ref 0.1–1)
BUN SERPL-MCNC: 20 MG/DL (ref 10–30)
CALCIUM SERPL-MCNC: 9.9 MG/DL (ref 8.7–10.5)
CHLORIDE SERPL-SCNC: 103 MMOL/L (ref 95–110)
CO2 SERPL-SCNC: 28 MMOL/L (ref 23–29)
CREAT SERPL-MCNC: 0.6 MG/DL (ref 0.5–1.4)
DIFFERENTIAL METHOD: NORMAL
EOSINOPHIL # BLD AUTO: 0.2 K/UL (ref 0–0.5)
EOSINOPHIL NFR BLD: 2.7 % (ref 0–8)
ERYTHROCYTE [DISTWIDTH] IN BLOOD BY AUTOMATED COUNT: 13 % (ref 11.5–14.5)
EST. GFR  (NO RACE VARIABLE): >60 ML/MIN/1.73 M^2
GLUCOSE SERPL-MCNC: 90 MG/DL (ref 70–110)
HCT VFR BLD AUTO: 38.3 % (ref 37–48.5)
HGB BLD-MCNC: 12.4 G/DL (ref 12–16)
IMM GRANULOCYTES # BLD AUTO: 0.02 K/UL (ref 0–0.04)
IMM GRANULOCYTES NFR BLD AUTO: 0.3 % (ref 0–0.5)
LYMPHOCYTES # BLD AUTO: 2.3 K/UL (ref 1–4.8)
LYMPHOCYTES NFR BLD: 38 % (ref 18–48)
MCH RBC QN AUTO: 30.2 PG (ref 27–31)
MCHC RBC AUTO-ENTMCNC: 32.4 G/DL (ref 32–36)
MCV RBC AUTO: 93 FL (ref 82–98)
MONOCYTES # BLD AUTO: 0.4 K/UL (ref 0.3–1)
MONOCYTES NFR BLD: 7 % (ref 4–15)
NEUTROPHILS # BLD AUTO: 3.1 K/UL (ref 1.8–7.7)
NEUTROPHILS NFR BLD: 51.7 % (ref 38–73)
NRBC BLD-RTO: 0 /100 WBC
PLATELET # BLD AUTO: 185 K/UL (ref 150–450)
PMV BLD AUTO: 9.5 FL (ref 9.2–12.9)
POTASSIUM SERPL-SCNC: 4.1 MMOL/L (ref 3.5–5.1)
PROT SERPL-MCNC: 7.4 G/DL (ref 6–8.4)
RBC # BLD AUTO: 4.1 M/UL (ref 4–5.4)
SODIUM SERPL-SCNC: 137 MMOL/L (ref 136–145)
WBC # BLD AUTO: 6.02 K/UL (ref 3.9–12.7)

## 2023-02-06 PROCEDURE — 1159F MED LIST DOCD IN RCRD: CPT | Mod: HCNC,CPTII,S$GLB, | Performed by: SURGERY

## 2023-02-06 PROCEDURE — 80053 COMPREHEN METABOLIC PANEL: CPT | Mod: HCNC | Performed by: SURGERY

## 2023-02-06 PROCEDURE — 1125F AMNT PAIN NOTED PAIN PRSNT: CPT | Mod: HCNC,CPTII,S$GLB, | Performed by: SURGERY

## 2023-02-06 PROCEDURE — 99999 PR PBB SHADOW E&M-EST. PATIENT-LVL III: CPT | Mod: PBBFAC,HCNC,, | Performed by: SURGERY

## 2023-02-06 PROCEDURE — 1125F PR PAIN SEVERITY QUANTIFIED, PAIN PRESENT: ICD-10-PCS | Mod: HCNC,CPTII,S$GLB, | Performed by: SURGERY

## 2023-02-06 PROCEDURE — 99205 OFFICE O/P NEW HI 60 MIN: CPT | Mod: HCNC,S$GLB,, | Performed by: SURGERY

## 2023-02-06 PROCEDURE — 99205 PR OFFICE/OUTPT VISIT, NEW, LEVL V, 60-74 MIN: ICD-10-PCS | Mod: HCNC,S$GLB,, | Performed by: SURGERY

## 2023-02-06 PROCEDURE — 1159F PR MEDICATION LIST DOCUMENTED IN MEDICAL RECORD: ICD-10-PCS | Mod: HCNC,CPTII,S$GLB, | Performed by: SURGERY

## 2023-02-06 PROCEDURE — 85025 COMPLETE CBC W/AUTO DIFF WBC: CPT | Mod: HCNC | Performed by: SURGERY

## 2023-02-06 PROCEDURE — 99999 PR PBB SHADOW E&M-EST. PATIENT-LVL III: ICD-10-PCS | Mod: PBBFAC,HCNC,, | Performed by: SURGERY

## 2023-02-06 PROCEDURE — 1160F RVW MEDS BY RX/DR IN RCRD: CPT | Mod: HCNC,CPTII,S$GLB, | Performed by: SURGERY

## 2023-02-06 PROCEDURE — 1160F PR REVIEW ALL MEDS BY PRESCRIBER/CLIN PHARMACIST DOCUMENTED: ICD-10-PCS | Mod: HCNC,CPTII,S$GLB, | Performed by: SURGERY

## 2023-02-06 PROCEDURE — 99205 PR OFFICE/OUTPT VISIT, NEW, LEVL V, 60-74 MIN: ICD-10-PCS | Mod: HCNC,S$GLB,, | Performed by: INTERNAL MEDICINE

## 2023-02-06 PROCEDURE — 99205 OFFICE O/P NEW HI 60 MIN: CPT | Mod: HCNC,S$GLB,, | Performed by: INTERNAL MEDICINE

## 2023-02-06 PROCEDURE — 36415 COLL VENOUS BLD VENIPUNCTURE: CPT | Mod: HCNC | Performed by: SURGERY

## 2023-02-06 RX ORDER — SODIUM CHLORIDE 9 MG/ML
INJECTION, SOLUTION INTRAVENOUS CONTINUOUS
Status: CANCELLED | OUTPATIENT
Start: 2023-02-06

## 2023-02-06 NOTE — H&P (VIEW-ONLY)
Breast Surgery  [facility]  Department of Surgery      REFERRING PROVIDER: Kev Macias MD  [address]  [address], [address]  [place] [address]    Chief Complaint: Breast Cancer (New Patient Multi -D Invasive Ductal Carcinoma 23 .)      Subjective:      Patient ID: Ernestine Luna is a 89 y.o. female who presents with IDC of the left breast.   Patient with a history of right breast cancer (DCIS) in  s/p lumpectomy and radiation.  She underwent 5 years of tamoxifen therapy.  She has been undergoing routine screening since that time.   Patient originally presented for screening mammogram which showed an asymmetry in the left breast. Follow-up mammogram (23) showed a 7 mm x 5 mm x 6 mm mass at the 6 o'clock position, 2cm FN. A ultrasound guided biopsy was performed on 23 with pathology revealing infiltrating ductal carcinoma of the left breast.     Patient has not noted a change on breast exam.  Patient denies nipple discharge.  Patient has a personal history of breast cancer.    Findings at that time were the following:   Tumor size: 7 mm x 5 mm x 6 mm   Tumor stgstrstastdstest:st st1st Estrogen Receptor: +   Progesterone Receptor: +   Her-2 sterling: equivocal, sent for FISH   Lymph node status: large L axillary lymph node with cortical thickening, not biopsied   Lymphatic invasion: none.     GYN History:  Age of menopause was 38 (hysterectomy).   Patient denies hormonal therapy. Patient is . Age of first live birth was 25. Patient did not breast feed.    Family History:   Sister, BC (82)   Daughter, BC (46)   Daughter, BC (45)   Daughter, BC (37)   2 cousins with breast cancer     Past Medical History:   Diagnosis Date    Anemia     s/p knee surgery since surgery has resolved    Breast cancer, stage 0     lumpectomy in     Cataract     DCIS (ductal carcinoma in situ) of breast 2013    Family history of breast cancer 2016    Genetic testing 2016    negative Integrated  BRACAnalysis with myRisk    Hypertension     Osteoarthritis     Urge urinary incontinence 4/18/2019     Past Surgical History:   Procedure Laterality Date    BREAST BIOPSY Right     BREAST LUMPECTOMY Right     BUNIONECTOMY      Left foot (x2)    CARPAL TUNNEL RELEASE Right     38 years old    CATARACT EXTRACTION Left     with lens     CATARACT EXTRACTION W/  INTRAOCULAR LENS IMPLANT Right 10/12/2015    Dr. Wilkins    COLONOSCOPY N/A 10/1/2019    Procedure: COLONOSCOPY;  Surgeon: Jarad Chavira MD;  Location: [facility] ([address] [place] [address]);  Service: Endoscopy;  Laterality: N/A;  miralax prep (used miralax for last colonoscopy) - ERW    COLONOSCOPY W/ POLYPECTOMY  08/08/2013    RASHEED   06/24/2014    EYE SURGERY      HYSTERECTOMY  age 38    ALISTAIR; ovaries in place    JOINT REPLACEMENT      bilateral knees    TOTAL KNEE ARTHROPLASTY      Bilateral     Current Outpatient Medications on File Prior to Visit   Medication Sig Dispense Refill    acetic acid-oxyquinoline (FEM PH) 0.9-0.025 % Gel Place 1 applicator vaginally twice a week. 50 g 11    alendronate (FOSAMAX) 70 MG tablet Take 1 tablet (70 mg total) by mouth every 7 days. 12 tablet 3    amLODIPine (NORVASC) 10 MG tablet Take 1 tablet (10 mg total) by mouth once daily. 90 tablet 1    atenoloL (TENORMIN) 25 MG tablet Take 1 tablet (25 mg total) by mouth once daily. 90 tablet 1    benazepriL (LOTENSIN) 40 MG tablet Take 1 tablet (40 mg total) by mouth once daily. 90 tablet 1    calcium carbonate (OS-DHRUV) 600 mg calcium (1,500 mg) Tab Take 600 mg by mouth once.      ciclopirox (PENLAC) 8 % Soln Apply topically nightly. (Patient not taking: Reported on 1/5/2023) 6.6 mL 11    diclofenac sodium (VOLTAREN) 1 % Gel Apply 2 g topically 3 (three) times daily. (Patient not taking: Reported on 1/5/2023) 100 g 3    glycerin/min oil/polycarbophil (REPLENS VAGL) Place vaginally.      lidocaine HCL 2% (XYLOCAINE) 2 % jelly Apply topically as needed. For toe pain, apply up to  twice daily as needed for pain. (Patient not taking: Reported on 1/5/2023) 30 mL 3    MULTIVITAMIN W-MINERALS/LUTEIN (CENTRUM SILVER ORAL) Take 1 tablet by mouth Daily.      omega 3-calcium-vit D3-FA-mv13 [phone] mg Cmpk Take by mouth once daily.      vitamin D 1000 units Tab Take 185 mg by mouth once daily.       No current facility-administered medications on file prior to visit.     Social History     Socioeconomic History    Marital status:    Tobacco Use    Smoking status: Never    Smokeless tobacco: Never   Substance and Sexual Activity    Alcohol use: Yes     Alcohol/week: 1.0 standard drink     Types: 1 Glasses of wine per week     Comment: Rare, every other week    Drug use: No    Sexual activity: Not Currently     Birth control/protection: None     Comment:      Family History   Problem Relation Age of Onset    Stroke Mother     Breast cancer Sister 82    Heart disease Sister     Diabetes Sister     Cancer Sister         breast cancer    Asthma Sister     Breast cancer Daughter 46    Cancer Daughter         breast    Breast cancer Daughter 45        negative genetic testing 2015    Cancer Daughter         breast    Breast cancer Daughter 37    Cancer Daughter         breast    Coronary artery disease Father     Diabetes Father     Heart disease Father     Heart attack Father     Heart disease Sister     Diabetes Sister     Diabetes Sister     Heart disease Sister     Hyperlipidemia Sister     Heart attack Brother     No Known Problems Son     Cancer Cousin         colon    Cancer Cousin     Breast cancer Cousin     Breast cancer Other     Diabetes Other     Blindness Other         One eye is blind from complications of diabetes    Breast cancer Other     Diabetes Other     Breast cancer Other     Diabetes Other     Ovarian cancer Neg Hx     Endometrial cancer Neg Hx     Vaginal cancer Neg Hx     Cervical cancer Neg Hx         Review of  "Systems   All other systems reviewed and are negative.  Objective:   BP (!) 154/70 (BP Location: Left arm, Patient Position: Sitting, BP Method: Medium (Automatic))   Pulse 65   Temp 97.7 °F (36.5 °C) (Oral)   Ht 4' 11" (1.499 m)   Wt 56.7 kg (125 lb)   LMP  (LMP Unknown)   SpO2 99%   BMI 25.25 kg/m²     Physical Exam   Vitals reviewed.  Constitutional: She is oriented to person, place, and time.   Cardiovascular:  Normal rate.      Exam reveals no gallop.       No murmur heard.  Pulmonary/Chest: Effort normal. No respiratory distress. She has no wheezes. Right breast exhibits no mass, no nipple discharge, no skin change and no tenderness. Left breast exhibits no mass, no nipple discharge, no skin change and no tenderness.       Abdominal: Normal appearance.   Lymphadenopathy:     She has no cervical adenopathy.        Right: No supraclavicular adenopathy present.        Left: No supraclavicular adenopathy present.   Neurological: She is alert and oriented to person, place, and time.   Skin: Skin is warm and dry.     Psychiatric: Her behavior is normal. Mood, judgment and thought content normal.     Radiology review: Images personally reviewed by me in the clinic.      Mammo Digital Diagnostic Left with Cristiano    Result Date: 1/19/2023  Result: Mammo Digital Diagnostic Left with Cristiano US Breast Left Limited  History: Patient is 89 y.o. and is seen for diagnostic imaging. Films Compared: Compared to: 12/21/2022 Mammo Digital Screening Bilat w/ Cristiano, 10/29/2021 Mammo Digital Screening Bilat, and 10/23/2020 Mammo Digital Screening Bilat w/ Cristiano  Findings: This procedure was performed using tomosynthesis. Computer-aided detection was utilized in the interpretation of this examination. Mammo Digital Diagnostic Left with Cristiano The left breast has scattered areas of fibroglandular density. There is a mass seen in the left breast at 6 o'clock. The mass correlates with the screening mammogram finding. US Breast Left " Limited There is a 7 mm x 5 mm x 6 mm irregularly shaped, hypoechoic mass with microlobulated margins seen in the left breast at 6 o'clock, 2 cm from the nipple. The mass correlates with the mass seen on today's mammogram. The left axillary lymph node measures 1.2 cm with a cortex of 4 mm.  The cortex is slightly thickened.      Left Mass: Left breast 7 mm x 5 mm x 6 mm mass at the 6 o'clock position. Assessment: 4B - Suspicious finding. Ultrasound-guided biopsy is recommended.   I will speak with breast surgery regarding the lymph node.  The patient's age and shoulder arthritis are factors in not immediately proceeding to biopsy of the axillary lymph node. I discussed the findings and recommendations for today's exam in detail with the patient.  BI-RADS Category: Overall: 4 - Suspicious  Recommendation: Ultrasound-guided biopsy is recommended.     US Breast Left Limited    Result Date: 1/19/2023  Result: Mammo Digital Diagnostic Left with Cristiano US Breast Left Limited  History: Patient is 89 y.o. and is seen for diagnostic imaging. Films Compared: Compared to: 12/21/2022 Mammo Digital Screening Bilat w/ Cristiano, 10/29/2021 Mammo Digital Screening Bilat, and 10/23/2020 Mammo Digital Screening Bilat w/ Cristiano  Findings: This procedure was performed using tomosynthesis. Computer-aided detection was utilized in the interpretation of this examination. Mammo Digital Diagnostic Left with Cristiano The left breast has scattered areas of fibroglandular density. There is a mass seen in the left breast at 6 o'clock. The mass correlates with the screening mammogram finding. US Breast Left Limited There is a 7 mm x 5 mm x 6 mm irregularly shaped, hypoechoic mass with microlobulated margins seen in the left breast at 6 o'clock, 2 cm from the nipple. The mass correlates with the mass seen on today's mammogram. The left axillary lymph node measures 1.2 cm with a cortex of 4 mm.  The cortex is slightly thickened.      Left Mass: Left breast 7  mm x 5 mm x 6 mm mass at the 6 o'clock position. Assessment: 4B - Suspicious finding. Ultrasound-guided biopsy is recommended.   I will speak with breast surgery regarding the lymph node.  The patient's age and shoulder arthritis are factors in not immediately proceeding to biopsy of the axillary lymph node. I discussed the findings and recommendations for today's exam in detail with the patient.  BI-RADS Category: Overall: 4 - Suspicious  Recommendation: Ultrasound-guided biopsy is recommended.     Assessment:       1. Invasive ductal carcinoma of breast, left    2. Pre-op testing        Plan:     Multidisciplinary nature of breast cancer care was discussed in detail at today's visit.    Her HER2 testing is pending. We discussed that if this is found to be positive then there may be impacts to her treatment plan. I will call her if the results are positive to discuss. If HER2 testing is negative then we will proceed with the treatment plan as discussed.     We discussed that surgical options would include a lumpectomy versus a mastectomy.  We discussed there is no survival benefit to undergoing a mastectomy compared to lumpectomy.  Based on clinical exam and imaging, she would be a good candidate for breast conservation.  Surgical technique and rationale was discussed with the patient.  We discussed that this would be done as a reflector localized excision of the primary tumor along with a surrounding margin of normal breast tissue.  The concept of local control was explained to the patient.  She understands that we would aim to achieve negative margins at the time of initial surgery.  There is however an approximately 10-15% risk of a positive margin that would require take back for reexcision.  Risks and benefits of the procedure were discussed in detail.  Risks include but are not limited to infection, bleeding, poor cosmesis, positive margins requiring reexcision, need for additional surgery, and local and  distant recurrence.     We discussed the enlarged lymph node on ultrasound.  This could be percutaneous biopsied or removed at the time of the sentinel lymph node biopsy.  We discussed that the percutaneous biopsy would potentially help us locate this lymph node better at the time of surgery.  We discussed that it is highly likely this also is a sentinel lymph node but that could not be guaranteed.  She preferred to have the lymph node removed at the time of sentinel lymph node biopsy.  I will remove any palpable or enlarged lymph nodes at that time however can not guarantee this is the exact same lymph node seen on ultrasound. We also discussed axillary staging using sentinel node biopsy.  Dudley lymph node biopsies performed utilizing the injection of blue and radioactive dye.  This dye travels to the 1st few lymph nodes that drain the breast.  Lymph nodes that uptake the blue or radioactive dye or are palpable are surgically removed and sent to pathology.  Typically 1-5 lymph nodes are removed during this procedure although exact numbers vary depending on the patient.  This procedure allows sampling of the lymph nodes most at risk for metastasis.  The risks and benefits of the procedure discussed the patient.  Risks include but are not limited to lymphedema, bleeding, infection, poor cosmesis, numbness of the incision site, seroma, failure of dye to map, nerve or vascular injury leading to permanent arm numbness and or muscle weakness, possibility of a false negative finding, blood clots and need for additional surgery.  If metastatic disease is identified on pathology of the lymph nodes been axillary dissection (a second surgery) may be recommended.     We discussed that due to her age she is at a slightly higher risk of complications but overall has a good health and I anticipate she would tolerate surgery well.  Materials utilized in the surgery include surgical metal clips, suture material, and skin  adhesives. These materials can lead to allergic reactions, skin irration, and other complications. Medications utilized in surgery include but are not limited to antibiotics, isosulfan blue dye, and technetium sulfa colloid.  Given the patient's allergy history of sulfa.  Studies have shown there is a low risk of allergic reactions with a sulfa allergy with the use of technetium sulfur colloid or blue dye however I usually avoid the use of blue dye in these patients.    The role of adjuvant radiation therapy following breast conservation surgery was also discussed with the patient. We discussed that when looking at all patient populations risk of local recurrence with lumpectomy alone is high and radiation therapy is recommended in most cases. We discussed that final recommendations on radiation would be based on final surgical pathology. The duration and treatment side effects were discussed with  the patient.  She'll be referred to radiation oncology post-operatively for further discussion of her options.     She is meeting with Medical Oncology today to discuss systemic treatments.    She would prior genetic testing in 2016 that was negative.    Following her discussion today, Ernestine Luna is motivated to undergo breast conservation.  She will be scheduled for a left breast  localized partial mastectomy and sentinel lymph node biopsy.     Surgery will be scheduled. Follow-up in clinic roughly 14 days after surgery.      Patient was given patient information binder including [facility] breast cancer treatment brochure.  All her questions were answered.    Total time spent with the patient: 60 minutes.  50 minutes of face to face consultation and 10 minutes of chart review and coordination of care.

## 2023-02-06 NOTE — PROGRESS NOTES
[facility]/ [facility]   at [facility] Note:      Chief Complaint:   Encounter Diagnoses   Name Primary?    Invasive ductal carcinoma of left breast Yes    History of breast cancer         Cancer Staging   No matching staging information was found for the patient.    HPI:  Ernestine Luna is a 89 y.o. female who presents today for evaluation of newly diagnosed breast cancer. She has a history of right breast cancer DCIS diagnosed in  s/p lumpectomy, radiation and tamoxifen. She completed 5 years of tamoxifen. She received her treatment at [facility].    Oncology history:  : right breast DCIS s/p  lumpectomy, radiation and tamoxifen  Routine screening mammogram on 2022 showed left breast asymmetry.   Diagnostic mammogram on 2023 demonstrated a left 7mm x 5mm x 6mm mass at the 6:00 position with left axillary LN 1.2 cm.   Biopsy  that was consistent with 0.4cm invasive ductal carcinoma, grade 2; ER 95%, ND 95%, HER2 +2 with FISH pending, Ki67 20%.    Gyn History:   Menarche: 13  Menopause: 30 (hysterectomy)   (3 daughters, 1 son)  Age at first pregnancy: 25  HRT: Birth control for 1 year in late 20s  She did not breast feed.  She is a never smoker. She drinks about a glass of red wine per week.    Family history: 3 daughters with breast cancer (youngest diagnosed around age 30; oldest diagnosed age 46 and second oldest diagnosed age 45), one sister and one niece and great niece all passed away from breast cancer, multiple first cousins with breast, colon, throat cancer. Genetic testing done in 2016 was negative.    Social History     Tobacco Use    Smoking status: Never    Smokeless tobacco: Never   Substance Use Topics    Alcohol use: Yes     Alcohol/week: 1.0 standard drink     Types: 1 Glasses of wine per week     Comment: Rare, every other week    Drug use: No     Family History   Problem Relation Age of Onset    Stroke Mother      Breast cancer Sister 82    Heart disease Sister     Diabetes Sister     Cancer Sister         breast cancer    Asthma Sister     Breast cancer Daughter 46    Cancer Daughter         breast    Breast cancer Daughter 45        negative genetic testing 2015    Cancer Daughter         breast    Breast cancer Daughter 37    Cancer Daughter         breast    Coronary artery disease Father     Diabetes Father     Heart disease Father     Heart attack Father     Heart disease Sister     Diabetes Sister     Diabetes Sister     Heart disease Sister     Hyperlipidemia Sister     Heart attack Brother     No Known Problems Son     Cancer Cousin         colon    Cancer Cousin     Breast cancer Cousin     Breast cancer Other     Diabetes Other     Blindness Other         One eye is blind from complications of diabetes    Breast cancer Other     Diabetes Other     Breast cancer Other     Diabetes Other     Ovarian cancer Neg Hx     Endometrial cancer Neg Hx     Vaginal cancer Neg Hx     Cervical cancer Neg Hx      Past Medical History:   Diagnosis Date    Anemia     s/p knee surgery since surgery has resolved    Breast cancer, stage 0     lumpectomy in 1992    Cataract     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     Past Surgical History:   Procedure Laterality Date    BREAST BIOPSY Right     BREAST LUMPECTOMY Right     BUNIONECTOMY      Left foot (x2)    CARPAL TUNNEL RELEASE Right     38 years old    CATARACT EXTRACTION Left     with lens     CATARACT EXTRACTION W/  INTRAOCULAR LENS IMPLANT Right 10/12/2015    Dr. Wilkins    COLONOSCOPY N/A 10/1/2019    Procedure: COLONOSCOPY;  Surgeon: Jarad Chavira MD;  Location: [facility] ([address] [place] [address]);  Service: Endoscopy;  Laterality: N/A;  miralax prep (used miralax for last colonoscopy) - ERW     COLONOSCOPY W/ POLYPECTOMY  08/08/2013    RASHEED   06/24/2014    EYE SURGERY      HYSTERECTOMY  age 38    ALISTAIR; ovaries in place    JOINT REPLACEMENT      bilateral knees    TOTAL KNEE ARTHROPLASTY      Bilateral       Patient Active Problem List   Diagnosis    Hypertension    Osteoarthritis    Senile nuclear sclerosis    DDD (degenerative disc disease), lumbar    History of breast cancer    Ectatic thoracic aorta    Atherosclerosis of aorta    Mild carotid artery disease    Inflammatory arthritis    Vaginal vault prolapse, posthysterectomy    Prolapse of anterior vaginal wall    Facet arthropathy    Vaginal atrophy    Urinary incontinence, urge    Nocturia    Colon polyps    Depressed mood    Abdominal aortic pulsation    Aortic aneurysm without rupture    Posterior vitreous detachment of both eyes    Pseudophakia of both eyes    Dry eye syndrome of both eyes    Impaired functional mobility, balance, gait, and endurance    Decreased strength of lower extremity    Decreased range of motion of right ankle    Invasive ductal carcinoma of breast, left       Current Outpatient Medications   Medication Instructions    acetic acid-oxyquinoline (FEM PH) 0.9-0.025 % Gel 1 applicator, Vaginal, Twice weekly    alendronate (FOSAMAX) 70 mg, Oral, Every 7 days    amLODIPine (NORVASC) 10 mg, Oral, Daily    atenoloL (TENORMIN) 25 mg, Oral, Daily    benazepriL (LOTENSIN) 40 mg, Oral, Daily    calcium carbonate (OS-DHRUV) 600 mg, Oral, Once    ciclopirox (PENLAC) 8 % Soln Topical (Top), Nightly    diclofenac sodium (VOLTAREN) 2 g, Topical (Top), 3 times daily    glycerin/min oil/polycarbophil (REPLENS VAGL) Vaginal    lidocaine HCL 2% (XYLOCAINE) 2 % jelly Topical (Top), As needed (PRN), For toe pain, apply up to twice daily as needed for pain.    MULTIVITAMIN W-MINERALS/LUTEIN (CENTRUM SILVER ORAL) 1 tablet, Daily    omega 3-calcium-vit [id number] [phone] mg Cmpk Oral, Daily    vitamin D (VITAMIN D3) 185 mg, Oral, Daily       Review  of Systems:   Review of Systems   Constitutional:  Negative for chills and fever.   HENT:  Negative for congestion and sore throat.    Eyes:  Negative for photophobia and pain.   Respiratory:  Negative for cough and shortness of breath.    Cardiovascular:  Negative for chest pain and leg swelling.   Gastrointestinal:  Negative for abdominal pain and nausea.   Genitourinary:  Negative for dysuria and flank pain.   Musculoskeletal:  Negative for joint pain and myalgias.   Skin:  Negative for itching and rash.   Neurological:  Negative for dizziness and headaches.   Psychiatric/Behavioral:  Negative for depression. The patient is not nervous/anxious.      PHYSICAL EXAM:  LMP  (LMP Unknown)     General Appearance:    Alert, cooperative, no distress, appears stated age   Head:    Normocephalic, without obvious abnormality, atraumatic   Throat:   Lips, mucosa, and tongue normal; teeth and gums normal   Neck:   Supple, symmetrical, no adenopathy;     thyroid:  no enlargement/tenderness/nodules   Lungs:     Clear to auscultation bilaterally, respirations unlabored    Heart:    Regular rate and rhythm, S1 and S2 normal   Breast Exam:    Bilateral breast normal. No masses, no tenderness, no skin or nipple abnormalities.  No lymphadenopathy.  Left breast bruising noted at biopsy site.   Abdomen:     Soft, non-tender, bowel sounds active, no masses, no organomegaly   Extremities:   Extremities normal, atraumatic, no cyanosis or edema   Pulses:   2+ and symmetric all extremities   Skin:   Skin color, texture, turgor normal, no rashes or lesions   Lymph nodes:   Cervical, supraclavicular, and axillary nodes normal   Neurologic:   CNII-XII intact, normal strength, sensation and reflexes     throughout     Pertinent Labs & Imaging:  Specimen (730h ago, onward)      None            No results found for this or any previous visit (from the past 24 hour(s)).    Assessment & Plan:    1. Invasive ductal carcinoma of left breast    2.  History of breast cancer      Patient is a 89 year old woman with history of right breast DCIS in 1992 (s/p lumpectomy, radiation and tamoxifen) who presents with newly diagnosed left invasive breast hormone positive breast cancer. Reviewed patients referring notes, imaging and pathology. Discussed diagnosis, staging, and treatment in detail with patient.   Patient's good performance status makes her a good candidate to proceed with lumpectomy and SLNB per Breast Surgery. Discussed with patient about possible radiation therapy after lumpectomy. Patient values her cognition and wishes to remain independent. Also discussed that due to patient's age and side effects of hormonal therapy (ie memory loss, arthralgias, hot flashes), side effects outweigh benefits so will defer at this time. Plan to discuss patient at tumor board once pathology from lumpectomy returns.    Patient is in agreement with above plan. She was advised to contact the office with any questions.    Route Chart for Scheduling    Med Onc Chart Routing      Follow up with physician . Follow up as needed   Follow up with [facility]    Infusion scheduling note    Injection scheduling note    Labs    Imaging    Pharmacy appointment    Other referrals      MDM includes  :    - Acute or chronic illness or injury that poses a threat to life or bodily function  - Review of prior external notes from unique source  - Independent review and explanation of 3+ results from unique tests  - Extensive discussion of treatment and management  - Prescription drug management  - Drug therapy requiring intensive monitoring for toxicity      Discussed with Dr. Saldivar.    Justina Orourke MD PGY IV  02/15/2023

## 2023-02-06 NOTE — PROGRESS NOTES
Breast Surgery  [facility]  Department of Surgery      REFERRING PROVIDER: Kev Macias MD  [address]  [address], [address]  [place] [address]    Chief Complaint: Breast Cancer (New Patient Multi -D Invasive Ductal Carcinoma 23 .)      Subjective:      Patient ID: Ernestine Luna is a 89 y.o. female who presents with IDC of the left breast.   Patient with a history of right breast cancer (DCIS) in  s/p lumpectomy and radiation.  She underwent 5 years of tamoxifen therapy.  She has been undergoing routine screening since that time.   Patient originally presented for screening mammogram which showed an asymmetry in the left breast. Follow-up mammogram (23) showed a 7 mm x 5 mm x 6 mm mass at the 6 o'clock position, 2cm FN. A ultrasound guided biopsy was performed on 23 with pathology revealing infiltrating ductal carcinoma of the left breast.     Patient has not noted a change on breast exam.  Patient denies nipple discharge.  Patient has a personal history of breast cancer.    Findings at that time were the following:   Tumor size: 7 mm x 5 mm x 6 mm   Tumor rdgrdrrdarddrderd:rd rd3rd Estrogen Receptor: +   Progesterone Receptor: +   Her-2 sterling: equivocal, sent for FISH   Lymph node status: large L axillary lymph node with cortical thickening, not biopsied   Lymphatic invasion: none.     GYN History:  Age of menopause was 38 (hysterectomy).   Patient denies hormonal therapy. Patient is . Age of first live birth was 25. Patient did not breast feed.    Family History:   Sister, BC (82)   Daughter, BC (46)   Daughter, BC (45)   Daughter, BC (37)   2 cousins with breast cancer     Past Medical History:   Diagnosis Date    Anemia     s/p knee surgery since surgery has resolved    Breast cancer, stage 0     lumpectomy in     Cataract     DCIS (ductal carcinoma in situ) of breast 2013    Family history of breast cancer 2016    Genetic testing 2016    negative Integrated  BRACAnalysis with myRisk    Hypertension     Osteoarthritis     Urge urinary incontinence 4/18/2019     Past Surgical History:   Procedure Laterality Date    BREAST BIOPSY Right     BREAST LUMPECTOMY Right     BUNIONECTOMY      Left foot (x2)    CARPAL TUNNEL RELEASE Right     38 years old    CATARACT EXTRACTION Left     with lens     CATARACT EXTRACTION W/  INTRAOCULAR LENS IMPLANT Right 10/12/2015    Dr. Wilkins    COLONOSCOPY N/A 10/1/2019    Procedure: COLONOSCOPY;  Surgeon: Jarad Chavira MD;  Location: [facility] ([address] [place] [address]);  Service: Endoscopy;  Laterality: N/A;  miralax prep (used miralax for last colonoscopy) - ERW    COLONOSCOPY W/ POLYPECTOMY  08/08/2013    RASHEED   06/24/2014    EYE SURGERY      HYSTERECTOMY  age 38    ALISTAIR; ovaries in place    JOINT REPLACEMENT      bilateral knees    TOTAL KNEE ARTHROPLASTY      Bilateral     Current Outpatient Medications on File Prior to Visit   Medication Sig Dispense Refill    acetic acid-oxyquinoline (FEM PH) 0.9-0.025 % Gel Place 1 applicator vaginally twice a week. 50 g 11    alendronate (FOSAMAX) 70 MG tablet Take 1 tablet (70 mg total) by mouth every 7 days. 12 tablet 3    amLODIPine (NORVASC) 10 MG tablet Take 1 tablet (10 mg total) by mouth once daily. 90 tablet 1    atenoloL (TENORMIN) 25 MG tablet Take 1 tablet (25 mg total) by mouth once daily. 90 tablet 1    benazepriL (LOTENSIN) 40 MG tablet Take 1 tablet (40 mg total) by mouth once daily. 90 tablet 1    calcium carbonate (OS-DHRUV) 600 mg calcium (1,500 mg) Tab Take 600 mg by mouth once.      ciclopirox (PENLAC) 8 % Soln Apply topically nightly. (Patient not taking: Reported on 1/5/2023) 6.6 mL 11    diclofenac sodium (VOLTAREN) 1 % Gel Apply 2 g topically 3 (three) times daily. (Patient not taking: Reported on 1/5/2023) 100 g 3    glycerin/min oil/polycarbophil (REPLENS VAGL) Place vaginally.      lidocaine HCL 2% (XYLOCAINE) 2 % jelly Apply topically as needed. For toe pain, apply up to  twice daily as needed for pain. (Patient not taking: Reported on 1/5/2023) 30 mL 3    MULTIVITAMIN W-MINERALS/LUTEIN (CENTRUM SILVER ORAL) Take 1 tablet by mouth Daily.      omega 3-calcium-vit D3-FA-mv13 [id number] mg Cmpk Take by mouth once daily.      vitamin D 1000 units Tab Take 185 mg by mouth once daily.       No current facility-administered medications on file prior to visit.     Social History     Socioeconomic History    Marital status:    Tobacco Use    Smoking status: Never    Smokeless tobacco: Never   Substance and Sexual Activity    Alcohol use: Yes     Alcohol/week: 1.0 standard drink     Types: 1 Glasses of wine per week     Comment: Rare, every other week    Drug use: No    Sexual activity: Not Currently     Birth control/protection: None     Comment:      Family History   Problem Relation Age of Onset    Stroke Mother     Breast cancer Sister 82    Heart disease Sister     Diabetes Sister     Cancer Sister         breast cancer    Asthma Sister     Breast cancer Daughter 46    Cancer Daughter         breast    Breast cancer Daughter 45        negative genetic testing 2015    Cancer Daughter         breast    Breast cancer Daughter 37    Cancer Daughter         breast    Coronary artery disease Father     Diabetes Father     Heart disease Father     Heart attack Father     Heart disease Sister     Diabetes Sister     Diabetes Sister     Heart disease Sister     Hyperlipidemia Sister     Heart attack Brother     No Known Problems Son     Cancer Cousin         colon    Cancer Cousin     Breast cancer Cousin     Breast cancer Other     Diabetes Other     Blindness Other         One eye is blind from complications of diabetes    Breast cancer Other     Diabetes Other     Breast cancer Other     Diabetes Other     Ovarian cancer Neg Hx     Endometrial cancer Neg Hx     Vaginal cancer Neg Hx     Cervical cancer Neg Hx         Review of  "Systems   All other systems reviewed and are negative.  Objective:   BP (!) 154/70 (BP Location: Left arm, Patient Position: Sitting, BP Method: Medium (Automatic))   Pulse 65   Temp 97.7 °F (36.5 °C) (Oral)   Ht 4' 11" (1.499 m)   Wt 56.7 kg (125 lb)   LMP  (LMP Unknown)   SpO2 99%   BMI 25.25 kg/m²     Physical Exam   Vitals reviewed.  Constitutional: She is oriented to person, place, and time.   Cardiovascular:  Normal rate.      Exam reveals no gallop.       No murmur heard.  Pulmonary/Chest: Effort normal. No respiratory distress. She has no wheezes. Right breast exhibits no mass, no nipple discharge, no skin change and no tenderness. Left breast exhibits no mass, no nipple discharge, no skin change and no tenderness.       Abdominal: Normal appearance.   Lymphadenopathy:     She has no cervical adenopathy.        Right: No supraclavicular adenopathy present.        Left: No supraclavicular adenopathy present.   Neurological: She is alert and oriented to person, place, and time.   Skin: Skin is warm and dry.     Psychiatric: Her behavior is normal. Mood, judgment and thought content normal.     Radiology review: Images personally reviewed by me in the clinic.      Mammo Digital Diagnostic Left with Cristiano    Result Date: 1/19/2023  Result: Mammo Digital Diagnostic Left with Cristiano US Breast Left Limited  History: Patient is 89 y.o. and is seen for diagnostic imaging. Films Compared: Compared to: 12/21/2022 Mammo Digital Screening Bilat w/ Cristiano, 10/29/2021 Mammo Digital Screening Bilat, and 10/23/2020 Mammo Digital Screening Bilat w/ Cristiano  Findings: This procedure was performed using tomosynthesis. Computer-aided detection was utilized in the interpretation of this examination. Mammo Digital Diagnostic Left with Cristiano The left breast has scattered areas of fibroglandular density. There is a mass seen in the left breast at 6 o'clock. The mass correlates with the screening mammogram finding. US Breast Left " Limited There is a 7 mm x 5 mm x 6 mm irregularly shaped, hypoechoic mass with microlobulated margins seen in the left breast at 6 o'clock, 2 cm from the nipple. The mass correlates with the mass seen on today's mammogram. The left axillary lymph node measures 1.2 cm with a cortex of 4 mm.  The cortex is slightly thickened.      Left Mass: Left breast 7 mm x 5 mm x 6 mm mass at the 6 o'clock position. Assessment: 4B - Suspicious finding. Ultrasound-guided biopsy is recommended.   I will speak with breast surgery regarding the lymph node.  The patient's age and shoulder arthritis are factors in not immediately proceeding to biopsy of the axillary lymph node. I discussed the findings and recommendations for today's exam in detail with the patient.  BI-RADS Category: Overall: 4 - Suspicious  Recommendation: Ultrasound-guided biopsy is recommended.     US Breast Left Limited    Result Date: 1/19/2023  Result: Mammo Digital Diagnostic Left with Cristiano US Breast Left Limited  History: Patient is 89 y.o. and is seen for diagnostic imaging. Films Compared: Compared to: 12/21/2022 Mammo Digital Screening Bilat w/ Cristiano, 10/29/2021 Mammo Digital Screening Bilat, and 10/23/2020 Mammo Digital Screening Bilat w/ Cristiano  Findings: This procedure was performed using tomosynthesis. Computer-aided detection was utilized in the interpretation of this examination. Mammo Digital Diagnostic Left with Cristiano The left breast has scattered areas of fibroglandular density. There is a mass seen in the left breast at 6 o'clock. The mass correlates with the screening mammogram finding. US Breast Left Limited There is a 7 mm x 5 mm x 6 mm irregularly shaped, hypoechoic mass with microlobulated margins seen in the left breast at 6 o'clock, 2 cm from the nipple. The mass correlates with the mass seen on today's mammogram. The left axillary lymph node measures 1.2 cm with a cortex of 4 mm.  The cortex is slightly thickened.      Left Mass: Left breast 7  mm x 5 mm x 6 mm mass at the 6 o'clock position. Assessment: 4B - Suspicious finding. Ultrasound-guided biopsy is recommended.   I will speak with breast surgery regarding the lymph node.  The patient's age and shoulder arthritis are factors in not immediately proceeding to biopsy of the axillary lymph node. I discussed the findings and recommendations for today's exam in detail with the patient.  BI-RADS Category: Overall: 4 - Suspicious  Recommendation: Ultrasound-guided biopsy is recommended.     Assessment:       1. Invasive ductal carcinoma of breast, left    2. Pre-op testing        Plan:     Multidisciplinary nature of breast cancer care was discussed in detail at today's visit.    Her HER2 testing is pending. We discussed that if this is found to be positive then there may be impacts to her treatment plan. I will call her if the results are positive to discuss. If HER2 testing is negative then we will proceed with the treatment plan as discussed.     We discussed that surgical options would include a lumpectomy versus a mastectomy.  We discussed there is no survival benefit to undergoing a mastectomy compared to lumpectomy.  Based on clinical exam and imaging, she would be a good candidate for breast conservation.  Surgical technique and rationale was discussed with the patient.  We discussed that this would be done as a reflector localized excision of the primary tumor along with a surrounding margin of normal breast tissue.  The concept of local control was explained to the patient.  She understands that we would aim to achieve negative margins at the time of initial surgery.  There is however an approximately 10-15% risk of a positive margin that would require take back for reexcision.  Risks and benefits of the procedure were discussed in detail.  Risks include but are not limited to infection, bleeding, poor cosmesis, positive margins requiring reexcision, need for additional surgery, and local and  distant recurrence.     We discussed the enlarged lymph node on ultrasound.  This could be percutaneous biopsied or removed at the time of the sentinel lymph node biopsy.  We discussed that the percutaneous biopsy would potentially help us locate this lymph node better at the time of surgery.  We discussed that it is highly likely this also is a sentinel lymph node but that could not be guaranteed.  She preferred to have the lymph node removed at the time of sentinel lymph node biopsy.  I will remove any palpable or enlarged lymph nodes at that time however can not guarantee this is the exact same lymph node seen on ultrasound. We also discussed axillary staging using sentinel node biopsy.  [place] lymph node biopsies performed utilizing the injection of blue and radioactive dye.  This dye travels to the 1st few lymph nodes that drain the breast.  Lymph nodes that uptake the blue or radioactive dye or are palpable are surgically removed and sent to pathology.  Typically 1-5 lymph nodes are removed during this procedure although exact numbers vary depending on the patient.  This procedure allows sampling of the lymph nodes most at risk for metastasis.  The risks and benefits of the procedure discussed the patient.  Risks include but are not limited to lymphedema, bleeding, infection, poor cosmesis, numbness of the incision site, seroma, failure of dye to map, nerve or vascular injury leading to permanent arm numbness and or muscle weakness, possibility of a false negative finding, blood clots and need for additional surgery.  If metastatic disease is identified on pathology of the lymph nodes been axillary dissection (a second surgery) may be recommended.     We discussed that due to her age she is at a slightly higher risk of complications but overall has a good health and I anticipate she would tolerate surgery well.  Materials utilized in the surgery include surgical metal clips, suture material, and skin  adhesives. These materials can lead to allergic reactions, skin irration, and other complications. Medications utilized in surgery include but are not limited to antibiotics, isosulfan blue dye, and technetium sulfa colloid.  Given the patient's allergy history of sulfa.  Studies have shown there is a low risk of allergic reactions with a sulfa allergy with the use of technetium sulfur colloid or blue dye however I usually avoid the use of blue dye in these patients.    The role of adjuvant radiation therapy following breast conservation surgery was also discussed with the patient. We discussed that when looking at all patient populations risk of local recurrence with lumpectomy alone is high and radiation therapy is recommended in most cases. We discussed that final recommendations on radiation would be based on final surgical pathology. The duration and treatment side effects were discussed with  the patient.  She'll be referred to radiation oncology post-operatively for further discussion of her options.     She is meeting with Medical Oncology today to discuss systemic treatments.    She would prior genetic testing in 2016 that was negative.    Following her discussion today, Ernestine Luna is motivated to undergo breast conservation.  She will be scheduled for a left breast  localized partial mastectomy and sentinel lymph node biopsy.     Surgery will be scheduled. Follow-up in clinic roughly 14 days after surgery.      Patient was given patient information binder including [facility] breast cancer treatment brochure.  All her questions were answered.    Total time spent with the patient: 60 minutes.  50 minutes of face to face consultation and 10 minutes of chart review and coordination of care.

## 2023-02-08 ENCOUNTER — DOCUMENTATION ONLY (OUTPATIENT)
Dept: SURGERY | Facility: CLINIC | Age: 88
End: 2023-02-08
Payer: MEDICARE

## 2023-02-08 DIAGNOSIS — C50.912 INVASIVE DUCTAL CARCINOMA OF BREAST, LEFT: Primary | ICD-10-CM

## 2023-02-08 NOTE — NURSING
Nurse Navigator Note:     Met with patient during her consult with Dr. Belcher.  Patient and I reviewed the information she discussed with Dr. Belcher, including treatment options, diagnosis, and future plans for workup. Patient and I went through the new patient binder, explained some of the information and why it is provided.     Also offered patient consults with our other specialty clinics: Dr. Garibay for Integrative Therapies, Survivorship and/or Women's Gynecologic needs, our breast physical therapy department for pre-op and post-operative assessments, Oncologic Psychology for psychological support, and Oncologic Nutrition for nutritional counseling. Explained to patient that all of these support services are completely optional. Discussed that physical therapy may call patient to offer pre-op appt, and what that appt would entail.     Patient was given a copy of her appointments, Dr. Belcher's card, and my card. Encouraged her to call me if she has any questions or concerns or would like to schedule any additional appointments. Verbalized understanding of all information.     Referrals placed for PT and integrative oncology. No genetics. No e mail listed for support group.  Oncology Navigation   Intake  Date of Diagnosis: 01/26/23  Cancer Type: Breast  Internal / External Referral: Internal  Date of Referral: 01/19/23  Initial Nurse Navigator Contact: 02/01/23  Referral to Initial Contact Timeline (days): 13  Date Worked: 02/08/23  First Appointment Available: 02/06/23  Appointment Date: 02/06/23  First Available Date vs. Scheduled Date (days): 0     Treatment  Current Status: Staging work-up    Surgery: Planned  Surgical Oncologist: Ye  Plastic Surgeon: Left lumpectomy with SLNB  Consult Date: 02/06/23  Surgery Schedule Date: 02/20/23    Medical Oncologist: ty  Consult Date: 02/06/23       Procedures: Biopsy; Ultrasound; Mammogram  Biopsy Schedule Date: 01/19/23  Mammo Schedule Date: 12/28/23  Ultrasound  Schedule Date: 12/28/23       ER: Positive  IL: Positive  Her2: -- (fish pending)       Support Systems: Children     Acuity  Treatment Tolerability: Has not started treatment yet/treatment fully completed and side effects resolved  Hospitalization Within the Past Month: 0   Needed: 0  Support: 0  Verbalizes Financial Concerns: 0  Transportation: 0  History of noncompliance/frequent no shows and cancellations: 0  Verbalizes the need for more education: 0  Navigation Acuity: 0     Follow Up  Follow up in about 26 days (around 3/6/2023) for f/u post op.

## 2023-02-15 ENCOUNTER — OFFICE VISIT (OUTPATIENT)
Dept: PODIATRY | Facility: CLINIC | Age: 88
End: 2023-02-15
Payer: MEDICARE

## 2023-02-15 ENCOUNTER — HOSPITAL ENCOUNTER (OUTPATIENT)
Dept: RADIOLOGY | Facility: HOSPITAL | Age: 88
Discharge: HOME | End: 2023-02-15
Attending: SURGERY
Payer: MEDICARE

## 2023-02-15 VITALS
DIASTOLIC BLOOD PRESSURE: 74 MMHG | WEIGHT: 125 LBS | HEART RATE: 78 BPM | HEIGHT: 59 IN | SYSTOLIC BLOOD PRESSURE: 145 MMHG | BODY MASS INDEX: 25.2 KG/M2

## 2023-02-15 DIAGNOSIS — L84 CORN OR CALLUS: Primary | ICD-10-CM

## 2023-02-15 DIAGNOSIS — B35.1 ONYCHOMYCOSIS DUE TO DERMATOPHYTE: ICD-10-CM

## 2023-02-15 DIAGNOSIS — R92.8 ABNORMAL FINDING ON BREAST IMAGING: ICD-10-CM

## 2023-02-15 DIAGNOSIS — C50.912 INVASIVE DUCTAL CARCINOMA OF LEFT BREAST: ICD-10-CM

## 2023-02-15 LAB
FINAL PATHOLOGIC DIAGNOSIS: NORMAL
GROSS: NORMAL
Lab: NORMAL
SUPPLEMENTAL DIAGNOSIS: NORMAL

## 2023-02-15 PROCEDURE — 1125F AMNT PAIN NOTED PAIN PRSNT: CPT | Mod: HCNC,CPTII,, | Performed by: PODIATRIST

## 2023-02-15 PROCEDURE — 77065 MAMMO DIGITAL DIAGNOSTIC LEFT: ICD-10-PCS | Mod: 26,HCNC,LT, | Performed by: RADIOLOGY

## 2023-02-15 PROCEDURE — 19285 PERQ DEV BREAST 1ST US IMAG: CPT | Mod: HCNC,LT

## 2023-02-15 PROCEDURE — 99999 PR PBB SHADOW E&M-EST. PATIENT-LVL III: CPT | Mod: PBBFAC,HCNC,, | Performed by: PODIATRIST

## 2023-02-15 PROCEDURE — 99999 PR PBB SHADOW E&M-EST. PATIENT-LVL III: ICD-10-PCS | Mod: PBBFAC,HCNC,, | Performed by: PODIATRIST

## 2023-02-15 PROCEDURE — 17999 PR NON-COVERED FOOT CARE: ICD-10-PCS | Mod: CSM,HCNC,S$GLB, | Performed by: PODIATRIST

## 2023-02-15 PROCEDURE — 19285 US BREAST RADAR REFLECTOR LOCALIZATION W/GUIDANCE, 1ST LESION, LEFT: ICD-10-PCS | Mod: HCNC,LT,, | Performed by: RADIOLOGY

## 2023-02-15 PROCEDURE — 25000003 PHARM REV CODE 250: Mod: HCNC | Performed by: SURGERY

## 2023-02-15 PROCEDURE — 77065 DX MAMMO INCL CAD UNI: CPT | Mod: 26,HCNC,LT, | Performed by: RADIOLOGY

## 2023-02-15 PROCEDURE — 99499 NO LOS: ICD-10-PCS | Mod: HCNC,,, | Performed by: PODIATRIST

## 2023-02-15 PROCEDURE — 19285 PERQ DEV BREAST 1ST US IMAG: CPT | Mod: HCNC,LT,, | Performed by: RADIOLOGY

## 2023-02-15 PROCEDURE — 1159F PR MEDICATION LIST DOCUMENTED IN MEDICAL RECORD: ICD-10-PCS | Mod: HCNC,CPTII,, | Performed by: PODIATRIST

## 2023-02-15 PROCEDURE — A4648 IMPLANTABLE TISSUE MARKER: HCPCS | Mod: HCNC

## 2023-02-15 PROCEDURE — 17999 UNLISTD PX SKN MUC MEMB SUBQ: CPT | Mod: CSM,HCNC,S$GLB, | Performed by: PODIATRIST

## 2023-02-15 PROCEDURE — 1125F PR PAIN SEVERITY QUANTIFIED, PAIN PRESENT: ICD-10-PCS | Mod: HCNC,CPTII,, | Performed by: PODIATRIST

## 2023-02-15 PROCEDURE — 1159F MED LIST DOCD IN RCRD: CPT | Mod: HCNC,CPTII,, | Performed by: PODIATRIST

## 2023-02-15 PROCEDURE — 77065 DX MAMMO INCL CAD UNI: CPT | Mod: TC,HCNC,LT

## 2023-02-15 PROCEDURE — 1160F RVW MEDS BY RX/DR IN RCRD: CPT | Mod: HCNC,CPTII,, | Performed by: PODIATRIST

## 2023-02-15 PROCEDURE — 99499 UNLISTED E&M SERVICE: CPT | Mod: HCNC,,, | Performed by: PODIATRIST

## 2023-02-15 PROCEDURE — 1160F PR REVIEW ALL MEDS BY PRESCRIBER/CLIN PHARMACIST DOCUMENTED: ICD-10-PCS | Mod: HCNC,CPTII,, | Performed by: PODIATRIST

## 2023-02-15 RX ORDER — LIDOCAINE HYDROCHLORIDE 20 MG/ML
20 INJECTION, SOLUTION INFILTRATION; PERINEURAL ONCE
Status: COMPLETED | OUTPATIENT
Start: 2023-02-15 | End: 2023-02-15

## 2023-02-15 RX ORDER — IOHEXOL 350 MG/ML
INJECTION, SOLUTION INTRAVENOUS
COMMUNITY
Start: 2022-09-20 | End: 2023-10-25

## 2023-02-15 RX ADMIN — LIDOCAINE HYDROCHLORIDE 10 ML: 20 INJECTION, SOLUTION INFILTRATION; PERINEURAL at 01:02

## 2023-02-17 ENCOUNTER — TELEPHONE (OUTPATIENT)
Dept: SURGERY | Facility: CLINIC | Age: 88
End: 2023-02-17
Payer: MEDICARE

## 2023-02-17 ENCOUNTER — ANESTHESIA EVENT (OUTPATIENT)
Dept: SURGERY | Facility: HOSPITAL | Age: 88
End: 2023-02-17
Payer: MEDICARE

## 2023-02-17 NOTE — PRE-PROCEDURE INSTRUCTIONS
PreOp Instructions given:   - Verbal medication information (what to hold and what to take)   - NPO guidelines 9289-2089  - Arrival place directions given; time to be given the day before procedure by the   Surgeon's Office 0500 [facility]C  - Bathing with antibacterial soap   - Don't wear any jewelry or bring any valuables AM of surgery   - No makeup or moisturizer to face   - No perfume/cologne, powder, lotions or aftershave   Pt. verbalized understanding.   Pt denies any h/o Anesthesia/Sedation complications or side effects.

## 2023-02-17 NOTE — TELEPHONE ENCOUNTER
Confirmed arrival time for surgery on 2/20/23 at [facility] 2nd floor with . Arrival time is for 5 am surgery is scheduled for 7 am . Nothing to eat Sunday evening  2/19/23 after 9 pm . Clear liquids Gatorade up until patient leaves home . Leave all jewelry home . Mrs. Luna voiced understanding to this call.

## 2023-02-19 NOTE — ANESTHESIA PREPROCEDURE EVALUATION
[facility]  Anesthesia Pre-Operative Evaluation         Patient Name: Ernestine Luna  YOB: 1929  MRN: [id number]    SUBJECTIVE:     Pre-operative evaluation for Procedure(s) (LRB):  MASTECTOMY, PARTIAL-Left with radiological marker (Left)  BIOPSY, LYMPH NODE, SENTINEL-Left (Left)  INJECTION, FOR SENTINEL NODE IDENTIFICATION-Left (Left)     02/19/2023    Ernestine Luna is a 89 y.o. female with a significant medical history of Aortic arthrosclerosis, carotid artery disease, diastolic heart failure, and HTN who presents for the above procedure. She is having the procedure for newly diagnosed breast cancer. She has a history of right breast cancer DCIS diagnosed in 1992 s/p lumpectomy, radiation and tamoxifen. She is a never smoker.    Prev airway: None documented.    Drips: None documented.      Patient Active Problem List   Diagnosis    Hypertension    Osteoarthritis    Senile nuclear sclerosis    DDD (degenerative disc disease), lumbar    History of breast cancer    Ectatic thoracic aorta    Atherosclerosis of aorta    Mild carotid artery disease    Inflammatory arthritis    Vaginal vault prolapse, posthysterectomy    Prolapse of anterior vaginal wall    Facet arthropathy    Vaginal atrophy    Urinary incontinence, urge    Nocturia    Colon polyps    Depressed mood    Abdominal aortic pulsation    Aortic aneurysm without rupture    Posterior vitreous detachment of both eyes    Pseudophakia of both eyes    Dry eye syndrome of both eyes    Impaired functional mobility, balance, gait, and endurance    Decreased strength of lower extremity    Decreased range of motion of right ankle    Invasive ductal carcinoma of breast, left       Review of patient's allergies indicates:   Allergen Reactions    Tramadol Shortness Of Breath, Diarrhea and Nausea And Vomiting    Acetaminophen Nausea And Vomiting    Aspirin Nausea And Vomiting    Sulfa (sulfonamide antibiotics)  Nausea And Vomiting    Prednisone Other (See Comments)     Stomach problems        Current Inpatient Medications:      No current facility-administered medications on file prior to encounter.     Current Outpatient Medications on File Prior to Encounter   Medication Sig Dispense Refill    benazepriL (LOTENSIN) 40 MG tablet Take 1 tablet (40 mg total) by mouth once daily. 90 tablet 1    MULTIVITAMIN W-MINERALS/LUTEIN (CENTRUM SILVER ORAL) Take 1 tablet by mouth Daily.      omega 3-calcium-vit [id number] mg Cmpk Take by mouth once daily.      acetic acid-oxyquinoline (FEM PH) 0.9-0.025 % Gel Place 1 applicator vaginally twice a week. 50 g 11    alendronate (FOSAMAX) 70 MG tablet Take 1 tablet (70 mg total) by mouth every 7 days. 12 tablet 3    amLODIPine (NORVASC) 10 MG tablet Take 1 tablet (10 mg total) by mouth once daily. 90 tablet 1    atenoloL (TENORMIN) 25 MG tablet Take 1 tablet (25 mg total) by mouth once daily. 90 tablet 1    calcium carbonate (OS-DHRUV) 600 mg calcium (1,500 mg) Tab Take 600 mg by mouth once.      ciclopirox (PENLAC) 8 % Soln Apply topically nightly. 6.6 mL 11    diclofenac sodium (VOLTAREN) 1 % Gel Apply 2 g topically 3 (three) times daily. 100 g 3    glycerin/min oil/polycarbophil (REPLENS VAGL) Place vaginally.      lidocaine HCL 2% (XYLOCAINE) 2 % jelly Apply topically as needed. For toe pain, apply up to twice daily as needed for pain. 30 mL 3    vitamin D 1000 units Tab Take 185 mg by mouth once daily.         Past Surgical History:   Procedure Laterality Date    BREAST BIOPSY Right     BREAST LUMPECTOMY Right     BUNIONECTOMY      Left foot (x2)    CARPAL TUNNEL RELEASE Right     38 years old    CATARACT EXTRACTION Left     with lens     CATARACT EXTRACTION W/  INTRAOCULAR LENS IMPLANT Right 10/12/2015    Dr. Wilkins    COLONOSCOPY N/A 10/1/2019    Procedure: COLONOSCOPY;  Surgeon: Jarad Chavira MD;  Location: [facility] ([address] [place] [address]);  Service: Endoscopy;   Laterality: N/A;  miralax prep (used miralax for last colonoscopy) - ERW    COLONOSCOPY W/ POLYPECTOMY  08/08/2013    RASHEED   06/24/2014    EYE SURGERY      HYSTERECTOMY  age 38    ALISTAIR; ovaries in place    JOINT REPLACEMENT      bilateral knees    TOTAL KNEE ARTHROPLASTY      Bilateral       Social History     Socioeconomic History    Marital status:    Tobacco Use    Smoking status: Never    Smokeless tobacco: Never   Substance and Sexual Activity    Alcohol use: Yes     Alcohol/week: 1.0 standard drink     Types: 1 Glasses of wine per week     Comment: Rare, every other week    Drug use: No    Sexual activity: Not Currently     Birth control/protection: None     Comment:        OBJECTIVE:     Vital Signs Range:  Vitals - 1 value per visit 2/6/2023 2/15/2023 2/15/2023   SYSTOLIC 154 - 145   DIASTOLIC 70 - 74   Pulse 65 - 78   Temp 97.7 - -   Resp - - -   SPO2 99 - -   Weight (lb) 125 - 125   Weight (kg) 56.7 - 56.7   Height 59 - 59   BMI (Calculated) 25.2 - 25.2   VISIT REPORT - - -   Pain Score  - 5 -   Some recent data might be hidden         CBC:   Lab Results   Component Value Date    WBC 6.02 02/06/2023    HGB 12.4 02/06/2023    HCT 38.3 02/06/2023    MCV 93 02/06/2023     02/06/2023         CMP:   Sodium   Date Value Ref Range Status   02/06/2023 137 136 - 145 mmol/L Final     Potassium   Date Value Ref Range Status   02/06/2023 4.1 3.5 - 5.1 mmol/L Final     Chloride   Date Value Ref Range Status   02/06/2023 103 95 - 110 mmol/L Final     CO2   Date Value Ref Range Status   02/06/2023 28 23 - 29 mmol/L Final     Glucose   Date Value Ref Range Status   02/06/2023 90 70 - 110 mg/dL Final     BUN   Date Value Ref Range Status   02/06/2023 20 10 - 30 mg/dL Final     Creatinine   Date Value Ref Range Status   02/06/2023 0.6 0.5 - 1.4 mg/dL Final     Calcium   Date Value Ref Range Status   02/06/2023 9.9 8.7 - 10.5 mg/dL Final     Total Protein   Date Value Ref Range Status    02/06/2023 7.4 6.0 - 8.4 g/dL Final     Albumin   Date Value Ref Range Status   02/06/2023 3.9 3.5 - 5.2 g/dL Final     Total Bilirubin   Date Value Ref Range Status   02/06/2023 0.4 0.1 - 1.0 mg/dL Final     Comment:     For infants and newborns, interpretation of results should be based  on gestational age, weight and in agreement with clinical  observations.    Premature Infant recommended reference ranges:  Up to 24 hours.............<8.0 mg/dL  Up to 48 hours............<12.0 mg/dL  3-5 days..................<15.0 mg/dL  6-29 days.................<15.0 mg/dL       Alkaline Phosphatase   Date Value Ref Range Status   02/06/2023 57 55 - 135 U/L Final     AST   Date Value Ref Range Status   02/06/2023 28 10 - 40 U/L Final     ALT   Date Value Ref Range Status   02/06/2023 18 10 - 44 U/L Final     Anion Gap   Date Value Ref Range Status   02/06/2023 6 (L) 8 - 16 mmol/L Final     eGFR if    Date Value Ref Range Status   05/02/2022 >60.0 >60 mL/min/1.73 m^2 Final     eGFR if non    Date Value Ref Range Status   05/02/2022 >60.0 >60 mL/min/1.73 m^2 Final     Comment:     Calculation used to obtain the estimated glomerular filtration  rate (eGFR) is the CKD-EPI equation.          INR:  No results found for: INR, PROTIME    EKG:   Results for orders placed or performed during the hospital encounter of 09/20/22   EKG 12-lead    Collection Time: 09/20/22  8:57 PM    Narrative    Test Reason : R07.9,    Vent. Rate : 071 BPM     Atrial Rate : 071 BPM     P-R Int : 176 ms          QRS Dur : 076 ms      QT Int : 398 ms       P-R-T Axes : 032 -13 058 degrees     QTc Int : 432 ms    Normal sinus rhythm  Cannot rule out Anterior infarct ,age undetermined  Abnormal ECG    Confirmed by Adrianna PEREZ, Gucci IVERSON ([id number]) on 9/21/2022 5:18:12 PM    Referred By: [email or web address]   SELF           Confirmed By:Gucci Rodas MD        2D ECHO:   Results for orders placed in visit on 10/26/21    Echo    Interpretation  Summary  · The left ventricle is normal in size with concentric remodeling and normal systolic function. The estimated ejection fraction is 65%.  · Normal right ventricular size with normal right ventricular systolic function.  · Grade II left ventricular diastolic dysfunction.  · Mild to moderate tricuspid regurgitation.  · Severe left atrial enlargement.  · The estimated PA systolic pressure is 58 mmHg.  · Normal central venous pressure (3 mmHg).         ASSESSMENT/PLAN:         Pre-op Assessment    I have reviewed the Patient Summary Reports.       I have reviewed the Medications.     Review of Systems  Anesthesia Hx:  No problems with previous Anesthesia  Denies Family Hx of Anesthesia complications.   Denies Personal Hx of Anesthesia complications.   Hematology/Oncology:  Hematology Normal   Oncology Normal     EENT/Dental:EENT/Dental Normal   Cardiovascular:   Exercise tolerance: good Hypertension    Pulmonary:  Pulmonary Normal    Renal/:  Renal/ Normal     Hepatic/GI:  Hepatic/GI Normal Bowel Prep.    Musculoskeletal:   Arthritis     Neurological:  Neurology Normal    Endocrine:  Endocrine Normal    Dermatological:  Skin Normal    Psych:  Psychiatric Normal           Physical Exam  General: Well nourished and Cooperative    Airway:  Mallampati: III   Mouth Opening: Normal  TM Distance: Normal  Tongue: Normal  Neck ROM: Normal ROM    Dental:  Intact        Anesthesia Plan  Type of Anesthesia, risks & benefits discussed:    Anesthesia Type: Gen ETT  Intra-op Monitoring Plan: Standard ASA Monitors  Post Op Pain Control Plan: multimodal analgesia and IV/PO Opioids PRN  Induction:  IV  Airway Plan: Direct and Video, Post-Induction  Informed Consent: Informed consent signed with the Patient and all parties understand the risks and agree with anesthesia plan.  All questions answered.   ASA Score: 3  Day of Surgery Review of History & Physical: H&P Update referred to the surgeon/provider.    Ready For Surgery  From Anesthesia Perspective.     .

## 2023-02-20 ENCOUNTER — HOSPITAL ENCOUNTER (OUTPATIENT)
Dept: RADIOLOGY | Facility: HOSPITAL | Age: 88
Discharge: HOME | End: 2023-02-20
Attending: SURGERY
Payer: MEDICARE

## 2023-02-20 ENCOUNTER — HOSPITAL ENCOUNTER (OUTPATIENT)
Facility: HOSPITAL | Age: 88
Discharge: HOME | End: 2023-02-20
Attending: SURGERY | Admitting: SURGERY
Payer: MEDICARE

## 2023-02-20 ENCOUNTER — ANESTHESIA (OUTPATIENT)
Dept: SURGERY | Facility: HOSPITAL | Age: 88
End: 2023-02-20
Payer: MEDICARE

## 2023-02-20 VITALS
DIASTOLIC BLOOD PRESSURE: 72 MMHG | WEIGHT: 125 LBS | HEIGHT: 59 IN | TEMPERATURE: 98 F | HEART RATE: 67 BPM | OXYGEN SATURATION: 97 % | RESPIRATION RATE: 20 BRPM | SYSTOLIC BLOOD PRESSURE: 142 MMHG | BODY MASS INDEX: 25.2 KG/M2

## 2023-02-20 DIAGNOSIS — C50.912 INVASIVE DUCTAL CARCINOMA OF LEFT BREAST: ICD-10-CM

## 2023-02-20 DIAGNOSIS — C50.912 INVASIVE DUCTAL CARCINOMA OF BREAST, LEFT: ICD-10-CM

## 2023-02-20 PROCEDURE — 88342 IMHCHEM/IMCYTCHM 1ST ANTB: CPT | Mod: 26,HCNC,, | Performed by: PATHOLOGY

## 2023-02-20 PROCEDURE — 63600175 PHARM REV CODE 636 W HCPCS: Mod: HCNC | Performed by: STUDENT IN AN ORGANIZED HEALTH CARE EDUCATION/TRAINING PROGRAM

## 2023-02-20 PROCEDURE — 38525 PR BIOPSY/REM LYMPH NODES, AXILLARY: ICD-10-PCS | Mod: 51,LT,, | Performed by: SURGERY

## 2023-02-20 PROCEDURE — D9220A PRA ANESTHESIA: ICD-10-PCS | Mod: HCNC,,, | Performed by: STUDENT IN AN ORGANIZED HEALTH CARE EDUCATION/TRAINING PROGRAM

## 2023-02-20 PROCEDURE — 76098 X-RAY EXAM SURGICAL SPECIMEN: CPT | Mod: TC,HCNC

## 2023-02-20 PROCEDURE — 38792 PR IDENTIFY SENTINEL 2DE: ICD-10-PCS | Mod: 51,LT,, | Performed by: SURGERY

## 2023-02-20 PROCEDURE — 25000003 PHARM REV CODE 250: Mod: HCNC | Performed by: SURGERY

## 2023-02-20 PROCEDURE — 88307 PR  SURG PATH,LEVEL V: ICD-10-PCS | Mod: 26,HCNC,, | Performed by: PATHOLOGY

## 2023-02-20 PROCEDURE — 25000003 PHARM REV CODE 250: Mod: HCNC | Performed by: STUDENT IN AN ORGANIZED HEALTH CARE EDUCATION/TRAINING PROGRAM

## 2023-02-20 PROCEDURE — 88342 CHG IMMUNOCYTOCHEMISTRY: ICD-10-PCS | Mod: 26,HCNC,, | Performed by: PATHOLOGY

## 2023-02-20 PROCEDURE — A9520 TC99 TILMANOCEPT DIAG 0.5MCI: HCPCS | Mod: HCNC

## 2023-02-20 PROCEDURE — 63600175 PHARM REV CODE 636 W HCPCS: Mod: HCNC | Performed by: SURGERY

## 2023-02-20 PROCEDURE — 36000707: Mod: HCNC | Performed by: SURGERY

## 2023-02-20 PROCEDURE — 63600175 PHARM REV CODE 636 W HCPCS: Mod: HCNC | Performed by: ANESTHESIOLOGY

## 2023-02-20 PROCEDURE — 88307 TISSUE EXAM BY PATHOLOGIST: CPT | Mod: 26,HCNC,, | Performed by: PATHOLOGY

## 2023-02-20 PROCEDURE — 88341 IMHCHEM/IMCYTCHM EA ADD ANTB: CPT | Mod: 26,HCNC,, | Performed by: PATHOLOGY

## 2023-02-20 PROCEDURE — 19301 PARTIAL MASTECTOMY: CPT | Mod: HCNC,LT,, | Performed by: SURGERY

## 2023-02-20 PROCEDURE — 64462 PARAVERTEBRAL: ICD-10-PCS | Mod: HCNC,,, | Performed by: ANESTHESIOLOGY

## 2023-02-20 PROCEDURE — 76098 MAMMO BREAST SPECIMEN: ICD-10-PCS | Mod: 26,HCNC,, | Performed by: RADIOLOGY

## 2023-02-20 PROCEDURE — 64461 PARAVERTEBRAL: ICD-10-PCS | Mod: HCNC,59,LT, | Performed by: ANESTHESIOLOGY

## 2023-02-20 PROCEDURE — 88342 IMHCHEM/IMCYTCHM 1ST ANTB: CPT | Mod: 59,HCNC | Performed by: PATHOLOGY

## 2023-02-20 PROCEDURE — 71000045 HC DOSC ROUTINE RECOVERY EA ADD'L HR: Mod: HCNC | Performed by: SURGERY

## 2023-02-20 PROCEDURE — 71000044 HC DOSC ROUTINE RECOVERY FIRST HOUR: Mod: HCNC | Performed by: SURGERY

## 2023-02-20 PROCEDURE — D9220A PRA ANESTHESIA: Mod: HCNC,,, | Performed by: STUDENT IN AN ORGANIZED HEALTH CARE EDUCATION/TRAINING PROGRAM

## 2023-02-20 PROCEDURE — 38792 RA TRACER ID OF SENTINL NODE: CPT | Mod: 51,LT,, | Performed by: SURGERY

## 2023-02-20 PROCEDURE — 88307 TISSUE EXAM BY PATHOLOGIST: CPT | Mod: 59,HCNC | Performed by: PATHOLOGY

## 2023-02-20 PROCEDURE — 64461 PVB THORACIC SINGLE INJ SITE: CPT | Mod: HCNC,59,LT, | Performed by: ANESTHESIOLOGY

## 2023-02-20 PROCEDURE — 38525 BIOPSY/REMOVAL LYMPH NODES: CPT | Mod: 51,LT,, | Performed by: SURGERY

## 2023-02-20 PROCEDURE — 88341 PR IHC OR ICC EACH ADD'L SINGLE ANTIBODY  STAINPR: ICD-10-PCS | Mod: 26,HCNC,, | Performed by: PATHOLOGY

## 2023-02-20 PROCEDURE — 37000009 HC ANESTHESIA EA ADD 15 MINS: Mod: HCNC | Performed by: SURGERY

## 2023-02-20 PROCEDURE — 64462 PVB THORACIC 2ND+ INJ SITE: CPT | Mod: HCNC,,, | Performed by: ANESTHESIOLOGY

## 2023-02-20 PROCEDURE — 36000706: Mod: HCNC | Performed by: SURGERY

## 2023-02-20 PROCEDURE — 71000015 HC POSTOP RECOV 1ST HR: Mod: HCNC | Performed by: SURGERY

## 2023-02-20 PROCEDURE — 64462 PVB THORACIC 2ND+ INJ SITE: CPT | Mod: HCNC | Performed by: STUDENT IN AN ORGANIZED HEALTH CARE EDUCATION/TRAINING PROGRAM

## 2023-02-20 PROCEDURE — 37000008 HC ANESTHESIA 1ST 15 MINUTES: Mod: HCNC | Performed by: SURGERY

## 2023-02-20 PROCEDURE — 27201423 OPTIME MED/SURG SUP & DEVICES STERILE SUPPLY: Mod: HCNC | Performed by: SURGERY

## 2023-02-20 PROCEDURE — 76098 X-RAY EXAM SURGICAL SPECIMEN: CPT | Mod: 26,HCNC,, | Performed by: RADIOLOGY

## 2023-02-20 PROCEDURE — 88341 IMHCHEM/IMCYTCHM EA ADD ANTB: CPT | Mod: 59,HCNC | Performed by: PATHOLOGY

## 2023-02-20 PROCEDURE — 19301 PR MASTECTOMY, PARTIAL: ICD-10-PCS | Mod: HCNC,LT,, | Performed by: SURGERY

## 2023-02-20 RX ORDER — ROCURONIUM BROMIDE 10 MG/ML
INJECTION, SOLUTION INTRAVENOUS
Status: DISCONTINUED | OUTPATIENT
Start: 2023-02-20 | End: 2023-02-20

## 2023-02-20 RX ORDER — HALOPERIDOL 5 MG/ML
0.5 INJECTION INTRAMUSCULAR EVERY 10 MIN PRN
Status: DISCONTINUED | OUTPATIENT
Start: 2023-02-20 | End: 2023-02-20 | Stop reason: HOSPADM

## 2023-02-20 RX ORDER — HYDROMORPHONE HYDROCHLORIDE 1 MG/ML
0.2 INJECTION, SOLUTION INTRAMUSCULAR; INTRAVENOUS; SUBCUTANEOUS EVERY 5 MIN PRN
Status: DISCONTINUED | OUTPATIENT
Start: 2023-02-20 | End: 2023-02-20 | Stop reason: HOSPADM

## 2023-02-20 RX ORDER — OXYCODONE HYDROCHLORIDE 5 MG/1
5 TABLET ORAL EVERY 6 HOURS PRN
Qty: 10 TABLET | Refills: 0 | Status: SHIPPED | OUTPATIENT
Start: 2023-02-20 | End: 2023-02-20 | Stop reason: HOSPADM

## 2023-02-20 RX ORDER — OXYCODONE HYDROCHLORIDE 5 MG/1
5 TABLET ORAL EVERY 6 HOURS PRN
Qty: 10 TABLET | Refills: 0 | Status: SHIPPED | OUTPATIENT
Start: 2023-02-20 | End: 2023-05-04

## 2023-02-20 RX ORDER — DEXAMETHASONE SODIUM PHOSPHATE 4 MG/ML
INJECTION, SOLUTION INTRA-ARTICULAR; INTRALESIONAL; INTRAMUSCULAR; INTRAVENOUS; SOFT TISSUE
Status: DISCONTINUED | OUTPATIENT
Start: 2023-02-20 | End: 2023-02-20

## 2023-02-20 RX ORDER — MIDAZOLAM HYDROCHLORIDE 1 MG/ML
.5-4 INJECTION INTRAMUSCULAR; INTRAVENOUS
Status: DISCONTINUED | OUTPATIENT
Start: 2023-02-20 | End: 2023-02-20 | Stop reason: HOSPADM

## 2023-02-20 RX ORDER — SODIUM CHLORIDE 0.9 % (FLUSH) 0.9 %
10 SYRINGE (ML) INJECTION
Status: DISCONTINUED | OUTPATIENT
Start: 2023-02-20 | End: 2023-02-20 | Stop reason: HOSPADM

## 2023-02-20 RX ORDER — PROPOFOL 10 MG/ML
VIAL (ML) INTRAVENOUS CONTINUOUS PRN
Status: DISCONTINUED | OUTPATIENT
Start: 2023-02-20 | End: 2023-02-20

## 2023-02-20 RX ORDER — KETAMINE HCL IN 0.9 % NACL 50 MG/5 ML
SYRINGE (ML) INTRAVENOUS
Status: DISCONTINUED | OUTPATIENT
Start: 2023-02-20 | End: 2023-02-20

## 2023-02-20 RX ORDER — FENTANYL CITRATE 50 UG/ML
25-200 INJECTION, SOLUTION INTRAMUSCULAR; INTRAVENOUS
Status: DISCONTINUED | OUTPATIENT
Start: 2023-02-20 | End: 2023-02-20 | Stop reason: HOSPADM

## 2023-02-20 RX ORDER — LIDOCAINE HYDROCHLORIDE 10 MG/ML
1 INJECTION, SOLUTION EPIDURAL; INFILTRATION; INTRACAUDAL; PERINEURAL ONCE AS NEEDED
Status: DISCONTINUED | OUTPATIENT
Start: 2023-02-20 | End: 2023-02-20 | Stop reason: HOSPADM

## 2023-02-20 RX ORDER — BUPIVACAINE HYDROCHLORIDE 7.5 MG/ML
INJECTION, SOLUTION EPIDURAL; RETROBULBAR
Status: DISCONTINUED | OUTPATIENT
Start: 2023-02-20 | End: 2023-02-20

## 2023-02-20 RX ORDER — ACETAMINOPHEN 500 MG
1000 TABLET ORAL ONCE
Status: DISCONTINUED | OUTPATIENT
Start: 2023-02-20 | End: 2023-02-20 | Stop reason: HOSPADM

## 2023-02-20 RX ORDER — ONDANSETRON 2 MG/ML
INJECTION INTRAMUSCULAR; INTRAVENOUS
Status: DISCONTINUED | OUTPATIENT
Start: 2023-02-20 | End: 2023-02-20

## 2023-02-20 RX ORDER — PROPOFOL 10 MG/ML
VIAL (ML) INTRAVENOUS
Status: DISCONTINUED | OUTPATIENT
Start: 2023-02-20 | End: 2023-02-20

## 2023-02-20 RX ORDER — SODIUM CHLORIDE 9 MG/ML
INJECTION, SOLUTION INTRAVENOUS CONTINUOUS
Status: DISCONTINUED | OUTPATIENT
Start: 2023-02-20 | End: 2023-02-20 | Stop reason: HOSPADM

## 2023-02-20 RX ORDER — ROPIVACAINE HYDROCHLORIDE 5 MG/ML
INJECTION, SOLUTION EPIDURAL; INFILTRATION; PERINEURAL
Status: COMPLETED | OUTPATIENT
Start: 2023-02-20 | End: 2023-02-20

## 2023-02-20 RX ADMIN — ROCURONIUM BROMIDE 50 MG: 10 INJECTION INTRAVENOUS at 07:02

## 2023-02-20 RX ADMIN — SODIUM CHLORIDE 0.2 MCG/KG/MIN: 9 INJECTION, SOLUTION INTRAVENOUS at 07:02

## 2023-02-20 RX ADMIN — DEXAMETHASONE SODIUM PHOSPHATE 4 MG: 4 INJECTION INTRA-ARTICULAR; INTRALESIONAL; INTRAMUSCULAR; INTRAVENOUS; SOFT TISSUE at 07:02

## 2023-02-20 RX ADMIN — FENTANYL CITRATE 50 MCG: 50 INJECTION INTRAMUSCULAR; INTRAVENOUS at 06:02

## 2023-02-20 RX ADMIN — FENTANYL CITRATE 25 MCG: 50 INJECTION INTRAMUSCULAR; INTRAVENOUS at 09:02

## 2023-02-20 RX ADMIN — MIDAZOLAM 1 MG: 1 INJECTION INTRAMUSCULAR; INTRAVENOUS at 06:02

## 2023-02-20 RX ADMIN — PROPOFOL 75 MCG/KG/MIN: 10 INJECTION, EMULSION INTRAVENOUS at 07:02

## 2023-02-20 RX ADMIN — SUGAMMADEX 200 MG: 100 INJECTION, SOLUTION INTRAVENOUS at 09:02

## 2023-02-20 RX ADMIN — ROCURONIUM BROMIDE 20 MG: 10 INJECTION INTRAVENOUS at 07:02

## 2023-02-20 RX ADMIN — SODIUM CHLORIDE: 9 INJECTION, SOLUTION INTRAVENOUS at 06:02

## 2023-02-20 RX ADMIN — CEFAZOLIN 2 G: 2 INJECTION, POWDER, FOR SOLUTION INTRAMUSCULAR; INTRAVENOUS at 07:02

## 2023-02-20 RX ADMIN — Medication 10 MG: at 08:02

## 2023-02-20 RX ADMIN — FENTANYL CITRATE 25 MCG: 50 INJECTION INTRAMUSCULAR; INTRAVENOUS at 08:02

## 2023-02-20 RX ADMIN — ROPIVACAINE HYDROCHLORIDE 20 ML: 5 INJECTION EPIDURAL; INFILTRATION; PERINEURAL at 06:02

## 2023-02-20 RX ADMIN — Medication 70 MG: at 07:02

## 2023-02-20 RX ADMIN — Medication 10 MG: at 07:02

## 2023-02-20 RX ADMIN — ROCURONIUM BROMIDE 20 MG: 10 INJECTION INTRAVENOUS at 08:02

## 2023-02-20 RX ADMIN — ONDANSETRON 4 MG: 2 INJECTION INTRAMUSCULAR; INTRAVENOUS at 09:02

## 2023-02-20 NOTE — BRIEF OP NOTE
[facility] - Surgery ([facility])  Brief Operative Note    Surgery Date: 2/20/2023     Surgeon(s) and Role:     * ROSIBEL Belcher MD - Primary     * Niko Mcmahon MD - Resident - Assisting    Pre-op Diagnosis:  Invasive ductal carcinoma of breast, left [C50.912]    Post-op Diagnosis:  Post-Op Diagnosis Codes:     * Invasive ductal carcinoma of breast, left [C50.912]    Procedure(s) (LRB):  MASTECTOMY, PARTIAL-Left with radiological marker (Left)  BIOPSY, LYMPH NODE, SENTINEL-Left (Left)  INJECTION, FOR SENTINEL NODE IDENTIFICATION-Left (Left)    Anesthesia: General    Operative Findings: left partial mastectomy performed, sentinel lymph node biopsy performed, two sentinel lymph nodes removed first one hot (1607) second one palpable    Estimated Blood Loss: * No values recorded between 2/20/2023  7:29 AM and 2/20/2023  9:22 AM *         Specimens:   Specimen (24h ago, onward)       Start     Ordered    02/20/23 0818  Specimen to Pathology, Surgery General Surgery  Once        Comments: Pre-op Diagnosis: Invasive ductal carcinoma of breast, left [C50.912]Procedure(s):MASTECTOMY, PARTIAL-Left with radiological markerBIOPSY, LYMPH NODE, SENTINEL-LeftINJECTION, FOR SENTINEL NODE IDENTIFICATION-Left Number of specimens: 4Name of specimens: 1. Left partial mastectomy, green is inferior, blue is superior, orange is lateral, yellow is anterior,  black is posterior, red is medial - perm2. Left partial mastectomy new medial margin is red - perm3. Left axillary sentinel lymph node #1, hot @1607- perm4. Left axillary sentinel lymph node #2, palpable - perm     References:    Click here for ordering Quick Tip   Question Answer Comment   Procedure Type: General Surgery    Specimen Class: Known or suspected malignancy    Which provider would you like to cc? ROSIBEL BELCHER    Release to patient Immediate        02/20/23 0908                      Discharge Note    OUTCOME: Patient tolerated treatment/procedure well without complication  and is now ready for discharge.    DISPOSITION: Home or Self Care    FINAL DIAGNOSIS:  <principal problem not specified>    FOLLOWUP: In clinic    DISCHARGE INSTRUCTIONS:    Discharge Procedure Orders   Diet Adult Regular     Notify your health care provider if you experience any of the following:  temperature >100.4     Notify your health care provider if you experience any of the following:  persistent nausea and vomiting or diarrhea     Notify your health care provider if you experience any of the following:  severe uncontrolled pain     Notify your health care provider if you experience any of the following:  redness, tenderness, or signs of infection (pain, swelling, redness, odor or green/yellow discharge around incision site)     Notify your health care provider if you experience any of the following:  difficulty breathing or increased cough     Weight bearing restrictions (specify):   Order Comments: Do not lift greater than 10 lbs for 2 weeks. Do not push or pull heavy objects for 2 weeks.

## 2023-02-20 NOTE — ANESTHESIA POSTPROCEDURE EVALUATION
Anesthesia Post Evaluation    Patient: Ernestine Luna    Procedure(s) Performed: Procedure(s) (LRB):  MASTECTOMY, PARTIAL-Left with radiological marker (Left)  BIOPSY, LYMPH NODE, SENTINEL-Left (Left)  INJECTION, FOR SENTINEL NODE IDENTIFICATION-Left (Left)    Final Anesthesia Type: general      Patient location during evaluation: PACU  Patient participation: Yes- Able to Participate  Level of consciousness: awake and alert  Post-procedure vital signs: reviewed and stable  Pain management: adequate  Airway patency: patent  PERRY mitigation strategies: Extubation while patient is awake  PONV status at discharge: No PONV  Anesthetic complications: no      Cardiovascular status: stable  Respiratory status: spontaneous ventilation and face mask  Hydration status: euvolemic  Follow-up not needed.          Vitals Value Taken Time   /68 02/20/23 1047   Temp 36.4 °C (97.5 °F) 02/20/23 0924   Pulse 66 02/20/23 1054   Resp 32 02/20/23 1054   SpO2 95 % 02/20/23 1054   Vitals shown include unvalidated device data.      No case tracking events are documented in the log.      Pain/Javed Score: Pain Rating Prior to Med Admin: 0 (2/20/2023  6:55 AM)  Javed Score: 10 (2/20/2023 10:55 AM)

## 2023-02-20 NOTE — TRANSFER OF CARE
"Anesthesia Transfer of Care Note    Patient: Ernestine Luna    Procedure(s) Performed: Procedure(s) (LRB):  MASTECTOMY, PARTIAL-Left with radiological marker (Left)  BIOPSY, LYMPH NODE, SENTINEL-Left (Left)  INJECTION, FOR SENTINEL NODE IDENTIFICATION-Left (Left)    Patient location: [facility]    Anesthesia Type: general and regional    Transport from OR: Transported from OR on 6-10 L/min O2 by face mask with adequate spontaneous ventilation    Post pain: adequate analgesia    Post assessment: no apparent anesthetic complications    Post vital signs: stable    Level of consciousness: awake and alert    Nausea/Vomiting: no nausea/vomiting    Complications: none    Transfer of care protocol was followed      Last vitals:   Visit Vitals  BP (!) 144/69   Pulse (!) 58   Temp 36.4 °C (97.5 °F) (Temporal)   Resp 18   Ht 4' 11" (1.499 m)   Wt 56.7 kg (125 lb)   LMP  (LMP Unknown)   SpO2 100%   Breastfeeding No   BMI 25.25 kg/m²     "

## 2023-02-20 NOTE — PATIENT INSTRUCTIONS
POSTOPERATIVE INSTRUCTIONS FOLLOWING BIOPSY OR LUMPECTOMY    The following are post-operative instructions that will help you to recover from your surgery.  Please read over these instructions carefully and contact us if we can answer any of your questions or concerns.    Wound Care  A surgical bra may be placed around your chest after your surgery.  If you are given the bra, please wear it as close to 24 hours a day as possible until your post-operative clinic appointment (2 weeks after surgery).  If the elastic around the bra irritates your skin, you may wear a soft t-shirt underneath the bra.  You may go without wearing the bra long enough to shower, to launder and dry the bra.  If the bra is extremely uncomfortable, you may wear a supportive sports bra instead after 2 days.  You may shower the day after surgery.  Do not take a tub bath and do not soak the surgical site for 2 weeks.  If you had lymph node surgery a blue dye was utilized. This may turn your skin, urine or stool temporarily blue. This is expected and will improve in a few days.     Activity   You should be able to return to your regular activities 2 days after your surgery.  However, do not engage in strenuous activities in which you use your upper body such as:  golf, tennis, aerobics, washing windows, raking the yard, mopping, vacuuming, heavy lifting (>15 lbs,e.g children) until you are seen for your follow-up appointment in clinic (about 2 weeks).  Please wait at least 24 hrs after anesthesia to drive. You can not drive if you are taking narcotic pain medicine. Otherwise you may drive as long as you fell comfortable to do so.     Medication for pain  You may find that over the counter pain medications may be sufficient for your pain.  You will be given a prescription for pain medication for more severe pain.  You should not drive or operate machinery while taking these.  Please take narcotics with food.  Narcotics can cause, or worsen  constipation.  If taking narcotics you will need to increase your fluid intake, eat high fiber foods (such as fruits and bran) and make sure that you are up and walking. You may need to take an over the counter stool softener for constipation.      Please report the following:  Temperature greater than 101 degrees  Discharge or bad odor from the wound  Excessive bleeding, such as bloody dressing or extreme bruising  Redness at incision and/or drain sites  Swelling or buildup of fluid around incision    Additional information  I will see you approximately 2 weeks following your surgery.  If this follow-up appointment has not been made, please call the office.    If you have any questions or problems, please call my office or my nurse.  Dr. Suri Belcher MD  If you have questions, please call my nurse: Alexa at [phone] or Mikala at [phone]    After hours and on weekends, you may call the main [facility] line at [phone] and ask to have the general surgery resident paged or have me paged.    If directed to the emergency room it would be best to proceed to the [facility] ER. However if very ill or unable to travel safely then please present to your nearest ER.

## 2023-02-20 NOTE — PLAN OF CARE
Patient tolerated procedure and anesthesia with no complaints of pain or nausea. Discharge material given and explained to patient and granddaughter. Both verbalized understanding. Patient awaiting ride via wheelchair in stable condition with no complaints.

## 2023-02-20 NOTE — INTERVAL H&P NOTE
The patient has been examined and the H&P has been reviewed:    I concur with the findings and no changes have occurred since H&P was written.    Surgery risks, benefits and alternative options discussed and understood by patient/family.    Patient has been consented and marked for laterality.    There are no hospital problems to display for this patient.

## 2023-02-20 NOTE — OP NOTE
DATE OF PROCEDURE: 2/20/2023    SURGEON: Suri Belcher MD    ASSISTANT:  Niko Mcmahon MD    PREOPERATIVE DIAGNOSIS: Invasive breast carcinoma of the left breast lower outer quadrant    POSTOPERATIVE DIAGNOSIS: same    ANESTHESIA: general    PROCEDURES PERFORMED:   1. left breast Rhonda  radar localization partial mastectomy (lumpectomy) with excision for clear margins   2. left axillary deep sentinel lymph node biopsy   3. injection of left breast with technetium-labeled radiocolloid for sentinel lymph node identification  4. Identification of sentinel lymph node       PROCEDURE IN DETAIL:   The patient underwent informed consent.  The rhonda  reflector was placed in the lower outer quadrant of the left breast. The localization films were reviewed.    She was then brought to the Operating Room and placed in the supine position. Anesthesia was administered.  The left breast was injected in the subareolar region with the technetium-labeled radiocolloid.  The left breast, anterior chest, arm and axilla were then prepped and draped in a sterile fashion.     We turned our attention to the left breast itself. An incision was made in the lower outer quadrant of the left breast using the reflector as a guide. The specimen was dissected circumferentially around the cancer. We did dissect all the way down to, but not including the underlying pectoralis fascia and the anterior margin of was skin. . The localization lumpectomy specimen was inked on the back table using blue superior, green inferior, yellow anterior, black posterior, orange lateral, and red medial.  It was then fixed with acetic acid and submitted for specimen radiograph, which confirmed the clip and area of interest within the specimen. Given the appearance and location of the lesion, additional margins were taken including medial.  Within the lumpectomy cavity, hemostasis was achieved with cautery. The wound was irrigated until clear. There was no  evidence of bleeding. It was closed in multiple layers with deep dermal and subcutaneous interrupted Vicryl sutures and a running 4-0 Monocryl subcuticular skin closure.    Using the gamma probe, activity was noted and localized in the left axilla. Local anesthetic with 1% lidocaine was injected into the skin where the incision will be placed. We made a small transverse inferior axillary incision over the area of activity. We then dissected down through the clavipectoral fascia using electrocautery. The first sentinel lymph node was identified using  the gamma probe.  It was dissected circumferentially with electrocautery. The lymph node was labeled as specimen #1 for permanent sectioning, hot with an ex vivo count of 1607. A total of 2 axillary sentinel lymph nodes were removed. The probe was placed in the cavity and there was no significant residual background radioactivity or blue dye noted in the axilla indicating adequate and appropriate sentinel lymph node biopsy. The cavity was then palpated and 0 further palpable nodes were noted. Any additional palpable nodes were sent to pathology for permanent section. We then achieved hemostasis and irrigation was performed.  The incision was then closed with an interuppted 3-0 vicryl deep dermal followed by a running 4-0 monocryl subcuticular.    Dermabond was applied. Sterile fluff gauze was placed and a post-procedure bra was placed. She tolerated the procedure well without complication and was turned over to Anesthesia for transport to the recovery area in a satisfactory condition. All specimens were sent to Pathology for permanent sectioning.    ESTIMATED BLOOD LOSS: 5 ml    COMPLICATIONS: none    DISPOSITION:  PACU--hemodynamically stable    ATTESTATION:  I was present and scrubbed for the entire procedure.    Alana Synoptic Reporting:  Operation performed with curative intent: yes    Tracer used to identify sentinel nodes in the upfront surgery (non-neoadjuvant)  setting: radioactive tracer,     Tracer used to identify sentinel nodes in the neoadjuvant setting: N/A    All nodes (colored or non-colored) present at the end of a dye-filled lymphatic channel were removed: N/A    All significantly radioactive nodes were removed: Yes    All palpably suspicious nodes were removed: Yes    Biopsy-proven positive nodes marked with clips prior to chemotherapy were identified and removed: N/A

## 2023-02-20 NOTE — ANESTHESIA PROCEDURE NOTES
Paravertebral    Patient location during procedure: pre-op   Block not for primary anesthetic.  Reason for block: at surgeon's request and post-op pain management   Post-op Pain Location: left breast pain   Start time: 2/20/2023 7:05 AM  Timeout: 2/20/2023 7:05 AM   End time: 2/20/2023 7:05 AM    Staffing  Authorizing Provider: Leo Hazel MD  Performing Provider: Marino Cruz MD    Preanesthetic Checklist  Completed: patient identified, IV checked, site marked, risks and benefits discussed, surgical consent, monitors and equipment checked, pre-op evaluation and timeout performed  Peripheral Block  Patient position: sitting  Prep: ChloraPrep  Patient monitoring: heart rate, cardiac monitor, continuous pulse ox, continuous capnometry and frequent blood pressure checks  Block type: paravertebral - thoracic  Laterality: left  Injection technique: single shot  Interspace: T2-3 and T4-5    Needle  Needle type: Stimuplex   Needle gauge: 20 G  Needle length: 4 in  Needle localization: anatomical landmarks and ultrasound guidance   -ultrasound image captured on disc.  Assessment  Injection assessment: negative aspiration, negative parasthesia and local visualized surrounding nerve  Paresthesia pain: none  Heart rate change: no  Slow fractionated injection: yes  Pain Tolerance: comfortable throughout block and no complaints  Medications:    Medications: ropivacaine (NAROPIN) injection 0.5% - Perineural   20 mL - 2/20/2023 6:55:00 AM    Additional Notes  T2: 10ml of 0.5% ropivacaine + 1;300k epi    T4: 10mL of 0.5% ropivacaine + 1:300k epi

## 2023-02-20 NOTE — ANESTHESIA PROCEDURE NOTES
Intubation    Date/Time: 2/20/2023 7:21 AM  Performed by: Ant Rutledge MD  Authorized by: Graham Figueredo MD     Intubation:     Induction:  Intravenous    Intubated:  Postinduction    Mask Ventilation:  Easy mask    Attempts:  1    Attempted By:  Resident anesthesiologist    Method of Intubation:  Direct    Blade:  Osuna 2    Laryngeal View Grade: Grade IIA - cords partially seen      Difficult Airway Encountered?: No      Complications:  None    Airway Device:  Oral endotracheal tube    Airway Device Size:  7.0    Style/Cuff Inflation:  Cuffed (inflated to minimal occlusive pressure)    Tube secured:  22    Secured at:  The lips    Placement Verified By:  Capnometry    Findings Post-Intubation:  Atraumatic/condition of teeth unchanged

## 2023-02-23 ENCOUNTER — TELEPHONE (OUTPATIENT)
Dept: HEMATOLOGY/ONCOLOGY | Facility: CLINIC | Age: 88
End: 2023-02-23
Payer: MEDICARE

## 2023-02-23 ENCOUNTER — TELEPHONE (OUTPATIENT)
Dept: SURGERY | Facility: CLINIC | Age: 88
End: 2023-02-23
Payer: MEDICARE

## 2023-02-23 NOTE — TELEPHONE ENCOUNTER
Spoke with patient regarding post-surgical bra. Patient expressed concern with putting the bra on by herself. Stated she has a neighbor that is a nurse and can help her. Offered to contact patient's daughter or granddaughter, pt denied at this time. Instructed patient does not need to reapply gauze into bra. Pt denies fever, redness, or swelling. All questions answered at this time.

## 2023-02-23 NOTE — TELEPHONE ENCOUNTER
"Spoke to pt earlier via phone call transferred from .  Took message from pt.   Walked written message to Dr. Belcher staff RN and MA who then called pt to give instructions.       ----- Message from Stephan Nova sent at 2/23/2023 12:32 PM CST -----  Consult/Advisory:          Name Of Caller: Self      Contact Preference?: [phone] (Mobile)      Provider Name: Janna      Does patient feel the need to be seen today? No      What is the nature of the call?: Calling to speak w/ Josie. Requesting assistance on how to re-apply her post-op bra          Additional Notes:  "Thank you for all that you do for our patients"      "

## 2023-02-27 ENCOUNTER — TELEPHONE (OUTPATIENT)
Dept: SURGERY | Facility: CLINIC | Age: 88
End: 2023-02-27
Payer: MEDICARE

## 2023-02-27 NOTE — TELEPHONE ENCOUNTER
Returned call to son regarding letter received on Saturday from [facility] . Per  all results will be discussed on patient's post op visit on March 13th . Patient voiced understanding to this call .

## 2023-03-03 LAB
FINAL PATHOLOGIC DIAGNOSIS: NORMAL
GROSS: NORMAL
Lab: NORMAL

## 2023-03-13 ENCOUNTER — OFFICE VISIT (OUTPATIENT)
Dept: SURGERY | Facility: CLINIC | Age: 88
End: 2023-03-13
Payer: MEDICARE

## 2023-03-13 VITALS
DIASTOLIC BLOOD PRESSURE: 68 MMHG | HEART RATE: 72 BPM | TEMPERATURE: 98 F | OXYGEN SATURATION: 99 % | WEIGHT: 132 LBS | BODY MASS INDEX: 26.61 KG/M2 | HEIGHT: 59 IN | SYSTOLIC BLOOD PRESSURE: 117 MMHG

## 2023-03-13 DIAGNOSIS — C50.912 INVASIVE DUCTAL CARCINOMA OF BREAST, LEFT: Primary | ICD-10-CM

## 2023-03-13 PROCEDURE — 1159F MED LIST DOCD IN RCRD: CPT | Mod: HCNC,CPTII,S$GLB, | Performed by: SURGERY

## 2023-03-13 PROCEDURE — 1125F AMNT PAIN NOTED PAIN PRSNT: CPT | Mod: HCNC,CPTII,S$GLB, | Performed by: SURGERY

## 2023-03-13 PROCEDURE — 99999 PR PBB SHADOW E&M-EST. PATIENT-LVL IV: ICD-10-PCS | Mod: PBBFAC,HCNC,, | Performed by: SURGERY

## 2023-03-13 PROCEDURE — 99024 PR POST-OP FOLLOW-UP VISIT: ICD-10-PCS | Mod: HCNC,S$GLB,, | Performed by: SURGERY

## 2023-03-13 PROCEDURE — 1125F PR PAIN SEVERITY QUANTIFIED, PAIN PRESENT: ICD-10-PCS | Mod: HCNC,CPTII,S$GLB, | Performed by: SURGERY

## 2023-03-13 PROCEDURE — 1160F RVW MEDS BY RX/DR IN RCRD: CPT | Mod: HCNC,CPTII,S$GLB, | Performed by: SURGERY

## 2023-03-13 PROCEDURE — 99999 PR PBB SHADOW E&M-EST. PATIENT-LVL IV: CPT | Mod: PBBFAC,HCNC,, | Performed by: SURGERY

## 2023-03-13 PROCEDURE — 99024 POSTOP FOLLOW-UP VISIT: CPT | Mod: HCNC,S$GLB,, | Performed by: SURGERY

## 2023-03-13 PROCEDURE — 1160F PR REVIEW ALL MEDS BY PRESCRIBER/CLIN PHARMACIST DOCUMENTED: ICD-10-PCS | Mod: HCNC,CPTII,S$GLB, | Performed by: SURGERY

## 2023-03-13 PROCEDURE — 1159F PR MEDICATION LIST DOCUMENTED IN MEDICAL RECORD: ICD-10-PCS | Mod: HCNC,CPTII,S$GLB, | Performed by: SURGERY

## 2023-03-13 RX ORDER — CEFAZOLIN SODIUM 2 G/50ML
2 SOLUTION INTRAVENOUS
Status: CANCELLED | OUTPATIENT
Start: 2023-03-13

## 2023-03-13 RX ORDER — SODIUM CHLORIDE 9 MG/ML
INJECTION, SOLUTION INTRAVENOUS CONTINUOUS
Status: CANCELLED | OUTPATIENT
Start: 2023-03-13

## 2023-03-13 NOTE — PROGRESS NOTES
Post-Op  [facility]  Department of Surgery    REFERRING PROVIDER: No referring provider defined for this encounter. Kev Macias MD  MEDICAL ONCOLOGIST:    pending  RADIATION ONCOLOGIST:   pending    DIAGNOSIS:    This is a 89 y.o. 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    TREATMENT SUMMARY:  The patient is status post left partial mastectomy and sentinel node biopsy on 2/20/2023.  Final pathology showed :     1. LEFT BREAST, PARTIAL MASTECTOMY:   Invasive ductal carcinoma with micropapillary features, Allyn histologic   grade 2 (tubule formation:  3, nuclear pleomorphism:  2, mitotic activity:   2).   Ductal carcinoma in situ (DCIS), intermediate nuclear grade, solid and   papillary types.   Surgical margins are negative for invasive carcinoma.   DCIS is present at posterior superior surgical margins.   Comment:  Tumor size is estimated at 8 mm by measuring invasive carcinoma on   slides representing the longest axis of lesion.  DCIS is not directly   associated with invasive carcinoma but extensively present in surrounding   breast tissues.  P63 staining shows retained myoepithelial cells at the   periphery of DCIS lesions. Intraductal lesional cells of DCIS are negative   mostly with CK5/6.  These findings support the interpretation and aid in   margin assessment.   2. LEFT BREAST, NEW MEDIAL MARGIN, EXCISION:   Small focus of residual ductal carcinoma in situ (DCIS), intermediate nuclear   grade.   New medial margin is negative for DCIS (closest focus 1 mm away).   Negative for invasive carcinoma.   3. LEFT AXILLARY SENTINEL LYMPH NODE #1, EXCISION:   One lymph node negative for malignancy (0/1) by routine staining and   epithelial immunostain panel (AE1/AE3, WSK, cam 5.2).   4. LEFT AXILLARY SENTINEL LYMPH NODE #2, EXCISION:   One lymph node negative for malignancy (0/1) by routine staining and   epithelial immunostain  panel (AE1/AE3, WSK, cam 5.2).   CAP SURGICAL PATHOLOGY CANCER CASE SUMMARY:  INVASIVE CARCINOMA OF THE BREAST   Procedure:  Excision, Less than Total mastectomy   Specimen laterality:  Left   TUMOR   Histologic type:  Invasive carcinoma of no special type (ductal)   Histologic grade     Glandular/tubular differentiation:  Score 3     Nuclear pleomorphism:  Score 2     Mitotic rate:  Score 2     Overall grade:  Grade 2   Tumor size:  8 mm   Tumor focality:  Single focus of invasive carcinoma   Ductal carcinoma in Situ (DCIS):  Present, positive for extensive intraductal   component   Lymphatic and/or vascular invasion:  Not identified   MARGINS   Margin status for invasive carcinoma:  All margins negative for invasive   carcinoma     Closest margins to invasive carcinoma:  5 mm from medial, 6 mm from   anterior and 10 mm from inferior, all other margins greater than 10 mm away   Margin status for DCIS:  DCIS is present margins     Margins involved by DCIS:  Posterior and superior     Closest other margins to DCIS:  1 mm from new medial margin, all other   margins greater than 5 mm away   REGIONAL LYMPH NODES   Regional lymph node status:  All regional lymph nodes negative for tumor     Total number of lymph nodes examined:  2     Number of sentinel nodes examined:  2   Pathologic stage classification (pTNM): pT1b pN0   Breast biomarkers from previous biopsy   Estrogen receptor: Positive; strong nuclear staining over 95% of tumor cells.   Progesterone receptor: Positive; strong nuclear staining in over 95% of tumor   cells.   Her2: Equivocal (Stain score = 2+); HER FISH negative   Ki-67: Positive nuclear staining in 20% of tumor cells.   Tumor block for potential special studies: 1L     INTERVAL HISTORY:   Ernestine Luna comes in for a post-op check.  Her pain is well controlled.      MEDICATIONS:  Current Outpatient Medications   Medication Sig Dispense Refill    acetic acid-oxyquinoline (FEM PH) 0.9-0.025 % Gel  Place 1 applicator vaginally twice a week. 50 g 11    alendronate (FOSAMAX) 70 MG tablet Take 1 tablet (70 mg total) by mouth every 7 days. 12 tablet 3    amLODIPine (NORVASC) 10 MG tablet Take 1 tablet (10 mg total) by mouth once daily. 90 tablet 1    atenoloL (TENORMIN) 25 MG tablet Take 1 tablet (25 mg total) by mouth once daily. 90 tablet 1    benazepriL (LOTENSIN) 40 MG tablet Take 1 tablet (40 mg total) by mouth once daily. 90 tablet 1    calcium carbonate (OS-DHRUV) 600 mg calcium (1,500 mg) Tab Take 600 mg by mouth once.      ciclopirox (PENLAC) 8 % Soln Apply topically nightly. 6.6 mL 11    diclofenac sodium (VOLTAREN) 1 % Gel Apply 2 g topically 3 (three) times daily. 100 g 3    glycerin/min oil/polycarbophil (REPLENS VAGL) Place vaginally.      lidocaine HCL 2% (XYLOCAINE) 2 % jelly Apply topically as needed. For toe pain, apply up to twice daily as needed for pain. 30 mL 3    MULTIVITAMIN W-MINERALS/LUTEIN (CENTRUM SILVER ORAL) Take 1 tablet by mouth Daily.      omega 3-calcium-vit D3-FA-mv13 [id number] mg Cmpk Take by mouth once daily.      OMNIPAQUE 350 350 mg iodine/mL Soln injection       oxyCODONE (ROXICODONE) 5 MG immediate release tablet Take 1 tablet (5 mg total) by mouth every 6 (six) hours as needed for Pain. 10 tablet 0    vitamin D 1000 units Tab Take 185 mg by mouth once daily.       No current facility-administered medications for this visit.       ALLERGIES:   Review of patient's allergies indicates:   Allergen Reactions    Tramadol Shortness Of Breath, Diarrhea and Nausea And Vomiting    Acetaminophen Nausea And Vomiting    Aspirin Nausea And Vomiting    Sulfa (sulfonamide antibiotics) Nausea And Vomiting    Prednisone Other (See Comments)     Stomach problems        PHYSICAL EXAMINATION:   General:  This is a well appearing female with appropriate speech, affect and gait.     Breast:  Incisions clean, dry, and intact      IMPRESSION:   Negative margins for invasive cancer  Positive  margins for DCIS, posterior (muscle), superior   Close margin for DCIS, medial, 1 mm    PLAN:   We discussed positive margins for DCIS.  The posterior margin was the pectoralis muscle fascia and does not require re-excision.  However the superior margin is recommended for return to the OR for margin re-excision.  The close margin of DCIS of the medial margin we will also be recommended for re-excision at that time to achieve wide margins.  This would allow the patient to be a good candidate for partial breast radiation.  We discussed alternatives return to the OR.  We discussed the standard recommendation is return to the OR for margin re-excision.

## 2023-03-15 ENCOUNTER — OFFICE VISIT (OUTPATIENT)
Dept: PODIATRY | Facility: CLINIC | Age: 88
End: 2023-03-15
Payer: MEDICARE

## 2023-03-15 VITALS
WEIGHT: 132.06 LBS | DIASTOLIC BLOOD PRESSURE: 72 MMHG | HEART RATE: 63 BPM | HEIGHT: 59 IN | SYSTOLIC BLOOD PRESSURE: 155 MMHG | BODY MASS INDEX: 26.62 KG/M2

## 2023-03-15 DIAGNOSIS — B35.1 ONYCHOMYCOSIS DUE TO DERMATOPHYTE: Primary | ICD-10-CM

## 2023-03-15 DIAGNOSIS — L84 CORN OR CALLUS: ICD-10-CM

## 2023-03-15 PROCEDURE — 1125F AMNT PAIN NOTED PAIN PRSNT: CPT | Mod: HCNC,CPTII,, | Performed by: PODIATRIST

## 2023-03-15 PROCEDURE — 99999 PR PBB SHADOW E&M-EST. PATIENT-LVL III: ICD-10-PCS | Mod: PBBFAC,HCNC,, | Performed by: PODIATRIST

## 2023-03-15 PROCEDURE — 99999 PR PBB SHADOW E&M-EST. PATIENT-LVL III: CPT | Mod: PBBFAC,HCNC,, | Performed by: PODIATRIST

## 2023-03-15 PROCEDURE — 1125F PR PAIN SEVERITY QUANTIFIED, PAIN PRESENT: ICD-10-PCS | Mod: HCNC,CPTII,, | Performed by: PODIATRIST

## 2023-03-15 PROCEDURE — 1160F PR REVIEW ALL MEDS BY PRESCRIBER/CLIN PHARMACIST DOCUMENTED: ICD-10-PCS | Mod: HCNC,CPTII,, | Performed by: PODIATRIST

## 2023-03-15 PROCEDURE — 17999 PR NON-COVERED FOOT CARE: ICD-10-PCS | Mod: CSM,HCNC,S$GLB, | Performed by: PODIATRIST

## 2023-03-15 PROCEDURE — 1159F MED LIST DOCD IN RCRD: CPT | Mod: HCNC,CPTII,, | Performed by: PODIATRIST

## 2023-03-15 PROCEDURE — 99499 NO LOS: ICD-10-PCS | Mod: HCNC,,, | Performed by: PODIATRIST

## 2023-03-15 PROCEDURE — 99499 UNLISTED E&M SERVICE: CPT | Mod: HCNC,,, | Performed by: PODIATRIST

## 2023-03-15 PROCEDURE — 1159F PR MEDICATION LIST DOCUMENTED IN MEDICAL RECORD: ICD-10-PCS | Mod: HCNC,CPTII,, | Performed by: PODIATRIST

## 2023-03-15 PROCEDURE — 17999 UNLISTD PX SKN MUC MEMB SUBQ: CPT | Mod: CSM,HCNC,S$GLB, | Performed by: PODIATRIST

## 2023-03-15 PROCEDURE — 1160F RVW MEDS BY RX/DR IN RCRD: CPT | Mod: HCNC,CPTII,, | Performed by: PODIATRIST

## 2023-03-17 ENCOUNTER — OFFICE VISIT (OUTPATIENT)
Dept: UROGYNECOLOGY | Facility: CLINIC | Age: 88
End: 2023-03-17
Payer: MEDICARE

## 2023-03-17 VITALS
BODY MASS INDEX: 26.21 KG/M2 | SYSTOLIC BLOOD PRESSURE: 160 MMHG | DIASTOLIC BLOOD PRESSURE: 74 MMHG | HEIGHT: 59 IN | WEIGHT: 130 LBS

## 2023-03-17 DIAGNOSIS — N39.41 URINARY INCONTINENCE, URGE: ICD-10-CM

## 2023-03-17 DIAGNOSIS — N99.3 VAGINAL VAULT PROLAPSE, POSTHYSTERECTOMY: Primary | ICD-10-CM

## 2023-03-17 DIAGNOSIS — Z46.89 PESSARY MAINTENANCE: ICD-10-CM

## 2023-03-17 DIAGNOSIS — R35.1 NOCTURIA: ICD-10-CM

## 2023-03-17 DIAGNOSIS — N81.10 PROLAPSE OF ANTERIOR VAGINAL WALL: ICD-10-CM

## 2023-03-17 PROCEDURE — 1101F PT FALLS ASSESS-DOCD LE1/YR: CPT | Mod: HCNC,CPTII,S$GLB, | Performed by: NURSE PRACTITIONER

## 2023-03-17 PROCEDURE — 99214 PR OFFICE/OUTPT VISIT, EST, LEVL IV, 30-39 MIN: ICD-10-PCS | Mod: HCNC,S$GLB,, | Performed by: NURSE PRACTITIONER

## 2023-03-17 PROCEDURE — 1159F MED LIST DOCD IN RCRD: CPT | Mod: HCNC,CPTII,S$GLB, | Performed by: NURSE PRACTITIONER

## 2023-03-17 PROCEDURE — 1159F PR MEDICATION LIST DOCUMENTED IN MEDICAL RECORD: ICD-10-PCS | Mod: HCNC,CPTII,S$GLB, | Performed by: NURSE PRACTITIONER

## 2023-03-17 PROCEDURE — 3288F PR FALLS RISK ASSESSMENT DOCUMENTED: ICD-10-PCS | Mod: HCNC,CPTII,S$GLB, | Performed by: NURSE PRACTITIONER

## 2023-03-17 PROCEDURE — 99999 PR PBB SHADOW E&M-EST. PATIENT-LVL IV: CPT | Mod: PBBFAC,HCNC,, | Performed by: NURSE PRACTITIONER

## 2023-03-17 PROCEDURE — 1125F PR PAIN SEVERITY QUANTIFIED, PAIN PRESENT: ICD-10-PCS | Mod: HCNC,CPTII,S$GLB, | Performed by: NURSE PRACTITIONER

## 2023-03-17 PROCEDURE — 3288F FALL RISK ASSESSMENT DOCD: CPT | Mod: HCNC,CPTII,S$GLB, | Performed by: NURSE PRACTITIONER

## 2023-03-17 PROCEDURE — 1101F PR PT FALLS ASSESS DOC 0-1 FALLS W/OUT INJ PAST YR: ICD-10-PCS | Mod: HCNC,CPTII,S$GLB, | Performed by: NURSE PRACTITIONER

## 2023-03-17 PROCEDURE — 1160F RVW MEDS BY RX/DR IN RCRD: CPT | Mod: HCNC,CPTII,S$GLB, | Performed by: NURSE PRACTITIONER

## 2023-03-17 PROCEDURE — 99999 PR PBB SHADOW E&M-EST. PATIENT-LVL IV: ICD-10-PCS | Mod: PBBFAC,HCNC,, | Performed by: NURSE PRACTITIONER

## 2023-03-17 PROCEDURE — 1125F AMNT PAIN NOTED PAIN PRSNT: CPT | Mod: HCNC,CPTII,S$GLB, | Performed by: NURSE PRACTITIONER

## 2023-03-17 PROCEDURE — 99214 OFFICE O/P EST MOD 30 MIN: CPT | Mod: HCNC,S$GLB,, | Performed by: NURSE PRACTITIONER

## 2023-03-17 PROCEDURE — 1160F PR REVIEW ALL MEDS BY PRESCRIBER/CLIN PHARMACIST DOCUMENTED: ICD-10-PCS | Mod: HCNC,CPTII,S$GLB, | Performed by: NURSE PRACTITIONER

## 2023-03-17 RX ORDER — TROSPIUM CHLORIDE 20 MG/1
20 TABLET, FILM COATED ORAL 2 TIMES DAILY
Qty: 60 TABLET | Refills: 11 | Status: SHIPPED | OUTPATIENT
Start: 2023-03-17 | End: 2023-12-20

## 2023-03-17 NOTE — PATIENT INSTRUCTIONS
1. Prolapse: Stage 2 apical prolapse, Stage 3 anterior and posterior vaginal wall prolapse   --Pessary # 3 ring with support  --Daily pelvic floor exercises as assigned, consider Pelvic floor PT in future  --Non surgical options discussed with patient      2. Urge urinary incontinence:    --Empty bladder every 3 hours. Empty well: wait a minute, lean forward on toilet.   --Avoid dietary irritants (see sheet). Keep diary x 3-5 days to determine your irritants.  --consider PT in future  --URGE:  trial of trospium 20 mg twice edailyTakes 2-4 weeks to see if will have effect. For dry mouth: get sour, sugar free lozenge or gum.   --STRESS: Pessary vs. Sling.      3. Incomplete bladder emptying:improved with pessary   --Double void and Crede maneuvers discussed  --Improved with pessary    4. Vaginal atrophy (dryness):  Use KY jelly, astroglide, or other water-based lube quarter size with your finger twice weekly    5. Hemorrhoid  --not inflammed at this time  --rectal 12/2020 NEG    6. Nocturia (nighttime urination): stop fluids 2 hours before bed. No water by the bed. If have leg swelling:  Elevate feet above chest x 1 hour before bed to get excess fluid off.  Can also use support hose (knee highs).      7.  RTC 3 months for pessary check.

## 2023-03-17 NOTE — PROGRESS NOTES
Urogyn follow up  03/17/2023  .  [facility] - UROGYNECOLOGY  [address] [address]    Ernestine Luna  [id number]  8/13/1929      Ernestine Luna is a 89 y.o. here for a urogyn pessary check.      Last HPI from 10/29/2018  1)  UI:  (--) NELI   (--) UUI (--) pads Daytime frequency: Q 30 minutes- 3 hours.  Nocturia: Yes 3-4/night.   (--) dysuria,  (--) hematuria,  (--) frequent UTIs.  (+) complete bladder emptying.   2)  POP:  Absent.   Symptoms:(--)  .  (--) vaginal bleeding. (--) vaginal discharge. (--) sexually active.  (--) dyspareunia.   (+)  Vaginal dryness.  (--) vaginal estrogen use.   3)  BM:  (--) constipation/straining.  (--) chronic diarrhea. (--) hematochezia.  (--) fecal incontinence.  (--) fecal smearing/urgency.  (--) incomplete evacuation.    4)Pessary:  Denies pain, bleeding or dischage. Using #3 ring with support pessary..    03/27/2021  No /GYN changes.  Still sad because her long-term dog passed away in March.  Is stable, has good support system, no SI/HI.     1)  UI:  (--) NELI   (--) UUI (--) pads Daytime frequency: Q 30 minutes (after BP meds)- 3 hours.  Nocturia: Yes 3-4/night.  Wakes with urge to urinate. Sounds like dog can keep her awake, too.  (--) dysuria,  (--) hematuria,  (--) frequent UTIs.  (+) complete bladder emptying.     2)  POP:  Absent.   Symptoms:(--).  (--) vaginal bleeding. (--) vaginal discharge. (--) sexually active.  (--) dyspareunia.   (+)  Vaginal dryness.  (--) vaginal estrogen use. Using replens 2x/week.  Difficult to squeeze replens applicator. Uses 2x/week.     3)  BM:  (--) constipation/straining.  (--) chronic diarrhea. (--) hematochezia.  (--) fecal incontinence.  (--) fecal smearing/urgency.  (--) incomplete evacuation.      4) Pessary:  Denies pain, bleeding.  Has some scant discharge. Using #3 ring with support pessary.    5) Depression: Patient is still very sad about the passing of her dog in March 2020. She has had the dog  stuffed.  She denies SI/HI but cannot wait to see her dog again one day.  She doesn't want to burden her family with her sadness. She feels that she has a good support system in her [employer].      06/23/2021   1)  UI:  (--) NELI   (--) UUI (+) liner pads just in case: Daytime frequency: Q 30 minutes (after BP meds)- 3 hours.  Nocturia: Yes 3/night.  o.  (--) dysuria,  (--) hematuria,  (--) frequent UTIs.  (+) complete bladder emptying.     2)  POP:  Absent with pessary in place.   Symptoms:(--).  (--) vaginal bleeding. (--) vaginal discharge. (--) sexually active.  (--) dyspareunia.   (+)  Vaginal dryness.  (--) vaginal estrogen use. Using replens 2x/week.  Difficult to squeeze replens applicator. Uses 2x/week.     3)  BM:  (--) constipation/straining.  (--) chronic diarrhea. (--) hematochezia.  (--) fecal incontinence.  (--) fecal smearing/urgency.  (--) incomplete evacuation.      4) Pessary:  Denies pain, bleeding.  Denies vaginal discharge. Using #3 ring with support pessary.    5) Depression Feels better since she has had the dog stuffed.  She denies SI/HI but cannot wait to see her dog again one day.  She doesn't want to burden her family with her sadness. She feels that she has a good support system in her [employer].    Changes since last visit:     1)  UI:  (--) NELI   (--) UUI (+) liner pads just in case: Daytime frequency: Q 30 minutes (after BP meds)- 3 hours.  Nocturia: Yes 4/night.  Limits fluids several hours prior to prior to bedtme  (--) dysuria,  (--) hematuria,  (--) frequent UTIs.  (+) complete bladder emptying.     2)  POP:  Absent with pessary in place.   Symptoms:(--).  (--) vaginal bleeding. (--) vaginal discharge. (--) sexually active.  (--) dyspareunia.   (+)  Vaginal dryness.  (--) vaginal estrogen use. Using replens 2x/week.      3)  BM:  (--) constipation/straining.  (--) chronic diarrhea. (--) hematochezia.  (--) fecal incontinence.  (--) fecal smearing/urgency.  (--) incomplete evacuation.       4) Pessary:  Denies pain, bleeding.  Denies vaginal discharge. Using #3 ring with support pessary.    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 Wilkins    COLONOSCOPY N/A 10/1/2019    Procedure: COLONOSCOPY;  Surgeon: Jarad Chavira MD;  Location: [facility] (4TH FLR);  Service: Endoscopy;  Laterality: N/A;  miralax prep (used miralax for last colonoscopy) - ERW    COLONOSCOPY W/ POLYPECTOMY  08/08/2013    RASHEED   06/24/2014    EYE SURGERY      HYSTERECTOMY  age 38    ALISTAIR; ovaries in place    INJECTION FOR SENTINEL NODE IDENTIFICATION Left 2/20/2023    Procedure: INJECTION, FOR SENTINEL NODE IDENTIFICATION-Left;  Surgeon: ROSIBEL Belcher MD;  Location: [facility] OR 2ND [facility];  Service: General;  Laterality: Left;    JOINT REPLACEMENT      bilateral knees    MASTECTOMY, PARTIAL Left 2/20/2023    Procedure: MASTECTOMY, PARTIAL-Left with radiological marker;  Surgeon: ROSIBEL Belcher MD;  Location: [facility] OR [address] [place] [address];  Service: General;  Laterality: Left;    SENTINEL LYMPH NODE BIOPSY Left 2/20/2023    Procedure: BIOPSY, LYMPH NODE, SENTINEL-Left;  Surgeon: ROSIBEL Belcher MD;  Location: [facility] OR [address] [place] [address];  Service: General;  Laterality: Left;    TOTAL KNEE ARTHROPLASTY      Bilateral       Current Outpatient Medications   Medication Sig    acetic acid-oxyquinoline (FEM  PH) 0.9-0.025 % Gel Place 1 applicator vaginally twice a week.    alendronate (FOSAMAX) 70 MG tablet Take 1 tablet (70 mg total) by mouth every 7 days.    amLODIPine (NORVASC) 10 MG tablet Take 1 tablet (10 mg total) by mouth once daily.    atenoloL (TENORMIN) 25 MG tablet Take 1 tablet (25 mg total) by mouth once daily.    benazepriL (LOTENSIN) 40 MG tablet Take 1 tablet (40 mg total) by mouth once daily.    calcium carbonate (OS-DHRUV) 600 mg calcium (1,500 mg) Tab Take 600 mg by mouth once.    ciclopirox (PENLAC) 8 % Soln Apply topically nightly.    diclofenac sodium (VOLTAREN) 1 % Gel Apply 2 g topically 3 (three) times daily.    glycerin/min oil/polycarbophil (REPLENS VAGL) Place vaginally.    lidocaine HCL 2% (XYLOCAINE) 2 % jelly Apply topically as needed. For toe pain, apply up to twice daily as needed for pain.    MULTIVITAMIN W-MINERALS/LUTEIN (CENTRUM SILVER ORAL) Take 1 tablet by mouth Daily.    omega 3-calcium-vit D3-FA-mv13 [id number] mg Cmpk Take by mouth once daily.    OMNIPAQUE 350 350 mg iodine/mL Soln injection     oxyCODONE (ROXICODONE) 5 MG immediate release tablet Take 1 tablet (5 mg total) by mouth every 6 (six) hours as needed for Pain.    vitamin D 1000 units Tab Take 185 mg by mouth once daily.    trospium (SANCTURA) 20 mg Tab tablet Take 1 tablet (20 mg total) by mouth 2 (two) times daily.     No current facility-administered medications for this visit.       ROS:  As per HPI.      Well Woman:  --Pap:denies history of abnormal pap smear-- post hyst; no further needed  --Mammo:L partial mastectomy:   pT1b N0 M0 grade 2 ER + CA + HER2 negative invasive ductal carcinoma of the left breast.--+ margins-- repeat biopsy scheduled  --Colonoscopy: 10/2019 Diverticulosis in the sigmoid colon.  No further recommended.   --Dexa:06/2016Osteoporosis of the femoral neck. Has not takenTx. Talk with Dr. Ugalde about need for repeat bone density and/or treatment for osteoporosis.     Exam  BP (!) 160/74  "(BP Location: Right arm, Patient Position: Sitting, BP Method: Medium (Manual))   Ht 4' 11" (1.499 m)   Wt 59 kg (130 lb)   LMP  (LMP Unknown)   BMI 26.26 kg/m²   General: alert and oriented, no acute distress  Respiratory: normal respiratory effort  Abd: soft, non-tender, non-distended    Pelvic  Ext. Genitalia: normal external genitalia. Normal bartholin's and skenes glands  Vagina: + atrophy. Normal vaginal mucosa without lesions. No discharge noted.   Non-tender bladder base without palpable mass.  #3 ring with support in place--removed and cleaned.   Cervix: absent  Uterus:  absent   Urethra: no masses or tenderness  Urethral meatus: no lesions, caruncle or prolapse.    Pessary removed without difficulty. Washed with soap and water. Reinserted without difficulty    Impression  1. Vaginal vault prolapse, posthysterectomy        2. Prolapse of anterior vaginal wall        3. Urinary incontinence, urge        4. Nocturia  trospium (SANCTURA) 20 mg Tab tablet      5. Pessary maintenance              We reviewed the above issues and discussed options for short-term versus long-term management of her problems.   Plan:   1. Prolapse: Stage 2 apical prolapse, Stage 3 anterior and posterior vaginal wall prolapse   --Pessary # 3 ring with support  --Daily pelvic floor exercises as assigned, consider Pelvic floor PT in future  --Non surgical options discussed with patient      2. Urge urinary incontinence:    --Empty bladder every 3 hours. Empty well: wait a minute, lean forward on toilet.   --Avoid dietary irritants (see sheet). Keep diary x 3-5 days to determine your irritants.  --consider PT in future  --URGE:  trial of trospium 20 mg twice edailyTakes 2-4 weeks to see if will have effect. For dry mouth: get sour, sugar free lozenge or gum.   --STRESS: Pessary vs. Sling.      3. Incomplete bladder emptying:improved with pessary   --Double void and Crede maneuvers discussed  --Improved with pessary    4. Vaginal " atrophy (dryness):  Use KY jelly, astroglide, or other water-based lube quarter size with your finger twice weekly    5. Hemorrhoid  --not inflammed at this time  --rectal 12/2020 NEG    6. Nocturia (nighttime urination): stop fluids 2 hours before bed. No water by the bed. If have leg swelling:  Elevate feet above chest x 1 hour before bed to get excess fluid off.  Can also use support hose (knee highs).    --will try to get trospium approved    7.  RTC 3 months for pessary check.     I spent a total of 30 minutes on the day of the visit.    This includes face to face time and non-face to face time preparing to see the patient (eg, review of tests), obtaining and/or reviewing separately obtained history, documenting clinical information in the electronic or other health record, independently interpreting results and communicating results to the patient/family/caregiver, or care coordinator.     Kristine Sequeira, MAEGAN-BC   Division of Female Pelvic Medicine and Reconstructive Surgery  Department of Obstetrics & Gynecology

## 2023-03-23 ENCOUNTER — TELEPHONE (OUTPATIENT)
Dept: PHARMACY | Facility: CLINIC | Age: 88
End: 2023-03-23
Payer: MEDICARE

## 2023-03-31 ENCOUNTER — TELEPHONE (OUTPATIENT)
Dept: SURGERY | Facility: CLINIC | Age: 88
End: 2023-03-31
Payer: MEDICARE

## 2023-03-31 NOTE — TELEPHONE ENCOUNTER
Spoke with patient regarding new surgical date. Pt agreeable to 4/18 at [facility] with Dr. Belcher.   All questions answered at this time.

## 2023-03-31 NOTE — TELEPHONE ENCOUNTER
Spoke with patient's daughter regarding changing locations of surgery due to anesthesia not clearing this patient at the [facility].   Pt to call RN back to further discuss.

## 2023-04-13 ENCOUNTER — OFFICE VISIT (OUTPATIENT)
Dept: PODIATRY | Facility: CLINIC | Age: 88
End: 2023-04-13
Payer: MEDICARE

## 2023-04-13 VITALS
HEART RATE: 78 BPM | SYSTOLIC BLOOD PRESSURE: 134 MMHG | WEIGHT: 130.06 LBS | BODY MASS INDEX: 26.22 KG/M2 | HEIGHT: 59 IN | DIASTOLIC BLOOD PRESSURE: 78 MMHG

## 2023-04-13 DIAGNOSIS — L84 CORN OR CALLUS: ICD-10-CM

## 2023-04-13 DIAGNOSIS — B35.1 ONYCHOMYCOSIS DUE TO DERMATOPHYTE: Primary | ICD-10-CM

## 2023-04-13 PROCEDURE — 99499 NO LOS: ICD-10-PCS | Mod: HCNC,,, | Performed by: PODIATRIST

## 2023-04-13 PROCEDURE — 1159F MED LIST DOCD IN RCRD: CPT | Mod: HCNC,CPTII,, | Performed by: PODIATRIST

## 2023-04-13 PROCEDURE — 17999 PR NON-COVERED FOOT CARE: ICD-10-PCS | Mod: CSM,HCNC,S$GLB, | Performed by: PODIATRIST

## 2023-04-13 PROCEDURE — 1160F RVW MEDS BY RX/DR IN RCRD: CPT | Mod: HCNC,CPTII,, | Performed by: PODIATRIST

## 2023-04-13 PROCEDURE — 1126F AMNT PAIN NOTED NONE PRSNT: CPT | Mod: HCNC,CPTII,, | Performed by: PODIATRIST

## 2023-04-13 PROCEDURE — 99999 PR PBB SHADOW E&M-EST. PATIENT-LVL III: CPT | Mod: PBBFAC,HCNC,, | Performed by: PODIATRIST

## 2023-04-13 PROCEDURE — 99999 PR PBB SHADOW E&M-EST. PATIENT-LVL III: ICD-10-PCS | Mod: PBBFAC,HCNC,, | Performed by: PODIATRIST

## 2023-04-13 PROCEDURE — 1126F PR PAIN SEVERITY QUANTIFIED, NO PAIN PRESENT: ICD-10-PCS | Mod: HCNC,CPTII,, | Performed by: PODIATRIST

## 2023-04-13 PROCEDURE — 99499 UNLISTED E&M SERVICE: CPT | Mod: HCNC,,, | Performed by: PODIATRIST

## 2023-04-13 PROCEDURE — 17999 UNLISTD PX SKN MUC MEMB SUBQ: CPT | Mod: CSM,HCNC,S$GLB, | Performed by: PODIATRIST

## 2023-04-13 PROCEDURE — 1160F PR REVIEW ALL MEDS BY PRESCRIBER/CLIN PHARMACIST DOCUMENTED: ICD-10-PCS | Mod: HCNC,CPTII,, | Performed by: PODIATRIST

## 2023-04-13 PROCEDURE — 1159F PR MEDICATION LIST DOCUMENTED IN MEDICAL RECORD: ICD-10-PCS | Mod: HCNC,CPTII,, | Performed by: PODIATRIST

## 2023-04-17 ENCOUNTER — TELEPHONE (OUTPATIENT)
Dept: SURGERY | Facility: CLINIC | Age: 88
End: 2023-04-17
Payer: MEDICARE

## 2023-04-17 ENCOUNTER — ANESTHESIA EVENT (OUTPATIENT)
Dept: SURGERY | Facility: HOSPITAL | Age: 88
End: 2023-04-17
Payer: MEDICARE

## 2023-04-17 NOTE — TELEPHONE ENCOUNTER
Spoke with patient regarding arrival time for tomorrow at 0600. NPO status reinforced. All questions answered at this time.

## 2023-04-17 NOTE — ANESTHESIA PREPROCEDURE EVALUATION
[facility]  Anesthesia Pre-Operative Evaluation         Patient Name: Ernestine Luna  YOB: 1929  MRN: [id number]    SUBJECTIVE:     Pre-operative evaluation for Procedure(s) (LRB):  MASTECTOMY, PARTIAL-left re-excision (Left)     04/17/2023    Ernestine Luna is a 89 y.o. female w/ a significant PMHx of right breast cancer in 1992 (s/p lumpectomy, radiation and tamoxifen) Left breast cancer (s/p partial mastectomy 2/20/23), Aortic atherosclerosis, mild carotid artery disease, diastolic HF, HTN.    Patient now presents for the above procedure(s).    TTE 11/1/21:   The left ventricle is normal in size with concentric remodeling and normal systolic function. The estimated ejection fraction is 65%.   Normal right ventricular size with normal right ventricular systolic function.   Grade II left ventricular diastolic dysfunction.   Mild to moderate tricuspid regurgitation.   Severe left atrial enlargement.   The estimated PA systolic pressure is 58 mmHg.   Normal central venous pressure (3 mmHg).      LDA: None documented.       Prev airway:   Intubation     Date/Time: 2/20/2023 7:21 AM  Performed by: Ant Rutledge MD  Authorized by: Graham Figueredo MD      Intubation:     Induction:  Intravenous    Intubated:  Postinduction    Mask Ventilation:  Easy mask    Attempts:  1    Attempted By:  Resident anesthesiologist    Method of Intubation:  Direct    Blade:  Osuna 2    Laryngeal View Grade: Grade IIA - cords partially seen      Difficult Airway Encountered?: No      Complications:  None    Airway Device:  Oral endotracheal tube    Airway Device Size:  7.0    Style/Cuff Inflation:  Cuffed (inflated to minimal occlusive pressure)    Tube secured:  22    Secured at:  The lips    Placement Verified By:  Capnometry    Findings Post-Intubation:  Atraumatic/condition of teeth unchanged       Drips: None documented.      Patient Active Problem List   Diagnosis    Hypertension     Osteoarthritis    Senile nuclear sclerosis    DDD (degenerative disc disease), lumbar    History of breast cancer    Ectatic thoracic aorta    Atherosclerosis of aorta    Mild carotid artery disease    Inflammatory arthritis    Vaginal vault prolapse, posthysterectomy    Prolapse of anterior vaginal wall    Facet arthropathy    Vaginal atrophy    Urinary incontinence, urge    Nocturia    Colon polyps    Depressed mood    Abdominal aortic pulsation    Aortic aneurysm without rupture    Posterior vitreous detachment of both eyes    Pseudophakia of both eyes    Dry eye syndrome of both eyes    Impaired functional mobility, balance, gait, and endurance    Decreased strength of lower extremity    Decreased range of motion of right ankle    Invasive ductal carcinoma of breast, left       Review of patient's allergies indicates:   Allergen Reactions    Tramadol Shortness Of Breath, Diarrhea and Nausea And Vomiting    Acetaminophen Nausea And Vomiting    Aspirin Nausea And Vomiting    Sulfa (sulfonamide antibiotics) Nausea And Vomiting    Prednisone Other (See Comments)     Stomach problems        Current Inpatient Medications:      No current facility-administered medications on file prior to encounter.     Current Outpatient Medications on File Prior to Encounter   Medication Sig Dispense Refill    acetic acid-oxyquinoline (FEM PH) 0.9-0.025 % Gel Place 1 applicator vaginally twice a week. 50 g 11    alendronate (FOSAMAX) 70 MG tablet Take 1 tablet (70 mg total) by mouth every 7 days. 12 tablet 3    amLODIPine (NORVASC) 10 MG tablet Take 1 tablet (10 mg total) by mouth once daily. 90 tablet 1    atenoloL (TENORMIN) 25 MG tablet Take 1 tablet (25 mg total) by mouth once daily. 90 tablet 1    benazepriL (LOTENSIN) 40 MG tablet Take 1 tablet (40 mg total) by mouth once daily. 90 tablet 1    calcium carbonate (OS-DHRUV) 600 mg calcium (1,500 mg) Tab Take 600 mg by mouth once.      ciclopirox  (PENLAC) 8 % Soln Apply topically nightly. 6.6 mL 11    diclofenac sodium (VOLTAREN) 1 % Gel Apply 2 g topically 3 (three) times daily. 100 g 3    glycerin/min oil/polycarbophil (REPLENS VAGL) Place vaginally.      lidocaine HCL 2% (XYLOCAINE) 2 % jelly Apply topically as needed. For toe pain, apply up to twice daily as needed for pain. 30 mL 3    MULTIVITAMIN W-MINERALS/LUTEIN (CENTRUM SILVER ORAL) Take 1 tablet by mouth Daily.      omega 3-calcium-vit D3-FA-mv13 [id number] mg Cmpk Take by mouth once daily.      OMNIPAQUE 350 350 mg iodine/mL Soln injection       oxyCODONE (ROXICODONE) 5 MG immediate release tablet Take 1 tablet (5 mg total) by mouth every 6 (six) hours as needed for Pain. 10 tablet 0    vitamin D 1000 units Tab Take 185 mg by mouth once daily.         Past Surgical History:   Procedure Laterality Date    BREAST BIOPSY Right     BREAST LUMPECTOMY Right     BUNIONECTOMY      Left foot (x2)    CARPAL TUNNEL RELEASE Right     38 years old    CATARACT EXTRACTION Left     with lens     CATARACT EXTRACTION W/  INTRAOCULAR LENS IMPLANT Right 10/12/2015    Dr. Wilkins    COLONOSCOPY N/A 10/1/2019    Procedure: COLONOSCOPY;  Surgeon: Jarad Chavira MD;  Location: [facility] ([facility]);  Service: Endoscopy;  Laterality: N/A;  miralax prep (used miralax for last colonoscopy) - ERW    COLONOSCOPY W/ POLYPECTOMY  08/08/2013    RASHEED   06/24/2014    EYE SURGERY      HYSTERECTOMY  age 38    ALISTAIR; ovaries in place    INJECTION FOR SENTINEL NODE IDENTIFICATION Left 2/20/2023    Procedure: INJECTION, FOR SENTINEL NODE IDENTIFICATION-Left;  Surgeon: ROSIBEL Belcher MD;  Location: [address];  Service: General;  Laterality: Left;    JOINT REPLACEMENT      bilateral knees    MASTECTOMY, PARTIAL Left 2/20/2023    Procedure: MASTECTOMY, PARTIAL-Left with radiological marker;  Surgeon: ROSIBEL Belcher MD;  Location: [address];  Service: General;  Laterality: Left;    SENTINEL LYMPH NODE BIOPSY Left  2/20/2023    Procedure: BIOPSY, LYMPH NODE, SENTINEL-Left;  Surgeon: ROSIBEL Belcher MD;  Location: [facility] OR [address] [place] [address];  Service: General;  Laterality: Left;    TOTAL KNEE ARTHROPLASTY      Bilateral       Social History     Socioeconomic History    Marital status:    Tobacco Use    Smoking status: Never    Smokeless tobacco: Never   Substance and Sexual Activity    Alcohol use: Yes     Alcohol/week: 1.0 standard drink     Types: 1 Glasses of wine per week     Comment: Rare, every other week    Drug use: No    Sexual activity: Not Currently     Birth control/protection: None     Comment:        OBJECTIVE:     Vital Signs Range (Last 24H):         Significant Labs:  Lab Results   Component Value Date    WBC 6.02 02/06/2023    HGB 12.4 02/06/2023    HCT 38.3 02/06/2023     02/06/2023    CHOL 155 11/23/2022    TRIG 84 11/23/2022    HDL 56 11/23/2022    ALT 18 02/06/2023    AST 28 02/06/2023     02/06/2023    K 4.1 02/06/2023     02/06/2023    CREATININE 0.6 02/06/2023    BUN 20 02/06/2023    CO2 28 02/06/2023    TSH 0.471 11/23/2022       Diagnostic Studies: No relevant studies.    EKG:   Results for orders placed or performed during the hospital encounter of 09/20/22   EKG 12-lead    Collection Time: 09/20/22  8:57 PM    Narrative    Test Reason : R07.9,    Vent. Rate : 071 BPM     Atrial Rate : 071 BPM     P-R Int : 176 ms          QRS Dur : 076 ms      QT Int : 398 ms       P-R-T Axes : 032 -13 058 degrees     QTc Int : 432 ms    Normal sinus rhythm  Cannot rule out Anterior infarct ,age undetermined  Abnormal ECG    Confirmed by Adrianna PEREZ, Gucci IVERSON ([id number]) on 9/21/2022 5:18:12 PM    Referred By: [email or web address]   SELF           Confirmed By:Gucci Rodas MD       2D ECHO:  TTE:  Results for orders placed or performed in visit on 10/26/21   Echo   Result Value Ref Range    Ascending aorta 3.29 cm    STJ 2.55 cm    AV mean gradient 8 mmHg    Ao peak talia 1.92 m/s    Ao VTI 50.15 cm     "IVS 1.04 0.6 - 1.1 cm    LA size 3.50 cm    Left Atrium Major Axis 5.50 cm    Left Atrium Minor Axis 5.20 cm    LVIDd 3.40 (A) 3.5 - 6.0 cm    LVIDs 2.10 2.1 - 4.0 cm    LVOT diameter 2.10 cm    LVOT peak VTI 29.69 cm    Posterior Wall 0.85 0.6 - 1.1 cm    MV Peak A Karlos 1.16 m/s    E wave deceleration time 178.10 msec    MV Peak E Karlos 1.20 m/s    PV Peak D Karlos 0.50 m/s    PV Peak S Karlos 0.65 m/s    RA Major Axis 5.00 cm    RA Width 3.67 cm    RVDD 3.30 cm    Sinus 2.82 cm    TAPSE 3.03 cm    TR Max Karlos 3.72 m/s    TDI LATERAL 0.08 m/s    TDI SEPTAL 0.06 m/s    LA WIDTH 4.80 cm    MV stenosis pressure 1/2 time 51.65 ms    LV Diastolic Volume 47.28 mL    LV Systolic Volume 14.41 mL    RV S' 16.17 cm/s    LVOT peak karlos 1.14 m/s    LA volume (mod) 55.44 cm3    MV "A" wave duration 10.85 msec    LV LATERAL E/E' RATIO 15.00 m/s    LV SEPTAL E/E' RATIO 20.00 m/s    FS 38 %    LA volume 76.34 cm3    LV mass 91.06 g    Left Ventricle Relative Wall Thickness 0.50 cm    AV valve area 2.05 cm2    AV Velocity Ratio 0.59     AV index (prosthetic) 0.59     MV valve area p 1/2 method 4.26 cm2    E/A ratio 1.03     Mean e' 0.07 m/s    Pulm vein S/D ratio 1.30     LVOT area 3.5 cm2    LVOT stroke volume 102.78 cm3    AV peak gradient 15 mmHg    E/E' ratio 17.14 m/s    Triscuspid Valve Regurgitation Peak Gradient 55 mmHg    BSA 1.52 m2    LV Systolic Volume Index 9.6 mL/m2    LV Diastolic Volume Index 31.52 mL/m2    LA Volume Index 50.9 mL/m2    LV Mass Index 61 g/m2    LA Volume Index (Mod) 37.0 mL/m2    Right Atrial Pressure (from IVC) 3 mmHg    EF 65 %    TV rest pulmonary artery pressure 58 mmHg    Narrative    · The left ventricle is normal in size with concentric remodeling and   normal systolic function. The estimated ejection fraction is 65%.  · Normal right ventricular size with normal right ventricular systolic   function.  · Grade II left ventricular diastolic dysfunction.  · Mild to moderate tricuspid regurgitation.  · " Severe left atrial enlargement.  · The estimated PA systolic pressure is 58 mmHg.  · Normal central venous pressure (3 mmHg).          SIMEON:  No results found for this or any previous visit.    ASSESSMENT/PLAN:                                                                                                                  04/17/2023  Ernestine Luna is a 89 y.o., female.      Pre-op Assessment    I have reviewed the Patient Summary Reports.     I have reviewed the Nursing Notes. I have reviewed the NPO Status.   I have reviewed the Medications.     Review of Systems  Anesthesia Hx:  No problems with previous Anesthesia  History of prior surgery of interest to airway management or planning: Denies Family Hx of Anesthesia complications.   Denies Personal Hx of Anesthesia complications.   Hematology/Oncology:  Hematology Normal       -- Cancer in past history:  Breast bilateral surgery, radiation and chemotherapy    EENT/Dental:EENT/Dental Normal   Cardiovascular:   Exercise tolerance: good Hypertension Denies CAD.    Denies CABG/stent.  CHF no hyperlipidemia Aortic aneurysmn  Pulm HTN   Pulmonary:  Pulmonary Normal  Denies COPD.  Denies Asthma.  Denies Sleep Apnea.    Renal/:  Renal/ Normal  Denies Chronic Renal Disease.     Hepatic/GI:  Hepatic/GI Normal Bowel Prep.  Denies GERD.    Musculoskeletal:   Arthritis   Spine Disorders: lumbar Degenerative disease and Disc disease    Neurological:  Neurology Normal  Denies CVA.  Denies Headaches. Denies Seizures.    Endocrine:  Endocrine Normal Denies Diabetes.  Denies Obesity / BMI > 30  Dermatological:  Skin Normal    Psych:  Psychiatric Normal  Denies Psychiatric History.          Physical Exam  General: Well nourished, Cooperative, Alert and Oriented    Airway:  Mallampati: III / II  Mouth Opening: Normal  TM Distance: Normal  Tongue: Normal  Neck ROM: Normal ROM    Dental:  Periodontal disease        Anesthesia Plan  Type of Anesthesia, risks & benefits  discussed:    Anesthesia Type: MAC, Gen Natural Airway, Regional  Intra-op Monitoring Plan: Standard ASA Monitors  Post Op Pain Control Plan: multimodal analgesia and IV/PO Opioids PRN  Induction:  IV  Airway Plan: Video and Direct, Post-Induction  Informed Consent: Informed consent signed with the Patient and all parties understand the risks and agree with anesthesia plan.  All questions answered. Patient consented to blood products? Yes  ASA Score: 3  Day of Surgery Review of History & Physical: H&P Update referred to the surgeon/provider.    Ready For Surgery From Anesthesia Perspective.     .

## 2023-04-18 ENCOUNTER — ANESTHESIA (OUTPATIENT)
Dept: SURGERY | Facility: HOSPITAL | Age: 88
End: 2023-04-18
Payer: MEDICARE

## 2023-04-18 ENCOUNTER — HOSPITAL ENCOUNTER (OUTPATIENT)
Facility: HOSPITAL | Age: 88
Discharge: HOME | End: 2023-04-18
Attending: SURGERY | Admitting: SURGERY
Payer: MEDICARE

## 2023-04-18 VITALS
TEMPERATURE: 99 F | BODY MASS INDEX: 20.66 KG/M2 | SYSTOLIC BLOOD PRESSURE: 140 MMHG | OXYGEN SATURATION: 94 % | HEART RATE: 70 BPM | WEIGHT: 121 LBS | DIASTOLIC BLOOD PRESSURE: 71 MMHG | HEIGHT: 64 IN | RESPIRATION RATE: 18 BRPM

## 2023-04-18 DIAGNOSIS — C50.912 INVASIVE DUCTAL CARCINOMA OF BREAST, LEFT: ICD-10-CM

## 2023-04-18 PROCEDURE — 88307 TISSUE EXAM BY PATHOLOGIST: CPT | Mod: 59,HCNC | Performed by: PATHOLOGY

## 2023-04-18 PROCEDURE — D9220A PRA ANESTHESIA: Mod: HCNC,,, | Performed by: ANESTHESIOLOGY

## 2023-04-18 PROCEDURE — 37000008 HC ANESTHESIA 1ST 15 MINUTES: Mod: HCNC | Performed by: SURGERY

## 2023-04-18 PROCEDURE — 71000015 HC POSTOP RECOV 1ST HR: Mod: HCNC | Performed by: SURGERY

## 2023-04-18 PROCEDURE — 63600175 PHARM REV CODE 636 W HCPCS: Mod: HCNC

## 2023-04-18 PROCEDURE — 88342 CHG IMMUNOCYTOCHEMISTRY: ICD-10-PCS | Mod: 26,HCNC,, | Performed by: PATHOLOGY

## 2023-04-18 PROCEDURE — 25000003 PHARM REV CODE 250: Mod: HCNC

## 2023-04-18 PROCEDURE — 25000003 PHARM REV CODE 250: Mod: HCNC | Performed by: SURGERY

## 2023-04-18 PROCEDURE — 88342 IMHCHEM/IMCYTCHM 1ST ANTB: CPT | Mod: 59,HCNC | Performed by: PATHOLOGY

## 2023-04-18 PROCEDURE — 88342 IMHCHEM/IMCYTCHM 1ST ANTB: CPT | Mod: 26,HCNC,, | Performed by: PATHOLOGY

## 2023-04-18 PROCEDURE — 71000044 HC DOSC ROUTINE RECOVERY FIRST HOUR: Mod: HCNC | Performed by: SURGERY

## 2023-04-18 PROCEDURE — 36000707: Mod: HCNC | Performed by: SURGERY

## 2023-04-18 PROCEDURE — 19301 PARTIAL MASTECTOMY: CPT | Mod: 58,HCNC,LT, | Performed by: SURGERY

## 2023-04-18 PROCEDURE — 64461 PVB THORACIC SINGLE INJ SITE: CPT | Mod: HCNC

## 2023-04-18 PROCEDURE — 64461 LEFT PARAVERTEBRAL SS: ICD-10-PCS | Mod: HCNC,59,LT, | Performed by: ANESTHESIOLOGY

## 2023-04-18 PROCEDURE — D9220A PRA ANESTHESIA: ICD-10-PCS | Mod: HCNC,,, | Performed by: ANESTHESIOLOGY

## 2023-04-18 PROCEDURE — 88307 TISSUE EXAM BY PATHOLOGIST: CPT | Mod: 26,HCNC,, | Performed by: PATHOLOGY

## 2023-04-18 PROCEDURE — 63600175 PHARM REV CODE 636 W HCPCS: Mod: HCNC | Performed by: SURGERY

## 2023-04-18 PROCEDURE — 64461 PVB THORACIC SINGLE INJ SITE: CPT | Mod: HCNC,59,LT, | Performed by: ANESTHESIOLOGY

## 2023-04-18 PROCEDURE — 37000009 HC ANESTHESIA EA ADD 15 MINS: Mod: HCNC | Performed by: SURGERY

## 2023-04-18 PROCEDURE — 36000706: Mod: HCNC | Performed by: SURGERY

## 2023-04-18 PROCEDURE — 19301 PR MASTECTOMY, PARTIAL: ICD-10-PCS | Mod: 58,HCNC,LT, | Performed by: SURGERY

## 2023-04-18 PROCEDURE — 27201423 OPTIME MED/SURG SUP & DEVICES STERILE SUPPLY: Mod: HCNC | Performed by: SURGERY

## 2023-04-18 PROCEDURE — 88307 PR  SURG PATH,LEVEL V: ICD-10-PCS | Mod: 26,HCNC,, | Performed by: PATHOLOGY

## 2023-04-18 PROCEDURE — 63600175 PHARM REV CODE 636 W HCPCS: Mod: HCNC | Performed by: ANESTHESIOLOGY

## 2023-04-18 RX ORDER — ONDANSETRON 2 MG/ML
INJECTION INTRAMUSCULAR; INTRAVENOUS
Status: DISCONTINUED | OUTPATIENT
Start: 2023-04-18 | End: 2023-04-18

## 2023-04-18 RX ORDER — OXYCODONE HYDROCHLORIDE 5 MG/1
5 TABLET ORAL EVERY 4 HOURS PRN
Qty: 10 TABLET | Refills: 0 | Status: SHIPPED | OUTPATIENT
Start: 2023-04-18 | End: 2023-05-04

## 2023-04-18 RX ORDER — MIDAZOLAM HYDROCHLORIDE 1 MG/ML
.5-4 INJECTION INTRAMUSCULAR; INTRAVENOUS
Status: ACTIVE | OUTPATIENT
Start: 2023-04-18

## 2023-04-18 RX ORDER — SODIUM CHLORIDE 0.9 % (FLUSH) 0.9 %
10 SYRINGE (ML) INJECTION
Status: DISCONTINUED | OUTPATIENT
Start: 2023-04-18 | End: 2023-04-18 | Stop reason: HOSPADM

## 2023-04-18 RX ORDER — PROCHLORPERAZINE EDISYLATE 5 MG/ML
5 INJECTION INTRAMUSCULAR; INTRAVENOUS EVERY 30 MIN PRN
Status: DISCONTINUED | OUTPATIENT
Start: 2023-04-18 | End: 2023-04-18 | Stop reason: HOSPADM

## 2023-04-18 RX ORDER — FENTANYL CITRATE 50 UG/ML
25-200 INJECTION, SOLUTION INTRAMUSCULAR; INTRAVENOUS
Status: ACTIVE | OUTPATIENT
Start: 2023-04-18

## 2023-04-18 RX ORDER — HYDROMORPHONE HYDROCHLORIDE 1 MG/ML
0.2 INJECTION, SOLUTION INTRAMUSCULAR; INTRAVENOUS; SUBCUTANEOUS EVERY 5 MIN PRN
Status: DISCONTINUED | OUTPATIENT
Start: 2023-04-18 | End: 2023-04-18 | Stop reason: HOSPADM

## 2023-04-18 RX ORDER — ACETAMINOPHEN 500 MG
1000 TABLET ORAL
Status: DISCONTINUED | OUTPATIENT
Start: 2023-04-18 | End: 2023-04-18 | Stop reason: HOSPADM

## 2023-04-18 RX ORDER — PROPOFOL 10 MG/ML
VIAL (ML) INTRAVENOUS
Status: DISCONTINUED | OUTPATIENT
Start: 2023-04-18 | End: 2023-04-18

## 2023-04-18 RX ORDER — SODIUM CHLORIDE 9 MG/ML
INJECTION, SOLUTION INTRAVENOUS CONTINUOUS
Status: DISCONTINUED | OUTPATIENT
Start: 2023-04-18 | End: 2023-04-18 | Stop reason: HOSPADM

## 2023-04-18 RX ORDER — ROPIVACAINE HYDROCHLORIDE 5 MG/ML
INJECTION, SOLUTION EPIDURAL; INFILTRATION; PERINEURAL
Status: COMPLETED | OUTPATIENT
Start: 2023-04-18 | End: 2023-04-18

## 2023-04-18 RX ORDER — HYDROCODONE BITARTRATE AND ACETAMINOPHEN 5; 325 MG/1; MG/1
1 TABLET ORAL EVERY 6 HOURS PRN
Qty: 10 TABLET | Refills: 0 | Status: SHIPPED | OUTPATIENT
Start: 2023-04-18 | End: 2023-04-18 | Stop reason: HOSPADM

## 2023-04-18 RX ORDER — DEXAMETHASONE SODIUM PHOSPHATE 4 MG/ML
INJECTION, SOLUTION INTRA-ARTICULAR; INTRALESIONAL; INTRAMUSCULAR; INTRAVENOUS; SOFT TISSUE
Status: DISCONTINUED | OUTPATIENT
Start: 2023-04-18 | End: 2023-04-18

## 2023-04-18 RX ORDER — ACETAMINOPHEN 10 MG/ML
INJECTION, SOLUTION INTRAVENOUS
Status: DISCONTINUED | OUTPATIENT
Start: 2023-04-18 | End: 2023-04-18

## 2023-04-18 RX ADMIN — FENTANYL CITRATE 25 MCG: 50 INJECTION, SOLUTION INTRAMUSCULAR; INTRAVENOUS at 08:04

## 2023-04-18 RX ADMIN — PROPOFOL 75 MCG/KG/MIN: 10 INJECTION, EMULSION INTRAVENOUS at 08:04

## 2023-04-18 RX ADMIN — DEXAMETHASONE SODIUM PHOSPHATE 4 MG: 4 INJECTION, SOLUTION INTRAMUSCULAR; INTRAVENOUS at 08:04

## 2023-04-18 RX ADMIN — ONDANSETRON 4 MG: 2 INJECTION INTRAMUSCULAR; INTRAVENOUS at 08:04

## 2023-04-18 RX ADMIN — PROPOFOL 20 MG: 10 INJECTION, EMULSION INTRAVENOUS at 08:04

## 2023-04-18 RX ADMIN — ACETAMINOPHEN 1000 MG: 10 INJECTION, SOLUTION INTRAVENOUS at 09:04

## 2023-04-18 RX ADMIN — ROPIVACAINE HYDROCHLORIDE 15 ML: 5 INJECTION EPIDURAL; INFILTRATION; PERINEURAL at 08:04

## 2023-04-18 RX ADMIN — CEFAZOLIN 2 G: 2 INJECTION, POWDER, FOR SOLUTION INTRAMUSCULAR; INTRAVENOUS at 08:04

## 2023-04-18 RX ADMIN — SODIUM CHLORIDE: 9 INJECTION, SOLUTION INTRAVENOUS at 07:04

## 2023-04-18 NOTE — TRANSFER OF CARE
"Anesthesia Transfer of Care Note    Patient: Ernestine Luna    Procedure(s) Performed: Procedure(s) (LRB):  MASTECTOMY, PARTIAL-left re-excision (Left)    Patient location: PACU    Anesthesia Type: general    Transport from OR: Transported from OR on room air with adequate spontaneous ventilation    Post pain: adequate analgesia    Post assessment: no apparent anesthetic complications    Post vital signs: stable    Level of consciousness: awake and alert    Nausea/Vomiting: no nausea/vomiting    Complications: none    Transfer of care protocol was followed      Last vitals:   Visit Vitals  BP (!) 198/96   Pulse 65   Temp 36.5 °C (97.7 °F)   Resp 16   Ht 5' 4" (1.626 m)   Wt 54.9 kg (121 lb)   LMP  (LMP Unknown)   SpO2 96%   Breastfeeding No   BMI 20.77 kg/m²     "

## 2023-04-18 NOTE — BRIEF OP NOTE
[facility] - Surgery ([facility])  Brief Operative Note    Surgery Date: 4/18/2023     Surgeon(s) and Role:     * ROSIBEL Belcher MD - Primary     * Matthew Londono MD - Resident - Assisting        Pre-op Diagnosis:  Invasive ductal carcinoma of breast, left [C50.912]    Post-op Diagnosis:  Post-Op Diagnosis Codes:     * Invasive ductal carcinoma of breast, left [C50.912]    Procedure(s) (LRB):  MASTECTOMY, PARTIAL-left re-excision (Left)    Anesthesia: Local MAC    Operative Findings: Left partial mastectomy re-excision performed as planned. New medial, superior, and posterior margins taken.    Estimated Blood Loss: minimal         Specimens:   Specimen (24h ago, onward)       Start     Ordered    04/18/23 0913  Specimen to Pathology, Surgery Breast  Once        Comments: Pre-op Diagnosis: Invasive ductal carcinoma of breast, left [C50.912]Procedure(s):MASTECTOMY, PARTIAL-left re-excision Number of specimens: 3Name of specimens: 1- left partial mastectomy, new superior margin- blue- permanent2- left partial mastectomy new medial margin- red- permanent3- left partial mastectomy, new posterior margin- black- permanent     References:    Click here for ordering Quick Tip   Question Answer Comment   Procedure Type: Breast    Specimen Class: Complex case/Special    Which provider would you like to cc? ROSIBEL BELCHER    Release to patient Immediate        04/18/23 0914    Pending  Specimen to Pathology, Surgery Breast  Once        Comments: Pre-op Diagnosis: Invasive ductal carcinoma of breast, left [C50.912]Procedure(s):MASTECTOMY, PARTIAL-left re-excision Number of specimens:3Name of specimens: 1. LEFT PARTIAL MASTECTOMY, NEW SUPERIOR MARGIN, BLUE- PERMANENT, FRESH  2. LEFT PARTIAL MASTECTOMY, NEW MEDIAL MARGIN, ED- PERMANENT, FRESH  3. LEFT PARTIAL MASTECTOMY, NEW POSTERIOR MARGIN, BLACK --- PERMANENT, FRESH     References:    Click here for ordering Quick Tip   Question Answer Comment   Procedure Type: Breast     Which provider would you like to cc? ROSIBEL PIMENTEL    Release to patient Immediate        Pending                      Discharge Note    OUTCOME: Patient tolerated treatment/procedure well without complication and is now ready for discharge.    DISPOSITION: Home or Self Care    FINAL DIAGNOSIS:  <principal problem not specified>    FOLLOWUP: In clinic    DISCHARGE INSTRUCTIONS:  See Patient Instructions.

## 2023-04-18 NOTE — ANESTHESIA POSTPROCEDURE EVALUATION
Anesthesia Post Evaluation    Patient: Ernestine Luna    Procedure(s) Performed: Procedure(s) (LRB):  MASTECTOMY, PARTIAL-left re-excision (Left)    Final Anesthesia Type: general      Patient location during evaluation: PACU  Patient participation: Yes- Able to Participate  Level of consciousness: awake and alert and oriented  Post-procedure vital signs: reviewed and stable  Pain management: adequate  Airway patency: patent    PONV status at discharge: No PONV  Anesthetic complications: no      Cardiovascular status: hemodynamically stable  Respiratory status: unassisted and spontaneous ventilation  Hydration status: euvolemic  Follow-up not needed.          Vitals Value Taken Time   /70 04/18/23 1121   Temp 37.2 °C (98.9 °F) 04/18/23 0945   Pulse 70 04/18/23 1121   Resp 24 04/18/23 1121   SpO2 97 % 04/18/23 1121   Vitals shown include unvalidated device data.      No case tracking events are documented in the log.      Pain/Javed Score: Javed Score: 9 (4/18/2023 10:00 AM)

## 2023-04-18 NOTE — PROGRESS NOTES
Pt awake, oriented, vital signs stable, room air. Ambulates with assistance, denies pain at surgical site. No N/V. Family at bedside.  Waiting on meds from Pharmacy.

## 2023-04-18 NOTE — H&P
Breast Surgery  [facility]  Department of Surgery     REFERRING PROVIDER: No referring provider defined for this encounter. Kev Macias MD  MEDICAL ONCOLOGIST:                 pending  RADIATION ONCOLOGIST:             pending     DIAGNOSIS:    This is a 89 y.o. female with a stage pT1b N0 M0 grade 2 ER + [place] + HER2 negative invasive ductal carcinoma of the left breast.     TREATMENT SUMMARY:  The patient is status post left partial mastectomy and sentinel node biopsy on 2/20/2023.  Final pathology showed negative margins for IDC, but positive or close margins for DCIS on the superior and medial margins.     INTERVAL HISTORY:   Ernestine Luna presents to outpatient surgery for planned re-excision s/p left lumpectomy. She is feeling well today. No significant change since being seen in clinic.     MEDICATIONS:  Current Medications          Current Outpatient Medications   Medication Sig Dispense Refill    acetic acid-oxyquinoline (FEM PH) 0.9-0.025 % Gel Place 1 applicator vaginally twice a week. 50 g 11    alendronate (FOSAMAX) 70 MG tablet Take 1 tablet (70 mg total) by mouth every 7 days. 12 tablet 3    amLODIPine (NORVASC) 10 MG tablet Take 1 tablet (10 mg total) by mouth once daily. 90 tablet 1    atenoloL (TENORMIN) 25 MG tablet Take 1 tablet (25 mg total) by mouth once daily. 90 tablet 1    benazepriL (LOTENSIN) 40 MG tablet Take 1 tablet (40 mg total) by mouth once daily. 90 tablet 1    calcium carbonate (OS-DHRUV) 600 mg calcium (1,500 mg) Tab Take 600 mg by mouth once.        ciclopirox (PENLAC) 8 % Soln Apply topically nightly. 6.6 mL 11    diclofenac sodium (VOLTAREN) 1 % Gel Apply 2 g topically 3 (three) times daily. 100 g 3    glycerin/min oil/polycarbophil (REPLENS VAGL) Place vaginally.        lidocaine HCL 2% (XYLOCAINE) 2 % jelly Apply topically as needed. For toe pain, apply up to twice daily as needed for pain. 30 mL 3    MULTIVITAMIN W-MINERALS/LUTEIN (CENTRUM SILVER ORAL) Take 1  tablet by mouth Daily.        omega 3-calcium-vit D3-FA-mv13 [phone] mg Cmpk Take by mouth once daily.        OMNIPAQUE 350 350 mg iodine/mL Soln injection          oxyCODONE (ROXICODONE) 5 MG immediate release tablet Take 1 tablet (5 mg total) by mouth every 6 (six) hours as needed for Pain. 10 tablet 0    vitamin D 1000 units Tab Take 185 mg by mouth once daily.          No current facility-administered medications for this visit.            ALLERGIES:         Review of patient's allergies indicates:   Allergen Reactions    Tramadol Shortness Of Breath, Diarrhea and Nausea And Vomiting    Acetaminophen Nausea And Vomiting    Aspirin Nausea And Vomiting    Sulfa (sulfonamide antibiotics) Nausea And Vomiting    Prednisone Other (See Comments)       Stomach problems       Review of Systems   Constitutional:  Negative for chills and fever.   Respiratory:  Negative for cough and shortness of breath.    Cardiovascular:  Negative for chest pain.   Gastrointestinal:  Negative for abdominal pain, constipation, nausea and vomiting.   Genitourinary:  Negative for dysuria.   Neurological:  Negative for dizziness.       Physical Exam   Vitals reviewed.  Constitutional: She is oriented to person, place, and time.   HENT:   Head: Normocephalic.   Cardiovascular:  Normal rate and normal pulses.            Pulmonary/Chest: Effort normal.   Abdominal: Normal appearance. She exhibits no distension. There is no abdominal tenderness.   Neurological: She is alert and oriented to person, place, and time.   Skin: Skin is warm and dry.             IMPRESSION:   Negative margins for invasive cancer  Positive margins for DCIS, posterior (muscle), superior   Close margin for DCIS, medial, 1 mm     PLAN:   We discussed positive margins for DCIS.  The posterior margin was the pectoralis muscle fascia and does not require re-excision.  However the superior margin is recommended for return to the OR for margin re-excision.  The close margin  of DCIS of the medial margin we will also be recommended for re-excision at that time to achieve wide margins.  This would allow the patient to be a good candidate for partial breast radiation.  We discussed alternatives return to the OR.  We discussed the standard recommendation is return to the OR for margin re-excision.    Proceed with planned re-excision.

## 2023-04-18 NOTE — OP NOTE
DATE OF PROCEDURE: 4/18/2023    SURGEON: Surgeon(s) and Role:     * ROSIBEL Belcher MD - Primary     * Matthew Londono MD - Resident - Assisting    PREOPERATIVE DIAGNOSIS: Breast cancer of the left breast lower outer quadrant    POSTOPERATIVE DIAGNOSIS: same    ANESTHESIA: general    PROCEDURES PERFORMED:   left breast reexcision partial mastectomy (lumpectomy) for clear margins     PROCEDURE IN DETAIL:   The patient underwent informed consent.      She was then brought to the Operating Room and placed in the supine position. Anesthesia and preoperative antibiotics were administered.  The left breast, anterior chest, right arm and axilla were then prepped and draped in a sterile fashion.     Next, we turned our attention to the left breast. Local anesthetic was injected into the area.  The prior incision was incised in the lower outer quadrant of the left breast . The prior cavity was entered and the seroma was evacuated. We then dissected out the margins requiring reexcision: medial, superior, and posterior. The specimen was then inked on the back table using red medial, blue superior, black posterior.  It was then fixed with acetic acid and submitted for permanent pathology.     Within the lumpectomy cavity, hemostasis was achieved with cautery. The wound was irrigated until clear. There was no evidence of bleeding. It was closed in multiple layers with deep dermal and subcutaneous interrupted Vicryl sutures and a running 4-0 Monocryl subcuticular skin closure.    Dermabond was applied. Sterile fluff gauze was placed and a post-procedure bra was placed. She tolerated the procedure well without complication and was turned over to Anesthesia for transport to the recovery area in a satisfactory condition. All specimens were sent to Pathology for permanent sectioning.    ESTIMATED BLOOD LOSS: 5 ml    COMPLICATIONS: none    DISPOSITION:  PACU--hemodynamically stable    ATTESTATION:  I was present and scrubbed for  the entire procedure.

## 2023-04-18 NOTE — ANESTHESIA PROCEDURE NOTES
Left Paravertebral SS    Patient location during procedure: pre-op   Block not for primary anesthetic.  Reason for block: at surgeon's request and post-op pain management   Post-op Pain Location: Chest Pain   Start time: 4/18/2023 7:55 AM  Timeout: 4/18/2023 7:55 AM   End time: 4/18/2023 8:12 AM    Staffing  Authorizing Provider: Dona Hart MD  Performing Provider: Ilana Rodriguez MD    Preanesthetic Checklist  Completed: patient identified, IV checked, site marked, risks and benefits discussed, surgical consent, monitors and equipment checked, pre-op evaluation and timeout performed  Peripheral Block  Patient position: sitting  Prep: ChloraPrep  Patient monitoring: heart rate, cardiac monitor, continuous pulse ox, continuous capnometry and frequent blood pressure checks  Block type: paravertebral - thoracic  Laterality: left  Injection technique: single shot  Interspace: T1-2    Needle  Needle type: Tuohy   Needle gauge: 17 G  Needle length: 3.5 in  Needle localization: anatomical landmarks     Assessment  Injection assessment: negative aspiration and negative parasthesia  Paresthesia pain: none  Heart rate change: no  Slow fractionated injection: yes  Pain Tolerance: comfortable throughout block and no complaints  Medications:    Medications: ropivacaine (NAROPIN) injection 0.5% - Perineural   15 mL - 4/18/2023 8:12:00 AM    Additional Notes  T1 os at 4 cm  VSS.  [facility] RN monitoring vitals throughout procedure.  Patient tolerated procedure well.

## 2023-04-18 NOTE — PATIENT INSTRUCTIONS
POSTOPERATIVE INSTRUCTIONS FOLLOWING BIOPSY OR LUMPECTOMY    The following are post-operative instructions that will help you to recover from your surgery.  Please read over these instructions carefully and contact us if we can answer any of your questions or concerns.    Wound Care  A surgical bra may be placed around your chest after your surgery.  If you are given the bra, please wear it as close to 24 hours a day as possible until your post-operative clinic appointment (2 weeks after surgery).  If the elastic around the bra irritates your skin, you may wear a soft t-shirt underneath the bra.  You may go without wearing the bra long enough to shower, to launder and dry the bra.  If the bra is extremely uncomfortable, you may wear a supportive sports bra instead after 2 days.  You may shower the day after surgery.  Do not take a tub bath and do not soak the surgical site for 2 weeks.  If you had lymph node surgery a blue dye was utilized. This may turn your skin, urine or stool temporarily blue. This is expected and will improve in a few days.     Activity   You should be able to return to your regular activities 2 days after your surgery.  However, do not engage in strenuous activities in which you use your upper body such as:  golf, tennis, aerobics, washing windows, raking the yard, mopping, vacuuming, heavy lifting (>15 lbs,e.g children) until you are seen for your follow-up appointment in clinic (about 2 weeks).  Please wait at least 24 hrs after anesthesia to drive. You can not drive if you are taking narcotic pain medicine. Otherwise you may drive as long as you fell comfortable to do so.     Medication for pain  You may find that over the counter pain medications may be sufficient for your pain.  You will be given a prescription for pain medication for more severe pain.  You should not drive or operate machinery while taking these.  Please take narcotics with food.  Narcotics can cause, or worsen  constipation.  If taking narcotics you will need to increase your fluid intake, eat high fiber foods (such as fruits and bran) and make sure that you are up and walking. You may need to take an over the counter stool softener for constipation.      Please report the following:  Temperature greater than 101 degrees  Discharge or bad odor from the wound  Excessive bleeding, such as bloody dressing or extreme bruising  Redness at incision and/or drain sites  Swelling or buildup of fluid around incision    Additional information  I will see you approximately 2 weeks following your surgery.  If this follow-up appointment has not been made, please call the office.    If you have any questions or problems, please call my office or my nurse.  Dr. Suri Belcher MD  If you have questions, please call my nurse: Alexa at [phone] or Mikala at [phone]    After hours and on weekends, you may call the main [facility] line at [phone] and ask to have the general surgery resident paged or have me paged.    If directed to the emergency room it would be best to proceed to the [facility] ER. However if very ill or unable to travel safely then please present to your nearest ER.

## 2023-04-26 LAB
COMMENT: NORMAL
FINAL PATHOLOGIC DIAGNOSIS: NORMAL
GROSS: NORMAL
Lab: NORMAL

## 2023-05-01 ENCOUNTER — OFFICE VISIT (OUTPATIENT)
Dept: SURGERY | Facility: CLINIC | Age: 88
End: 2023-05-01
Payer: MEDICARE

## 2023-05-01 VITALS
HEART RATE: 63 BPM | HEIGHT: 63 IN | WEIGHT: 132 LBS | BODY MASS INDEX: 23.39 KG/M2 | SYSTOLIC BLOOD PRESSURE: 142 MMHG | OXYGEN SATURATION: 98 % | DIASTOLIC BLOOD PRESSURE: 68 MMHG

## 2023-05-01 DIAGNOSIS — C50.912 INVASIVE DUCTAL CARCINOMA OF BREAST, LEFT: Primary | ICD-10-CM

## 2023-05-01 PROCEDURE — 99024 POSTOP FOLLOW-UP VISIT: CPT | Mod: HCNC,S$GLB,, | Performed by: SURGERY

## 2023-05-01 PROCEDURE — 3288F PR FALLS RISK ASSESSMENT DOCUMENTED: ICD-10-PCS | Mod: HCNC,CPTII,S$GLB, | Performed by: SURGERY

## 2023-05-01 PROCEDURE — 99024 PR POST-OP FOLLOW-UP VISIT: ICD-10-PCS | Mod: HCNC,S$GLB,, | Performed by: SURGERY

## 2023-05-01 PROCEDURE — 99999 PR PBB SHADOW E&M-EST. PATIENT-LVL III: CPT | Mod: PBBFAC,HCNC,, | Performed by: SURGERY

## 2023-05-01 PROCEDURE — 1159F PR MEDICATION LIST DOCUMENTED IN MEDICAL RECORD: ICD-10-PCS | Mod: HCNC,CPTII,S$GLB, | Performed by: SURGERY

## 2023-05-01 PROCEDURE — 1125F PR PAIN SEVERITY QUANTIFIED, PAIN PRESENT: ICD-10-PCS | Mod: HCNC,CPTII,S$GLB, | Performed by: SURGERY

## 2023-05-01 PROCEDURE — 1101F PR PT FALLS ASSESS DOC 0-1 FALLS W/OUT INJ PAST YR: ICD-10-PCS | Mod: HCNC,CPTII,S$GLB, | Performed by: SURGERY

## 2023-05-01 PROCEDURE — 99999 PR PBB SHADOW E&M-EST. PATIENT-LVL III: ICD-10-PCS | Mod: PBBFAC,HCNC,, | Performed by: SURGERY

## 2023-05-01 PROCEDURE — 1159F MED LIST DOCD IN RCRD: CPT | Mod: HCNC,CPTII,S$GLB, | Performed by: SURGERY

## 2023-05-01 PROCEDURE — 1160F PR REVIEW ALL MEDS BY PRESCRIBER/CLIN PHARMACIST DOCUMENTED: ICD-10-PCS | Mod: HCNC,CPTII,S$GLB, | Performed by: SURGERY

## 2023-05-01 PROCEDURE — 3288F FALL RISK ASSESSMENT DOCD: CPT | Mod: HCNC,CPTII,S$GLB, | Performed by: SURGERY

## 2023-05-01 PROCEDURE — 1125F AMNT PAIN NOTED PAIN PRSNT: CPT | Mod: HCNC,CPTII,S$GLB, | Performed by: SURGERY

## 2023-05-01 PROCEDURE — 1101F PT FALLS ASSESS-DOCD LE1/YR: CPT | Mod: HCNC,CPTII,S$GLB, | Performed by: SURGERY

## 2023-05-01 PROCEDURE — 1160F RVW MEDS BY RX/DR IN RCRD: CPT | Mod: HCNC,CPTII,S$GLB, | Performed by: SURGERY

## 2023-05-01 NOTE — PROGRESS NOTES
[facility]       Post-Op        REFERRING PHYSICIAN:  No referring provider defined for this encounter.       Kev Macias MD    MEDICAL ONCOLOGIST:    Pending  RADIATION ONCOLOGIST:   Pending    DIAGNOSIS:    This is a 89 y.o. 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     TREATMENT SUMMARY:  The patient is status post left partial mastectomy and sentinel node biopsy on 2/20/2023.  Final pathology showed negative margins for IDC, but positive or close margins for DCIS on the superior and medial margins. She underwent re-excision of the posterior, medial, and superior margins on 4/14/2023, with final pathology revealing positive margins for DCIS in the superior and posterior margins, and 1 mm from the new medial margin.    INTERVAL HISTORY:   Ernestine Luna comes in for a post-op check. Her pain is well controlled.      MEDICATIONS:  Current Outpatient Medications   Medication Sig Dispense Refill    acetic acid-oxyquinoline (FEM PH) 0.9-0.025 % Gel Place 1 applicator vaginally twice a week. 50 g 11    alendronate (FOSAMAX) 70 MG tablet Take 1 tablet (70 mg total) by mouth every 7 days. 12 tablet 3    amLODIPine (NORVASC) 10 MG tablet Take 1 tablet (10 mg total) by mouth once daily. 90 tablet 1    atenoloL (TENORMIN) 25 MG tablet Take 1 tablet (25 mg total) by mouth once daily. 90 tablet 1    benazepriL (LOTENSIN) 40 MG tablet Take 1 tablet (40 mg total) by mouth once daily. 90 tablet 1    calcium carbonate (OS-DHRUV) 600 mg calcium (1,500 mg) Tab Take 600 mg by mouth once.      ciclopirox (PENLAC) 8 % Soln Apply topically nightly. 6.6 mL 11    diclofenac sodium (VOLTAREN) 1 % Gel Apply 2 g topically 3 (three) times daily. 100 g 3    glycerin/min oil/polycarbophil (REPLENS VAGL) Place vaginally.      lidocaine HCL 2% (XYLOCAINE) 2 % jelly Apply topically as needed. For toe pain, apply up to twice daily as needed for pain. 30  mL 3    MULTIVITAMIN W-MINERALS/LUTEIN (CENTRUM SILVER ORAL) Take 1 tablet by mouth Daily.      omega 3-calcium-vit [id number] [phone] mg Cmpk Take by mouth once daily.      OMNIPAQUE 350 350 mg iodine/mL Soln injection       oxyCODONE (ROXICODONE) 5 MG immediate release tablet Take 1 tablet (5 mg total) by mouth every 6 (six) hours as needed for Pain. 10 tablet 0    oxyCODONE (ROXICODONE) 5 MG immediate release tablet Take 1 tablet (5 mg total) by mouth every 4 (four) hours as needed for Pain (if pain is not relieved by alternating tylenol and ibuprofen). 10 tablet 0    trospium (SANCTURA) 20 mg Tab tablet Take 1 tablet (20 mg total) by mouth 2 (two) times daily. 60 tablet 11    vitamin D 1000 units Tab Take 185 mg by mouth once daily.       No current facility-administered medications for this visit.     Facility-Administered Medications Ordered in Other Visits   Medication Dose Route Frequency Provider Last Rate Last Admin    fentaNYL 50 mcg/mL injection  mcg   mcg Intravenous PRN Ilana Rodriguez MD   25 mcg at 04/18/23 0813    midazolam (VERSED) 1 mg/mL injection 0.5-4 mg  0.5-4 mg Intravenous PRN Ilana Rodriguez MD           ALLERGIES:   Review of patient's allergies indicates:   Allergen Reactions    Tramadol Shortness Of Breath, Diarrhea and Nausea And Vomiting    Acetaminophen Nausea And Vomiting    Aspirin Nausea And Vomiting    Sulfa (sulfonamide antibiotics) Nausea And Vomiting    Prednisone Other (See Comments)     Stomach problems        PHYSICAL EXAMINATION:   General:  This is a well appearing female with appropriate speech, affect and gait.     Breast:  Incision clean, dry, and intact.  Moderate seroma at breast incision.     IMPRESSION:   The patient has had an uneventful postoperative course.      PLAN:   Moderate seroma causing minimal discomfort.  Discussed potential aspiration patient declined.  We discussed that multiple margins remain positive for DCIS.    We discussed the skin  for care would be return to the OR for margin re-excision or mastectomy.  We discussed that breast MRI could be done to further evaluate the breast for additional sites DCIS.  The patient is very apprehensive to return to the OR.  She has also apprehensive to have a mastectomy.  She is concerned about returning to her regular activities as soon as possible.  We discussed that it may be reasonable to proceed with adjuvant therapy despite the known positive margins for DCIS.  We discussed the natural history of DCIS given her age of 89.  We plan to discuss her case in the multidisciplinary tumor board.  I will call her after to discuss the findings.

## 2023-05-09 ENCOUNTER — TUMOR BOARD CONFERENCE (OUTPATIENT)
Dept: SURGERY | Facility: CLINIC | Age: 88
End: 2023-05-09
Payer: MEDICARE

## 2023-05-09 NOTE — PROGRESS NOTES
Oncology History   Invasive ductal carcinoma of breast, left   8/26/2016 Genetic Testing    Negative      1/26/2023 Biopsy    Left breast, 6:00 a.m. mass, core biopsy:   Invasive ductal carcinoma  Grade 2      1/26/2023 Breast Tumor Markers    Estrogen receptor: Positive  Progesterone receptor: Positive   Her2: Equivocal (Negative by FISH).      2/6/2023 Initial Diagnosis    Invasive ductal carcinoma of breast, left     2/20/2023 Breast Surgery    Left partial mastectomy      5/9/2023 Tumor Conference    Margins are positive for DCIS after re-excision so there was discussion regarding additional surgery with mastectomy.   Referral to radiation oncology for adjuvant XRT if patients opts out of additional surgery.   Referral to medical oncology for endocrine therapy discussion.   The option for observation after mastectomy is reasonable.

## 2023-05-10 ENCOUNTER — TELEPHONE (OUTPATIENT)
Dept: SURGERY | Facility: CLINIC | Age: 88
End: 2023-05-10
Payer: MEDICARE

## 2023-05-10 NOTE — TELEPHONE ENCOUNTER
Called in discussed recommendation for mastectomy. Still apprehensive but will consider.  We discussed expected postoperative recovery.  Despite her advanced age she is in very good health and has good functional status.  I think she would tolerate a mastectomy.  She does have extensive experience with breast cancer surgery recovery in that multiple we will family members of her underwent treatment for breast cancer and she helped care for them during the recovery.  She expressed familiarity with mastectomy as well as postoperative recovery.    She would like to have consult with Dr. Tee of radiation oncology to discuss going for with radiation despite the positive DCIS margins.  We discussed that proceeding with radiation therapy despite positive margins for DCIS would be suboptimal as there is likely residual DCIS at the margins of the prior lumpectomy beds.  However given her advanced age it may be reasonable to proceed to adjuvant treatments without clear margins if she is willing to accept this increased risk.

## 2023-05-10 NOTE — TELEPHONE ENCOUNTER
Called patient regarding patient navigation message. No answer, left vm with direct call back number.

## 2023-05-16 ENCOUNTER — OFFICE VISIT (OUTPATIENT)
Dept: PODIATRY | Facility: CLINIC | Age: 88
End: 2023-05-16
Payer: MEDICARE

## 2023-05-16 DIAGNOSIS — L60.3 ONYCHODYSTROPHY: Primary | ICD-10-CM

## 2023-05-16 DIAGNOSIS — L84 CORN OR CALLUS: ICD-10-CM

## 2023-05-16 PROCEDURE — 17999 PR NON-COVERED FOOT CARE: ICD-10-PCS | Mod: CSM,HCNC,S$GLB, | Performed by: PODIATRIST

## 2023-05-16 PROCEDURE — 17999 UNLISTD PX SKN MUC MEMB SUBQ: CPT | Mod: CSM,HCNC,S$GLB, | Performed by: PODIATRIST

## 2023-05-16 PROCEDURE — 99499 NO LOS: ICD-10-PCS | Mod: HCNC,,, | Performed by: PODIATRIST

## 2023-05-16 PROCEDURE — 99999 PR PBB SHADOW E&M-EST. PATIENT-LVL III: CPT | Mod: PBBFAC,HCNC,, | Performed by: PODIATRIST

## 2023-05-16 PROCEDURE — 99999 PR PBB SHADOW E&M-EST. PATIENT-LVL III: ICD-10-PCS | Mod: PBBFAC,HCNC,, | Performed by: PODIATRIST

## 2023-05-16 PROCEDURE — 99499 UNLISTED E&M SERVICE: CPT | Mod: HCNC,,, | Performed by: PODIATRIST

## 2023-05-16 RX ORDER — ROPIVACAINE HYDROCHLORIDE 5 MG/ML
INJECTION, SOLUTION EPIDURAL; INFILTRATION; PERINEURAL
COMMUNITY
Start: 2023-02-20 | End: 2023-10-25

## 2023-05-16 RX ORDER — PROPOFOL 10 MG/ML
INJECTION, EMULSION INTRAVENOUS
COMMUNITY
Start: 2023-02-20 | End: 2023-10-25

## 2023-05-16 RX ORDER — MIDAZOLAM HYDROCHLORIDE 1 MG/ML
INJECTION, SOLUTION INTRAMUSCULAR; INTRAVENOUS
COMMUNITY
Start: 2023-02-20 | End: 2023-10-25

## 2023-05-16 RX ORDER — ONDANSETRON 2 MG/ML
INJECTION INTRAMUSCULAR; INTRAVENOUS
COMMUNITY
Start: 2023-02-20 | End: 2023-10-25

## 2023-05-16 RX ORDER — CEFAZOLIN 2 G/1
INJECTION, POWDER, FOR SOLUTION INTRAMUSCULAR; INTRAVENOUS
COMMUNITY
Start: 2023-02-20 | End: 2023-10-25

## 2023-05-16 RX ORDER — DEXAMETHASONE SODIUM PHOSPHATE 4 MG/ML
INJECTION, SOLUTION INTRA-ARTICULAR; INTRALESIONAL; INTRAMUSCULAR; INTRAVENOUS; SOFT TISSUE
COMMUNITY
Start: 2023-02-20 | End: 2023-10-25

## 2023-05-16 RX ORDER — FENTANYL CITRATE 50 UG/ML
INJECTION, SOLUTION INTRAMUSCULAR; INTRAVENOUS
COMMUNITY
Start: 2023-02-20 | End: 2023-10-25

## 2023-05-16 RX ORDER — PHENYLEPHRINE HYDROCHLORIDE 10 MG/ML
INJECTION INTRAVENOUS
COMMUNITY
Start: 2023-02-20 | End: 2023-10-25

## 2023-05-31 ENCOUNTER — OFFICE VISIT (OUTPATIENT)
Dept: RADIATION ONCOLOGY | Facility: CLINIC | Age: 88
End: 2023-05-31
Payer: MEDICARE

## 2023-05-31 VITALS
HEIGHT: 63 IN | SYSTOLIC BLOOD PRESSURE: 176 MMHG | BODY MASS INDEX: 23.51 KG/M2 | HEART RATE: 69 BPM | DIASTOLIC BLOOD PRESSURE: 74 MMHG | RESPIRATION RATE: 16 BRPM | WEIGHT: 132.69 LBS

## 2023-05-31 DIAGNOSIS — C50.912 INVASIVE DUCTAL CARCINOMA OF BREAST, LEFT: Primary | ICD-10-CM

## 2023-05-31 PROCEDURE — 1126F PR PAIN SEVERITY QUANTIFIED, NO PAIN PRESENT: ICD-10-PCS | Mod: HCNC,CPTII,S$GLB, | Performed by: RADIOLOGY

## 2023-05-31 PROCEDURE — 3288F FALL RISK ASSESSMENT DOCD: CPT | Mod: HCNC,CPTII,S$GLB, | Performed by: RADIOLOGY

## 2023-05-31 PROCEDURE — 99204 PR OFFICE/OUTPT VISIT, NEW, LEVL IV, 45-59 MIN: ICD-10-PCS | Mod: HCNC,S$GLB,, | Performed by: RADIOLOGY

## 2023-05-31 PROCEDURE — 99999 PR PBB SHADOW E&M-EST. PATIENT-LVL IV: CPT | Mod: PBBFAC,HCNC,, | Performed by: RADIOLOGY

## 2023-05-31 PROCEDURE — 1101F PR PT FALLS ASSESS DOC 0-1 FALLS W/OUT INJ PAST YR: ICD-10-PCS | Mod: HCNC,CPTII,S$GLB, | Performed by: RADIOLOGY

## 2023-05-31 PROCEDURE — 1126F AMNT PAIN NOTED NONE PRSNT: CPT | Mod: HCNC,CPTII,S$GLB, | Performed by: RADIOLOGY

## 2023-05-31 PROCEDURE — 1101F PT FALLS ASSESS-DOCD LE1/YR: CPT | Mod: HCNC,CPTII,S$GLB, | Performed by: RADIOLOGY

## 2023-05-31 PROCEDURE — 1159F MED LIST DOCD IN RCRD: CPT | Mod: HCNC,CPTII,S$GLB, | Performed by: RADIOLOGY

## 2023-05-31 PROCEDURE — 99999 PR PBB SHADOW E&M-EST. PATIENT-LVL IV: ICD-10-PCS | Mod: PBBFAC,HCNC,, | Performed by: RADIOLOGY

## 2023-05-31 PROCEDURE — 1160F RVW MEDS BY RX/DR IN RCRD: CPT | Mod: HCNC,CPTII,S$GLB, | Performed by: RADIOLOGY

## 2023-05-31 PROCEDURE — 1159F PR MEDICATION LIST DOCUMENTED IN MEDICAL RECORD: ICD-10-PCS | Mod: HCNC,CPTII,S$GLB, | Performed by: RADIOLOGY

## 2023-05-31 PROCEDURE — 3288F PR FALLS RISK ASSESSMENT DOCUMENTED: ICD-10-PCS | Mod: HCNC,CPTII,S$GLB, | Performed by: RADIOLOGY

## 2023-05-31 PROCEDURE — 1160F PR REVIEW ALL MEDS BY PRESCRIBER/CLIN PHARMACIST DOCUMENTED: ICD-10-PCS | Mod: HCNC,CPTII,S$GLB, | Performed by: RADIOLOGY

## 2023-05-31 PROCEDURE — 99204 OFFICE O/P NEW MOD 45 MIN: CPT | Mod: HCNC,S$GLB,, | Performed by: RADIOLOGY

## 2023-05-31 NOTE — PROGRESS NOTES
HISTORY OF PRESENT ILLNESS:   This patient presents for discussion of postoperative radiotherapy for a recently diagnosed Lt. breast cancer.     Mrs. 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  She received 5 years of hormonal therapy.  She remained MARIA LUISA until January of this year when diagnostic MMG confirmed a 7 mm irregularly shaped mass at the 6 o'clock position in the Lt.. breast.  Biopsies confirmed Grade 2 invasive ductal carcinoma.  Ninety Five percent of the cells were strongly ER and W[place] positive, Her-2 was 2+ but negative by FISH, Ki-67 was 20%.  She underwent partial mastectomy with sentinel node biopsy on 23.  Pathology revealed an 8 mm focus of infiltrating ductal carcinoma, histologic grade 3, nuclear grade 2 and mitotic index 2.  There was associated DCIS and EIC. There was no LVI.  The margins were negative for invasive cancer but DCIS was at the posterior and superior margins.  Two sentinel nodes were negative for tumor involvement.  She underwent re-excision on 23.  Intermediate grade DCIS was present in the new superior, medial and posterior margins.  The superior and posterior margins were focally positive. The patient is reluctant to consider mastectomy.  She presents for discussion of possible radiotherapy.  Today the patient states she feels well.  No complaints..     REVIEW OF SYSTEMS:   Review of Systems   Constitutional:  Negative for chills, fever, malaise/fatigue and weight loss.   Respiratory:  Negative for cough and shortness of breath.    Cardiovascular:  Negative for chest pain and palpitations.   Gastrointestinal:  Negative for abdominal pain, nausea and vomiting.     GYN HISTORY:  Age of menopause was 38 (hysterectomy).   Patient denies hormonal therapy. Patient is . Age of first live birth was 25. Patient did not breast feed.  Genetic testing was negative.       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 Wilkins    COLONOSCOPY N/A 10/1/2019    Procedure: COLONOSCOPY;  Surgeon: Jarad Chavira MD;  Location: [facility] ENDO (4TH FLR);  Service: Endoscopy;  Laterality: N/A;  miralax prep (used miralax for last colonoscopy) - ERW    COLONOSCOPY W/ POLYPECTOMY  08/08/2013    RASHEED   06/24/2014    EYE SURGERY      HYSTERECTOMY  age 38    ALISTAIR; ovaries in place    INJECTION FOR SENTINEL NODE IDENTIFICATION Left 2/20/2023    Procedure: INJECTION, FOR SENTINEL NODE IDENTIFICATION-Left;  Surgeon: ROSIBEL Belcher MD;  Location: [facility] OR 2ND FLR;  Service: General;  Laterality: Left;    JOINT REPLACEMENT      bilateral knees    MASTECTOMY, PARTIAL Left 2/20/2023    Procedure: MASTECTOMY, PARTIAL-Left with radiological marker;  Surgeon: ROSIBEL Belcher MD;  Location: [facility] OR 2ND FLR;  Service: General;  Laterality: Left;    MASTECTOMY, PARTIAL Left 4/18/2023    Procedure: MASTECTOMY, PARTIAL-left re-excision;  Surgeon: ROSIBEL Belcher MD;  [facility];  Service: General;  Laterality: Left;    SENTINEL LYMPH NODE BIOPSY Left 2/20/2023    Procedure: BIOPSY, LYMPH NODE, SENTINEL-Left;  Surgeon: ROSIBEL Belcher MD;  [facility];  Service: General;  Laterality: Left;     TOTAL KNEE ARTHROPLASTY      Bilateral       ALLERGIES:   Review of patient's allergies indicates:   Allergen Reactions    Tramadol Shortness Of Breath, Diarrhea and Nausea And Vomiting    Acetaminophen Nausea And Vomiting    Aspirin Nausea And Vomiting    Sulfa (sulfonamide antibiotics) Nausea And Vomiting    Prednisone Other (See Comments)     Stomach problems        MEDICATIONS:  Current Outpatient Medications   Medication Sig    acetic acid-oxyquinoline (FEM PH) 0.9-0.025 % Gel Place 1 applicator vaginally twice a week.    alendronate (FOSAMAX) 70 MG tablet Take 1 tablet (70 mg total) by mouth every 7 days.    amLODIPine (NORVASC) 10 MG tablet Take 1 tablet (10 mg total) by mouth once daily.    atenoloL (TENORMIN) 25 MG tablet Take 1 tablet (25 mg total) by mouth once daily.    benazepriL (LOTENSIN) 40 MG tablet Take 1 tablet (40 mg total) by mouth once daily.    calcium carbonate (OS-DHRUV) 600 mg calcium (1,500 mg) Tab Take 600 mg by mouth once.    ceFAZolin 2 gram SolR     ciclopirox (PENLAC) 8 % Soln Apply topically nightly.    dexAMETHasone (DECADRON) 4 mg/mL injection     diclofenac sodium (VOLTAREN) 1 % Gel Apply 2 g topically 3 (three) times daily.    DIPRIVAN 10 mg/mL infusion     fentaNYL (SUBLIMAZE) 50 mcg/mL injection     glycerin/min oil/polycarbophil (REPLENS VAGL) Place vaginally.    lidocaine HCL 2% (XYLOCAINE) 2 % jelly Apply topically as needed. For toe pain, apply up to twice daily as needed for pain.    midazolam (VERSED) 1 mg/mL injection     MULTIVITAMIN W-MINERALS/LUTEIN (CENTRUM SILVER ORAL) Take 1 tablet by mouth Daily.    omega 3-calcium-vit D3-FA-mv13 [phone] mg Cmpk Take by mouth once daily.    ondansetron 4 mg/2 mL Soln     ROPIvacaine 0.5%, PF, (NAROPIN) 5 mg/mL (0.5 %) injection     vitamin D 1000 units Tab Take 185 mg by mouth once daily.    OMNIPAQUE 350 350 mg iodine/mL Soln injection     PHENYLephrine 10 mg/mL injection     trospium (SANCTURA) 20 mg Tab tablet Take 1 tablet  (20 mg total) by mouth 2 (two) times daily.     No current facility-administered medications for this visit.     Facility-Administered Medications Ordered in Other Visits   Medication    fentaNYL 50 mcg/mL injection  mcg    midazolam (VERSED) 1 mg/mL injection 0.5-4 mg       SOCIAL HISTORY:  Social History     Socioeconomic History    Marital status:    Tobacco Use    Smoking status: Never    Smokeless tobacco: Never   Substance and Sexual Activity    Alcohol use: Yes     Alcohol/week: 1.0 standard drink     Types: 1 Glasses of wine per week     Comment: Rare, every other week    Drug use: No    Sexual activity: Not Currently     Birth control/protection: None     Comment:        FAMILY HISTORY:  Family History   Problem Relation Age of Onset    Stroke Mother     Breast cancer Sister 82    Heart disease Sister     Diabetes Sister     Cancer Sister         breast cancer    Asthma Sister     Breast cancer Daughter 46    Cancer Daughter         breast    Breast cancer Daughter 45        negative genetic testing 2015    Cancer Daughter         breast    Breast cancer Daughter 37    Cancer Daughter         breast    Coronary artery disease Father     Diabetes Father     Heart disease Father     Heart attack Father     Heart disease Sister     Diabetes Sister     Diabetes Sister     Heart disease Sister     Hyperlipidemia Sister     Heart attack Brother     No Known Problems Son     Cancer Cousin         colon    Cancer Cousin     Breast cancer Cousin     Breast cancer Other     Diabetes Other     Blindness Other         One eye is blind from complications of diabetes    Breast cancer Other     Diabetes Other     Breast cancer Other     Diabetes Other     Ovarian cancer Neg Hx     Endometrial cancer Neg Hx     Vaginal cancer Neg Hx     Cervical cancer Neg Hx          PHYSICAL EXAMINATION:  Vitals:    05/31/23 1450   BP: (!) 176/74   Pulse: 69   Resp: 16     Physical Exam  Constitutional:       General:  She is not in acute distress.     Appearance: Normal appearance.   Pulmonary:      Effort: Pulmonary effort is normal. No respiratory distress.   Chest:   Breasts:     Left: No swelling, bleeding, mass or skin change.      Comments: well healed incision   Musculoskeletal:      Cervical back: Normal range of motion and neck supple.   Neurological:      Mental Status: She is alert.       ASSESSMENT/PLAN:  Stage IA (T1b, N0, M0, G2, ER/GA+, Her-2-) ductal carcinoma of the LOQ of the Lt. breast.   Positive for EIC with focally positive margins for DCIS    ECO    I had a long discussion with the patient.  Currently she is very independent, lives alone continues to drive, shop and cook.  She wants to remain independent and is concerned about further surgery.  Discussed the options of whole breast irradiation with a boost to the resection cavity.  Explained that given her EIC and focally positive margins she does have a higher risk of recurrence following radiotherapy but this is in the 15-20%.  Overall, I think adjuvant radiotherapy is a good option given her pathology, desires and age.  Will plan to proceed with 40 Gy in 15 fractions followed by a boost to the resection cavity.  Thank you for allowing us to participate in the care of this patient.      I spent approximately 50 minutes reviewing the available records and evaluating the patient, out of which over 50% of the time was spent face to face with the patient in counseling and coordinating this patient's care.

## 2023-06-07 ENCOUNTER — HOSPITAL ENCOUNTER (OUTPATIENT)
Dept: RADIATION THERAPY | Facility: HOSPITAL | Age: 88
Discharge: HOME | End: 2023-06-07
Attending: RADIOLOGY
Payer: MEDICARE

## 2023-06-07 PROCEDURE — 77290 PR  SET RADN THERAPY FIELD COMPLEX: ICD-10-PCS | Mod: 26,HCNC,, | Performed by: RADIOLOGY

## 2023-06-07 PROCEDURE — 77334 RADIATION TREATMENT AID(S): CPT | Mod: TC,HCNC | Performed by: RADIOLOGY

## 2023-06-07 PROCEDURE — 77290 THER RAD SIMULAJ FIELD CPLX: CPT | Mod: TC,HCNC | Performed by: RADIOLOGY

## 2023-06-07 PROCEDURE — 77014 HC CT GUIDANCE RADIATION THERAPY FLDS PLACEMENT: CPT | Mod: TC,HCNC | Performed by: RADIOLOGY

## 2023-06-07 PROCEDURE — 77334 RADIATION TREATMENT AID(S): CPT | Mod: 26,HCNC,, | Performed by: RADIOLOGY

## 2023-06-07 PROCEDURE — 77334 PR  RADN TREATMENT AID(S) COMPLX: ICD-10-PCS | Mod: 26,HCNC,, | Performed by: RADIOLOGY

## 2023-06-07 PROCEDURE — 77263 THER RADIOLOGY TX PLNG CPLX: CPT | Mod: HCNC,,, | Performed by: RADIOLOGY

## 2023-06-07 PROCEDURE — 77290 THER RAD SIMULAJ FIELD CPLX: CPT | Mod: 26,HCNC,, | Performed by: RADIOLOGY

## 2023-06-07 PROCEDURE — 77263 PR  RADIATION THERAPY PLAN COMPLEX: ICD-10-PCS | Mod: HCNC,,, | Performed by: RADIOLOGY

## 2023-06-14 ENCOUNTER — OFFICE VISIT (OUTPATIENT)
Dept: PODIATRY | Facility: CLINIC | Age: 88
End: 2023-06-14
Payer: MEDICARE

## 2023-06-14 VITALS
BODY MASS INDEX: 23.51 KG/M2 | HEIGHT: 63 IN | HEART RATE: 70 BPM | WEIGHT: 132.69 LBS | DIASTOLIC BLOOD PRESSURE: 80 MMHG | SYSTOLIC BLOOD PRESSURE: 157 MMHG

## 2023-06-14 DIAGNOSIS — B35.1 ONYCHOMYCOSIS DUE TO DERMATOPHYTE: Primary | ICD-10-CM

## 2023-06-14 DIAGNOSIS — L84 CORNS/CALLOSITIES: ICD-10-CM

## 2023-06-14 PROCEDURE — 99999 PR PBB SHADOW E&M-EST. PATIENT-LVL IV: ICD-10-PCS | Mod: PBBFAC,HCNC,, | Performed by: PODIATRIST

## 2023-06-14 PROCEDURE — 77295 3-D RADIOTHERAPY PLAN: CPT | Mod: 26,HCNC,, | Performed by: RADIOLOGY

## 2023-06-14 PROCEDURE — 77334 RADIATION TREATMENT AID(S): CPT | Mod: TC,HCNC | Performed by: RADIOLOGY

## 2023-06-14 PROCEDURE — 77300 RADIATION THERAPY DOSE PLAN: CPT | Mod: TC,HCNC | Performed by: RADIOLOGY

## 2023-06-14 PROCEDURE — 99499 UNLISTED E&M SERVICE: CPT | Mod: HCNC,,, | Performed by: PODIATRIST

## 2023-06-14 PROCEDURE — 77334 RADIATION TREATMENT AID(S): CPT | Mod: 26,HCNC,, | Performed by: RADIOLOGY

## 2023-06-14 PROCEDURE — 77300 PR RADIATION THERAPY,DOSIMETRY PLAN: ICD-10-PCS | Mod: 26,HCNC,, | Performed by: RADIOLOGY

## 2023-06-14 PROCEDURE — 77295 PR 3D RADIOTHERAPY PLAN: ICD-10-PCS | Mod: 26,HCNC,, | Performed by: RADIOLOGY

## 2023-06-14 PROCEDURE — 17999 PR NON-COVERED FOOT CARE: ICD-10-PCS | Mod: CSM,HCNC,S$GLB, | Performed by: PODIATRIST

## 2023-06-14 PROCEDURE — 77300 RADIATION THERAPY DOSE PLAN: CPT | Mod: 26,HCNC,, | Performed by: RADIOLOGY

## 2023-06-14 PROCEDURE — 17999 UNLISTD PX SKN MUC MEMB SUBQ: CPT | Mod: CSM,HCNC,S$GLB, | Performed by: PODIATRIST

## 2023-06-14 PROCEDURE — 99499 NO LOS: ICD-10-PCS | Mod: HCNC,,, | Performed by: PODIATRIST

## 2023-06-14 PROCEDURE — 77334 PR  RADN TREATMENT AID(S) COMPLX: ICD-10-PCS | Mod: 26,HCNC,, | Performed by: RADIOLOGY

## 2023-06-14 PROCEDURE — 77295 3-D RADIOTHERAPY PLAN: CPT | Mod: TC,HCNC | Performed by: RADIOLOGY

## 2023-06-14 PROCEDURE — 99999 PR PBB SHADOW E&M-EST. PATIENT-LVL IV: CPT | Mod: PBBFAC,HCNC,, | Performed by: PODIATRIST

## 2023-06-15 PROCEDURE — G6002 PR STEREOSCOPIC XRAY GUIDE FOR RADIATION TX DELIV: ICD-10-PCS | Mod: 26,HCNC,, | Performed by: RADIOLOGY

## 2023-06-15 PROCEDURE — 77417 THER RADIOLOGY PORT IMAGE(S): CPT | Mod: HCNC | Performed by: RADIOLOGY

## 2023-06-15 PROCEDURE — 77412 RADIATION TX DELIVERY LVL 3: CPT | Mod: HCNC | Performed by: RADIOLOGY

## 2023-06-15 PROCEDURE — G6002 STEREOSCOPIC X-RAY GUIDANCE: HCPCS | Mod: 26,HCNC,, | Performed by: RADIOLOGY

## 2023-06-15 PROCEDURE — 77387 GUIDANCE FOR RADJ TX DLVR: CPT | Mod: TC,HCNC | Performed by: RADIOLOGY

## 2023-06-16 ENCOUNTER — TELEPHONE (OUTPATIENT)
Dept: INTERNAL MEDICINE | Facility: CLINIC | Age: 88
End: 2023-06-16
Payer: MEDICARE

## 2023-06-16 DIAGNOSIS — G89.29 CHRONIC SHOULDER PAIN, UNSPECIFIED LATERALITY: Primary | ICD-10-CM

## 2023-06-16 DIAGNOSIS — M25.519 CHRONIC SHOULDER PAIN, UNSPECIFIED LATERALITY: Primary | ICD-10-CM

## 2023-06-16 PROCEDURE — G6002 PR STEREOSCOPIC XRAY GUIDE FOR RADIATION TX DELIV: ICD-10-PCS | Mod: 26,HCNC,, | Performed by: RADIOLOGY

## 2023-06-16 PROCEDURE — 77387 GUIDANCE FOR RADJ TX DLVR: CPT | Mod: TC,HCNC | Performed by: RADIOLOGY

## 2023-06-16 PROCEDURE — G6002 STEREOSCOPIC X-RAY GUIDANCE: HCPCS | Mod: 26,HCNC,, | Performed by: RADIOLOGY

## 2023-06-16 PROCEDURE — 77412 RADIATION TX DELIVERY LVL 3: CPT | Mod: HCNC | Performed by: RADIOLOGY

## 2023-06-16 NOTE — TELEPHONE ENCOUNTER
----- Message from Becky Perez sent at 6/15/2023 12:23 PM CDT -----  Contact: [phone]  [email or web address]     Patient is calling for Medical Advice regarding: Shoulder pain     How long has patient had these symptoms:2 weeks    Pharmacy name and phone#:  [id number] - [place], [place] - [address] AT [facility] [address]  [address]  [place] [address]  Phone: [phone] Fax: [phone]       Would like response via [facility]IDhart:  call back    Comments:

## 2023-06-16 NOTE — TELEPHONE ENCOUNTER
Recommend for now that she takes over-the-counter Advil or Alleve 2 tablets 2 3 times a day with a meal for about a week but could also referral to Orthopedics and the referral is in

## 2023-06-19 PROCEDURE — 77412 RADIATION TX DELIVERY LVL 3: CPT | Mod: HCNC | Performed by: RADIOLOGY

## 2023-06-19 PROCEDURE — G6002 PR STEREOSCOPIC XRAY GUIDE FOR RADIATION TX DELIV: ICD-10-PCS | Mod: 26,HCNC,, | Performed by: RADIOLOGY

## 2023-06-19 PROCEDURE — 77387 GUIDANCE FOR RADJ TX DLVR: CPT | Mod: TC,HCNC | Performed by: RADIOLOGY

## 2023-06-19 PROCEDURE — G6002 STEREOSCOPIC X-RAY GUIDANCE: HCPCS | Mod: 26,HCNC,, | Performed by: RADIOLOGY

## 2023-06-20 PROCEDURE — 77412 RADIATION TX DELIVERY LVL 3: CPT | Mod: HCNC | Performed by: RADIOLOGY

## 2023-06-20 PROCEDURE — G6002 STEREOSCOPIC X-RAY GUIDANCE: HCPCS | Mod: 26,HCNC,, | Performed by: RADIOLOGY

## 2023-06-20 PROCEDURE — G6002 PR STEREOSCOPIC XRAY GUIDE FOR RADIATION TX DELIV: ICD-10-PCS | Mod: 26,HCNC,, | Performed by: RADIOLOGY

## 2023-06-20 PROCEDURE — 77387 GUIDANCE FOR RADJ TX DLVR: CPT | Mod: TC,HCNC | Performed by: RADIOLOGY

## 2023-06-21 ENCOUNTER — DOCUMENTATION ONLY (OUTPATIENT)
Dept: RADIATION ONCOLOGY | Facility: CLINIC | Age: 88
End: 2023-06-21
Payer: MEDICARE

## 2023-06-21 PROCEDURE — 77412 RADIATION TX DELIVERY LVL 3: CPT | Mod: HCNC | Performed by: RADIOLOGY

## 2023-06-21 PROCEDURE — G6002 STEREOSCOPIC X-RAY GUIDANCE: HCPCS | Mod: 26,HCNC,, | Performed by: RADIOLOGY

## 2023-06-21 PROCEDURE — G6002 PR STEREOSCOPIC XRAY GUIDE FOR RADIATION TX DELIV: ICD-10-PCS | Mod: 26,HCNC,, | Performed by: RADIOLOGY

## 2023-06-21 PROCEDURE — 77387 GUIDANCE FOR RADJ TX DLVR: CPT | Mod: TC,HCNC | Performed by: RADIOLOGY

## 2023-06-21 NOTE — PLAN OF CARE
Day 5 of outpatient radiation to left breast. Doing well. Denies pain. No skin reactions. Encouraged to used Miaderm lotion after treatment and at bedtime (not 4 hrs prior to therapy) voiced understanding.

## 2023-06-22 PROCEDURE — 77336 RADIATION PHYSICS CONSULT: CPT | Mod: HCNC | Performed by: RADIOLOGY

## 2023-06-22 PROCEDURE — G6002 PR STEREOSCOPIC XRAY GUIDE FOR RADIATION TX DELIV: ICD-10-PCS | Mod: 26,HCNC,, | Performed by: RADIOLOGY

## 2023-06-22 PROCEDURE — 77412 RADIATION TX DELIVERY LVL 3: CPT | Mod: HCNC | Performed by: RADIOLOGY

## 2023-06-22 PROCEDURE — G6002 STEREOSCOPIC X-RAY GUIDANCE: HCPCS | Mod: 26,HCNC,, | Performed by: RADIOLOGY

## 2023-06-22 PROCEDURE — 77417 THER RADIOLOGY PORT IMAGE(S): CPT | Mod: HCNC | Performed by: RADIOLOGY

## 2023-06-22 PROCEDURE — 77387 GUIDANCE FOR RADJ TX DLVR: CPT | Mod: TC,HCNC | Performed by: RADIOLOGY

## 2023-06-23 PROCEDURE — 77412 RADIATION TX DELIVERY LVL 3: CPT | Mod: HCNC | Performed by: RADIOLOGY

## 2023-06-23 PROCEDURE — 77402 RADIATION TX DELIVERY LVL 1: CPT | Mod: HCNC | Performed by: RADIOLOGY

## 2023-06-26 PROCEDURE — G6002 STEREOSCOPIC X-RAY GUIDANCE: HCPCS | Mod: 26,HCNC,, | Performed by: RADIOLOGY

## 2023-06-26 PROCEDURE — 77412 RADIATION TX DELIVERY LVL 3: CPT | Mod: HCNC | Performed by: RADIOLOGY

## 2023-06-26 PROCEDURE — G6002 PR STEREOSCOPIC XRAY GUIDE FOR RADIATION TX DELIV: ICD-10-PCS | Mod: 26,HCNC,, | Performed by: RADIOLOGY

## 2023-06-26 PROCEDURE — 77387 GUIDANCE FOR RADJ TX DLVR: CPT | Mod: TC,HCNC | Performed by: RADIOLOGY

## 2023-06-27 ENCOUNTER — DOCUMENTATION ONLY (OUTPATIENT)
Dept: RADIATION ONCOLOGY | Facility: CLINIC | Age: 88
End: 2023-06-27
Payer: MEDICARE

## 2023-06-27 PROCEDURE — G6002 PR STEREOSCOPIC XRAY GUIDE FOR RADIATION TX DELIV: ICD-10-PCS | Mod: 26,HCNC,, | Performed by: RADIOLOGY

## 2023-06-27 PROCEDURE — 77387 GUIDANCE FOR RADJ TX DLVR: CPT | Mod: TC,HCNC | Performed by: RADIOLOGY

## 2023-06-27 PROCEDURE — G6002 STEREOSCOPIC X-RAY GUIDANCE: HCPCS | Mod: 26,HCNC,, | Performed by: RADIOLOGY

## 2023-06-27 PROCEDURE — 77412 RADIATION TX DELIVERY LVL 3: CPT | Mod: HCNC | Performed by: RADIOLOGY

## 2023-06-27 NOTE — PLAN OF CARE
Day 9 of outpatient radiation to left breast. Doing well. No skin issues. Using miaderm lotion twice a day as directed.

## 2023-06-28 PROCEDURE — G6002 STEREOSCOPIC X-RAY GUIDANCE: HCPCS | Mod: 26,HCNC,, | Performed by: RADIOLOGY

## 2023-06-28 PROCEDURE — 77412 RADIATION TX DELIVERY LVL 3: CPT | Mod: HCNC | Performed by: RADIOLOGY

## 2023-06-28 PROCEDURE — G6002 PR STEREOSCOPIC XRAY GUIDE FOR RADIATION TX DELIV: ICD-10-PCS | Mod: 26,HCNC,, | Performed by: RADIOLOGY

## 2023-06-28 PROCEDURE — 77387 GUIDANCE FOR RADJ TX DLVR: CPT | Mod: TC,HCNC | Performed by: RADIOLOGY

## 2023-06-29 PROCEDURE — 77412 RADIATION TX DELIVERY LVL 3: CPT | Mod: HCNC | Performed by: RADIOLOGY

## 2023-06-29 PROCEDURE — 77417 THER RADIOLOGY PORT IMAGE(S): CPT | Mod: HCNC | Performed by: RADIOLOGY

## 2023-06-29 PROCEDURE — 77387 GUIDANCE FOR RADJ TX DLVR: CPT | Mod: TC,HCNC | Performed by: RADIOLOGY

## 2023-06-29 PROCEDURE — G6002 PR STEREOSCOPIC XRAY GUIDE FOR RADIATION TX DELIV: ICD-10-PCS | Mod: 26,HCNC,, | Performed by: RADIOLOGY

## 2023-06-29 PROCEDURE — G6002 STEREOSCOPIC X-RAY GUIDANCE: HCPCS | Mod: 26,HCNC,, | Performed by: RADIOLOGY

## 2023-06-29 PROCEDURE — 77336 RADIATION PHYSICS CONSULT: CPT | Mod: HCNC | Performed by: RADIOLOGY

## 2023-06-30 PROCEDURE — G6002 STEREOSCOPIC X-RAY GUIDANCE: HCPCS | Mod: 26,HCNC,, | Performed by: RADIOLOGY

## 2023-06-30 PROCEDURE — 77387 GUIDANCE FOR RADJ TX DLVR: CPT | Mod: TC,HCNC | Performed by: RADIOLOGY

## 2023-06-30 PROCEDURE — G6002 PR STEREOSCOPIC XRAY GUIDE FOR RADIATION TX DELIV: ICD-10-PCS | Mod: 26,HCNC,, | Performed by: RADIOLOGY

## 2023-06-30 PROCEDURE — 77412 RADIATION TX DELIVERY LVL 3: CPT | Mod: HCNC | Performed by: RADIOLOGY

## 2023-07-03 ENCOUNTER — HOSPITAL ENCOUNTER (OUTPATIENT)
Dept: RADIATION THERAPY | Facility: HOSPITAL | Age: 88
Discharge: HOME | End: 2023-07-03
Attending: RADIOLOGY
Payer: MEDICARE

## 2023-07-03 PROCEDURE — 77412 RADIATION TX DELIVERY LVL 3: CPT | Mod: HCNC | Performed by: RADIOLOGY

## 2023-07-03 PROCEDURE — G6002 STEREOSCOPIC X-RAY GUIDANCE: HCPCS | Mod: 26,HCNC,, | Performed by: RADIOLOGY

## 2023-07-03 PROCEDURE — 77387 GUIDANCE FOR RADJ TX DLVR: CPT | Mod: TC,HCNC | Performed by: RADIOLOGY

## 2023-07-03 PROCEDURE — G6002 PR STEREOSCOPIC XRAY GUIDE FOR RADIATION TX DELIV: ICD-10-PCS | Mod: 26,HCNC,, | Performed by: RADIOLOGY

## 2023-07-05 ENCOUNTER — DOCUMENTATION ONLY (OUTPATIENT)
Dept: RADIATION ONCOLOGY | Facility: CLINIC | Age: 88
End: 2023-07-05
Payer: MEDICARE

## 2023-07-05 PROCEDURE — 77307 TELETHX ISODOSE PLAN CPLX: CPT | Mod: 26,HCNC,, | Performed by: RADIOLOGY

## 2023-07-05 PROCEDURE — 77307 TELETHX ISODOSE PLAN CPLX: CPT | Mod: TC,HCNC | Performed by: RADIOLOGY

## 2023-07-05 PROCEDURE — 77307 PR TELETHERAPY ISODOSE PLAN; COMPLEX: ICD-10-PCS | Mod: 26,HCNC,, | Performed by: RADIOLOGY

## 2023-07-05 PROCEDURE — 77334 PR  RADN TREATMENT AID(S) COMPLX: ICD-10-PCS | Mod: 26,HCNC,, | Performed by: RADIOLOGY

## 2023-07-05 PROCEDURE — 77412 RADIATION TX DELIVERY LVL 3: CPT | Mod: HCNC | Performed by: RADIOLOGY

## 2023-07-05 PROCEDURE — 77334 RADIATION TREATMENT AID(S): CPT | Mod: 26,HCNC,, | Performed by: RADIOLOGY

## 2023-07-05 PROCEDURE — 77387 GUIDANCE FOR RADJ TX DLVR: CPT | Mod: TC,HCNC | Performed by: RADIOLOGY

## 2023-07-05 PROCEDURE — 77334 RADIATION TREATMENT AID(S): CPT | Mod: TC,HCNC | Performed by: RADIOLOGY

## 2023-07-05 PROCEDURE — G6002 PR STEREOSCOPIC XRAY GUIDE FOR RADIATION TX DELIV: ICD-10-PCS | Mod: 26,HCNC,, | Performed by: RADIOLOGY

## 2023-07-05 PROCEDURE — G6002 STEREOSCOPIC X-RAY GUIDANCE: HCPCS | Mod: 26,HCNC,, | Performed by: RADIOLOGY

## 2023-07-06 PROCEDURE — 77387 GUIDANCE FOR RADJ TX DLVR: CPT | Mod: TC,HCNC | Performed by: RADIOLOGY

## 2023-07-06 PROCEDURE — G6002 STEREOSCOPIC X-RAY GUIDANCE: HCPCS | Mod: 26,HCNC,, | Performed by: RADIOLOGY

## 2023-07-06 PROCEDURE — G6002 PR STEREOSCOPIC XRAY GUIDE FOR RADIATION TX DELIV: ICD-10-PCS | Mod: 26,HCNC,, | Performed by: RADIOLOGY

## 2023-07-06 PROCEDURE — 77412 RADIATION TX DELIVERY LVL 3: CPT | Mod: HCNC | Performed by: RADIOLOGY

## 2023-07-07 PROCEDURE — 77387 GUIDANCE FOR RADJ TX DLVR: CPT | Mod: TC,HCNC | Performed by: RADIOLOGY

## 2023-07-07 PROCEDURE — 77336 RADIATION PHYSICS CONSULT: CPT | Mod: HCNC | Performed by: RADIOLOGY

## 2023-07-07 PROCEDURE — G6002 PR STEREOSCOPIC XRAY GUIDE FOR RADIATION TX DELIV: ICD-10-PCS | Mod: 26,HCNC,, | Performed by: RADIOLOGY

## 2023-07-07 PROCEDURE — 77412 RADIATION TX DELIVERY LVL 3: CPT | Mod: HCNC | Performed by: RADIOLOGY

## 2023-07-07 PROCEDURE — G6002 STEREOSCOPIC X-RAY GUIDANCE: HCPCS | Mod: 26,HCNC,, | Performed by: RADIOLOGY

## 2023-07-10 PROCEDURE — 77014 PR  CT GUIDANCE PLACEMENT RAD THERAPY FIELDS: CPT | Mod: 26,HCNC,, | Performed by: RADIOLOGY

## 2023-07-10 PROCEDURE — 77014 HC CT GUIDANCE RADIATION THERAPY FLDS PLACEMENT: CPT | Mod: TC,HCNC | Performed by: RADIOLOGY

## 2023-07-10 PROCEDURE — 77014 PR  CT GUIDANCE PLACEMENT RAD THERAPY FIELDS: ICD-10-PCS | Mod: 26,HCNC,, | Performed by: RADIOLOGY

## 2023-07-10 PROCEDURE — 77417 THER RADIOLOGY PORT IMAGE(S): CPT | Mod: HCNC | Performed by: RADIOLOGY

## 2023-07-10 PROCEDURE — 77412 RADIATION TX DELIVERY LVL 3: CPT | Mod: HCNC | Performed by: RADIOLOGY

## 2023-07-11 PROCEDURE — 77014 HC CT GUIDANCE RADIATION THERAPY FLDS PLACEMENT: CPT | Mod: TC,HCNC | Performed by: RADIOLOGY

## 2023-07-11 PROCEDURE — 77412 RADIATION TX DELIVERY LVL 3: CPT | Mod: HCNC | Performed by: RADIOLOGY

## 2023-07-11 PROCEDURE — 77014 PR  CT GUIDANCE PLACEMENT RAD THERAPY FIELDS: CPT | Mod: 26,HCNC,, | Performed by: RADIOLOGY

## 2023-07-11 PROCEDURE — 77014 PR  CT GUIDANCE PLACEMENT RAD THERAPY FIELDS: ICD-10-PCS | Mod: 26,HCNC,, | Performed by: RADIOLOGY

## 2023-07-12 PROCEDURE — 77014 HC CT GUIDANCE RADIATION THERAPY FLDS PLACEMENT: CPT | Mod: TC,HCNC | Performed by: RADIOLOGY

## 2023-07-12 PROCEDURE — 77014 PR  CT GUIDANCE PLACEMENT RAD THERAPY FIELDS: ICD-10-PCS | Mod: 26,HCNC,, | Performed by: RADIOLOGY

## 2023-07-12 PROCEDURE — 77014 PR  CT GUIDANCE PLACEMENT RAD THERAPY FIELDS: CPT | Mod: 26,HCNC,, | Performed by: RADIOLOGY

## 2023-07-12 PROCEDURE — 77412 RADIATION TX DELIVERY LVL 3: CPT | Mod: HCNC | Performed by: RADIOLOGY

## 2023-07-13 ENCOUNTER — OFFICE VISIT (OUTPATIENT)
Dept: PODIATRY | Facility: CLINIC | Age: 88
End: 2023-07-13
Payer: MEDICARE

## 2023-07-13 VITALS
WEIGHT: 132.69 LBS | BODY MASS INDEX: 23.51 KG/M2 | SYSTOLIC BLOOD PRESSURE: 143 MMHG | HEART RATE: 61 BPM | HEIGHT: 63 IN | DIASTOLIC BLOOD PRESSURE: 67 MMHG

## 2023-07-13 DIAGNOSIS — L84 CORNS/CALLOSITIES: ICD-10-CM

## 2023-07-13 DIAGNOSIS — B35.1 ONYCHOMYCOSIS DUE TO DERMATOPHYTE: Primary | ICD-10-CM

## 2023-07-13 PROCEDURE — 77014 PR  CT GUIDANCE PLACEMENT RAD THERAPY FIELDS: CPT | Mod: 26,HCNC,, | Performed by: RADIOLOGY

## 2023-07-13 PROCEDURE — 99999 PR PBB SHADOW E&M-EST. PATIENT-LVL IV: CPT | Mod: PBBFAC,HCNC,, | Performed by: PODIATRIST

## 2023-07-13 PROCEDURE — 99499 NO LOS: ICD-10-PCS | Mod: HCNC,,, | Performed by: PODIATRIST

## 2023-07-13 PROCEDURE — 17999 PR NON-COVERED FOOT CARE: ICD-10-PCS | Mod: CSM,HCNC,S$GLB, | Performed by: PODIATRIST

## 2023-07-13 PROCEDURE — 77336 RADIATION PHYSICS CONSULT: CPT | Mod: HCNC | Performed by: RADIOLOGY

## 2023-07-13 PROCEDURE — 17999 UNLISTD PX SKN MUC MEMB SUBQ: CPT | Mod: CSM,HCNC,S$GLB, | Performed by: PODIATRIST

## 2023-07-13 PROCEDURE — 77014 PR  CT GUIDANCE PLACEMENT RAD THERAPY FIELDS: ICD-10-PCS | Mod: 26,HCNC,, | Performed by: RADIOLOGY

## 2023-07-13 PROCEDURE — 99999 PR PBB SHADOW E&M-EST. PATIENT-LVL IV: ICD-10-PCS | Mod: PBBFAC,HCNC,, | Performed by: PODIATRIST

## 2023-07-13 PROCEDURE — 77014 HC CT GUIDANCE RADIATION THERAPY FLDS PLACEMENT: CPT | Mod: TC,HCNC | Performed by: RADIOLOGY

## 2023-07-13 PROCEDURE — 77412 RADIATION TX DELIVERY LVL 3: CPT | Mod: HCNC | Performed by: RADIOLOGY

## 2023-07-13 PROCEDURE — 99499 UNLISTED E&M SERVICE: CPT | Mod: HCNC,,, | Performed by: PODIATRIST

## 2023-07-14 ENCOUNTER — DOCUMENTATION ONLY (OUTPATIENT)
Dept: RADIATION ONCOLOGY | Facility: CLINIC | Age: 88
End: 2023-07-14
Payer: MEDICARE

## 2023-07-14 NOTE — PLAN OF CARE
Completed 16/16 of outpatient radiation to left breast on July 13th. Tolerated therapy well. RTC in 2-3 mos unless symptoms warrant otherwise.

## 2023-07-15 NOTE — TELEPHONE ENCOUNTER
No care due was identified.  [facility] Embedded Care Due Messages. Reference number: [id number].   7/15/2023 5:51:19 AM CDT

## 2023-07-16 RX ORDER — BENAZEPRIL HYDROCHLORIDE 40 MG/1
TABLET ORAL
Qty: 90 TABLET | Refills: 1 | Status: SHIPPED | OUTPATIENT
Start: 2023-07-16 | End: 2024-01-11

## 2023-07-16 RX ORDER — ATENOLOL 25 MG/1
TABLET ORAL
Qty: 90 TABLET | Refills: 1 | Status: SHIPPED | OUTPATIENT
Start: 2023-07-16 | End: 2024-01-11

## 2023-07-16 NOTE — TELEPHONE ENCOUNTER
Refill Routing Note   Medication(s) are not appropriate for processing by [facility] for the following reason(s):      Required vitals abnormal    [facility] action(s):  Defer Care Due:  None identified     Medication Therapy Plan: BP 7/13/23 (!) 143/67      Appointments  past 12m or future 3m with PCP    Date Provider   Last Visit   11/23/2022 Kev Macias MD   Next Visit   11/8/2023 Kev Macias MD   ED visits in past 90 days: 0        Note composed:8:26 PM 07/15/2023

## 2023-07-21 ENCOUNTER — TELEPHONE (OUTPATIENT)
Dept: SURGERY | Facility: CLINIC | Age: 88
End: 2023-07-21
Payer: MEDICARE

## 2023-07-21 NOTE — TELEPHONE ENCOUNTER
Called patient and scheduled follow up appt with Dr. Saldivar. Reviewed location of appt. Patient verbalized understanding.

## 2023-07-24 ENCOUNTER — OFFICE VISIT (OUTPATIENT)
Dept: HEMATOLOGY/ONCOLOGY | Facility: CLINIC | Age: 88
End: 2023-07-24
Payer: MEDICARE

## 2023-07-24 VITALS
BODY MASS INDEX: 22.84 KG/M2 | HEIGHT: 63 IN | TEMPERATURE: 99 F | SYSTOLIC BLOOD PRESSURE: 124 MMHG | HEART RATE: 65 BPM | WEIGHT: 128.88 LBS | DIASTOLIC BLOOD PRESSURE: 60 MMHG | RESPIRATION RATE: 18 BRPM | OXYGEN SATURATION: 97 %

## 2023-07-24 DIAGNOSIS — C50.912 INVASIVE DUCTAL CARCINOMA OF LEFT BREAST: Primary | ICD-10-CM

## 2023-07-24 PROCEDURE — 99214 OFFICE O/P EST MOD 30 MIN: CPT | Mod: HCNC,S$GLB,, | Performed by: INTERNAL MEDICINE

## 2023-07-24 PROCEDURE — 99999 PR PBB SHADOW E&M-EST. PATIENT-LVL IV: CPT | Mod: PBBFAC,HCNC,, | Performed by: INTERNAL MEDICINE

## 2023-07-24 PROCEDURE — 1125F AMNT PAIN NOTED PAIN PRSNT: CPT | Mod: HCNC,CPTII,S$GLB, | Performed by: INTERNAL MEDICINE

## 2023-07-24 PROCEDURE — 1101F PT FALLS ASSESS-DOCD LE1/YR: CPT | Mod: HCNC,CPTII,S$GLB, | Performed by: INTERNAL MEDICINE

## 2023-07-24 PROCEDURE — 99214 PR OFFICE/OUTPT VISIT, EST, LEVL IV, 30-39 MIN: ICD-10-PCS | Mod: HCNC,S$GLB,, | Performed by: INTERNAL MEDICINE

## 2023-07-24 PROCEDURE — 1159F MED LIST DOCD IN RCRD: CPT | Mod: HCNC,CPTII,S$GLB, | Performed by: INTERNAL MEDICINE

## 2023-07-24 PROCEDURE — 1159F PR MEDICATION LIST DOCUMENTED IN MEDICAL RECORD: ICD-10-PCS | Mod: HCNC,CPTII,S$GLB, | Performed by: INTERNAL MEDICINE

## 2023-07-24 PROCEDURE — 3288F FALL RISK ASSESSMENT DOCD: CPT | Mod: HCNC,CPTII,S$GLB, | Performed by: INTERNAL MEDICINE

## 2023-07-24 PROCEDURE — 1101F PR PT FALLS ASSESS DOC 0-1 FALLS W/OUT INJ PAST YR: ICD-10-PCS | Mod: HCNC,CPTII,S$GLB, | Performed by: INTERNAL MEDICINE

## 2023-07-24 PROCEDURE — 1125F PR PAIN SEVERITY QUANTIFIED, PAIN PRESENT: ICD-10-PCS | Mod: HCNC,CPTII,S$GLB, | Performed by: INTERNAL MEDICINE

## 2023-07-24 PROCEDURE — 99999 PR PBB SHADOW E&M-EST. PATIENT-LVL IV: ICD-10-PCS | Mod: PBBFAC,HCNC,, | Performed by: INTERNAL MEDICINE

## 2023-07-24 PROCEDURE — 3288F PR FALLS RISK ASSESSMENT DOCUMENTED: ICD-10-PCS | Mod: HCNC,CPTII,S$GLB, | Performed by: INTERNAL MEDICINE

## 2023-07-24 NOTE — PROGRESS NOTES
[facility]/ [facility]   at [facility] Note:      Chief Complaint:   Encounter Diagnosis   Name Primary?    Invasive ductal carcinoma of left breast Yes          Cancer Staging   Invasive ductal carcinoma of breast, left  Staging form: Breast, AJCC 8th Edition  - Clinical stage from 2023: Stage IA (cT1b, cN0, cM0, G2, ER+, CT+, HER2-) - Signed by Ronald Tee Jr., MD on 2023      HPI:  Ernestine Luna is a 89 y.o. female who presents today after completion of her radiation (16 fractions). Had a lumpectomy in 2023 and there were multiple positive margins for DCIS. She decided against further surgery and proceeded with radiation. She tolerated the radiation well and has no complaints at this time.     Oncology history:  : right breast DCIS s/p  lumpectomy, radiation and tamoxifen  Routine screening mammogram on 2022 showed left breast asymmetry.   Diagnostic mammogram on 2023 demonstrated a left 7mm x 5mm x 6mm mass at the 6:00 position with left axillary LN 1.2 cm.   Biopsy  that was consistent with 0.4cm invasive ductal carcinoma, grade 2; ER 95%, CT 95%, HER2 +2 with FISH pending, Ki67 20%.  Lumpectomy 2023, multiple margins positive for DCIS (did not want further surgeries)   Finished 16 fractions of radiation on 23    Gyn History:   Menarche: 13  Menopause: 30 (hysterectomy)   (3 daughters, 1 son)  Age at first pregnancy: 25  HRT: Birth control for 1 year in late 20s  She did not breast feed.  She is a never smoker. She drinks about a glass of red wine per week.    Family history: 3 daughters with breast cancer (youngest diagnosed around age 30; oldest diagnosed age 46 and second oldest diagnosed age 45), one sister and one niece and great niece all passed away from breast cancer, multiple first cousins with breast, colon, throat cancer. Genetic testing done in  was negative.    Social History      Tobacco Use    Smoking status: Never    Smokeless tobacco: Never   Substance Use Topics    Alcohol use: Yes     Alcohol/week: 1.0 standard drink     Types: 1 Glasses of wine per week     Comment: Rare, every other week    Drug use: No     Family History   Problem Relation Age of Onset    Stroke Mother     Breast cancer Sister 82    Heart disease Sister     Diabetes Sister     Cancer Sister         breast cancer    Asthma Sister     Breast cancer Daughter 46    Cancer Daughter         breast    Breast cancer Daughter 45        negative genetic testing 2015    Cancer Daughter         breast    Breast cancer Daughter 37    Cancer Daughter         breast    Coronary artery disease Father     Diabetes Father     Heart disease Father     Heart attack Father     Heart disease Sister     Diabetes Sister     Diabetes Sister     Heart disease Sister     Hyperlipidemia Sister     Heart attack Brother     No Known Problems Son     Cancer Cousin         colon    Cancer Cousin     Breast cancer Cousin     Breast cancer Other     Diabetes Other     Blindness Other         One eye is blind from complications of diabetes    Breast cancer Other     Diabetes Other     Breast cancer Other     Diabetes Other     Ovarian cancer Neg Hx     Endometrial cancer Neg Hx     Vaginal cancer Neg Hx     Cervical cancer Neg Hx      Past Medical History:   Diagnosis Date    Anemia     s/p knee surgery since surgery has resolved    Breast cancer, stage 0     lumpectomy in 1992    Cataract     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     Past Surgical History:   Procedure Laterality Date    BREAST BIOPSY Right     BREAST LUMPECTOMY Right     BUNIONECTOMY      Left foot (x2)    CARPAL TUNNEL RELEASE Right     38 years old    CATARACT EXTRACTION  Left     with lens     CATARACT EXTRACTION W/  INTRAOCULAR LENS IMPLANT Right 10/12/2015    Dr. Wilkins    COLONOSCOPY N/A 10/1/2019    Procedure: COLONOSCOPY;  Surgeon: Jarad Chavira MD;  Location: [facility] (4TH FLR);  Service: Endoscopy;  Laterality: N/A;  miralax prep (used miralax for last colonoscopy) - ERW    COLONOSCOPY W/ POLYPECTOMY  08/08/2013    RASHEED   06/24/2014    EYE SURGERY      HYSTERECTOMY  age 38    ALISTAIR; ovaries in place    INJECTION FOR SENTINEL NODE IDENTIFICATION Left 2/20/2023    Procedure: INJECTION, FOR SENTINEL NODE IDENTIFICATION-Left;  Surgeon: ROSIBEL Belcher MD;  Location: [facility] OR 2ND FLR;  Service: General;  Laterality: Left;    JOINT REPLACEMENT      bilateral knees    MASTECTOMY, PARTIAL Left 2/20/2023    Procedure: MASTECTOMY, PARTIAL-Left with radiological marker;  Surgeon: ROSIBEL Belcher MD;  Location: [facility] OR 2ND FLR;  Service: General;  Laterality: Left;    MASTECTOMY, PARTIAL Left 4/18/2023    Procedure: MASTECTOMY, PARTIAL-left re-excision;  Surgeon: ROSIBEL Belcher MD;  Location: [facility] OR [address] [place] [address];  Service: General;  Laterality: Left;    SENTINEL LYMPH NODE BIOPSY Left 2/20/2023    Procedure: BIOPSY, LYMPH NODE, SENTINEL-Left;  Surgeon: ROSIBEL Belcher MD;  Location: [facility] OR [address] [place] [address];  Service: General;  Laterality: Left;    TOTAL KNEE ARTHROPLASTY      Bilateral       Patient Active Problem List   Diagnosis    Hypertension    Osteoarthritis    Senile nuclear sclerosis    DDD (degenerative disc disease), lumbar    History of breast cancer    Ectatic thoracic aorta    Atherosclerosis of aorta    Mild carotid artery disease    Inflammatory arthritis    Vaginal vault prolapse, posthysterectomy    Prolapse of anterior vaginal wall    Facet arthropathy    Vaginal atrophy    Urinary incontinence, urge    Nocturia    Colon polyps    Depressed mood    Abdominal aortic pulsation    Aortic aneurysm without rupture    Posterior vitreous detachment of both eyes    Pseudophakia of both eyes     Dry eye syndrome of both eyes    Impaired functional mobility, balance, gait, and endurance    Decreased strength of lower extremity    Decreased range of motion of right ankle    Invasive ductal carcinoma of breast, left       Current Outpatient Medications   Medication Instructions    acetic acid-oxyquinoline (FEM PH) 0.9-0.025 % Gel 1 applicator, Vaginal, Twice weekly    alendronate (FOSAMAX) 70 mg, Oral, Every 7 days    amLODIPine (NORVASC) 10 mg, Oral, Daily    atenoloL (TENORMIN) 25 MG tablet TAKE 1 TABLET(25 MG) BY MOUTH EVERY DAY    benazepriL (LOTENSIN) 40 MG tablet TAKE 1 TABLET(40 MG) BY MOUTH EVERY DAY    calcium carbonate (OS-DHRUV) 600 mg, Oral, Once    ceFAZolin 2 gram SolR No dose, route, or frequency recorded.    ciclopirox (PENLAC) 8 % Soln Topical (Top), Nightly    dexAMETHasone (DECADRON) 4 mg/mL injection No dose, route, or frequency recorded.    diclofenac sodium (VOLTAREN) 2 g, Topical (Top), 3 times daily    DIPRIVAN 10 mg/mL infusion No dose, route, or frequency recorded.    fentaNYL (SUBLIMAZE) 50 mcg/mL injection No dose, route, or frequency recorded.    glycerin/min oil/polycarbophil (REPLENS VAGL) Vaginal    lidocaine HCL 2% (XYLOCAINE) 2 % jelly Topical (Top), As needed (PRN), For toe pain, apply up to twice daily as needed for pain.    midazolam (VERSED) 1 mg/mL injection No dose, route, or frequency recorded.    MULTIVITAMIN W-MINERALS/LUTEIN (CENTRUM SILVER ORAL) 1 tablet, Daily    omega 3-calcium-vit [id number] mg Cmpk Oral, Daily    OMNIPAQUE 350 350 mg iodine/mL Soln injection No dose, route, or frequency recorded.    ondansetron 4 mg/2 mL Soln No dose, route, or frequency recorded.    PHENYLephrine 10 mg/mL injection No dose, route, or frequency recorded.    ROPIvacaine 0.5%, PF, (NAROPIN) 5 mg/mL (0.5 %) injection No dose, route, or frequency recorded.    trospium (SANCTURA) 20 mg, Oral, 2 times daily    vitamin D (VITAMIN D3) 185 mg, Oral, Daily       Review of  "Systems:   Review of Systems   Constitutional:  Negative for chills and fever.   HENT:  Negative for congestion and sore throat.    Eyes:  Negative for photophobia and pain.   Respiratory:  Negative for cough and shortness of breath.    Cardiovascular:  Negative for chest pain and leg swelling.   Gastrointestinal:  Negative for abdominal pain and nausea.   Genitourinary:  Negative for dysuria and flank pain.   Musculoskeletal:  Negative for joint pain and myalgias.   Skin:  Negative for itching and rash.   Neurological:  Negative for dizziness and headaches.   Psychiatric/Behavioral:  Negative for depression. The patient is not nervous/anxious.      PHYSICAL EXAM:  /60   Pulse 65   Temp 98.6 °F (37 °C) (Oral)   Resp 18   Ht 5' 3" (1.6 m)   Wt 58.4 kg (128 lb 13.7 oz)   LMP  (LMP Unknown)   SpO2 97%   BMI 22.83 kg/m²     General Appearance:    Alert, cooperative, no distress, appears stated age   Head:    Normocephalic, without obvious abnormality, atraumatic   Throat:   Lips, mucosa, and tongue normal; teeth and gums normal   Neck:   Supple, symmetrical, no adenopathy;     thyroid:  no enlargement/tenderness/nodules   Lungs:     Clear to auscultation bilaterally, respirations unlabored    Heart:    Regular rate and rhythm, S1 and S2 normal   Breast Exam:    Bilateral breast normal. No masses, no tenderness, no skin or nipple abnormalities.  No lymphadenopathy.  Left breast bruising noted at biopsy site.   Abdomen:     Soft, non-tender, bowel sounds active, no masses, no organomegaly   Extremities:   Extremities normal, atraumatic, no cyanosis or edema   Pulses:   2+ and symmetric all extremities   Skin:   Skin color, texture, turgor normal, no rashes or lesions   Lymph nodes:   Cervical, supraclavicular, and axillary nodes normal   Neurologic:   CNII-XII intact, normal strength, sensation and reflexes     throughout     Pertinent Labs & Imaging:  Specimen (730h ago, onward)      None            No " results found for this or any previous visit (from the past 24 hour(s)).    Assessment & Plan:    1. Invasive ductal carcinoma of left breast      Patient is a 89 year old woman with history of right breast DCIS in 1992 (s/p lumpectomy, radiation and tamoxifen) who presents with newly diagnosed left invasive breast hormone positive breast cancer. Reviewed patients referring notes, imaging and pathology. Discussed diagnosis, staging, and treatment in detail with patient.   Patient's good performance status makes her a good candidate to proceed with lumpectomy and SLNB per Breast Surgery. Discussed with patient about possible radiation therapy after lumpectomy. Patient values her cognition and wishes to remain independent. Also discussed that due to patient's age and side effects of hormonal therapy (ie memory loss, arthralgias, hot flashes), side effects outweigh benefits so will defer at this time.     Patient underwent lumpectomy April 2023, multiple margins were positive for DCIS  Decided against surgery, underwent 16 fractions of radiation (finished 7/13/23)  Discussed with patient the that 10-year chance of recurrence is about 10% and at her age the risk of side effects outweighs the benefits of hormonal therapy   Patient does not need to follow-up in oncology clinic     Patient is in agreement with above plan. She was advised to contact the office with any questions.      Route Chart for Scheduling  Med Onc Route Chart for Scheduling  MDM includes  :    - Acute or chronic illness or injury that poses a threat to life or bodily function  - Review of prior external notes from unique source  - Independent review and explanation of 3+ results from unique tests  - Extensive discussion of treatment and management  - Prescription drug management  - Drug therapy requiring intensive monitoring for toxicity      Discussed with Dr. Saldivar.    Matt Juarez MD PGY IV  07/24/2023

## 2023-07-25 ENCOUNTER — TELEPHONE (OUTPATIENT)
Dept: HEMATOLOGY/ONCOLOGY | Facility: CLINIC | Age: 88
End: 2023-07-25
Payer: MEDICARE

## 2023-07-25 NOTE — TELEPHONE ENCOUNTER
----- Message from Atul Condon RN sent at 7/24/2023  4:29 PM CDT -----  Regarding: FW: Call Back  Contact: Pt [phone]    ----- Message -----  From: Norah Arrington  Sent: 7/24/2023   4:26 PM CDT  To: Janna Karimi Staff  Subject: Call Back                                        Pt is calling stating she received a call from the clinic and is asking that they call back please call

## 2023-08-14 ENCOUNTER — OFFICE VISIT (OUTPATIENT)
Dept: PODIATRY | Facility: CLINIC | Age: 88
End: 2023-08-14
Payer: MEDICARE

## 2023-08-14 VITALS
WEIGHT: 128.75 LBS | SYSTOLIC BLOOD PRESSURE: 152 MMHG | DIASTOLIC BLOOD PRESSURE: 67 MMHG | HEART RATE: 67 BPM | HEIGHT: 63 IN | BODY MASS INDEX: 22.81 KG/M2

## 2023-08-14 DIAGNOSIS — B35.1 ONYCHOMYCOSIS DUE TO DERMATOPHYTE: Primary | ICD-10-CM

## 2023-08-14 DIAGNOSIS — L84 CORNS/CALLOSITIES: ICD-10-CM

## 2023-08-14 PROCEDURE — 17999 PR NON-COVERED FOOT CARE: ICD-10-PCS | Mod: CSM,HCNC,S$GLB, | Performed by: PODIATRIST

## 2023-08-14 PROCEDURE — 99499 UNLISTED E&M SERVICE: CPT | Mod: HCNC,,, | Performed by: PODIATRIST

## 2023-08-14 PROCEDURE — 99999 PR PBB SHADOW E&M-EST. PATIENT-LVL IV: CPT | Mod: PBBFAC,HCNC,, | Performed by: PODIATRIST

## 2023-08-14 PROCEDURE — 99499 NO LOS: ICD-10-PCS | Mod: HCNC,,, | Performed by: PODIATRIST

## 2023-08-14 PROCEDURE — 17999 UNLISTD PX SKN MUC MEMB SUBQ: CPT | Mod: CSM,HCNC,S$GLB, | Performed by: PODIATRIST

## 2023-08-14 PROCEDURE — 99999 PR PBB SHADOW E&M-EST. PATIENT-LVL IV: ICD-10-PCS | Mod: PBBFAC,HCNC,, | Performed by: PODIATRIST

## 2023-08-14 RX ORDER — ACETAMINOPHEN 10 MG/ML
INJECTION, SOLUTION INTRAVENOUS
Status: ON HOLD | COMMUNITY
Start: 2023-04-18 | End: 2024-03-13 | Stop reason: CLARIF

## 2023-09-22 ENCOUNTER — TELEPHONE (OUTPATIENT)
Dept: OPTOMETRY | Facility: CLINIC | Age: 88
End: 2023-09-22
Payer: MEDICARE

## 2023-09-25 ENCOUNTER — TELEPHONE (OUTPATIENT)
Dept: PODIATRY | Facility: CLINIC | Age: 88
End: 2023-09-25
Payer: MEDICARE

## 2023-09-25 NOTE — TELEPHONE ENCOUNTER
Left vm for pt with callback number of [phone] in regards to r/s 9/26/2023 appt due to provider not being in clinic. No portal to send message.

## 2023-09-25 NOTE — TELEPHONE ENCOUNTER
Spoke to pt and rescheduled appt. Original appt:9/26/2023  New Appt:9/27/2023 with Tommy. Pt did not want to wait for first available. Pt given directions to new location. Pt verbalized understanding.

## 2023-09-27 ENCOUNTER — OFFICE VISIT (OUTPATIENT)
Dept: PODIATRY | Facility: CLINIC | Age: 88
End: 2023-09-27

## 2023-09-27 VITALS — RESPIRATION RATE: 18 BRPM | HEIGHT: 63 IN | WEIGHT: 128 LBS | BODY MASS INDEX: 22.68 KG/M2

## 2023-09-27 DIAGNOSIS — L60.9 DISEASE OF NAIL: ICD-10-CM

## 2023-09-27 DIAGNOSIS — M20.60 DEFORMITY OF TOE, UNSPECIFIED LATERALITY: ICD-10-CM

## 2023-09-27 DIAGNOSIS — L84 CORN OR CALLUS: Primary | ICD-10-CM

## 2023-09-27 PROCEDURE — 99999 PR PBB SHADOW E&M-EST. PATIENT-LVL IV: ICD-10-PCS | Mod: PBBFAC,,, | Performed by: PODIATRIST

## 2023-09-27 PROCEDURE — 99499 NO LOS: ICD-10-PCS | Mod: ,,, | Performed by: PODIATRIST

## 2023-09-27 PROCEDURE — 17999 PR NON-COVERED FOOT CARE: ICD-10-PCS | Mod: CSM,S$GLB,, | Performed by: PODIATRIST

## 2023-09-27 PROCEDURE — 99999 PR PBB SHADOW E&M-EST. PATIENT-LVL IV: CPT | Mod: PBBFAC,,, | Performed by: PODIATRIST

## 2023-09-27 PROCEDURE — 17999 UNLISTD PX SKN MUC MEMB SUBQ: CPT | Mod: CSM,S$GLB,, | Performed by: PODIATRIST

## 2023-09-27 PROCEDURE — 99499 UNLISTED E&M SERVICE: CPT | Mod: ,,, | Performed by: PODIATRIST

## 2023-09-27 NOTE — PROCEDURES
Procedures  Patient is a patient of Dr. Cochran.  She presents today because he is out of clinic    Pt presents for routine non-covered foot care. Pt. does not have high risk feet. Pedal pulses are palpable. Nails are elongated, thickened Bilaterally. Diagnosis is onychauxis. Focal hyperkeratotic lesion consisting entirely of hyperkeratotic tissue without open skin, drainage, pus, fluctuance, malodor, or signs of infection   Nails were reduced Bilaterally. - With patient's permission, Utilizing a #15 scalpel, I trimmed the corns and calluses at the above mentioned location.      The patient will continue to monitor the areas daily, inspect the feet, wear protective shoe gear when ambulatory, and moisturizer to maintain skin integrity.  Patient tolerated well and related relief. RTC p.r.n. as PROC B      She discusses her foot surgery on the left from Dr. Cruz [place].  Patient relates that the foot is not in good alignment and often hurts the bunion area where she gets the callus  She also has a skin tag on the 4th toe  With multiple areas of calluses        Applied U shaped moleskin    Dispensed Lido gel for right medial border hallux nail as it was painful upon debridement no signs of infection  Continue shoes    Follow-up in a few months with Dr Cochran. Welcome to f/u with me as needed

## 2023-10-02 ENCOUNTER — TELEPHONE (OUTPATIENT)
Dept: PODIATRY | Facility: CLINIC | Age: 88
End: 2023-10-02
Payer: MEDICARE

## 2023-10-02 NOTE — TELEPHONE ENCOUNTER
Does the patient know what this is regarding?: PT WOULD LIKE TO SPEAK WITH THE OFFICE ABOUT HER FOOT SHE STATED THAT PROVIDER TIFFANY DIDN'T DO WHAT SHE NEEDED HER TO DO WITH HER FOOT PLEASE REACH OUT TO PT SHE STATED SHE'S IN EXTREME PAIN     Would the patient rather a call back YES     Best Call Back Number: [phone]     Additional Information: Thank You     Left voice message for patient to give our office a call back at [phone] help with scheduling appointment with Dr. Pappas.

## 2023-10-04 ENCOUNTER — TELEPHONE (OUTPATIENT)
Dept: INTERNAL MEDICINE | Facility: CLINIC | Age: 88
End: 2023-10-04
Payer: MEDICARE

## 2023-10-04 DIAGNOSIS — Z00.00 ANNUAL PHYSICAL EXAM: Primary | ICD-10-CM

## 2023-10-04 DIAGNOSIS — I10 ESSENTIAL HYPERTENSION: ICD-10-CM

## 2023-10-04 DIAGNOSIS — M19.90 OSTEOARTHRITIS, UNSPECIFIED OSTEOARTHRITIS TYPE, UNSPECIFIED SITE: ICD-10-CM

## 2023-10-04 DIAGNOSIS — Z85.3 HISTORY OF BREAST CANCER: ICD-10-CM

## 2023-10-04 NOTE — TELEPHONE ENCOUNTER
"Called patient back to follow up. She said that she was upset because no one ever calls her to schedule appointments and that she has to "run behind us" to make sure that she has an appointment. I made sure to let her know that at her next appointment, that I will schedule her future appointment before she leaves.    She also is requesting that any labs that she needs be done before the appointment.   "

## 2023-10-04 NOTE — TELEPHONE ENCOUNTER
----- Message from Jennifer Nava sent at 10/4/2023  3:28 PM CDT -----  Contact: [phone]  Pt is requesting a call back in regards to a letter she received about scheduling an appt. Per pt, she has an appt scheduled for November. Please call.                Thank you

## 2023-10-05 ENCOUNTER — TELEPHONE (OUTPATIENT)
Dept: OPTOMETRY | Facility: CLINIC | Age: 88
End: 2023-10-05
Payer: MEDICARE

## 2023-10-06 ENCOUNTER — TELEPHONE (OUTPATIENT)
Dept: OPHTHALMOLOGY | Facility: CLINIC | Age: 88
End: 2023-10-06
Payer: MEDICARE

## 2023-10-06 NOTE — TELEPHONE ENCOUNTER
"Scheduled pt to see Dr. Samuel on 11/9 @ 8:30    ----- Message from Elli Samuel OD sent at 10/6/2023  1:26 PM CDT -----  Offer her next avail green spot thanks  ----- Message -----  From: Stephan Nova  Sent: 10/6/2023   1:18 PM CDT  To: Jimmie MRATIN Staff    Consult/Advisory:          Name Of Caller:  Denisse Luna (Daughter)       Contact Preference?:  [phone] (Mobile)      What is the nature of the call?: Following up w/ office about an appt. Stating she was supposed to receive a call yesterday, but never did      Additional Notes:  "Thank you for all that you do for our patients"        "

## 2023-10-11 ENCOUNTER — OFFICE VISIT (OUTPATIENT)
Dept: PODIATRY | Facility: CLINIC | Age: 88
End: 2023-10-11
Payer: MEDICARE

## 2023-10-11 VITALS
HEART RATE: 75 BPM | BODY MASS INDEX: 22.69 KG/M2 | HEIGHT: 63 IN | SYSTOLIC BLOOD PRESSURE: 160 MMHG | WEIGHT: 128.06 LBS | DIASTOLIC BLOOD PRESSURE: 76 MMHG

## 2023-10-11 DIAGNOSIS — L84 CORNS/CALLOSITIES: Primary | ICD-10-CM

## 2023-10-11 PROCEDURE — 99499 NO LOS: ICD-10-PCS | Mod: HCNC,,, | Performed by: PODIATRIST

## 2023-10-11 PROCEDURE — 17999 UNLISTD PX SKN MUC MEMB SUBQ: CPT | Mod: CSM,HCNC,S$GLB, | Performed by: PODIATRIST

## 2023-10-11 PROCEDURE — 99999 PR PBB SHADOW E&M-EST. PATIENT-LVL IV: ICD-10-PCS | Mod: PBBFAC,HCNC,, | Performed by: PODIATRIST

## 2023-10-11 PROCEDURE — 17999 PR NON-COVERED FOOT CARE: ICD-10-PCS | Mod: CSM,HCNC,S$GLB, | Performed by: PODIATRIST

## 2023-10-11 PROCEDURE — 99999 PR PBB SHADOW E&M-EST. PATIENT-LVL IV: CPT | Mod: PBBFAC,HCNC,, | Performed by: PODIATRIST

## 2023-10-11 PROCEDURE — 99499 UNLISTED E&M SERVICE: CPT | Mod: HCNC,,, | Performed by: PODIATRIST

## 2023-10-11 RX ORDER — BNT162B2 ORIGINAL AND OMICRON BA.4/BA.5 .1125; .1125 MG/2.25ML; MG/2.25ML
INJECTION, SUSPENSION INTRAMUSCULAR
Status: ON HOLD | COMMUNITY
Start: 2023-08-25 | End: 2024-03-13 | Stop reason: CLARIF

## 2023-10-18 RX ORDER — AMLODIPINE BESYLATE 10 MG/1
10 TABLET ORAL DAILY
Qty: 90 TABLET | Refills: 1 | Status: SHIPPED | OUTPATIENT
Start: 2023-10-18

## 2023-10-18 NOTE — TELEPHONE ENCOUNTER
----- Message from Becky Perez sent at 10/18/2023  4:57 PM CDT -----  Contact: [phone]  Requesting an RX refill or new RX.  Is this a refill or new RX:   RX name and strength (copy/paste from chart):  amLODIPine (NORVASC) 10 MG tablet  Is this a 30 day or 90 day RX:   Pharmacy name and phone # (copy/paste from chart):      [id number] - [place] - [address] AT [facility] [address]  [address]  [facility] [address]  Phone: [phone] Fax: [phone]

## 2023-10-18 NOTE — TELEPHONE ENCOUNTER
The patient has an appointment with me on November 8th    Whenever it is convenient she can come in for pre visit labs

## 2023-10-18 NOTE — TELEPHONE ENCOUNTER
Care Due:                  Date            Visit Type   Department     Provider  --------------------------------------------------------------------------------                                EP -                              PRIMARY      [facility] PRIMARY  Last Visit: 11-      CARE ([facility])   MARKELL Macias                              EP -                              PRIMARY      [facility] INTERNAL  Next Visit: 11-      CARE ([facility])   KYLEIGH Macias                                                            Last  Test          Frequency    Reason                     Performed    Due Date  --------------------------------------------------------------------------------    Mg Level....  12 months..  alendronate..............  Not Found    Overdue    Phosphate...  12 months..  alendronate..............  Not Found    Overdue    Vitamin D...  12 months..  alendronate..............  11- 11-    [facility] Embedded Care Due Messages. Reference number: [id number].   10/18/2023 5:00:46 PM CDT

## 2023-10-21 ENCOUNTER — HOSPITAL ENCOUNTER (EMERGENCY)
Facility: HOSPITAL | Age: 88
Discharge: HOME | End: 2023-10-21
Attending: STUDENT IN AN ORGANIZED HEALTH CARE EDUCATION/TRAINING PROGRAM
Payer: MEDICARE

## 2023-10-21 VITALS
HEART RATE: 77 BPM | SYSTOLIC BLOOD PRESSURE: 159 MMHG | RESPIRATION RATE: 16 BRPM | DIASTOLIC BLOOD PRESSURE: 74 MMHG | BODY MASS INDEX: 23.57 KG/M2 | TEMPERATURE: 98 F | WEIGHT: 133 LBS | OXYGEN SATURATION: 98 % | HEIGHT: 63 IN

## 2023-10-21 DIAGNOSIS — M62.838 NECK MUSCLE SPASM: ICD-10-CM

## 2023-10-21 DIAGNOSIS — M54.2 NECK PAIN: Primary | ICD-10-CM

## 2023-10-21 DIAGNOSIS — M43.6 TORTICOLLIS: ICD-10-CM

## 2023-10-21 PROCEDURE — 25000003 PHARM REV CODE 250: Mod: HCNC | Performed by: PHYSICIAN ASSISTANT

## 2023-10-21 PROCEDURE — 99284 EMERGENCY DEPT VISIT MOD MDM: CPT | Mod: 25,HCNC

## 2023-10-21 RX ORDER — ONDANSETRON 4 MG/1
4 TABLET, ORALLY DISINTEGRATING ORAL
Status: COMPLETED | OUTPATIENT
Start: 2023-10-21 | End: 2023-10-21

## 2023-10-21 RX ORDER — ONDANSETRON 4 MG/1
4 TABLET, ORALLY DISINTEGRATING ORAL EVERY 8 HOURS PRN
Qty: 12 TABLET | Refills: 0 | Status: SHIPPED | OUTPATIENT
Start: 2023-10-21 | End: 2023-10-21 | Stop reason: SDUPTHER

## 2023-10-21 RX ORDER — LIDOCAINE 50 MG/G
1 PATCH TOPICAL DAILY
Qty: 15 PATCH | Refills: 0 | Status: ON HOLD | OUTPATIENT
Start: 2023-10-21 | End: 2024-03-13 | Stop reason: CLARIF

## 2023-10-21 RX ORDER — METHOCARBAMOL 500 MG/1
500 TABLET, FILM COATED ORAL 3 TIMES DAILY
Qty: 15 TABLET | Refills: 0 | Status: SHIPPED | OUTPATIENT
Start: 2023-10-21 | End: 2023-10-26

## 2023-10-21 RX ORDER — LIDOCAINE 50 MG/G
1 PATCH TOPICAL
Status: DISCONTINUED | OUTPATIENT
Start: 2023-10-21 | End: 2023-10-21 | Stop reason: HOSPADM

## 2023-10-21 RX ORDER — METHOCARBAMOL 500 MG/1
500 TABLET, FILM COATED ORAL
Status: COMPLETED | OUTPATIENT
Start: 2023-10-21 | End: 2023-10-21

## 2023-10-21 RX ORDER — ONDANSETRON 4 MG/1
4 TABLET, ORALLY DISINTEGRATING ORAL EVERY 8 HOURS PRN
Qty: 12 TABLET | Refills: 0 | Status: ON HOLD | OUTPATIENT
Start: 2023-10-21 | End: 2024-03-13 | Stop reason: CLARIF

## 2023-10-21 RX ADMIN — LIDOCAINE 1 PATCH: 50 PATCH CUTANEOUS at 01:10

## 2023-10-21 RX ADMIN — METHOCARBAMOL 500 MG: 500 TABLET ORAL at 01:10

## 2023-10-21 RX ADMIN — ONDANSETRON 4 MG: 4 TABLET, ORALLY DISINTEGRATING ORAL at 02:10

## 2023-10-21 NOTE — ED PROVIDER NOTES
Encounter Date: 10/21/2023       History     Chief Complaint   Patient presents with    Neck Pain     Can't move neck right or left, denies     89-year-old female with history of hypertension, osteoarthritis, breast cancer presents to the ED complaining of neck pain that started last night.  She got into bed around 9p, then developed neck pain, tried to adjust her pillow but did not have any improvement of her pain.  Pain was very severe this morning, 10/10, worse with movement.  She is unable to drive due to the neck pain so called her daughter to bring her to the ED. She has not taken any over-the-counter pain medication as all pain medications, including Tylenol and aspirin make her vomit.  No falls or trauma.  Denies fever, chills, chest pain, shortness of breath, headache, dizziness, numbness, paresthesias.    The history is provided by the patient.     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 Wilkins    COLONOSCOPY N/A 10/1/2019    Procedure:  COLONOSCOPY;  Surgeon: Jarad Chavira MD;  Location: [facility] ENDO (4TH FLR);  Service: Endoscopy;  Laterality: N/A;  miralax prep (used miralax for last colonoscopy) - ERW    COLONOSCOPY W/ POLYPECTOMY  08/08/2013    RASHEED   06/24/2014    EYE SURGERY      HYSTERECTOMY  age 38    ALISTAIR; ovaries in place    INJECTION FOR SENTINEL NODE IDENTIFICATION Left 2/20/2023    Procedure: INJECTION, FOR SENTINEL NODE IDENTIFICATION-Left;  Surgeon: ROSIBEL Belcher MD;  Location: [facility] OR 2ND FLR;  Service: General;  Laterality: Left;    JOINT REPLACEMENT      bilateral knees    MASTECTOMY, PARTIAL Left 2/20/2023    Procedure: MASTECTOMY, PARTIAL-Left with radiological marker;  Surgeon: ROSIBEL Belcher MD;  Location: [facility] OR 2ND FLR;  Service: General;  Laterality: Left;    MASTECTOMY, PARTIAL Left 4/18/2023    Procedure: MASTECTOMY, PARTIAL-left re-excision;  Surgeon: ROSIBEL Belcher MD;  Location: [facility] OR 2ND FLR;  Service: General;  Laterality: Left;    SENTINEL LYMPH NODE BIOPSY Left 2/20/2023    Procedure: BIOPSY, LYMPH NODE, SENTINEL-Left;  Surgeon: ROSIBEL Belcher MD;  Location: [facility] OR 2ND FLR;  Service: General;  Laterality: Left;    TOTAL KNEE ARTHROPLASTY      Bilateral     Family History   Problem Relation Age of Onset    Stroke Mother     Breast cancer Sister 82    Heart disease Sister     Diabetes Sister     Cancer Sister         breast cancer    Asthma Sister     Breast cancer Daughter 46    Cancer Daughter         breast    Breast cancer Daughter 45        negative genetic testing 2015    Cancer Daughter         breast    Breast cancer Daughter 37    Cancer Daughter         breast    Coronary artery disease Father     Diabetes Father     Heart disease Father     Heart attack Father     Heart disease Sister     Diabetes Sister     Diabetes Sister     Heart disease Sister     Hyperlipidemia Sister     Heart attack Brother     No Known Problems Son     Cancer Cousin         colon    Cancer Cousin     Breast cancer Cousin      Breast cancer Other     Diabetes Other     Blindness Other         One eye is blind from complications of diabetes    Breast cancer Other     Diabetes Other     Breast cancer Other     Diabetes Other     Ovarian cancer Neg Hx     Endometrial cancer Neg Hx     Vaginal cancer Neg Hx     Cervical cancer Neg Hx      Social History     Tobacco Use    Smoking status: Never    Smokeless tobacco: Never   Substance Use Topics    Alcohol use: Yes     Alcohol/week: 1.0 standard drink of alcohol     Types: 1 Glasses of wine per week     Comment: Rare, every other week    Drug use: No     Review of Systems   Musculoskeletal:  Positive for neck pain.       Physical Exam     Initial Vitals [10/21/23 1235]   BP Pulse Resp Temp SpO2   (!) 163/73 76 18 97.8 °F (36.6 °C) 97 %      MAP       --         Physical Exam    Nursing note and vitals reviewed.  Constitutional: She appears well-developed and well-nourished. She is not diaphoretic. No distress.   Cardiovascular:  Normal rate, regular rhythm and normal heart sounds.     Exam reveals no gallop and no friction rub.       No murmur heard.  Pulmonary/Chest: Breath sounds normal. She has no wheezes. She has no rhonchi. She has no rales.   Musculoskeletal:      Cervical back: Muscular tenderness present. No spinous process tenderness. Decreased range of motion.      Comments: Muscular tenderness noted to the L neck. No midline C spine tenderness. Bilateral radial pulses 2+. Normal sensation.     Neurological: She is alert and oriented to person, place, and time. No sensory deficit.   Skin: Skin is warm and dry. No rash noted. No erythema.   Psychiatric: She has a normal mood and affect.         ED Course   Procedures  Labs Reviewed - No data to display       Imaging Results              CT Cervical Spine Without Contrast (Final result)  Result time 10/21/23 14:31:09      Final result by Sandro Rey MD (10/21/23 14:31:09)                   Impression:      1. No acute cervical  spine fracture.  2. Degenerative findings, as discussed.      Electronically signed by: Sandro Rey  Date:    10/21/2023  Time:    14:31               Narrative:    EXAMINATION:  CT CERVICAL SPINE WITHOUT CONTRAST    CLINICAL HISTORY:  Neck pain, acute, known malignancy;    TECHNIQUE:  Low dose axial images, sagittal and coronal reformations were performed though the cervical spine.  Contrast was not administered.    COMPARISON:  None    FINDINGS:  Fractures: No acute fractures    Alignment: Grade 1 anterolisthesis of C3 on C4, C6 on C7, and C7-T1.  Atlanto-axial and atlanto-occipital joints: Atlanto-axial and atlanto-occipital intervals are not widened.  Facet joints: There is no traumatic facet joint widening.Multilevel degenerate facet arthropathy.  Ankylosis across the left C4-5 facet joint.  Vertebral bodies: Query osseous demineralization.  Multilevel degenerative endplate change.  Question rudimentary ribs at C7.  Discs: Degenerative disc disease.  Spinal canal and foraminal narrowing: Although CT does not optimally evaluate the soft tissue contents of the spinal canal and foramina, no critical stenosis is suggested.    At C3-4, moderate to severe right foraminal narrowing    At C4-5, mild central spinal canal stenosis    At C5-6, mild central spinal canal stenosis and moderate left foraminal narrowing      Paraspinal soft tissues: Heterogeneous thyroid gland.  Atherosclerosis of the carotid arteries.    Upper Lungs:Clear                                       Medications   LIDOcaine 5 % patch 1 patch (1 patch Transdermal Patch Applied 10/21/23 1357)   methocarbamoL tablet 500 mg (500 mg Oral Given 10/21/23 1359)   ondansetron disintegrating tablet 4 mg (4 mg Oral Given 10/21/23 1400)     Medical Decision Making  Amount and/or Complexity of Data Reviewed  Radiology: ordered.    Risk  Prescription drug management.         APC / Resident Notes:   89-year-old female with history of hypertension,  osteoarthritis, breast cancer presents to the ED complaining of neck pain that started last night.  Vital signs stable.  Well-appearing.  Regular rate and rhythm.  Lungs are clear.  Abdomen is soft, nontender.  There is no midline C-spine tenderness.  Muscular tenderness and spasm noted to the left neck and left upper back.  Decreased range of motion of the neck secondary to pain.  Bilateral radial pulses 2+.  Normal sensation to the upper extremities.  No rashes.  She does have a history of breast cancer.  Differential diagnosis includes but isn't limited to muscle strain, cervical radiculopathy, bony metastasis.  No trauma and I do not suspect fracture.  Will obtain CT of the C-spine for further evaluation.  Will give Lidoderm patch, Zofran, Robaxin.  Declined any pain medication.    CT with no acute fracture, degenerative changes. No bony destruction.     She had some increased ROM of the neck with lidoderm and robaxin. Declines any tylenol. I do not feel that she needs any further labs or imaging at this time. Stable for discharge.     She was discharged with prescriptions for lidoderm patches, robaxin, zofran.  She will follow up with her PCP.  Referral for physical therapy placed.  Strict ED return precautions given.  All of the patient's questions were answered.  I reviewed the patient's chart and imaging and discussed the case with my supervising physician.        Attending Attestation:     Physician Attestation Statement for NP/PA:   I have directed and reviewed the workup performed by the PA/NP.  I performed the substantive portion of the medical decision making.                               Clinical Impression:   Final diagnoses:  [M54.2] Neck pain (Primary)  [M43.6] Torticollis  [M62.838] Neck muscle spasm        ED Disposition Condition    Discharge Stable          ED Prescriptions       Medication Sig Dispense Start Date End Date Auth. Provider    LIDOcaine (LIDODERM) 5 % Place 1 patch onto the skin  once daily. Remove & Discard patch within 12 hours or as directed by MD 15 patch 10/21/2023 -- Ana Laura Llamas PA-C    methocarbamoL (ROBAXIN) 500 MG Tab Take 1 tablet (500 mg total) by mouth 3 (three) times daily. for 5 days 15 tablet 10/21/2023 10/26/2023 Ana Laura Llamas PA-C    ondansetron (ZOFRAN-ODT) 4 MG TbDL  (Status: Discontinued) Take 1 tablet (4 mg total) by mouth every 8 (eight) hours as needed (nausea). 12 tablet 10/21/2023 10/21/2023 Ana Laura Llamas PA-C    ondansetron (ZOFRAN-ODT) 4 MG TbDL Take 1 tablet (4 mg total) by mouth every 8 (eight) hours as needed (nausea). 12 tablet 10/21/2023 -- Ana Laura Llamas PA-C          Follow-up Information       Follow up With Specialties Details Why Contact Info    Kev Macias MD Internal Medicine   [address]  [place] [address]  [phone]               Ana Laura Llamas PA-C  10/21/23 7689       Nick Pate MD  10/22/23 7222

## 2023-10-21 NOTE — ED NOTES
Ernestine Luna, a 89 y.o. female presents to the ED w/ complaint of neck pain that started last night around 10 pm.  Cannot move her head L, R, or tilt it back. Pain even wraps around her neck as well    Triage note:  Chief Complaint   Patient presents with    Neck Pain     Can't move neck right or left, denies     Review of patient's allergies indicates:   Allergen Reactions    Tramadol Shortness Of Breath, Diarrhea and Nausea And Vomiting    Acetaminophen Nausea And Vomiting    Aspirin Nausea And Vomiting    Sulfa (sulfonamide antibiotics) Nausea And Vomiting    Prednisone Other (See Comments)     Stomach problems      Past Medical History:   Diagnosis Date    Anemia     s/p knee surgery since surgery has resolved    Breast cancer, stage 0     lumpectomy in 1992    Cataract     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

## 2023-10-24 ENCOUNTER — TELEPHONE (OUTPATIENT)
Dept: INTERNAL MEDICINE | Facility: CLINIC | Age: 88
End: 2023-10-24
Payer: MEDICARE

## 2023-10-24 ENCOUNTER — HOSPITAL ENCOUNTER (EMERGENCY)
Facility: HOSPITAL | Age: 88
Discharge: HOME | End: 2023-10-25
Attending: STUDENT IN AN ORGANIZED HEALTH CARE EDUCATION/TRAINING PROGRAM
Payer: MEDICARE

## 2023-10-24 DIAGNOSIS — N64.4 BREAST PAIN: Primary | ICD-10-CM

## 2023-10-24 DIAGNOSIS — E04.9 ENLARGED THYROID: ICD-10-CM

## 2023-10-24 DIAGNOSIS — R50.9 FEVER, UNSPECIFIED FEVER CAUSE: ICD-10-CM

## 2023-10-24 LAB
ALBUMIN SERPL BCP-MCNC: 3.9 G/DL (ref 3.5–5.2)
ALLENS TEST: NORMAL
ALP SERPL-CCNC: 65 U/L (ref 55–135)
ALT SERPL W/O P-5'-P-CCNC: 18 U/L (ref 10–44)
ANION GAP SERPL CALC-SCNC: 11 MMOL/L (ref 8–16)
AST SERPL-CCNC: 28 U/L (ref 10–40)
BACTERIA #/AREA URNS AUTO: NORMAL /HPF
BASOPHILS # BLD AUTO: 0.02 K/UL (ref 0–0.2)
BASOPHILS NFR BLD: 0.2 % (ref 0–1.9)
BILIRUB SERPL-MCNC: 0.8 MG/DL (ref 0.1–1)
BILIRUB UR QL STRIP: NEGATIVE
BNP SERPL-MCNC: 148 PG/ML (ref 0–99)
BUN SERPL-MCNC: 24 MG/DL (ref 10–30)
CALCIUM SERPL-MCNC: 10.2 MG/DL (ref 8.7–10.5)
CHLORIDE SERPL-SCNC: 101 MMOL/L (ref 95–110)
CLARITY UR REFRACT.AUTO: CLEAR
CO2 SERPL-SCNC: 22 MMOL/L (ref 23–29)
COLOR UR AUTO: YELLOW
CREAT SERPL-MCNC: 0.7 MG/DL (ref 0.5–1.4)
DIFFERENTIAL METHOD: ABNORMAL
EOSINOPHIL # BLD AUTO: 0 K/UL (ref 0–0.5)
EOSINOPHIL NFR BLD: 0 % (ref 0–8)
ERYTHROCYTE [DISTWIDTH] IN BLOOD BY AUTOMATED COUNT: 12.9 % (ref 11.5–14.5)
EST. GFR  (NO RACE VARIABLE): >60 ML/MIN/1.73 M^2
GLUCOSE SERPL-MCNC: 135 MG/DL (ref 70–110)
GLUCOSE UR QL STRIP: NEGATIVE
HCT VFR BLD AUTO: 39.3 % (ref 37–48.5)
HGB BLD-MCNC: 13.2 G/DL (ref 12–16)
HGB UR QL STRIP: ABNORMAL
IMM GRANULOCYTES # BLD AUTO: 0.06 K/UL (ref 0–0.04)
IMM GRANULOCYTES NFR BLD AUTO: 0.6 % (ref 0–0.5)
INFLUENZA A, MOLECULAR: NEGATIVE
INFLUENZA B, MOLECULAR: NEGATIVE
KETONES UR QL STRIP: NEGATIVE
LDH SERPL L TO P-CCNC: 0.78 MMOL/L (ref 0.5–2.2)
LEUKOCYTE ESTERASE UR QL STRIP: NEGATIVE
LYMPHOCYTES # BLD AUTO: 0.8 K/UL (ref 1–4.8)
LYMPHOCYTES NFR BLD: 7.8 % (ref 18–48)
MCH RBC QN AUTO: 31.2 PG (ref 27–31)
MCHC RBC AUTO-ENTMCNC: 33.6 G/DL (ref 32–36)
MCV RBC AUTO: 93 FL (ref 82–98)
MICROSCOPIC COMMENT: NORMAL
MONOCYTES # BLD AUTO: 0.6 K/UL (ref 0.3–1)
MONOCYTES NFR BLD: 5.8 % (ref 4–15)
NEUTROPHILS # BLD AUTO: 9.1 K/UL (ref 1.8–7.7)
NEUTROPHILS NFR BLD: 85.6 % (ref 38–73)
NITRITE UR QL STRIP: NEGATIVE
NRBC BLD-RTO: 0 /100 WBC
PH UR STRIP: 6 [PH] (ref 5–8)
PLATELET # BLD AUTO: 195 K/UL (ref 150–450)
PMV BLD AUTO: 10 FL (ref 9.2–12.9)
POC CARDIAC TROPONIN I: 0 NG/ML (ref 0–0.08)
POC CARDIAC TROPONIN I: 0.01 NG/ML (ref 0–0.08)
POTASSIUM SERPL-SCNC: 4.4 MMOL/L (ref 3.5–5.1)
PROT SERPL-MCNC: 8.2 G/DL (ref 6–8.4)
PROT UR QL STRIP: NEGATIVE
RBC # BLD AUTO: 4.23 M/UL (ref 4–5.4)
RBC #/AREA URNS AUTO: 1 /HPF (ref 0–4)
SAMPLE: NORMAL
SARS-COV-2 RDRP RESP QL NAA+PROBE: NEGATIVE
SITE: NORMAL
SODIUM SERPL-SCNC: 134 MMOL/L (ref 136–145)
SP GR UR STRIP: 1.01 (ref 1–1.03)
SPECIMEN SOURCE: NORMAL
SQUAMOUS #/AREA URNS AUTO: 0 /HPF
TROPONIN I SERPL DL<=0.01 NG/ML-MCNC: 0.01 NG/ML (ref 0–0.03)
TROPONIN I SERPL DL<=0.01 NG/ML-MCNC: 0.02 NG/ML (ref 0–0.03)
URN SPEC COLLECT METH UR: ABNORMAL
WBC # BLD AUTO: 10.59 K/UL (ref 3.9–12.7)
WBC #/AREA URNS AUTO: 4 /HPF (ref 0–5)

## 2023-10-24 PROCEDURE — 83605 ASSAY OF LACTIC ACID: CPT | Mod: HCNC

## 2023-10-24 PROCEDURE — 99285 EMERGENCY DEPT VISIT HI MDM: CPT | Mod: 25,HCNC

## 2023-10-24 PROCEDURE — U0002 COVID-19 LAB TEST NON-CDC: HCPCS | Mod: HCNC | Performed by: STUDENT IN AN ORGANIZED HEALTH CARE EDUCATION/TRAINING PROGRAM

## 2023-10-24 PROCEDURE — 81001 URINALYSIS AUTO W/SCOPE: CPT | Mod: HCNC | Performed by: STUDENT IN AN ORGANIZED HEALTH CARE EDUCATION/TRAINING PROGRAM

## 2023-10-24 PROCEDURE — 96374 THER/PROPH/DIAG INJ IV PUSH: CPT | Mod: HCNC

## 2023-10-24 PROCEDURE — 93010 ELECTROCARDIOGRAM REPORT: CPT | Mod: HCNC,,, | Performed by: INTERNAL MEDICINE

## 2023-10-24 PROCEDURE — 63600175 PHARM REV CODE 636 W HCPCS: Mod: HCNC | Performed by: STUDENT IN AN ORGANIZED HEALTH CARE EDUCATION/TRAINING PROGRAM

## 2023-10-24 PROCEDURE — 96361 HYDRATE IV INFUSION ADD-ON: CPT | Mod: HCNC

## 2023-10-24 PROCEDURE — 99900035 HC TECH TIME PER 15 MIN (STAT): Mod: HCNC

## 2023-10-24 PROCEDURE — 83880 ASSAY OF NATRIURETIC PEPTIDE: CPT | Mod: HCNC | Performed by: STUDENT IN AN ORGANIZED HEALTH CARE EDUCATION/TRAINING PROGRAM

## 2023-10-24 PROCEDURE — 84484 ASSAY OF TROPONIN QUANT: CPT | Mod: HCNC | Performed by: STUDENT IN AN ORGANIZED HEALTH CARE EDUCATION/TRAINING PROGRAM

## 2023-10-24 PROCEDURE — 85025 COMPLETE CBC W/AUTO DIFF WBC: CPT | Mod: HCNC | Performed by: STUDENT IN AN ORGANIZED HEALTH CARE EDUCATION/TRAINING PROGRAM

## 2023-10-24 PROCEDURE — 84484 ASSAY OF TROPONIN QUANT: CPT | Mod: HCNC

## 2023-10-24 PROCEDURE — 87502 INFLUENZA DNA AMP PROBE: CPT | Mod: HCNC | Performed by: STUDENT IN AN ORGANIZED HEALTH CARE EDUCATION/TRAINING PROGRAM

## 2023-10-24 PROCEDURE — 96375 TX/PRO/DX INJ NEW DRUG ADDON: CPT | Mod: HCNC

## 2023-10-24 PROCEDURE — 82803 BLOOD GASES ANY COMBINATION: CPT | Mod: HCNC

## 2023-10-24 PROCEDURE — 87040 BLOOD CULTURE FOR BACTERIA: CPT | Mod: HCNC | Performed by: STUDENT IN AN ORGANIZED HEALTH CARE EDUCATION/TRAINING PROGRAM

## 2023-10-24 PROCEDURE — 80053 COMPREHEN METABOLIC PANEL: CPT | Mod: HCNC | Performed by: STUDENT IN AN ORGANIZED HEALTH CARE EDUCATION/TRAINING PROGRAM

## 2023-10-24 PROCEDURE — 93010 EKG 12-LEAD: ICD-10-PCS | Mod: HCNC,,, | Performed by: INTERNAL MEDICINE

## 2023-10-24 PROCEDURE — 25500020 PHARM REV CODE 255: Mod: HCNC | Performed by: STUDENT IN AN ORGANIZED HEALTH CARE EDUCATION/TRAINING PROGRAM

## 2023-10-24 PROCEDURE — 93005 ELECTROCARDIOGRAM TRACING: CPT | Mod: HCNC

## 2023-10-24 RX ORDER — KETOROLAC TROMETHAMINE 30 MG/ML
15 INJECTION, SOLUTION INTRAMUSCULAR; INTRAVENOUS
Status: COMPLETED | OUTPATIENT
Start: 2023-10-24 | End: 2023-10-24

## 2023-10-24 RX ORDER — ONDANSETRON 2 MG/ML
4 INJECTION INTRAMUSCULAR; INTRAVENOUS
Status: COMPLETED | OUTPATIENT
Start: 2023-10-24 | End: 2023-10-24

## 2023-10-24 RX ORDER — HYDROCORTISONE ACETATE 25 MG/1
25 SUPPOSITORY RECTAL 2 TIMES DAILY
Qty: 6 SUPPOSITORY | Refills: 0 | Status: SHIPPED | OUTPATIENT
Start: 2023-10-24 | End: 2023-10-27

## 2023-10-24 RX ADMIN — IOHEXOL 75 ML: 350 INJECTION, SOLUTION INTRAVENOUS at 11:10

## 2023-10-24 RX ADMIN — KETOROLAC TROMETHAMINE 15 MG: 30 INJECTION, SOLUTION INTRAMUSCULAR; INTRAVENOUS at 07:10

## 2023-10-24 RX ADMIN — ONDANSETRON 4 MG: 2 INJECTION INTRAMUSCULAR; INTRAVENOUS at 07:10

## 2023-10-24 RX ADMIN — SODIUM CHLORIDE, POTASSIUM CHLORIDE, SODIUM LACTATE AND CALCIUM CHLORIDE 1000 ML: 600; 310; 30; 20 INJECTION, SOLUTION INTRAVENOUS at 07:10

## 2023-10-24 NOTE — TELEPHONE ENCOUNTER
----- Message from Chi Ricci sent at 10/23/2023  1:40 PM CDT -----  Contact: Pt [phone]  [email or web address]     Patient is calling for Medical Advice regarding: Compacted and used and enema and citrus of magnesia and lots of pain in the anus    How long has patient had these symptoms: 1 day    Pharmacy name and phone#: [id number] - [place] [address] AT [facility] [address] Phone:  [phone]  Fax:  [phone]

## 2023-10-24 NOTE — TELEPHONE ENCOUNTER
The patient know that I sent in a rectal suppository to use twice a day.  To help with the burning sensation in his okay to use over-the-counter MiraLax 1 capful in a glass of water once a day to help with constipation

## 2023-10-24 NOTE — TELEPHONE ENCOUNTER
"Called patient to follow up on yesterday's call,    She said she was very constipated and bloated, and needed relief. She says she thinks she may have overloaded it. Says she feeling a "burning sensation" in rectum.. said she's feeling better this morning.   "

## 2023-10-24 NOTE — ED PROVIDER NOTES
Chief Complaint   Chest Pain (Pain to L breast, chills, radiation to breast 2 mos ago)      History Of Present Illness   Ernestine Luna is a 89 y.o. female with a history of breast cancer (s/p lumpectomy and radiation this year), HTN, OA presenting with constant chest pain that began this morning. The pain is central, pleuritic, worse with palpation. The pain is not associated with exertion or positional. She endorses an associated subjective fever and chills. She was recently recently seen in the ED and discharged with a muscle relaxant/lidocaine patches for neck muscle spasms but she currently reports no neck pain.. She denies any cough, SOB, recent trauma, sick contacts, abdominal pain, nausea, vomiting, diarrhea, or dysuria.    Independent Historian: Yes  Other Historian or Collateral:  Daughter at bedside  Interpretor: No      Review of patient's allergies indicates:   Allergen Reactions    Tramadol Shortness Of Breath, Diarrhea and Nausea And Vomiting    Acetaminophen Nausea And Vomiting    Aspirin Nausea And Vomiting    Sulfa (sulfonamide antibiotics) Nausea And Vomiting    Prednisone Other (See Comments)     Stomach problems        Current Facility-Administered Medications on File Prior to Encounter   Medication Dose Route Frequency Provider Last Rate Last Admin    fentaNYL 50 mcg/mL injection  mcg   mcg Intravenous PRN Ilana Rodriguez MD   25 mcg at 04/18/23 0813    midazolam (VERSED) 1 mg/mL injection 0.5-4 mg  0.5-4 mg Intravenous PRN Ilana Rodriguez MD         Current Outpatient Medications on File Prior to Encounter   Medication Sig Dispense Refill    acetic acid-oxyquinoline (FEM PH) 0.9-0.025 % Gel Place 1 applicator vaginally twice a week. 50 g 11    alendronate (FOSAMAX) 70 MG tablet Take 1 tablet (70 mg total) by mouth every 7 days. 12 tablet 3    amLODIPine (NORVASC) 10 MG tablet Take 1 tablet (10 mg total) by mouth once daily. 90 tablet 1    atenoloL (TENORMIN) 25 MG tablet TAKE 1  TABLET(25 MG) BY MOUTH EVERY DAY 90 tablet 1    benazepriL (LOTENSIN) 40 MG tablet TAKE 1 TABLET(40 MG) BY MOUTH EVERY DAY 90 tablet 1    calcium carbonate (OS-DHRUV) 600 mg calcium (1,500 mg) Tab Take 600 mg by mouth once.      ceFAZolin 2 gram SolR       ciclopirox (PENLAC) 8 % Soln Apply topically nightly. 6.6 mL 11    dexAMETHasone (DECADRON) 4 mg/mL injection       diclofenac sodium (VOLTAREN) 1 % Gel Apply 2 g topically 3 (three) times daily. 100 g 3    DIPRIVAN 10 mg/mL infusion       fentaNYL (SUBLIMAZE) 50 mcg/mL injection       glycerin/min oil/polycarbophil (REPLENS VAGL) Place vaginally.      hydrocortisone (ANUSOL-HC) 25 mg suppository Place 1 suppository (25 mg total) rectally 2 (two) times daily. for 3 days 6 suppository 0    LIDOcaine (LIDODERM) 5 % Place 1 patch onto the skin once daily. Remove & Discard patch within 12 hours or as directed by MD 15 patch 0    lidocaine HCL 2% (XYLOCAINE) 2 % jelly Apply topically as needed. For toe pain, apply up to twice daily as needed for pain. 30 mL 3    methocarbamoL (ROBAXIN) 500 MG Tab Take 1 tablet (500 mg total) by mouth 3 (three) times daily. for 5 days 15 tablet 0    midazolam (VERSED) 1 mg/mL injection       MULTIVITAMIN W-MINERALS/LUTEIN (CENTRUM SILVER ORAL) Take 1 tablet by mouth Daily.      OFIRMEV 1,000 mg/100 mL (10 mg/mL) Soln       omega 3-calcium-vit [id number] mg Cmpk Take by mouth once daily.      OMNIPAQUE 350 350 mg iodine/mL Soln injection       ondansetron (ZOFRAN-ODT) 4 MG TbDL Take 1 tablet (4 mg total) by mouth every 8 (eight) hours as needed (nausea). 12 tablet 0    ondansetron 4 mg/2 mL Soln       PFIZER COVID BIVAL,12Y UP,,PF, 30 mcg/0.3 mL injection Inject into the muscle.      PHENYLephrine 10 mg/mL injection       ROPIvacaine 0.5%, PF, (NAROPIN) 5 mg/mL (0.5 %) injection       trospium (SANCTURA) 20 mg Tab tablet Take 1 tablet (20 mg total) by mouth 2 (two) times daily. 60 tablet 11    vitamin D 1000 units Tab Take 185  mg by mouth once daily.         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 Wilkins    COLONOSCOPY N/A 10/1/2019    Procedure: COLONOSCOPY;  Surgeon: Jarad Chavira MD;  Location: [facility] (4TH [facility]);  Service: Endoscopy;  Laterality: N/A;  miralax prep (used miralax for last colonoscopy) - ERW    COLONOSCOPY W/ POLYPECTOMY  08/08/2013    RASHEED   06/24/2014    EYE SURGERY      HYSTERECTOMY  age 38    ALISTAIR; ovaries in place    INJECTION FOR SENTINEL NODE IDENTIFICATION Left 2/20/2023    Procedure: INJECTION, FOR SENTINEL NODE IDENTIFICATION-Left;  Surgeon: ROSIBEL Belcher MD;  Location: [facility] OR 2ND [facility];  Service: General;  Laterality: Left;    JOINT REPLACEMENT      bilateral knees    MASTECTOMY, PARTIAL Left 2/20/2023    Procedure: MASTECTOMY, PARTIAL-Left with radiological marker;  Surgeon: ROSIBEL Belcher MD;  Location: [facility] OR [address] [place] [address];  Service: General;  Laterality: Left;    MASTECTOMY, PARTIAL Left 4/18/2023    Procedure: MASTECTOMY, PARTIAL-left re-excision;  Surgeon: ROSIBEL Belcher MD;  Location: [facility] OR [address] [place] [address];  Service: General;  Laterality: Left;    SENTINEL LYMPH NODE BIOPSY Left 2/20/2023    Procedure: BIOPSY, LYMPH NODE, SENTINEL-Left;  Surgeon: ROSIBEL Belcher MD;  Location: [facility] OR [address] [place] [address];  Service: General;   Laterality: Left;    TOTAL KNEE ARTHROPLASTY      Bilateral       Social History     Socioeconomic History    Marital status:    Tobacco Use    Smoking status: Never    Smokeless tobacco: Never   Substance and Sexual Activity    Alcohol use: Yes     Alcohol/week: 1.0 standard drink of alcohol     Types: 1 Glasses of wine per week     Comment: Rare, every other week    Drug use: No    Sexual activity: Not Currently     Birth control/protection: None     Comment:      Social Determinants of Health     Financial Resource Strain: Low Risk  (3/5/2021)    Overall Financial Resource Strain (CARDIA)     Difficulty of Paying Living Expenses: Not hard at all   Food Insecurity: No Food Insecurity (3/5/2021)    Hunger Vital Sign     Worried About Running Out of Food in the Last Year: Never true     Ran Out of Food in the Last Year: Never true   Transportation Needs: No Transportation Needs (3/5/2021)    PRAPARE - Transportation     Lack of Transportation (Medical): No     Lack of Transportation (Non-Medical): No   Physical Activity: Insufficiently Active (3/5/2021)    Exercise Vital Sign     Days of Exercise per Week: 4 days     Minutes of Exercise per Session: 30 min   Stress: No Stress Concern Present (3/5/2021)    Citizen of [place] of Occupational Health - Occupational Stress Questionnaire     Feeling of Stress : Only a little   Social Connections: Moderately Isolated (3/5/2021)    Social Connection and Isolation Panel [NHANES]     Frequency of Communication with Friends and Family: More than three times a week     Frequency of Social Gatherings with Friends and Family: Twice a week     Attends Hindu Services: More than 4 times per year     Active Member of Clubs or Organizations: No     Attends Club or Organization Meetings: Never     Marital Status:    Housing Stability: Low Risk  (3/5/2021)    Housing Stability Vital Sign     Unable to Pay for Housing in the Last Year: No     Number of Places  "Lived in the Last Year: 1     Unstable Housing in the Last Year: No       Family History   Problem Relation Age of Onset    Stroke Mother     Breast cancer Sister 82    Heart disease Sister     Diabetes Sister     Cancer Sister         breast cancer    Asthma Sister     Breast cancer Daughter 46    Cancer Daughter         breast    Breast cancer Daughter 45        negative genetic testing 2015    Cancer Daughter         breast    Breast cancer Daughter 37    Cancer Daughter         breast    Coronary artery disease Father     Diabetes Father     Heart disease Father     Heart attack Father     Heart disease Sister     Diabetes Sister     Diabetes Sister     Heart disease Sister     Hyperlipidemia Sister     Heart attack Brother     No Known Problems Son     Cancer Cousin         colon    Cancer Cousin     Breast cancer Cousin     Breast cancer Other     Diabetes Other     Blindness Other         One eye is blind from complications of diabetes    Breast cancer Other     Diabetes Other     Breast cancer Other     Diabetes Other     Ovarian cancer Neg Hx     Endometrial cancer Neg Hx     Vaginal cancer Neg Hx     Cervical cancer Neg Hx        Physical Exam     Vitals:    10/24/23 1720 10/24/23 1842   BP: (!) 161/92 (!) 178/86   Pulse: 96 104   Resp: 18 (!) 22   Temp: 99.8 °F (37.7 °C) (!) 101.8 °F (38.8 °C)   TempSrc: Oral Oral   SpO2: 98% 96%   Weight: 61.7 kg (136 lb)    Height: 5' 3" (1.6 m)        Physical Exam  Vitals reviewed.   Constitutional:       General: She is not in acute distress.     Appearance: Normal appearance. She is not toxic-appearing.   HENT:      Head: Normocephalic and atraumatic.      Nose: No congestion.      Mouth/Throat:      Mouth: Mucous membranes are moist.      Pharynx: Oropharynx is clear. No oropharyngeal exudate.   Eyes:      General: No scleral icterus.     Extraocular Movements: Extraocular movements intact.   Cardiovascular:      Rate and Rhythm: Normal rate and regular rhythm. "   Pulmonary:      Effort: No respiratory distress.      Breath sounds: No wheezing or rhonchi.   Chest:      Chest wall: Tenderness (Midsternal without palpable deformity, crepitus, or swelling.) present.      Comments: No palpable breast masses, swelling, or fluctuance.  Abdominal:      General: There is no distension.      Palpations: Abdomen is soft.      Tenderness: There is no abdominal tenderness. There is no guarding.   Musculoskeletal:         General: No deformity.      Cervical back: No rigidity.   Skin:     General: Skin is warm and dry.      Capillary Refill: Capillary refill takes less than 2 seconds.   Neurological:      General: No focal deficit present.      Mental Status: She is alert and oriented to person, place, and time.             Results     Labs Reviewed   CBC W/ AUTO DIFFERENTIAL - Abnormal; Notable for the following components:       Result Value    MCH 31.2 (*)     Immature Granulocytes 0.6 (*)     Gran # (ANC) 9.1 (*)     Immature Grans (Abs) 0.06 (*)     Lymph # 0.8 (*)     Gran % 85.6 (*)     Lymph % 7.8 (*)     All other components within normal limits   COMPREHENSIVE METABOLIC PANEL - Abnormal; Notable for the following components:    Sodium 134 (*)     CO2 22 (*)     Glucose 135 (*)     All other components within normal limits   B-TYPE NATRIURETIC PEPTIDE - Abnormal; Notable for the following components:     (*)     All other components within normal limits   INFLUENZA A & B BY MOLECULAR   CULTURE, BLOOD   CULTURE, BLOOD   TROPONIN I   SARS-COV-2 RNA AMPLIFICATION, QUAL   TROPONIN ISTAT   TROPONIN ISTAT   TROPONIN I   URINALYSIS, REFLEX TO URINE CULTURE   ISTAT LACTATE   POCT TROPONIN   POCT TROPONIN       Imaging Results              X-Ray Chest AP Portable (Final result)  Result time 10/24/23 19:39:36      Final result by Bharathi Gorman MD (10/24/23 19:39:36)                   Impression:      1. Chronic appearing interstitial findings, no large focal  consolidation.      Electronically signed by: Bharathi Gorman MD  Date:    10/24/2023  Time:    19:39               Narrative:    EXAMINATION:  XR CHEST AP PORTABLE    CLINICAL HISTORY:  Sepsis;    TECHNIQUE:  Single frontal view of the chest was performed.    COMPARISON:  09/20/2022    FINDINGS:  The cardiomediastinal silhouette is prominent, similar to the previous exam noting magnification by technique.  There is calcification of the aorta.  There is elevation of the right hemidiaphragm..  There is no pleural effusion.  The trachea is midline.  The lungs are symmetrically expanded bilaterally with mildly coarse interstitial attenuation.  No large focal consolidation seen.  There is no pneumothorax.  The osseous structures are remarkable for degenerative changes noting osteopenia.  There are prominent degenerative changes of the bilateral glenohumeral joints.                                            Initial [facility]   Medical Decision Making  Patient is a 89-year-old woman presenting with chest pain.  Given her associated chest wall tenderness and fever, most concerning for pleurisy, costochondritis, viral illness.  We will swab test for COVID/flu.  Lower suspicion for pneumonia but we will get chest x-ray to further evaluate.  Lower suspicion for ACS, arrhythmia but we will get workup including EKG and troponin to further evaluate.  Consider PE given her history of breast cancer and pleuritic nature.  Given her risk factors, we will get CTA chest to further evaluate.    Amount and/or Complexity of Data Reviewed  Labs: ordered.  Radiology: ordered.    Risk  Prescription drug management.               Medications Given / Interventions     Medications   iohexoL (OMNIPAQUE 350) injection 75 mL (has no administration in time range)   lactated ringers bolus 1,000 mL (0 mLs Intravenous Stopped 10/24/23 2037)   ketorolac injection 15 mg (15 mg Intravenous Given 10/24/23 1917)   ondansetron injection 4 mg (4 mg Intravenous  Given 10/24/23 1958)       Procedures     ED POCUS Performed: No    Reassessment and ED Course     ED Course as of 10/24/23 2232   Tue Oct 24, 2023   2230 Flu/COVID testing negative.  Initial troponin negative.  BNP minimally elevated but consistent with some prior levels.  Initial lactic acid normal.  CMP grossly okay.  CBC grossly okay.     Patient is pending UA, 2nd troponin, and CTA chest.  Patient signed out to oncoming provider pending results of the studies.   [CH]      ED Course User Index  [CH] Ana Laura Aguirre MD              Final diagnoses:  [R07.89] Chest discomfort  [R07.9] Chest pain  [R50.9] Fever, unspecified fever cause (Primary)           Dispo                        Ana Laura Aguirre MD  10/24/23 2232

## 2023-10-25 ENCOUNTER — OFFICE VISIT (OUTPATIENT)
Dept: RADIATION ONCOLOGY | Facility: CLINIC | Age: 88
End: 2023-10-25
Payer: MEDICARE

## 2023-10-25 VITALS
DIASTOLIC BLOOD PRESSURE: 80 MMHG | BODY MASS INDEX: 23.51 KG/M2 | WEIGHT: 136 LBS | HEIGHT: 63 IN | SYSTOLIC BLOOD PRESSURE: 142 MMHG | HEART RATE: 85 BPM | DIASTOLIC BLOOD PRESSURE: 65 MMHG | WEIGHT: 132.69 LBS | BODY MASS INDEX: 24.1 KG/M2 | OXYGEN SATURATION: 99 % | SYSTOLIC BLOOD PRESSURE: 155 MMHG | RESPIRATION RATE: 16 BRPM | HEIGHT: 63 IN | HEART RATE: 71 BPM | TEMPERATURE: 99 F | RESPIRATION RATE: 18 BRPM

## 2023-10-25 DIAGNOSIS — C50.912 INVASIVE DUCTAL CARCINOMA OF BREAST, LEFT: Primary | Chronic | ICD-10-CM

## 2023-10-25 PROCEDURE — 99999 PR PBB SHADOW E&M-EST. PATIENT-LVL IV: ICD-10-PCS | Mod: PBBFAC,HCNC,, | Performed by: RADIOLOGY

## 2023-10-25 PROCEDURE — 99999 PR PBB SHADOW E&M-EST. PATIENT-LVL IV: CPT | Mod: PBBFAC,HCNC,, | Performed by: RADIOLOGY

## 2023-10-25 PROCEDURE — 99024 PR POST-OP FOLLOW-UP VISIT: ICD-10-PCS | Mod: HCNC,S$GLB,, | Performed by: RADIOLOGY

## 2023-10-25 PROCEDURE — 99024 POSTOP FOLLOW-UP VISIT: CPT | Mod: HCNC,S$GLB,, | Performed by: RADIOLOGY

## 2023-10-25 NOTE — PROVIDER PROGRESS NOTES - EMERGENCY DEPT.
Signout Note    I received signout from the previous provider.     Chief complaint:  Chest Pain (Pain to L breast, chills, radiation to breast 2 mos ago)      Pertinent history &exam:  Ernestine Luna is a 89 y.o. female with pertinent PMH of breast cancer currently receiving radiation who presented to emergency department with complaint of pain in her left breast.    Patient states that the breast pain began earlier today.  She denies any actual chest pain.  No shortness of breath, dizziness, lightheadedness.    She did note a fever today.  No cough, abdominal pain, vomiting, diarrhea.    Prior team obtain labs, EKG.  She was found to be COVID, flu, RSV negative.  She had no leukocytosis.  No acute kidney injury or electrolyte derangement.  Negative troponin.    She was signed out to me pending urinalysis and CT PE for final disposition    Vitals:    10/25/23 0107   BP: (!) 155/80   Pulse: 85   Resp: 18   Temp: 98.8 °F (37.1 °C)       Imaging Studies:    CTA Chest Non-Coronary (PE Studies)   Final Result   Abnormal      No evidence of pulmonary thromboembolism.      Skin thickening and soft tissue stranding about the left breast, correlate with physical exam and breast history.      Cystic structure in the left axilla measuring up to 4.3 cm, new from prior, possible seroma or hematoma.  Neoplasm other pathology not fully excluded.  Correlate clinically.      Enlarged multinodular thyroid.  Correlate clinically.      Mucosal thickening is again suggested in the incompletely visualized stomach.  Gastritis is a consideration.  Other infiltrative gastric pathology not fully excluded.  Correlate clinically; consider outpatient EGD.      This report was flagged in Epic as abnormal.      Electronically signed by resident: Richard Calles   Date:    10/24/2023   Time:    23:31      Electronically signed by: Mark Tran   Date:    10/25/2023   Time:    00:19      X-Ray Chest AP Portable   Final Result      1. Chronic  appearing interstitial findings, no large focal consolidation.         Electronically signed by: Bharathi Gorman MD   Date:    10/24/2023   Time:    19:39          Medications Given:  Medications   lactated ringers bolus 1,000 mL (0 mLs Intravenous Stopped 10/24/23 2037)   ketorolac injection 15 mg (15 mg Intravenous Given 10/24/23 1917)   ondansetron injection 4 mg (4 mg Intravenous Given 10/24/23 1958)   iohexoL (OMNIPAQUE 350) injection 75 mL (75 mLs Intravenous Given 10/24/23 2305)       Pending Items/ MDM:  89 y.o. female with above complaint.  Urinalysis not consistent with infection.      CT PE reviewed, no pulmonary embolism, there is evidence of cancer in the left breast, and a cystic mass in the left axilla.  I reassessed the patient, she was absolutely no tenderness there, I do not believe this is consistent with an abscess clinically.  Differential according to Radiology includes seroma versus hematoma.  This cystic structure is not in the area where she had pain, which was on the anterior medial portion of the breast.  I examined that part of the breast, there is no evidence of cellulitis there.      Incidental findings were noted including gastric thickening, and an enlarged thyroid.  She was informed about incidental findings.  She plans to follow up with her breast surgeon.      With respect to the fever, I did not find any obvious source.  Do not believe that there is indication for antibiotics at this time.  She was discharged home with close return precautions.    Diagnostic Impression:    1. Breast pain    2. Fever, unspecified fever cause    3. Enlarged thyroid         ED Disposition Condition    Discharge Stable          ED Prescriptions    None       Follow-up Information       Follow up With Specialties Details Why Contact Info Additional Information    Kev Macias MD Internal Medicine Schedule an appointment as soon as possible for a visit  As needed [id number]  [facility]  [address]  [phone]       [facility] Breast Surgery Schedule an appointment as soon as possible for a visit   [address] [facility] [address] [phone] Please park in the [facility] surface lot on the [address] side. Follow signage to the [facility] entrance facing the [address] Parking Garage. Check in on the 1st oor.    [facility] 4th Fl Gastroenterology Schedule an appointment as soon as possible for a visit   [address] [place] [address] [phone] GI Center & Urology - Main Building, 4th Floor Please park in South Garage and take Atrium elevator            Patient and family understands the plan and is in agreement, verbalized understanding, questions answered    Viviane Jfefrey MD  Emergency Medicine

## 2023-10-25 NOTE — PROGRESS NOTES
[facility] - Radiation Oncology    Patient ID: Ernestine Luna is a 89 y.o. female.    Chief Complaint: Breast Cancer (F/u after xrt)    Mrs. 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  She recently presented with Stage IA, T1b, pN0, M0, G2, ER/MA+ carcinoma of the Lt. breast.   She represented in January of 2023 when diagnostic MMG confirmed a 7 mm irregularly shaped mass at the 6 o'clock position in the Lt.. breast. Biopsies confirmed Grade 2 invasive ductal carcinoma. 95% of the cells were strongly ER and MA positive, Her-2 was 2+ but negative by FISH, Ki-67 was 20%. She underwent partial mastectomy with sentinel node biopsy on 2/20/23. Pathology revealed an 8 mm focus of infiltrating ductal carcinoma, histologic grade 3, nuclear grade 2 and mitotic index 2. There was associated DCIS and EIC. There was no LVI. The margins were negative for invasive cancer but DCIS was at the posterior and superior margins. Two sentinel nodes were negative for tumor involvement. She underwent re-excision on 4/18/23. Intermediate grade DCIS was present in the new superior, medial and posterior margins. The superior and posterior margins were focally positive. The patient declined further surgery was referred for radiotherapy. She completed radiotherapy to the Lt. breast in July of 2023. Today the patient states she feels well   Was seen in the ED yesterday for fever.  Work up was negative.       Review of Systems   Constitutional:  Negative for activity change, appetite change and fatigue.   Respiratory:  Negative for cough and shortness of breath.    Gastrointestinal:  Negative for abdominal pain, nausea and vomiting.   Integumentary:  Negative for breast mass and breast tenderness.   Breast: Negative for mass and tenderness    Physical Exam  Constitutional:       General: She is not in acute  distress.     Appearance: Normal appearance.   Pulmonary:      Effort: Pulmonary effort is normal. No respiratory distress.   Chest:   Breasts:     Left: No swelling, bleeding, mass or skin change.   Neurological:      Mental Status: She is alert.          Assessment and Plan     1. Invasive ductal carcinoma of breast, left      Recovered from the acute effects of radiotherapy. Continue follow up with breast surgery.

## 2023-10-25 NOTE — DISCHARGE INSTRUCTIONS
As discussed, your CT scan showed a cystic structure in the left armpit area, which should be followed up by your breast surgeon.  You also have some thickening of your stomach which can be followed up by a gastroenterologist.  You also have it in enlarged thyroid, which can be followed up by your primary care doctor. We did not find a cause of your fever today.  You were COVID and flu negative.  There was no pneumonia or urinary tract infection.  Return to the emergency department if you have ongoing fevers, or any worsening of your symptoms.  Please follow up with your radiation oncologist later today    Tests today showed:   Labs Reviewed   CBC W/ AUTO DIFFERENTIAL - Abnormal; Notable for the following components:       Result Value    MCH 31.2 (*)     Immature Granulocytes 0.6 (*)     Gran # (ANC) 9.1 (*)     Immature Grans (Abs) 0.06 (*)     Lymph # 0.8 (*)     Gran % 85.6 (*)     Lymph % 7.8 (*)     All other components within normal limits   COMPREHENSIVE METABOLIC PANEL - Abnormal; Notable for the following components:    Sodium 134 (*)     CO2 22 (*)     Glucose 135 (*)     All other components within normal limits   B-TYPE NATRIURETIC PEPTIDE - Abnormal; Notable for the following components:     (*)     All other components within normal limits   URINALYSIS, REFLEX TO URINE CULTURE - Abnormal; Notable for the following components:    Occult Blood UA 2+ (*)     All other components within normal limits    Narrative:     Specimen Source->Urine   INFLUENZA A & B BY MOLECULAR   CULTURE, BLOOD   CULTURE, BLOOD   TROPONIN I   TROPONIN I   SARS-COV-2 RNA AMPLIFICATION, QUAL   TROPONIN ISTAT   TROPONIN ISTAT   URINALYSIS MICROSCOPIC    Narrative:     Specimen Source->Urine   ISTAT LACTATE   POCT TROPONIN   POCT TROPONIN     CTA Chest Non-Coronary (PE Studies)   Final Result   Abnormal      No evidence of pulmonary thromboembolism.      Skin thickening and soft tissue stranding about the left breast,  correlate with physical exam and breast history.      Cystic structure in the left axilla measuring up to 4.3 cm, new from prior, possible seroma or hematoma.  Neoplasm other pathology not fully excluded.  Correlate clinically.      Enlarged multinodular thyroid.  Correlate clinically.      Mucosal thickening is again suggested in the incompletely visualized stomach.  Gastritis is a consideration.  Other infiltrative gastric pathology not fully excluded.  Correlate clinically; consider outpatient EGD.      This report was flagged in Epic as abnormal.      Electronically signed by resident: Richard Calles   Date:    10/24/2023   Time:    23:31      Electronically signed by: Mark Tran   Date:    10/25/2023   Time:    00:19      X-Ray Chest AP Portable   Final Result      1. Chronic appearing interstitial findings, no large focal consolidation.         Electronically signed by: Bharathi Gorman MD   Date:    10/24/2023   Time:    19:39          Treatments you had today:   Medications   lactated ringers bolus 1,000 mL (0 mLs Intravenous Stopped 10/24/23 2037)   ketorolac injection 15 mg (15 mg Intravenous Given 10/24/23 1917)   ondansetron injection 4 mg (4 mg Intravenous Given 10/24/23 1958)   iohexoL (OMNIPAQUE 350) injection 75 mL (75 mLs Intravenous Given 10/24/23 2305)       Follow-Up Plan:  - Follow-up with primary care doctor within 3 - 5 days  - Additional testing and/or evaluation as directed by your primary doctor    Return to the Emergency Department for symptoms including but not limited to: worsening symptoms, shortness of breath or chest pain, vomiting with inability to hold down fluids, fevers greater than 100.4°F, passing out/fainting/unconsciousness, or other concerning symptoms.

## 2023-10-25 NOTE — ED NOTES
Patient identifiers for Ernestine Luna 89 y.o. female checked and correct.  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     Allergies reported:   Review of patient's allergies indicates:   Allergen Reactions    Tramadol Shortness Of Breath, Diarrhea and Nausea And Vomiting    Acetaminophen Nausea And Vomiting    Aspirin Nausea And Vomiting    Sulfa (sulfonamide antibiotics) Nausea And Vomiting    Prednisone Other (See Comments)     Stomach problems          LOC: Patient is awake, alert, and aware of environment with an appropriate affect. Patient is oriented x 4 and speaking appropriately.  APPEARANCE: Patient resting comfortably and is clutching her chest. Patient is clean and well groomed, patient's clothing is properly fastened.  HEENT: - JVD, + midline trach  SKIN: The skin is warm and dry. Patient has normal skin turgor and moist mucus membranes.   MUSKULOSKELETAL: Patient is moving all extremities well, no obvious deformities noted. Pulses intact. PMS x 4  RESPIRATORY: Airway is open and patent. Respirations are spontaneous and non-labored with normal effort and rate. = CBBS  CARDIAC: Patient has a tachycardic rate and regular rhythm of 104 on cardiac monitor. No peripheral edema noted. + 2 bilateral pedal and radial pulses. Endorses 10/10 midsternal, nonradiating, pressure quality c/p.  ABDOMEN: No distention noted. Soft and non-tender upon palpation.  NEUROLOGICAL: pupils 3 mm, PERRL. Facial expression is symmetrical. Hand grasps are  equal bilaterally. Normal sensation in all extremities when touched with finger.

## 2023-10-25 NOTE — ED TRIAGE NOTES
89 y/o F presents to ER with c/c chest pain since this morning. Pt describes pain as midsternal, nonradiating, pressure quality c/p. Pt denies SOB, and N/V/D. Pt has h/x breast cancer and last immunotherapy was 2 months ago.

## 2023-10-26 ENCOUNTER — CLINICAL SUPPORT (OUTPATIENT)
Dept: REHABILITATION | Facility: OTHER | Age: 88
End: 2023-10-26
Payer: MEDICARE

## 2023-10-26 DIAGNOSIS — R29.898 DECREASED RANGE OF MOTION OF NECK: ICD-10-CM

## 2023-10-26 DIAGNOSIS — M53.82 WEAKNESS OF NECK: ICD-10-CM

## 2023-10-26 DIAGNOSIS — M43.6 TORTICOLLIS: ICD-10-CM

## 2023-10-26 DIAGNOSIS — M54.2 NECK PAIN: ICD-10-CM

## 2023-10-26 DIAGNOSIS — R29.3 POSTURAL IMBALANCE: ICD-10-CM

## 2023-10-26 PROCEDURE — 97161 PT EVAL LOW COMPLEX 20 MIN: CPT | Mod: HCNC,PN

## 2023-10-26 PROCEDURE — 97530 THERAPEUTIC ACTIVITIES: CPT | Mod: HCNC,PN

## 2023-10-26 NOTE — PLAN OF CARE
"[facility]   Physical Therapy Initial Evaluation      Name: Ernestine Luna  Clinic Number: [id number]    Therapy Diagnosis:   Encounter Diagnoses   Name Primary?    Neck pain     Torticollis     Decreased range of motion of neck     Postural imbalance     Weakness of neck         Physician: Ana Laura Llamas PA-C    Physician Orders: PT Eval and Treat   Medical Diagnosis from Referral: M54.2 (ICD-10-CM) - Neck pain M43.6 (ICD-10-CM) - Torticollis   Evaluation Date: 10/26/2023  Authorization Period Expiration: 10/20/24  Plan of Care Expiration: 1/24/24  Progress Note Due: 11/26/23  Date of Surgery: none  Visit # / Visits authorized: 1/ 1   FOTO: 1/ 3    Precautions: Standard     Time In: 4:35 pm  Time Out: 5:15 pm  Total Billable Time: 40 minutes    Subjective     Date of onset: September of 2022    History of current condition - Ernestine reports she went to [facility] in 2022 for neck pain and she's had pain that comes and goes ever since then. The most recent flare up occurred last Saturday - she had just gotten out of the shower and she felt her neck "grabbed her" and it shot down the center of her head and wrapped around her neck. She went to the ER to assess her symptoms and then she went back again on Tuesday because of the pain. They told her they don't know exactly what was happening but they did perform imaging on her spine. They did mention torticollis to her that has progressed over time. The pain mainly bothers her when she goes to lay down. She denies any pain down into the arms and no radicular symptoms.     Falls: no falls.    Imaging: MRI studies, bone scan films:     FINDINGS:  Fractures: No acute fractures     Alignment: Grade 1 anterolisthesis of C3 on C4, C6 on C7, and C7-T1.  Atlanto-axial and atlanto-occipital joints: Atlanto-axial and atlanto-occipital intervals are not widened.  Facet joints: There is no traumatic facet joint widening.Multilevel degenerate facet " arthropathy.  Ankylosis across the left C4-5 facet joint.  Vertebral bodies: Query osseous demineralization.  Multilevel degenerative endplate change.  Question rudimentary ribs at C7.  Discs: Degenerative disc disease.  Spinal canal and foraminal narrowing: Although CT does not optimally evaluate the soft tissue contents of the spinal canal and foramina, no critical stenosis is suggested.     At C3-4, moderate to severe right foraminal narrowing     At C4-5, mild central spinal canal stenosis     At C5-6, mild central spinal canal stenosis and moderate left foraminal narrowing        Paraspinal soft tissues: Heterogeneous thyroid gland.  Atherosclerosis of the carotid arteries.    1. No acute cervical spine fracture.  2. Degenerative findings, as discussed.    Prior Therapy: Yes - many years ago but for her shoulder   Social History: Lives alone with a dog    Occupation: Retired, enjoys going for daily walks   Prior Level of Function: Independent with all activities, walking daily   Current Level of Function: Independent but pain with all daily activities that is slowing her down.     Pain:  Current 2/10, worst 10/10, best 2/10   Location: base of neck (below skull) bilaterally   Description: Grabbing and Sharp  Aggravating Factors: lying down.   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       Surgical History:   Ernestine Luna  has a past surgical history that includes Bunionectomy; Total knee  arthroplasty; Carpal tunnel release (Right); Cataract extraction (Left); Cataract extraction w/  intraocular lens implant (Right, 10/12/2015); Joint replacement; Colonoscopy w/ polypectomy (08/08/2013); RASHEED  (06/24/2014); Eye surgery; Hysterectomy (age 38); Breast lumpectomy (Right); Breast biopsy (Right); Colonoscopy (N/A, 10/1/2019); Mastectomy, partial (Left, 2/20/2023); [place] lymph node biopsy (Left, 2/20/2023); Injection for sentinel node identification (Left, 2/20/2023); and Mastectomy, partial (Left, 4/18/2023).    Medications:   Ernestine has a current medication list which includes the following prescription(s): acetic acid-oxyquinoline, alendronate, amlodipine, atenolol, benazepril, calcium carbonate, ciclopirox, diclofenac sodium, glycerin/min oil/polycarbophil, hydrocortisone, lidocaine, lidocaine hcl 2%, methocarbamol, folic acid/multivit-min/lutein, ofirmev, omeg3-calcium-d3-folic-mvit 13, ondansetron, pfizer covid bival(12y up)(pf), trospium, and vitamin d, and the following Facility-Administered Medications: fentanyl and midazolam.    Allergies:   Review of patient's allergies indicates:   Allergen Reactions    Tramadol Shortness Of Breath, Diarrhea and Nausea And Vomiting    Acetaminophen Nausea And Vomiting    Aspirin Nausea And Vomiting    Sulfa (sulfonamide antibiotics) Nausea And Vomiting    Prednisone Other (See Comments)     Stomach problems         Objective        Observations: Pt presents with upper crossed posture; head forward with rounded shoulders    Handedness (R: Right, L: Left): R handed     Shoulder screening: ROM limited B due to arthritis in shoulders (L>R)     Cervical AROM:   Cervical AROM Approximate degrees (Normal) Comments if applicable    Flexion  50 (50-60) No increased pain    Extension  25 (60-70) Slight pain    R Rotation  50 (70-90) Pulling on left side    L Rotation  45 (70-90) Pulling on right side    R Side bending  25 (20-45) Slight pain    L Side bending 20  (20-45) Slight pain    Quadrant testing (+ or - for s/s):  Right: negative vs Left: negative      Cervical Strength: (weakness throughout) but no pain    Flexion: 4/5   Extension: 4/5   SB: 4/5   Rotation: 4/5    Muscle flexibility (Y: Yes, N: No)  - Upper trapezius muscle tightness with passive stretching: yes  - Levator scapulae musce tightness with passive stretching: yes      Upper Extremity Strength  (R) UE  (L) UE    Shoulder flexion: 4/5 Shoulder flexion: 4/5   Shoulder Abduction: 4/5 Shoulder abduction: 4/5   Shoulder ER 4/5 Shoulder ER 4/5   Shoulder IR 4/5 Shoulder IR 4/5   Elbow flexion: 4/5 Elbow flexion: 4/5   Elbow extension: 4/5 Elbow extension: 4/5   Wrist flexion: 4/5 Wrist flexion: 4/5   Wrist extension: 4/5 Wrist extension: 4/5   Lower Trap 4/5 Lower Trap 4/5   Upper Trap 4/5 Upper Trap 4/5   Rhomboids 4/5 Rhomboids 4/5       Dermatomes: WNL      Myotomes: WFL        Palpation: Pt presents with tenderness to palpation to along B suboccipitals, cervical multifidi, and B periscapular region along rhomboids.    Joint Mobility: CPA's: hypomobility through cervical spine C3-C7    Special testing cluster exams:  - Criteria for cervical radiculopathy:        - Cervical Rotation AROM < 60*, relief with distraction, s/s with Spurlings, + ULTTA  - Met 1/4 criteria for cervical radiculopathy  - Met the < 60 active ROM, negative for spurlings and tension tests.     Cervical myelopathy cluster:   - Gait deviations/coordination deficits, (+) Ramachandran, (+) Inverted supinator sign, (+) Babinski, Age>46 y/o   - Met 2/5 for cervical myelopathy (1/5 rule out, 3/5 rule in)   - Met age > 45, mild balance issues but that's due to age and balance (nothing out of ordinary for her)    Upper Cervical Clearing Screening (+/-):  - Sharp-Bladimir = negative  - Alar ligament test = negative  - Transverse ligament test = NT  - Vertebral Insufficiency Testing = negative    [place] C spine Rules:            Intake Outcome Measure  for FOTO Neck Survey    Therapist reviewed FOTO scores for Ernestine Luna on 10/26/2023.   FOTO report - see Media section or FOTO account episode details.    Intake Score: 41%    Goal Score: 53%         Treatment     Total Treatment time (time-based codes) separate from Evaluation: 10 minutes     Ernestine received the treatments listed below:      therapeutic exercises to develop strength, endurance, ROM, flexibility, and posture for 0 minutes including:  None today.       manual therapy techniques: Joint mobilizations, Manual traction, Myofacial release, and Soft tissue Mobilization were applied to the: neck for 0 minutes, including:  None today.       neuromuscular re-education activities to improve: Balance, Coordination, Kinesthetic, Sense, Proprioception, and Posture for 0 minutes. The following activities were included:  None today.       therapeutic activities to improve functional performance for 10  minutes, including:  + development, demonstration, and review of HEP:   Chin tucks with overpressure   Scapular retractions   UT stretch - sit on hand for addition stretch   No money with chin tuck   + education on pain symptoms, pain pathology, and influence of posture on neck pain          Patient Education and Home Exercises     Education provided:   - Pt educated on current diagnosis, pain pathology, muscle anatomy, and influence of posture on neck pain.  - Pt educated on role and purpose of PT.  - Pt educated on HEP to perform at home to begin maintaining progress and improving mobility.     Written Home Exercises Provided: yes. Exercises were reviewed and Ernestine was able to demonstrate them prior to the end of the session.  Ernestine demonstrated good  understanding of the education provided. See EMR under Patient Instructions for exercises provided during therapy sessions.    Assessment     Ernestine is a 89 y.o. female referred to outpatient Physical Therapy with a medical diagnosis of M54.2 (ICD-10-CM) - Neck  pain M43.6 (ICD-10-CM) - Torticollis. Patient presents with chief complaint of neck pain in suboccipital region that flared up several days ago. Patient exhibits decreased neck range of motion, weakness of cervical musculature, and tenderness along suboccipitals, periscapular region, and cervical multifidi. Patient noted to have some hypomobility through cervical SP with mild tenderness during mobs. Patient most limited with sleeping tolerance, as well as pain with cervical range of motion - mainly with rotation and side bending. S/sx consistent with referring diagnosis of neck pain. No radicular symptoms noted during evaluation. At this time, patient would benefit from skilled PT to address her deficits and improve tolerance with daily activities.       Patient prognosis is Good.   Patient will benefit from skilled outpatient Physical Therapy to address the deficits stated above and in the chart below, provide patient /family education, and to maximize patientt's level of independence.     Plan of care discussed with patient: Yes  Patient's spiritual, cultural and educational needs considered and patient is agreeable to the plan of care and goals as stated below:     Anticipated Barriers for therapy: standard, hypertension, limited shoulder ROM due to OA.    Medical Necessity is demonstrated by the following  History  Co-morbidities and personal factors that may impact the plan of care [] LOW: no personal factors / co-morbidities  [] MODERATE: 1-2 personal factors / co-morbidities  [x] HIGH: 3+ personal factors / co-morbidities    Moderate / High Support Documentation:   Co-morbidities affecting plan of care: HTN, B shoulder OA with limited ROM    Personal Factors: age     Examination  Body Structures and Functions, activity limitations and participation restrictions that may impact the plan of care [x] LOW: addressing 1-2 elements  [] MODERATE: 3+ elements  [] HIGH: 4+ elements (please support below)    Moderate /  High Support Documentation: n/a     Clinical Presentation [x] LOW: stable  [] MODERATE: Evolving  [] HIGH: Unstable     Decision Making/ Complexity Score: low       Goals:  Short Term Goals: 4 weeks   Patient will increase active cervical ROM by 5 degrees in all directions to improve tolerance with sleeping and driving.  Patient will increase strength in cervical muscles by 1/2 grade to increase tolerance with sleeping and performing daily activities.  Patient will report pain < 5/10 when laying down to increase tolerance with sleeping.  Patient will increase strength in periscapular region by 1/2 grade to increase postural stability.   Patient will increase score of FOTO by 10% showing improved mobility.       Long Term Goals: 12 weeks   Patient will increase active cervical ROM by 10 degrees in all directions for tolerance with driving and sleeping.  Patient will achieve 5/5 strength in cervical musculature for tolerance with performing daily activities and housework.  Patient will achieve 5/5 strength in periscapular region for improved postural stability.  Patient will report pain < 3/10 with laying down to increase tolerance with sleeping at night.  Patient will report independence with HEP to maintain progress and improve overall mobility.      Plan     Plan of care Certification: 10/26/2023 to 1/24/24.    Outpatient Physical Therapy 2 times weekly for 12 weeks to include the following interventions: Aquatic Therapy, Cervical/Lumbar Traction, Electrical Stimulation as needed, Iontophoresis (with dexamethasone), Manual Therapy, Moist Heat/ Ice, Neuromuscular Re-ed, Orthotic Management and Training, Patient Education, Self Care, Therapeutic Activities, Therapeutic Exercise, Ultrasound, and Dry Needling .     Samantha Medina PT        Physician's Signature: _________________________________________ Date: ________________

## 2023-10-29 LAB
BACTERIA BLD CULT: NORMAL
BACTERIA BLD CULT: NORMAL

## 2023-10-31 ENCOUNTER — TELEPHONE (OUTPATIENT)
Dept: RADIATION ONCOLOGY | Facility: CLINIC | Age: 88
End: 2023-10-31
Payer: MEDICARE

## 2023-10-31 NOTE — TELEPHONE ENCOUNTER
October 29, 2021        Adarsh Martin  [address] [place] [address]      Dear Adarsh Martin,      We attempted to contact you by phone; however, the number we have has been disconnected    As follow up from our call, we need to cancel your appointment withDr. George Mcclellan M.D. that was scheduled for 12/14/2021 at 3:45pm.  Our records show you are due for follow up colonoscopy.    Please contact our office at [phone] to schedule.  We apologize for the inconvenience.          Sincerely,    Dr. Mcclellan’s office  [phone]   Returned call, no answer. Left voicemail requesting a call back.

## 2023-11-01 ENCOUNTER — TELEPHONE (OUTPATIENT)
Dept: INTERNAL MEDICINE | Facility: CLINIC | Age: 88
End: 2023-11-01
Payer: MEDICARE

## 2023-11-01 NOTE — TELEPHONE ENCOUNTER
----- Message from Yanna Mdeina sent at 11/1/2023  4:09 PM CDT -----  Contact: Ernestine   Patient is returning a phone call.  Who left a message for the patient: not sure who called her   Does patient know what this is regarding:  she has a knot on her right hand?  Would you like a call back, or a response through your [facility] portal?: call back   Comments:

## 2023-11-02 ENCOUNTER — OFFICE VISIT (OUTPATIENT)
Dept: URGENT CARE | Facility: CLINIC | Age: 88
End: 2023-11-02
Payer: MEDICARE

## 2023-11-02 VITALS
BODY MASS INDEX: 23.39 KG/M2 | RESPIRATION RATE: 16 BRPM | TEMPERATURE: 99 F | HEIGHT: 63 IN | WEIGHT: 132 LBS | OXYGEN SATURATION: 97 % | SYSTOLIC BLOOD PRESSURE: 155 MMHG | HEART RATE: 76 BPM | DIASTOLIC BLOOD PRESSURE: 72 MMHG

## 2023-11-02 DIAGNOSIS — S69.91XA INJURY OF RIGHT WRIST, INITIAL ENCOUNTER: ICD-10-CM

## 2023-11-02 DIAGNOSIS — M25.431 SWELLING OF RIGHT WRIST: ICD-10-CM

## 2023-11-02 DIAGNOSIS — M11.20 CHONDROCALCINOSIS: ICD-10-CM

## 2023-11-02 DIAGNOSIS — M25.531 RIGHT WRIST PAIN: Primary | ICD-10-CM

## 2023-11-02 DIAGNOSIS — R22.31 LOCALIZED SWELLING ON RIGHT HAND: ICD-10-CM

## 2023-11-02 PROCEDURE — 96372 THER/PROPH/DIAG INJ SC/IM: CPT | Mod: S$GLB,,, | Performed by: NURSE PRACTITIONER

## 2023-11-02 PROCEDURE — 99213 OFFICE O/P EST LOW 20 MIN: CPT | Mod: 25,S$GLB,, | Performed by: NURSE PRACTITIONER

## 2023-11-02 PROCEDURE — 73110 X-RAY EXAM OF WRIST: CPT | Mod: RT,S$GLB,, | Performed by: RADIOLOGY

## 2023-11-02 PROCEDURE — 96372 PR INJECTION,THERAP/PROPH/DIAG2ST, IM OR SUBCUT: ICD-10-PCS | Mod: S$GLB,,, | Performed by: NURSE PRACTITIONER

## 2023-11-02 PROCEDURE — 73110 XR WRIST COMPLETE 3 VIEWS RIGHT: ICD-10-PCS | Mod: RT,S$GLB,, | Performed by: RADIOLOGY

## 2023-11-02 PROCEDURE — 99213 PR OFFICE/OUTPT VISIT, EST, LEVL III, 20-29 MIN: ICD-10-PCS | Mod: 25,S$GLB,, | Performed by: NURSE PRACTITIONER

## 2023-11-02 RX ORDER — DEXAMETHASONE SODIUM PHOSPHATE 100 MG/10ML
10 INJECTION INTRAMUSCULAR; INTRAVENOUS
Status: COMPLETED | OUTPATIENT
Start: 2023-11-02 | End: 2023-11-02

## 2023-11-02 RX ORDER — COLCHICINE 0.6 MG/1
TABLET ORAL
Qty: 3 TABLET | Refills: 0 | Status: SHIPPED | OUTPATIENT
Start: 2023-11-02

## 2023-11-02 RX ADMIN — DEXAMETHASONE SODIUM PHOSPHATE 10 MG: 100 INJECTION INTRAMUSCULAR; INTRAVENOUS at 06:11

## 2023-11-02 NOTE — PATIENT INSTRUCTIONS
Please drink plenty of fluids.  Please get plenty of rest.  Please return here or go to the Emergency Department for any concerns or worsening of condition.  Rest, ice, compression and elevation to the affected joint or limb as needed.  Please follow up with your primary care doctor or specialist as needed.

## 2023-11-02 NOTE — PROGRESS NOTES
"Subjective:      Patient ID: Ernestine Luna is a 89 y.o. female.    Vitals:  height is 5' 3" (1.6 m) and weight is 59.9 kg (132 lb). Her oral temperature is 98.8 °F (37.1 °C). Her blood pressure is 155/72 (abnormal) and her pulse is 76. Her respiration is 16 and oxygen saturation is 97%.     Chief Complaint: Wrist Injury (RIGHT )    Pt injured her right wrist on Sunday. However, yesterday her right wrist/ hand started to swell bad. Pt did use cold compression treated. However, it didn't work.     89-year-old female presents to clinic with complaints of right wrist injury on Sunday with swelling, tenderness and warmth to right hand wrist and forearm started 5 days later. She reports sliding up in bed using her right hand for support and possibly injuring her right wrist wrist. Patient cannot take NSAIDS, Tylenol, or oral steroids     Wrist Injury   Incident onset: x 4 days. The incident occurred at home. The pain is present in the right wrist. The quality of the pain is described as aching. The pain radiates to the right hand. The pain is at a severity of 10/10. The pain is severe. The symptoms are aggravated by movement. She has tried ice for the symptoms. The treatment provided no relief.     Musculoskeletal:  Positive for pain, joint pain, joint swelling, abnormal ROM of joint and muscle ache. Negative for trauma and gout.      Objective:     Physical Exam   Constitutional: She is oriented to person, place, and time. She appears well-developed. She is cooperative. No distress.   HENT:   Head: Normocephalic and atraumatic.   Nose: Nose normal.   Mouth/Throat: Oropharynx is clear and moist and mucous membranes are normal.   Eyes: Conjunctivae and lids are normal.   Neck: Trachea normal and phonation normal. Neck supple.   Cardiovascular: Normal rate and normal pulses.   Pulmonary/Chest: Effort normal.   Abdominal: Normal appearance.   Musculoskeletal:         General: No deformity.      Right wrist: She exhibits " tenderness and swelling.      Right hand: She exhibits tenderness and swelling.   Neurological: She is alert and oriented to person, place, and time. She has normal strength and normal reflexes. No sensory deficit.   Skin: Skin is warm, dry, intact and not diaphoretic.   Psychiatric: Her speech is normal and behavior is normal. Judgment and thought content normal.   Nursing note and vitals reviewed.      Assessment:     1. Right wrist pain    2. Injury of right wrist, initial encounter    3. Localized swelling on right hand    4. Swelling of right wrist    5. Chondrocalcinosis        Plan:       Right wrist pain  -     X-Ray Wrist Complete 3 views Right; Future; Expected date: 11/02/2023    Injury of right wrist, initial encounter  -     X-Ray Wrist Complete 3 views Right; Future; Expected date: 11/02/2023    Localized swelling on right hand    Swelling of right wrist    Chondrocalcinosis  -     colchicine, gout, (COLCRYS) 0.6 mg tablet; Take 2 tablets (1.2 mg) by mouth x 1 dose then 1 tablet (0.6 mg) by mouth one hour later  Dispense: 3 tablet; Refill: 0  -     dexAMETHasone injection 10 mg      X-Ray Wrist Complete 3 views Right    Result Date: 11/2/2023  EXAMINATION: XR WRIST COMPLETE 3 VIEWS RIGHT CLINICAL HISTORY: Pain in right wrist TECHNIQUE: PA, lateral, and oblique views of the right wrist were performed. COMPARISON: 02/21/2019. FINDINGS: There is osteopenia.  There is no acute fracture or dislocation.  There are degenerative changes of the triscaphe joint.  There are scattered degenerative cystic changes in the carpal bones.  There is joint space narrowing of the thumb metacarpophalangeal joint with minimal degenerative volar subluxation.  There is joint space narrowing of the 2nd and 3rd metacarpophalangeal joints.  There is circumferential soft tissue edema of the hand and wrist.  There is chondrocalcinosis of the TFCC which can be seen with CPPD.     No acute fracture or dislocation. Degenerative  changes as above. Soft tissue edema. Electronically signed by: Stephan Zurita Date:    11/02/2023 Time:    18:24    CTA Chest Non-Coronary (PE Studies)    Addendum Date: 10/25/2023    Sagittal and coronal multiplanar reformations have become available and have been reviewed by the attending radiologist.  They confirm and help further characterize the findings seen on axial images. Electronically signed by: Mark Tran Date:    10/25/2023 Time:    07:10    Result Date: 10/25/2023  EXAMINATION: CTA CHEST NON CORONARY (PE STUDIES) CLINICAL HISTORY: Pulmonary embolism (PE) suspected, high prob; TECHNIQUE: Low dose axial images were obtained from the thoracic inlet to the lung bases following the IV administration of 75 mL of Omnipaque 350.  Contrast timing was optimized to evaluate the pulmonary arteries. At the time of this stat interpretation, the usually-obtained sagittal and coronal reformations of the chest CTA are not yet available for interpretation on PACS.  Their absence could limit assessment or detection of some findings.  The attending radiologist ROSIBEL Tran MD, has requested these reformations from RT Leo, via epic secure chat message, on 10/25/2023 at 00:11.  Patient name and medical record number were specified/linked in this message.  The messaging system provided confirmation that the recipient promptly saw this message, and that the recipient promptly forwarded the message to RT Elroy. COMPARISON: CTA chest abdomen pelvis 09/20/2022 FINDINGS: SOFT TISSUES: Cystic structure in the left axilla measuring 4.3 x 3.5 cm (axial series 2, image 163), new from prior.  There is skin thickening and soft tissue stranding about the left breast. Prominent calcification in the right breast. Multinodular thyroid with asymmetric enlargement of the right thyroid lobe, unchanged. HEART AND MEDIASTINUM: Heart and pericardium are within normal limits. Calcifications of the aortic and mitral valves,  and coronary vessels.  No mediastinal or hilar lymphadenopathy. AORTA: No aneurysm. Moderate calcified atherosclerosis of the aortic arch. PULMONARY VASCULATURE: Pulmonary arteries distribute normally. Pulmonary arteries are brightly opacified and sufficient for diagnostic assessment.  There is no pulmonary thromboembolism or filling defect. LUNGS AND AIRWAYS: Central airways are patent.  Small right posterolateral tracheal diverticulum at the thoracic inlet.  Evaluation of the lungs is degraded by respiratory motion.  Scattered calcified granulomas.  No concerning pulmonary nodules or masses.  No large focal consolidation.  Bibasilar subsegmental atelectasis and within the lingula. UPPER ABDOMEN: No acute intra-abdominal abnormality.  Even allowing for the poor luminal distention of the visualized portion of the upper stomach, there is likely still some pre-existing gastric mucosal thickening. BONES: Advanced degenerative changes of the visualized spine and bilateral glenohumeral joints.  Diffuse osteopenia.  No acute fracture or aggressive osseous lesion.     No evidence of pulmonary thromboembolism. Skin thickening and soft tissue stranding about the left breast, correlate with physical exam and breast history. Cystic structure in the left axilla measuring up to 4.3 cm, new from prior, possible seroma or hematoma.  Neoplasm other pathology not fully excluded.  Correlate clinically. Enlarged multinodular thyroid.  Correlate clinically. Mucosal thickening is again suggested in the incompletely visualized stomach.  Gastritis is a consideration.  Other infiltrative gastric pathology not fully excluded.  Correlate clinically; consider outpatient EGD. This report was flagged in Epic as abnormal. Electronically signed by resident: Richard Calles Date:    10/24/2023 Time:    23:31 Electronically signed by: Mark Tran Date:    10/25/2023 Time:    00:19    X-Ray Chest AP Portable    Result Date: 10/24/2023  EXAMINATION:  XR CHEST AP PORTABLE CLINICAL HISTORY: Sepsis; TECHNIQUE: Single frontal view of the chest was performed. COMPARISON: 09/20/2022 FINDINGS: The cardiomediastinal silhouette is prominent, similar to the previous exam noting magnification by technique.  There is calcification of the aorta.  There is elevation of the right hemidiaphragm..  There is no pleural effusion.  The trachea is midline.  The lungs are symmetrically expanded bilaterally with mildly coarse interstitial attenuation.  No large focal consolidation seen.  There is no pneumothorax.  The osseous structures are remarkable for degenerative changes noting osteopenia.  There are prominent degenerative changes of the bilateral glenohumeral joints.     1. Chronic appearing interstitial findings, no large focal consolidation. Electronically signed by: Bharathi Gorman MD Date:    10/24/2023 Time:    19:39    CT Cervical Spine Without Contrast    Result Date: 10/21/2023  EXAMINATION: CT CERVICAL SPINE WITHOUT CONTRAST CLINICAL HISTORY: Neck pain, acute, known malignancy; TECHNIQUE: Low dose axial images, sagittal and coronal reformations were performed though the cervical spine.  Contrast was not administered. COMPARISON: None FINDINGS: Fractures: No acute fractures Alignment: Grade 1 anterolisthesis of C3 on C4, C6 on C7, and C7-T1. Atlanto-axial and atlanto-occipital joints: Atlanto-axial and atlanto-occipital intervals are not widened. Facet joints: There is no traumatic facet joint widening.Multilevel degenerate facet arthropathy.  Ankylosis across the left C4-5 facet joint. Vertebral bodies: Query osseous demineralization.  Multilevel degenerative endplate change.  Question rudimentary ribs at C7. Discs: Degenerative disc disease. Spinal canal and foraminal narrowing: Although CT does not optimally evaluate the soft tissue contents of the spinal canal and foramina, no critical stenosis is suggested. At C3-4, moderate to severe right foraminal narrowing At  C4-5, mild central spinal canal stenosis At C5-6, mild central spinal canal stenosis and moderate left foraminal narrowing Paraspinal soft tissues: Heterogeneous thyroid gland.  Atherosclerosis of the carotid arteries. Upper Lungs:Clear     1. No acute cervical spine fracture. 2. Degenerative findings, as discussed. Electronically signed by: Sandro Rey Date:    10/21/2023 Time:    14:31            Patient Instructions   Please drink plenty of fluids.  Please get plenty of rest.  Please return here or go to the Emergency Department for any concerns or worsening of condition.  Rest, ice, compression and elevation to the affected joint or limb as needed.  Please follow up with your primary care doctor or specialist as needed.

## 2023-11-07 ENCOUNTER — DOCUMENTATION ONLY (OUTPATIENT)
Dept: REHABILITATION | Facility: OTHER | Age: 88
End: 2023-11-07

## 2023-11-07 DIAGNOSIS — R29.3 POSTURAL IMBALANCE: ICD-10-CM

## 2023-11-07 DIAGNOSIS — R29.898 DECREASED RANGE OF MOTION OF NECK: Primary | ICD-10-CM

## 2023-11-07 DIAGNOSIS — M53.82 WEAKNESS OF NECK: ICD-10-CM

## 2023-11-07 NOTE — PROGRESS NOTES
MEDICAL HISTORY:  Hypertension.  Mitral regurgitation based on 2D echo  Aortic atherosclerosis based on imaging  Breast cancer left breast, invasive ductal carcinoma, status post left partial mastectomy and re-excision left partial mastectomy with positive margins, followed by radiation therapy, diagnosed 2023  Breast cancer of the right breast, status post lumpectomy and had been on tamoxifen.  Osteoporosis  Osteoarthritis of the knees and shoulder.  Hysterectomy with oophorectomy.  Carpal tunnel syndrome release.  Bilateral knee arthroscopic surgery.  Bunionectomy.      SOCIAL HISTORY:  Tobacco use and alcohol use, none.  , has one living daughter, two daughters  from breast cancer.  She has a son with hypertension and hyperlipidemia.     FAMILY HISTORY:  Father is , hypertension.  Mother is , stroke.  Sister  of diabetes and heart disease.  Another sister  of breast cancer, heart disease, diabetes.  One sister alive, has diabetes and heart disease.     SCREENING:  Colonoscopy in 2013 revealed an adenomatous polyp.  Colonoscopy 2019 normal other than diverticulosis        MEDICATIONS:   Atenolol 25 mg daily.  Amlodipine 10 mg daily.  Benazepril 40 mg daily.  Vitamin D.  Omega-3.  Calcium.  Multivitamin      89-year-old female   She is here with her daughter   Comes in for regular follow-up visit    In 2023 diagnosed with invasive ductal carcinoma left breast, status post lumpectomy x2 and postop radiation therapy      On  presented to the emergency room with posterior neck pain where was hard to move.  She was treated with lidocaine patch, methocarbamol and Zofran and responded well.  CT of the neck showed no acute changes other than degenerative changes in neuroforaminal stenosis    On  presented to the emergency room again with anterior sternal pain, chills and fever.  Acute coronary syndrome was ruled out.  CTA  of the chest was negative for pulmonary embolism.  She was given intravenous Zofran and Toradol responded well.    On November 2nd present urgent care where she was having pain and swelling over the left wrist.  She was diagnosed with possible gout.  Received a steroid shot and colchicine for 3 doses it has significantly improved but there is still a degree of swelling and warmth    Presently she reports no chest pain or palpitations.  Has no difficulty breathing.  Reports no abdominal pain.  Regular bowel function every day.  No difficulty urinating, nocturia x2, has urgency but no incontinence.  Reports no headaches, or any other arthralgias.  Appetite is fair    Examination   Weight 123 lb   Pulse 68   Blood pressure 150/82  HEENT exam, no abnormal findings  Neck no thyromegaly no masses  Chest clear breath sounds good effort  Heart regular rate rhythm, no murmurs or gallops   Abdominal exam nontender, soft, no hepatosplenomegaly abdominal masses  Pulses 2+ carotid pulses no bruits, 1+ dorsalis pedal pulses  Extremities no edema  Skin, no gross abnormal findings  There is degree of soft tissue swelling minimal warmth over the medial aspect right wrist    There is a palpable soft tissue mass within  the left axillary region          addendum  the CTA that she had on October 24th showed a 4 x 5 cm soft tissue mass that could be that of a seroma or hematoma      Impression  General exam  Hypertension  Breast cancer with history of both right and left breast cancer  Aortic atherosclerosis ptosis based on imaging  Osteoporosis  Inflammatory arthritis wrist  Generalized osteoarthritis  Left axillary soft tissue mass probably that of seroma    Plan   Cool pack recommended over the wrists  Labs are pending.  Depending on results may consider short course Of prednisone for the wrist  Maintain proper hydration  Follow-up with breast surgery regarding the seroma axillary soft tissue mass

## 2023-11-08 ENCOUNTER — LAB VISIT (OUTPATIENT)
Dept: LAB | Facility: HOSPITAL | Age: 88
End: 2023-11-08
Attending: INTERNAL MEDICINE
Payer: MEDICARE

## 2023-11-08 ENCOUNTER — OFFICE VISIT (OUTPATIENT)
Dept: INTERNAL MEDICINE | Facility: CLINIC | Age: 88
End: 2023-11-08
Payer: MEDICARE

## 2023-11-08 ENCOUNTER — TELEPHONE (OUTPATIENT)
Dept: INTERNAL MEDICINE | Facility: CLINIC | Age: 88
End: 2023-11-08

## 2023-11-08 VITALS
WEIGHT: 123.88 LBS | OXYGEN SATURATION: 98 % | DIASTOLIC BLOOD PRESSURE: 84 MMHG | SYSTOLIC BLOOD PRESSURE: 132 MMHG | HEART RATE: 66 BPM | HEIGHT: 63 IN | BODY MASS INDEX: 21.95 KG/M2

## 2023-11-08 DIAGNOSIS — M81.0 OSTEOPOROSIS, UNSPECIFIED OSTEOPOROSIS TYPE, UNSPECIFIED PATHOLOGICAL FRACTURE PRESENCE: ICD-10-CM

## 2023-11-08 DIAGNOSIS — M19.90 OSTEOARTHRITIS, UNSPECIFIED OSTEOARTHRITIS TYPE, UNSPECIFIED SITE: ICD-10-CM

## 2023-11-08 DIAGNOSIS — I70.0 ATHEROSCLEROSIS OF AORTA: ICD-10-CM

## 2023-11-08 DIAGNOSIS — Z00.00 ANNUAL PHYSICAL EXAM: Primary | ICD-10-CM

## 2023-11-08 DIAGNOSIS — C50.912 MALIGNANT NEOPLASM OF LEFT FEMALE BREAST, UNSPECIFIED ESTROGEN RECEPTOR STATUS, UNSPECIFIED SITE OF BREAST: ICD-10-CM

## 2023-11-08 DIAGNOSIS — I10 ESSENTIAL HYPERTENSION: ICD-10-CM

## 2023-11-08 DIAGNOSIS — Z85.3 HISTORY OF BREAST CANCER: ICD-10-CM

## 2023-11-08 DIAGNOSIS — T79.2XXA SEROMA DUE TO TRAUMA: ICD-10-CM

## 2023-11-08 DIAGNOSIS — M19.90 INFLAMMATORY ARTHRITIS: ICD-10-CM

## 2023-11-08 DIAGNOSIS — Z00.00 ANNUAL PHYSICAL EXAM: ICD-10-CM

## 2023-11-08 LAB
ALBUMIN SERPL BCP-MCNC: 3.6 G/DL (ref 3.5–5.2)
ALP SERPL-CCNC: 55 U/L (ref 55–135)
ALT SERPL W/O P-5'-P-CCNC: 18 U/L (ref 10–44)
ANION GAP SERPL CALC-SCNC: 9 MMOL/L (ref 8–16)
AST SERPL-CCNC: 24 U/L (ref 10–40)
BASOPHILS # BLD AUTO: 0.02 K/UL (ref 0–0.2)
BASOPHILS NFR BLD: 0.3 % (ref 0–1.9)
BILIRUB SERPL-MCNC: 0.5 MG/DL (ref 0.1–1)
BUN SERPL-MCNC: 19 MG/DL (ref 10–30)
CALCIUM SERPL-MCNC: 9.9 MG/DL (ref 8.7–10.5)
CHLORIDE SERPL-SCNC: 107 MMOL/L (ref 95–110)
CHOLEST SERPL-MCNC: 150 MG/DL (ref 120–199)
CHOLEST/HDLC SERPL: 3.3 {RATIO} (ref 2–5)
CO2 SERPL-SCNC: 28 MMOL/L (ref 23–29)
CREAT SERPL-MCNC: 0.7 MG/DL (ref 0.5–1.4)
DIFFERENTIAL METHOD: ABNORMAL
EOSINOPHIL # BLD AUTO: 0.2 K/UL (ref 0–0.5)
EOSINOPHIL NFR BLD: 2.5 % (ref 0–8)
ERYTHROCYTE [DISTWIDTH] IN BLOOD BY AUTOMATED COUNT: 12.9 % (ref 11.5–14.5)
EST. GFR  (NO RACE VARIABLE): >60 ML/MIN/1.73 M^2
GLUCOSE SERPL-MCNC: 84 MG/DL (ref 70–110)
HCT VFR BLD AUTO: 38.8 % (ref 37–48.5)
HDLC SERPL-MCNC: 46 MG/DL (ref 40–75)
HDLC SERPL: 30.7 % (ref 20–50)
HGB BLD-MCNC: 12.1 G/DL (ref 12–16)
IMM GRANULOCYTES # BLD AUTO: 0.02 K/UL (ref 0–0.04)
IMM GRANULOCYTES NFR BLD AUTO: 0.3 % (ref 0–0.5)
LDLC SERPL CALC-MCNC: 93 MG/DL (ref 63–159)
LYMPHOCYTES # BLD AUTO: 1.3 K/UL (ref 1–4.8)
LYMPHOCYTES NFR BLD: 20.7 % (ref 18–48)
MCH RBC QN AUTO: 29.6 PG (ref 27–31)
MCHC RBC AUTO-ENTMCNC: 31.2 G/DL (ref 32–36)
MCV RBC AUTO: 95 FL (ref 82–98)
MONOCYTES # BLD AUTO: 0.4 K/UL (ref 0.3–1)
MONOCYTES NFR BLD: 5.8 % (ref 4–15)
NEUTROPHILS # BLD AUTO: 4.5 K/UL (ref 1.8–7.7)
NEUTROPHILS NFR BLD: 70.4 % (ref 38–73)
NONHDLC SERPL-MCNC: 104 MG/DL
NRBC BLD-RTO: 0 /100 WBC
PLATELET # BLD AUTO: 249 K/UL (ref 150–450)
PMV BLD AUTO: 10.2 FL (ref 9.2–12.9)
POTASSIUM SERPL-SCNC: 4.3 MMOL/L (ref 3.5–5.1)
PROT SERPL-MCNC: 7.1 G/DL (ref 6–8.4)
RBC # BLD AUTO: 4.09 M/UL (ref 4–5.4)
SODIUM SERPL-SCNC: 144 MMOL/L (ref 136–145)
TRIGL SERPL-MCNC: 55 MG/DL (ref 30–150)
TSH SERPL DL<=0.005 MIU/L-ACNC: 0.47 UIU/ML (ref 0.4–4)
WBC # BLD AUTO: 6.33 K/UL (ref 3.9–12.7)

## 2023-11-08 PROCEDURE — 80053 COMPREHEN METABOLIC PANEL: CPT | Mod: HCNC | Performed by: INTERNAL MEDICINE

## 2023-11-08 PROCEDURE — 1126F AMNT PAIN NOTED NONE PRSNT: CPT | Mod: HCNC,CPTII,S$GLB, | Performed by: INTERNAL MEDICINE

## 2023-11-08 PROCEDURE — 99999 PR PBB SHADOW E&M-EST. PATIENT-LVL IV: ICD-10-PCS | Mod: PBBFAC,HCNC,, | Performed by: INTERNAL MEDICINE

## 2023-11-08 PROCEDURE — 99397 PER PM REEVAL EST PAT 65+ YR: CPT | Mod: HCNC,S$GLB,, | Performed by: INTERNAL MEDICINE

## 2023-11-08 PROCEDURE — 84443 ASSAY THYROID STIM HORMONE: CPT | Mod: HCNC | Performed by: INTERNAL MEDICINE

## 2023-11-08 PROCEDURE — 3288F FALL RISK ASSESSMENT DOCD: CPT | Mod: HCNC,CPTII,S$GLB, | Performed by: INTERNAL MEDICINE

## 2023-11-08 PROCEDURE — 1101F PR PT FALLS ASSESS DOC 0-1 FALLS W/OUT INJ PAST YR: ICD-10-PCS | Mod: HCNC,CPTII,S$GLB, | Performed by: INTERNAL MEDICINE

## 2023-11-08 PROCEDURE — 80061 LIPID PANEL: CPT | Mod: HCNC | Performed by: INTERNAL MEDICINE

## 2023-11-08 PROCEDURE — 1159F PR MEDICATION LIST DOCUMENTED IN MEDICAL RECORD: ICD-10-PCS | Mod: HCNC,CPTII,S$GLB, | Performed by: INTERNAL MEDICINE

## 2023-11-08 PROCEDURE — 1101F PT FALLS ASSESS-DOCD LE1/YR: CPT | Mod: HCNC,CPTII,S$GLB, | Performed by: INTERNAL MEDICINE

## 2023-11-08 PROCEDURE — 3288F PR FALLS RISK ASSESSMENT DOCUMENTED: ICD-10-PCS | Mod: HCNC,CPTII,S$GLB, | Performed by: INTERNAL MEDICINE

## 2023-11-08 PROCEDURE — 99999 PR PBB SHADOW E&M-EST. PATIENT-LVL IV: CPT | Mod: PBBFAC,HCNC,, | Performed by: INTERNAL MEDICINE

## 2023-11-08 PROCEDURE — 1159F MED LIST DOCD IN RCRD: CPT | Mod: HCNC,CPTII,S$GLB, | Performed by: INTERNAL MEDICINE

## 2023-11-08 PROCEDURE — 36415 COLL VENOUS BLD VENIPUNCTURE: CPT | Mod: HCNC | Performed by: INTERNAL MEDICINE

## 2023-11-08 PROCEDURE — 1160F RVW MEDS BY RX/DR IN RCRD: CPT | Mod: HCNC,CPTII,S$GLB, | Performed by: INTERNAL MEDICINE

## 2023-11-08 PROCEDURE — 1160F PR REVIEW ALL MEDS BY PRESCRIBER/CLIN PHARMACIST DOCUMENTED: ICD-10-PCS | Mod: HCNC,CPTII,S$GLB, | Performed by: INTERNAL MEDICINE

## 2023-11-08 PROCEDURE — 1126F PR PAIN SEVERITY QUANTIFIED, NO PAIN PRESENT: ICD-10-PCS | Mod: HCNC,CPTII,S$GLB, | Performed by: INTERNAL MEDICINE

## 2023-11-08 PROCEDURE — 85025 COMPLETE CBC W/AUTO DIFF WBC: CPT | Mod: HCNC | Performed by: INTERNAL MEDICINE

## 2023-11-08 PROCEDURE — 99397 PR PREVENTIVE VISIT,EST,65 & OVER: ICD-10-PCS | Mod: HCNC,S$GLB,, | Performed by: INTERNAL MEDICINE

## 2023-11-08 RX ORDER — PREDNISONE 20 MG/1
20 TABLET ORAL DAILY
Qty: 4 TABLET | Refills: 0 | Status: SHIPPED | OUTPATIENT
Start: 2023-11-08 | End: 2023-11-12

## 2023-11-08 RX ORDER — ALENDRONATE SODIUM 70 MG/1
70 TABLET ORAL
Qty: 12 TABLET | Refills: 3 | Status: SHIPPED | OUTPATIENT
Start: 2023-11-08 | End: 2024-11-07

## 2023-11-08 NOTE — PROGRESS NOTES
Physical Therapy No Show Note       Patient was scheduled for physical therapy at [facility] for 11/7/2023. Pt failed to appear for appointment without prior notification for today.     Kathrin Hua, PTA

## 2023-11-08 NOTE — PROGRESS NOTES
Internal medicine note    Test results look fine.    Regarding the arthritis of the hand, was sent in prednisone 20 mg once a day for the next 4 days

## 2023-11-09 ENCOUNTER — OFFICE VISIT (OUTPATIENT)
Dept: OPTOMETRY | Facility: CLINIC | Age: 88
End: 2023-11-09
Payer: MEDICARE

## 2023-11-09 DIAGNOSIS — Z96.1 PSEUDOPHAKIA OF BOTH EYES: ICD-10-CM

## 2023-11-09 DIAGNOSIS — H52.4 PRESBYOPIA: ICD-10-CM

## 2023-11-09 DIAGNOSIS — H43.813 POSTERIOR VITREOUS DETACHMENT OF BOTH EYES: ICD-10-CM

## 2023-11-09 DIAGNOSIS — H52.203 ASTIGMATISM OF BOTH EYES, UNSPECIFIED TYPE: ICD-10-CM

## 2023-11-09 DIAGNOSIS — I10 ESSENTIAL HYPERTENSION: Primary | ICD-10-CM

## 2023-11-09 DIAGNOSIS — H26.491 PCO (POSTERIOR CAPSULAR OPACIFICATION), RIGHT: ICD-10-CM

## 2023-11-09 DIAGNOSIS — H04.123 DRY EYE SYNDROME OF BOTH EYES: ICD-10-CM

## 2023-11-09 PROCEDURE — 99999 PR PBB SHADOW E&M-EST. PATIENT-LVL III: CPT | Mod: PBBFAC,HCNC,, | Performed by: OPTOMETRIST

## 2023-11-09 PROCEDURE — 92014 PR EYE EXAM, EST PATIENT,COMPREHESV: ICD-10-PCS | Mod: HCNC,S$GLB,, | Performed by: OPTOMETRIST

## 2023-11-09 PROCEDURE — 92015 PR REFRACTION: ICD-10-PCS | Mod: HCNC,S$GLB,, | Performed by: OPTOMETRIST

## 2023-11-09 PROCEDURE — 1101F PT FALLS ASSESS-DOCD LE1/YR: CPT | Mod: HCNC,CPTII,S$GLB, | Performed by: OPTOMETRIST

## 2023-11-09 PROCEDURE — 3288F PR FALLS RISK ASSESSMENT DOCUMENTED: ICD-10-PCS | Mod: HCNC,CPTII,S$GLB, | Performed by: OPTOMETRIST

## 2023-11-09 PROCEDURE — 92014 COMPRE OPH EXAM EST PT 1/>: CPT | Mod: HCNC,S$GLB,, | Performed by: OPTOMETRIST

## 2023-11-09 PROCEDURE — 1160F PR REVIEW ALL MEDS BY PRESCRIBER/CLIN PHARMACIST DOCUMENTED: ICD-10-PCS | Mod: HCNC,CPTII,S$GLB, | Performed by: OPTOMETRIST

## 2023-11-09 PROCEDURE — 1159F MED LIST DOCD IN RCRD: CPT | Mod: HCNC,CPTII,S$GLB, | Performed by: OPTOMETRIST

## 2023-11-09 PROCEDURE — 99999 PR PBB SHADOW E&M-EST. PATIENT-LVL III: ICD-10-PCS | Mod: PBBFAC,HCNC,, | Performed by: OPTOMETRIST

## 2023-11-09 PROCEDURE — 1126F AMNT PAIN NOTED NONE PRSNT: CPT | Mod: HCNC,CPTII,S$GLB, | Performed by: OPTOMETRIST

## 2023-11-09 PROCEDURE — 1101F PR PT FALLS ASSESS DOC 0-1 FALLS W/OUT INJ PAST YR: ICD-10-PCS | Mod: HCNC,CPTII,S$GLB, | Performed by: OPTOMETRIST

## 2023-11-09 PROCEDURE — 1159F PR MEDICATION LIST DOCUMENTED IN MEDICAL RECORD: ICD-10-PCS | Mod: HCNC,CPTII,S$GLB, | Performed by: OPTOMETRIST

## 2023-11-09 PROCEDURE — 3288F FALL RISK ASSESSMENT DOCD: CPT | Mod: HCNC,CPTII,S$GLB, | Performed by: OPTOMETRIST

## 2023-11-09 PROCEDURE — 1160F RVW MEDS BY RX/DR IN RCRD: CPT | Mod: HCNC,CPTII,S$GLB, | Performed by: OPTOMETRIST

## 2023-11-09 PROCEDURE — 1126F PR PAIN SEVERITY QUANTIFIED, NO PAIN PRESENT: ICD-10-PCS | Mod: HCNC,CPTII,S$GLB, | Performed by: OPTOMETRIST

## 2023-11-09 PROCEDURE — 92015 DETERMINE REFRACTIVE STATE: CPT | Mod: HCNC,S$GLB,, | Performed by: OPTOMETRIST

## 2023-11-09 NOTE — PROGRESS NOTES
HPI    Last eye exam was 114/16/2022 with Dr. Mccormick  Patient states she thinks she needs an updated prescription for distance   and near   Denies pain, headaches,flashes floaters     Gtts-none     Last edited by Bing Wise on 11/9/2023  9:41 AM.            Assessment /Plan     For exam results, see Encounter Report.    Essential hypertension   No retinopathy, monitor yearly    Pseudophakia of both eyes  Posterior vitreous detachment of both eyes  PCO (posterior capsular opacification), right   Stable, monitor    Dry eye syndrome of both eyes   Use art tears PRN    Astigmatism of both eyes, unspecified type  Presbyopia   Rx specs    RTC 1 year

## 2023-11-22 ENCOUNTER — TELEPHONE (OUTPATIENT)
Dept: PODIATRY | Facility: CLINIC | Age: 88
End: 2023-11-22
Payer: MEDICARE

## 2023-11-22 NOTE — TELEPHONE ENCOUNTER
----- Message from Jacque Layton sent at 11/22/2023  2:31 PM CST -----  Regarding: sooner appt  Contact: [phone]  Sooner Appt Request:  Caller requesting a sooner appt.  Caller declined 1st available appt and wait list.    Request message be sent to doctor.     Name of Caller: Ernestine     When is their appt: 12/27     Symptoms:  Left foot pain    Call back # [phone]  Additional Info: (optional): pt would really like to be seen this Friday 11/24, due to her being in a lot of pain, and she already has to  her glasses. Please give pt a call back.

## 2023-11-22 NOTE — TELEPHONE ENCOUNTER
Called patient to help her schedule appointment with a different podiatrist but she wanted Friday 11/24/23 but no availability and she decided to keep her appointment with Dr Pappas in 12/23 instead, place patient on the waiting list. Will call back as needed.

## 2023-12-04 ENCOUNTER — TELEPHONE (OUTPATIENT)
Dept: INTERNAL MEDICINE | Facility: CLINIC | Age: 88
End: 2023-12-04
Payer: MEDICARE

## 2023-12-04 NOTE — TELEPHONE ENCOUNTER
----- Message from Alondra Carvajal sent at 12/4/2023  9:41 AM CST -----  Contact: Denisse (daughter)[phone]  Patient would like to get medical advice.  Symptoms (please be specific):   memory issues, forgetting things and pt repeating herself/pt daughter states the pt does not know she is calling  How long have you had these symptoms: about 3 weeks  Would you like a call back, or a response through your [facility] portal?:     Pharmacy name and phone # (copy from chart):     Comments:

## 2023-12-04 NOTE — TELEPHONE ENCOUNTER
"Daughter said she's forgetful, repeating things like stories multiple times like she's never said it before, she's doing things differently, or acting like she doesn't know how to do certain things. Said she never called her "breast doctor."    She said she called the breast doctor, daughter called to see if she did, and they confirmed that she did not. Daughter claims she's been extremely irritable as of recently. Daughter said her breast are swollen.     Also has been eating everything. Daughter claims she is a very picky eater, but has been eating everything she puts her eyes on.     Said she was waiting on Dr. Belcher (breast doctor) to call her  back to schedule appointment, but asking to speak to you for advice on this.   "

## 2023-12-05 ENCOUNTER — OFFICE VISIT (OUTPATIENT)
Dept: SURGERY | Facility: CLINIC | Age: 88
End: 2023-12-05
Payer: MEDICARE

## 2023-12-05 VITALS
HEART RATE: 69 BPM | DIASTOLIC BLOOD PRESSURE: 67 MMHG | SYSTOLIC BLOOD PRESSURE: 149 MMHG | BODY MASS INDEX: 21.95 KG/M2 | HEIGHT: 63 IN

## 2023-12-05 DIAGNOSIS — C50.912 INVASIVE DUCTAL CARCINOMA OF BREAST, LEFT: ICD-10-CM

## 2023-12-05 DIAGNOSIS — Z85.3 PERSONAL HISTORY OF MALIGNANT NEOPLASM OF BREAST: Primary | ICD-10-CM

## 2023-12-05 DIAGNOSIS — N63.0 BREAST SWELLING: ICD-10-CM

## 2023-12-05 PROCEDURE — 99213 PR OFFICE/OUTPT VISIT, EST, LEVL III, 20-29 MIN: ICD-10-PCS | Mod: S$GLB,,, | Performed by: SURGERY

## 2023-12-05 PROCEDURE — 1160F PR REVIEW ALL MEDS BY PRESCRIBER/CLIN PHARMACIST DOCUMENTED: ICD-10-PCS | Mod: CPTII,S$GLB,, | Performed by: SURGERY

## 2023-12-05 PROCEDURE — 1160F RVW MEDS BY RX/DR IN RCRD: CPT | Mod: CPTII,S$GLB,, | Performed by: SURGERY

## 2023-12-05 PROCEDURE — 1159F PR MEDICATION LIST DOCUMENTED IN MEDICAL RECORD: ICD-10-PCS | Mod: CPTII,S$GLB,, | Performed by: SURGERY

## 2023-12-05 PROCEDURE — 99213 OFFICE O/P EST LOW 20 MIN: CPT | Mod: S$GLB,,, | Performed by: SURGERY

## 2023-12-05 PROCEDURE — 1101F PR PT FALLS ASSESS DOC 0-1 FALLS W/OUT INJ PAST YR: ICD-10-PCS | Mod: CPTII,S$GLB,, | Performed by: SURGERY

## 2023-12-05 PROCEDURE — 3288F PR FALLS RISK ASSESSMENT DOCUMENTED: ICD-10-PCS | Mod: CPTII,S$GLB,, | Performed by: SURGERY

## 2023-12-05 PROCEDURE — 1125F PR PAIN SEVERITY QUANTIFIED, PAIN PRESENT: ICD-10-PCS | Mod: CPTII,S$GLB,, | Performed by: SURGERY

## 2023-12-05 PROCEDURE — 1101F PT FALLS ASSESS-DOCD LE1/YR: CPT | Mod: CPTII,S$GLB,, | Performed by: SURGERY

## 2023-12-05 PROCEDURE — 1125F AMNT PAIN NOTED PAIN PRSNT: CPT | Mod: CPTII,S$GLB,, | Performed by: SURGERY

## 2023-12-05 PROCEDURE — 3288F FALL RISK ASSESSMENT DOCD: CPT | Mod: CPTII,S$GLB,, | Performed by: SURGERY

## 2023-12-05 PROCEDURE — 1159F MED LIST DOCD IN RCRD: CPT | Mod: CPTII,S$GLB,, | Performed by: SURGERY

## 2023-12-05 NOTE — PROGRESS NOTES
"[facility]           Breast Surgery Surveillance    12/5/2023    DIAGNOSIS:   This is a 89 y.o. female with a history of stage pT1b N0 M0 grade 2 ER + MA + HER2 negative invasive ductal carcinoma of the left breast.      TREATMENT:   1. Left partial mastectomy with sentinel node biopsy on 2/20/2023. Re-excision of the posterior, medial, and superior margins on 4/14/2023. Palak Belcher M.D. Surgical Oncology  2. Radiation therapy from 6/15/2022  To 7/13/2023. Ronald Tee M.D. Radiation Oncology     HISTORY OF PRESENT ILLNESS:   Ernestine Luna is a 89 y.o. female comes in for oncological follow up. She reports left breast has been swollen over the past few months following radiation. The swelling intermittently worsens, but is for the most part always present. Past medical and surgical history is updated without new changes. There have been no changes to family history.     IMAGING:   No recent imaging.      MEDICATIONS/ALLERGIES:   [unfilled]  Review of patient's allergies indicates:   Allergen Reactions    Tramadol Shortness Of Breath, Diarrhea and Nausea And Vomiting    Acetaminophen Nausea And Vomiting    Aspirin Nausea And Vomiting    Sulfa (sulfonamide antibiotics) Nausea And Vomiting    Prednisone Other (See Comments)     Stomach problems, no issue with injection       PHYSICAL EXAM:   BP (!) 149/67 (BP Location: Left arm, Patient Position: Sitting)   Pulse 69   Ht 5' 3" (1.6 m)   LMP  (LMP Unknown)   BMI 21.95 kg/m²   General: The patient appears well and is in no acute distress.   Breasts: The exam was done with the patient seated and supine. Left breast - status post partial mastectomy and radiation therapy.  Firm edematous tissue in the dependent portion of the breast.  Ultrasound of the breast tissue showed fluid dispersed through the breast tissue consistent with post radiation edema.  No large pocket of fluid amenable to drainage.  No palpable masses otherwise.  No evidence of " cellulitis.  No supraclavicular or axillary lymphadenopathy on the left side.   Right breast - within normal limits. No palpable masses and no abnormal skin or nipple findings. No supraclavicular or axillary lymphadenopathy on the right side.    ASSESSMENT:   This is a 89 y.o. female without evidence of recurrence by exam, history or imaging. Bedside US did not reveal any large pockets of fluid or areas amenable to drainage. No obvious masses. New breast swelling is likely due to postsurgical and postradiation changes.  PLAN:   1. Scheduled diagnostic mammogram to better evaluate swelling.  Suspect swelling likely due to prior treatments however we will better evaluate on imaging.  Was due for screening mammogram in January we will move up.  2. Discussed with patient and daughter importance of wearing a tight fitting bra as much as tolerated to reduce swelling.   3. Continue monthly self breast exams and call the clinic with any changes or problems.  4. Return to clinic in following imaging to discuss results. The patient is in agreement with the plan. Questions were encouraged and answered to patient's satisfaction. Ernestine will call our office with any questions or concerns.

## 2023-12-13 ENCOUNTER — HOSPITAL ENCOUNTER (OUTPATIENT)
Dept: RADIOLOGY | Facility: HOSPITAL | Age: 88
Discharge: HOME | End: 2023-12-13
Attending: SURGERY
Payer: MEDICARE

## 2023-12-13 DIAGNOSIS — Z85.3 PERSONAL HISTORY OF MALIGNANT NEOPLASM OF BREAST: ICD-10-CM

## 2023-12-13 DIAGNOSIS — C50.912 INVASIVE DUCTAL CARCINOMA OF BREAST, LEFT: ICD-10-CM

## 2023-12-13 PROCEDURE — 77062 MAMMO DIGITAL DIAGNOSTIC BILAT WITH TOMO: ICD-10-PCS | Mod: 26,,, | Performed by: RADIOLOGY

## 2023-12-13 PROCEDURE — 77062 BREAST TOMOSYNTHESIS BI: CPT | Mod: TC

## 2023-12-13 PROCEDURE — 77066 DX MAMMO INCL CAD BI: CPT | Mod: 26,,, | Performed by: RADIOLOGY

## 2023-12-13 PROCEDURE — 77066 MAMMO DIGITAL DIAGNOSTIC BILAT WITH TOMO: ICD-10-PCS | Mod: 26,,, | Performed by: RADIOLOGY

## 2023-12-13 PROCEDURE — 77062 BREAST TOMOSYNTHESIS BI: CPT | Mod: 26,,, | Performed by: RADIOLOGY

## 2023-12-19 NOTE — PROGRESS NOTES
MEDICAL HISTORY:  Hypertension.  Mitral regurgitation based on 2D echo  Aortic atherosclerosis based on imaging  Breast cancer left breast, invasive ductal carcinoma, status post left partial mastectomy and re-excision left partial mastectomy with positive margins, followed by radiation therapy, diagnosed January 2023  Breast cancer of the right breast, status post lumpectomy and had been on tamoxifen.  Osteoporosis  Osteoarthritis of the knees and shoulder.  Hysterectomy with oophorectomy.  Carpal tunnel syndrome release.  Bilateral knee arthroscopic surgery.  Bunionectomy.      SOCIAL HISTORY:  Tobacco use and alcohol use, none.        MEDICATIONS:   Atenolol 25 mg daily.  Amlodipine 10 mg daily.  Benazepril 40 mg daily.  Vitamin D.  Omega-3.  Calcium.  Multivitamin      [age] year female   Is here with her daughter.  Recently seen for routine check.  The purpose of the visit has been memory impairment being observe.  Is being noted that she was misplacing things.  She is forgetting what is being said to.  Forgetting things that she needed to do.  However she lives alone.  Functions independently takes the medication she remembers prescription medications outlined above can not name the dose.  She needs no assistance with activities of daily living.  She again expresses missing walking her little dog which passed away within the past year.  However she does not feel depressed    Examination   Vital signs per epic  Mini-mental status exam score was 26/20 7/30.  Clock drawing test was supper    Impression   Probable mild cognitive impairment with memory loss     Plan B12 folate RPR   Trial of Aricept 5 mg a day.  Patient and daughter will let me know in a month how she was doing

## 2023-12-20 ENCOUNTER — LAB VISIT (OUTPATIENT)
Dept: LAB | Facility: HOSPITAL | Age: 88
End: 2023-12-20
Attending: INTERNAL MEDICINE
Payer: MEDICARE

## 2023-12-20 ENCOUNTER — OFFICE VISIT (OUTPATIENT)
Dept: INTERNAL MEDICINE | Facility: CLINIC | Age: 88
End: 2023-12-20
Payer: MEDICARE

## 2023-12-20 VITALS
WEIGHT: 131.38 LBS | OXYGEN SATURATION: 99 % | SYSTOLIC BLOOD PRESSURE: 128 MMHG | HEART RATE: 68 BPM | DIASTOLIC BLOOD PRESSURE: 62 MMHG | BODY MASS INDEX: 23.28 KG/M2 | HEIGHT: 63 IN

## 2023-12-20 DIAGNOSIS — R41.9 UNSPECIFIED SYMPTOMS AND SIGNS INVOLVING COGNITIVE FUNCTIONS AND AWARENESS: ICD-10-CM

## 2023-12-20 DIAGNOSIS — G31.84 MCI (MILD COGNITIVE IMPAIRMENT) WITH MEMORY LOSS: ICD-10-CM

## 2023-12-20 DIAGNOSIS — G31.84 MCI (MILD COGNITIVE IMPAIRMENT) WITH MEMORY LOSS: Primary | ICD-10-CM

## 2023-12-20 LAB — FOLATE SERPL-MCNC: 14.9 NG/ML (ref 4–24)

## 2023-12-20 PROCEDURE — 99999 PR PBB SHADOW E&M-EST. PATIENT-LVL IV: CPT | Mod: PBBFAC,,, | Performed by: INTERNAL MEDICINE

## 2023-12-20 PROCEDURE — 1159F PR MEDICATION LIST DOCUMENTED IN MEDICAL RECORD: ICD-10-PCS | Mod: CPTII,S$GLB,, | Performed by: INTERNAL MEDICINE

## 2023-12-20 PROCEDURE — 1126F AMNT PAIN NOTED NONE PRSNT: CPT | Mod: CPTII,S$GLB,, | Performed by: INTERNAL MEDICINE

## 2023-12-20 PROCEDURE — 3288F FALL RISK ASSESSMENT DOCD: CPT | Mod: CPTII,S$GLB,, | Performed by: INTERNAL MEDICINE

## 2023-12-20 PROCEDURE — 36415 COLL VENOUS BLD VENIPUNCTURE: CPT | Performed by: INTERNAL MEDICINE

## 2023-12-20 PROCEDURE — 1159F MED LIST DOCD IN RCRD: CPT | Mod: CPTII,S$GLB,, | Performed by: INTERNAL MEDICINE

## 2023-12-20 PROCEDURE — 86592 SYPHILIS TEST NON-TREP QUAL: CPT | Performed by: INTERNAL MEDICINE

## 2023-12-20 PROCEDURE — 99214 OFFICE O/P EST MOD 30 MIN: CPT | Mod: S$GLB,,, | Performed by: INTERNAL MEDICINE

## 2023-12-20 PROCEDURE — 1160F PR REVIEW ALL MEDS BY PRESCRIBER/CLIN PHARMACIST DOCUMENTED: ICD-10-PCS | Mod: CPTII,S$GLB,, | Performed by: INTERNAL MEDICINE

## 2023-12-20 PROCEDURE — 99999 PR PBB SHADOW E&M-EST. PATIENT-LVL IV: ICD-10-PCS | Mod: PBBFAC,,, | Performed by: INTERNAL MEDICINE

## 2023-12-20 PROCEDURE — 1101F PT FALLS ASSESS-DOCD LE1/YR: CPT | Mod: CPTII,S$GLB,, | Performed by: INTERNAL MEDICINE

## 2023-12-20 PROCEDURE — 3288F PR FALLS RISK ASSESSMENT DOCUMENTED: ICD-10-PCS | Mod: CPTII,S$GLB,, | Performed by: INTERNAL MEDICINE

## 2023-12-20 PROCEDURE — 1126F PR PAIN SEVERITY QUANTIFIED, NO PAIN PRESENT: ICD-10-PCS | Mod: CPTII,S$GLB,, | Performed by: INTERNAL MEDICINE

## 2023-12-20 PROCEDURE — 1101F PR PT FALLS ASSESS DOC 0-1 FALLS W/OUT INJ PAST YR: ICD-10-PCS | Mod: CPTII,S$GLB,, | Performed by: INTERNAL MEDICINE

## 2023-12-20 PROCEDURE — 82607 VITAMIN B-12: CPT | Performed by: INTERNAL MEDICINE

## 2023-12-20 PROCEDURE — 1160F RVW MEDS BY RX/DR IN RCRD: CPT | Mod: CPTII,S$GLB,, | Performed by: INTERNAL MEDICINE

## 2023-12-20 PROCEDURE — 99214 PR OFFICE/OUTPT VISIT, EST, LEVL IV, 30-39 MIN: ICD-10-PCS | Mod: S$GLB,,, | Performed by: INTERNAL MEDICINE

## 2023-12-20 PROCEDURE — 82746 ASSAY OF FOLIC ACID SERUM: CPT | Performed by: INTERNAL MEDICINE

## 2023-12-20 RX ORDER — DONEPEZIL HYDROCHLORIDE 5 MG/1
5 TABLET, FILM COATED ORAL DAILY
Qty: 30 TABLET | Refills: 2 | Status: SHIPPED | OUTPATIENT
Start: 2023-12-20 | End: 2024-03-20

## 2023-12-21 LAB
RPR SER QL: NORMAL
VIT B12 SERPL-MCNC: 1146 NG/L (ref 180–914)

## 2023-12-27 ENCOUNTER — OFFICE VISIT (OUTPATIENT)
Dept: PODIATRY | Facility: CLINIC | Age: 88
End: 2023-12-27
Payer: MEDICARE

## 2023-12-27 VITALS
RESPIRATION RATE: 18 BRPM | SYSTOLIC BLOOD PRESSURE: 140 MMHG | HEART RATE: 65 BPM | HEIGHT: 63 IN | DIASTOLIC BLOOD PRESSURE: 80 MMHG | BODY MASS INDEX: 23.21 KG/M2 | WEIGHT: 131 LBS

## 2023-12-27 DIAGNOSIS — B35.1 ONYCHOMYCOSIS DUE TO DERMATOPHYTE: ICD-10-CM

## 2023-12-27 DIAGNOSIS — L84 CORNS/CALLOSITIES: Primary | ICD-10-CM

## 2023-12-27 PROCEDURE — 17999 UNLISTD PX SKN MUC MEMB SUBQ: CPT | Mod: CSM,S$GLB,, | Performed by: PODIATRIST

## 2023-12-27 PROCEDURE — 99499 NO LOS: ICD-10-PCS | Mod: ,,, | Performed by: PODIATRIST

## 2023-12-27 PROCEDURE — 99999 PR PBB SHADOW E&M-EST. PATIENT-LVL IV: CPT | Mod: PBBFAC,,, | Performed by: PODIATRIST

## 2023-12-27 PROCEDURE — 99499 UNLISTED E&M SERVICE: CPT | Mod: ,,, | Performed by: PODIATRIST

## 2023-12-27 PROCEDURE — 17999 PR NON-COVERED FOOT CARE: ICD-10-PCS | Mod: CSM,S$GLB,, | Performed by: PODIATRIST

## 2023-12-27 PROCEDURE — 99999 PR PBB SHADOW E&M-EST. PATIENT-LVL IV: ICD-10-PCS | Mod: PBBFAC,,, | Performed by: PODIATRIST

## 2024-01-10 ENCOUNTER — TELEPHONE (OUTPATIENT)
Dept: INTERNAL MEDICINE | Facility: CLINIC | Age: 89
End: 2024-01-10
Payer: MEDICARE
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
## 2024-01-11 RX ORDER — BENAZEPRIL HYDROCHLORIDE 40 MG/1
TABLET ORAL
Qty: 90 TABLET | Refills: 3 | Status: SHIPPED | OUTPATIENT
Start: 2024-01-11

## 2024-01-11 RX ORDER — ATENOLOL 25 MG/1
TABLET ORAL
Qty: 90 TABLET | Refills: 3 | Status: SHIPPED | OUTPATIENT
Start: 2024-01-11

## 2024-01-11 NOTE — TELEPHONE ENCOUNTER
Care Due:                  Date            Visit Type   Department     Provider  --------------------------------------------------------------------------------                                EP -                              PRIMARY      [facility]  Last Visit: 12-      CARE ([facility])   MEDICINE       Kev Macias  Next Visit: None Scheduled  None         None Found                                                            Last  Test          Frequency    Reason                     Performed    Due Date  --------------------------------------------------------------------------------    Mg Level....  12 months..  alendronate..............  Not Found    Overdue    Phosphate...  12 months..  alendronate..............  Not Found    Overdue    Vitamin D...  12 months..  alendronate..............  11- 11-    [facility] Embedded Care Due Messages. Reference number: [id number].   1/11/2024 5:50:36 AM CST

## 2024-01-11 NOTE — TELEPHONE ENCOUNTER
Refill Routing Note   Medication(s) are not appropriate for processing by [facility] for the following reason(s):        Required vitals abnormal    [facility] action(s):  Defer     Requires labs : Yes             Appointments  past 12m or future 3m with PCP    Date Provider   Last Visit   12/20/2023 Kev Macias MD   Next Visit   Visit date not found Kev Macias MD   ED visits in past 90 days: 2        Note composed:6:46 AM 01/11/2024

## 2024-01-12 ENCOUNTER — TELEPHONE (OUTPATIENT)
Dept: INTERNAL MEDICINE | Facility: CLINIC | Age: 89
End: 2024-01-12
Payer: MEDICARE

## 2024-01-12 RX ORDER — TRAZODONE HYDROCHLORIDE 50 MG/1
50 TABLET ORAL NIGHTLY
Qty: 7 TABLET | Refills: 0 | Status: SHIPPED | OUTPATIENT
Start: 2024-01-12 | End: 2024-01-19

## 2024-01-12 NOTE — TELEPHONE ENCOUNTER
----- Message from Salome Kimbrough sent at 1/12/2024 10:25 AM CST -----  Contact: Self/[phone]  1MEDICALADVICE     Patient is calling for Medical Advice regarding:dry cough/runny nose     How long has patient had these symptoms:could not understand pt has bad phone connection    Pharmacy name and phone#:  [id number] - [place], [place] - [address] AT [facility] [address]  [address]  [place] [address]  Phone: [phone] Fax: [phone]      Comments: pt stated that she cannot come in because she has the cough is asking what Dr Macias advises   We have created the following screening note for this call:  Symptom: Cough  Outcome: Schedule an appointment to be seen within 24 hours.  Reason: Caller denied all higher acuity questions    The caller rejected this outcome

## 2024-01-12 NOTE — TELEPHONE ENCOUNTER
Called patient back to see how she's doing,    Cough, runny nose little congestion, no fever that she knows of. Doesn't want to come in bcs she's coughing, asking for medication to help.

## 2024-01-12 NOTE — TELEPHONE ENCOUNTER
Called daughter back,  She's asking to get something to calm mom down or relax her. Said she's on edge all day today. Has a cough, and very irritable. She put mom on the phone again.     Ms. Sinha said she can't sleep, can't eat, not feeling well. She kept saying she's ready to give up. She does not have a fever, no body aches, just a cough that wont go away.     Asking for med to be sent to [facility].

## 2024-01-12 NOTE — TELEPHONE ENCOUNTER
----- Message from Salome Kimbrough sent at 1/12/2024 10:25 AM CST -----  Contact: Self/[phone]  1MEDICALADVICE     Patient is calling for Medical Advice regarding:dry cough/runny nose     How long has patient had these symptoms:could not understand pt has bad phone connection    Pharmacy name and phone#:  [id number] - [place], [place] - [address] AT [facility] [address]  [address]  [place] [address]  Phone: [phone] Fax: [phone]      Comments: pt stated that she cannot come in because she has the cough is asking what Dr Macias advises   We have created the following screening note for this call:  Symptom: Cough  Outcome: Schedule an appointment to be seen within 24 hours.  Reason: Caller denied all higher acuity questions    The caller rejected this outcome

## 2024-01-12 NOTE — TELEPHONE ENCOUNTER
----- Message from Alondra Carvajal sent at 1/12/2024  2:54 PM CST -----  Contact: [phone] Denisse Luna  Patient would like to get medical advice.  Symptoms (please be specific): Pt daughter is asking if Dr. Macias prescribed something for sleep for her mother. Pt daughter states her Mother is acting unpredictable   How long have you had these symptoms:   Would you like a call back, or a response through your [facility] portal?:  Call back

## 2024-01-16 ENCOUNTER — TELEPHONE (OUTPATIENT)
Dept: INTERNAL MEDICINE | Facility: CLINIC | Age: 89
End: 2024-01-16
Payer: MEDICARE

## 2024-01-16 NOTE — TELEPHONE ENCOUNTER
----- Message from Stefanie Martines sent at 1/12/2024  5:40 PM CST -----  Type:  Patient Returning Call    Who Called:Pt  Would the patient rather a call back or a response via [facility]? Call  Best Call Back Number:[phone]  Additional Information: missed call, please call back again

## 2024-01-16 NOTE — TELEPHONE ENCOUNTER
Returned call to pt daughter Denisse. States pt has had a cough for the past 6 days. States the Mucinex that PCP advised is not loosening up up or stopping it. Denies fever, dyspnea, sinus congestion or any other sx. Daughter requesting another prescription to help with cough. Do you prefer pt come in for an eval?

## 2024-01-16 NOTE — TELEPHONE ENCOUNTER
----- Message from Jennifer Nava sent at 1/16/2024 11:36 AM CST -----  Contact: [phone] pt's daughter Denisse  Per Denisse, the Mucinex is not working and pt still has a dry cough. Pt would like to have something else called in for her.       Pt is using   [id number] - [place], [place] - [address] AT [facility] [address]  [address]  [facility] [address]  Phone: [phone] Fax: [phone]    Per Denisse, please give her a call to advise her on what she can do for pt as well.             Thank you

## 2024-01-18 ENCOUNTER — TELEPHONE (OUTPATIENT)
Dept: INTERNAL MEDICINE | Facility: CLINIC | Age: 89
End: 2024-01-18
Payer: MEDICARE

## 2024-01-18 ENCOUNTER — DOCUMENTATION ONLY (OUTPATIENT)
Dept: INTERNAL MEDICINE | Facility: CLINIC | Age: 89
End: 2024-01-18
Payer: MEDICARE

## 2024-01-18 ENCOUNTER — TELEPHONE (OUTPATIENT)
Dept: PODIATRY | Facility: CLINIC | Age: 89
End: 2024-01-18
Payer: MEDICARE

## 2024-01-18 RX ORDER — PROMETHAZINE HYDROCHLORIDE AND DEXTROMETHORPHAN HYDROBROMIDE 6.25; 15 MG/5ML; MG/5ML
5 SYRUP ORAL EVERY 6 HOURS PRN
Qty: 120 ML | Refills: 0 | Status: SHIPPED | OUTPATIENT
Start: 2024-01-18 | End: 2024-01-28

## 2024-01-18 NOTE — TELEPHONE ENCOUNTER
Tanya sent a msg on the 16th regarding a cough, would you like to see her in person? Seems daughter is upset that she hasn't heard anything after she spoke w/tanya     Attempted to call daughter and ms vivien back, no answer left voicemail .

## 2024-01-18 NOTE — TELEPHONE ENCOUNTER
----- Message from Roseanne Batista MA sent at 1/18/2024  3:30 PM CST -----  The patient's daughter Denisse is calling to get an update from message sent on 01/16. Says it is now Thursday and she has not heard back from anyone. Please give her a call back at [phone] .

## 2024-01-19 NOTE — TELEPHONE ENCOUNTER
No care due was identified.  [facility] Embedded Care Due Messages. Reference number: [id number].   1/19/2024 4:05:53 AM CST

## 2024-01-19 NOTE — PROGRESS NOTES
Internal medicine note    Promethazine DM was sent in for persistent dry nonproductive cough for the past 7-10 days.  She tried Mucinex DM without success.  That has no reported shortness a breath wheezing or fever.  No nasal head congestion.  No issues of heartburn indigestion.  The only recent change in medication was the addition of Aricept back in mid December.  She was on benazepril but been on this for over a year

## 2024-01-24 ENCOUNTER — OFFICE VISIT (OUTPATIENT)
Dept: PODIATRY | Facility: CLINIC | Age: 89
End: 2024-01-24
Payer: MEDICARE

## 2024-01-24 VITALS
DIASTOLIC BLOOD PRESSURE: 84 MMHG | SYSTOLIC BLOOD PRESSURE: 144 MMHG | WEIGHT: 134.69 LBS | HEIGHT: 63 IN | BODY MASS INDEX: 23.86 KG/M2 | HEART RATE: 69 BPM

## 2024-01-24 DIAGNOSIS — B35.1 ONYCHOMYCOSIS DUE TO DERMATOPHYTE: Primary | ICD-10-CM

## 2024-01-24 DIAGNOSIS — L84 CORNS/CALLOSITIES: ICD-10-CM

## 2024-01-24 PROCEDURE — 99499 UNLISTED E&M SERVICE: CPT | Mod: HCNC,,, | Performed by: PODIATRIST

## 2024-01-24 PROCEDURE — 17999 UNLISTD PX SKN MUC MEMB SUBQ: CPT | Mod: CSM,HCNC,S$GLB, | Performed by: PODIATRIST

## 2024-01-24 PROCEDURE — 99999 PR PBB SHADOW E&M-EST. PATIENT-LVL IV: CPT | Mod: PBBFAC,HCNC,, | Performed by: PODIATRIST

## 2024-01-24 RX ORDER — KETOROLAC TROMETHAMINE 30 MG/ML
INJECTION, SOLUTION INTRAMUSCULAR; INTRAVENOUS
Status: ON HOLD | COMMUNITY
Start: 2023-10-24 | End: 2024-03-12 | Stop reason: CLARIF

## 2024-01-24 RX ORDER — SODIUM CHLORIDE, SODIUM LACTATE, POTASSIUM CHLORIDE, CALCIUM CHLORIDE 600; 310; 30; 20 MG/100ML; MG/100ML; MG/100ML; MG/100ML
INJECTION, SOLUTION INTRAVENOUS
Status: ON HOLD | COMMUNITY
Start: 2023-10-24 | End: 2024-03-12 | Stop reason: CLARIF

## 2024-01-24 NOTE — PROGRESS NOTES
.Patient presents to the clinic for non-covered routine foot care. Patient is not a high risk foot care patient. Patient understands this is not typically a covered service and patient is aware of responsibility of payment. Pedal pulses are palpable. No know risk factors requiring routine foot care. Nails are elongated and thickened on feet. Hyperkeratotic lesions noted to multiple toes. Diagnosis is onychomcyosis and corn/callus. Calluses/corns and nails were reduced and trimmed bilaterally. Patient tolerated well.    RTC 1-2 months for Proc B, sooner PRN

## 2024-02-27 ENCOUNTER — TELEPHONE (OUTPATIENT)
Dept: PODIATRY | Facility: CLINIC | Age: 89
End: 2024-02-27
Payer: MEDICARE

## 2024-02-27 NOTE — TELEPHONE ENCOUNTER
Spoke with patient daughter(Denisse) in regards to message concerning her mother having toe pain, pt. Is scheduled to be seen on 02/28/2024 with Dr. Pappas(Podiatry) and I informed her daughter their are no providers in clinic today at [facility] to see her. I suggested if she cannot wait to bring mom to Urgent Care, Daughter(Denisse) said her mom is just going to have to wait until her scheduled appointment               ----- Message from Mayank De Jesus sent at 2/27/2024  9:32 AM CST -----  Contact: [phone]  Ernestine Gibson calling regarding Same Day Appointment  (message) Pt daughter asking for a call back she states she needs to get her mom in today for a ingrowing toe nail she states her mom is in pain and needs to be seen today she can not wait until tomorrow

## 2024-02-28 ENCOUNTER — OFFICE VISIT (OUTPATIENT)
Dept: PODIATRY | Facility: CLINIC | Age: 89
End: 2024-02-28
Payer: MEDICARE

## 2024-02-28 VITALS
BODY MASS INDEX: 23.83 KG/M2 | HEIGHT: 63 IN | WEIGHT: 134.5 LBS | DIASTOLIC BLOOD PRESSURE: 80 MMHG | HEART RATE: 63 BPM | SYSTOLIC BLOOD PRESSURE: 153 MMHG

## 2024-02-28 DIAGNOSIS — L84 CORNS/CALLOSITIES: ICD-10-CM

## 2024-02-28 DIAGNOSIS — B35.1 ONYCHOMYCOSIS DUE TO DERMATOPHYTE: Primary | ICD-10-CM

## 2024-02-28 PROCEDURE — 99499 UNLISTED E&M SERVICE: CPT | Mod: HCNC,,, | Performed by: PODIATRIST

## 2024-02-28 PROCEDURE — 99999 PR PBB SHADOW E&M-EST. PATIENT-LVL IV: CPT | Mod: PBBFAC,HCNC,, | Performed by: PODIATRIST

## 2024-02-28 PROCEDURE — 17999 UNLISTD PX SKN MUC MEMB SUBQ: CPT | Mod: CSM,HCNC,S$GLB, | Performed by: PODIATRIST

## 2024-03-04 ENCOUNTER — OFFICE VISIT (OUTPATIENT)
Dept: UROGYNECOLOGY | Facility: CLINIC | Age: 89
End: 2024-03-04
Payer: MEDICARE

## 2024-03-04 VITALS
BODY MASS INDEX: 24.3 KG/M2 | DIASTOLIC BLOOD PRESSURE: 60 MMHG | SYSTOLIC BLOOD PRESSURE: 140 MMHG | HEIGHT: 63 IN | WEIGHT: 137.13 LBS

## 2024-03-04 DIAGNOSIS — N81.10 PROLAPSE OF ANTERIOR VAGINAL WALL: ICD-10-CM

## 2024-03-04 DIAGNOSIS — N39.41 URINARY INCONTINENCE, URGE: Primary | ICD-10-CM

## 2024-03-04 DIAGNOSIS — Z46.89 PESSARY MAINTENANCE: ICD-10-CM

## 2024-03-04 DIAGNOSIS — R35.1 NOCTURIA: ICD-10-CM

## 2024-03-04 DIAGNOSIS — N95.2 VAGINAL ATROPHY: ICD-10-CM

## 2024-03-04 DIAGNOSIS — N99.3 VAGINAL VAULT PROLAPSE, POSTHYSTERECTOMY: ICD-10-CM

## 2024-03-04 PROCEDURE — 99999 PR PBB SHADOW E&M-EST. PATIENT-LVL IV: CPT | Mod: PBBFAC,HCNC,, | Performed by: NURSE PRACTITIONER

## 2024-03-04 PROCEDURE — 1101F PT FALLS ASSESS-DOCD LE1/YR: CPT | Mod: HCNC,CPTII,S$GLB, | Performed by: NURSE PRACTITIONER

## 2024-03-04 PROCEDURE — 1126F AMNT PAIN NOTED NONE PRSNT: CPT | Mod: HCNC,CPTII,S$GLB, | Performed by: NURSE PRACTITIONER

## 2024-03-04 PROCEDURE — 1160F RVW MEDS BY RX/DR IN RCRD: CPT | Mod: HCNC,CPTII,S$GLB, | Performed by: NURSE PRACTITIONER

## 2024-03-04 PROCEDURE — 99213 OFFICE O/P EST LOW 20 MIN: CPT | Mod: HCNC,S$GLB,, | Performed by: NURSE PRACTITIONER

## 2024-03-04 PROCEDURE — 1159F MED LIST DOCD IN RCRD: CPT | Mod: HCNC,CPTII,S$GLB, | Performed by: NURSE PRACTITIONER

## 2024-03-04 PROCEDURE — 3288F FALL RISK ASSESSMENT DOCD: CPT | Mod: HCNC,CPTII,S$GLB, | Performed by: NURSE PRACTITIONER

## 2024-03-04 RX ORDER — TROSPIUM CHLORIDE 20 MG/1
20 TABLET, FILM COATED ORAL DAILY
Qty: 30 TABLET | Refills: 0 | Status: ON HOLD | OUTPATIENT
Start: 2024-03-04 | End: 2024-03-13 | Stop reason: HOSPADM

## 2024-03-04 NOTE — PATIENT INSTRUCTIONS
1. Prolapse: Stage 2 apical prolapse, Stage 3 anterior and posterior vaginal wall prolapse   --Pessary # 3 ring with support  --Daily pelvic floor exercises as assigned, consider Pelvic floor PT in future  --Non surgical options discussed with patient      2. Urge urinary incontinence:    --Empty bladder every 3 hours. Empty well: wait a minute, lean forward on toilet.   --Avoid dietary irritants (see sheet). Keep diary x 3-5 days to determine your irritants.  --consider PT in future  --URGE:  trial of trospium 20 mg twice edailyTakes 2-4 weeks to see if will have effect. For dry mouth: get sour, sugar free lozenge or gum.   --STRESS: Pessary vs. Sling.      3. Incomplete bladder emptying:improved with pessary   --Double void and Crede maneuvers discussed  --Improved with pessary    4. Vaginal atrophy (dryness):  Use KY jelly, astroglide, or other water-based lube quarter size with your finger twice weekly    5. Hemorrhoid  --not inflammed at this time  --rectal 12/2020 NEG    6. Nocturia (nighttime urination): stop fluids 2 hours before bed. No water by the bed. If have leg swelling:  Elevate feet above chest x 1 hour before bed to get excess fluid off.  Can also use support hose (knee highs).    --will try to get trospium approved    7.  RTC 3 months for pessary check.

## 2024-03-04 NOTE — PROGRESS NOTES
Urogyn follow up  03/04/2024  .  [facility] - UROGYNECOLOGY  [address] [address]    Ernestine Luna  [id number]  8/13/1929      Ernestine Luna is a 89 y.o. here for a urogyn pessary check.      Last HPI from 10/29/2018  1)  UI:  (--) NELI   (--) UUI (--) pads Daytime frequency: Q 30 minutes- 3 hours.  Nocturia: Yes 3-4/night.   (--) dysuria,  (--) hematuria,  (--) frequent UTIs.  (+) complete bladder emptying.   2)  POP:  Absent.   Symptoms:(--)  .  (--) vaginal bleeding. (--) vaginal discharge. (--) sexually active.  (--) dyspareunia.   (+)  Vaginal dryness.  (--) vaginal estrogen use.   3)  BM:  (--) constipation/straining.  (--) chronic diarrhea. (--) hematochezia.  (--) fecal incontinence.  (--) fecal smearing/urgency.  (--) incomplete evacuation.    4)Pessary:  Denies pain, bleeding or dischage. Using #3 ring with support pessary..    03/27/2021  No /GYN changes.  Still sad because her long-term dog passed away in March.  Is stable, has good support system, no SI/HI.     1)  UI:  (--) NELI   (--) UUI (--) pads Daytime frequency: Q 30 minutes (after BP meds)- 3 hours.  Nocturia: Yes 3-4/night.  Wakes with urge to urinate. Sounds like dog can keep her awake, too.  (--) dysuria,  (--) hematuria,  (--) frequent UTIs.  (+) complete bladder emptying.     2)  POP:  Absent.   Symptoms:(--).  (--) vaginal bleeding. (--) vaginal discharge. (--) sexually active.  (--) dyspareunia.   (+)  Vaginal dryness.  (--) vaginal estrogen use. Using replens 2x/week.  Difficult to squeeze replens applicator. Uses 2x/week.     3)  BM:  (--) constipation/straining.  (--) chronic diarrhea. (--) hematochezia.  (--) fecal incontinence.  (--) fecal smearing/urgency.  (--) incomplete evacuation.      4) Pessary:  Denies pain, bleeding.  Has some scant discharge. Using #3 ring with support pessary.    5) Depression: Patient is still very sad about the passing of her dog in March 2020. She has had the dog  stuffed.  She denies SI/HI but cannot wait to see her dog again one day.  She doesn't want to burden her family with her sadness. She feels that she has a good support system in her [employer].      06/23/2021   1)  UI:  (--) NELI   (--) UUI (+) liner pads just in case: Daytime frequency: Q 30 minutes (after BP meds)- 3 hours.  Nocturia: Yes 3/night.  o.  (--) dysuria,  (--) hematuria,  (--) frequent UTIs.  (+) complete bladder emptying.     2)  POP:  Absent with pessary in place.   Symptoms:(--).  (--) vaginal bleeding. (--) vaginal discharge. (--) sexually active.  (--) dyspareunia.   (+)  Vaginal dryness.  (--) vaginal estrogen use. Using replens 2x/week.  Difficult to squeeze replens applicator. Uses 2x/week.     3)  BM:  (--) constipation/straining.  (--) chronic diarrhea. (--) hematochezia.  (--) fecal incontinence.  (--) fecal smearing/urgency.  (--) incomplete evacuation.      4) Pessary:  Denies pain, bleeding.  Denies vaginal discharge. Using #3 ring with support pessary.    5) Depression Feels better since she has had the dog stuffed.  She denies SI/HI but cannot wait to see her dog again one day.  She doesn't want to burden her family with her sadness. She feels that she has a good support system in her [employer].    Changes since last visit:     1)  UI:  (--) NELI   (--) UUI (+) liner pads just in case: Daytime frequency: Q 2-3 hours  Nocturia: Yes 3-4/night.  Limits fluids several hours prior to prior to bedtme Not taking trospium at this time. (--) dysuria,  (--) hematuria,  (--) frequent UTIs.  (+) complete bladder emptying.     2)  POP:  Absent with pessary in place.   Symptoms:(--).  (--) vaginal bleeding. (--) vaginal discharge. (--) sexually active.  (--) dyspareunia.   (+)  Vaginal dryness.  (--) vaginal estrogen use. Using replens 2x/week.      3)  BM:  (--) constipation/straining.  (--) chronic diarrhea. (--) hematochezia.  (--) fecal incontinence.  (--) fecal smearing/urgency.  (--) incomplete  evacuation.      4) Pessary:  Denies pain, bleeding.  Denies vaginal discharge. Using #3 ring with support pessary.    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 Wilkins    COLONOSCOPY N/A 10/1/2019    Procedure: COLONOSCOPY;  Surgeon: Jarad Chavira MD;  [facility]  Service: Endoscopy;  Laterality: N/A;  miralax prep (used miralax for last colonoscopy) - ERW    COLONOSCOPY W/ POLYPECTOMY  08/08/2013    RASHEED   06/24/2014    EYE SURGERY      HYSTERECTOMY  age 38    ALISTAIR; ovaries in place    INJECTION FOR SENTINEL NODE IDENTIFICATION Left 2/20/2023    Procedure: INJECTION, FOR SENTINEL NODE IDENTIFICATION-Left;  Surgeon: ROSIBEL Belcher MD;  Location: [address];  Service: General;  Laterality: Left;    JOINT REPLACEMENT      bilateral knees    MASTECTOMY, PARTIAL Left 2/20/2023    Procedure: MASTECTOMY, PARTIAL-Left with radiological marker;  Surgeon: ROSIBEL Belcher MD;  Location: [address];  Service: General;  Laterality: Left;    MASTECTOMY, PARTIAL Left 4/18/2023    Procedure: MASTECTOMY, PARTIAL-left re-excision;  Surgeon: ROSIBEL Belcher MD;  Location: [address];  Service: General;  Laterality: Left;    SENTINEL LYMPH NODE BIOPSY Left 2/20/2023    Procedure: BIOPSY, LYMPH NODE, SENTINEL-Left;   Surgeon: ROSIBEL Belcher MD;  Location: [facility] OR [address] [place] [address];  Service: General;  Laterality: Left;    TOTAL KNEE ARTHROPLASTY      Bilateral       Current Outpatient Medications   Medication Sig    acetic acid-oxyquinoline (FEM PH) 0.9-0.025 % Gel Place 1 applicator vaginally twice a week.    alendronate (FOSAMAX) 70 MG tablet Take 1 tablet (70 mg total) by mouth every 7 days.    amLODIPine (NORVASC) 10 MG tablet Take 1 tablet (10 mg total) by mouth once daily.    atenoloL (TENORMIN) 25 MG tablet TAKE 1 TABLET(25 MG) BY MOUTH EVERY DAY    benazepriL (LOTENSIN) 40 MG tablet TAKE 1 TABLET(40 MG) BY MOUTH EVERY DAY    calcium carbonate (OS-DHRUV) 600 mg calcium (1,500 mg) Tab Take 600 mg by mouth once.    ciclopirox (PENLAC) 8 % Soln Apply topically nightly.    colchicine, gout, (COLCRYS) 0.6 mg tablet Take 2 tablets (1.2 mg) by mouth x 1 dose then 1 tablet (0.6 mg) by mouth one hour later    diclofenac sodium (VOLTAREN) 1 % Gel Apply 2 g topically 3 (three) times daily.    donepeziL (ARICEPT) 5 MG tablet Take 1 tablet (5 mg total) by mouth once daily.    glycerin/min oil/polycarbophil (REPLENS VAGL) Place vaginally.    ketorolac (TORADOL) 30 mg/mL (1 mL) injection     LIDOcaine (LIDODERM) 5 % Place 1 patch onto the skin once daily. Remove & Discard patch within 12 hours or as directed by MD    lidocaine HCL 2% (XYLOCAINE) 2 % jelly Apply topically as needed. For toe pain, apply up to twice daily as needed for pain.    MULTIVITAMIN W-MINERALS/LUTEIN (CENTRUM SILVER ORAL) Take 1 tablet by mouth Daily.    OFIRMEV 1,000 mg/100 mL (10 mg/mL) Soln     omega 3-calcium-vit [id number] [phone] mg Cmpk Take by mouth once daily.    ondansetron (ZOFRAN-ODT) 4 MG TbDL Take 1 tablet (4 mg total) by mouth every 8 (eight) hours as needed (nausea).    PFIZER COVID BIVAL,12Y UP,,PF, 30 mcg/0.3 mL injection Inject into the muscle.    Ringer's solution,lactated (LACTATED RINGERS) infusion     vitamin D 1000 units Tab Take 185 mg by mouth  "once daily.    traZODone (DESYREL) 50 MG tablet Take 1 tablet (50 mg total) by mouth every evening. for 7 days    trospium (SANCTURA) 20 mg Tab tablet Take 1 tablet (20 mg total) by mouth once daily.     No current facility-administered medications for this visit.     Facility-Administered Medications Ordered in Other Visits   Medication    fentaNYL 50 mcg/mL injection  mcg    midazolam (VERSED) 1 mg/mL injection 0.5-4 mg       ROS:  As per HPI.      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  No further recommended.   --Dexa:06/2016Osteoporosis of the femoral neck. Has not takenTx. Talk with Dr. Ugalde about need for repeat bone density and/or treatment for osteoporosis.     Exam  BP (!) 140/60 (BP Location: Right arm, Patient Position: Sitting, BP Method: Medium (Manual))   Ht 5' 3" (1.6 m)   Wt 62.2 kg (137 lb 2 oz)   LMP  (LMP Unknown)   BMI 24.29 kg/m²   General: alert and oriented, no acute distress  Respiratory: normal respiratory effort  Abd: soft, non-tender, non-distended    L breast swollen--no masses palpable.    Pelvic  Ext. Genitalia: normal external genitalia. Normal bartholin's and skenes glands  Vagina: + atrophy. Normal vaginal mucosa without lesions. No discharge noted.   Non-tender bladder base without palpable mass.  #3 ring with support in place--removed and cleaned.   Cervix: absent  Uterus:  absent   Urethra: no masses or tenderness  Urethral meatus: no lesions, caruncle or prolapse.    Pessary removed without difficulty. Washed with soap and water. Reinserted without difficulty    Impression  1. Urinary incontinence, urge  trospium (SANCTURA) 20 mg Tab tablet      2. Vaginal vault prolapse, posthysterectomy        3. " Prolapse of anterior vaginal wall        4. Nocturia        5. Pessary maintenance        6. Vaginal atrophy                We reviewed the above issues and discussed options for short-term versus long-term management of her problems.   Plan:   1. Prolapse: Stage 2 apical prolapse, Stage 3 anterior and posterior vaginal wall prolapse   --Pessary # 3 ring with support  --Daily pelvic floor exercises as assigned, consider Pelvic floor PT in future  --Non surgical options discussed with patient      2. Urge urinary incontinence:    --Empty bladder every 3 hours. Empty well: wait a minute, lean forward on toilet.   --Avoid dietary irritants (see sheet). Keep diary x 3-5 days to determine your irritants.  --consider PT in future  --URGE:  trial of trospium 20 mg twice edailyTakes 2-4 weeks to see if will have effect. For dry mouth: get sour, sugar free lozenge or gum.   --STRESS: Pessary vs. Sling.      3. Incomplete bladder emptying:improved with pessary   --Double void and Crede maneuvers discussed  --Improved with pessary    4. Vaginal atrophy (dryness):  Use KY jelly, astroglide, or other water-based lube quarter size with your finger twice weekly    5. Hemorrhoid  --not inflammed at this time  --rectal 12/2020 NEG    6. Nocturia (nighttime urination): stop fluids 2 hours before bed. No water by the bed. If have leg swelling:  Elevate feet above chest x 1 hour before bed to get excess fluid off.  Can also use support hose (knee highs).    --will try to get trospium approved    7.  RTC 3 months for pessary check.     I spent a total of 20 minutes on the day of the visit.    This includes face to face time and non-face to face time preparing to see the patient (eg, review of tests), obtaining and/or reviewing separately obtained history, documenting clinical information in the electronic or other health record, independently interpreting results and communicating results to the patient/family/caregiver, or care  coordinator.     Kristine Sequeira, FNP-BC   Division of Female Pelvic Medicine and Reconstructive Surgery  Department of Obstetrics & Gynecology

## 2024-03-11 ENCOUNTER — TELEPHONE (OUTPATIENT)
Dept: INTERNAL MEDICINE | Facility: CLINIC | Age: 89
End: 2024-03-11

## 2024-03-11 ENCOUNTER — HOSPITAL ENCOUNTER (OUTPATIENT)
Facility: HOSPITAL | Age: 89
Discharge: HOME | End: 2024-03-13
Attending: EMERGENCY MEDICINE | Admitting: EMERGENCY MEDICINE
Payer: MEDICARE

## 2024-03-11 DIAGNOSIS — N30.00 ACUTE CYSTITIS WITHOUT HEMATURIA: ICD-10-CM

## 2024-03-11 DIAGNOSIS — R45.851 DEPRESSION WITH SUICIDAL IDEATION: ICD-10-CM

## 2024-03-11 DIAGNOSIS — F32.A DEPRESSION WITH SUICIDAL IDEATION: ICD-10-CM

## 2024-03-11 DIAGNOSIS — R07.9 CHEST PAIN: ICD-10-CM

## 2024-03-11 DIAGNOSIS — F99 PSYCHIATRIC PROBLEM: Primary | ICD-10-CM

## 2024-03-11 LAB
ALBUMIN SERPL BCP-MCNC: 4 G/DL (ref 3.5–5.2)
ALP SERPL-CCNC: 67 U/L (ref 55–135)
ALT SERPL W/O P-5'-P-CCNC: 25 U/L (ref 10–44)
AMPHET+METHAMPHET UR QL: NEGATIVE
ANION GAP SERPL CALC-SCNC: 8 MMOL/L (ref 8–16)
APAP SERPL-MCNC: <3 UG/ML (ref 10–20)
AST SERPL-CCNC: 31 U/L (ref 10–40)
BACTERIA #/AREA URNS AUTO: ABNORMAL /HPF
BARBITURATES UR QL SCN>200 NG/ML: NEGATIVE
BASOPHILS # BLD AUTO: 0.01 K/UL (ref 0–0.2)
BASOPHILS NFR BLD: 0.2 % (ref 0–1.9)
BENZODIAZ UR QL SCN>200 NG/ML: NEGATIVE
BILIRUB SERPL-MCNC: 0.5 MG/DL (ref 0.1–1)
BILIRUB UR QL STRIP: NEGATIVE
BUN SERPL-MCNC: 26 MG/DL (ref 10–30)
BZE UR QL SCN: NEGATIVE
CALCIUM SERPL-MCNC: 10.2 MG/DL (ref 8.7–10.5)
CANNABINOIDS UR QL SCN: NEGATIVE
CHLORIDE SERPL-SCNC: 102 MMOL/L (ref 95–110)
CLARITY UR REFRACT.AUTO: CLEAR
CO2 SERPL-SCNC: 29 MMOL/L (ref 23–29)
COLOR UR AUTO: YELLOW
CREAT SERPL-MCNC: 0.6 MG/DL (ref 0.5–1.4)
CREAT UR-MCNC: 33 MG/DL (ref 15–325)
DIFFERENTIAL METHOD BLD: NORMAL
EOSINOPHIL # BLD AUTO: 0.1 K/UL (ref 0–0.5)
EOSINOPHIL NFR BLD: 1.3 % (ref 0–8)
ERYTHROCYTE [DISTWIDTH] IN BLOOD BY AUTOMATED COUNT: 13.7 % (ref 11.5–14.5)
EST. GFR  (NO RACE VARIABLE): >60 ML/MIN/1.73 M^2
ETHANOL SERPL-MCNC: <10 MG/DL
GLUCOSE SERPL-MCNC: 81 MG/DL (ref 70–110)
GLUCOSE UR QL STRIP: NEGATIVE
HCT VFR BLD AUTO: 41.5 % (ref 37–48.5)
HCV AB SERPL QL IA: NORMAL
HGB BLD-MCNC: 13.4 G/DL (ref 12–16)
HGB UR QL STRIP: NEGATIVE
HIV 1+2 AB+HIV1 P24 AG SERPL QL IA: NORMAL
IMM GRANULOCYTES # BLD AUTO: 0.01 K/UL (ref 0–0.04)
IMM GRANULOCYTES NFR BLD AUTO: 0.2 % (ref 0–0.5)
KETONES UR QL STRIP: NEGATIVE
LEUKOCYTE ESTERASE UR QL STRIP: ABNORMAL
LYMPHOCYTES # BLD AUTO: 1.7 K/UL (ref 1–4.8)
LYMPHOCYTES NFR BLD: 27.5 % (ref 18–48)
MCH RBC QN AUTO: 30.5 PG (ref 27–31)
MCHC RBC AUTO-ENTMCNC: 32.3 G/DL (ref 32–36)
MCV RBC AUTO: 95 FL (ref 82–98)
METHADONE UR QL SCN>300 NG/ML: NEGATIVE
MICROSCOPIC COMMENT: ABNORMAL
MONOCYTES # BLD AUTO: 0.5 K/UL (ref 0.3–1)
MONOCYTES NFR BLD: 7.7 % (ref 4–15)
NEUTROPHILS # BLD AUTO: 3.9 K/UL (ref 1.8–7.7)
NEUTROPHILS NFR BLD: 63.1 % (ref 38–73)
NITRITE UR QL STRIP: NEGATIVE
NRBC BLD-RTO: 0 /100 WBC
OPIATES UR QL SCN: NEGATIVE
PCP UR QL SCN>25 NG/ML: NEGATIVE
PH UR STRIP: 7 [PH] (ref 5–8)
PLATELET # BLD AUTO: 197 K/UL (ref 150–450)
PMV BLD AUTO: 10.6 FL (ref 9.2–12.9)
POTASSIUM SERPL-SCNC: 3.7 MMOL/L (ref 3.5–5.1)
PROT SERPL-MCNC: 7.9 G/DL (ref 6–8.4)
PROT UR QL STRIP: NEGATIVE
RBC # BLD AUTO: 4.39 M/UL (ref 4–5.4)
RBC #/AREA URNS AUTO: 2 /HPF (ref 0–4)
SODIUM SERPL-SCNC: 139 MMOL/L (ref 136–145)
SP GR UR STRIP: 1.02 (ref 1–1.03)
SQUAMOUS #/AREA URNS AUTO: 1 /HPF
TOXICOLOGY INFORMATION: NORMAL
TSH SERPL DL<=0.005 MIU/L-ACNC: 0.87 UIU/ML (ref 0.4–4)
URN SPEC COLLECT METH UR: ABNORMAL
WBC # BLD AUTO: 6.11 K/UL (ref 3.9–12.7)
WBC #/AREA URNS AUTO: 8 /HPF (ref 0–5)

## 2024-03-11 PROCEDURE — 84443 ASSAY THYROID STIM HORMONE: CPT | Mod: HCNC | Performed by: EMERGENCY MEDICINE

## 2024-03-11 PROCEDURE — 25000003 PHARM REV CODE 250: Mod: HCNC | Performed by: EMERGENCY MEDICINE

## 2024-03-11 PROCEDURE — 87086 URINE CULTURE/COLONY COUNT: CPT | Mod: HCNC | Performed by: EMERGENCY MEDICINE

## 2024-03-11 PROCEDURE — 82077 ASSAY SPEC XCP UR&BREATH IA: CPT | Mod: HCNC | Performed by: EMERGENCY MEDICINE

## 2024-03-11 PROCEDURE — 63600175 PHARM REV CODE 636 W HCPCS: Mod: HCNC | Performed by: EMERGENCY MEDICINE

## 2024-03-11 PROCEDURE — 81001 URINALYSIS AUTO W/SCOPE: CPT | Mod: HCNC | Performed by: EMERGENCY MEDICINE

## 2024-03-11 PROCEDURE — 80307 DRUG TEST PRSMV CHEM ANLYZR: CPT | Mod: HCNC | Performed by: EMERGENCY MEDICINE

## 2024-03-11 PROCEDURE — 87088 URINE BACTERIA CULTURE: CPT | Mod: HCNC | Performed by: EMERGENCY MEDICINE

## 2024-03-11 PROCEDURE — 80143 DRUG ASSAY ACETAMINOPHEN: CPT | Mod: HCNC | Performed by: EMERGENCY MEDICINE

## 2024-03-11 PROCEDURE — 87389 HIV-1 AG W/HIV-1&-2 AB AG IA: CPT | Mod: HCNC | Performed by: PHYSICIAN ASSISTANT

## 2024-03-11 PROCEDURE — 96372 THER/PROPH/DIAG INJ SC/IM: CPT | Mod: 59 | Performed by: FAMILY MEDICINE

## 2024-03-11 PROCEDURE — G0378 HOSPITAL OBSERVATION PER HR: HCPCS | Mod: HCNC

## 2024-03-11 PROCEDURE — 87077 CULTURE AEROBIC IDENTIFY: CPT | Mod: HCNC | Performed by: EMERGENCY MEDICINE

## 2024-03-11 PROCEDURE — 63600175 PHARM REV CODE 636 W HCPCS: Mod: HCNC | Performed by: FAMILY MEDICINE

## 2024-03-11 PROCEDURE — 80053 COMPREHEN METABOLIC PANEL: CPT | Mod: HCNC | Performed by: EMERGENCY MEDICINE

## 2024-03-11 PROCEDURE — 87186 SC STD MICRODIL/AGAR DIL: CPT | Mod: HCNC | Performed by: EMERGENCY MEDICINE

## 2024-03-11 PROCEDURE — 96365 THER/PROPH/DIAG IV INF INIT: CPT | Mod: HCNC

## 2024-03-11 PROCEDURE — 85025 COMPLETE CBC W/AUTO DIFF WBC: CPT | Mod: HCNC | Performed by: EMERGENCY MEDICINE

## 2024-03-11 PROCEDURE — 99285 EMERGENCY DEPT VISIT HI MDM: CPT | Mod: 25,HCNC

## 2024-03-11 PROCEDURE — 86803 HEPATITIS C AB TEST: CPT | Mod: HCNC | Performed by: PHYSICIAN ASSISTANT

## 2024-03-11 RX ORDER — ALUMINUM HYDROXIDE, MAGNESIUM HYDROXIDE, AND SIMETHICONE 1200; 120; 1200 MG/30ML; MG/30ML; MG/30ML
30 SUSPENSION ORAL 4 TIMES DAILY PRN
Status: DISCONTINUED | OUTPATIENT
Start: 2024-03-11 | End: 2024-03-13 | Stop reason: HOSPADM

## 2024-03-11 RX ORDER — TALC
6 POWDER (GRAM) TOPICAL NIGHTLY PRN
Status: DISCONTINUED | OUTPATIENT
Start: 2024-03-11 | End: 2024-03-13 | Stop reason: HOSPADM

## 2024-03-11 RX ORDER — CEPHALEXIN 500 MG/1
500 CAPSULE ORAL EVERY 12 HOURS
Status: DISCONTINUED | OUTPATIENT
Start: 2024-03-12 | End: 2024-03-13 | Stop reason: HOSPADM

## 2024-03-11 RX ORDER — AMLODIPINE BESYLATE 10 MG/1
10 TABLET ORAL DAILY
Status: DISCONTINUED | OUTPATIENT
Start: 2024-03-12 | End: 2024-03-13

## 2024-03-11 RX ORDER — GLUCAGON 1 MG
1 KIT INJECTION
Status: DISCONTINUED | OUTPATIENT
Start: 2024-03-11 | End: 2024-03-13 | Stop reason: HOSPADM

## 2024-03-11 RX ORDER — LISINOPRIL 20 MG/1
20 TABLET ORAL DAILY
Status: DISCONTINUED | OUTPATIENT
Start: 2024-03-12 | End: 2024-03-13

## 2024-03-11 RX ORDER — NALOXONE HCL 0.4 MG/ML
0.02 VIAL (ML) INJECTION
Status: DISCONTINUED | OUTPATIENT
Start: 2024-03-11 | End: 2024-03-13 | Stop reason: HOSPADM

## 2024-03-11 RX ORDER — ATENOLOL 25 MG/1
25 TABLET ORAL DAILY
Status: DISCONTINUED | OUTPATIENT
Start: 2024-03-12 | End: 2024-03-13 | Stop reason: HOSPADM

## 2024-03-11 RX ORDER — ENOXAPARIN SODIUM 100 MG/ML
40 INJECTION SUBCUTANEOUS EVERY 24 HOURS
Status: DISCONTINUED | OUTPATIENT
Start: 2024-03-11 | End: 2024-03-13 | Stop reason: HOSPADM

## 2024-03-11 RX ORDER — IBUPROFEN 200 MG
16 TABLET ORAL
Status: DISCONTINUED | OUTPATIENT
Start: 2024-03-11 | End: 2024-03-13 | Stop reason: HOSPADM

## 2024-03-11 RX ORDER — SODIUM CHLORIDE 0.9 % (FLUSH) 0.9 %
10 SYRINGE (ML) INJECTION EVERY 12 HOURS PRN
Status: DISCONTINUED | OUTPATIENT
Start: 2024-03-11 | End: 2024-03-13 | Stop reason: HOSPADM

## 2024-03-11 RX ORDER — IBUPROFEN 200 MG
24 TABLET ORAL
Status: DISCONTINUED | OUTPATIENT
Start: 2024-03-11 | End: 2024-03-13 | Stop reason: HOSPADM

## 2024-03-11 RX ORDER — ALENDRONATE SODIUM 70 MG/1
70 TABLET ORAL
Qty: 12 TABLET | Refills: 3 | OUTPATIENT
Start: 2024-03-11

## 2024-03-11 RX ORDER — ONDANSETRON 8 MG/1
8 TABLET, ORALLY DISINTEGRATING ORAL EVERY 8 HOURS PRN
Status: DISCONTINUED | OUTPATIENT
Start: 2024-03-11 | End: 2024-03-13 | Stop reason: HOSPADM

## 2024-03-11 RX ADMIN — CEFTRIAXONE 1 G: 1 INJECTION, POWDER, FOR SOLUTION INTRAMUSCULAR; INTRAVENOUS at 04:03

## 2024-03-11 RX ADMIN — ENOXAPARIN SODIUM 40 MG: 40 INJECTION SUBCUTANEOUS at 11:03

## 2024-03-11 NOTE — TELEPHONE ENCOUNTER
Depression, Alzheimer's, suicidal. Scheduled for 4.     Called daughter back, got more info. Sched appt.

## 2024-03-11 NOTE — FIRST PROVIDER EVALUATION
"Medical screening examination initiated.  I have conducted a focused provider triage encounter, findings are as follows:    Brief history of present illness:  Pt has been depressed with thoughts of death.  Daughter wondering if she needs a scan (clarified as head CT)    Vitals:    03/11/24 1343   BP: (!) 145/67   Pulse: 64   Resp: 20   Temp: 97.6 °F (36.4 °C)   TempSrc: Oral   SpO2: 99%   Weight: 62.1 kg (137 lb)   Height: 5' 3" (1.6 m)       Pertinent physical exam:  Alert    Brief workup plan:  will put in psych order set and head ct    Preliminary workup initiated; this workup will be continued and followed by the physician or advanced practice provider that is assigned to the patient when roomed.  "

## 2024-03-11 NOTE — ED PROVIDER NOTES
"Encounter Date: 3/11/2024       History     Chief Complaint   Patient presents with    Suicidal     Referred in my primary doctor     HPI  Sent in for evaluation for suicidal statements. Sent in by pcp for evaluation    Patient reports she is not suicidal. She states she gets frustrated and just tells people "I'm tired".  I did try to kill myself after my   39 years ago but that was then. Denies harm to self.  No medical complaitns including chest pain, sob, fevers, falls, weakness, numbness, hallucinations      Per daughter, she was talking about killing herself this weekend. Told her [employer] member this.  Her daughter heard the [employer] member ringing the doorbell. She had called her and told her she wanted to kill herself. Daughter went down, mom was in the bathroom and told her she was going to shower. When asked about the statement she said "I'm just tired, Denisse".  Daughter thought at first she just wanted attention.  Checked on her the next morning and went to check on her, was sitting on the toilet and she was like "something came over me, I'm depressed, I can't sleep, I can't do anything.  States she's been praying more, praying rosary and to saints.  Just kept saying she's tired. Also has been very labile and upset with doctor. Making strange decisions.        Review of patient's allergies indicates:   Allergen Reactions    Tramadol Shortness Of Breath, Diarrhea and Nausea And Vomiting    Acetaminophen Nausea And Vomiting    Aspirin Nausea And Vomiting    Sulfa (sulfonamide antibiotics) Nausea And Vomiting    Prednisone Other (See Comments)     Stomach problems, no issue with injection     Past Medical History:   Diagnosis Date    Anemia     s/p knee surgery since surgery has resolved    Breast cancer, stage 0     lumpectomy in     Cataract     DCIS (ductal carcinoma in situ) of breast 2013    Family history of breast cancer 2016    Genetic testing 2016    negative Integrated " BRACAnalysis with myRisk    Hypertension     Osteoarthritis     Osteoporosis 11/8/2023    Urge urinary incontinence 4/18/2019     Past Surgical History:   Procedure Laterality Date    BREAST BIOPSY Right     BREAST LUMPECTOMY Right     BUNIONECTOMY      Left foot (x2)    CARPAL TUNNEL RELEASE Right     38 years old    CATARACT EXTRACTION Left     with lens     CATARACT EXTRACTION W/  INTRAOCULAR LENS IMPLANT Right 10/12/2015    Dr. Wilkins    COLONOSCOPY N/A 10/1/2019    Procedure: COLONOSCOPY;  Surgeon: Jarad Chavira MD;  Location: [facility] ENDO (4TH FLR);  Service: Endoscopy;  Laterality: N/A;  miralax prep (used miralax for last colonoscopy) - ERW    COLONOSCOPY W/ POLYPECTOMY  08/08/2013    RASHEED   06/24/2014    EYE SURGERY      HYSTERECTOMY  age 38    ALISTAIR; ovaries in place    INJECTION FOR SENTINEL NODE IDENTIFICATION Left 2/20/2023    Procedure: INJECTION, FOR SENTINEL NODE IDENTIFICATION-Left;  Surgeon: ROSIBEL Belcher MD;  Location: [facility] OR 2ND FLR;  Service: General;  Laterality: Left;    JOINT REPLACEMENT      bilateral knees    MASTECTOMY, PARTIAL Left 2/20/2023    Procedure: MASTECTOMY, PARTIAL-Left with radiological marker;  Surgeon: ROSIBEL Belcher MD;  Location: [facility] OR 2ND FLR;  Service: General;  Laterality: Left;    MASTECTOMY, PARTIAL Left 4/18/2023    Procedure: MASTECTOMY, PARTIAL-left re-excision;  Surgeon: ROSIBEL Belcher MD;  Location: [facility] OR 2ND FLR;  Service: General;  Laterality: Left;    SENTINEL LYMPH NODE BIOPSY Left 2/20/2023    Procedure: BIOPSY, LYMPH NODE, SENTINEL-Left;  Surgeon: ROSIBEL Belcher MD;  [facility];  Service: General;  Laterality: Left;    TOTAL KNEE ARTHROPLASTY      Bilateral     Family History   Problem Relation Age of Onset    Stroke Mother     Breast cancer Sister 82    Heart disease Sister     Diabetes Sister     Cancer Sister         breast cancer    Asthma Sister     Breast cancer Daughter 46    Cancer Daughter         breast    Breast cancer Daughter 45         negative genetic testing 2015    Cancer Daughter         breast    Breast cancer Daughter 37    Cancer Daughter         breast    Coronary artery disease Father     Diabetes Father     Heart disease Father     Heart attack Father     Heart disease Sister     Diabetes Sister     Diabetes Sister     Heart disease Sister     Hyperlipidemia Sister     Heart attack Brother     No Known Problems Son     Cancer Cousin         colon    Cancer Cousin     Breast cancer Cousin     Breast cancer Other     Diabetes Other     Blindness Other         One eye is blind from complications of diabetes    Breast cancer Other     Diabetes Other     Breast cancer Other     Diabetes Other     Ovarian cancer Neg Hx     Endometrial cancer Neg Hx     Vaginal cancer Neg Hx     Cervical cancer Neg Hx      Social History     Tobacco Use    Smoking status: Never    Smokeless tobacco: Never   Substance Use Topics    Alcohol use: Yes     Alcohol/week: 1.0 standard drink of alcohol     Types: 1 Glasses of wine per week     Comment: Rare, every other week    Drug use: No     Review of Systems    Physical Exam     Initial Vitals [03/11/24 1343]   BP Pulse Resp Temp SpO2   (!) 145/67 64 20 97.6 °F (36.4 °C) 99 %      MAP       --         Physical Exam    Nursing note and vitals reviewed.  Constitutional: She appears well-developed and well-nourished. No distress.   HENT:   Head: Normocephalic and atraumatic.   Nose: Nose normal.   Eyes: Conjunctivae and EOM are normal. Pupils are equal, round, and reactive to light.   Neck:   Normal range of motion.  Cardiovascular:  Normal rate, regular rhythm and normal heart sounds.     Exam reveals no gallop and no friction rub.       No murmur heard.  Pulmonary/Chest: Breath sounds normal. No respiratory distress. She has no wheezes. She has no rhonchi. She has no rales.   Abdominal: Abdomen is soft. She exhibits no distension. There is no abdominal tenderness. There is no rebound and no guarding.    Musculoskeletal:         General: No tenderness or edema. Normal range of motion.      Cervical back: Normal range of motion.     Neurological: She is alert and oriented to person, place, and time. She has normal strength. No sensory deficit. GCS score is 15. GCS eye subscore is 4. GCS verbal subscore is 5. GCS motor subscore is 6.   Skin: Skin is warm and dry. Capillary refill takes less than 2 seconds.   Psychiatric: She has a normal mood and affect.         ED Course   Procedures  Labs Reviewed   URINALYSIS, REFLEX TO URINE CULTURE - Abnormal; Notable for the following components:       Result Value    Leukocytes, UA 3+ (*)     All other components within normal limits    Narrative:     Specimen Source->Urine   ACETAMINOPHEN LEVEL - Abnormal; Notable for the following components:    Acetaminophen (Tylenol), Serum <3.0 (*)     All other components within normal limits    Narrative:     Release to patient->Immediate   URINALYSIS MICROSCOPIC - Abnormal; Notable for the following components:    WBC, UA 8 (*)     All other components within normal limits    Narrative:     Specimen Source->Urine   HIV 1 / 2 ANTIBODY    Narrative:     Release to patient->Immediate   HEPATITIS C ANTIBODY    Narrative:     Release to patient->Immediate   CBC W/ AUTO DIFFERENTIAL    Narrative:     Release to patient->Immediate   COMPREHENSIVE METABOLIC PANEL    Narrative:     Release to patient->Immediate   TSH    Narrative:     Release to patient->Immediate   DRUG SCREEN PANEL, URINE EMERGENCY    Narrative:     Specimen Source->Urine   ALCOHOL,MEDICAL (ETHANOL)    Narrative:     Release to patient->Immediate          Imaging Results              CT Head Without Contrast (Final result)  Result time 03/11/24 15:34:35      Final result by Mauricio Del Cid MD (03/11/24 15:34:35)                   Impression:      No evidence of acute hemorrhage or major vascular distribution infarct.    Mild chronic small vessel ischemic change with  moderate generalized cerebral volume loss.      Electronically signed by: Mauricio Del Cid MD  Date:    2024  Time:    15:34               Narrative:    EXAMINATION:  CT HEAD WITHOUT CONTRAST    CLINICAL HISTORY:  Mental status change, unknown cause;    TECHNIQUE:  Low dose axial CT images obtained throughout the head without the use of intravenous contrast.  Axial, sagittal and coronal reconstructions were performed.    COMPARISON:  2013.    FINDINGS:  Intracranial compartment:    Moderate generalized cerebral volume loss.  No evidence of hydrocephalus.    Mild patchy hypoattenuation in the supratentorial white matter, nonspecific but most likely reflecting chronic small vessel ischemic changes. No recent or remote major vascular distribution infarct. No acute hemorrhage.  No mass effect or midline shift.    No extra-axial blood or fluid collections.    Skull/extracranial contents (limited evaluation):    Remote right orbital floor fracture.  No acute displaced calvarial fracture.    The mastoid air cells and visualized paranasal sinuses are essentially clear.    Postsurgical change both globes.                                       Medications   cefTRIAXone (Rocephin) 1 g in dextrose 5 % in water (D5W) 100 mL IVPB (MB+) (0 g Intravenous Stopped 3/11/24 1647)     Medical Decision Making  89yo F who presents with depression.  Here with more labile moods at home, telling people she wants to kill herself. Reportedly did have a suicide attempt when   40yrs ago. Here she is tearful but also upset saying she is fine and would not kill herself and wants to leave. No medical complaints. Oriented x3.  Grossly intact exam. I suspect she likely has some component of dementia and depression but not a totally clear need for inpatient. Will get psychiatry to help decide. In the mean time will medically clear with labs, CT head.    CT head without acute process.  I discussed with Psychiatry however her urine  does have 3+ leuk esterase and white blood cells.  They do not feel like a geriatric psych facility will take her because of a possible UTI and a more acute change from baseline.  They think she needs to be admitted for antibiotics to see if that improves.  They will still consult.  We will admit to hospital medicine.                                      Clinical Impression:  Final diagnoses:  [N30.00] Acute cystitis without hematuria (Primary)  [F32.A, R45.851] Depression with suicidal ideation          ED Disposition Condition    Observation Stable                Danna Chilel MD  03/11/24 1906

## 2024-03-11 NOTE — TELEPHONE ENCOUNTER
----- Message from Salome Kimbrough sent at 3/11/2024 10:45 AM CDT -----  Contact: Daughter/Denisse/[phone]  Caller is requesting an earlier appointment then we can schedule.  Caller is requesting a message be sent to the provider.  If this is for urgent care symptoms, did you offer other providers at this location, providers at other locations, or [facility]? (yes, no, n/a):  n/a  If this is for the patients physical, did you offer to schedule next available and put on wait list, or to see NP or PA for their physical?  (yes, no, n/a):  n/a  When is the next available appointment with their provider:  3/20  Reason for the appointment: Depression/Alzheimer's and additional problems   Patient preference of timeframe to be scheduled:  asap   Would the patient like a call back, or a response through their [facility] portal?:   call back   Comments:  Denisse is requesting a return call

## 2024-03-11 NOTE — ED NOTES
"Pt brought to the ED for a psychiatric evaluation. Pt is well dressed and well kept. Pt denies SI/HI/AVH's. Pt states she gets upset and depressed at times because of the way her daughter treats her. Pt is cooperative with the "code watch" process, she changed into a paper scrubs, she provided all specimens and is resting comfortably on the stretcher. Pt informed of the plan of care, she is upset with the situation but agrees to comply with the process.   "

## 2024-03-11 NOTE — TELEPHONE ENCOUNTER
"Spoke to her daughter, she states she heard banging on the door. Ring bell kept ringing. A [employer] member was at the door "I'm trying to get your momma, she won't answer the door. Momma said she wants to kill herself I thought she just wanted attention. I walked in and found her rubbing her head, "I'm tired, I'm just tired,..." I asked her why she said that? I asked her to go take her shower. "I'm going to be in here for a long time." Was the patients response. When I spoke to the Mandaeism member I rounded the corner and notice my mom was eavesdropping just outside the door to my conversation with the Mandaeism member.  That's what made me think she just wanted attention because she was eavesdropping. The daughter states, "They prayed together. I figured I'd check on her the next morning." Look like something just came over my body, like something was taking over me, I'm depressed, I can't sleep, I don't know what is wrong with me, something is changing."  This was Saturday. We went to [employer] on Saturday, she was fine. Last night she said, "I'm going to call the  and change my power of , Im going to the bank." I told her you said you wanted to live to 100, what's wrong? Daughter, She stated she called and no one called her back, trying to get her mom seen. She is asking if she needs to be on her meds again. I explained that she needed to be evaluated first. When patient comes back in please review which medications she is actually taking, the daughter states she only sees 2 medication bottles, but the patient has a long list of meds. She is able to bring her to the emergency room today.  Incidentally, patient also has something in her vagina, her daughter will reach out to  OB/Gyn for that. Thank you.   "

## 2024-03-11 NOTE — ED NOTES
"The patient is recv'd lying supine in bed, awake. She is attired in [facility] provided scrubs w/ good grooming & hygiene. She is oriented to her whereabouts. She denies S/HI, A/VH. She states her daughter is the cause of her :ED visit stating, "she the one crazy, she the one need to be here." She states that she told her daughter, " you'll make somebody wanna' blow they brains out." She states that she does own a gun from years ago for protection bought by her . Patient's  has been dead for 39 yrs. She engages easily, mood is pleasant w/ a relaxed, pleasant affect. She is maintained on DVC for safety.   "

## 2024-03-12 PROBLEM — N30.00 ACUTE CYSTITIS: Status: ACTIVE | Noted: 2024-03-12

## 2024-03-12 PROBLEM — R45.851 SUICIDAL IDEATION: Status: ACTIVE | Noted: 2024-03-12

## 2024-03-12 PROBLEM — F99 PSYCHIATRIC PROBLEM: Status: ACTIVE | Noted: 2024-03-12

## 2024-03-12 LAB
ANION GAP SERPL CALC-SCNC: 7 MMOL/L (ref 8–16)
BASOPHILS # BLD AUTO: 0.01 K/UL (ref 0–0.2)
BASOPHILS NFR BLD: 0.2 % (ref 0–1.9)
BUN SERPL-MCNC: 19 MG/DL (ref 10–30)
CALCIUM SERPL-MCNC: 9.6 MG/DL (ref 8.7–10.5)
CHLORIDE SERPL-SCNC: 108 MMOL/L (ref 95–110)
CO2 SERPL-SCNC: 26 MMOL/L (ref 23–29)
CREAT SERPL-MCNC: 0.6 MG/DL (ref 0.5–1.4)
DIFFERENTIAL METHOD BLD: ABNORMAL
EOSINOPHIL # BLD AUTO: 0.1 K/UL (ref 0–0.5)
EOSINOPHIL NFR BLD: 2.9 % (ref 0–8)
ERYTHROCYTE [DISTWIDTH] IN BLOOD BY AUTOMATED COUNT: 13.4 % (ref 11.5–14.5)
EST. GFR  (NO RACE VARIABLE): >60 ML/MIN/1.73 M^2
GLUCOSE SERPL-MCNC: 79 MG/DL (ref 70–110)
HCT VFR BLD AUTO: 37.5 % (ref 37–48.5)
HGB BLD-MCNC: 12.5 G/DL (ref 12–16)
IMM GRANULOCYTES # BLD AUTO: 0.01 K/UL (ref 0–0.04)
IMM GRANULOCYTES NFR BLD AUTO: 0.2 % (ref 0–0.5)
LYMPHOCYTES # BLD AUTO: 1.5 K/UL (ref 1–4.8)
LYMPHOCYTES NFR BLD: 31 % (ref 18–48)
MCH RBC QN AUTO: 31.1 PG (ref 27–31)
MCHC RBC AUTO-ENTMCNC: 33.3 G/DL (ref 32–36)
MCV RBC AUTO: 93 FL (ref 82–98)
MONOCYTES # BLD AUTO: 0.5 K/UL (ref 0.3–1)
MONOCYTES NFR BLD: 9.8 % (ref 4–15)
NEUTROPHILS # BLD AUTO: 2.8 K/UL (ref 1.8–7.7)
NEUTROPHILS NFR BLD: 55.9 % (ref 38–73)
NRBC BLD-RTO: 0 /100 WBC
PLATELET # BLD AUTO: 176 K/UL (ref 150–450)
PMV BLD AUTO: 10.4 FL (ref 9.2–12.9)
POTASSIUM SERPL-SCNC: 3.4 MMOL/L (ref 3.5–5.1)
RBC # BLD AUTO: 4.02 M/UL (ref 4–5.4)
SODIUM SERPL-SCNC: 141 MMOL/L (ref 136–145)
WBC # BLD AUTO: 4.91 K/UL (ref 3.9–12.7)

## 2024-03-12 PROCEDURE — 96372 THER/PROPH/DIAG INJ SC/IM: CPT | Performed by: FAMILY MEDICINE

## 2024-03-12 PROCEDURE — 94761 N-INVAS EAR/PLS OXIMETRY MLT: CPT | Mod: HCNC

## 2024-03-12 PROCEDURE — G0378 HOSPITAL OBSERVATION PER HR: HCPCS | Mod: HCNC

## 2024-03-12 PROCEDURE — 36415 COLL VENOUS BLD VENIPUNCTURE: CPT | Mod: HCNC | Performed by: FAMILY MEDICINE

## 2024-03-12 PROCEDURE — 25000003 PHARM REV CODE 250: Mod: HCNC | Performed by: STUDENT IN AN ORGANIZED HEALTH CARE EDUCATION/TRAINING PROGRAM

## 2024-03-12 PROCEDURE — 85025 COMPLETE CBC W/AUTO DIFF WBC: CPT | Mod: HCNC | Performed by: FAMILY MEDICINE

## 2024-03-12 PROCEDURE — 80048 BASIC METABOLIC PNL TOTAL CA: CPT | Mod: HCNC | Performed by: FAMILY MEDICINE

## 2024-03-12 PROCEDURE — 63600175 PHARM REV CODE 636 W HCPCS: Mod: HCNC | Performed by: FAMILY MEDICINE

## 2024-03-12 PROCEDURE — 25000003 PHARM REV CODE 250: Mod: HCNC | Performed by: FAMILY MEDICINE

## 2024-03-12 PROCEDURE — 90792 PSYCH DIAG EVAL W/MED SRVCS: CPT | Mod: HCNC,GC,, | Performed by: PSYCHIATRY & NEUROLOGY

## 2024-03-12 RX ORDER — POTASSIUM CHLORIDE 20 MEQ/1
20 TABLET, EXTENDED RELEASE ORAL ONCE
Status: COMPLETED | OUTPATIENT
Start: 2024-03-12 | End: 2024-03-12

## 2024-03-12 RX ORDER — CHOLECALCIFEROL (VITAMIN D3) 25 MCG
1000 TABLET ORAL DAILY
Status: DISCONTINUED | OUTPATIENT
Start: 2024-03-12 | End: 2024-03-13 | Stop reason: HOSPADM

## 2024-03-12 RX ADMIN — CEPHALEXIN 500 MG: 500 CAPSULE ORAL at 09:03

## 2024-03-12 RX ADMIN — ATENOLOL 25 MG: 25 TABLET ORAL at 10:03

## 2024-03-12 RX ADMIN — AMLODIPINE BESYLATE 10 MG: 10 TABLET ORAL at 10:03

## 2024-03-12 RX ADMIN — CEPHALEXIN 500 MG: 500 CAPSULE ORAL at 10:03

## 2024-03-12 RX ADMIN — CHOLECALCIFEROL TAB 25 MCG (1000 UNIT) 1000 UNITS: 25 TAB at 10:03

## 2024-03-12 RX ADMIN — LISINOPRIL 20 MG: 20 TABLET ORAL at 10:03

## 2024-03-12 RX ADMIN — POTASSIUM CHLORIDE 20 MEQ: 1500 TABLET, EXTENDED RELEASE ORAL at 06:03

## 2024-03-12 RX ADMIN — ENOXAPARIN SODIUM 40 MG: 40 INJECTION SUBCUTANEOUS at 06:03

## 2024-03-12 NOTE — ED NOTES
"Eating dinner & states, "I can't wait to get home to steam my vegetables." She speaks often of being a vegetarian & shopping for her organic groceries on Tuesdays. DVC is maintained for safety.  "

## 2024-03-12 NOTE — PROGRESS NOTES
"[facility]  Progress Note    Patient Name: Ernestine Luna  MRN: [id number]  Patient Class: OP- Observation   Admission Date: 3/11/2024  Length of Stay: 0 days  Attending Physician: Yudith Fatima MD  Primary Care Provider: Kev Macias MD        Subjective:     Principal Problem:Psychiatric problem      HPI:  Sent in for evaluation for suicidal statements. Sent in by pcp for evaluation     Patient reports she is not suicidal. She states she gets frustrated and just tells people "I'm tired".  I did try to kill myself after my   39 years ago but that was then. Denies harm to self.  No medical complaitns including chest pain, sob, fevers, falls, weakness, numbness, hallucinations     Per daughter, she was talking about killing herself this weekend. Told her [employer]ism member this.  Her daughter heard the Sikhism member ringing the doorbell. She had called her and told her she wanted to kill herself. Daughter went down, mom was in the bathroom and told her she was going to shower. When asked about the statement she said "I'm just tired, Denisse".  Daughter thought at first she just wanted attention.  Checked on her the next morning and went to check on her, was sitting on the toilet and she was like "something came over me, I'm depressed, I can't sleep, I can't do anything.  States she's been praying more, praying rosary and to saints.  Just kept saying she's tired. Also has been very labile and upset with doctor. Making strange decisions.    In the ED patient afebrile and hemodynamically stable saturating well on room air at rest. Placed under PEC and psychiatry consulted. Concern for possible UTI on UA and patient started on ceftriaxone in ED. Admitted to  for further evaluation and management.     Overview/Hospital Course:  No notes on file    Interval History:  Patient states she is feeling fine.  She denies urinary complaints- no abdominal pain, no dysuria, or strong " "smelling urine.  Patient denies homicidal or suicidal ideation.  She states "I have my 's gun at home. If I wanted to hurt myself I would do it, not go tell people about it." She states every morning she goes walking around the neighborhood around [facility]. She is requesting to go home.  Reports her daughter lives in the other half of the house.  She denies sitting on the toilet and telling her daughter she felt depressed. Denies any depression symptoms. Denies taking any psychiatric medications.    Discussed with psychiatry. They recommend continued PEC and inpatient psychiatric admission.    Discussed with patient's daughter Denisse at bedside. Denisse was tearful. She states her mother was confused due to UTI and is much better. Does not want her to go to inpatient psych.     Review of Systems  A comprehensive review of systems was negative except as noted above.       Objective:     Vital Signs (Most Recent):  Temp: 97.5 °F (36.4 °C) (03/12/24 1547)  Pulse: 61 (03/12/24 1547)  Resp: 18 (03/12/24 1547)  BP: (!) 150/67 (03/12/24 1547)  SpO2: 97 % (03/12/24 1547) Vital Signs (24h Range):  Temp:  [97.5 °F (36.4 °C)-98 °F (36.7 °C)] 97.5 °F (36.4 °C)  Pulse:  [61-78] 61  Resp:  [16-18] 18  SpO2:  [94 %-97 %] 97 %  BP: (116-170)/(53-88) 150/67     Weight: 62.1 kg (137 lb)  Body mass index is 24.27 kg/m².  No intake or output data in the 24 hours ending 03/12/24 4239      Physical Exam  Vitals reviewed.  Nursing notes reviewed  GEN: NAD   HEENT: Normocephalic, atraumatic. PERRLA, EOMI  CV: RRR, no murmurs/rubs/gallops  Lungs: CTAB, no rubs/rhonchi/rales, non-labored breathing on room air  Abd: soft, non-tender to palpation. No guarding  Msk: No muscular deformities noted  Skin: No rashes or erythema noted  Neuro: Alert and oriented x3. No focal neurologic deficits  Psych: Denies suicidal or homicidal ideation.  Calm and cooperative.  Does not appear depressed.  Does repeat herself often. Insight poor. "       Significant Labs: All pertinent labs within the past 24 hours have been reviewed.  CBC:   Recent Labs   Lab 03/11/24  1421 03/12/24  0501   WBC 6.11 4.91   HGB 13.4 12.5   HCT 41.5 37.5    176     CMP:   Recent Labs   Lab 03/11/24  1421 03/12/24  0501    141   K 3.7 3.4*    108   CO2 29 26   GLU 81 79   BUN 26 19   CREATININE 0.6 0.6   CALCIUM 10.2 9.6   PROT 7.9  --    ALBUMIN 4.0  --    BILITOT 0.5  --    ALKPHOS 67  --    AST 31  --    ALT 25  --    ANIONGAP 8 7*         Significant Imaging: I have reviewed all pertinent imaging results/findings within the past 24 hours.    Assessment/Plan:      * Psychiatric problem  - PEC orders placed by ED for concern of suicidal ideation  - Psychiatry consulted. Continue PEC. Patient is in imminent danger of hurting self or others and is gravely disabled. Recommend inpatient psych placement.   -no acute changes on head CT.  -checked TSH. Negative UDS.   -Unclear if UTI could be contributing to symptoms. Will reassess  -no psychiatric medications recommended at this time       Suicidal ideation  -patient denies suicidal or homicidal ideation.  States she never did express this  -following Psychiatry recommendations      Acute cystitis  - sp ceftriaxone x1 in ED  - started po keflex  - follow up urine culture- pending      Essential hypertension  - continue home meds:  Atenolol, amlodipine, losartan (for home ARB)  - BP remains elevated  - PRN IV Hydralazine      VTE Risk Mitigation (From admission, onward)           Ordered     enoxaparin injection 40 mg  Daily         03/11/24 1911     IP VTE HIGH RISK PATIENT  Once         03/11/24 1911     Place sequential compression device  Until discontinued         03/11/24 1911                    Discharge Planning   MATTI: 3/13/2024     Code Status: Full Code   Is the patient medically ready for discharge?: No    Reason for patient still in hospital (select all that apply): Patient trending condition,  Treatment, and Consult recommendations             Yudith Fatima MD  Department of Hospital Medicine   [facility]

## 2024-03-12 NOTE — ASSESSMENT & PLAN NOTE
-patient denies suicidal or homicidal ideation.  States she never did express this  -following Psychiatry recommendations

## 2024-03-12 NOTE — SUBJECTIVE & OBJECTIVE
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 Wilkins    COLONOSCOPY N/A 10/1/2019    Procedure: COLONOSCOPY;  Surgeon: Jarad Chavira MD;  Location: [facility] ENDO (4TH FLR);  Service: Endoscopy;  Laterality: N/A;  miralax prep (used miralax for last colonoscopy) - ERW    COLONOSCOPY W/ POLYPECTOMY  08/08/2013    RASHEED   06/24/2014    EYE SURGERY      HYSTERECTOMY  age 38    ALISTAIR; ovaries in place    INJECTION FOR SENTINEL NODE IDENTIFICATION Left 2/20/2023    Procedure: INJECTION, FOR SENTINEL NODE IDENTIFICATION-Left;  Surgeon: ROSIBEL Belcher MD;  Location: [facility] OR 2ND FLR;  Service: General;  Laterality: Left;    JOINT REPLACEMENT      bilateral knees    MASTECTOMY, PARTIAL Left 2/20/2023    Procedure: MASTECTOMY, PARTIAL-Left with radiological marker;  Surgeon: ROSIBEL Belcher MD;  Location: [facility] OR 2ND FLR;  Service: General;  Laterality: Left;    MASTECTOMY, PARTIAL Left 4/18/2023    Procedure: MASTECTOMY, PARTIAL-left re-excision;  Surgeon: ROSBIEL Belcher MD;  [facility];  Service: General;  Laterality: Left;    SENTINEL LYMPH NODE BIOPSY Left 2/20/2023    Procedure: BIOPSY, LYMPH NODE, SENTINEL-Left;  Surgeon: ROSIBEL Belcher MD;  [facility];  Service: General;  Laterality: Left;    TOTAL KNEE  ARTHROPLASTY      Bilateral       Review of patient's allergies indicates:   Allergen Reactions    Tramadol Shortness Of Breath, Diarrhea and Nausea And Vomiting    Acetaminophen Nausea And Vomiting    Aspirin Nausea And Vomiting    Sulfa (sulfonamide antibiotics) Nausea And Vomiting    Prednisone Other (See Comments)     Stomach problems, no issue with injection       Current Facility-Administered Medications on File Prior to Encounter   Medication    fentaNYL 50 mcg/mL injection  mcg    midazolam (VERSED) 1 mg/mL injection 0.5-4 mg     Current Outpatient Medications on File Prior to Encounter   Medication Sig    acetic acid-oxyquinoline (FEM PH) 0.9-0.025 % Gel Place 1 applicator vaginally twice a week. (Patient not taking: Reported on 3/11/2024)    alendronate (FOSAMAX) 70 MG tablet Take 1 tablet (70 mg total) by mouth every 7 days. (Patient not taking: Reported on 3/11/2024)    amLODIPine (NORVASC) 10 MG tablet Take 1 tablet (10 mg total) by mouth once daily.    atenoloL (TENORMIN) 25 MG tablet TAKE 1 TABLET(25 MG) BY MOUTH EVERY DAY    benazepriL (LOTENSIN) 40 MG tablet TAKE 1 TABLET(40 MG) BY MOUTH EVERY DAY    calcium carbonate (OS-DHRUV) 600 mg calcium (1,500 mg) Tab Take 600 mg by mouth once.    ciclopirox (PENLAC) 8 % Soln Apply topically nightly. (Patient not taking: Reported on 3/11/2024)    colchicine, gout, (COLCRYS) 0.6 mg tablet Take 2 tablets (1.2 mg) by mouth x 1 dose then 1 tablet (0.6 mg) by mouth one hour later (Patient not taking: Reported on 3/11/2024)    diclofenac sodium (VOLTAREN) 1 % Gel Apply 2 g topically 3 (three) times daily. (Patient not taking: Reported on 3/11/2024)    donepeziL (ARICEPT) 5 MG tablet Take 1 tablet (5 mg total) by mouth once daily. (Patient not taking: Reported on 3/11/2024)    glycerin/min oil/polycarbophil (REPLENS VAGL) Place vaginally.    ketorolac (TORADOL) 30 mg/mL (1 mL) injection     LIDOcaine (LIDODERM) 5 % Place 1 patch onto the skin once daily. Remove  & Discard patch within 12 hours or as directed by MD (Patient not taking: Reported on 3/11/2024)    lidocaine HCL 2% (XYLOCAINE) 2 % jelly Apply topically as needed. For toe pain, apply up to twice daily as needed for pain. (Patient not taking: Reported on 3/11/2024)    MULTIVITAMIN W-MINERALS/LUTEIN (CENTRUM SILVER ORAL) Take 1 tablet by mouth Daily.    OFIRMEV 1,000 mg/100 mL (10 mg/mL) Soln     omega 3-calcium-vit [id number] [phone] mg Cmpk Take by mouth once daily.    ondansetron (ZOFRAN-ODT) 4 MG TbDL Take 1 tablet (4 mg total) by mouth every 8 (eight) hours as needed (nausea). (Patient not taking: Reported on 3/11/2024)    PFIZER COVID BIVAL,12Y UP,,PF, 30 mcg/0.3 mL injection Inject into the muscle.    Ringer's solution,lactated (LACTATED RINGERS) infusion     traZODone (DESYREL) 50 MG tablet Take 1 tablet (50 mg total) by mouth every evening. for 7 days    trospium (SANCTURA) 20 mg Tab tablet Take 1 tablet (20 mg total) by mouth once daily.    vitamin D 1000 units Tab Take 185 mg by mouth once daily.     Family History       Problem Relation (Age of Onset)    Asthma Sister    Blindness Other    Breast cancer Sister (82), Daughter (46), Daughter (45), Daughter (37), Cousin, Other, Other, Other    Cancer Sister, Daughter, Daughter, Daughter, Cousin, Cousin    Coronary artery disease Father    Diabetes Sister, Father, Sister, Sister, Other, Other, Other    Heart attack Father, Brother    Heart disease Sister, Father, Sister, Sister    Hyperlipidemia Sister    No Known Problems Son    Stroke Mother          Tobacco Use    Smoking status: Never    Smokeless tobacco: Never   Substance and Sexual Activity    Alcohol use: Yes     Alcohol/week: 1.0 standard drink of alcohol     Types: 1 Glasses of wine per week     Comment: Rare, every other week    Drug use: No    Sexual activity: Not Currently     Birth control/protection: None     Comment:      Review of Systems   Constitutional:  Negative for chills,  fatigue and fever.   HENT:  Negative for sore throat and trouble swallowing.    Eyes:  Negative for photophobia and visual disturbance.   Respiratory:  Negative for cough, shortness of breath and wheezing.    Cardiovascular:  Negative for chest pain, palpitations and leg swelling.   Gastrointestinal:  Negative for abdominal distention, abdominal pain, diarrhea, nausea and vomiting.   Genitourinary:  Negative for dysuria and hematuria.   Musculoskeletal:  Negative for neck pain and neck stiffness.   Skin:  Negative for rash and wound.   Neurological:  Negative for tremors, seizures, syncope, weakness and headaches.   Psychiatric/Behavioral:  Negative for confusion, decreased concentration, hallucinations, self-injury and suicidal ideas.      Objective:     Vital Signs (Most Recent):  Temp: 97.9 °F (36.6 °C) (03/11/24 2310)  Pulse: 63 (03/11/24 2310)  Resp: 18 (03/11/24 2310)  BP: (!) 160/72 (03/11/24 2310)  SpO2: (!) 94 % (03/11/24 2310) Vital Signs (24h Range):  Temp:  [97.6 °F (36.4 °C)-98 °F (36.7 °C)] 97.9 °F (36.6 °C)  Pulse:  [63-65] 63  Resp:  [18-20] 18  SpO2:  [94 %-99 %] 94 %  BP: (142-170)/(67-81) 160/72     Weight: 62.1 kg (137 lb)  Body mass index is 24.27 kg/m².     Physical Exam  Constitutional:       General: She is not in acute distress.     Appearance: She is not toxic-appearing or diaphoretic.   HENT:      Head: Normocephalic and atraumatic.      Nose: Nose normal.   Eyes:      General: No scleral icterus.     Extraocular Movements: Extraocular movements intact.      Pupils: Pupils are equal, round, and reactive to light.   Cardiovascular:      Rate and Rhythm: Normal rate and regular rhythm.   Pulmonary:      Effort: Pulmonary effort is normal. No respiratory distress.      Breath sounds: No wheezing or rales.   Abdominal:      General: Abdomen is flat. There is no distension.      Palpations: Abdomen is soft.      Tenderness: There is no abdominal tenderness. There is no guarding.    Musculoskeletal:         General: Normal range of motion.      Cervical back: Normal range of motion and neck supple. No rigidity.      Right lower leg: No edema.      Left lower leg: No edema.   Skin:     General: Skin is warm and dry.      Coloration: Skin is not jaundiced.   Neurological:      General: No focal deficit present.      Mental Status: She is alert and oriented to person, place, and time.      Cranial Nerves: No cranial nerve deficit.   Psychiatric:         Mood and Affect: Mood normal.         Behavior: Behavior normal.         Thought Content: Thought content normal.              CRANIAL NERVES     CN III, IV, VI   Pupils are equal, round, and reactive to light.       Significant Labs: All pertinent labs within the past 24 hours have been reviewed.  CBC:   Recent Labs   Lab 03/11/24  1421   WBC 6.11   HGB 13.4   HCT 41.5        CMP:   Recent Labs   Lab 03/11/24  1421      K 3.7      CO2 29   GLU 81   BUN 26   CREATININE 0.6   CALCIUM 10.2   PROT 7.9   ALBUMIN 4.0   BILITOT 0.5   ALKPHOS 67   AST 31   ALT 25   ANIONGAP 8     Urine Studies:   Recent Labs   Lab 03/11/24  1423   COLORU Yellow   APPEARANCEUA Clear   PHUR 7.0   SPECGRAV 1.020   PROTEINUA Negative   GLUCUA Negative   KETONESU Negative   BILIRUBINUA Negative   OCCULTUA Negative   NITRITE Negative   LEUKOCYTESUR 3+*   RBCUA 2   WBCUA 8*   BACTERIA Rare   SQUAMEPITHEL 1       Significant Imaging: I have reviewed all pertinent imaging results/findings within the past 24 hours.

## 2024-03-12 NOTE — NURSING
Nurses Note -- 4 Eyes            Skin assessed during: Admit      [x] No Altered Skin Integrity Present    []Prevention Measures Documented      [] Yes- Altered Skin Integrity Present or Discovered   [] LDA Added if Not in Epic (Describe Wound)   [] New Altered Skin Integrity was Present on Admit and Documented in LDA   [] Wound Image Taken    Wound Care Consulted? No    Attending Nurse:  Erica Stevens RN/Staff Member:  Zurdo RAHMAN

## 2024-03-12 NOTE — ASSESSMENT & PLAN NOTE
- continue home meds:  Atenolol, amlodipine, losartan (for home ARB)  - BP remains elevated  - PRN IV Hydralazine

## 2024-03-12 NOTE — PLAN OF CARE
Placed ADT32 after speaking with Dr. Fatima.     Spoke with patient's granddaughter Nadine ([phone]) and was informed that she would like to be contacted once psych facility is decided upon.        Jodi Tyson, RNCM  Case Management  [phone]

## 2024-03-12 NOTE — PHARMACY MED REC
"  Admission Medication History     The home medication history was taken by Ramona Arce.    You may go to "Admission" then "Reconcile Home Medications" tabs to review and/or act upon these items.     The home medication list has been updated by the Pharmacy department.   Please read ALL comments highlighted in yellow.   Please address this information as you see fit.    Feel free to contact us if you have any questions or require assistance.      The medications listed below were removed from the home medication list. Please reorder if appropriate:  Patient reports no longer taking the following medication(s):  Acetic acid-oxyquinoline (FEM PH) 0.9-0.025 % Gel  Alendronate (FOSAMAX) 70 MG tablet  Ciclopirox (PENLAC) 8 % Soln  Colchicine, gout, (COLCRYS) 0.6 mg tablet  Diclofenac sodium (VOLTAREN) 1 % Gel  LIDOcaine (LIDODERM) 5 %  Lidocaine HCL 2% (XYLOCAINE) 2 % jelly  Ondansetron (ZOFRAN-ODT) 4 MG TbDL  TraZODone (DESYREL) 50 MG tablet  Trospium (SANCTURA) 20 mg Tab tablet    Medications listed below were obtained from: Patient/family and Analytic software- [id number]  PTA Medications   Medication Sig    amLODIPine (NORVASC) 10 MG tablet   Take 1 tablet (10 mg total) by mouth once daily.    atenoloL (TENORMIN) 25 MG tablet   TAKE 1 TABLET(25 MG) BY MOUTH EVERY DAY    benazepriL (LOTENSIN) 40 MG tablet   TAKE 1 TABLET(40 MG) BY MOUTH EVERY DAY    calcium carbonate (OS-DHRUV) 600 mg calcium (1,500 mg) Tab   Take 600 mg by mouth once.    MULTIVITAMIN W-MINERALS/LUTEIN (CENTRUM SILVER ORAL)   Take 1 tablet by mouth Daily.    omega 3-calcium-vit [id number]-mv13 [phone] mg Cmpk   Take by mouth once daily.    vitamin D 1000 units Tab   Take 185 mg by mouth once daily.     Ramona Arce  EXT [id number]               .          "

## 2024-03-12 NOTE — ASSESSMENT & PLAN NOTE
- PEC orders placed by ED for concern of suicidal ideation  - Psychiatry consulted. Continue PEC. Patient is in imminent danger of hurting self or others and is gravely disabled. Recommend inpatient psych placement.   -no acute changes on head CT.  -checked TSH. Negative UDS.   -Unclear if UTI could be contributing to symptoms. Will reassess  -no psychiatric medications recommended at this time

## 2024-03-12 NOTE — CONSULTS
"CONSULTATION LIAISON PSYCHIATRY INITIAL EVALUATION    Patient Name: Ernestine Luna  MRN: [id number]  Patient Class: OP- Observation  Admission Date: 3/11/2024  Attending Physician: Yudith Fatima MD      HPI:   Ernestine Luna is a 89 y.o. female with no past psychiatric history & past pertinent medical history of HTN, breast cancer, Osteoporosis presents to the ED/admitted to the hospital for depression and concern for SI, per daughter    Psychiatry consulted for "depression"    On overnight psych exam, patient seated in bed, eating dinner. Pleasant elderly lady, calm and cooperative. She reports living with her daughter in the home that patient has owned for the last 60 years and reporting frustrations with the daughter. She reports that the daughter is very bossy and controlling at times and this resulted in patient stating, "dealing with her would make someone blow their head off." Patient denied any suicidal thoughts and reports she plans to live until at least 89 years old, as she is proud she can function independently (able to drive self, take her own medications, buy her organic food at [employer] on Tuesdays only, and tend to her daily activities). Denied any issue with depressed mood, sleep, appetite, concentration, or energy. Able to state the year, month, name, location, and most recent Spiritism holiday. She denied ever having any suicidal ideations, she does report sometimes feeling lonely after her  passed away 39 years ago, but has never had thoughts of self-harm to join her .     This AM pt states on initation of interview, "I don't need to see a psychiatrist.  There's nothing wrong with my brain".  Pt endorses being in the hospital "because they think I'm suicidal".  She denies ever being suicidal.  Pt largely tangential and ruminative throughout the interview, often requiring redirection.  She denies sad/depressed mood, changes in sleep, appetite, anhedonia, energy, and " "concentration. Pt says that she never said she wanted to kill herself.  However, later in conversation said that when she was having an argument with her daughter, she may have said something like that which she didn't mean.  Pt says that she has a gun and "if I wanted to kill myself I would have just shot myself already".  Pt then tells a story about how she carries the gun on her for protection and how a man approached the car asking if she had a lighter.  Pt says she then pointed the gun at him and said "I'll light it with this".  She denies wanting to harm him or anyone else and then talks about how if she was in danger, she would use the gun for protection.  Pt says that she lives independently, including driving.  Daughter lives on the property in a rental type unit.  She denies any psychiatric history.    Pt claims her daughter made up this information about her being suicidal.  However, she's unable to state any reason daughter would falsely report this.  Per chart review, her daughter has been active and engaged in her medical care.         Collateral with patient's permission:   Daughter, Denisse Luna [phone], [phone]  Collateral was attained from Denisse Luna (daughter). Per daughter, on 3/8, pt expressed SI "blowing her brains out" to son; he interpreted it as a joke and did not inquire or escalate further. Pt then contacted a [employer] member and expressed SI; [employer] member urgently came to pt's home, informed daughter, and spoke/prayed with pt. Daughter reached out to primary care physician and was advised to take pt to ED.     Per daughter, pt has been complaining about being "depressed" and "tired" for several weeks; anhedonia of typical interests. She noted sleep disturbances; pt was waking up tired, returning to bed within hours (very uncharacteristic), but unable to sleep throughout the day. Pt is noted to be more disheveled in appearance; although she cleans and bathes regularly and " independently, she will wear the same soiled clothing afterwards. Memory changes noted as pt is forgetting recent conversations, and is frustrated and combative when corrected. No explicit expression of guilt or hopelessness noted. Daughter denies SI/HI/AVH/anh in pt's history. Daughter knows that pt has a gun, but unsure where it's kept--daughter was advised to remove it from residence before pt's return.     Per daughter, she and pt have a strained relationship with stressors including pt's care/health/particularity and financial stressors. Entire family has extensive history of breast cancer in pt and all children, which has resulted in the death of 2 daughters. Pt typically drives herself and daughter; daughter generally did not express concern for her driving; noted a scratch on the car that pt likely caused herself.    Collateral obtained by medical student Bear Ramirez    Medical Review of Systems:  Pertinent items are noted in HPI.    Psychiatric Review of Systems (is patient experiencing or having changes in): Per pt report   sleep: no  appetite: no, eats only at [employer]  weight: no  energy/anergy: no  interest/pleasure/anhedonia: no  somatic symptoms: no  libido: no  anxiety/panic: no  guilty/hopelessness: no  concentration: no  Anh:no  Psychosis: no  Trauma: no  S.I.B.s/risky behavior: no    Past Psychiatric History:  Previous Medication Trials: no  Previous Psychiatric Hospitalizations:no   Previous Suicide Attempts: no (of note there is one note stating she had a suicide attempt after the death of her  many years ago.  Pt and collateral deny this.  It sounds like patient was distraught and drove the car to clear her mind)  History of Violence: no  Outpatient Psychiatrist: no  Family Psychiatric History: no    Substance Abuse History (with emphasis over the last 12 months):  Recreational Drugs: Denied  Use of Alcohol: Denied  Tobacco Use:Denied  Rehab History:Denied    Social  "History:  Marital Status:   Children: 4  Employment Status/Info: retired  : no  Education: Not asked  Special Ed: Not asked  Housing Status: Lives independently, daughter lives on her property in a unit she was previously using as a rental   Access to gun: yes  Psychosocial Stressors: health  Functioning Relationships: Somewhat strained relationship with her daughter     Legal History:  Past Charges/Incarcerations: no  Pending charges:no    Mental Status Exam:  General Appearance: appears younger than stated age, fair hygiene and grooming  Behavior: appropriate eye-contact, redirectable, evasive when asking specifics of events leading to hospitalization   Involuntary Movements and Motor Activity: no abnormal involuntary movements noted; no tics, no tremors, no akathisia, no dystonia, no evidence of tardive dyskinesia; no psychomotor agitation or retardation  Gait and Station: unable to assess - patient lying down or seated  Speech and Language: intact; normal rate, rhythm, volume, tone, and pitch; conversational, spontaneous, and coherent; speaks and understands English proficiently and fluently; repeats words and phrases, no word finding difficulties are noted  Mood: "good"  Affect:  constricted   Thought Process and Associations: ruminative  Thought Content and Perceptions:: no suicidal or homicidal ideation, no auditory or visual hallucinations, no paranoid ideation, no ideas of reference, no evidence of delusions or psychosis  Sensorium and Orientation: Oriented to person, place, situation, year, month, and day of week  Recent and Remote Memory: Recent memory impaired to some degree   Attention and Concentration: Impaired, unable to spell WORLD backwards, unable to name months of the year backwards   Fund of Knowledge: grossly intact  Insight: poor, limited/partial awareness of illness  Judgment: limited, behavior is inappropriate to the circumstances    CAM ICU positive? no      ASSESSMENT & " RECOMMENDATIONS   Cognitive impairment  Hx of mild cognitive impairment  R/O dementia  R/O Depression    PSYCH MEDICATIONS  Scheduled: Defer to inpatient psych  PRN: None      DELIRIUM  DELIRIUM BEHAVIOR MANAGEMENT  PLEASE utilize CHEMICAL restraints with PRN meds first for agitation. Minimize use of PHYSICAL restraints OR have periods of being out of physical restraints if possible.  Keep window shades open and room lit during day and room dim at night in order to promote normal sleep-wake cycles  Encourage family at bedside. [place] patient often to situation, location, date.  Continue to Limit or Discontinue use of Narcotics, Benzos and Anti-cholinergic medications as they may worsen delirium.  Continue medical workup for causative etiology of Delirium.     OTHER PERTINENT DIAGNOSIS    RISK ASSESSMENT  NEEDS PEC because patient is in imminent danger of hurting self or others and is gravely disabled. & NEEDS 1:1 sitter      FOLLOW UP  Will follow up while in house      DISPOSITION - once medically cleared:   Seek involuntary inpatient psychiatric admission for stabilization of acute psychiatric symptoms and a safe disposition plan is enacted. The patient &/or their family was informed that the patient will be transferred to an inpatient unit per ED/primary placement team. Pt will need waylon psych placement.     Please contact ON CALL psychiatry service (24/7) for any acute issues that may arise.    Dr. Irene Jesus   Psychiatry  [facility]  3/12/2024 8:02 PM        --------------------------------------------------------------------------------------------------------------------------------------------------------------------------------------------------------------------------------------    CONTINUED HISTORY & OBJECTIVE clinical data & findings reviewed and noted for above decision making    Past Medical/Surgical History:   Past Medical History:   Diagnosis Date    Anemia     s/p  knee surgery since surgery has resolved    Breast cancer, stage 0     lumpectomy in 1992    Cataract     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 Wilkins    COLONOSCOPY N/A 10/1/2019    Procedure: COLONOSCOPY;  Surgeon: Jarad Chavira MD;  Location: [facility] ENDO (4TH FLR);  Service: Endoscopy;  Laterality: N/A;  miralax prep (used miralax for last colonoscopy) - ERW    COLONOSCOPY W/ POLYPECTOMY  08/08/2013    RASHEED   06/24/2014    EYE SURGERY      HYSTERECTOMY  age 38    ALISTAIR; ovaries in place    INJECTION FOR SENTINEL NODE IDENTIFICATION Left 2/20/2023    Procedure: INJECTION, FOR SENTINEL NODE IDENTIFICATION-Left;  Surgeon: ROSIBEL Belcher MD;  Location: [facility] OR 2ND FLR;  Service: General;  Laterality: Left;    JOINT REPLACEMENT      bilateral knees    MASTECTOMY, PARTIAL Left 2/20/2023    Procedure: MASTECTOMY, PARTIAL-Left with radiological marker;  Surgeon: ROSIBEL Belcher MD;  Location: [facility] OR 2ND [facility];  Service: General;  Laterality: Left;    MASTECTOMY, PARTIAL Left 4/18/2023    Procedure: MASTECTOMY, PARTIAL-left re-excision;  Surgeon: ROSIBEL Belcher MD;  Location: [facility] OR [address] [place] [address];  Service: General;  Laterality: Left;    SENTINEL LYMPH NODE BIOPSY Left 2/20/2023    Procedure: BIOPSY, LYMPH NODE, SENTINEL-Left;  Surgeon: ROSIBEL Belcher MD;  Location: [facility] OR [address] [place] [address];  Service: General;  Laterality: Left;    TOTAL KNEE ARTHROPLASTY      Bilateral       Current Medications:   Scheduled  Meds:    amLODIPine  10 mg Oral Daily    atenoloL  25 mg Oral Daily    cephALEXin  500 mg Oral Q12H    enoxparin  40 mg Subcutaneous Daily    lisinopriL  20 mg Oral Daily    vitamin D  1,000 Units Oral Daily     PRN Meds: aluminum-magnesium hydroxide-simethicone, dextrose 10%, dextrose 10%, glucagon (human recombinant), glucose, glucose, melatonin, naloxone, ondansetron, sodium chloride 0.9%    Allergies:   Review of patient's allergies indicates:   Allergen Reactions    Tramadol Shortness Of Breath, Diarrhea and Nausea And Vomiting    Acetaminophen Nausea And Vomiting    Aspirin Nausea And Vomiting    Sulfa (sulfonamide antibiotics) Nausea And Vomiting    Prednisone Other (See Comments)     Stomach problems, no issue with injection       Vitals  Vitals:    03/12/24 0706   BP: (!) 147/69   Pulse: 72   Resp: 18   Temp: 98 °F (36.7 °C)       Labs/Imaging/Studies:  Recent Results (from the past 24 hour(s))   HIV 1/2 Ag/Ab (4th Gen)    Collection Time: 03/11/24  2:21 PM   Result Value Ref Range    HIV 1/2 Ag/Ab Non-reactive Non-reactive   Hepatitis C Antibody    Collection Time: 03/11/24  2:21 PM   Result Value Ref Range    Hepatitis C Ab Non-reactive Non-reactive   CBC auto differential    Collection Time: 03/11/24  2:21 PM   Result Value Ref Range    WBC 6.11 3.90 - 12.70 K/uL    RBC 4.39 4.00 - 5.40 M/uL    Hemoglobin 13.4 12.0 - 16.0 g/dL    Hematocrit 41.5 37.0 - 48.5 %    MCV 95 82 - 98 fL    MCH 30.5 27.0 - 31.0 pg    MCHC 32.3 32.0 - 36.0 g/dL    RDW 13.7 11.5 - 14.5 %    Platelets 197 150 - 450 K/uL    MPV 10.6 9.2 - 12.9 fL    Immature Granulocytes 0.2 0.0 - 0.5 %    Gran # (ANC) 3.9 1.8 - 7.7 K/uL    Immature Grans (Abs) 0.01 0.00 - 0.04 K/uL    Lymph # 1.7 1.0 - 4.8 K/uL    Mono # 0.5 0.3 - 1.0 K/uL    Eos # 0.1 0.0 - 0.5 K/uL    Baso # 0.01 0.00 - 0.20 K/uL    nRBC 0 0 /100 WBC    Gran % 63.1 38.0 - 73.0 %    Lymph % 27.5 18.0 - 48.0 %    Mono % 7.7 4.0 - 15.0 %    Eosinophil % 1.3 0.0 - 8.0 %    Basophil %  0.2 0.0 - 1.9 %    Differential Method Automated    Comprehensive metabolic panel    Collection Time: 03/11/24  2:21 PM   Result Value Ref Range    Sodium 139 136 - 145 mmol/L    Potassium 3.7 3.5 - 5.1 mmol/L    Chloride 102 95 - 110 mmol/L    CO2 29 23 - 29 mmol/L    Glucose 81 70 - 110 mg/dL    BUN 26 10 - 30 mg/dL    Creatinine 0.6 0.5 - 1.4 mg/dL    Calcium 10.2 8.7 - 10.5 mg/dL    Total Protein 7.9 6.0 - 8.4 g/dL    Albumin 4.0 3.5 - 5.2 g/dL    Total Bilirubin 0.5 0.1 - 1.0 mg/dL    Alkaline Phosphatase 67 55 - 135 U/L    AST 31 10 - 40 U/L    ALT 25 10 - 44 U/L    eGFR >60.0 >60 mL/min/1.73 m^2    Anion Gap 8 8 - 16 mmol/L   TSH    Collection Time: 03/11/24  2:21 PM   Result Value Ref Range    TSH 0.868 0.400 - 4.000 uIU/mL   Ethanol    Collection Time: 03/11/24  2:21 PM   Result Value Ref Range    Alcohol, Serum <10 <10 mg/dL   Acetaminophen level    Collection Time: 03/11/24  2:21 PM   Result Value Ref Range    Acetaminophen (Tylenol), Serum <3.0 (L) 10.0 - 20.0 ug/mL   Urinalysis, Reflex to Urine Culture Urine, Clean Catch    Collection Time: 03/11/24  2:23 PM    Specimen: Urine   Result Value Ref Range    Specimen UA Urine, Clean Catch     Color, UA Yellow Yellow, Straw, Jenny    Appearance, UA Clear Clear    pH, UA 7.0 5.0 - 8.0    Specific Gravity, UA 1.020 1.005 - 1.030    Protein, UA Negative Negative    Glucose, UA Negative Negative    Ketones, UA Negative Negative    Bilirubin (UA) Negative Negative    Occult Blood UA Negative Negative    Nitrite, UA Negative Negative    Leukocytes, UA 3+ (A) Negative   Drug screen panel, emergency    Collection Time: 03/11/24  2:23 PM   Result Value Ref Range    Benzodiazepines Negative Negative    Methadone metabolites Negative Negative    Cocaine (Metab.) Negative Negative    Opiate Scrn, Ur Negative Negative    Barbiturate Screen, Ur Negative Negative    Amphetamine Screen, Ur Negative Negative    THC Negative Negative    Phencyclidine Negative Negative     Creatinine, Urine 33.0 15.0 - 325.0 mg/dL    Toxicology Information SEE COMMENT    Urinalysis Microscopic    Collection Time: 03/11/24  2:23 PM   Result Value Ref Range    RBC, UA 2 0 - 4 /hpf    WBC, UA 8 (H) 0 - 5 /hpf    Bacteria Rare None-Occ /hpf    Squam Epithel, UA 1 /hpf    Microscopic Comment SEE COMMENT    Basic Metabolic Panel (BMP)    Collection Time: 03/12/24  5:01 AM   Result Value Ref Range    Sodium 141 136 - 145 mmol/L    Potassium 3.4 (L) 3.5 - 5.1 mmol/L    Chloride 108 95 - 110 mmol/L    CO2 26 23 - 29 mmol/L    Glucose 79 70 - 110 mg/dL    BUN 19 10 - 30 mg/dL    Creatinine 0.6 0.5 - 1.4 mg/dL    Calcium 9.6 8.7 - 10.5 mg/dL    Anion Gap 7 (L) 8 - 16 mmol/L    eGFR >60.0 >60 mL/min/1.73 m^2   CBC with Automated Differential    Collection Time: 03/12/24  5:01 AM   Result Value Ref Range    WBC 4.91 3.90 - 12.70 K/uL    RBC 4.02 4.00 - 5.40 M/uL    Hemoglobin 12.5 12.0 - 16.0 g/dL    Hematocrit 37.5 37.0 - 48.5 %    MCV 93 82 - 98 fL    MCH 31.1 (H) 27.0 - 31.0 pg    MCHC 33.3 32.0 - 36.0 g/dL    RDW 13.4 11.5 - 14.5 %    Platelets 176 150 - 450 K/uL    MPV 10.4 9.2 - 12.9 fL    Immature Granulocytes 0.2 0.0 - 0.5 %    Gran # (ANC) 2.8 1.8 - 7.7 K/uL    Immature Grans (Abs) 0.01 0.00 - 0.04 K/uL    Lymph # 1.5 1.0 - 4.8 K/uL    Mono # 0.5 0.3 - 1.0 K/uL    Eos # 0.1 0.0 - 0.5 K/uL    Baso # 0.01 0.00 - 0.20 K/uL    nRBC 0 0 /100 WBC    Gran % 55.9 38.0 - 73.0 %    Lymph % 31.0 18.0 - 48.0 %    Mono % 9.8 4.0 - 15.0 %    Eosinophil % 2.9 0.0 - 8.0 %    Basophil % 0.2 0.0 - 1.9 %    Differential Method Automated      Imaging Results              CT Head Without Contrast (Final result)  Result time 03/11/24 15:34:35      Final result by Mauricio Del Cid MD (03/11/24 15:34:35)                   Impression:      No evidence of acute hemorrhage or major vascular distribution infarct.    Mild chronic small vessel ischemic change with moderate generalized cerebral volume loss.      Electronically signed  by: Mauricio Del Cid MD  Date:    03/11/2024  Time:    15:34               Narrative:    EXAMINATION:  CT HEAD WITHOUT CONTRAST    CLINICAL HISTORY:  Mental status change, unknown cause;    TECHNIQUE:  Low dose axial CT images obtained throughout the head without the use of intravenous contrast.  Axial, sagittal and coronal reconstructions were performed.    COMPARISON:  11/03/2013.    FINDINGS:  Intracranial compartment:    Moderate generalized cerebral volume loss.  No evidence of hydrocephalus.    Mild patchy hypoattenuation in the supratentorial white matter, nonspecific but most likely reflecting chronic small vessel ischemic changes. No recent or remote major vascular distribution infarct. No acute hemorrhage.  No mass effect or midline shift.    No extra-axial blood or fluid collections.    Skull/extracranial contents (limited evaluation):    Remote right orbital floor fracture.  No acute displaced calvarial fracture.    The mastoid air cells and visualized paranasal sinuses are essentially clear.    Postsurgical change both globes.

## 2024-03-12 NOTE — SUBJECTIVE & OBJECTIVE
"Interval History: ***    Review of Systems  Objective:     Vital Signs (Most Recent):  Temp: 98 °F (36.7 °C) (03/12/24 1043)  Pulse: 78 (03/12/24 1043)  Resp: 18 (03/12/24 1043)  BP: (!) 142/88 (03/12/24 1043)  SpO2: 97 % (03/12/24 1043) Vital Signs (24h Range):  Temp:  [97.6 °F (36.4 °C)-98 °F (36.7 °C)] 98 °F (36.7 °C)  Pulse:  [62-78] 78  Resp:  [16-20] 18  SpO2:  [94 %-99 %] 97 %  BP: (116-170)/(53-88) 142/88     Weight: 62.1 kg (137 lb)  Body mass index is 24.27 kg/m².  No intake or output data in the 24 hours ending 03/12/24 1159      Physical Exam        Significant Labs: {Results:10707::"All pertinent labs within the past 24 hours have been reviewed."}    Significant Imaging: {Imaging Review:50415::"I have reviewed all pertinent imaging results/findings within the past 24 hours."}  "

## 2024-03-12 NOTE — ED NOTES
Escorted to & from the restroom, patient voided & returned to her room. She is currently eating a late dinner.

## 2024-03-12 NOTE — PLAN OF CARE
Discussed case with psychiatry. NEEDS PEC because patient is in imminent danger of hurting self or others and is gravely disabled. & NEEDS 1:1 sitter  Seek involuntary inpatient psychiatric admission    Patient is medically clear for discharge

## 2024-03-12 NOTE — HPI
"Sent in for evaluation for suicidal statements. Sent in by pcp for evaluation     Patient reports she is not suicidal. She states she gets frustrated and just tells people "I'm tired".  I did try to kill myself after my   39 years ago but that was then. Denies harm to self.  No medical complaitns including chest pain, sob, fevers, falls, weakness, numbness, hallucinations     Per daughter, she was talking about killing herself this weekend. Told her Mosque member this.  Her daughter heard the Mosque member ringing the doorbell. She had called her and told her she wanted to kill herself. Daughter went down, mom was in the bathroom and told her she was going to shower. When asked about the statement she said "I'm just tired, Denisse".  Daughter thought at first she just wanted attention.  Checked on her the next morning and went to check on her, was sitting on the toilet and she was like "something came over me, I'm depressed, I can't sleep, I can't do anything.  States she's been praying more, praying rosary and to saints.  Just kept saying she's tired. Also has been very labile and upset with doctor. Making strange decisions.    In the ED patient afebrile and hemodynamically stable saturating well on room air at rest. Placed under PEC and psychiatry consulted. Concern for possible UTI on UA and patient started on ceftriaxone in ED. Admitted to  for further evaluation and management.   "

## 2024-03-12 NOTE — PLAN OF CARE
[facility] - Med Surg  Initial Discharge Assessment       Primary Care Provider: Kev Macias MD    Admission Diagnosis: Acute cystitis without hematuria [N30.00]  Chest pain [R07.9]  Depression with suicidal ideation [F32.A, R45.851]    Admission Date: 3/11/2024  Expected Discharge Date: 3/13/2024    Transition of Care Barriers: (P) None    Payor: HUMANA MANAGED MEDICARE / Plan: HUMANA MEDICARE SELECT PARTNER / Product Type: Medicare Advantage /     Extended Emergency Contact Information  Primary Emergency Contact: Denisse Luna  Address: [address] [place] [address] [facility]  Home Phone: [phone]  Mobile Phone: [phone]  Relation: Daughter  Secondary Emergency Contact: Audra Magallon   [place]  Mobile Phone: [phone]  Relation: Grandchild    Discharge Plan A: (P) Psychiatric hospital  Discharge Plan B: (P) Home with family      [id number] - [place], [place] - [address] AT [facility] [address]  [address]  [place] [address]  Phone: [phone] Fax: [phone]    [facility]  [address]  [facility] [address]  Phone: [phone] Fax: [phone]    [id number] - [place], [place] - [address]  [address]  [place] [address]  Phone: [phone] Fax: [phone]      CM met with patient to discuss discharge assessment. Patient lives home alone in a single story home with fourteen steps to enter. Prior to hospital admission, patient was independent with ADLs and did not require medical equipment. Discharge Plan A and Plan B have been determined by review of patient's clinical status, future medical and therapeutic needs, and coverage/benefits for post-acute care in coordination with multidisciplinary team members.  Initial Assessment (most recent)       Adult Discharge Assessment - 03/12/24 7378          Discharge Assessment    Assessment Type  Discharge Planning Assessment (P)      Confirmed/corrected address, phone number and insurance Yes (P)      Confirmed Demographics Correct on Facesheet (P)      Source of Information patient;family (P)      Communicated MATTI with patient/caregiver Date not available/Unable to determine (P)      Reason For Admission Psychiatric problem (P)      People in Home alone (P)      Facility Arrived From: Home (P)      Do you expect to return to your current living situation? Yes (P)      Prior to hospitilization cognitive status: Alert/Oriented (P)      Current cognitive status: Alert/Oriented (P)      Walking or Climbing Stairs Difficulty no (P)      Dressing/Bathing Difficulty no (P)      Home Accessibility stairs to enter home (P)      Number of Stairs, Main Entrance other (see comments) (P)    14 steps    Equipment Currently Used at Home none (P)      Readmission within 30 days? No (P)      Patient currently being followed by outpatient case management? No (P)      Do you currently have service(s) that help you manage your care at home? No (P)      Do you take prescription medications? Yes (P)      Do you have prescription coverage? Yes (P)      Coverage [employer] Mc[employer] (P)      Do you have any problems affording any of your prescribed medications? No (P)      Is the patient taking medications as prescribed? yes (P)      Who is going to help you get home at discharge? Denisse Luna (daughter) [phone] (P)      How do you get to doctors appointments? car, drives self (P)      Are you on dialysis? No (P)      Do you take coumadin? No (P)      Discharge Plan A Psychiatric hospital (P)      Discharge Plan B Home with family (P)      DME Needed Upon Discharge  none (P)      Transition of Care Barriers None (P)         Physical Activity    On average, how many days per week do you engage in moderate to strenuous exercise (like a brisk walk)? 4 days (P)      On average, how many minutes do you engage in exercise at this  level? 30 min (P)         Financial Resource Strain    How hard is it for you to pay for the very basics like food, housing, medical care, and heating? Not hard at all (P)         Housing Stability    In the last 12 months, was there a time when you were not able to pay the mortgage or rent on time? No (P)      In the last 12 months, how many places have you lived? 1 (P)      In the last 12 months, was there a time when you did not have a steady place to sleep or slept in a shelter (including now)? No (P)         Transportation Needs    In the past 12 months, has lack of transportation kept you from medical appointments or from getting medications? No (P)      In the past 12 months, has lack of transportation kept you from meetings, work, or from getting things needed for daily living? No (P)         Food Insecurity    Within the past 12 months, you worried that your food would run out before you got the money to buy more. Never true (P)      Within the past 12 months, the food you bought just didn't last and you didn't have money to get more. Never true (P)         Stress    Do you feel stress - tense, restless, nervous, or anxious, or unable to sleep at night because your mind is troubled all the time - these days? Only a little (P)         Social Connections    In a typical week, how many times do you talk on the phone with family, friends, or neighbors? More than three times a week (P)      How often do you get together with friends or relatives? More than three times a week (P)      How often do you attend Caodaism or Mormon services? More than 4 times per year (P)      Do you belong to any clubs or organizations such as Caodaism groups, unions, fraternal or athletic groups, or school groups? No (P)      How often do you attend meetings of the clubs or organizations you belong to? Never (P)      Are you , , , , never , or living with a partner?  (P)         Alcohol  Use    Q1: How often do you have a drink containing alcohol? 2-4 times a month (P)      Q2: How many drinks containing alcohol do you have on a typical day when you are drinking? 1 or 2 (P)      Q3: How often do you have six or more drinks on one occasion? Never (P)                         DEEPAK Paige  Case Management  [phone]

## 2024-03-12 NOTE — SUBJECTIVE & OBJECTIVE
"Interval History:  Patient states she is feeling fine.  She denies urinary complaints- no abdominal pain, no dysuria, or strong smelling urine.  Patient denies homicidal or suicidal ideation.  She states "I have my 's gun at home. If I wanted to hurt myself I would do it, not go tell people about it." She states every morning she goes walking around the neighborhood around [facility]. She is requesting to go home.  Reports her daughter lives in the other half of the house.  She denies sitting on the toilet and telling her daughter she felt depressed. Denies any depression symptoms. Denies taking any psychiatric medications.    Discussed with psychiatry. They recommend continued PEC and inpatient psychiatric admission.    Discussed with patient's daughter Denisse at bedside. Denisse was tearful. She states her mother was confused due to UTI and is much better. Does not want her to go to inpatient psych.     Review of Systems  A comprehensive review of systems was negative except as noted above.       Objective:     Vital Signs (Most Recent):  Temp: 97.5 °F (36.4 °C) (03/12/24 1547)  Pulse: 61 (03/12/24 1547)  Resp: 18 (03/12/24 1547)  BP: (!) 150/67 (03/12/24 1547)  SpO2: 97 % (03/12/24 1547) Vital Signs (24h Range):  Temp:  [97.5 °F (36.4 °C)-98 °F (36.7 °C)] 97.5 °F (36.4 °C)  Pulse:  [61-78] 61  Resp:  [16-18] 18  SpO2:  [94 %-97 %] 97 %  BP: (116-170)/(53-88) 150/67     Weight: 62.1 kg (137 lb)  Body mass index is 24.27 kg/m².  No intake or output data in the 24 hours ending 03/12/24 1759      Physical Exam  Vitals reviewed.  Nursing notes reviewed  GEN: NAD   HEENT: Normocephalic, atraumatic. PERRLA, EOMI  CV: RRR, no murmurs/rubs/gallops  Lungs: CTAB, no rubs/rhonchi/rales, non-labored breathing on room air  Abd: soft, non-tender to palpation. No guarding  Msk: No muscular deformities noted  Skin: No rashes or erythema noted  Neuro: Alert and oriented x3. No focal neurologic deficits  Psych: Denies " suicidal or homicidal ideation.  Calm and cooperative.  Does not appear depressed.  Does repeat herself often. Insight poor.       Significant Labs: All pertinent labs within the past 24 hours have been reviewed.  CBC:   Recent Labs   Lab 03/11/24  1421 03/12/24  0501   WBC 6.11 4.91   HGB 13.4 12.5   HCT 41.5 37.5    176     CMP:   Recent Labs   Lab 03/11/24  1421 03/12/24  0501    141   K 3.7 3.4*    108   CO2 29 26   GLU 81 79   BUN 26 19   CREATININE 0.6 0.6   CALCIUM 10.2 9.6   PROT 7.9  --    ALBUMIN 4.0  --    BILITOT 0.5  --    ALKPHOS 67  --    AST 31  --    ALT 25  --    ANIONGAP 8 7*         Significant Imaging: I have reviewed all pertinent imaging results/findings within the past 24 hours.

## 2024-03-12 NOTE — PLAN OF CARE
Spoke with MD and was informed that patient will be re-evaluated by psych in the morning to determine discharge disposition. Called [facility] with [facility] and cancelled transportation.       DEEPAK Paige  Case Management  [phone]

## 2024-03-12 NOTE — H&P
"  [facility]  History & Physical    Patient Name: Ernestine Luna  MRN: [id number]  Patient Class: OP- Observation  Admission Date: 3/11/2024  Attending Physician: Bennett Chappell MD   Primary Care Provider: Kev Macias MD         Patient information was obtained from patient, past medical records, and ER records.     Subjective:     Principal Problem:Psychiatric problem    Chief Complaint:   Chief Complaint   Patient presents with    Suicidal     Referred in my primary doctor        HPI: Sent in for evaluation for suicidal statements. Sent in by pcp for evaluation     Patient reports she is not suicidal. She states she gets frustrated and just tells people "I'm tired".  I did try to kill myself after my   39 years ago but that was then. Denies harm to self.  No medical complaitns including chest pain, sob, fevers, falls, weakness, numbness, hallucinations     Per daughter, she was talking about killing herself this weekend. Told her Zoroastrianism member this.  Her daughter heard the Zoroastrianism member ringing the doorbell. She had called her and told her she wanted to kill herself. Daughter went down, mom was in the bathroom and told her she was going to shower. When asked about the statement she said "I'm just tired, Denisse".  Daughter thought at first she just wanted attention.  Checked on her the next morning and went to check on her, was sitting on the toilet and she was like "something came over me, I'm depressed, I can't sleep, I can't do anything.  States she's been praying more, praying rosary and to saints.  Just kept saying she's tired. Also has been very labile and upset with doctor. Making strange decisions.    In the ED patient afebrile and hemodynamically stable saturating well on room air at rest. Placed under PEC and psychiatry consulted. Concern for possible UTI on UA and patient started on ceftriaxone in ED. Admitted to  for further evaluation and management. 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 Wilkins    COLONOSCOPY N/A 10/1/2019    Procedure: COLONOSCOPY;  Surgeon: Jarad Chavira MD;  Location: [facility] ENDO (4TH FLR);  Service: Endoscopy;  Laterality: N/A;  miralax prep (used miralax for last colonoscopy) - ERW    COLONOSCOPY W/ POLYPECTOMY  08/08/2013    RASHEED   06/24/2014    EYE SURGERY      HYSTERECTOMY  age 38    ALISTAIR; ovaries in place    INJECTION FOR SENTINEL NODE IDENTIFICATION Left 2/20/2023    Procedure: INJECTION, FOR SENTINEL NODE IDENTIFICATION-Left;  Surgeon: ROSIBEL Belcher MD;  Location: [facility] OR 2ND FLR;  Service: General;  Laterality: Left;    JOINT REPLACEMENT      bilateral knees    MASTECTOMY, PARTIAL Left 2/20/2023    Procedure: MASTECTOMY, PARTIAL-Left with radiological marker;  Surgeon: ROSIBEL Belcher MD;  Location: [facility] OR 2ND FLR;  Service: General;  Laterality: Left;    MASTECTOMY, PARTIAL Left 4/18/2023    Procedure: MASTECTOMY, PARTIAL-left re-excision;  Surgeon: ROSIBEL Belcher MD;  [facility];  Service: General;  Laterality: Left;    SENTINEL LYMPH NODE BIOPSY Left 2/20/2023    Procedure: BIOPSY, LYMPH NODE, SENTINEL-Left;  Surgeon: ROSIBEL Belcher MD;  [facility];  Service: General;  Laterality: Left;    TOTAL KNEE  ARTHROPLASTY      Bilateral       Review of patient's allergies indicates:   Allergen Reactions    Tramadol Shortness Of Breath, Diarrhea and Nausea And Vomiting    Acetaminophen Nausea And Vomiting    Aspirin Nausea And Vomiting    Sulfa (sulfonamide antibiotics) Nausea And Vomiting    Prednisone Other (See Comments)     Stomach problems, no issue with injection       Current Facility-Administered Medications on File Prior to Encounter   Medication    fentaNYL 50 mcg/mL injection  mcg    midazolam (VERSED) 1 mg/mL injection 0.5-4 mg     Current Outpatient Medications on File Prior to Encounter   Medication Sig    acetic acid-oxyquinoline (FEM PH) 0.9-0.025 % Gel Place 1 applicator vaginally twice a week. (Patient not taking: Reported on 3/11/2024)    alendronate (FOSAMAX) 70 MG tablet Take 1 tablet (70 mg total) by mouth every 7 days. (Patient not taking: Reported on 3/11/2024)    amLODIPine (NORVASC) 10 MG tablet Take 1 tablet (10 mg total) by mouth once daily.    atenoloL (TENORMIN) 25 MG tablet TAKE 1 TABLET(25 MG) BY MOUTH EVERY DAY    benazepriL (LOTENSIN) 40 MG tablet TAKE 1 TABLET(40 MG) BY MOUTH EVERY DAY    calcium carbonate (OS-DHRUV) 600 mg calcium (1,500 mg) Tab Take 600 mg by mouth once.    ciclopirox (PENLAC) 8 % Soln Apply topically nightly. (Patient not taking: Reported on 3/11/2024)    colchicine, gout, (COLCRYS) 0.6 mg tablet Take 2 tablets (1.2 mg) by mouth x 1 dose then 1 tablet (0.6 mg) by mouth one hour later (Patient not taking: Reported on 3/11/2024)    diclofenac sodium (VOLTAREN) 1 % Gel Apply 2 g topically 3 (three) times daily. (Patient not taking: Reported on 3/11/2024)    donepeziL (ARICEPT) 5 MG tablet Take 1 tablet (5 mg total) by mouth once daily. (Patient not taking: Reported on 3/11/2024)    glycerin/min oil/polycarbophil (REPLENS VAGL) Place vaginally.    ketorolac (TORADOL) 30 mg/mL (1 mL) injection     LIDOcaine (LIDODERM) 5 % Place 1 patch onto the skin once daily. Remove  & Discard patch within 12 hours or as directed by MD (Patient not taking: Reported on 3/11/2024)    lidocaine HCL 2% (XYLOCAINE) 2 % jelly Apply topically as needed. For toe pain, apply up to twice daily as needed for pain. (Patient not taking: Reported on 3/11/2024)    MULTIVITAMIN W-MINERALS/LUTEIN (CENTRUM SILVER ORAL) Take 1 tablet by mouth Daily.    OFIRMEV 1,000 mg/100 mL (10 mg/mL) Soln     omega 3-calcium-vit [id number] [phone] mg Cmpk Take by mouth once daily.    ondansetron (ZOFRAN-ODT) 4 MG TbDL Take 1 tablet (4 mg total) by mouth every 8 (eight) hours as needed (nausea). (Patient not taking: Reported on 3/11/2024)    PFIZER COVID BIVAL,12Y UP,,PF, 30 mcg/0.3 mL injection Inject into the muscle.    Ringer's solution,lactated (LACTATED RINGERS) infusion     traZODone (DESYREL) 50 MG tablet Take 1 tablet (50 mg total) by mouth every evening. for 7 days    trospium (SANCTURA) 20 mg Tab tablet Take 1 tablet (20 mg total) by mouth once daily.    vitamin D 1000 units Tab Take 185 mg by mouth once daily.     Family History       Problem Relation (Age of Onset)    Asthma Sister    Blindness Other    Breast cancer Sister (82), Daughter (46), Daughter (45), Daughter (37), Cousin, Other, Other, Other    Cancer Sister, Daughter, Daughter, Daughter, Cousin, Cousin    Coronary artery disease Father    Diabetes Sister, Father, Sister, Sister, Other, Other, Other    Heart attack Father, Brother    Heart disease Sister, Father, Sister, Sister    Hyperlipidemia Sister    No Known Problems Son    Stroke Mother          Tobacco Use    Smoking status: Never    Smokeless tobacco: Never   Substance and Sexual Activity    Alcohol use: Yes     Alcohol/week: 1.0 standard drink of alcohol     Types: 1 Glasses of wine per week     Comment: Rare, every other week    Drug use: No    Sexual activity: Not Currently     Birth control/protection: None     Comment:      Review of Systems   Constitutional:  Negative for chills,  fatigue and fever.   HENT:  Negative for sore throat and trouble swallowing.    Eyes:  Negative for photophobia and visual disturbance.   Respiratory:  Negative for cough, shortness of breath and wheezing.    Cardiovascular:  Negative for chest pain, palpitations and leg swelling.   Gastrointestinal:  Negative for abdominal distention, abdominal pain, diarrhea, nausea and vomiting.   Genitourinary:  Negative for dysuria and hematuria.   Musculoskeletal:  Negative for neck pain and neck stiffness.   Skin:  Negative for rash and wound.   Neurological:  Negative for tremors, seizures, syncope, weakness and headaches.   Psychiatric/Behavioral:  Negative for confusion, decreased concentration, hallucinations, self-injury and suicidal ideas.      Objective:     Vital Signs (Most Recent):  Temp: 97.9 °F (36.6 °C) (03/11/24 2310)  Pulse: 63 (03/11/24 2310)  Resp: 18 (03/11/24 2310)  BP: (!) 160/72 (03/11/24 2310)  SpO2: (!) 94 % (03/11/24 2310) Vital Signs (24h Range):  Temp:  [97.6 °F (36.4 °C)-98 °F (36.7 °C)] 97.9 °F (36.6 °C)  Pulse:  [63-65] 63  Resp:  [18-20] 18  SpO2:  [94 %-99 %] 94 %  BP: (142-170)/(67-81) 160/72     Weight: 62.1 kg (137 lb)  Body mass index is 24.27 kg/m².     Physical Exam  Constitutional:       General: She is not in acute distress.     Appearance: She is not toxic-appearing or diaphoretic.   HENT:      Head: Normocephalic and atraumatic.      Nose: Nose normal.   Eyes:      General: No scleral icterus.     Extraocular Movements: Extraocular movements intact.      Pupils: Pupils are equal, round, and reactive to light.   Cardiovascular:      Rate and Rhythm: Normal rate and regular rhythm.   Pulmonary:      Effort: Pulmonary effort is normal. No respiratory distress.      Breath sounds: No wheezing or rales.   Abdominal:      General: Abdomen is flat. There is no distension.      Palpations: Abdomen is soft.      Tenderness: There is no abdominal tenderness. There is no guarding.    Musculoskeletal:         General: Normal range of motion.      Cervical back: Normal range of motion and neck supple. No rigidity.      Right lower leg: No edema.      Left lower leg: No edema.   Skin:     General: Skin is warm and dry.      Coloration: Skin is not jaundiced.   Neurological:      General: No focal deficit present.      Mental Status: She is alert and oriented to person, place, and time.      Cranial Nerves: No cranial nerve deficit.   Psychiatric:         Mood and Affect: Mood normal.         Behavior: Behavior normal.         Thought Content: Thought content normal.              CRANIAL NERVES     CN III, IV, VI   Pupils are equal, round, and reactive to light.       Significant Labs: All pertinent labs within the past 24 hours have been reviewed.  CBC:   Recent Labs   Lab 03/11/24  1421   WBC 6.11   HGB 13.4   HCT 41.5        CMP:   Recent Labs   Lab 03/11/24  1421      K 3.7      CO2 29   GLU 81   BUN 26   CREATININE 0.6   CALCIUM 10.2   PROT 7.9   ALBUMIN 4.0   BILITOT 0.5   ALKPHOS 67   AST 31   ALT 25   ANIONGAP 8     Urine Studies:   Recent Labs   Lab 03/11/24  1423   COLORU Yellow   APPEARANCEUA Clear   PHUR 7.0   SPECGRAV 1.020   PROTEINUA Negative   GLUCUA Negative   KETONESU Negative   BILIRUBINUA Negative   OCCULTUA Negative   NITRITE Negative   LEUKOCYTESUR 3+*   RBCUA 2   WBCUA 8*   BACTERIA Rare   SQUAMEPITHEL 1       Significant Imaging: I have reviewed all pertinent imaging results/findings within the past 24 hours.  Assessment/Plan:     * Psychiatric problem  - PEC orders placed by ED for concern of suicidal ideation  - consult Psych in am      Acute cystitis  - sp ceftriaxone x1 in ED  - start keflex  - follow up urine culture      Essential hypertension  - continue home meds      VTE Risk Mitigation (From admission, onward)           Ordered     enoxaparin injection 40 mg  Daily         03/11/24 1911     IP VTE HIGH RISK PATIENT  Once         03/11/24 1911      Place sequential compression device  Until discontinued         03/11/24 1911                       On 03/12/2024, patient should be placed in hospital observation services under my care.             Bennett Chappell MD  Department of Hospital Medicine  [facility]

## 2024-03-13 VITALS
RESPIRATION RATE: 16 BRPM | WEIGHT: 136.88 LBS | DIASTOLIC BLOOD PRESSURE: 57 MMHG | HEART RATE: 67 BPM | BODY MASS INDEX: 24.25 KG/M2 | OXYGEN SATURATION: 97 % | HEIGHT: 63 IN | SYSTOLIC BLOOD PRESSURE: 111 MMHG | TEMPERATURE: 98 F

## 2024-03-13 LAB
ANION GAP SERPL CALC-SCNC: 6 MMOL/L (ref 8–16)
BASOPHILS # BLD AUTO: 0.01 K/UL (ref 0–0.2)
BASOPHILS NFR BLD: 0.2 % (ref 0–1.9)
BUN SERPL-MCNC: 26 MG/DL (ref 10–30)
CALCIUM SERPL-MCNC: 9.2 MG/DL (ref 8.7–10.5)
CHLORIDE SERPL-SCNC: 107 MMOL/L (ref 95–110)
CO2 SERPL-SCNC: 27 MMOL/L (ref 23–29)
CREAT SERPL-MCNC: 0.7 MG/DL (ref 0.5–1.4)
DIFFERENTIAL METHOD BLD: ABNORMAL
EOSINOPHIL # BLD AUTO: 0.2 K/UL (ref 0–0.5)
EOSINOPHIL NFR BLD: 4.1 % (ref 0–8)
ERYTHROCYTE [DISTWIDTH] IN BLOOD BY AUTOMATED COUNT: 13.7 % (ref 11.5–14.5)
EST. GFR  (NO RACE VARIABLE): >60 ML/MIN/1.73 M^2
GLUCOSE SERPL-MCNC: 86 MG/DL (ref 70–110)
HCT VFR BLD AUTO: 36.2 % (ref 37–48.5)
HGB BLD-MCNC: 11.9 G/DL (ref 12–16)
IMM GRANULOCYTES # BLD AUTO: 0.02 K/UL (ref 0–0.04)
IMM GRANULOCYTES NFR BLD AUTO: 0.4 % (ref 0–0.5)
LYMPHOCYTES # BLD AUTO: 1.9 K/UL (ref 1–4.8)
LYMPHOCYTES NFR BLD: 36.4 % (ref 18–48)
MCH RBC QN AUTO: 31 PG (ref 27–31)
MCHC RBC AUTO-ENTMCNC: 32.9 G/DL (ref 32–36)
MCV RBC AUTO: 94 FL (ref 82–98)
MONOCYTES # BLD AUTO: 0.5 K/UL (ref 0.3–1)
MONOCYTES NFR BLD: 9.9 % (ref 4–15)
NEUTROPHILS # BLD AUTO: 2.5 K/UL (ref 1.8–7.7)
NEUTROPHILS NFR BLD: 49 % (ref 38–73)
NRBC BLD-RTO: 0 /100 WBC
PLATELET # BLD AUTO: 184 K/UL (ref 150–450)
PMV BLD AUTO: 10.6 FL (ref 9.2–12.9)
POTASSIUM SERPL-SCNC: 4.3 MMOL/L (ref 3.5–5.1)
RBC # BLD AUTO: 3.84 M/UL (ref 4–5.4)
SODIUM SERPL-SCNC: 140 MMOL/L (ref 136–145)
VIT B12 SERPL-MCNC: 1693 PG/ML (ref 210–950)
WBC # BLD AUTO: 5.17 K/UL (ref 3.9–12.7)

## 2024-03-13 PROCEDURE — 36415 COLL VENOUS BLD VENIPUNCTURE: CPT | Mod: HCNC | Performed by: STUDENT IN AN ORGANIZED HEALTH CARE EDUCATION/TRAINING PROGRAM

## 2024-03-13 PROCEDURE — 80048 BASIC METABOLIC PNL TOTAL CA: CPT | Mod: HCNC | Performed by: FAMILY MEDICINE

## 2024-03-13 PROCEDURE — 25000003 PHARM REV CODE 250: Mod: HCNC | Performed by: FAMILY MEDICINE

## 2024-03-13 PROCEDURE — 25000003 PHARM REV CODE 250: Mod: HCNC | Performed by: STUDENT IN AN ORGANIZED HEALTH CARE EDUCATION/TRAINING PROGRAM

## 2024-03-13 PROCEDURE — G0378 HOSPITAL OBSERVATION PER HR: HCPCS | Mod: HCNC

## 2024-03-13 PROCEDURE — 85025 COMPLETE CBC W/AUTO DIFF WBC: CPT | Mod: HCNC | Performed by: FAMILY MEDICINE

## 2024-03-13 PROCEDURE — 82607 VITAMIN B-12: CPT | Mod: HCNC | Performed by: STUDENT IN AN ORGANIZED HEALTH CARE EDUCATION/TRAINING PROGRAM

## 2024-03-13 RX ORDER — CEPHALEXIN 500 MG/1
500 CAPSULE ORAL EVERY 12 HOURS
Qty: 10 CAPSULE | Refills: 0 | Status: SHIPPED | OUTPATIENT
Start: 2024-03-13 | End: 2024-04-12 | Stop reason: ALTCHOICE

## 2024-03-13 RX ADMIN — CHOLECALCIFEROL TAB 25 MCG (1000 UNIT) 1000 UNITS: 25 TAB at 10:03

## 2024-03-13 RX ADMIN — ATENOLOL 25 MG: 25 TABLET ORAL at 10:03

## 2024-03-13 RX ADMIN — CEPHALEXIN 500 MG: 500 CAPSULE ORAL at 10:03

## 2024-03-13 NOTE — DISCHARGE INSTRUCTIONS
-consider establishing with outpatient psychiatry    -please hold blood pressure medications and check BP at home.  May resume blood pressure medications when systolic BP (Top number) is >140

## 2024-03-13 NOTE — PLAN OF CARE
[facility] - Med Surg  Discharge Final Note    Primary Care Provider: Kev Macias MD    Expected Discharge Date: 3/13/2024      Patient discharged to home with no needs. Hai HARRINGTON to set up follow up appointment. Discharge Plan A and Plan B have been determined by review of patient's clinical status, future medical and therapeutic needs, and coverage/benefits for post-acute care in coordination with multidisciplinary team members.  Final Discharge Note (most recent)       Final Note - 03/13/24 1426          Final Note    Assessment Type Final Discharge Note     Anticipated Discharge Disposition Home or Self Care     Hospital Resources/Appts/Education Provided Appointments scheduled and added to AVS        Post-Acute Status    Coverage [employer]     Discharge Delays None known at this time (P)                      Important Message from Medicare           DEEPAK Paige  Case Management  [phone]

## 2024-03-13 NOTE — CONSULTS
See previous psychiatry consult note and progress note from this admission    Irene Jesus MD, PhD  [facility]  -4  03/13/2024

## 2024-03-13 NOTE — PROGRESS NOTES
"CONSULTATION LIAISON PSYCHIATRY PROGRESS NOTE    Patient Name: Ernestine Luna  MRN: [id number]  Patient Class: OP- Observation  Admission Date: 3/11/2024  Attending Physician: Yudith Fatima MD      SUBJECTIVE:   Ernestine Luna is a 89 y.o. female with no past psychiatric history & past pertinent medical history of HTN, breast cancer, Osteoporosis presents to the ED/admitted to the hospital for depression and concern for SI, per daughter     Psychiatry consulted for "depression"     Per chart review patient's daughter expressed yesterday that her mother is fine now and doesn't want her to go to inpatient psych.    This AM pt reports mood is "good".  She is ruminative about many events from the past.  Yesterday patient was evasive and denied suicidal statements.  Today she admits that she called her son in [place] and said "I want to blow my brains out".  She said this was in the context of recently having an argument with her daughter and that she didn't mean it.  "I wouldn't kill myself in a million years".  Pt claims she was just upset and that she didn't mean it literally.  Overall she says they had an argument starting about 1 week ago relating to daughter telling her what to do, which frustrates her. Pt is Sikhism and reports involvement with her local Hinduism. She says she has many friends and supports.  Pt says she has a lot to live for and hopes to live to 89.       OBJECTIVE:    Mental Status Exam:  General Appearance: appears younger than stated age, fair hygiene and grooming  Behavior: appropriate eye-contact, redirectable, evasive when asking specifics of events leading to hospitalization   Involuntary Movements and Motor Activity: no abnormal involuntary movements noted; no tics, no tremors, no akathisia, no dystonia, no evidence of tardive dyskinesia; no psychomotor agitation or retardation  Gait and Station: unable to assess - patient lying down or seated  Speech and Language: intact; normal " "rate, rhythm, volume, tone, and pitch; conversational, spontaneous, and coherent; speaks and understands English proficiently and fluently; repeats words and phrases, no word finding difficulties are noted  Mood: "good"  Affect:  reactive, appropriate   Thought Process and Associations: ruminative  Thought Content and Perceptions:: no suicidal or homicidal ideation, no auditory or visual hallucinations, no paranoid ideation, no ideas of reference, no evidence of delusions or psychosis  Sensorium and Orientation: Oriented to person, place, situation, year, month, and day of week  Recent and Remote Memory: Recent memory impaired to some degree   Attention and Concentration: Impaired, unable to spell WORLD backwards, unable to name months of the year backwards   Fund of Knowledge: grossly intact  Insight: limited, limited/partial awareness of illness, pt doesn't fully seem to appreciate why people would be concerned about the statement she made   Judgment: fair, behavior is inappropriate at times relating to her gun, behavior in the hospital is currently appropriate for circumstance.    CAM ICU positive? no      ASSESSMENT & RECOMMENDATIONS   Cognitive impairment  Suicidal ideation, resolved    PSYCH MEDICATIONS  Scheduled: None indicated   PRN: None needed    RISK ASSESSMENT  Can rescind PEC.  Pt no longer a danger to herself, others or gravely disabled.  It has been discussed with patient and family recommendation to remove the gun from her home.  Pt with poor judgement regarding the gun.     FOLLOW UP  Will sign off.     DISPOSITION - once medically cleared:  Defer to medical team    Please contact ON CALL psychiatry service (24/7) for any acute issues that may arise.    Irene Jesus MD, PhD  [facility] " Psychiatry  HO-4  03/13/2024        --------------------------------------------------------------------------------------------------------------------------------------------------------------------------------------------------------------------------------------    CONTINUED OBJECTIVE clinical data & findings reviewed and noted for above decision making    Current Medications:   Scheduled Meds:    atenoloL  25 mg Oral Daily    cephALEXin  500 mg Oral Q12H    enoxparin  40 mg Subcutaneous Daily    vitamin D  1,000 Units Oral Daily     PRN Meds: aluminum-magnesium hydroxide-simethicone, dextrose 10%, dextrose 10%, glucagon (human recombinant), glucose, glucose, melatonin, naloxone, ondansetron, sodium chloride 0.9%    Allergies:   Review of patient's allergies indicates:   Allergen Reactions    Tramadol Shortness Of Breath, Diarrhea and Nausea And Vomiting    Acetaminophen Nausea And Vomiting    Aspirin Nausea And Vomiting    Sulfa (sulfonamide antibiotics) Nausea And Vomiting    Prednisone Other (See Comments)     Stomach problems, no issue with injection       Vitals  Vitals:    03/13/24 0705   BP: (!) 127/58   Pulse: 73   Resp: 16   Temp: 97.6 °F (36.4 °C)       Labs/Imaging/Studies:  Recent Results (from the past 24 hour(s))   Basic Metabolic Panel (BMP)    Collection Time: 03/13/24  4:02 AM   Result Value Ref Range    Sodium 140 136 - 145 mmol/L    Potassium 4.3 3.5 - 5.1 mmol/L    Chloride 107 95 - 110 mmol/L    CO2 27 23 - 29 mmol/L    Glucose 86 70 - 110 mg/dL    BUN 26 10 - 30 mg/dL    Creatinine 0.7 0.5 - 1.4 mg/dL    Calcium 9.2 8.7 - 10.5 mg/dL    Anion Gap 6 (L) 8 - 16 mmol/L    eGFR >60.0 >60 mL/min/1.73 m^2   CBC with Automated Differential    Collection Time: 03/13/24  4:02 AM   Result Value Ref Range    WBC 5.17 3.90 - 12.70 K/uL    RBC 3.84 (L) 4.00 - 5.40 M/uL    Hemoglobin 11.9 (L) 12.0 - 16.0 g/dL    Hematocrit 36.2 (L) 37.0 - 48.5 %    MCV 94 82 - 98 fL    MCH 31.0 27.0 - 31.0 pg    MCHC 32.9  32.0 - 36.0 g/dL    RDW 13.7 11.5 - 14.5 %    Platelets 184 150 - 450 K/uL    MPV 10.6 9.2 - 12.9 fL    Immature Granulocytes 0.4 0.0 - 0.5 %    Gran # (ANC) 2.5 1.8 - 7.7 K/uL    Immature Grans (Abs) 0.02 0.00 - 0.04 K/uL    Lymph # 1.9 1.0 - 4.8 K/uL    Mono # 0.5 0.3 - 1.0 K/uL    Eos # 0.2 0.0 - 0.5 K/uL    Baso # 0.01 0.00 - 0.20 K/uL    nRBC 0 0 /100 WBC    Gran % 49.0 38.0 - 73.0 %    Lymph % 36.4 18.0 - 48.0 %    Mono % 9.9 4.0 - 15.0 %    Eosinophil % 4.1 0.0 - 8.0 %    Basophil % 0.2 0.0 - 1.9 %    Differential Method Automated    Vitamin B12    Collection Time: 03/13/24  4:02 AM   Result Value Ref Range    Vitamin B-12 1693 (H) 210 - 950 pg/mL     Imaging Results              CT Head Without Contrast (Final result)  Result time 03/11/24 15:34:35      Final result by Mauricio Del Cid MD (03/11/24 15:34:35)                   Impression:      No evidence of acute hemorrhage or major vascular distribution infarct.    Mild chronic small vessel ischemic change with moderate generalized cerebral volume loss.      Electronically signed by: Mauricio Del Cid MD  Date:    03/11/2024  Time:    15:34               Narrative:    EXAMINATION:  CT HEAD WITHOUT CONTRAST    CLINICAL HISTORY:  Mental status change, unknown cause;    TECHNIQUE:  Low dose axial CT images obtained throughout the head without the use of intravenous contrast.  Axial, sagittal and coronal reconstructions were performed.    COMPARISON:  11/03/2013.    FINDINGS:  Intracranial compartment:    Moderate generalized cerebral volume loss.  No evidence of hydrocephalus.    Mild patchy hypoattenuation in the supratentorial white matter, nonspecific but most likely reflecting chronic small vessel ischemic changes. No recent or remote major vascular distribution infarct. No acute hemorrhage.  No mass effect or midline shift.    No extra-axial blood or fluid collections.    Skull/extracranial contents (limited evaluation):    Remote right orbital floor  fracture.  No acute displaced calvarial fracture.    The mastoid air cells and visualized paranasal sinuses are essentially clear.    Postsurgical change both globes.

## 2024-03-14 NOTE — HOSPITAL COURSE
"Patient's UDS was negative. Urine culture with 10-50k gram negative rods- we will follow up final speciation and sensitivities.  Patient given a dose of Rocephin, started on p.o. Keflex.  Patient evaluated by Psychiatry on 3/12.  See thorough Psychiatry note on 03/12 for patient evaluation and history. "She denied the statements she made to her son and the acquaintance at her [employer] about "blowing my brains out". She did express a lot of frustration she has had with her daughter, whom she states has lived with her for years rent free in 1/2 of pt's double-shotgun house. Pt did admit to having a [place] [employer] [place] 0.38 caliber pistol that her  bought for her years ago for her protection. Pt states she keeps the gun and ammo secure in separate places in the house. However, she did admit to loading the gun and keeping it within easy reach in her car while she is driving (she claims to have a carry and conceal permit)." Psychiatry recommended to continue PEC and to place patient in inpatient geriatric psych facility.  Today patient was reassessed by Psychiatry. Can rescind PEC. Pt no longer a danger to herself, others or gravely disabled. It has been discussed with patient and family recommendation to remove the gun from her home. Pt with poor judgement regarding the gun. Recommend patient establish with outpatient psychiatry. Would recommend f/up with PCP with testing for signs of dementia.  We will continue to follow urine cultures.  Patient discharged with prescription for Keflex.  Also her blood pressure has been low today.  I think this is because she has not been drinking enough because she is particular about only drinking mineral water.  I encouraged her to have good p.o. fluid intake when she goes home.  Hold home antihypertensives until BP is elevated.  Discussed this with patient and daughter at bedside. Pt deemed appropriate for discharge. Plan discussed with pt, who was agreeable; medications were " discussed and reviewed. Outpatient follow-up discussed and scheduled when able. ER precautions were given. Patient discharged.

## 2024-03-14 NOTE — DISCHARGE SUMMARY
"[facility] Medicine  Discharge Summary      Patient Name: Ernestine Luna  MRN: [id number]  JOCY: [id number]  Patient Class: OP- Observation  Admission Date: 3/11/2024  Hospital Length of Stay: 2 days  Discharge Date and Time: 3/13/2024  1:11 PM  Attending Physician: Yudith Fatima MD  Discharging Provider: Yudith Fatima MD  Primary Care Provider: Kev Macias MD  [facility] Medicine Team: [facility] Yudith Fatima MD  Primary Care Team: [facility]    HPI:   Sent in for evaluation for suicidal statements. Sent in by pcp for evaluation     Patient reports she is not suicidal. She states she gets frustrated and just tells people "I'm tired".  I did try to kill myself after my   39 years ago but that was then. Denies harm to self.  No medical complaitns including chest pain, sob, fevers, falls, weakness, numbness, hallucinations     Per daughter, she was talking about killing herself this weekend. Told her Mormon member this.  Her daughter heard the Mormon member ringing the doorbell. She had called her and told her she wanted to kill herself. Daughter went down, mom was in the bathroom and told her she was going to shower. When asked about the statement she said "I'm just tired, Denisse".  Daughter thought at first she just wanted attention.  Checked on her the next morning and went to check on her, was sitting on the toilet and she was like "something came over me, I'm depressed, I can't sleep, I can't do anything.  States she's been praying more, praying rosary and to saints.  Just kept saying she's tired. Also has been very labile and upset with doctor. Making strange decisions.    In the ED patient afebrile and hemodynamically stable saturating well on room air at rest. Placed under PEC and psychiatry consulted. Concern for possible UTI on UA and patient started on ceftriaxone in ED. Admitted to  for further evaluation and management.     * No surgery found *  " "    Hospital Course:   Patient's UDS was negative. Urine culture with 10-50k gram negative rods- we will follow up final speciation and sensitivities.  Patient given a dose of Rocephin, started on p.o. Keflex.  Patient evaluated by Psychiatry on 3/12.  See thorough Psychiatry note on 03/12 for patient evaluation and history. "She denied the statements she made to her son and the acquaintance at her [employer] about "blowing my brains out". She did express a lot of frustration she has had with her daughter, whom she states has lived with her for years rent free in 1/2 of pt's double-shotgun house. Pt did admit to having a [place] 0.38 caliber pistol that her  bought for her years ago for her protection. Pt states she keeps the gun and ammo secure in separate places in the house. However, she did admit to loading the gun and keeping it within easy reach in her car while she is driving (she claims to have a carry and conceal permit)." Psychiatry recommended to continue PEC and to place patient in inpatient geriatric psych facility.  Today patient was reassessed by Psychiatry. Can rescind PEC. Pt no longer a danger to herself, others or gravely disabled. It has been discussed with patient and family recommendation to remove the gun from her home. Pt with poor judgement regarding the gun. Recommend patient establish with outpatient psychiatry. Would recommend f/up with PCP with testing for signs of dementia.  We will continue to follow urine cultures.  Patient discharged with prescription for Keflex.  Also her blood pressure has been low today.  I think this is because she has not been drinking enough because she is particular about only drinking mineral water.  I encouraged her to have good p.o. fluid intake when she goes home.  Hold home antihypertensives until BP is elevated.  Discussed this with patient and daughter at bedside. Pt deemed appropriate for discharge. Plan discussed with pt, who was " agreeable; medications were discussed and reviewed. Outpatient follow-up discussed and scheduled when able. ER precautions were given. Patient discharged.       Physical Exam  Vitals reviewed.  Nursing notes reviewed  GEN: NAD   HEENT: Normocephalic, atraumatic. PERRLA, EOMI  CV: RRR, no murmurs/rubs/gallops  Lungs: CTAB, no rubs/rhonchi/rales, non-labored breathing on room air  Abd: soft, non-tender to palpation. No guarding  Msk: No muscular deformities noted  Skin: No rashes or erythema noted  Neuro: Alert and oriented x3. No focal neurologic deficits  Psych: Denies suicidal or homicidal ideation.  Calm and cooperative.  Does not appear depressed.  Does repeat herself often.    Goals of Care Treatment Preferences:  Code Status: Full Code      Consults:   Consults (From admission, onward)          Status Ordering Provider     Inpatient consult to Psychiatry  Once        Provider:  (Not yet assigned)    JOHANNA Fay            No new Assessment & Plan notes have been filed under this hospital service since the last note was generated.  Service: Hospital Medicine    Final Active Diagnoses:    Diagnosis Date Noted POA    PRINCIPAL PROBLEM:  Psychiatric problem [F99] 03/12/2024 Yes    Acute cystitis [N30.00] 03/12/2024 Yes    Suicidal ideation [R45.851] 03/12/2024 Not Applicable    Essential hypertension [I10]  Yes      Problems Resolved During this Admission:       Discharged Condition: good    Disposition: Home or Self Care    Follow Up:    Patient Instructions:      Ambulatory referral/consult to Psychiatry   Standing Status: Future   Referral Priority: Routine Referral Type: Psychiatric   Referral Reason: Specialty Services Required   Requested Specialty: Psychiatry   Number of Visits Requested: 1     Diet Adult Regular     Activity as tolerated       Significant Diagnostic Studies: Labs: CMP   Recent Labs   Lab 03/13/24  0402      K 4.3      CO2 27   GLU 86   BUN 26   CREATININE 0.7    CALCIUM 9.2   ANIONGAP 6*    and CBC   Recent Labs   Lab 03/13/24  0402   WBC 5.17   HGB 11.9*   HCT 36.2*          Pending Diagnostic Studies:       None           Medications:  Reconciled Home Medications:      Medication List        START taking these medications      cephALEXin 500 MG capsule  Commonly known as: KEFLEX  Take 1 capsule (500 mg total) by mouth every 12 (twelve) hours. for 5 days            CONTINUE taking these medications      amLODIPine 10 MG tablet  Commonly known as: NORVASC  Take 1 tablet (10 mg total) by mouth once daily.     atenoloL 25 MG tablet  Commonly known as: TENORMIN  TAKE 1 TABLET(25 MG) BY MOUTH EVERY DAY     benazepriL 40 MG tablet  Commonly known as: LOTENSIN  TAKE 1 TABLET(40 MG) BY MOUTH EVERY DAY     calcium carbonate 600 mg calcium (1,500 mg) Tab  Commonly known as: OS-DHRUV  Take 600 mg by mouth once.     CENTRUM SILVER ORAL  Take 1 tablet by mouth Daily.     omeg3-calcium-D3-folic-mvit 13 1,000 mg-800 unit-2.5 mg Cmpk  Take by mouth once daily.     vitamin D 1000 units Tab  Commonly known as: VITAMIN D3  Take 185 mg by mouth once daily.            STOP taking these medications      traZODone 50 MG tablet  Commonly known as: DESYREL     trospium 20 mg Tab tablet  Commonly known as: sanctura            ASK your doctor about these medications      alendronate 70 MG tablet  Commonly known as: FOSAMAX  Take 1 tablet (70 mg total) by mouth every 7 days.     colchicine 0.6 mg tablet  Commonly known as: COLCRYS  Take 2 tablets (1.2 mg) by mouth x 1 dose then 1 tablet (0.6 mg) by mouth one hour later     donepeziL 5 MG tablet  Commonly known as: ARICEPT  Take 1 tablet (5 mg total) by mouth once daily.                  Time spent on the discharge of patient: Approximately 45 minutes of time spent on discharge, including examining the patient, providing discharge instructions, arranging follow up, and documentation.          Yudith Fatima MD  Department of Hospital  Medicine  [facility] Surg

## 2024-03-20 ENCOUNTER — TELEPHONE (OUTPATIENT)
Dept: INTERNAL MEDICINE | Facility: CLINIC | Age: 89
End: 2024-03-20
Payer: MEDICARE

## 2024-03-20 DIAGNOSIS — M19.90 OSTEOARTHRITIS, UNSPECIFIED OSTEOARTHRITIS TYPE, UNSPECIFIED SITE: Primary | ICD-10-CM

## 2024-03-20 DIAGNOSIS — R26.81 GAIT INSTABILITY: ICD-10-CM

## 2024-03-20 LAB — BACTERIA UR CULT: ABNORMAL

## 2024-03-20 RX ORDER — DONEPEZIL HYDROCHLORIDE 5 MG/1
5 TABLET, FILM COATED ORAL
Qty: 30 TABLET | Refills: 2 | Status: SHIPPED | OUTPATIENT
Start: 2024-03-20 | End: 2024-04-18

## 2024-03-20 NOTE — TELEPHONE ENCOUNTER
----- Message from Alondra Carvajal sent at 3/20/2024 12:34 PM CDT -----  Contact: [phone] Patient  Diabetic or Medical Supplies.  What supplies are needed:  4 prong cane/quad cane  What is the brand name of the supplies: N/A  Is this a refill or new prescription:  New  Who prescribed the supplies:  Dr Macias  Pharmacy or company name, phone # and location:  ?  Would the patient like a call back, or a response through their [facility] portal?:   call back  Comments:   Pt states she left her can at the store on 03/19/24 and it was not turned in by anyone. Pt states she is 89 years old and needs a new cane to keep from falling. Pt states she lives by herself.

## 2024-03-20 NOTE — TELEPHONE ENCOUNTER
Pt need an order put in for an new cane she states she left it in the store and when she went back it wasn't no where to be found.

## 2024-03-21 ENCOUNTER — TELEPHONE (OUTPATIENT)
Dept: INTERNAL MEDICINE | Facility: CLINIC | Age: 89
End: 2024-03-21
Payer: MEDICARE

## 2024-03-21 NOTE — TELEPHONE ENCOUNTER
Called patient to let her know we sent her order for her cane to [facility], patient understood.

## 2024-03-22 ENCOUNTER — TELEPHONE (OUTPATIENT)
Dept: INTERNAL MEDICINE | Facility: CLINIC | Age: 89
End: 2024-03-22
Payer: MEDICARE

## 2024-03-22 ENCOUNTER — OFFICE VISIT (OUTPATIENT)
Dept: PODIATRY | Facility: CLINIC | Age: 89
End: 2024-03-22
Payer: MEDICARE

## 2024-03-22 VITALS
DIASTOLIC BLOOD PRESSURE: 73 MMHG | BODY MASS INDEX: 24.45 KG/M2 | SYSTOLIC BLOOD PRESSURE: 151 MMHG | HEIGHT: 63 IN | HEART RATE: 81 BPM | WEIGHT: 138 LBS

## 2024-03-22 DIAGNOSIS — B35.1 ONYCHOMYCOSIS DUE TO DERMATOPHYTE: ICD-10-CM

## 2024-03-22 DIAGNOSIS — L84 CORNS/CALLOSITIES: Primary | ICD-10-CM

## 2024-03-22 PROCEDURE — 17999 UNLISTD PX SKN MUC MEMB SUBQ: CPT | Mod: CSM,HCNC,S$GLB, | Performed by: PODIATRIST

## 2024-03-22 PROCEDURE — 99499 UNLISTED E&M SERVICE: CPT | Mod: HCNC,,, | Performed by: PODIATRIST

## 2024-03-22 PROCEDURE — 99999 PR PBB SHADOW E&M-EST. PATIENT-LVL III: CPT | Mod: PBBFAC,HCNC,, | Performed by: PODIATRIST

## 2024-03-22 NOTE — TELEPHONE ENCOUNTER
----- Message from Franny Rodriguez sent at 3/22/2024  3:46 PM CDT -----  Contact: [phone] Patient  1MEDICALADVICE     Patient is calling for Medical Advice regarding:Patient need a Rx to help her stop eating.   Please call and advise     How long has patient had these symptoms:    Pharmacy name and phone#:    Would like response via [employer]:  call     Comments:

## 2024-03-28 ENCOUNTER — TELEPHONE (OUTPATIENT)
Dept: UROGYNECOLOGY | Facility: CLINIC | Age: 89
End: 2024-03-28
Payer: MEDICARE

## 2024-03-28 NOTE — TELEPHONE ENCOUNTER
"Informed pt rx wasn't sent to her pharmacy per NP MARIO Sequeira noted from last visit 3/4/24 "-will try to get trospium approved". Pt voiced understanding and call ended.   "

## 2024-03-28 NOTE — TELEPHONE ENCOUNTER
----- Message from Jaida Joseph sent at 3/28/2024 12:56 PM CDT -----  Regarding: call re rx  Name of Who is Calling:pt          What is the request in detail: pt would like a call back re trospium (SANCTURA) 20 mg Tab tablet/ She states that the pharmacy told her to call you so she can get the medication. She is asking why can she not  the medicaiton. Please call to advise           Can the clinic reply by [facility]:          What Number to Call Back if not in [facility]: [phone]

## 2024-04-10 NOTE — TELEPHONE ENCOUNTER
Care Due:                  Date            Visit Type   Department     Provider  --------------------------------------------------------------------------------                                EP -                              PRIMARY      [facility] INTER[facility]  Last Visit: 12-      CARE ([facility])   MEDICINE       Kev Macias  Next Visit: None Scheduled  None         None Found                                                            Last  Test          Frequency    Reason                     Performed    Due Date  --------------------------------------------------------------------------------    Mg Level....  12 months..  alendronate..............  Not Found    Overdue    Phosphate...  12 months..  alendronate..............  Not Found    Overdue    Vitamin D...  12 months..  alendronate..............  11- 11-    [facility] Embedded Care Due Messages. Reference number: [id number].   4/10/2024 5:50:08 AM CDT

## 2024-04-11 RX ORDER — AMLODIPINE BESYLATE 10 MG/1
10 TABLET ORAL
Qty: 90 TABLET | Refills: 1 | Status: SHIPPED | OUTPATIENT
Start: 2024-04-11

## 2024-04-12 ENCOUNTER — OFFICE VISIT (OUTPATIENT)
Dept: HOME HEALTH SERVICES | Facility: CLINIC | Age: 89
End: 2024-04-12
Payer: MEDICARE

## 2024-04-12 VITALS
SYSTOLIC BLOOD PRESSURE: 134 MMHG | BODY MASS INDEX: 24.45 KG/M2 | OXYGEN SATURATION: 98 % | HEIGHT: 63 IN | WEIGHT: 138 LBS | DIASTOLIC BLOOD PRESSURE: 72 MMHG | TEMPERATURE: 97 F | RESPIRATION RATE: 16 BRPM | HEART RATE: 64 BPM

## 2024-04-12 DIAGNOSIS — J84.10 CALCIFIED GRANULOMA OF LUNG: ICD-10-CM

## 2024-04-12 DIAGNOSIS — N39.41 URINARY INCONTINENCE, URGE: ICD-10-CM

## 2024-04-12 DIAGNOSIS — I10 ESSENTIAL HYPERTENSION: ICD-10-CM

## 2024-04-12 DIAGNOSIS — Z99.89 DEPENDENCE ON OTHER ENABLING MACHINES AND DEVICES: ICD-10-CM

## 2024-04-12 DIAGNOSIS — I70.0 ATHEROSCLEROSIS OF AORTA: ICD-10-CM

## 2024-04-12 DIAGNOSIS — I71.9 AORTIC ANEURYSM WITHOUT RUPTURE, UNSPECIFIED PORTION OF AORTA: Chronic | ICD-10-CM

## 2024-04-12 DIAGNOSIS — M81.0 OSTEOPOROSIS, UNSPECIFIED OSTEOPOROSIS TYPE, UNSPECIFIED PATHOLOGICAL FRACTURE PRESENCE: ICD-10-CM

## 2024-04-12 DIAGNOSIS — C50.912 INVASIVE DUCTAL CARCINOMA OF BREAST, LEFT: Chronic | ICD-10-CM

## 2024-04-12 DIAGNOSIS — Z00.00 ENCOUNTER FOR PREVENTIVE HEALTH EXAMINATION: Primary | ICD-10-CM

## 2024-04-12 PROBLEM — J98.4 CALCIFIED GRANULOMA OF LUNG: Status: ACTIVE | Noted: 2024-04-12

## 2024-04-12 PROCEDURE — 3288F FALL RISK ASSESSMENT DOCD: CPT | Mod: CPTII,S$GLB,,

## 2024-04-12 PROCEDURE — 1158F ADVNC CARE PLAN TLK DOCD: CPT | Mod: CPTII,S$GLB,,

## 2024-04-12 PROCEDURE — 1101F PT FALLS ASSESS-DOCD LE1/YR: CPT | Mod: CPTII,S$GLB,,

## 2024-04-12 PROCEDURE — 1170F FXNL STATUS ASSESSED: CPT | Mod: CPTII,S$GLB,,

## 2024-04-12 PROCEDURE — 1126F AMNT PAIN NOTED NONE PRSNT: CPT | Mod: CPTII,S$GLB,,

## 2024-04-12 PROCEDURE — 1160F RVW MEDS BY RX/DR IN RCRD: CPT | Mod: CPTII,S$GLB,,

## 2024-04-12 PROCEDURE — 1159F MED LIST DOCD IN RCRD: CPT | Mod: CPTII,S$GLB,,

## 2024-04-12 PROCEDURE — G0439 PPPS, SUBSEQ VISIT: HCPCS | Mod: S$GLB,,,

## 2024-04-12 NOTE — PATIENT INSTRUCTIONS
Counseling and Referral of Other Preventative  (Italic type indicates deductible and co-insurance are waived)    Patient Name: Ernestine Luna  Today's Date: 4/12/2024    Health Maintenance       Date Due Completion Date    Shingles Vaccine (1 of 2) 02/12/2016 12/18/2015    COVID-19 Vaccine (7 - 2023-24 season) 10/20/2023 8/25/2023    DEXA Scan 05/19/2024 5/19/2022    TETANUS VACCINE 06/09/2026 6/9/2016    Lipid Panel 11/08/2028 11/8/2023        No orders of the defined types were placed in this encounter.    The following information is provided to all patients.  This information is to help you find resources for any of the problems found today that may be affecting your health:                  Living healthy guide: www.[facility].[place].gov      Understanding Diabetes: www.diabetes.org      Eating healthy: www.cdc.gov/healthyweight      [facility] home safety checklist: www.cdc.gov/steadi/patient.html      Agency on Aging: www.goea.[place].gov      Alcoholics anonymous (AA): www.aa.org      Physical Activity: www.johnie.nih.gov/vx7tffw      Tobacco use: www.quitwithusla.org

## 2024-04-12 NOTE — PROGRESS NOTES
"Ernestine Luna presented for an initial Medicare AWV today. The following components were reviewed and updated:    Medical history  Family History  Social history  Allergies and Current Medications  Health Risk Assessment  Health Maintenance  Care Team    **See Completed Assessments for Annual Wellness visit with in the encounter summary    The following assessments were completed:  Depression Screening  Cognitive function Screening: Declines  Timed Get Up Test: Deferred, impaired mobility  Whisper Test      Opioid documentation:      Patient does not have a current opioid prescription.          Vitals:    04/12/24 1128   BP: 134/72   BP Location: Left arm   Patient Position: Sitting   Pulse: 64   Resp: 16   Temp: 97 °F (36.1 °C)   SpO2: 98%   Weight: 62.6 kg (138 lb 0.1 oz)   Height: 5' 3" (1.6 m)     Body mass index is 24.45 kg/m².       Physical Exam  Vitals reviewed.   Constitutional:       General: She is not in acute distress.     Appearance: Normal appearance.   HENT:      Head: Normocephalic.   Cardiovascular:      Rate and Rhythm: Normal rate and regular rhythm.      Pulses: Normal pulses.      Heart sounds: Normal heart sounds.   Pulmonary:      Effort: Pulmonary effort is normal. No respiratory distress.      Breath sounds: Normal breath sounds. No wheezing.   Abdominal:      General: Bowel sounds are normal.      Tenderness: There is no abdominal tenderness.   Musculoskeletal:         General: Normal range of motion.      Cervical back: Normal range of motion.      Right lower leg: No edema.      Left lower leg: No edema.   Skin:     General: Skin is warm and dry.      Capillary Refill: Capillary refill takes less than 2 seconds.   Neurological:      General: No focal deficit present.      Mental Status: She is alert and oriented to person, place, and time.   Psychiatric:         Mood and Affect: Mood normal. Affect is tearful.         Behavior: Behavior normal.           Diagnoses and health risks " identified today and associated recommendations/orders:    1. Encounter for preventive health examination  Assessments completed.  HM recommendations reviewed.  F/u with PCP as instructed.      2. Calcified granuloma of lung - CT 2023  Chronic, stable on current regimen. Followed by PCP.     3. Atherosclerosis of aorta  Chronic, stable on current regimen. Not on statin. Followed by PCP.     4. Aortic aneurysm without rupture, unspecified portion of aorta  Chronic, stable on current regimen. Followed by PCP.     5. Invasive ductal carcinoma of breast, left  Chronic, stable on current regimen. Followed by Hem/Onc.     6. Essential hypertension  Chronic, stable on current Amlodipine, Atenolol, Lotensin regimen. Followed by PCP.     7. Urinary incontinence, urge  Chronic, stable on current regimen. Followed by PCP.     8. Osteoporosis, unspecified osteoporosis type, unspecified pathological fracture presence  Chronic, stable on current Fosamax regimen. Followed by PCP.     9. Dependence on other enabling machines and devices  Uses cane as needed.       Provided Ernestine with a 5-10 year written screening schedule and personal prevention plan. Recommendations were developed using the USPSTF age appropriate recommendations. Education, counseling, and referrals were provided as needed.  After Visit Summary printed and given to patient which includes a list of additional screenings\tests needed.    Follow up in about 1 year (around 4/12/2025) for Medicare AWV.      Jemma Hilton NP    I offered to discuss advanced care planning, including how to pick a person who would make decisions for you if you were unable to make them for yourself, called a health care power of , and what kind of decisions you might make such as use of life sustaining treatments such as ventilators and tube feeding when faced with a life limiting illness recorded on a living will that they will need to know. (How you want to be cared for as  you near the end of your natural life)     X Patient is interested in learning more about how to make advanced directives.  I provided them paperwork and offered to discuss this with them.

## 2024-04-17 ENCOUNTER — TELEPHONE (OUTPATIENT)
Dept: INTERNAL MEDICINE | Facility: CLINIC | Age: 89
End: 2024-04-17
Payer: MEDICARE

## 2024-04-17 NOTE — PROGRESS NOTES
MEDICAL HISTORY:  Hypertension.  Mitral regurgitation based on 2D echo  Aortic atherosclerosis based on imaging  Breast cancer left breast, invasive ductal carcinoma, status post left partial mastectomy and re-excision left partial mastectomy with positive margins, followed by radiation therapy, diagnosed January 2023  Breast cancer of the right breast, status post lumpectomy and had been on tamoxifen.  Osteoporosis  Osteoarthritis of the knees and shoulder.  Hysterectomy with oophorectomy.  Carpal tunnel syndrome release.  Bilateral knee arthroscopic surgery.  Bunionectomy.      SOCIAL HISTORY:  Tobacco use and alcohol use, none.        MEDICATIONS:   Atenolol 25 mg daily.  Amlodipine 10 mg daily.  Benazepril 40 mg daily.  Vitamin D.  Omega-3.  Calcium.  Multivitamin      89-year-old female     The reason for that is visit, she reports just being tired.  It appears that she is upset that she can be physically active as she was before mainly because of pain involving the left foot from calluses involving the 5th and 4th toe.  She sees Podiatry every few months for shaving.  It was painful to walk.  She does not report out of breath.  That has no chest pain or abdominal pain.  Regular bowel function.  No difficulty urinating although nocturia x 3    It was noted that she was admit admitted in March due to suicidal ideation.  At the time she was taking trazodone trospium alendronate donepezil.  In March she would labs of chemistry CBC TSH, all fine.  Just very minimal anemia    Examination   Weight 133 lb   Pulse 64   Blood pressure 148/72  Chest clear breath sounds  Heart regular rate rhythm no murmurs gallops  Abdominal exam nontender no masses  1+ dorsalis pedal pulses  Extremities no edema  Valuation left foot the 5th and 4th toe extended up with when compared to the other toes.  That is callus formation in between the digits it hurts to touch    Impression  Painful callus   Hypertension   Adjustment disorder with  depressed mood    Plan  Recommend put in gauze in between the digits to see if this helps as well as the separate the toes.  Proper arch supports discussed  At night can try that a compound W over the callus  If she still feels down because of the Can consider low-dose antidepressant such as Zoloft

## 2024-04-17 NOTE — TELEPHONE ENCOUNTER
----- Message from Ishmael Villegas sent at 4/17/2024 12:24 PM CDT -----  Contact: [phone]  1MEDICALADVICE     Patient is calling for Medical Advice regarding:No energy     How long has patient had these symptoms: 6 weeks or longer     Pharmacy name and phone#:  [id number] - [place], [place] - [address] AT [facility] [address]  [address]  [place] [address]  Phone: [phone] Fax: [phone]      Would like response via [employer]:  call back     Comments:    Pt said she has been calling about this and nobody has called her back she wants a call back today

## 2024-04-18 ENCOUNTER — OFFICE VISIT (OUTPATIENT)
Dept: INTERNAL MEDICINE | Facility: CLINIC | Age: 89
End: 2024-04-18
Payer: MEDICARE

## 2024-04-18 VITALS — OXYGEN SATURATION: 98 % | BODY MASS INDEX: 23.68 KG/M2 | HEIGHT: 63 IN | HEART RATE: 64 BPM | WEIGHT: 133.63 LBS

## 2024-04-18 DIAGNOSIS — L84 CALLUS BETWEEN TOES: Primary | ICD-10-CM

## 2024-04-18 DIAGNOSIS — I10 ESSENTIAL HYPERTENSION: ICD-10-CM

## 2024-04-18 DIAGNOSIS — F43.21 ADJUSTMENT DISORDER WITH DEPRESSED MOOD: ICD-10-CM

## 2024-04-18 PROCEDURE — 1125F AMNT PAIN NOTED PAIN PRSNT: CPT | Mod: HCNC,CPTII,S$GLB, | Performed by: INTERNAL MEDICINE

## 2024-04-18 PROCEDURE — 1160F RVW MEDS BY RX/DR IN RCRD: CPT | Mod: HCNC,CPTII,S$GLB, | Performed by: INTERNAL MEDICINE

## 2024-04-18 PROCEDURE — 3288F FALL RISK ASSESSMENT DOCD: CPT | Mod: HCNC,CPTII,S$GLB, | Performed by: INTERNAL MEDICINE

## 2024-04-18 PROCEDURE — 99214 OFFICE O/P EST MOD 30 MIN: CPT | Mod: HCNC,S$GLB,, | Performed by: INTERNAL MEDICINE

## 2024-04-18 PROCEDURE — 1159F MED LIST DOCD IN RCRD: CPT | Mod: HCNC,CPTII,S$GLB, | Performed by: INTERNAL MEDICINE

## 2024-04-18 PROCEDURE — 99999 PR PBB SHADOW E&M-EST. PATIENT-LVL III: CPT | Mod: PBBFAC,HCNC,, | Performed by: INTERNAL MEDICINE

## 2024-04-18 PROCEDURE — 1101F PT FALLS ASSESS-DOCD LE1/YR: CPT | Mod: HCNC,CPTII,S$GLB, | Performed by: INTERNAL MEDICINE

## 2024-04-18 RX ORDER — DONEPEZIL HYDROCHLORIDE 5 MG/1
5 TABLET, FILM COATED ORAL
Qty: 90 TABLET | OUTPATIENT
Start: 2024-04-18

## 2024-04-19 ENCOUNTER — TELEPHONE (OUTPATIENT)
Dept: PODIATRY | Facility: CLINIC | Age: 89
End: 2024-04-19
Payer: MEDICARE

## 2024-04-19 NOTE — TELEPHONE ENCOUNTER
Spoke with patient to confirm her appointment on 04/22/2024 with Dr. Pappas(Podiatry), patient has confirmed appt. on 04/19/2024

## 2024-04-22 ENCOUNTER — OFFICE VISIT (OUTPATIENT)
Dept: PODIATRY | Facility: CLINIC | Age: 89
End: 2024-04-22
Payer: MEDICARE

## 2024-04-22 VITALS
BODY MASS INDEX: 23.68 KG/M2 | DIASTOLIC BLOOD PRESSURE: 81 MMHG | SYSTOLIC BLOOD PRESSURE: 129 MMHG | HEART RATE: 76 BPM | HEIGHT: 63 IN | WEIGHT: 133.63 LBS

## 2024-04-22 DIAGNOSIS — L84 CORNS/CALLOSITIES: ICD-10-CM

## 2024-04-22 DIAGNOSIS — B35.1 ONYCHOMYCOSIS DUE TO DERMATOPHYTE: Primary | ICD-10-CM

## 2024-04-22 PROCEDURE — 17999 UNLISTD PX SKN MUC MEMB SUBQ: CPT | Mod: CSM,HCNC,S$GLB, | Performed by: PODIATRIST

## 2024-04-22 PROCEDURE — 99499 UNLISTED E&M SERVICE: CPT | Mod: HCNC,,, | Performed by: PODIATRIST

## 2024-04-22 PROCEDURE — 99999 PR PBB SHADOW E&M-EST. PATIENT-LVL III: CPT | Mod: PBBFAC,HCNC,, | Performed by: PODIATRIST

## 2024-04-22 RX ORDER — DONEPEZIL HYDROCHLORIDE 5 MG/1
5 TABLET, FILM COATED ORAL
COMMUNITY
Start: 2024-04-18

## 2024-05-06 ENCOUNTER — TELEPHONE (OUTPATIENT)
Dept: INTERNAL MEDICINE | Facility: CLINIC | Age: 89
End: 2024-05-06
Payer: MEDICARE

## 2024-05-06 NOTE — TELEPHONE ENCOUNTER
Pt was recently seen on 04/18/2024 and having neck pain and asking if you can send in a prescription.    Please review and respond,  Thank You.

## 2024-05-06 NOTE — TELEPHONE ENCOUNTER
----- Message from Rafaela Ravi sent at 5/6/2024 11:41 AM CDT -----  Contact: [phone]  Would like to receive medical advice.    Symptoms (please be specific):  neck pain     How long has the patient had these symptoms: this morning    Any drug allergies (copy/paste from chart): Acetaminophen, Aspirin, Sulfa (sulfonamide Antibiotics), Prednisone.       Pharmacy name/number (copy/paste from chart):    [id number] - [facility] [address] AT [facility] [address]  [address]  [facility] [address]  Phone: [phone] Fax: [phone]    Would they like a call back or a response via [facility]:  call    Additional information:  please call to advise

## 2024-05-20 ENCOUNTER — TELEPHONE (OUTPATIENT)
Dept: PODIATRY | Facility: CLINIC | Age: 89
End: 2024-05-20
Payer: MEDICARE

## 2024-05-20 ENCOUNTER — TELEPHONE (OUTPATIENT)
Dept: INTERNAL MEDICINE | Facility: CLINIC | Age: 89
End: 2024-05-20
Payer: MEDICARE

## 2024-05-20 NOTE — TELEPHONE ENCOUNTER
----- Message from Simran Telles MA sent at 5/17/2024  1:20 PM CDT -----  Contact: [phone]    ----- Message -----  From: Ishmael Villegas  Sent: 5/17/2024  12:36 PM CDT  To: Gilberto ISRAEL Staff    1MEDICALADVICE     Patient is calling for Medical Advice regarding: feeling tired no energy and said she has been calling and no one has try to call her back she wants a call back today      How long has patient had these symptoms:    Pharmacy name and phone#:  [id number] - [place], [place] - [address] AT [facility] [address]  [address]  [place] [facility] [address]  Phone: [phone] Fax: [phone]      Would like response via [employer]:  call back     Comments:    Pt is very mad

## 2024-05-20 NOTE — TELEPHONE ENCOUNTER
Unable to leave vm for pt in regards to r/s 5/23 appt due to provider not being in clinic.  full.     Spoke with daughter Denisse. She will giver her mother the message with the number [phone] to call us to get r/s. Pt daughter verbalized understanding.

## 2024-05-20 NOTE — TELEPHONE ENCOUNTER
Attempted to call pt in regards to r/s 5/23 appt due to provider not being in clinic. No answer and vm is not set up.

## 2024-05-24 ENCOUNTER — TELEPHONE (OUTPATIENT)
Dept: INTERNAL MEDICINE | Facility: CLINIC | Age: 89
End: 2024-05-24
Payer: MEDICARE

## 2024-05-24 NOTE — TELEPHONE ENCOUNTER
----- Message from Ila Randolph sent at 5/24/2024  2:55 PM CDT -----  Contact: [phone]@[email or web address]  Good afternoon patient would like a call back to discuss a med that she had some questions about. Patient wants to see if she can get the med and would it help with both of her ankles swelling. Please call patient to advise  [phone]

## 2024-05-24 NOTE — TELEPHONE ENCOUNTER
Spoke with pt, she want to know what do you think about her starting the Keto diet. Pt states that she has a big appetite and does not want to start a program that can affect her medication.    Please review and respond,  Thank You.

## 2024-05-27 ENCOUNTER — TELEPHONE (OUTPATIENT)
Dept: INTERNAL MEDICINE | Facility: CLINIC | Age: 89
End: 2024-05-27
Payer: MEDICARE

## 2024-05-27 NOTE — TELEPHONE ENCOUNTER
----- Message from Marino Jackson sent at 5/27/2024 11:48 AM CDT -----  Regarding: Patient Returning Call  Contact: Lizzie [phone]  Type: Returning a call    Who left a message? Dr. Macias's office    When did the practice call?    Comments:

## 2024-05-30 ENCOUNTER — TELEPHONE (OUTPATIENT)
Dept: INTERNAL MEDICINE | Facility: CLINIC | Age: 89
End: 2024-05-30
Payer: MEDICARE

## 2024-05-30 NOTE — TELEPHONE ENCOUNTER
Spoke with pt got her scheduled for an appointment with Dr. Macias for Monday 6/3 at 2:30.      -pc

## 2024-05-30 NOTE — TELEPHONE ENCOUNTER
----- Message from Marino Jackson sent at 5/30/2024  9:33 AM CDT -----  Regarding: Patient can't stop the hunger  Contact: Ernestine [phone]  1MEDICALADVICE     Patient is calling for Medical Advice regarding: Patient called to ask for some sort of hunger repressent. She says she can't stop eating and wants to know what to do about it. Call back at number provided in contact info    How long has patient had these symptoms:    Pharmacy name and phone#:    Would like response via [employer]:  ##    Comments:

## 2024-06-01 NOTE — PROGRESS NOTES
MEDICAL HISTORY:  Hypertension.  Mitral regurgitation based on 2D echo  Aortic atherosclerosis based on imaging  Breast cancer left breast, invasive ductal carcinoma, status post left partial mastectomy and re-excision left partial mastectomy with positive margins, followed by radiation therapy, diagnosed January 2023  Breast cancer of the right breast, status post lumpectomy and had been on tamoxifen.  Osteoporosis  Osteoarthritis of the knees and shoulder.  Hysterectomy with oophorectomy.  Carpal tunnel syndrome release.  Bilateral knee arthroscopic surgery.  Bunionectomy.      SOCIAL HISTORY:  Tobacco use and alcohol use, none.        MEDICATIONS:   Atenolol 25 mg daily.  Amlodipine 10 mg daily.  Benazepril 40 mg daily.  Vitamin D.  Omega-3.  Calcium.  Multivitamin      89-year-old female     Reason for his visit is that she feels appetite has gotten out of control.  She was constantly hungry.  Snacking throughout the day.  That has been weight gain.  She admits that she can not exercise or walk as much because of painful calluses involving the left foot which is on the side of the big toe in on the side of the 4th toe between the 4th and 5th toe.  She gets periodic podiatry appointment to have them shaved off which gives temporary relief.    Last she was year 2003 she had weight of 126 128.  Lb.  Today is 137.  When she was seen last weight was  133 lb    She reports no chest pain palpitations shortness a breath.  She was not having abdominal pain.  She was reporting regular bowel function      Examination   Weight 137 lb   BMI 25.12  Blood pressure 128/72  Chest clear breath sounds   Heart regular rate rhythm   Abdominal exam soft nontender  Neck no thyromegaly no masses   1+ dorsalis pedal pulses   Extremities 1+ pedal pretibial edema    Hallux valgus deformity both feet with bunion.  Callus is seen over the left 1st MTP  and  the 4th phalangeal    Impression   Weight gain  Increase  appetite  Hypertension  Painful callus    Plan   CBC basic metabolic profile TSH free T4 hemoglobin A1c  Recommend small more frequent meals in increase water fluid intake   use of over-the-counter Voltaren ointment over the callus is    Addendum   Test results reviewed.  All look good  Letter was sent regarding dietary habits at present no particular medicine recommend regarding weight management and appetite.  If it becomes more of a problem can consider the medicine Topamax

## 2024-06-03 ENCOUNTER — LAB VISIT (OUTPATIENT)
Dept: LAB | Facility: HOSPITAL | Age: 89
End: 2024-06-03
Attending: INTERNAL MEDICINE
Payer: MEDICARE

## 2024-06-03 ENCOUNTER — OFFICE VISIT (OUTPATIENT)
Dept: INTERNAL MEDICINE | Facility: CLINIC | Age: 89
End: 2024-06-03
Payer: MEDICARE

## 2024-06-03 VITALS
BODY MASS INDEX: 25.28 KG/M2 | HEART RATE: 79 BPM | WEIGHT: 137.38 LBS | SYSTOLIC BLOOD PRESSURE: 122 MMHG | DIASTOLIC BLOOD PRESSURE: 62 MMHG | OXYGEN SATURATION: 98 % | HEIGHT: 62 IN

## 2024-06-03 DIAGNOSIS — R63.5 WEIGHT GAIN: Primary | ICD-10-CM

## 2024-06-03 DIAGNOSIS — R63.2 APPETITE INCREASE: ICD-10-CM

## 2024-06-03 DIAGNOSIS — L84 FOOT CALLUS: ICD-10-CM

## 2024-06-03 DIAGNOSIS — R63.5 WEIGHT GAIN: ICD-10-CM

## 2024-06-03 DIAGNOSIS — I10 PRIMARY HYPERTENSION: ICD-10-CM

## 2024-06-03 LAB
ANION GAP SERPL CALC-SCNC: 7 MMOL/L (ref 8–16)
BASOPHILS # BLD AUTO: 0.02 K/UL (ref 0–0.2)
BASOPHILS NFR BLD: 0.3 % (ref 0–1.9)
BUN SERPL-MCNC: 36 MG/DL (ref 10–30)
CALCIUM SERPL-MCNC: 9.7 MG/DL (ref 8.7–10.5)
CHLORIDE SERPL-SCNC: 105 MMOL/L (ref 95–110)
CO2 SERPL-SCNC: 28 MMOL/L (ref 23–29)
CREAT SERPL-MCNC: 0.7 MG/DL (ref 0.5–1.4)
DIFFERENTIAL METHOD BLD: ABNORMAL
EOSINOPHIL # BLD AUTO: 0.1 K/UL (ref 0–0.5)
EOSINOPHIL NFR BLD: 2.3 % (ref 0–8)
ERYTHROCYTE [DISTWIDTH] IN BLOOD BY AUTOMATED COUNT: 13.7 % (ref 11.5–14.5)
EST. GFR  (NO RACE VARIABLE): >60 ML/MIN/1.73 M^2
ESTIMATED AVG GLUCOSE: 105 MG/DL (ref 68–131)
GLUCOSE SERPL-MCNC: 82 MG/DL (ref 70–110)
HBA1C MFR BLD: 5.3 % (ref 4–5.6)
HCT VFR BLD AUTO: 38.3 % (ref 37–48.5)
HGB BLD-MCNC: 12.7 G/DL (ref 12–16)
IMM GRANULOCYTES # BLD AUTO: 0.02 K/UL (ref 0–0.04)
IMM GRANULOCYTES NFR BLD AUTO: 0.3 % (ref 0–0.5)
LYMPHOCYTES # BLD AUTO: 2.2 K/UL (ref 1–4.8)
LYMPHOCYTES NFR BLD: 36.1 % (ref 18–48)
MCH RBC QN AUTO: 31.8 PG (ref 27–31)
MCHC RBC AUTO-ENTMCNC: 33.2 G/DL (ref 32–36)
MCV RBC AUTO: 96 FL (ref 82–98)
MONOCYTES # BLD AUTO: 0.5 K/UL (ref 0.3–1)
MONOCYTES NFR BLD: 8.9 % (ref 4–15)
NEUTROPHILS # BLD AUTO: 3.2 K/UL (ref 1.8–7.7)
NEUTROPHILS NFR BLD: 52.1 % (ref 38–73)
NRBC BLD-RTO: 0 /100 WBC
PLATELET # BLD AUTO: 210 K/UL (ref 150–450)
PMV BLD AUTO: 10.4 FL (ref 9.2–12.9)
POTASSIUM SERPL-SCNC: 4.6 MMOL/L (ref 3.5–5.1)
RBC # BLD AUTO: 3.99 M/UL (ref 4–5.4)
SODIUM SERPL-SCNC: 140 MMOL/L (ref 136–145)
T4 FREE SERPL-MCNC: 0.94 NG/DL (ref 0.71–1.51)
TSH SERPL DL<=0.005 MIU/L-ACNC: 0.46 UIU/ML (ref 0.4–4)
WBC # BLD AUTO: 6.06 K/UL (ref 3.9–12.7)

## 2024-06-03 PROCEDURE — 80048 BASIC METABOLIC PNL TOTAL CA: CPT | Mod: HCNC | Performed by: INTERNAL MEDICINE

## 2024-06-03 PROCEDURE — 84443 ASSAY THYROID STIM HORMONE: CPT | Mod: HCNC | Performed by: INTERNAL MEDICINE

## 2024-06-03 PROCEDURE — 1159F MED LIST DOCD IN RCRD: CPT | Mod: HCNC,CPTII,S$GLB, | Performed by: INTERNAL MEDICINE

## 2024-06-03 PROCEDURE — 1101F PT FALLS ASSESS-DOCD LE1/YR: CPT | Mod: HCNC,CPTII,S$GLB, | Performed by: INTERNAL MEDICINE

## 2024-06-03 PROCEDURE — 1160F RVW MEDS BY RX/DR IN RCRD: CPT | Mod: HCNC,CPTII,S$GLB, | Performed by: INTERNAL MEDICINE

## 2024-06-03 PROCEDURE — 3288F FALL RISK ASSESSMENT DOCD: CPT | Mod: HCNC,CPTII,S$GLB, | Performed by: INTERNAL MEDICINE

## 2024-06-03 PROCEDURE — 99214 OFFICE O/P EST MOD 30 MIN: CPT | Mod: HCNC,S$GLB,, | Performed by: INTERNAL MEDICINE

## 2024-06-03 PROCEDURE — 36415 COLL VENOUS BLD VENIPUNCTURE: CPT | Mod: HCNC | Performed by: INTERNAL MEDICINE

## 2024-06-03 PROCEDURE — 99999 PR PBB SHADOW E&M-EST. PATIENT-LVL III: CPT | Mod: PBBFAC,HCNC,, | Performed by: INTERNAL MEDICINE

## 2024-06-03 PROCEDURE — 1125F AMNT PAIN NOTED PAIN PRSNT: CPT | Mod: HCNC,CPTII,S$GLB, | Performed by: INTERNAL MEDICINE

## 2024-06-03 PROCEDURE — 83036 HEMOGLOBIN GLYCOSYLATED A1C: CPT | Mod: GA,HCNC | Performed by: INTERNAL MEDICINE

## 2024-06-03 PROCEDURE — 85025 COMPLETE CBC W/AUTO DIFF WBC: CPT | Mod: HCNC | Performed by: INTERNAL MEDICINE

## 2024-06-03 PROCEDURE — 84439 ASSAY OF FREE THYROXINE: CPT | Mod: HCNC | Performed by: INTERNAL MEDICINE

## 2024-06-04 ENCOUNTER — OFFICE VISIT (OUTPATIENT)
Dept: UROGYNECOLOGY | Facility: CLINIC | Age: 89
End: 2024-06-04
Payer: MEDICARE

## 2024-06-04 VITALS
WEIGHT: 138 LBS | HEIGHT: 62 IN | DIASTOLIC BLOOD PRESSURE: 80 MMHG | BODY MASS INDEX: 25.4 KG/M2 | SYSTOLIC BLOOD PRESSURE: 141 MMHG | HEART RATE: 68 BPM

## 2024-06-04 DIAGNOSIS — N81.10 PROLAPSE OF ANTERIOR VAGINAL WALL: ICD-10-CM

## 2024-06-04 DIAGNOSIS — N95.2 VAGINAL ATROPHY: ICD-10-CM

## 2024-06-04 DIAGNOSIS — N99.3 VAGINAL VAULT PROLAPSE, POSTHYSTERECTOMY: Primary | ICD-10-CM

## 2024-06-04 DIAGNOSIS — N39.41 URINARY INCONTINENCE, URGE: ICD-10-CM

## 2024-06-04 DIAGNOSIS — Z46.89 PESSARY MAINTENANCE: ICD-10-CM

## 2024-06-04 DIAGNOSIS — R35.1 NOCTURIA: ICD-10-CM

## 2024-06-04 PROCEDURE — 1160F RVW MEDS BY RX/DR IN RCRD: CPT | Mod: HCNC,CPTII,S$GLB, | Performed by: NURSE PRACTITIONER

## 2024-06-04 PROCEDURE — 1159F MED LIST DOCD IN RCRD: CPT | Mod: HCNC,CPTII,S$GLB, | Performed by: NURSE PRACTITIONER

## 2024-06-04 PROCEDURE — 99213 OFFICE O/P EST LOW 20 MIN: CPT | Mod: HCNC,S$GLB,, | Performed by: NURSE PRACTITIONER

## 2024-06-04 PROCEDURE — 3288F FALL RISK ASSESSMENT DOCD: CPT | Mod: HCNC,CPTII,S$GLB, | Performed by: NURSE PRACTITIONER

## 2024-06-04 PROCEDURE — 1125F AMNT PAIN NOTED PAIN PRSNT: CPT | Mod: HCNC,CPTII,S$GLB, | Performed by: NURSE PRACTITIONER

## 2024-06-04 PROCEDURE — 1101F PT FALLS ASSESS-DOCD LE1/YR: CPT | Mod: HCNC,CPTII,S$GLB, | Performed by: NURSE PRACTITIONER

## 2024-06-04 PROCEDURE — 99999 PR PBB SHADOW E&M-EST. PATIENT-LVL III: CPT | Mod: PBBFAC,HCNC,, | Performed by: NURSE PRACTITIONER

## 2024-06-04 RX ORDER — TROSPIUM CHLORIDE 20 MG/1
20 TABLET, FILM COATED ORAL 2 TIMES DAILY
Qty: 60 TABLET | Refills: 11 | Status: SHIPPED | OUTPATIENT
Start: 2024-06-04 | End: 2025-06-04

## 2024-06-04 NOTE — PROGRESS NOTES
Urogyn follow up  03/04/2024  .  [facility] - UROGYNECOLOGY  [address] [address]    Ernestine Luna  [id number]  8/13/1929      Ernestine Luna is a 89 y.o. here for a urogyn pessary check.      Last HPI from 10/29/2018  1)  UI:  (--) NELI   (--) UUI (--) pads Daytime frequency: Q 30 minutes- 3 hours.  Nocturia: Yes 3-4/night.   (--) dysuria,  (--) hematuria,  (--) frequent UTIs.  (+) complete bladder emptying.   2)  POP:  Absent.   Symptoms:(--)  .  (--) vaginal bleeding. (--) vaginal discharge. (--) sexually active.  (--) dyspareunia.   (+)  Vaginal dryness.  (--) vaginal estrogen use.   3)  BM:  (--) constipation/straining.  (--) chronic diarrhea. (--) hematochezia.  (--) fecal incontinence.  (--) fecal smearing/urgency.  (--) incomplete evacuation.    4)Pessary:  Denies pain, bleeding or dischage. Using #3 ring with support pessary..    03/27/2021  No /GYN changes.  Still sad because her long-term dog passed away in March.  Is stable, has good support system, no SI/HI.     1)  UI:  (--) NELI   (--) UUI (--) pads Daytime frequency: Q 30 minutes (after BP meds)- 3 hours.  Nocturia: Yes 3-4/night.  Wakes with urge to urinate. Sounds like dog can keep her awake, too.  (--) dysuria,  (--) hematuria,  (--) frequent UTIs.  (+) complete bladder emptying.     2)  POP:  Absent.   Symptoms:(--).  (--) vaginal bleeding. (--) vaginal discharge. (--) sexually active.  (--) dyspareunia.   (+)  Vaginal dryness.  (--) vaginal estrogen use. Using replens 2x/week.  Difficult to squeeze replens applicator. Uses 2x/week.     3)  BM:  (--) constipation/straining.  (--) chronic diarrhea. (--) hematochezia.  (--) fecal incontinence.  (--) fecal smearing/urgency.  (--) incomplete evacuation.      4) Pessary:  Denies pain, bleeding.  Has some scant discharge. Using #3 ring with support pessary.    5) Depression: Patient is still very sad about the passing of her dog in March 2020. She has had the dog stuffed.   She denies SI/HI but cannot wait to see her dog again one day.  She doesn't want to burden her family with her sadness. She feels that she has a good support system in her [employer].      06/23/2021   1)  UI:  (--) NELI   (--) UUI (+) liner pads just in case: Daytime frequency: Q 30 minutes (after BP meds)- 3 hours.  Nocturia: Yes 3/night.  o.  (--) dysuria,  (--) hematuria,  (--) frequent UTIs.  (+) complete bladder emptying.     2)  POP:  Absent with pessary in place.   Symptoms:(--).  (--) vaginal bleeding. (--) vaginal discharge. (--) sexually active.  (--) dyspareunia.   (+)  Vaginal dryness.  (--) vaginal estrogen use. Using replens 2x/week.  Difficult to squeeze replens applicator. Uses 2x/week.     3)  BM:  (--) constipation/straining.  (--) chronic diarrhea. (--) hematochezia.  (--) fecal incontinence.  (--) fecal smearing/urgency.  (--) incomplete evacuation.      4) Pessary:  Denies pain, bleeding.  Denies vaginal discharge. Using #3 ring with support pessary.    5) Depression Feels better since she has had the dog stuffed.  She denies SI/HI but cannot wait to see her dog again one day.  She doesn't want to burden her family with her sadness. She feels that she has a good support system in her [employer].    03/04/2024     1)  UI:  (--) NELI   (--) UUI (+) liner pads just in case: Daytime frequency: Q 2-3 hours  Nocturia: Yes 3-4/night.  Limits fluids several hours prior to prior to bedtme Not taking trospium at this time. (--) dysuria,  (--) hematuria,  (--) frequent UTIs.  (+) complete bladder emptying.     2)  POP:  Absent with pessary in place.   Symptoms:(--).  (--) vaginal bleeding. (--) vaginal discharge. (--) sexually active.  (--) dyspareunia.   (+)  Vaginal dryness.  (--) vaginal estrogen use. Using replens 2x/week.      3)  BM:  (--) constipation/straining.  (--) chronic diarrhea. (--) hematochezia.  (--) fecal incontinence.  (--) fecal smearing/urgency.  (--) incomplete evacuation.      4) Pessary:   Denies pain, bleeding.  Denies vaginal discharge. Using #3 ring with support pessary.      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 Wilkins    COLONOSCOPY N/A 10/1/2019    Procedure: COLONOSCOPY;  Surgeon: Jarad Chavira MD;  Location: [facility] (4TH FLR);  Service: Endoscopy;  Laterality: N/A;  miralax prep (used miralax for last colonoscopy) - ERW    COLONOSCOPY W/ POLYPECTOMY  08/08/2013    RASHEED   06/24/2014    EYE SURGERY      HYSTERECTOMY  age 38    ALISTAIR; ovaries in place    INJECTION FOR SENTINEL NODE IDENTIFICATION Left 2/20/2023    Procedure: INJECTION, FOR SENTINEL NODE IDENTIFICATION-Left;  Surgeon: ROSIBEL Belcher MD;  Location: [facility] OR 2ND FLR;  Service: General;  Laterality: Left;    JOINT REPLACEMENT      bilateral knees    MASTECTOMY, PARTIAL Left 2/20/2023    Procedure: MASTECTOMY, PARTIAL-Left with radiological marker;  Surgeon: ROSIBEL Belcher MD;  Location: [facility]  OR 2ND FLR;  Service: General;  Laterality: Left;    MASTECTOMY, PARTIAL Left 4/18/2023    Procedure: MASTECTOMY, PARTIAL-left re-excision;  Surgeon: ROSIBEL Belcher MD;  Location: [facility] OR 2ND FLR;  Service: General;  Laterality: Left;    SENTINEL LYMPH NODE BIOPSY Left 2/20/2023    Procedure: BIOPSY, LYMPH NODE, SENTINEL-Left;  Surgeon: ROSIBEL Belcher MD;  Location: [facility] OR 2ND FLR;  Service: General;  Laterality: Left;    TOTAL KNEE ARTHROPLASTY      Bilateral       Current Outpatient Medications   Medication Sig    amLODIPine (NORVASC) 10 MG tablet TAKE 1 TABLET(10 MG) BY MOUTH EVERY DAY    atenoloL (TENORMIN) 25 MG tablet TAKE 1 TABLET(25 MG) BY MOUTH EVERY DAY    benazepriL (LOTENSIN) 40 MG tablet TAKE 1 TABLET(40 MG) BY MOUTH EVERY DAY    calcium carbonate (OS-DHRUV) 600 mg calcium (1,500 mg) Tab Take 600 mg by mouth once daily.    MULTIVITAMIN W-MINERALS/LUTEIN (CENTRUM SILVER ORAL) Take 1 tablet by mouth Daily.    omega 3-calcium-vit [phone] mg Cmpk Take by mouth once daily.    vitamin D 1000 units Tab Take 185 mg by mouth once daily.    alendronate (FOSAMAX) 70 MG tablet Take 1 tablet (70 mg total) by mouth every 7 days. (Patient not taking: Reported on 4/18/2024)    donepeziL (ARICEPT) 5 MG tablet Take 5 mg by mouth. (Patient not taking: Reported on 6/4/2024)    trospium (SANCTURA) 20 mg Tab tablet Take 1 tablet (20 mg total) by mouth 2 (two) times daily.     No current facility-administered medications for this visit.     Facility-Administered Medications Ordered in Other Visits   Medication    fentaNYL 50 mcg/mL injection  mcg    midazolam (VERSED) 1 mg/mL injection 0.5-4 mg       ROS:  As per HPI.      Well Woman:  --Pap:denies history of abnormal pap smear-- post hyst; no further needed  --Mammo:12/2023 normal.  L partial mastectomy:   pT1b N0 M0 grade 2 ER + VT + HER2 negative invasive ductal carcinoma of the left breast.--+ margins-- repeat biopsy scheduled  --Colonoscopy:  "10/2019 Diverticulosis in the sigmoid colon.  No further recommended.   --Dexa:06/2016Osteoporosis of the femoral neck. Has not takenTx. Talk with Dr. Ugalde about need for repeat bone density and/or treatment for osteoporosis.     Exam  BP (!) 141/80   Pulse 68   Ht 5' 2" (1.575 m)   Wt 62.6 kg (138 lb 0.1 oz)   LMP  (LMP Unknown)   BMI 25.24 kg/m²   General: alert and oriented, no acute distress  Respiratory: normal respiratory effort  Abd: soft, non-tender, non-distended    L breast swollen--no masses palpable.    Pelvic  Ext. Genitalia: normal external genitalia. Normal bartholin's and skenes glands  Vagina: + atrophy. Normal vaginal mucosa without lesions. No discharge noted.   Non-tender bladder base without palpable mass.  #3 ring with support in place  Cervix: absent  Uterus:  absent   Urethra: no masses or tenderness  Urethral meatus: no lesions, caruncle or prolapse.    Pessary removed without difficulty. Washed with soap and water. Given to patient for pessary holiday    Impression  1. Vaginal vault prolapse, posthysterectomy  trospium (SANCTURA) 20 mg Tab tablet      2. Prolapse of anterior vaginal wall  trospium (SANCTURA) 20 mg Tab tablet      3. Urinary incontinence, urge  trospium (SANCTURA) 20 mg Tab tablet      4. Nocturia  trospium (SANCTURA) 20 mg Tab tablet      5. Pessary maintenance  trospium (SANCTURA) 20 mg Tab tablet      6. Vaginal atrophy  trospium (SANCTURA) 20 mg Tab tablet                We reviewed the above issues and discussed options for short-term versus long-term management of her problems.   Plan:   1. Prolapse: Stage 2 apical prolapse, Stage 3 anterior and posterior vaginal wall prolapse   --Pessary # 3 ring with support  --Daily pelvic floor exercises as assigned, consider Pelvic floor PT in future  --Non surgical options discussed with patient      2. Urge urinary incontinence:  --Empty bladder every 3 hours. Empty well: wait a minute, lean forward on toilet.   --Avoid " dietary irritants (see sheet). Keep diary x 3-5 days to determine your irritants.  --consider PT in future  --URGE:  restart trospium 20 mg twice edailyTakes 2-4 weeks to see if will have effect. For dry mouth: get sour, sugar free lozenge or gum.   --STRESS: Pessary vs. Sling.      3. Incomplete bladder emptying:improved with pessary   --Double void and Crede maneuvers discussed  --Improved with pessary  --will recheck without pessary in place    4. Vaginal atrophy (dryness):  Use KY jelly, astroglide, or other water-based lube quarter size with your finger twice weekly    5. Hemorrhoid  --not inflammed at this time  --rectal 12/2020 NEG    6. Nocturia (nighttime urination): stop fluids 2 hours before bed. No water by the bed. If have leg swelling:  Elevate feet above chest x 1 hour before bed to get excess fluid off.  Can also use support hose (knee highs).    --trospium 20 mg twice daily    7.  RTC 6 weeks for pvr    I spent a total of 20 minutes on the day of the visit.    This includes face to face time and non-face to face time preparing to see the patient (eg, review of tests), obtaining and/or reviewing separately obtained history, documenting clinical information in the electronic or other health record, independently interpreting results and communicating results to the patient/family/caregiver, or care coordinator.     Kristine Sequeira, MAEGAN-BC   Division of Female Pelvic Medicine and Reconstructive Surgery  Department of Obstetrics & Gynecology

## 2024-06-04 NOTE — PATIENT INSTRUCTIONS
1. Prolapse: Stage 2 apical prolapse, Stage 3 anterior and posterior vaginal wall prolapse   --Pessary # 3 ring with support  --Daily pelvic floor exercises as assigned, consider Pelvic floor PT in future  --Non surgical options discussed with patient      2. Urge urinary incontinence:  --Empty bladder every 3 hours. Empty well: wait a minute, lean forward on toilet.   --Avoid dietary irritants (see sheet). Keep diary x 3-5 days to determine your irritants.  --consider PT in future  --URGE:  restart trospium 20 mg twice edailyTakes 2-4 weeks to see if will have effect. For dry mouth: get sour, sugar free lozenge or gum.   --STRESS: Pessary vs. Sling.      3. Incomplete bladder emptying:improved with pessary   --Double void and Crede maneuvers discussed  --Improved with pessary  --will recheck without pessary in place    4. Vaginal atrophy (dryness):  Use KY jelly, astroglide, or other water-based lube quarter size with your finger twice weekly    5. Hemorrhoid  --not inflammed at this time  --rectal 12/2020 NEG    6. Nocturia (nighttime urination): stop fluids 2 hours before bed. No water by the bed. If have leg swelling:  Elevate feet above chest x 1 hour before bed to get excess fluid off.  Can also use support hose (knee highs).    --trospium 20 mg twice daily    7.  RTC 6 weeks for pvr

## 2024-06-07 ENCOUNTER — TELEPHONE (OUTPATIENT)
Dept: INTERNAL MEDICINE | Facility: CLINIC | Age: 89
End: 2024-06-07
Payer: MEDICARE

## 2024-06-07 NOTE — TELEPHONE ENCOUNTER
----- Message from Marino Jackson sent at 6/7/2024  2:00 PM CDT -----  Regarding: Patient needs a new bra  Contact: Ernestine [phone]  1MEDICALADVICE     Patient is calling for Medical Advice regarding: Patient is finding her sports bra a challenge and would like alternative suggestions for a new bra. She says she had an operation that removed some of her breast and was recommended to use a sports bra. She says she uses a shoe horn to zip it up and it is tiring for her. She is a 89 year old patient who lives alone and would like an alternative to the current bra she is using. Please call back patient to discuss, preferably a nurse who uses a bra.    How long has patient had these symptoms:    Pharmacy name and phone#:    Would like response via [facility]:  Call    Comments:

## 2024-06-10 ENCOUNTER — OFFICE VISIT (OUTPATIENT)
Dept: PODIATRY | Facility: CLINIC | Age: 89
End: 2024-06-10
Payer: MEDICARE

## 2024-06-10 ENCOUNTER — TELEPHONE (OUTPATIENT)
Dept: INTERNAL MEDICINE | Facility: CLINIC | Age: 89
End: 2024-06-10
Payer: MEDICARE

## 2024-06-10 VITALS
HEART RATE: 67 BPM | WEIGHT: 138 LBS | SYSTOLIC BLOOD PRESSURE: 153 MMHG | HEIGHT: 62 IN | DIASTOLIC BLOOD PRESSURE: 74 MMHG | BODY MASS INDEX: 25.4 KG/M2

## 2024-06-10 DIAGNOSIS — L84 CORNS/CALLOSITIES: Primary | ICD-10-CM

## 2024-06-10 DIAGNOSIS — B35.1 ONYCHOMYCOSIS DUE TO DERMATOPHYTE: ICD-10-CM

## 2024-06-10 PROCEDURE — 99499 UNLISTED E&M SERVICE: CPT | Mod: HCNC,,, | Performed by: PODIATRIST

## 2024-06-10 PROCEDURE — 17999 UNLISTD PX SKN MUC MEMB SUBQ: CPT | Mod: CSM,HCNC,S$GLB, | Performed by: PODIATRIST

## 2024-06-10 PROCEDURE — 99999 PR PBB SHADOW E&M-EST. PATIENT-LVL III: CPT | Mod: PBBFAC,HCNC,, | Performed by: PODIATRIST

## 2024-06-17 ENCOUNTER — TELEPHONE (OUTPATIENT)
Dept: INTERNAL MEDICINE | Facility: CLINIC | Age: 89
End: 2024-06-17
Payer: MEDICARE

## 2024-06-17 RX ORDER — TOPIRAMATE 25 MG/1
25 TABLET ORAL 2 TIMES DAILY
Qty: 60 TABLET | Refills: 0 | Status: SHIPPED | OUTPATIENT
Start: 2024-06-17

## 2024-06-17 NOTE — PROGRESS NOTES
Internal medicine note.  The test results look fine.    She was very emphatic that her appetite is out of control in gaining weight.  She would like to be on medication to help this out.  Will initiate Topamax 25 mg once a day to twice a day.  Do not feel that phentermine it was appropriate.    On labs that has no diabetes or pre diabetes

## 2024-06-17 NOTE — TELEPHONE ENCOUNTER
----- Message from Jennifer Nava sent at 6/17/2024  1:24 PM CDT -----  Contact: [phone]  5th message    Per pt, she stays hungry and can not control her appetite. Pt states that she has called several times in regard to having something called in to control her appetite. Pt is requesting a callback to be advised on today.     Per pt, please call she is upset that no one has reached out as of yet.                 Thank you

## 2024-06-18 ENCOUNTER — TELEPHONE (OUTPATIENT)
Dept: INTERNAL MEDICINE | Facility: CLINIC | Age: 89
End: 2024-06-18
Payer: MEDICARE

## 2024-06-18 NOTE — TELEPHONE ENCOUNTER
----- Message from Shannon Paredes sent at 6/18/2024 10:25 AM CDT -----  Contact: Pt [phone]  Would like to receive medical advice.  Would they like a call back or a response via [facility]:  Call Back  Additional information:      The pt is calling to speak about her topiramate (TOPAMAX) 25 MG tablet  prescription. When she picked it up she read about it and says it's for seizers and headaches and not for appetite control

## 2024-06-27 ENCOUNTER — TELEPHONE (OUTPATIENT)
Dept: INTERNAL MEDICINE | Facility: CLINIC | Age: 89
End: 2024-06-27
Payer: MEDICARE

## 2024-06-27 NOTE — TELEPHONE ENCOUNTER
Spoke with pt, she need the letter to have for [employer] due to accumulation of tickets about the way she has to park her vehicle in front of her residence. Pt stated that the city started giving tickets in her neighborhood, pt report that she can not afford to keep paying and she trying is to get trying to get the reserved handicap parking in front of her door.    I printed out a copy of the paperwork needed with a letter that include a form the pt's Physician need to review and complete. I will bring over to your area and leave so that when you have completed and signed the letters I can mail all paperwork to the pt.  Verified home address to mail.    Please review and respond,  Thank You.

## 2024-06-27 NOTE — TELEPHONE ENCOUNTER
----- Message from Ila Randolph sent at 6/27/2024  3:00 PM CDT -----  Contact: [phone]@patient  Good afternoon patient would like a call back to discuss getting a letter from the doc stating that she can not walk fast and needs help with holding on to something getting out of her car. Please call patient  advise [phone]

## 2024-06-28 ENCOUNTER — TELEPHONE (OUTPATIENT)
Dept: INTERNAL MEDICINE | Facility: CLINIC | Age: 89
End: 2024-06-28
Payer: MEDICARE

## 2024-06-28 NOTE — TELEPHONE ENCOUNTER
----- Message from Marino Jackson sent at 6/28/2024  2:31 PM CDT -----  Regarding: Low Energy  Contact: Pt [phone]  1MEDICALADVICE     Patient is calling for Medical Advice regarding: Pt called and wants to know what she can do to increase her energy. She is tired all the time and says her energy is down to nothing.     How long has patient had these symptoms:    Pharmacy name and phone#:    [id number] - [place] - [address] [facility] [address]  [address]  [facility] [address]  Phone: [phone] Fax: [phone]      Patient wants a call back or thru [facility]:    Comments:

## 2024-06-28 NOTE — TELEPHONE ENCOUNTER
Pt called and wants to know what she can do to increase her energy. She is tired all the time and says her energy is down to nothing.     Please review and respond,  Thank You.

## 2024-07-05 ENCOUNTER — TELEPHONE (OUTPATIENT)
Dept: INTERNAL MEDICINE | Facility: CLINIC | Age: 89
End: 2024-07-05
Payer: MEDICARE

## 2024-07-05 NOTE — TELEPHONE ENCOUNTER
Pt called stated she called last week for something to give her energy. She states she lives by herself and needs something to keep her going.    -pc

## 2024-07-06 ENCOUNTER — DOCUMENTATION ONLY (OUTPATIENT)
Dept: INTERNAL MEDICINE | Facility: CLINIC | Age: 89
End: 2024-07-06
Payer: MEDICARE

## 2024-07-06 RX ORDER — TRAZODONE HYDROCHLORIDE 50 MG/1
50 TABLET ORAL NIGHTLY
Qty: 7 TABLET | Refills: 0 | Status: SHIPPED | OUTPATIENT
Start: 2024-07-06 | End: 2024-07-13

## 2024-07-06 NOTE — PROGRESS NOTES
Internal medicine note    Patient call it in regard to having diminished energy.  The only thing that is new is that on  Topamax was sent in his help with the appetite.  It was recommended to put a hold or stop that medication.  She further states that she was thinking about her  who is  in his having trouble with sleeping.  Because of such will give a trial trazodone 50 mg at bedtime for the next 7 days

## 2024-07-08 ENCOUNTER — TELEPHONE (OUTPATIENT)
Dept: INTERNAL MEDICINE | Facility: CLINIC | Age: 89
End: 2024-07-08
Payer: MEDICARE

## 2024-07-08 NOTE — TELEPHONE ENCOUNTER
----- Message from Ila Randolph sent at 7/5/2024  4:38 PM CDT -----  Contact: [phone]@patient  Patient is returning a phone call.yes     Who left a message for the patient: Allyssa Coates MA    Does patient know what this is regarding:      Would you like a call back, or a response through your [facility] portal?:   call back     Comments:

## 2024-07-17 ENCOUNTER — OFFICE VISIT (OUTPATIENT)
Dept: PODIATRY | Facility: CLINIC | Age: 89
End: 2024-07-17
Payer: MEDICARE

## 2024-07-17 DIAGNOSIS — L60.3 ONYCHODYSTROPHY: Primary | ICD-10-CM

## 2024-07-17 PROCEDURE — 17999 UNLISTD PX SKN MUC MEMB SUBQ: CPT | Mod: CSM,HCNC,S$GLB, | Performed by: PODIATRIST

## 2024-07-17 PROCEDURE — 99499 UNLISTED E&M SERVICE: CPT | Mod: HCNC,,, | Performed by: PODIATRIST

## 2024-07-17 PROCEDURE — 99999 PR PBB SHADOW E&M-EST. PATIENT-LVL II: CPT | Mod: PBBFAC,HCNC,, | Performed by: PODIATRIST

## 2024-07-17 NOTE — PROGRESS NOTES
.Patient presents to the clinic for non-covered routine foot care. Patient is not a high risk foot care patient. Patient understands this is not typically a covered service every month and patient is aware of responsibility of payment. Pedal pulses are palpable. No know risk factors requiring routine foot care. Nails are elongated and thickened on feet. Hyperkeratotic lesions noted to multiple toes. Diagnosis is onychomychosis.  nails were reduced and trimmed bilaterally. Patient tolerated well.    RTC 1-2 months for Proc B, sooner PRN

## 2024-07-22 ENCOUNTER — TELEPHONE (OUTPATIENT)
Dept: INTERNAL MEDICINE | Facility: CLINIC | Age: 89
End: 2024-07-22
Payer: MEDICARE

## 2024-07-22 NOTE — TELEPHONE ENCOUNTER
----- Message from Ila Randolph sent at 7/22/2024 10:01 AM CDT -----  Contact: [phone]@  Good morning patient daughter  would like a call back to discuss her mother not eating. Ms. Salcedo says that her mother passed out on 7/20. Please call  to advise [phone]

## 2024-07-22 NOTE — TELEPHONE ENCOUNTER
Called and spoke to pt daughter. States pt has not been eating. States yesterday she felt dizzy and passed out, bumped her head. Unwitnessed fall, +LOC. Denies CP and dyspnea. Daughter said she doesn't need ER care, states she passed out because she doesn't eat. Daughter feels like pt is unsafe driving. She is forgetful and doesn't eat. Last appt in June pt was c/o weight gain. Pt scheduled for appt Thursday with daughter to discuss.

## 2024-07-25 ENCOUNTER — LAB VISIT (OUTPATIENT)
Dept: LAB | Facility: HOSPITAL | Age: 89
End: 2024-07-25
Attending: INTERNAL MEDICINE
Payer: MEDICARE

## 2024-07-25 ENCOUNTER — OFFICE VISIT (OUTPATIENT)
Dept: INTERNAL MEDICINE | Facility: CLINIC | Age: 89
End: 2024-07-25
Payer: MEDICARE

## 2024-07-25 ENCOUNTER — TELEPHONE (OUTPATIENT)
Dept: INTERNAL MEDICINE | Facility: CLINIC | Age: 89
End: 2024-07-25
Payer: MEDICARE

## 2024-07-25 VITALS
SYSTOLIC BLOOD PRESSURE: 132 MMHG | WEIGHT: 137.13 LBS | BODY MASS INDEX: 25.23 KG/M2 | HEART RATE: 77 BPM | OXYGEN SATURATION: 95 % | DIASTOLIC BLOOD PRESSURE: 60 MMHG | HEIGHT: 62 IN

## 2024-07-25 DIAGNOSIS — R63.0 APPETITE IMPAIRED: ICD-10-CM
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
## 2024-07-25 DIAGNOSIS — R55 SYNCOPE AND COLLAPSE: ICD-10-CM

## 2024-07-25 DIAGNOSIS — I10 PRIMARY HYPERTENSION: ICD-10-CM

## 2024-07-25 DIAGNOSIS — R42 DIZZINESS: ICD-10-CM

## 2024-07-25 LAB
ALBUMIN SERPL BCP-MCNC: 3.7 G/DL (ref 3.5–5.2)
ALP SERPL-CCNC: 58 U/L (ref 55–135)
ALT SERPL W/O P-5'-P-CCNC: 17 U/L (ref 10–44)
ANION GAP SERPL CALC-SCNC: 11 MMOL/L (ref 8–16)
AST SERPL-CCNC: 26 U/L (ref 10–40)
BASOPHILS # BLD AUTO: 0.02 K/UL (ref 0–0.2)
BASOPHILS NFR BLD: 0.3 % (ref 0–1.9)
BILIRUB SERPL-MCNC: 0.4 MG/DL (ref 0.1–1)
BUN SERPL-MCNC: 35 MG/DL (ref 10–30)
CALCIUM SERPL-MCNC: 9.9 MG/DL (ref 8.7–10.5)
CHLORIDE SERPL-SCNC: 105 MMOL/L (ref 95–110)
CO2 SERPL-SCNC: 25 MMOL/L (ref 23–29)
CREAT SERPL-MCNC: 0.7 MG/DL (ref 0.5–1.4)
DIFFERENTIAL METHOD BLD: ABNORMAL
EOSINOPHIL # BLD AUTO: 0.1 K/UL (ref 0–0.5)
EOSINOPHIL NFR BLD: 2.2 % (ref 0–8)
ERYTHROCYTE [DISTWIDTH] IN BLOOD BY AUTOMATED COUNT: 14.1 % (ref 11.5–14.5)
EST. GFR  (NO RACE VARIABLE): >60 ML/MIN/1.73 M^2
GLUCOSE SERPL-MCNC: 85 MG/DL (ref 70–110)
HCT VFR BLD AUTO: 38 % (ref 37–48.5)
HGB BLD-MCNC: 12.2 G/DL (ref 12–16)
IMM GRANULOCYTES # BLD AUTO: 0.01 K/UL (ref 0–0.04)
IMM GRANULOCYTES NFR BLD AUTO: 0.2 % (ref 0–0.5)
LYMPHOCYTES # BLD AUTO: 2 K/UL (ref 1–4.8)
LYMPHOCYTES NFR BLD: 33.4 % (ref 18–48)
MCH RBC QN AUTO: 31.2 PG (ref 27–31)
MCHC RBC AUTO-ENTMCNC: 32.1 G/DL (ref 32–36)
MCV RBC AUTO: 97 FL (ref 82–98)
MONOCYTES # BLD AUTO: 0.5 K/UL (ref 0.3–1)
MONOCYTES NFR BLD: 9 % (ref 4–15)
NEUTROPHILS # BLD AUTO: 3.3 K/UL (ref 1.8–7.7)
NEUTROPHILS NFR BLD: 54.9 % (ref 38–73)
NRBC BLD-RTO: 0 /100 WBC
PLATELET # BLD AUTO: 204 K/UL (ref 150–450)
PMV BLD AUTO: 10.6 FL (ref 9.2–12.9)
POTASSIUM SERPL-SCNC: 4.3 MMOL/L (ref 3.5–5.1)
PROT SERPL-MCNC: 7.1 G/DL (ref 6–8.4)
RBC # BLD AUTO: 3.91 M/UL (ref 4–5.4)
SODIUM SERPL-SCNC: 141 MMOL/L (ref 136–145)
TSH SERPL DL<=0.005 MIU/L-ACNC: 0.55 UIU/ML (ref 0.4–4)
WBC # BLD AUTO: 6.02 K/UL (ref 3.9–12.7)

## 2024-07-25 PROCEDURE — 99214 OFFICE O/P EST MOD 30 MIN: CPT | Mod: HCNC,S$GLB,, | Performed by: INTERNAL MEDICINE

## 2024-07-25 PROCEDURE — 84443 ASSAY THYROID STIM HORMONE: CPT | Mod: HCNC | Performed by: INTERNAL MEDICINE

## 2024-07-25 PROCEDURE — 99999 PR PBB SHADOW E&M-EST. PATIENT-LVL IV: CPT | Mod: PBBFAC,HCNC,, | Performed by: INTERNAL MEDICINE

## 2024-07-25 PROCEDURE — 85025 COMPLETE CBC W/AUTO DIFF WBC: CPT | Mod: HCNC | Performed by: INTERNAL MEDICINE

## 2024-07-25 PROCEDURE — 80053 COMPREHEN METABOLIC PANEL: CPT | Mod: HCNC | Performed by: INTERNAL MEDICINE

## 2024-07-25 PROCEDURE — 1126F AMNT PAIN NOTED NONE PRSNT: CPT | Mod: HCNC,CPTII,S$GLB, | Performed by: INTERNAL MEDICINE

## 2024-07-25 PROCEDURE — 3288F FALL RISK ASSESSMENT DOCD: CPT | Mod: HCNC,CPTII,S$GLB, | Performed by: INTERNAL MEDICINE

## 2024-07-25 PROCEDURE — 1159F MED LIST DOCD IN RCRD: CPT | Mod: HCNC,CPTII,S$GLB, | Performed by: INTERNAL MEDICINE

## 2024-07-25 PROCEDURE — 1101F PT FALLS ASSESS-DOCD LE1/YR: CPT | Mod: HCNC,CPTII,S$GLB, | Performed by: INTERNAL MEDICINE

## 2024-07-25 PROCEDURE — 36415 COLL VENOUS BLD VENIPUNCTURE: CPT | Mod: HCNC | Performed by: INTERNAL MEDICINE

## 2024-07-25 PROCEDURE — 1160F RVW MEDS BY RX/DR IN RCRD: CPT | Mod: HCNC,CPTII,S$GLB, | Performed by: INTERNAL MEDICINE
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
## 2024-07-25 NOTE — TELEPHONE ENCOUNTER
Spoke with pt's daughter, she has information on the medications and questions about notations on paperwork you gave her and requesting you to call her back.

## 2024-07-25 NOTE — TELEPHONE ENCOUNTER
----- Message from Cristiana Conteh sent at 7/25/2024  2:18 PM CDT -----  Contact: [phone]  1MEDICALADVICE     Patient is calling for Medical Advice regarding: patient Daughter Ms Salcedo is requesting a call back regarding med information that was required     How long has patient had these symptoms:n/a  Pharmacy name and phone#: n/a    Patient wants a call back or thru [facility]:call back     Comments:    Please advise patient replies from provider may take up to 48 hours.

## 2024-08-02 ENCOUNTER — HOSPITAL ENCOUNTER (OUTPATIENT)
Dept: RADIOLOGY | Facility: OTHER | Age: 89
Discharge: HOME | End: 2024-08-02
Attending: INTERNAL MEDICINE
Payer: MEDICAID

## 2024-08-02 DIAGNOSIS — R42 DIZZINESS: ICD-10-CM

## 2024-08-02 DIAGNOSIS — R55 SYNCOPE AND COLLAPSE: ICD-10-CM
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
## 2024-08-02 PROCEDURE — 70551 MRI BRAIN STEM W/O DYE: CPT | Mod: 26,,, | Performed by: RADIOLOGY

## 2024-08-02 PROCEDURE — 70551 MRI BRAIN STEM W/O DYE: CPT | Mod: TC

## 2024-09-03 ENCOUNTER — TELEPHONE (OUTPATIENT)
Dept: INTERNAL MEDICINE | Facility: CLINIC | Age: 89
End: 2024-09-03
Payer: MEDICARE

## 2024-09-03 RX ORDER — DOXYCYCLINE HYCLATE 100 MG
100 TABLET ORAL 2 TIMES DAILY
Qty: 20 TABLET | Refills: 0 | Status: SHIPPED | OUTPATIENT
Start: 2024-09-03 | End: 2024-09-13

## 2024-09-03 NOTE — TELEPHONE ENCOUNTER
----- Message from Cristiana Conteh sent at 9/3/2024 12:50 PM CDT -----  Contact: [phone]  1MEDICALADVICE     Patient is calling for Medical Advice regarding: Bad Cough, Green mucus.      How long has patient had these symptoms: 5 days     Pharmacy name and phone#:      [id number] - [place], [place] - [address] AT [facility] [address]  [address]  [facility] [address]  Phone: [phone] Fax: [phone]         Patient wants a call back or thru [facility]: call back     Comments:    Please advise patient replies from provider may take up to 48 hours.

## 2024-09-05 ENCOUNTER — TELEPHONE (OUTPATIENT)
Dept: INTERNAL MEDICINE | Facility: CLINIC | Age: 89
End: 2024-09-05
Payer: MEDICARE

## 2024-09-05 NOTE — TELEPHONE ENCOUNTER
Pt states that she continues to cough up green sputum mostly at night and has a runny nose. She states that she cannot take antihistamines. She just stared Doxycyline yesterday. She denies sob,chest pain or difficulty breathing. She has not bee tested for Covid-19.  She refused an appointment.

## 2024-09-05 NOTE — TELEPHONE ENCOUNTER
----- Message from Salome Kimbrough sent at 9/5/2024  3:10 PM CDT -----  Contact: Self/ [phone]  1MEDICALADVICE     Patient is calling for Medical Advice regarding:medication     How long has patient had these symptoms:ALEA    Pharmacy name and phone#:NA    Patient wants a call back or thru [facility]: call back     Comments: pt said that she is calling in regards to she just started taking the doxycycline (VIBRA-TABS) 100 MG tablet but it is not working pt stated that she is constantly wiping her nose and coughing would like a return call from the nurse . Please advise     Please advise patient replies from provider may take up to 48 hours.

## 2024-09-06 ENCOUNTER — TELEPHONE (OUTPATIENT)
Dept: INTERNAL MEDICINE | Facility: CLINIC | Age: 89
End: 2024-09-06
Payer: MEDICARE

## 2024-09-06 ENCOUNTER — HOSPITAL ENCOUNTER (EMERGENCY)
Facility: OTHER | Age: 89
Discharge: HOME | End: 2024-09-06
Attending: INTERNAL MEDICINE
Payer: MEDICARE

## 2024-09-06 VITALS
TEMPERATURE: 98 F | BODY MASS INDEX: 27.82 KG/M2 | RESPIRATION RATE: 18 BRPM | OXYGEN SATURATION: 97 % | HEIGHT: 59 IN | WEIGHT: 138 LBS | DIASTOLIC BLOOD PRESSURE: 75 MMHG | SYSTOLIC BLOOD PRESSURE: 178 MMHG | HEART RATE: 73 BPM

## 2024-09-06 DIAGNOSIS — J06.9 VIRAL URI WITH COUGH: Primary | ICD-10-CM

## 2024-09-06 LAB
CTP QC/QA: YES
CTP QC/QA: YES
POC MOLECULAR INFLUENZA A AGN: NEGATIVE
POC MOLECULAR INFLUENZA B AGN: NEGATIVE
SARS-COV-2 RDRP RESP QL NAA+PROBE: NEGATIVE

## 2024-09-06 PROCEDURE — 87635 SARS-COV-2 COVID-19 AMP PRB: CPT

## 2024-09-06 PROCEDURE — 25000003 PHARM REV CODE 250

## 2024-09-06 PROCEDURE — 99283 EMERGENCY DEPT VISIT LOW MDM: CPT

## 2024-09-06 RX ORDER — BENZONATATE 100 MG/1
200 CAPSULE ORAL 3 TIMES DAILY PRN
Qty: 21 CAPSULE | Refills: 0 | Status: SHIPPED | OUTPATIENT
Start: 2024-09-06 | End: 2024-09-16

## 2024-09-06 RX ORDER — CETIRIZINE HYDROCHLORIDE 10 MG/1
10 TABLET ORAL DAILY PRN
Qty: 10 TABLET | Refills: 0 | Status: SHIPPED | OUTPATIENT
Start: 2024-09-06 | End: 2024-09-16

## 2024-09-06 RX ORDER — CETIRIZINE HYDROCHLORIDE 5 MG/1
10 TABLET ORAL
Status: COMPLETED | OUTPATIENT
Start: 2024-09-06 | End: 2024-09-06

## 2024-09-06 RX ORDER — BENZONATATE 100 MG/1
200 CAPSULE ORAL
Status: COMPLETED | OUTPATIENT
Start: 2024-09-06 | End: 2024-09-06

## 2024-09-06 RX ADMIN — BENZONATATE 200 MG: 100 CAPSULE ORAL at 04:09

## 2024-09-06 RX ADMIN — CETIRIZINE HYDROCHLORIDE 10 MG: 5 TABLET, FILM COATED ORAL at 04:09

## 2024-09-06 NOTE — ED NOTES
Pt states she does have hypertension and she took medication this morning.  Pt states her BP is probably elevated because she was stressed out about not feeling well but is happy she was able to get medication today.  BP is better upon discharge than it was upon arrival.  Notified provider.

## 2024-09-06 NOTE — ED PROVIDER NOTES
Encounter Date: 9/6/2024       History     Chief Complaint   Patient presents with    General Illness     Pt reporting dry cough and rhinorrhea x 2 days.      Ernestine Luna is a 89 y.o. female with history of HTN, osteoarthritis and breast cancer in remission presenting to the emergency department for evaluation of dry cough and runny nose that began approximately 4 days ago. Unsure of recent sick contacts but states that she did go to [facility] days ago. She has not taken any medications for her symptoms. She denies fever, chills, sore throat, congestion, SOB, chest pain, palpitations, leg swelling, abdominal pain, N/V/D, urinary symptoms, vaginal bleeding or vaginal discharge.       The history is provided by the patient.     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 Wilkins    COLONOSCOPY N/A 10/1/2019    Procedure: COLONOSCOPY;  Surgeon: Jarad Chavira MD;  Location: [facility] ([address] [place] [address]);  Service:  Endoscopy;  Laterality: N/A;  miralax prep (used miralax for last colonoscopy) - ERW    COLONOSCOPY W/ POLYPECTOMY  08/08/2013    RASHEED   06/24/2014    EYE SURGERY      HYSTERECTOMY  age 38    ALISTAIR; ovaries in place    INJECTION FOR SENTINEL NODE IDENTIFICATION Left 2/20/2023    Procedure: INJECTION, FOR SENTINEL NODE IDENTIFICATION-Left;  Surgeon: ROSIBEL Belcher MD;  Location: [facility] OR 2ND FLR;  Service: General;  Laterality: Left;    JOINT REPLACEMENT      bilateral knees    MASTECTOMY, PARTIAL Left 2/20/2023    Procedure: MASTECTOMY, PARTIAL-Left with radiological marker;  Surgeon: ROSIBEL Belcher MD;  [facility];  Service: General;  Laterality: Left;    MASTECTOMY, PARTIAL Left 4/18/2023    Procedure: MASTECTOMY, PARTIAL-left re-excision;  Surgeon: ROSIBEL Belcher MD;  [facility];  Service: General;  Laterality: Left;    SENTINEL LYMPH NODE BIOPSY Left 2/20/2023    Procedure: BIOPSY, LYMPH NODE, SENTINEL-Left;  Surgeon: ROSIBEL Belcher MD;  [facility];  Service: General;  Laterality: Left;    TOTAL KNEE ARTHROPLASTY      Bilateral     Family History   Problem Relation Name Age of Onset    Stroke Mother      Breast cancer Sister Matilde 82    Heart disease Sister Matilde     Diabetes Sister Matilde     Cancer Sister Maitlde         breast cancer    Asthma Sister Matilde     Breast cancer Daughter  46    Cancer Daughter          breast    Breast cancer Daughter  45        negative genetic testing 2015    Cancer Daughter          breast    Breast cancer Daughter  37    Cancer Daughter          breast    Coronary artery disease Father      Diabetes Father      Heart disease Father      Heart attack Father      Heart disease Sister Shakira     Diabetes Sister Shakira     Diabetes Sister Kiley     Heart disease Sister Kiley     Hyperlipidemia Sister Kiley     Heart attack Brother      No Known Problems Son      Cancer Cousin Maternal         colon    Cancer Cousin Maternal     Breast  cancer Cousin Maternal     Breast cancer Other niece     Diabetes Other niece     Blindness Other niece         One eye is blind from complications of diabetes    Breast cancer Other niece     Diabetes Other niece     Breast cancer Other niece     Diabetes Other niece     Ovarian cancer Neg Hx      Endometrial cancer Neg Hx      Vaginal cancer Neg Hx      Cervical cancer Neg Hx       Social History     Tobacco Use    Smoking status: Never    Smokeless tobacco: Never   Substance Use Topics    Alcohol use: Yes     Alcohol/week: 1.0 standard drink of alcohol     Types: 1 Glasses of wine per week     Comment: Rare, every other week    Drug use: No     Review of Systems   Constitutional:  Negative for chills and fever.   HENT:  Positive for rhinorrhea. Negative for congestion and sore throat.    Respiratory:  Positive for cough. Negative for shortness of breath.    Cardiovascular:  Negative for chest pain.   Gastrointestinal:  Negative for abdominal pain, diarrhea, nausea and vomiting.   Genitourinary:  Negative for dysuria, frequency and urgency.   Musculoskeletal:  Negative for back pain.   Skin:  Negative for rash.   Neurological:  Negative for dizziness and headaches.   Psychiatric/Behavioral:  Negative for confusion.        Physical Exam     Initial Vitals [09/06/24 1614]   BP Pulse Resp Temp SpO2   (!) 184/91 81 18 97.9 °F (36.6 °C) 99 %      MAP       --         Physical Exam    Nursing note and vitals reviewed.  Constitutional: She appears well-developed and well-nourished. No distress.   HENT:   Head: Normocephalic and atraumatic.   Right Ear: External ear normal.   Left Ear: Tympanic membrane, external ear and ear canal normal.   Nose: Nose normal.   Mouth/Throat: Oropharynx is clear and moist. No oropharyngeal exudate.   Cerumen impaction on right. Mild rhinorrhea present.    Eyes: Conjunctivae and EOM are normal.   Neck: Neck supple.   Normal range of motion.  Cardiovascular:  Normal rate, regular rhythm,  normal heart sounds and intact distal pulses.           Pulmonary/Chest: Breath sounds normal. No respiratory distress. She has no wheezes. She has no rhonchi. She has no rales.   Musculoskeletal:         General: No edema. Normal range of motion.      Cervical back: Normal range of motion and neck supple.     Neurological: She is alert and oriented to person, place, and time. She has normal strength.   Skin: Skin is warm and dry.   Psychiatric: She has a normal mood and affect. Her behavior is normal. Judgment and thought content normal.         ED Course   Procedures  Labs Reviewed   SARS-COV-2 RDRP GENE       Result Value    POC Rapid COVID Negative       Acceptable Yes     POCT INFLUENZA A/B MOLECULAR    POC Molecular Influenza A Ag Negative      POC Molecular Influenza B Ag Negative       Acceptable Yes            Imaging Results    None          Medications   cetirizine tablet 10 mg (10 mg Oral Given 9/6/24 1651)   benzonatate capsule 200 mg (200 mg Oral Given 9/6/24 1651)     Medical Decision Making  Amount and/or Complexity of Data Reviewed  Labs: ordered.    Risk  OTC drugs.  Prescription drug management.                          Medical Decision Making:   Initial Assessment:   Urgent evaluation of 89 yo female with hx of HTN, osteoarthritis, and breast cancer in remission presenting with dry cough and runny nose for a few days. Unsure of recent sick contacts. Has not taken any medications for her symptoms. No other complaints at this time. On exam, she is a thin, elderly female. Well appearing and non toxic. Hypertensive but otherwise hemodynamically stable. Afebrile in the ER. Heart and lungs are clear auscultation. Mild rhinorrhea present. She is concerned for COVID. Will screen for COVID and influenza.   Differential Diagnosis:   Differential diagnosis includes but not limited to viral syndrome, viral URI, rhinitis, allergies, COVID, influenza  Clinical Tests:   Lab Tests:  Ordered and Reviewed  ED Management:  Rapid COVID and influenza tests are both negative. I updated the patient on all results. Explained that she likely has a viral URI with cough. Given dose of Zyrtec and Tessalon Perles in the ED. Discharged with scripts for Zyrtec and Tessalon Perles. Advised her to stay hydrated, continue daily home meds and to maintain good nutrition.  Patient verbalized understanding and agreement with this plan of care. She was given specific return precautions. Advised to follow up with PCP as needed. All questions and concerns addressed. She is stable for discharge.     This note was created with [employer] Fluency Direct Dictation. Please excuse any spelling or grammatical errors.             Clinical Impression:  Final diagnoses:  [J06.9] Viral URI with cough (Primary)          ED Disposition Condition    Discharge Stable          ED Prescriptions       Medication Sig Dispense Start Date End Date Auth. Provider    benzonatate (TESSALON) 100 MG capsule Take 2 capsules (200 mg total) by mouth 3 (three) times daily as needed for Cough. 21 capsule 9/6/2024 9/16/2024 Sonido Villasenor PA-C    cetirizine (ZYRTEC) 10 MG tablet Take 1 tablet (10 mg total) by mouth daily as needed for Allergies or Rhinitis (runny nose). 10 tablet 9/6/2024 9/16/2024 Sonido Villasenor PA-C          Follow-up Information    None          Sonido Villasenor PA-C  09/06/24 0094

## 2024-09-06 NOTE — DISCHARGE INSTRUCTIONS
Take Tessalon Perles as needed for your cough. Take Zyrtec once daily as needed for runny nose or sinus problems. Continue your daily home meds. Make sure you are staying hydrated with water or fluids containing electrolytes. Rest.

## 2024-09-06 NOTE — TELEPHONE ENCOUNTER
Spoke with pt, she stated that she is taking her Doxycycline. Spoke with pt, [facility] and get an over the counter Covid test.

## 2024-09-09 ENCOUNTER — HOSPITAL ENCOUNTER (OUTPATIENT)
Dept: RADIOLOGY | Facility: HOSPITAL | Age: 89
Discharge: HOME | End: 2024-09-09
Attending: NURSE PRACTITIONER
Payer: MEDICARE

## 2024-09-09 ENCOUNTER — TELEPHONE (OUTPATIENT)
Dept: INTERNAL MEDICINE | Facility: CLINIC | Age: 89
End: 2024-09-09
Payer: MEDICARE

## 2024-09-09 ENCOUNTER — OFFICE VISIT (OUTPATIENT)
Dept: INTERNAL MEDICINE | Facility: CLINIC | Age: 89
End: 2024-09-09
Payer: MEDICARE

## 2024-09-09 VITALS
HEIGHT: 59 IN | WEIGHT: 136.69 LBS | HEART RATE: 81 BPM | BODY MASS INDEX: 27.56 KG/M2 | DIASTOLIC BLOOD PRESSURE: 80 MMHG | OXYGEN SATURATION: 98 % | SYSTOLIC BLOOD PRESSURE: 142 MMHG

## 2024-09-09 DIAGNOSIS — I10 PRIMARY HYPERTENSION: ICD-10-CM

## 2024-09-09 DIAGNOSIS — J40 BRONCHITIS: ICD-10-CM

## 2024-09-09 DIAGNOSIS — J40 BRONCHITIS: Primary | ICD-10-CM

## 2024-09-09 PROCEDURE — 1101F PT FALLS ASSESS-DOCD LE1/YR: CPT | Mod: CPTII,S$GLB,, | Performed by: NURSE PRACTITIONER

## 2024-09-09 PROCEDURE — 99214 OFFICE O/P EST MOD 30 MIN: CPT | Mod: 25,S$GLB,, | Performed by: NURSE PRACTITIONER

## 2024-09-09 PROCEDURE — 96372 THER/PROPH/DIAG INJ SC/IM: CPT | Mod: S$GLB,,, | Performed by: NURSE PRACTITIONER

## 2024-09-09 PROCEDURE — 3288F FALL RISK ASSESSMENT DOCD: CPT | Mod: CPTII,S$GLB,, | Performed by: NURSE PRACTITIONER

## 2024-09-09 PROCEDURE — 1125F AMNT PAIN NOTED PAIN PRSNT: CPT | Mod: CPTII,S$GLB,, | Performed by: NURSE PRACTITIONER

## 2024-09-09 PROCEDURE — 71046 X-RAY EXAM CHEST 2 VIEWS: CPT | Mod: TC

## 2024-09-09 PROCEDURE — 71046 X-RAY EXAM CHEST 2 VIEWS: CPT | Mod: 26,,, | Performed by: RADIOLOGY

## 2024-09-09 PROCEDURE — 99999 PR PBB SHADOW E&M-EST. PATIENT-LVL IV: CPT | Mod: PBBFAC,,, | Performed by: NURSE PRACTITIONER

## 2024-09-09 PROCEDURE — 1159F MED LIST DOCD IN RCRD: CPT | Mod: CPTII,S$GLB,, | Performed by: NURSE PRACTITIONER

## 2024-09-09 RX ORDER — DOXYCYCLINE HYCLATE 100 MG
100 TABLET ORAL 2 TIMES DAILY
Qty: 20 TABLET | Refills: 0 | Status: SHIPPED | OUTPATIENT
Start: 2024-09-09 | End: 2024-09-19

## 2024-09-09 RX ORDER — BETAMETHASONE SODIUM PHOSPHATE AND BETAMETHASONE ACETATE 3; 3 MG/ML; MG/ML
6 INJECTION, SUSPENSION INTRA-ARTICULAR; INTRALESIONAL; INTRAMUSCULAR; SOFT TISSUE
Status: COMPLETED | OUTPATIENT
Start: 2024-09-09 | End: 2024-09-09

## 2024-09-09 RX ORDER — ALBUTEROL SULFATE 90 UG/1
2 INHALANT RESPIRATORY (INHALATION) EVERY 6 HOURS PRN
Qty: 6.7 G | Refills: 0 | Status: SHIPPED | OUTPATIENT
Start: 2024-09-09

## 2024-09-09 RX ORDER — GUAIFENESIN 100 MG/5ML
100 SOLUTION ORAL 3 TIMES DAILY PRN
COMMUNITY

## 2024-09-09 RX ADMIN — BETAMETHASONE SODIUM PHOSPHATE AND BETAMETHASONE ACETATE 6 MG: 3; 3 INJECTION, SUSPENSION INTRA-ARTICULAR; INTRALESIONAL; INTRAMUSCULAR; SOFT TISSUE at 03:09

## 2024-09-09 NOTE — TELEPHONE ENCOUNTER
----- Message from Katelynn Walton sent at 9/9/2024  2:30 PM CDT -----  Contact: [phone] Pt (daughter) Denisse  1MEDICALADVICE     Patient is calling for Medical Advice regarding: Pt daughter states that Pt is getting forgetful and is not giving antibiotics time to work. Daughter states that mother (Pt) and cousin are on their way in to see Dr. Macias.       How long has patient had these symptoms: 09/06/2024    Pharmacy name and phone#:    Patient wants a call back or [facility]: call back to daughter    Comments:    Please advise patient replies from provider may take up to 48 hours.

## 2024-09-09 NOTE — TELEPHONE ENCOUNTER
----- Message from Annabelinda De Luna sent at 9/9/2024 12:49 PM CDT -----  Contact: [phone]  1MEDICALADVICE     Patient is calling for Medical Advice regarding:pt is calling she went to Urgent care and the dr gave her benzonatate (TESSALON) 100 MG capsule  They are not helping. She keep coughing.  Please call her back she would something to help her with this cough.  Please call her back and advise.  How long has patient had these symptoms:    Pharmacy name and phone#:  [id number] - [place], [place] - [address] AT [facility] [address]  [address]  [facility] [address]  Phone: [phone] Fax: [phone]    Patient wants a call back or thru [facility]:callback    Comments:    Please advise patient replies from provider may take up to 48 hours.

## 2024-09-09 NOTE — TELEPHONE ENCOUNTER
Pt went to [facility] ER on Friday 09/06/2024 and received prescriptions for Tessalon perles and Cetirizine. Pt stated cough has not changed and rx's not working. Pt is asking if you can prescribe her something else to help manage her cough.    Please review and respond,  Thank You.

## 2024-09-09 NOTE — PROGRESS NOTES
INTERNAL MEDICINE PROGRESS/URGENT CARE NOTE    CHIEF COMPLAINT     Chief Complaint   Patient presents with    Cough     Cough x 3 days- dry cough all day and night    Pain     Mid chest area started today       HPI     Ernestine Luna is a 89 y.o. female who presents for an urgent visit today.    PCP: Dr. Macias    Pt c/o cough productive of sputum and congestion x 8 days now. PCP sent her in doxy but she didn't pick it up from the pharmacy. She went to the ER 9/6/23 for it. No sob or cp. Negative for covid and flu. Given tessalon and zyrtec. States nothing helping. No orthopnea. Taking the tessalon, zyrtec and plain robitussin.     Patient Active Problem List   Diagnosis    Primary hypertension    Osteoarthritis    DDD (degenerative disc disease), lumbar    History of breast cancer    Ectatic thoracic aorta    Atherosclerosis of aorta    Mild carotid artery disease    Inflammatory arthritis    Vaginal vault prolapse, posthysterectomy    Prolapse of anterior vaginal wall    Facet arthropathy    Vaginal atrophy    Urinary incontinence, urge    Nocturia    Colon polyps    Depressed mood    Abdominal aortic pulsation    Aortic aneurysm without rupture    Posterior vitreous detachment of both eyes    Pseudophakia of both eyes    Dry eye syndrome of both eyes    Impaired functional mobility, balance, gait, and endurance    Decreased strength of lower extremity    Invasive ductal carcinoma of breast, left    Decreased range of motion of neck    Postural imbalance    Osteoporosis    Psychiatric problem    Acute cystitis    Suicidal ideation    Calcified granuloma of lung - CT 2023        Past Medical History:  Past Medical History:   Diagnosis Date    Anemia     s/p knee surgery since surgery has resolved    Breast cancer, stage 0     lumpectomy in 1992    Cataract     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 Wilkins    COLONOSCOPY N/A 10/1/2019    Procedure: COLONOSCOPY;  Surgeon: Jarad Chavira MD;  Location: [facility] (4TH FLR);  Service: Endoscopy;  Laterality: N/A;  miralax prep (used miralax for last colonoscopy) - [facility]    COLONOSCOPY W/ POLYPECTOMY  08/08/2013    RASHEED   06/24/2014    EYE SURGERY      HYSTERECTOMY  age 38    ALISTAIR; ovaries in place    INJECTION FOR SENTINEL NODE IDENTIFICATION Left 2/20/2023    Procedure: INJECTION, FOR SENTINEL NODE IDENTIFICATION-Left;  Surgeon: ROSIBEL Belcher MD;  Location: [facility];  Service: General;  Laterality: Left;    JOINT REPLACEMENT      bilateral knees    MASTECTOMY, PARTIAL Left 2/20/2023    Procedure: MASTECTOMY, PARTIAL-Left with radiological marker;  Surgeon: ROSIBEL Belcher MD;  Location: [facility];  Service: General;  Laterality: Left;    MASTECTOMY, PARTIAL Left 4/18/2023    Procedure: MASTECTOMY, PARTIAL-left re-excision;  Surgeon: ROSIBEL Belcher MD;  Location: [facility] OR [address] [place] [address];  Service: General;  Laterality: Left;    SENTINEL LYMPH NODE BIOPSY Left 2/20/2023    Procedure: BIOPSY, LYMPH NODE, SENTINEL-Left;  Surgeon: ROSIBEL Belcher MD;  Location: [facility] OR [address] [place] [address];  Service: General;  Laterality: Left;    TOTAL KNEE ARTHROPLASTY      Bilateral        Allergies:  Review of patient's allergies indicates:   Allergen Reactions    Tramadol Shortness Of Breath, Diarrhea and Nausea And Vomiting    Acetaminophen Nausea And Vomiting    Aspirin Nausea And Vomiting    Sulfa (sulfonamide antibiotics) Nausea And  Vomiting    Prednisone Other (See Comments)     Stomach problems, no issue with injection       Home Medications:    Current Outpatient Medications:     albuterol (VENTOLIN HFA) 90 mcg/actuation inhaler, Inhale 2 puffs into the lungs every 6 (six) hours as needed for Wheezing. Rescue, Disp: 6.7 g, Rfl: 0    alendronate (FOSAMAX) 70 MG tablet, Take 1 tablet (70 mg total) by mouth every 7 days., Disp: 12 tablet, Rfl: 3    amLODIPine (NORVASC) 10 MG tablet, TAKE 1 TABLET(10 MG) BY MOUTH EVERY DAY, Disp: 90 tablet, Rfl: 1    atenoloL (TENORMIN) 25 MG tablet, TAKE 1 TABLET(25 MG) BY MOUTH EVERY DAY, Disp: 90 tablet, Rfl: 3    benazepriL (LOTENSIN) 40 MG tablet, TAKE 1 TABLET(40 MG) BY MOUTH EVERY DAY, Disp: 90 tablet, Rfl: 3    benzonatate (TESSALON) 100 MG capsule, Take 2 capsules (200 mg total) by mouth 3 (three) times daily as needed for Cough., Disp: 21 capsule, Rfl: 0    calcium carbonate (OS-DHRUV) 600 mg calcium (1,500 mg) Tab, Take 600 mg by mouth once daily., Disp: , Rfl:     cetirizine (ZYRTEC) 10 MG tablet, Take 1 tablet (10 mg total) by mouth daily as needed for Allergies or Rhinitis (runny nose)., Disp: 10 tablet, Rfl: 0    donepeziL (ARICEPT) 5 MG tablet, Take 5 mg by mouth., Disp: , Rfl:     doxycycline (VIBRA-TABS) 100 MG tablet, Take 1 tablet (100 mg total) by mouth 2 (two) times a day. for 10 days, Disp: 20 tablet, Rfl: 0    guaiFENesin 100 mg/5 ml (ROBITUSSIN) 100 mg/5 mL syrup, Take 100 mg by mouth 3 (three) times daily as needed for Cough., Disp: , Rfl:     MULTIVITAMIN W-MINERALS/LUTEIN (CENTRUM SILVER ORAL), Take 1 tablet by mouth Daily., Disp: , Rfl:     omega 3-calcium-vit [phone] mg Cmpk, Take by mouth once daily., Disp: , Rfl:     trospium (SANCTURA) 20 mg Tab tablet, Take 1 tablet (20 mg total) by mouth 2 (two) times daily. (Patient not taking: Reported on 7/25/2024), Disp: 60 tablet, Rfl: 11    vitamin D 1000 units Tab, Take 185 mg by mouth once daily., Disp: , Rfl:   No current  "facility-administered medications for this visit.    Facility-Administered Medications Ordered in Other Visits:     fentaNYL 50 mcg/mL injection  mcg,  mcg, Intravenous, PRN, Ilana Rodriguez MD, 25 mcg at 04/18/23 0813    midazolam (VERSED) 1 mg/mL injection 0.5-4 mg, 0.5-4 mg, Intravenous, PRN, Ilana Rodriguez MD     Review of Systems:  Review of Systems   Constitutional:  Negative for chills and fever.   HENT:  Positive for congestion, postnasal drip and rhinorrhea. Negative for sore throat and trouble swallowing.    Respiratory:  Positive for cough and wheezing. Negative for shortness of breath.    Cardiovascular:  Negative for chest pain and leg swelling.   Neurological:  Negative for weakness and headaches.         PHYSICAL EXAM     Vitals:    09/09/24 1500 09/09/24 1518   BP: (!) 148/82 (!) 142/80   BP Location: Left arm    Patient Position: Sitting    BP Method: Medium (Manual)    Pulse: 81    SpO2: 98%    Weight: 62 kg (136 lb 11 oz)    Height: 4' 11" (1.499 m)       Body mass index is 27.61 kg/m².     Physical Exam  Vitals reviewed.   Constitutional:       Appearance: Normal appearance.   HENT:      Head: Normocephalic and atraumatic.      Right Ear: Tympanic membrane and ear canal normal.      Left Ear: Tympanic membrane and ear canal normal.      Nose: Congestion present.      Mouth/Throat:      Mouth: Mucous membranes are moist.      Pharynx: Oropharynx is clear. Uvula midline. No oropharyngeal exudate, posterior oropharyngeal erythema or uvula swelling.   Eyes:      Conjunctiva/sclera: Conjunctivae normal.      Pupils: Pupils are equal, round, and reactive to light.   Cardiovascular:      Rate and Rhythm: Normal rate and regular rhythm.   Pulmonary:      Effort: Pulmonary effort is normal. No respiratory distress.      Breath sounds: No rales.      Comments: Mild Expiratory wheezes in bases. Has rhonchi on right but clears with cough  Musculoskeletal:      Cervical back: Normal range of " motion and neck supple.   Lymphadenopathy:      Cervical: No cervical adenopathy.   Skin:     General: Skin is warm and dry.      Findings: No rash.   Neurological:      General: No focal deficit present.      Mental Status: She is alert.   Psychiatric:         Mood and Affect: Mood normal.         Behavior: Behavior normal.         LABS     Lab Results   Component Value Date    HGBA1C 5.3 06/03/2024     CMP  Sodium   Date Value Ref Range Status   07/25/2024 141 136 - 145 mmol/L Final     Potassium   Date Value Ref Range Status   07/25/2024 4.3 3.5 - 5.1 mmol/L Final     Chloride   Date Value Ref Range Status   07/25/2024 105 95 - 110 mmol/L Final     CO2   Date Value Ref Range Status   07/25/2024 25 23 - 29 mmol/L Final     Glucose   Date Value Ref Range Status   07/25/2024 85 70 - 110 mg/dL Final     BUN   Date Value Ref Range Status   07/25/2024 35 (H) 10 - 30 mg/dL Final     Creatinine   Date Value Ref Range Status   07/25/2024 0.7 0.5 - 1.4 mg/dL Final     Calcium   Date Value Ref Range Status   07/25/2024 9.9 8.7 - 10.5 mg/dL Final     Total Protein   Date Value Ref Range Status   07/25/2024 7.1 6.0 - 8.4 g/dL Final     Albumin   Date Value Ref Range Status   07/25/2024 3.7 3.5 - 5.2 g/dL Final     Total Bilirubin   Date Value Ref Range Status   07/25/2024 0.4 0.1 - 1.0 mg/dL Final     Comment:     For infants and newborns, interpretation of results should be based  on gestational age, weight and in agreement with clinical  observations.    Premature Infant recommended reference ranges:  Up to 24 hours.............<8.0 mg/dL  Up to 48 hours............<12.0 mg/dL  3-5 days..................<15.0 mg/dL  6-29 days.................<15.0 mg/dL       Alkaline Phosphatase   Date Value Ref Range Status   07/25/2024 58 55 - 135 U/L Final     AST   Date Value Ref Range Status   07/25/2024 26 10 - 40 U/L Final     ALT   Date Value Ref Range Status   07/25/2024 17 10 - 44 U/L Final     Anion Gap   Date Value Ref Range  Status   07/25/2024 11 8 - 16 mmol/L Final     eGFR if    Date Value Ref Range Status   05/02/2022 >60.0 >60 mL/min/1.73 m^2 Final     eGFR if non    Date Value Ref Range Status   05/02/2022 >60.0 >60 mL/min/1.73 m^2 Final     Comment:     Calculation used to obtain the estimated glomerular filtration  rate (eGFR) is the CKD-EPI equation.        Lab Results   Component Value Date    WBC 6.02 07/25/2024    HGB 12.2 07/25/2024    HCT 38.0 07/25/2024    MCV 97 07/25/2024     07/25/2024     Lab Results   Component Value Date    CHOL 150 11/08/2023    CHOL 155 11/23/2022    CHOL 169 05/02/2022     Lab Results   Component Value Date    HDL 46 11/08/2023    HDL 56 11/23/2022    HDL 65 05/02/2022     Lab Results   Component Value Date    LDLCALC 93.0 11/08/2023    LDLCALC 82.2 11/23/2022    LDLCALC 95.2 05/02/2022     Lab Results   Component Value Date    TRIG 55 11/08/2023    TRIG 84 11/23/2022    TRIG 44 05/02/2022     Lab Results   Component Value Date    CHOLHDL 30.7 11/08/2023    CHOLHDL 36.1 11/23/2022    CHOLHDL 38.5 05/02/2022     Lab Results   Component Value Date    TSH 0.546 07/25/2024       ASSESSMENT     1. Bronchitis    2. Primary hypertension           PLAN  Start doxycycline. CXR today. Prednisone causes her a lot of GI upset but tolerates injections fine. Will given her celestone and send in albuterol.   Plain delsym okay to take for cough. Hydrate well.   BP slight elevated usually okay in clinic. Monitor and continue lotensin, norvasc.  F/u if no improvement. ER for any worsening.     1. Bronchitis  - doxycycline (VIBRA-TABS) 100 MG tablet; Take 1 tablet (100 mg total) by mouth 2 (two) times a day. for 10 days  Dispense: 20 tablet; Refill: 0  - betamethasone acetate-betamethasone sodium phosphate injection 6 mg  - X-Ray Chest PA And Lateral; Future  - albuterol (VENTOLIN HFA) 90 mcg/actuation inhaler; Inhale 2 puffs into the lungs every 6 (six) hours as needed for  Wheezing. Rescue  Dispense: 6.7 g; Refill: 0    2. Primary hypertension       Follow up with PCP     Patient was counseled on when to seek emergent care. Patient's plan/treatment was discussed including medications and possible side effects. Verbalized understanding of all instructions.     This note was partly generated with [employer] voice recognition software. I apologize for any possible typographical errors.          ALEJANDRO Maharaj  Department of Internal Medicine - [facility]  09/09/2024

## 2024-09-10 ENCOUNTER — TELEPHONE (OUTPATIENT)
Dept: INTERNAL MEDICINE | Facility: CLINIC | Age: 89
End: 2024-09-10
Payer: MEDICARE

## 2024-09-10 NOTE — TELEPHONE ENCOUNTER
----- Message from Viviane Stevens NP sent at 9/10/2024  8:07 AM CDT -----  Please let pt know CXR is clear

## 2024-09-12 ENCOUNTER — HOSPITAL ENCOUNTER (EMERGENCY)
Facility: HOSPITAL | Age: 89
Discharge: HOME | End: 2024-09-12
Attending: EMERGENCY MEDICINE
Payer: MEDICARE

## 2024-09-12 VITALS
HEART RATE: 64 BPM | RESPIRATION RATE: 20 BRPM | WEIGHT: 136 LBS | DIASTOLIC BLOOD PRESSURE: 72 MMHG | OXYGEN SATURATION: 97 % | TEMPERATURE: 98 F | HEIGHT: 59 IN | SYSTOLIC BLOOD PRESSURE: 157 MMHG | BODY MASS INDEX: 27.42 KG/M2

## 2024-09-12 DIAGNOSIS — R53.1 WEAKNESS: ICD-10-CM
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
## 2024-09-12 LAB
ALBUMIN SERPL BCP-MCNC: 3.5 G/DL (ref 3.5–5.2)
ALP SERPL-CCNC: 60 U/L (ref 55–135)
ALT SERPL W/O P-5'-P-CCNC: 20 U/L (ref 10–44)
AMORPH CRY UR QL COMP ASSIST: NORMAL
ANION GAP SERPL CALC-SCNC: 6 MMOL/L (ref 8–16)
AST SERPL-CCNC: 29 U/L (ref 10–40)
BACTERIA #/AREA URNS AUTO: NORMAL /HPF
BASOPHILS # BLD AUTO: 0.02 K/UL (ref 0–0.2)
BASOPHILS NFR BLD: 0.3 % (ref 0–1.9)
BILIRUB SERPL-MCNC: 0.6 MG/DL (ref 0.1–1)
BILIRUB UR QL STRIP: NEGATIVE
BUN SERPL-MCNC: 22 MG/DL (ref 10–30)
CALCIUM SERPL-MCNC: 9 MG/DL (ref 8.7–10.5)
CHLORIDE SERPL-SCNC: 111 MMOL/L (ref 95–110)
CLARITY UR REFRACT.AUTO: CLEAR
CO2 SERPL-SCNC: 25 MMOL/L (ref 23–29)
COLOR UR AUTO: COLORLESS
CREAT SERPL-MCNC: 0.6 MG/DL (ref 0.5–1.4)
DIFFERENTIAL METHOD BLD: ABNORMAL
EOSINOPHIL # BLD AUTO: 0.1 K/UL (ref 0–0.5)
EOSINOPHIL NFR BLD: 1.7 % (ref 0–8)
ERYTHROCYTE [DISTWIDTH] IN BLOOD BY AUTOMATED COUNT: 12.9 % (ref 11.5–14.5)
EST. GFR  (NO RACE VARIABLE): >60 ML/MIN/1.73 M^2
GLUCOSE SERPL-MCNC: 87 MG/DL (ref 70–110)
GLUCOSE UR QL STRIP: NEGATIVE
HCT VFR BLD AUTO: 39.2 % (ref 37–48.5)
HGB BLD-MCNC: 12.7 G/DL (ref 12–16)
HGB UR QL STRIP: NEGATIVE
IMM GRANULOCYTES # BLD AUTO: 0.03 K/UL (ref 0–0.04)
IMM GRANULOCYTES NFR BLD AUTO: 0.4 % (ref 0–0.5)
KETONES UR QL STRIP: NEGATIVE
LEUKOCYTE ESTERASE UR QL STRIP: ABNORMAL
LYMPHOCYTES # BLD AUTO: 1.8 K/UL (ref 1–4.8)
LYMPHOCYTES NFR BLD: 25.1 % (ref 18–48)
MCH RBC QN AUTO: 31.4 PG (ref 27–31)
MCHC RBC AUTO-ENTMCNC: 32.4 G/DL (ref 32–36)
MCV RBC AUTO: 97 FL (ref 82–98)
MICROSCOPIC COMMENT: NORMAL
MONOCYTES # BLD AUTO: 0.6 K/UL (ref 0.3–1)
MONOCYTES NFR BLD: 8.8 % (ref 4–15)
NEUTROPHILS # BLD AUTO: 4.6 K/UL (ref 1.8–7.7)
NEUTROPHILS NFR BLD: 63.7 % (ref 38–73)
NITRITE UR QL STRIP: NEGATIVE
NRBC BLD-RTO: 0 /100 WBC
OHS QRS DURATION: 72 MS
OHS QTC CALCULATION: 424 MS
PH UR STRIP: 8 [PH] (ref 5–8)
PLATELET # BLD AUTO: 189 K/UL (ref 150–450)
PMV BLD AUTO: 9.7 FL (ref 9.2–12.9)
POTASSIUM SERPL-SCNC: 3.6 MMOL/L (ref 3.5–5.1)
PROT SERPL-MCNC: 6.9 G/DL (ref 6–8.4)
PROT UR QL STRIP: NEGATIVE
RBC # BLD AUTO: 4.05 M/UL (ref 4–5.4)
RBC #/AREA URNS AUTO: 2 /HPF (ref 0–4)
SODIUM SERPL-SCNC: 142 MMOL/L (ref 136–145)
SP GR UR STRIP: 1.01 (ref 1–1.03)
URN SPEC COLLECT METH UR: ABNORMAL
WBC # BLD AUTO: 7.24 K/UL (ref 3.9–12.7)
WBC #/AREA URNS AUTO: 2 /HPF (ref 0–5)

## 2024-09-12 PROCEDURE — 25000003 PHARM REV CODE 250

## 2024-09-12 PROCEDURE — 93005 ELECTROCARDIOGRAM TRACING: CPT

## 2024-09-12 PROCEDURE — 85025 COMPLETE CBC W/AUTO DIFF WBC: CPT | Performed by: EMERGENCY MEDICINE

## 2024-09-12 PROCEDURE — 81001 URINALYSIS AUTO W/SCOPE: CPT | Performed by: EMERGENCY MEDICINE

## 2024-09-12 PROCEDURE — 63600175 PHARM REV CODE 636 W HCPCS

## 2024-09-12 PROCEDURE — 96374 THER/PROPH/DIAG INJ IV PUSH: CPT

## 2024-09-12 PROCEDURE — 96375 TX/PRO/DX INJ NEW DRUG ADDON: CPT

## 2024-09-12 PROCEDURE — 80053 COMPREHEN METABOLIC PANEL: CPT | Performed by: EMERGENCY MEDICINE

## 2024-09-12 PROCEDURE — 93010 ELECTROCARDIOGRAM REPORT: CPT | Mod: ,,, | Performed by: INTERNAL MEDICINE

## 2024-09-12 PROCEDURE — 99285 EMERGENCY DEPT VISIT HI MDM: CPT | Mod: 25

## 2024-09-12 RX ORDER — ONDANSETRON HYDROCHLORIDE 2 MG/ML
4 INJECTION, SOLUTION INTRAVENOUS
Status: COMPLETED | OUTPATIENT
Start: 2024-09-12 | End: 2024-09-12

## 2024-09-12 RX ORDER — AMLODIPINE BESYLATE 10 MG/1
10 TABLET ORAL
Status: COMPLETED | OUTPATIENT
Start: 2024-09-12 | End: 2024-09-12

## 2024-09-12 RX ORDER — KETOROLAC TROMETHAMINE 30 MG/ML
15 INJECTION, SOLUTION INTRAMUSCULAR; INTRAVENOUS
Status: COMPLETED | OUTPATIENT
Start: 2024-09-12 | End: 2024-09-12

## 2024-09-12 RX ADMIN — KETOROLAC TROMETHAMINE 15 MG: 30 INJECTION, SOLUTION INTRAMUSCULAR; INTRAVENOUS at 12:09

## 2024-09-12 RX ADMIN — AMLODIPINE BESYLATE 10 MG: 10 TABLET ORAL at 12:09

## 2024-09-12 RX ADMIN — ONDANSETRON 4 MG: 2 INJECTION INTRAMUSCULAR; INTRAVENOUS at 12:09

## 2024-09-12 NOTE — ED TRIAGE NOTES
Pt reports passing out at home. Denies hitting head. States daughter came to help her get up. Also reports cough and congestion for a few days. Pt denies abdominal pain, c/p, HA, SOB, vision changes.    LOC: The patient is awake, alert and aware of environment with an appropriate affect, the patient is oriented x 3 and speaking appropriately.  APPEARANCE: Patient in no acute distress, patient is clean and well groomed, patient's clothing is properly fastened.  SKIN: The skin is warm and dry, color consistent with ethnicity, patient has normal skin turgor and moist mucus membranes, skin intact, no breakdown or bruising noted.  MUSCULOSKELETAL: Patient moving all extremities spontaneously, no obvious swelling or deformities noted.  RESPIRATORY: Airway is open and patent, respirations are spontaneous, patient has a normal effort and rate, no accessory muscle use noted,  CARDIAC: Patient has a normal rate and regular rhythm, no periphreal edema noted, capillary refill < 3 seconds.  ABDOMEN: Soft and non tender to palpation, no distention noted,   NEUROLOGIC:  facial expression is symmetrical, patient moving all extremities spontaneously, normal sensation in all extremities when touched with a finger.  Follows all commands appropriately.

## 2024-09-12 NOTE — ED PROVIDER NOTES
Encounter Date: 9/12/2024       History     Chief Complaint   Patient presents with    Fall    Weakness     EMS reports fall at home/      Ernestine CORAL Luna is a 89 y.o. 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    The patient presents to the ED due to ground level fall with positive LOC. patient denies head trauma and can recall preceding events.  Patient states that she became lightheaded when standing up from seated position and fell to the kitchen floor without is chest pain or palpitations.  Upon regaining consciousness patient call for daughter came to her assistance and reported finding her on the ground with no obvious injuries or issues.  Patient not on blood thinners.  She states that where she has no other symptoms or injuries.       The history is provided by the patient, medical records, the EMS personnel and a relative. No  was used.     Review of patient's allergies indicates:   Allergen Reactions    Tramadol Shortness Of Breath, Diarrhea and Nausea And Vomiting    Acetaminophen Nausea And Vomiting    Aspirin Nausea And Vomiting    Sulfa (sulfonamide antibiotics) Nausea And Vomiting    Prednisone Other (See Comments)     Stomach problems, no issue with injection     Past Medical History:   Diagnosis Date    Anemia     s/p knee surgery since surgery has resolved    Breast cancer, stage 0     lumpectomy in 1992    Cataract     DCIS (ductal carcinoma in situ) of breast 12/12/2013    Family history of breast cancer 5/24/2016    Genetic testing 2016    negative [employer] with [employer]    Hypertension     Osteoarthritis     Osteoporosis 11/8/2023    Urge urinary incontinence 4/18/2019     Past Surgical History:    Procedure Laterality Date    BREAST BIOPSY Right     BREAST LUMPECTOMY Right     BUNIONECTOMY      Left foot (x2)    CARPAL TUNNEL RELEASE Right     38 years old    CATARACT EXTRACTION Left     with lens     CATARACT EXTRACTION W/  INTRAOCULAR LENS IMPLANT Right 10/12/2015    Dr. Wilkins    COLONOSCOPY N/A 10/1/2019    Procedure: COLONOSCOPY;  Surgeon: Jarad Chavira MD;  Location: [facility] ENDO (4TH FLR);  Service: Endoscopy;  Laterality: N/A;  miralax prep (used miralax for last colonoscopy) - ERW    COLONOSCOPY W/ POLYPECTOMY  08/08/2013    RASHEED   06/24/2014    EYE SURGERY      HYSTERECTOMY  age 38    ALISTAIR; ovaries in place    INJECTION FOR SENTINEL NODE IDENTIFICATION Left 2/20/2023    Procedure: INJECTION, FOR SENTINEL NODE IDENTIFICATION-Left;  Surgeon: ROSIBEL Belcher MD;  Location: [facility] OR 2ND FLR;  Service: General;  Laterality: Left;    JOINT REPLACEMENT      bilateral knees    MASTECTOMY, PARTIAL Left 2/20/2023    Procedure: MASTECTOMY, PARTIAL-Left with radiological marker;  Surgeon: ROSIBEL Belcher MD;  Location: [facility] OR 2ND FLR;  Service: General;  Laterality: Left;    MASTECTOMY, PARTIAL Left 4/18/2023    Procedure: MASTECTOMY, PARTIAL-left re-excision;  Surgeon: ROSIBEL Belcher MD;  Location: [facility] OR 2ND FLR;  Service: General;  Laterality: Left;    SENTINEL LYMPH NODE BIOPSY Left 2/20/2023    Procedure: BIOPSY, LYMPH NODE, SENTINEL-Left;  Surgeon: ROSIBEL Belcher MD;  Location: [facility] OR 2ND FLR;  Service: General;  Laterality: Left;    TOTAL KNEE ARTHROPLASTY      Bilateral     Family History   Problem Relation Name Age of Onset    Stroke Mother      Breast cancer Sister Matilde 82    Heart disease Sister Matilde     Diabetes Sister Matilde     Cancer Sister Matilde         breast cancer    Asthma Sister Matilde     Breast cancer Daughter  46    Cancer Daughter          breast    Breast cancer Daughter  45        negative genetic testing 2015    Cancer Daughter          breast    Breast cancer Daughter  37    Cancer Daughter           breast    Coronary artery disease Father      Diabetes Father      Heart disease Father      Heart attack Father      Heart disease Sister Sh[place]ira     Diabetes Sister [place]     Diabetes Sister Kiley     Heart disease Sister Kiley     Hyperlipidemia Sister Kiley     Heart attack Brother      No Known Problems Son      Cancer Cousin Maternal         colon    Cancer Cousin Maternal     Breast cancer Cousin Maternal     Breast cancer Other niece     Diabetes Other niece     Blindness Other niece         One eye is blind from complications of diabetes    Breast cancer Other niece     Diabetes Other niece     Breast cancer Other niece     Diabetes Other niece     Ovarian cancer Neg Hx      Endometrial cancer Neg Hx      Vaginal cancer Neg Hx      Cervical cancer Neg Hx       Social History     Tobacco Use    Smoking status: Never    Smokeless tobacco: Never   Substance Use Topics    Alcohol use: Yes     Alcohol/week: 1.0 standard drink of alcohol     Types: 1 Glasses of wine per week     Comment: Rare, every other week    Drug use: No     Review of Systems   All other systems reviewed and are negative.      Physical Exam     Initial Vitals [09/12/24 0845]   BP Pulse Resp Temp SpO2   (!) 187/86 87 16 97.8 °F (36.6 °C) 96 %      MAP       --         Physical Exam    Nursing note and vitals reviewed.  Constitutional: She appears well-developed and well-nourished. No distress.   HENT:   Head: Normocephalic and atraumatic.   Eyes: Conjunctivae and EOM are normal. Pupils are equal, round, and reactive to light.   Neck: Neck supple.   Normal range of motion.  Cardiovascular:  Normal rate, regular rhythm, normal heart sounds and intact distal pulses.           Pulmonary/Chest: Breath sounds normal.   Abdominal: Abdomen is soft.   Musculoskeletal:         General: Normal range of motion.      Cervical back: Normal range of motion and neck supple.     Neurological: She is alert and oriented to person, place, and time.  She has normal strength. GCS score is 15. GCS eye subscore is 4. GCS verbal subscore is 5. GCS motor subscore is 6.   Skin: Skin is warm and dry. Capillary refill takes less than 2 seconds.   Psychiatric: She has a normal mood and affect. Her behavior is normal. Judgment and thought content normal.         ED Course   Procedures  Labs Reviewed   CBC W/ AUTO DIFFERENTIAL - Abnormal       Result Value    WBC 7.24      RBC 4.05      Hemoglobin 12.7      Hematocrit 39.2      MCV 97      MCH 31.4 (*)     MCHC 32.4      RDW 12.9      Platelets 189      MPV 9.7      Immature Granulocytes 0.4      Gran # (ANC) 4.6      Immature Grans (Abs) 0.03      Lymph # 1.8      Mono # 0.6      Eos # 0.1      Baso # 0.02      nRBC 0      Gran % 63.7      Lymph % 25.1      Mono % 8.8      Eosinophil % 1.7      Basophil % 0.3      Differential Method Automated     COMPREHENSIVE METABOLIC PANEL - Abnormal    Sodium 142      Potassium 3.6      Chloride 111 (*)     CO2 25      Glucose 87      BUN 22      Creatinine 0.6      Calcium 9.0      Total Protein 6.9      Albumin 3.5      Total Bilirubin 0.6      Alkaline Phosphatase 60      AST 29      ALT 20      eGFR >60.0      Anion Gap 6 (*)    URINALYSIS, REFLEX TO URINE CULTURE - Abnormal    Specimen UA Urine, Clean Catch      Color, UA Colorless (*)     Appearance, UA Clear      pH, UA 8.0      Specific Gravity, UA 1.010      Protein, UA Negative      Glucose, UA Negative      Ketones, UA Negative      Bilirubin (UA) Negative      Occult Blood UA Negative      Nitrite, UA Negative      Leukocytes, UA Trace (*)     Narrative:     Specimen Source->Urine   URINALYSIS MICROSCOPIC    RBC, UA 2      WBC, UA 2      Bacteria Rare      Amorphous, UA Rare      Microscopic Comment SEE COMMENT      Narrative:     Specimen Source->Urine     EKG Readings: (Independently Interpreted)   Initial Reading: No STEMI. Rhythm: Normal Sinus Rhythm.     ECG Results              EKG 12-lead (Final result)         Collection Time Result Time QRS Duration [facility] QTC Calculation    09/12/24 09:09:41 09/12/24 11:17:05 72 424                     Final result by [facility] (09/12/24 11:17:10)                   Narrative:    Test Reason : R53.1,    Vent. Rate : 062 BPM     Atrial Rate : 062 BPM     P-R Int : 180 ms          QRS Dur : 072 ms      QT Int : 418 ms       P-R-T Axes : 033 -03 055 degrees     QTc Int : 424 ms    Normal sinus rhythm with sinus arrhythmia  Normal ECG  When compared with ECG of 24-OCT-2023 17:33,  Vent. rate has decreased BY  40 BPM  Confirmed by Jeff Leon MD ([id number]) on 9/12/2024 11:17:04 AM    Referred By: [email or web address]   SELF           Confirmed By:Jeff Leon MD                                  Imaging Results              X-Ray Shoulder Trauma Left (Final result)  Result time 09/12/24 12:42:58      Final result by Miller Samano MD (09/12/24 12:42:58)                   Impression:      See above      Electronically signed by: Miller Samano MD  Date:    09/12/2024  Time:    12:42               Narrative:    EXAMINATION:  XR SHOULDER TRAUMA 3 VIEW LEFT    CLINICAL HISTORY:  Unspecified fall, initial encounter    TECHNIQUE:  Three views of the left shoulder were performed.    COMPARISON  N 02/21/2019 one    FINDINGS:  There is severe DJD with osteophytosis and significant narrowing of the glenohumeral joint space.  No acute fracture or dislocation.  No bone destruction identified.                                       CT Head Without Contrast (Final result)  Result time 09/12/24 09:34:40      Final result by Mauricio Del Cid MD (09/12/24 09:34:40)                   Impression:      No evidence of acute intracranial hemorrhage.      Electronically signed by: Mauricio Del Cid MD  Date:    09/12/2024  Time:    09:34               Narrative:    EXAMINATION:  CT HEAD WITHOUT CONTRAST    CLINICAL HISTORY:  Head trauma, minor (Age >= 65y);    TECHNIQUE:  Low dose axial CT images obtained  throughout the head without the use of intravenous contrast.  Axial, sagittal and coronal reconstructions were performed.    COMPARISON:  MRI 08/02/2024    FINDINGS:  Intracranial compartment:    Ventricles are stable in size.  Pattern cerebral volume loss, without evidence of hydrocephalus.    Mild patchy hypoattenuation in the supratentorial white matter, nonspecific but most likely reflecting chronic small vessel ischemic changes. No recent or remote major vascular distribution infarct. No acute hemorrhage.  No mass effect or midline shift.    No new extra-axial blood or fluid collections.    Skull/extracranial contents (limited evaluation):    No new displaced calvarial fracture.  Remote right orbital floor fracture.    The mastoid air cells and visualized paranasal sinuses are essentially clear.    Bilateral pseudophakia.                                    X-Rays:   Independently Interpreted Readings:   Head CT: No hemorrhage.  No skull fracture.  No acute stroke.  The calcified pineal gland is midline.   Other Readings:  Left shoulder x-ray without evidence of dislocation, fractures or other pathological findings.    Medications   ketorolac injection 15 mg (15 mg Intravenous Given 9/12/24 1206)   ondansetron injection 4 mg (4 mg Intravenous Given 9/12/24 1205)   amLODIPine tablet 10 mg (10 mg Oral Given 9/12/24 1207)     Medical Decision Making  89-year-old female as described above presenting for ground level fall with positive LOC negative blood thinners or head trauma.    Ddx includes but is not limited to:   Arrhythmia, electrolyte derangement( hyponatremia, hypokalemia), dehydration, acs, hypogylcemia. Considered stroke and TIA however considering pt's presentation much lower likelihood at this time.     Update/re-evaluation:  Later in the ED course patient complained of left shoulder pain at which time x-rays were ordered with no evidence of acute fractures or dislocations.  Patient neurovascularly  intact of the affected extremity.  Negative head CT.  EKG without arrhythmias or ischemic changes.  Lab studies grossly normal.  Syncopal event presumably due to vasovagal reaction.  Neurological assessment globally normal and cranial nerves 2-12 grossly intact.  At this time I do not see reason for hospital admission patient was educated on follow up with her primary care provider.  She displayed good understanding and agreement and will be discharged home with return precautions.    Amount and/or Complexity of Data Reviewed  Labs: ordered. Decision-making details documented in ED Course.  Radiology: ordered. Decision-making details documented in ED Course.  ECG/medicine tests:  Decision-making details documented in ED Course.    Risk  Prescription drug management.              Attending Attestation:             Attending ED Notes:   Attending Note:  I have seen the patient, have repeated the key portions of the history and physical, reviewed and agree with the medical documentation, and supervised and managed the medical care of the patient. Additionally, I was present for the critical portion of any procedure(s) performed.    89 F hx above here weakness and fall  Hypertensive but did not take her BP meds prior to arrival.  Labs unremarkable, UA negative.  CT head negative for ICH.  Left shoulder x-ray reviewed and independently interpreted by me as no fracture dislocation.  Workup overall unremarkable, recommend continue monitoring blood pressure at home and take medications.  Your blood pressure remains elevated, follow up with PCP.  Return precautions given    TRICIA Felix MD  Staff ED Physician  09/12/2024 1:03 PM            ED Course as of 09/12/24 1612   Thu Sep 12, 2024   0935 EKG 12-lead  EKG independently interpreted by me.    Performed: 09/12/2024   Rate/Rhythm/Axis: 62 bpm, normal rhythm, normal axis  QRS 72 ms  Qtc 424 ms  Impression:  Normal Sinus Rhythm.  EKG without evidence of Na channel blockade,  K channel blockade, there is no prolonged QTc or digoxin effect.  There is no STEMI or signs of ischemia. [JL]   0948 CBC auto differential(!)  No leukocytosis or left shift or anemia appreciated [JL]   0948 Comprehensive metabolic panel(!)  No metabolic or electrolyte derangements [JL]   1013 CT Head Without Contrast  As per my interpretation:  Falx cerebri midline, no epidural or subdural hemorrhage appreciated, no fractures.  Otherwise normal head CT. [JL]   1120 Urinalysis Microscopic  Negative UA [JL]      ED Course User Index  [JL] Devi Marc MD                           Clinical Impression:  Final diagnoses:  [R53.1] Weakness  [W19.XXXA] Fall          ED Disposition Condition    Discharge Stable          ED Prescriptions    None       Follow-up Information       Follow up With Specialties Details Why Contact Info    Kev Macias MD Internal Medicine Schedule an appointment as soon as possible for a visit in 1 day  [address]  [place] [address]  [phone]      [facility] - Emergency Dept Emergency Medicine Go to  As needed [address] [facility] [id number] [phone]             Devi Marc MD  Resident  09/12/24 [id number]

## 2024-09-12 NOTE — DISCHARGE INSTRUCTIONS
You were seen in the [facility] ED after sustaining a ground level fall with loss of consciousness.  During your re-evaluation or found to happen negative head CT scan which was reassuring after your fall.  Your left shoulder x-ray was also reassuring with no fractures or dislocations.  Your laboratory studies and EKG were in normal range.  Should you develop headache, nausea or vomiting, vision changes, chest pain, shortness of breath, abdominal or one-sided or global weakness please return to the ED for further evaluation.  Please follow up with the primary care provider for ongoing care.  Thank you for letting us take care of you.

## 2024-10-04 DIAGNOSIS — M81.0 OSTEOPOROSIS, UNSPECIFIED OSTEOPOROSIS TYPE, UNSPECIFIED PATHOLOGICAL FRACTURE PRESENCE: Primary | ICD-10-CM

## 2024-10-04 RX ORDER — ALENDRONATE SODIUM 70 MG/1
70 TABLET ORAL
Qty: 12 TABLET | Refills: 3 | Status: SHIPPED | OUTPATIENT
Start: 2024-10-04

## 2024-10-04 NOTE — TELEPHONE ENCOUNTER
LOV w/ CRYSTAL -  7/25/2024   SISI cruz/ Rodney, NP 9/9/2024    Also due for dexa scan. Unable to send to centralized refill pool.

## 2024-10-04 NOTE — TELEPHONE ENCOUNTER
Care Due:                  Date            Visit Type   Department     Provider  --------------------------------------------------------------------------------                                EP -                              PRIMARY      [facility]  Last Visit: 07-      CARE ([facility])   MEDICINE       Kev Macias  Next Visit: None Scheduled  None         None Found                                                            Last  Test          Frequency    Reason                     Performed    Due Date  --------------------------------------------------------------------------------    Mg Level....  12 months..  alendronate..............  Not Found    Overdue    Phosphate...  12 months..  alendronate..............  Not Found    Overdue    [employer] Embedded Care Due Messages. Reference number: [id number].   10/04/2024 10:18:33 AM CDT

## 2024-10-07 RX ORDER — AMLODIPINE BESYLATE 10 MG/1
10 TABLET ORAL
Qty: 90 TABLET | Refills: 3 | Status: SHIPPED | OUTPATIENT
Start: 2024-10-07

## 2024-10-07 NOTE — TELEPHONE ENCOUNTER
No care due was identified.  [facility] Embedded Care Due Messages. Reference number: [id number].   10/07/2024 5:49:04 AM CDT

## 2024-10-07 NOTE — TELEPHONE ENCOUNTER
Refill Routing Note   Medication(s) are not appropriate for processing by [facility] for the following reason(s):        ED/Hospital Visit since last OV with provider  Required vitals abnormal    [facility] action(s):  Defer               Appointments  past 12m or future 3m with PCP    Date Provider   Last Visit   7/25/2024 Kev Macias MD   Next Visit   Visit date not found Kev Macias MD   ED visits in past 90 days: 2        Note composed:1:21 PM 10/07/2024

## 2024-10-31 ENCOUNTER — TELEPHONE (OUTPATIENT)
Dept: INTERNAL MEDICINE | Facility: CLINIC | Age: 89
End: 2024-10-31
Payer: MEDICARE

## 2024-10-31 NOTE — TELEPHONE ENCOUNTER
----- Message from Richard sent at 10/30/2024  2:10 PM CDT -----  Contact: [phone]  Caller is requesting an earlier appointment then we can schedule.  Caller is requesting a message be sent to the provider.    When is the next available appointment with their provider:  Nov 5th    Reason for the appointment:  cough    Patient preference of timeframe to be scheduled:  sometime this week instead of Dr. Alcantara (as requested by her daughter)    Would the patient like a call back, or a response through their [facility] portal?:   call    Comments:

## 2024-11-04 NOTE — PROGRESS NOTES
MEDICAL HISTORY:  Hypertension.  Mitral regurgitation based on 2D echo  Aortic atherosclerosis based on imaging  Breast cancer left breast, invasive ductal carcinoma, status post left partial mastectomy and re-excision left partial mastectomy with positive margins, followed by radiation therapy, diagnosed January 2023  Breast cancer of the right breast, status post lumpectomy and had been on tamoxifen.  Osteoporosis  Osteoarthritis of the knees and shoulder.  Hysterectomy with oophorectomy.  Carpal tunnel syndrome release.  Bilateral knee arthroscopic surgery.  Bunionectomy.      SOCIAL HISTORY:  Tobacco use and alcohol use, none.        MEDICATIONS:   Atenolol 25 mg daily.  Amlodipine 10 mg daily.  Benazepril 40 mg daily.  Vitamin D.  Omega-3.  Calcium.  Multivitamin      N[age] year female   For about 2 months she was been having a persistent cough.  The cough is dry nonproductive.  At times she feels it might be congested lower chest but not able to cough it up that has no shortness a breath or wheezing cough can happen in his in his position.  That has no chest pain.  Really no nasal head congestion.  She was not having heartburn indigestion does not come on with the eating.    She was seen in internal Medicine urgent care September which it was x-ray was unrevealing    Examination   Weight 142   BMI 28.81  Temperature is 97.8   Pulse ox 90%  Tympanic membranes normal   Nasal mucosa is clear   Oropharynx no abnormal findings  Neck no adenopathy   Chest clear breath sounds no wheezes or rales   Heart regular rate and rhythm  Abdominal exam soft, nontender no hepatosplenomegaly abdominal masses    Impression   Persistent cough  Hypertension  History of breast cancer    Plan   CT the chest  A trial of olmesartan place of benazepril although she was been on benazepril for a good number of years that has well as this may work better for management of hypotension  Tessalon Perles was given 3 times a day for the next  week

## 2024-11-05 ENCOUNTER — HOSPITAL ENCOUNTER (OUTPATIENT)
Dept: RADIOLOGY | Facility: HOSPITAL | Age: 89
Discharge: HOME | End: 2024-11-05
Attending: INTERNAL MEDICINE
Payer: MEDICARE

## 2024-11-05 ENCOUNTER — OFFICE VISIT (OUTPATIENT)
Dept: INTERNAL MEDICINE | Facility: CLINIC | Age: 89
End: 2024-11-05
Payer: MEDICARE

## 2024-11-05 VITALS
OXYGEN SATURATION: 98 % | DIASTOLIC BLOOD PRESSURE: 72 MMHG | WEIGHT: 142.63 LBS | HEART RATE: 84 BPM | SYSTOLIC BLOOD PRESSURE: 144 MMHG | BODY MASS INDEX: 28.81 KG/M2

## 2024-11-05 DIAGNOSIS — Z85.3 HISTORY OF BREAST CANCER: ICD-10-CM

## 2024-11-05 DIAGNOSIS — I10 PRIMARY HYPERTENSION: ICD-10-CM

## 2024-11-05 DIAGNOSIS — R05.3 CHRONIC COUGH: Primary | ICD-10-CM

## 2024-11-05 DIAGNOSIS — R05.3 CHRONIC COUGH: ICD-10-CM

## 2024-11-05 PROCEDURE — 1160F RVW MEDS BY RX/DR IN RCRD: CPT | Mod: CPTII,S$GLB,, | Performed by: INTERNAL MEDICINE

## 2024-11-05 PROCEDURE — 99999 PR PBB SHADOW E&M-EST. PATIENT-LVL III: CPT | Mod: PBBFAC,,, | Performed by: INTERNAL MEDICINE

## 2024-11-05 PROCEDURE — 1159F MED LIST DOCD IN RCRD: CPT | Mod: CPTII,S$GLB,, | Performed by: INTERNAL MEDICINE

## 2024-11-05 PROCEDURE — 99214 OFFICE O/P EST MOD 30 MIN: CPT | Mod: S$GLB,,, | Performed by: INTERNAL MEDICINE

## 2024-11-05 PROCEDURE — 3288F FALL RISK ASSESSMENT DOCD: CPT | Mod: CPTII,S$GLB,, | Performed by: INTERNAL MEDICINE

## 2024-11-05 PROCEDURE — 1100F PTFALLS ASSESS-DOCD GE2>/YR: CPT | Mod: CPTII,S$GLB,, | Performed by: INTERNAL MEDICINE

## 2024-11-05 PROCEDURE — 71250 CT THORAX DX C-: CPT | Mod: TC

## 2024-11-05 PROCEDURE — 71250 CT THORAX DX C-: CPT | Mod: 26,,, | Performed by: RADIOLOGY

## 2024-11-05 RX ORDER — BENZONATATE 100 MG/1
100 CAPSULE ORAL 3 TIMES DAILY PRN
Qty: 30 CAPSULE | Refills: 0 | Status: SHIPPED | OUTPATIENT
Start: 2024-11-05

## 2024-11-05 RX ORDER — OLMESARTAN MEDOXOMIL 40 MG/1
40 TABLET ORAL DAILY
Qty: 30 TABLET | Refills: 3 | Status: SHIPPED | OUTPATIENT
Start: 2024-11-05

## 2024-11-07 ENCOUNTER — TELEPHONE (OUTPATIENT)
Dept: INTERNAL MEDICINE | Facility: CLINIC | Age: 89
End: 2024-11-07
Payer: MEDICARE

## 2024-11-07 NOTE — TELEPHONE ENCOUNTER
----- Message from Alondra sent at 11/7/2024  8:02 AM CST -----  Contact: [phone] Patient  Patient is returning a phone call.    Who left a message for the patient: ?    Does patient know what this is regarding:  results?     Would you like a call back, or a response through your [facility] portal?:   call back     Comments:

## 2024-11-13 NOTE — PLAN OF CARE
Day 14 of outpatient radiation to left breast. Afebrile. Swelling of left breast, some tenderness. Dr. Pan notified.   
Cardiac

## 2024-12-05 ENCOUNTER — TELEPHONE (OUTPATIENT)
Dept: INTERNAL MEDICINE | Facility: CLINIC | Age: 89
End: 2024-12-05
Payer: MEDICARE

## 2024-12-05 RX ORDER — OLMESARTAN MEDOXOMIL 40 MG/1
40 TABLET ORAL DAILY
Qty: 90 TABLET | Refills: 3 | Status: SHIPPED | OUTPATIENT
Start: 2024-12-05

## 2024-12-05 NOTE — TELEPHONE ENCOUNTER
Pt's daughter is calling for a refill of the BP meds (Olmesartan 40mg)  given to pt at last office visit on 11/5/24.daughter states that pt is doing really well on new BP medication and would like a refill sent to pharmacy on file. Also stated the cough is gone. Please advise.

## 2024-12-05 NOTE — TELEPHONE ENCOUNTER
I sent in the requested blood pressure medication.  Can let her know that the CT scan that was done did not show any abnormal lung findings

## 2024-12-05 NOTE — TELEPHONE ENCOUNTER
----- Message from Anna sent at 12/5/2024 11:05 AM CST -----  Contact: [phone] Denisse  [email or web address]     Patient is calling for Medical Advice regarding:pt daughter Denisse [phone] calling to see about getting some more medication for her mother that was order on 11/5/24 not the shellie but the one for the blood pressure.  That one seems to work and discontinue the one the replace that with.  Please call Denisse back and advise.    How long has patient had these symptoms:    Pharmacy name and phone#:    Patient wants a call back or thru [facility]:callback    Comments:    Please advise patient replies from provider may take up to 48 hours.

## 2024-12-18 ENCOUNTER — TELEPHONE (OUTPATIENT)
Dept: INTERNAL MEDICINE | Facility: CLINIC | Age: 89
End: 2024-12-18
Payer: MEDICARE

## 2024-12-18 NOTE — TELEPHONE ENCOUNTER
----- Message from Candice sent at 12/18/2024 12:24 PM CST -----  Contact: Mobile  [phone]  Caller is requesting to schedule their annual screening mammogram appointment. Order is not listed in Epic.  Please enter order and contact patient to schedule.    Would the patient like a call back, or a response through their [facility] portal?:   Call    Where would they like the mammogram performed?: Call

## 2025-01-03 NOTE — TELEPHONE ENCOUNTER
Care Due:                  Date            Visit Type   Department     Provider  --------------------------------------------------------------------------------                                EP -                              PRIMARY      [facility]  Last Visit: 11-      CARE ([facility])   MEDICINE       Kev Macias  Next Visit: None Scheduled  None         None Found                                                            Last  Test          Frequency    Reason                     Performed    Due Date  --------------------------------------------------------------------------------    Mg Level....  12 months..  alendronate..............  Not Found    Overdue    Phosphate...  12 months..  alendronate..............  Not Found    Overdue    [employer] Embedded Care Due Messages. Reference number: [id number].   1/03/2025 5:49:52 AM CST

## 2025-01-04 RX ORDER — ATENOLOL 25 MG/1
TABLET ORAL
Qty: 90 TABLET | Refills: 3 | Status: SHIPPED | OUTPATIENT
Start: 2025-01-04

## 2025-01-04 NOTE — TELEPHONE ENCOUNTER
Refill Routing Note   Medication(s) are not appropriate for processing by [facility] for the following reason(s):      Required vitals abnormal    [facility] action(s):  Defer Care Due:  Labs due            Appointments  past 12m or future 3m with PCP    Date Provider   Last Visit   11/5/2024 Kev Macias MD   Next Visit   Visit date not found Kev Macias MD   ED visits in past 90 days: 0        Note composed:7:31 PM 01/03/2025

## 2025-02-03 ENCOUNTER — TELEPHONE (OUTPATIENT)
Dept: OPTOMETRY | Facility: CLINIC | Age: OVER 89
End: 2025-02-03
Payer: MEDICARE

## 2025-02-05 ENCOUNTER — OFFICE VISIT (OUTPATIENT)
Dept: OPTOMETRY | Facility: CLINIC | Age: OVER 89
End: 2025-02-05
Payer: MEDICARE

## 2025-02-05 DIAGNOSIS — Z96.1 PSEUDOPHAKIA OF BOTH EYES: ICD-10-CM

## 2025-02-05 DIAGNOSIS — H04.123 DRY EYE SYNDROME OF BOTH EYES: ICD-10-CM

## 2025-02-05 DIAGNOSIS — H52.4 PRESBYOPIA: ICD-10-CM

## 2025-02-05 DIAGNOSIS — H43.813 POSTERIOR VITREOUS DETACHMENT OF BOTH EYES: Primary | ICD-10-CM

## 2025-02-05 DIAGNOSIS — H52.203 ASTIGMATISM OF BOTH EYES, UNSPECIFIED TYPE: ICD-10-CM

## 2025-02-05 DIAGNOSIS — I10 PRIMARY HYPERTENSION: ICD-10-CM

## 2025-02-05 PROCEDURE — 1101F PT FALLS ASSESS-DOCD LE1/YR: CPT | Mod: CPTII,S$GLB,, | Performed by: OPTOMETRIST

## 2025-02-05 PROCEDURE — 99999 PR PBB SHADOW E&M-EST. PATIENT-LVL III: CPT | Mod: PBBFAC,,, | Performed by: OPTOMETRIST

## 2025-02-05 PROCEDURE — 1160F RVW MEDS BY RX/DR IN RCRD: CPT | Mod: CPTII,S$GLB,, | Performed by: OPTOMETRIST

## 2025-02-05 PROCEDURE — 3288F FALL RISK ASSESSMENT DOCD: CPT | Mod: CPTII,S$GLB,, | Performed by: OPTOMETRIST

## 2025-02-05 PROCEDURE — 99214 OFFICE O/P EST MOD 30 MIN: CPT | Mod: S$GLB,,, | Performed by: OPTOMETRIST

## 2025-02-05 PROCEDURE — 1126F AMNT PAIN NOTED NONE PRSNT: CPT | Mod: CPTII,S$GLB,, | Performed by: OPTOMETRIST

## 2025-02-05 PROCEDURE — 1159F MED LIST DOCD IN RCRD: CPT | Mod: CPTII,S$GLB,, | Performed by: OPTOMETRIST

## 2025-02-05 NOTE — PROGRESS NOTES
FREDDIE    CHELA: 11/9/2023 - Dr. Samuel     CC: Pt is here today for a routine eye exam. Pt states that her vision has   been stable since her last exam.     (-)Dryness  (-)Burning  (-)Itchiness  (+)Tearing  (-)Flashes  (-)Floaters   (+)Photophobia  (-)Eye Pain      Diabetic: no    Contact Lens: no    Eye Meds: none    PD: 67.5    Last edited by Vandana Bhatt MA on 2/5/2025  1:14 PM.            Assessment /Plan     For exam results, see Encounter Report.        Essential hypertension               No retinopathy, monitor yearly     Pseudophakia of both eyes  Posterior vitreous detachment of both eyes  PCO (posterior capsular opacification), right               Stable, monitor     Dry eye syndrome of both eyes               Use art tears PRN     Astigmatism of both eyes, unspecified type  Presbyopia               Rx specs     RTC 1 year

## 2025-02-06 ENCOUNTER — TELEPHONE (OUTPATIENT)
Dept: INTERNAL MEDICINE | Facility: CLINIC | Age: OVER 89
End: 2025-02-06
Payer: MEDICARE

## 2025-02-06 NOTE — TELEPHONE ENCOUNTER
Spoke with pt and explained her new med olmesartan is for hbp. Pt wants to know does she have to take this she never took it before

## 2025-02-06 NOTE — TELEPHONE ENCOUNTER
Regarding the patient's concern about the use of olmesartan, when she was seen by being November it was decided to start her on olmesartan 40 mg, to take the place of benazepril 40 mg what she was taking at the time.  Therefore need to know that has she taking both olmesartan and  benazepril together or 1 or the other.  Actually I would prefer that she takes olmesartan over benazepril.  I think this will work better for blood pressure

## 2025-02-07 ENCOUNTER — TELEPHONE (OUTPATIENT)
Dept: INTERNAL MEDICINE | Facility: CLINIC | Age: OVER 89
End: 2025-02-07
Payer: MEDICARE

## 2025-02-07 NOTE — TELEPHONE ENCOUNTER
----- Message from Med Assistant Aly sent at 2/7/2025 10:20 AM CST -----  Contact: [phone]    ----- Message -----  From: Cristiana Samson  Sent: 2/7/2025   9:43 AM CST  To: Gilberto ISRAEL Staff    Type: Returning a call    Who left a message? Dr. Macias Staff     When did the practice call? Today     Does patient know what this is regarding: question about new medication.    Would the patient rather a call back or a response via [facility]? Call back     Comments:

## 2025-02-11 ENCOUNTER — OFFICE VISIT (OUTPATIENT)
Dept: PODIATRY | Facility: CLINIC | Age: OVER 89
End: 2025-02-11
Payer: MEDICARE

## 2025-02-11 VITALS
SYSTOLIC BLOOD PRESSURE: 185 MMHG | BODY MASS INDEX: 28.76 KG/M2 | HEART RATE: 73 BPM | DIASTOLIC BLOOD PRESSURE: 80 MMHG | WEIGHT: 142.63 LBS | HEIGHT: 59 IN

## 2025-02-11 DIAGNOSIS — M79.672 BILATERAL FOOT PAIN: ICD-10-CM

## 2025-02-11 DIAGNOSIS — L84 CORNS/CALLOSITIES: ICD-10-CM

## 2025-02-11 DIAGNOSIS — M21.619 BUNION: Chronic | ICD-10-CM

## 2025-02-11 DIAGNOSIS — B35.1 ONYCHOMYCOSIS DUE TO DERMATOPHYTE: Primary | Chronic | ICD-10-CM

## 2025-02-11 DIAGNOSIS — M79.671 BILATERAL FOOT PAIN: ICD-10-CM

## 2025-02-11 NOTE — PROGRESS NOTES
"PODIATRY    PATIENT ID:  Ernestine Luna is a 89 y.o. female.     CHIEF CONCERN:   Nail Care (Nail Care/PCP Kev Macias MD  11/05/24)         MEDICAL DECISION MAKING:      Problem List Items Addressed This Visit    None  Visit Diagnoses         Onychomycosis due to dermatophyte  (Chronic)   -  Primary    Relevant Medications    ciclopirox 0.77 % Gel    Other Relevant Orders    Routine Foot Care      Bilateral foot pain        Relevant Orders    Routine Foot Care      Corns/callosities        Relevant Medications    ammonium lactate 12 % Crea    Other Relevant Orders    Routine Foot Care      Bunion  (Chronic)               I counseled the patient on the patient's conditions, their implications and medical management.    Shoe and activity modification as needed for relief.   Prescription topical as above.  Separately, routine foot care due to pain      Routine Foot Care    Date/Time: 2/11/2025 1:00 PM    Performed by: Ana Rodas DPM  Authorized by: Ana Rodas DPM    Time out: Immediately prior to procedure a "time out" was called to verify the correct patient, procedure, equipment, support staff and site/side marked as required.    Hyperkeratotic Skin Lesions?: Yes    Number of trimmed lesions:  2  Location(s):  Left Plantar and Right Plantar    Nail Care Type:  Debride  Location(s): All  (Left 1st Toe, Left 3rd Toe, Left 2nd Toe, Left 4th Toe, Left 5th Toe, Right 1st Toe, Right 2nd Toe, Right 3rd Toe, Right 4th Toe and Right 5th Toe)  Patient tolerance:  Patient tolerated the procedure well with no immediate complications     With patient's permission, the toenails mentioned above were reduced and debrided using a nail nipper, removing offending nail and debris.   Utilizing a #15 scalpel, I trimmed the corns and calluses at the above mentioned location.      The patient will continue to monitor the areas daily, inspect the feet, wear protective shoe gear when ambulatory, and moisturizer to maintain " skin integrity.          HISTORY OF PRESENT ILLNESS:  Ernestine Luna is a 89 y.o. female with concerns regarding: Nail Care (Nail Care/PCP Kev Macias MD  11/05/24)    Chronic thickened elongated toenails, impairing ambulation.      Patient Active Problem List   Diagnosis    Primary hypertension    Osteoarthritis    DDD (degenerative disc disease), lumbar    History of breast cancer    Ectatic thoracic aorta    Atherosclerosis of aorta    Mild carotid artery disease    Inflammatory arthritis    Vaginal vault prolapse, posthysterectomy    Prolapse of anterior vaginal wall    Facet arthropathy    Vaginal atrophy    Urinary incontinence, urge    Nocturia    Colon polyps    Depressed mood    Abdominal aortic pulsation    Aortic aneurysm without rupture    Posterior vitreous detachment of both eyes    Pseudophakia of both eyes    Dry eye syndrome of both eyes    Impaired functional mobility, balance, gait, and endurance    Decreased strength of lower extremity    Invasive ductal carcinoma of breast, left    Decreased range of motion of neck    Postural imbalance    Osteoporosis    Psychiatric problem    Acute cystitis    Suicidal ideation    Calcified granuloma of lung - CT 2023       Current Outpatient Medications on File Prior to Visit   Medication Sig Dispense Refill    albuterol (VENTOLIN HFA) 90 mcg/actuation inhaler Inhale 2 puffs into the lungs every 6 (six) hours as needed for Wheezing. Rescue 6.7 g 0    alendronate (FOSAMAX) 70 MG tablet TAKE 1 TABLET(70 MG) BY MOUTH EVERY 7 DAYS 12 tablet 3    amLODIPine (NORVASC) 10 MG tablet TAKE 1 TABLET(10 MG) BY MOUTH EVERY DAY 90 tablet 3    atenoloL (TENORMIN) 25 MG tablet TAKE 1 TABLET(25 MG) BY MOUTH EVERY DAY 90 tablet 3    calcium carbonate (OS-DHRUV) 600 mg calcium (1,500 mg) Tab Take 600 mg by mouth once daily.      guaiFENesin 100 mg/5 ml (ROBITUSSIN) 100 mg/5 mL syrup Take 100 mg by mouth 3 (three) times daily as needed for Cough.      MULTIVITAMIN  "W-MINERALS/LUTEIN (CENTRUM SILVER ORAL) Take 1 tablet by mouth Daily.      olmesartan (BENICAR) 40 MG tablet Take 1 tablet (40 mg total) by mouth once daily. 90 tablet 3    omega 3-calcium-vit [id number] [phone] mg Cmpk Take by mouth once daily.      trospium (SANCTURA) 20 mg Tab tablet Take 1 tablet (20 mg total) by mouth 2 (two) times daily. 60 tablet 11    vitamin D 1000 units Tab Take 185 mg by mouth once daily.      cetirizine (ZYRTEC) 10 MG tablet Take 1 tablet (10 mg total) by mouth daily as needed for Allergies or Rhinitis (runny nose). 10 tablet 0     Current Facility-Administered Medications on File Prior to Visit   Medication Dose Route Frequency Provider Last Rate Last Admin    fentaNYL 50 mcg/mL injection  mcg   mcg Intravenous PRN Ilana Rodriguez MD   25 mcg at 04/18/23 0813    midazolam (VERSED) 1 mg/mL injection 0.5-4 mg  0.5-4 mg Intravenous PRN Ilana Rodriguez MD           Review of patient's allergies indicates:   Allergen Reactions    Tramadol Shortness Of Breath, Diarrhea and Nausea And Vomiting    Acetaminophen Nausea And Vomiting    Aspirin Nausea And Vomiting    Sulfa (sulfonamide antibiotics) Nausea And Vomiting    Prednisone Other (See Comments)     Stomach problems, no issue with injection         ROS:   General ROS: negative for - chills, fever or night sweats  Respiratory ROS: no cough, shortness of breath, or wheezing  Cardiovascular ROS: no chest pain or dyspnea on exertion      EXAM:     Vitals:    02/11/25 1258   BP: (!) 185/80   Pulse: 73   Weight: 64.7 kg (142 lb 10.2 oz)   Height: 4' 11" (1.499 m)        General:  Alert and Oriented x 3;  No acute distress    Vascular:   Dorsalis Pedis:  present   Posterior Tibial:  present  Capillary refill time:  3 seconds  Temperature of toes warm to touch  Edema:  1+       Neurological:     Sharp touch:  normal  Light touch: normal  Tinels Sign:  Absent  Mulders Click:   Absent      Dermatological:   Skin: thin and " atrophic  Wounds/Ulcers:  Absent  Bruising:  Absent  Erythema:  Absent  Elongated thickened toenails x 10  Calluses, bilateral.  Does not appear verrucous.  No petechiae      Musculoskeletal:   Metatarsophalangeal range of motion:   diminished range of motion  Subtalar joint range of motion: diminished range of motion  Ankle joint range of motion:  diminished range of motion  5/5 muscle strength  Bunions bilateral

## 2025-02-12 ENCOUNTER — TELEPHONE (OUTPATIENT)
Dept: PODIATRY | Facility: CLINIC | Age: OVER 89
End: 2025-02-12
Payer: MEDICARE

## 2025-02-12 RX ORDER — AMMONIUM LACTATE 12 G/100G
1 CREAM TOPICAL DAILY
Qty: 140 G | Refills: 6 | Status: SHIPPED | OUTPATIENT
Start: 2025-02-12

## 2025-02-12 RX ORDER — CICLOPIROX 7.7 MG/G
GEL TOPICAL DAILY
Qty: 45 G | Refills: 6 | Status: SHIPPED | OUTPATIENT
Start: 2025-02-12

## 2025-02-12 NOTE — TELEPHONE ENCOUNTER
----- Message from Uyen sent at 2/12/2025  1:11 PM CST -----  Pharmacy Calling to Clarify an RX     Name of Caller Denisse ( daughter)        What do they need to clarify? Stated that her mom was seen in the office on yesterday and was suppose to get an RX for a cream for the foot. Called the pharmacy Rx has not been sent. Please send the Rx to the listed pharmacy below.      Best Call Back Number [phone]      Additional Information:       [id number] - [place], [place] - [address] AT [facility] [address]  [address]  [facility] [address]  Phone: [phone] Fax: [phone]

## 2025-02-12 NOTE — TELEPHONE ENCOUNTER
Staff contacted and spoke patient informing her that a message has been sent to Dr. Rodas and is waiting a response.      Patient verbalized understanding.

## 2025-02-13 ENCOUNTER — HOSPITAL ENCOUNTER (OUTPATIENT)
Dept: RADIOLOGY | Facility: OTHER | Age: OVER 89
Discharge: HOME | End: 2025-02-13
Attending: INTERNAL MEDICINE
Payer: MEDICARE

## 2025-02-13 DIAGNOSIS — Z85.3 HISTORY OF BREAST CANCER: ICD-10-CM

## 2025-02-13 PROCEDURE — 77062 BREAST TOMOSYNTHESIS BI: CPT | Mod: TC

## 2025-03-31 ENCOUNTER — TELEPHONE (OUTPATIENT)
Dept: PODIATRY | Facility: CLINIC | Age: OVER 89
End: 2025-03-31
Payer: MEDICARE

## 2025-03-31 NOTE — TELEPHONE ENCOUNTER
Staff contacted and spoke with patient informing her that she has an appointment scheduled with Dr. Sotomayor for consult for surgery on April 30th at 1:00 pm.      Patient verbalized understanding.

## 2025-03-31 NOTE — TELEPHONE ENCOUNTER
----- Message from Sherie sent at 3/31/2025  2:13 PM CDT -----  Contact: [phone]  Patient is returning a phone call.Who left a message for the patient: Darcy Paez MADoes patient know what this is regarding:  returning call Would you like a call back, or a response through your [facility] portal?:   call back Comments:

## 2025-03-31 NOTE — TELEPHONE ENCOUNTER
----- Message from Med Assistant Andino sent at 3/28/2025  2:24 PM CDT -----  Regarding: FW: Patient concerns  Contact: Denisse [phone]    ----- Message -----  From: Wendie Martin  Sent: 3/28/2025   2:21 PM CDT  To: Adrianna Perez Staff  Subject: Patient concerns                                 Type:  Needs Medical AdviceWho Called: Denisse(daughter) called in regards to pt getting a referral to get her bunions removed pt is suffering Symptoms (please be specific):  pain in the bunion pt can not wear shoesHow long has patient had these symptoms:  Would the patient rather a call back or a response via [facility]? Call back Best Call Back Number: Denisse [phone] Information:

## 2025-04-08 ENCOUNTER — TELEPHONE (OUTPATIENT)
Dept: PODIATRY | Facility: CLINIC | Age: OVER 89
End: 2025-04-08
Payer: MEDICARE

## 2025-04-08 NOTE — TELEPHONE ENCOUNTER
Staff contacted and spoke with patient daughter Denisse Luna regarding wanting a recommendation for bunion surgery.    Staff informed patient daughter Denisse Luna that the patient have an appointment with Dr. Sotomayor at the main [facility] on 4/30/25 at 1:00 pm., for Consult for surgery.      Patient daughter Denisse Luna verbalized understanding.

## 2025-04-08 NOTE — TELEPHONE ENCOUNTER
----- Message from Licha sent at 4/8/2025 10:06 AM CDT -----  Regarding: Patient Advice- 2nd Attempt  Contact: [phone]  Denisse/daughter calling to get recommendations for a provider to perform surgery on her mariaelena. Pls call [phone]

## 2025-04-25 ENCOUNTER — TELEPHONE (OUTPATIENT)
Dept: INTERNAL MEDICINE | Facility: CLINIC | Age: OVER 89
End: 2025-04-25
Payer: MEDICARE

## 2025-04-25 NOTE — TELEPHONE ENCOUNTER
I tried returning pt, call ,but was unsuuccessful, and was not able to leave a voicemail, as the mailbox was full

## 2025-04-25 NOTE — TELEPHONE ENCOUNTER
I called and spoke with pt, and she said that she have no energy.  She said she be tired and sleepy all of time.  She said her energy is low.  She would like to know what do you suggest.

## 2025-04-25 NOTE — TELEPHONE ENCOUNTER
----- Message from Ioana sent at 4/24/2025  2:45 PM CDT -----  Contact: Patient [phone]  .1MEDICALADVICE Patient is calling for Medical Advice regarding:patient reports very low energy levels - no energy- needs adviceHow long has patient had these symptoms:3monthsPharmacy name and phone#:Patient wants a call back or thru [facility]:Patient [phone]:[id number] - [place], [place] - [address] AT [facility] & [id number] [address]Rrkan: [phone] Fax: [phone] advise patient replies from provider may take up to 48 hours.

## 2025-04-25 NOTE — TELEPHONE ENCOUNTER
----- Message from Marce sent at 4/25/2025  2:57 PM CDT -----  Contact: patient @ [phone]  Type: Returning a callWho left a message?n/Lee did the practice call?n/aDoes patient know what this is regarding:patient wants to speak with Dr Brownould the patient rather a call back or a response via [facility]? Call Comments:

## 2025-04-25 NOTE — TELEPHONE ENCOUNTER
----- Message from Marino sent at 4/25/2025  9:36 AM CDT -----  Regarding: Returning Call for Sandrine  Contact: Pt [phone]  Type: Returning a callWho left a message? Sandrine Franklin MAWhen did the practice call? TodayDoes patient know what this is regarding: YesWould the patient rather a call back or a response via [facility]? CallComments:

## 2025-04-28 ENCOUNTER — TELEPHONE (OUTPATIENT)
Dept: INTERNAL MEDICINE | Facility: CLINIC | Age: OVER 89
End: 2025-04-28
Payer: MEDICARE

## 2025-04-28 NOTE — TELEPHONE ENCOUNTER
I called and spoke with pt, and she would like to have something because she states she has no energy at all.

## 2025-04-30 ENCOUNTER — OFFICE VISIT (OUTPATIENT)
Dept: PODIATRY | Facility: CLINIC | Age: OVER 89
End: 2025-04-30
Payer: MEDICARE

## 2025-04-30 VITALS — HEART RATE: 69 BPM | SYSTOLIC BLOOD PRESSURE: 150 MMHG | DIASTOLIC BLOOD PRESSURE: 79 MMHG

## 2025-04-30 DIAGNOSIS — M20.62 ACQUIRED TOE DEFORMITY, LEFT: ICD-10-CM

## 2025-04-30 DIAGNOSIS — M79.672 CHRONIC PAIN IN LEFT FOOT: Primary | ICD-10-CM

## 2025-04-30 DIAGNOSIS — M21.612 BUNION OF LEFT FOOT: ICD-10-CM

## 2025-04-30 DIAGNOSIS — G89.29 CHRONIC PAIN IN LEFT FOOT: Primary | ICD-10-CM

## 2025-04-30 PROCEDURE — 99215 OFFICE O/P EST HI 40 MIN: CPT | Mod: S$GLB,,, | Performed by: STUDENT IN AN ORGANIZED HEALTH CARE EDUCATION/TRAINING PROGRAM

## 2025-04-30 PROCEDURE — 99999 PR PBB SHADOW E&M-EST. PATIENT-LVL III: CPT | Mod: PBBFAC,,, | Performed by: STUDENT IN AN ORGANIZED HEALTH CARE EDUCATION/TRAINING PROGRAM

## 2025-04-30 PROCEDURE — 3288F FALL RISK ASSESSMENT DOCD: CPT | Mod: CPTII,S$GLB,, | Performed by: STUDENT IN AN ORGANIZED HEALTH CARE EDUCATION/TRAINING PROGRAM

## 2025-04-30 PROCEDURE — 1101F PT FALLS ASSESS-DOCD LE1/YR: CPT | Mod: CPTII,S$GLB,, | Performed by: STUDENT IN AN ORGANIZED HEALTH CARE EDUCATION/TRAINING PROGRAM

## 2025-04-30 PROCEDURE — 1125F AMNT PAIN NOTED PAIN PRSNT: CPT | Mod: CPTII,S$GLB,, | Performed by: STUDENT IN AN ORGANIZED HEALTH CARE EDUCATION/TRAINING PROGRAM

## 2025-04-30 NOTE — PROGRESS NOTES
Subjective:     Patient    Ernestine Luna is a 89 y.o. female.    Problem    Previously followed by Dr Pappas and Dr Rodas. Last seen by Dr Rodas 2 months ago; last seen for nail fungus and chronic foot pain, started on topical ciclopirox for nails and debrided nails. Has had multiple surgeries of the feet that she was not fully happy with. Currently with left foot chronic pain primarily at residual bunion deformity, plantarflexed 3rd toe and dorsiflexed 4th and 5th toes. Has a callus at bunion due to rubbing. Has attempted change in footwear, callus debridements, topical medications, prior surgeries, pads/straps without resolution. Requesting surgery.        History    History obtained from patient and review of medical records.     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 Wilkins    COLONOSCOPY N/A 10/1/2019    Procedure: COLONOSCOPY;  Surgeon: Jarad Chavira MD;  Location: [address]);  Service: Endoscopy;  Laterality: N/A;  miralax prep (used miralax for last colonoscopy) - [facility]    COLONOSCOPY W/ POLYPECTOMY  08/08/2013    RASHEED   06/24/2014    EYE SURGERY      HYSTERECTOMY  age 38    ALISTAIR; ovaries in place    INJECTION FOR SENTINEL NODE IDENTIFICATION Left 2/20/2023    Procedure: INJECTION, FOR SENTINEL NODE  IDENTIFICATION-Left;  Surgeon: ROSIBEL Belcher MD;  [facility];  Service: General;  Laterality: Left;    JOINT REPLACEMENT      bilateral knees    MASTECTOMY, PARTIAL Left 2023    Procedure: MASTECTOMY, PARTIAL-Left with radiological marker;  Surgeon: ROSIBEL Belcher MD;  [facility];  Service: General;  Laterality: Left;    MASTECTOMY, PARTIAL Left 2023    Procedure: MASTECTOMY, PARTIAL-left re-excision;  Surgeon: ROSIBEL Belcher MD;  [facility];  Service: General;  Laterality: Left;    SENTINEL LYMPH NODE BIOPSY Left 2023    Procedure: BIOPSY, LYMPH NODE, SENTINEL-Left;  Surgeon: ROSIBEL Belcher MD;  Location: [facility] OR [address] [place] [address];  Service: General;  Laterality: Left;    TOTAL KNEE ARTHROPLASTY      Bilateral        Objective:     Vitals  Wt Readings from Last 1 Encounters:   25 64.7 kg (142 lb 10.2 oz)     Temp Readings from Last 1 Encounters:   24 97.8 °F (36.6 °C) (Oral)     BP Readings from Last 1 Encounters:   25 (!) 150/79     Pulse Readings from Last 1 Encounters:   25 69       Dermatological Exam    Skin:  Pedal hair growth diminished and Pedal skin thin and shiny on right  Pedal hair growth diminished and Pedal skin thin and shiny on left; tender callus overlying medial bunion    Nails:  10 nail(s) thickened    Vascular Exam    Arteries:  Posterior tibial artery palpable on right  Dorsalis pedis artery palpable on right  Posterior tibial artery palpable on left  Dorsalis pedis artery palpable on left    Veins:  Telangectasia on right  Telangectasia on left    Swellin+ nonpitting on right  1+ nonpitting on left    Neurological Exam    Shoshone touch test:  6/6 sites sensed, light touch intact     Musculoskeletal Exam    Footwear:  Casual on right  Casual on left    Gait Exam:   Ambulatory Status: Ambulatory  Gait: Antalgic  Assistive Devices: None    Foot Progression Angle:  Normal on right  Normal on left    Right Lower  Extremity Additional Findings:  Mild bunion  Right foot and ankle function, strength, and range of motion unremarkable except as noted above.     Left Lower Extremity Additional Findings:  Mild to moderate bunion with tenderness on palpation of bunion, partially reproduced with PROM  3rd MTPJ plantarflexion deformity with tenderness on palpation, limited function and PROM  4th and 5th MTPJ dorsiflexion deformities with tenderness on palpation, limited function and PROM  Left foot and ankle function, strength, and range of motion unremarkable except as noted above.    Imaging and Other Tests    Imaging:  Independently reviewed and interpreted imaging, findings are as follows: N/A     Assessment:     Encounter Diagnoses   Name Primary?    Chronic pain in left foot Yes    Bunion of left foot     Acquired toe deformity, left         Plan:     I counseled the patient on her conditions, their implications and medical management.    Left foot bunion, 3rd-5th toe deformities: chronic exacerbated  Surgical Decision Making  -Discussed further attempts at conservative care versus surgical intervention consisting of left foot MIS chevron with Akin osteotomies, 3rd-5th toe deformity corrections with possible syndactylies. Patient declined further conservative care and would like to proceed with surgical intervention.   -Reviewed potential risks, benefits, alternatives. Answered all questions. Risk factors include age. Written consent to be obtained on day of surgery.   -Discussed anticipated postop course including immediate postop weightbearing status which is WBAT in surgical shoe. Not driving foot, no driving restrictions.  No assistive device needed.  -Reviewed medical history; medications; most recent CMP, CBC; EKG. Ordered updated arterial US, X rays.   -If arterial US OK and if cleared by PCP then will proceed with surgery.   -Case request submitted for May 16.        Return to clinic 1 week postop, call sooner PRN.

## 2025-04-30 NOTE — H&P (VIEW-ONLY)
Subjective:     Patient    Ernestine Luna is a 89 y.o. female.    Problem    Previously followed by Dr Pappas and Dr Rodas. Last seen by Dr Rodas 2 months ago; last seen for nail fungus and chronic foot pain, started on topical ciclopirox for nails and debrided nails. Has had multiple surgeries of the feet that she was not fully happy with. Currently with left foot chronic pain primarily at residual bunion deformity, plantarflexed 3rd toe and dorsiflexed 4th and 5th toes. Has a callus at bunion due to rubbing. Has attempted change in footwear, callus debridements, topical medications, prior surgeries, pads/straps without resolution. Requesting surgery.        History    History obtained from patient and review of medical records.     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 Wilkins    COLONOSCOPY N/A 10/1/2019    Procedure: COLONOSCOPY;  Surgeon: Jarad Chavira MD;  Location: [address]);  Service: Endoscopy;  Laterality: N/A;  miralax prep (used miralax for last colonoscopy) - [facility]    COLONOSCOPY W/ POLYPECTOMY  08/08/2013    RASHEED   06/24/2014    EYE SURGERY      HYSTERECTOMY  age 38    ALISTAIR; ovaries in place    INJECTION FOR SENTINEL NODE IDENTIFICATION Left 2/20/2023    Procedure: INJECTION, FOR SENTINEL NODE  IDENTIFICATION-Left;  Surgeon: ROSIBEL Belcher MD;  [facility];  Service: General;  Laterality: Left;    JOINT REPLACEMENT      bilateral knees    MASTECTOMY, PARTIAL Left 2023    Procedure: MASTECTOMY, PARTIAL-Left with radiological marker;  Surgeon: ROSIBEL Belcher MD;  [facility];  Service: General;  Laterality: Left;    MASTECTOMY, PARTIAL Left 2023    Procedure: MASTECTOMY, PARTIAL-left re-excision;  Surgeon: ROSIBEL Belcher MD;  [facility];  Service: General;  Laterality: Left;    SENTINEL LYMPH NODE BIOPSY Left 2023    Procedure: BIOPSY, LYMPH NODE, SENTINEL-Left;  Surgeon: ROSIBEL Belcher MD;  Location: [facility] OR [address] [place] [address];  Service: General;  Laterality: Left;    TOTAL KNEE ARTHROPLASTY      Bilateral        Objective:     Vitals  Wt Readings from Last 1 Encounters:   25 64.7 kg (142 lb 10.2 oz)     Temp Readings from Last 1 Encounters:   24 97.8 °F (36.6 °C) (Oral)     BP Readings from Last 1 Encounters:   25 (!) 150/79     Pulse Readings from Last 1 Encounters:   25 69       Dermatological Exam    Skin:  Pedal hair growth diminished and Pedal skin thin and shiny on right  Pedal hair growth diminished and Pedal skin thin and shiny on left; tender callus overlying medial bunion    Nails:  10 nail(s) thickened    Vascular Exam    Arteries:  Posterior tibial artery palpable on right  Dorsalis pedis artery palpable on right  Posterior tibial artery palpable on left  Dorsalis pedis artery palpable on left    Veins:  Telangectasia on right  Telangectasia on left    Swellin+ nonpitting on right  1+ nonpitting on left    Neurological Exam    [place] touch test:  6/6 sites sensed, light touch intact     Musculoskeletal Exam    Footwear:  Casual on right  Casual on left    Gait Exam:   Ambulatory Status: Ambulatory  Gait: Antalgic  Assistive Devices: None    Foot Progression Angle:  Normal on right  Normal on left    Right Lower  Extremity Additional Findings:  Mild bunion  Right foot and ankle function, strength, and range of motion unremarkable except as noted above.     Left Lower Extremity Additional Findings:  Mild to moderate bunion with tenderness on palpation of bunion, partially reproduced with PROM  3rd MTPJ plantarflexion deformity with tenderness on palpation, limited function and PROM  4th and 5th MTPJ dorsiflexion deformities with tenderness on palpation, limited function and PROM  Left foot and ankle function, strength, and range of motion unremarkable except as noted above.    Imaging and Other Tests    Imaging:  Independently reviewed and interpreted imaging, findings are as follows: N/A     Assessment:     Encounter Diagnoses   Name Primary?    Chronic pain in left foot Yes    Bunion of left foot     Acquired toe deformity, left         Plan:     I counseled the patient on her conditions, their implications and medical management.    Left foot bunion, 3rd-5th toe deformities: chronic exacerbated  Surgical Decision Making  -Discussed further attempts at conservative care versus surgical intervention consisting of left foot MIS chevron with Akin osteotomies, 3rd-5th toe deformity corrections with possible syndactylies. Patient declined further conservative care and would like to proceed with surgical intervention.   -Reviewed potential risks, benefits, alternatives. Answered all questions. Risk factors include age. Written consent to be obtained on day of surgery.   -Discussed anticipated postop course including immediate postop weightbearing status which is WBAT in surgical shoe. Not driving foot, no driving restrictions.  No assistive device needed.  -Reviewed medical history; medications; most recent CMP, CBC; EKG. Ordered updated arterial US, X rays.   -If arterial US OK and if cleared by PCP then will proceed with surgery.   -Case request submitted for May 16.        Return to clinic 1 week postop, call sooner PRN.

## 2025-04-30 NOTE — PROGRESS NOTES
MEDICAL HISTORY:  Hypertension.  Mitral regurgitation /tricuspid regurgitation based on 2D echo  Pulmonary hypertension seen on 2D echo  Aortic atherosclerosis based on imaging  Breast cancer left breast, invasive ductal carcinoma, status post left partial mastectomy and re-excision left partial mastectomy with positive margins, followed by radiation therapy, diagnosed January 2023  Breast cancer of the right breast, status post lumpectomy and had been on tamoxifen.  Osteoporosis  Osteoarthritis of the knees and shoulder.  Hysterectomy with oophorectomy.  Carpal tunnel syndrome release.  Bilateral knee arthroscopic surgery.  Bunionectomy.      SOCIAL HISTORY:  Tobacco use and alcohol use, none.        MEDICATIONS:   Atenolol 25 mg daily.  Amlodipine 10 mg daily.  Olmesartan 40 mg daily.  Vitamin D.  Omega-3.  Calcium.  Multivitamin    89-year-old female   She is here with a daughter.    When the appointment was made as because she has been feeling cold all the time.  Chronically feels tired has no energy.  It could be a sleepiness.  This no shortness a breath or chest pain.  No abdominal pain    She does the bed around 930 p.m. gets up around 6:30 p.m..  She might get up twice a night to urinate.  She admits that she is restless.  She does not do much activity during the day.  When she sits down she feels he goes asleep but she can not go to sleep    Another issue is that on May 16 she is tentatively scheduled undergo surgery left foot to correct bunion deformity.  Early today she had an arterial ultrasound that showed about 50% stenosis in the posterior tibialis    Examination   Weight 144   BMI 29.17   Blood pressure 176/72  Pulse 76  Chest clear breath sounds Heart regular rate rhythm 2 6 systolic, the apex  Abdominal exam active bowel sounds soft nontender  Extremities trace edema   2+ carotid pulses no bruits  Trace pedal pulse    ECG normal sinus rhythm, ventricular rate 72 no acute changes    Impression    Hypertension  Mitral regurgitation  Pulmonary hypertension  Cold intolerance   Preop evaluation    Plan   Chemistry CBC TSH BNP  Disposition follow  Possible option is medicine the help with sleep such as trazodone 50 mg half a tab

## 2025-05-01 ENCOUNTER — OFFICE VISIT (OUTPATIENT)
Dept: INTERNAL MEDICINE | Facility: CLINIC | Age: OVER 89
End: 2025-05-01
Payer: MEDICARE

## 2025-05-01 ENCOUNTER — HOSPITAL ENCOUNTER (OUTPATIENT)
Dept: RADIOLOGY | Facility: HOSPITAL | Age: OVER 89
Discharge: HOME | End: 2025-05-01
Attending: STUDENT IN AN ORGANIZED HEALTH CARE EDUCATION/TRAINING PROGRAM
Payer: MEDICARE

## 2025-05-01 ENCOUNTER — RESULTS FOLLOW-UP (OUTPATIENT)
Dept: PODIATRY | Facility: CLINIC | Age: OVER 89
End: 2025-05-01

## 2025-05-01 ENCOUNTER — TELEPHONE (OUTPATIENT)
Dept: PODIATRY | Facility: CLINIC | Age: OVER 89
End: 2025-05-01
Payer: MEDICARE

## 2025-05-01 VITALS
HEART RATE: 75 BPM | OXYGEN SATURATION: 98 % | SYSTOLIC BLOOD PRESSURE: 140 MMHG | DIASTOLIC BLOOD PRESSURE: 78 MMHG | BODY MASS INDEX: 29.11 KG/M2 | HEIGHT: 59 IN | WEIGHT: 144.38 LBS

## 2025-05-01 DIAGNOSIS — M21.612 BUNION OF LEFT FOOT: ICD-10-CM

## 2025-05-01 DIAGNOSIS — M79.672 CHRONIC PAIN IN LEFT FOOT: ICD-10-CM

## 2025-05-01 DIAGNOSIS — Z01.818 PREOP EXAMINATION: ICD-10-CM

## 2025-05-01 DIAGNOSIS — G89.29 CHRONIC PAIN IN LEFT FOOT: ICD-10-CM

## 2025-05-01 DIAGNOSIS — R68.89 COLD INTOLERANCE: ICD-10-CM

## 2025-05-01 DIAGNOSIS — I27.20 PULMONARY HYPERTENSION: ICD-10-CM

## 2025-05-01 DIAGNOSIS — I34.0 NONRHEUMATIC MITRAL VALVE REGURGITATION: ICD-10-CM

## 2025-05-01 DIAGNOSIS — I10 PRIMARY HYPERTENSION: Primary | ICD-10-CM

## 2025-05-01 LAB
OHS QRS DURATION: 58 MS
OHS QTC CALCULATION: 427 MS

## 2025-05-01 PROCEDURE — 73630 X-RAY EXAM OF FOOT: CPT | Mod: 26,LT,, | Performed by: RADIOLOGY

## 2025-05-01 PROCEDURE — 93926 LOWER EXTREMITY STUDY: CPT | Mod: TC,LT

## 2025-05-01 PROCEDURE — 99999 PR PBB SHADOW E&M-EST. PATIENT-LVL III: CPT | Mod: PBBFAC,,, | Performed by: INTERNAL MEDICINE

## 2025-05-01 PROCEDURE — 73630 X-RAY EXAM OF FOOT: CPT | Mod: TC,LT

## 2025-05-01 PROCEDURE — 93926 LOWER EXTREMITY STUDY: CPT | Mod: 26,LT,, | Performed by: RADIOLOGY

## 2025-05-01 NOTE — TELEPHONE ENCOUNTER
Spoke to pt and discussed 5/16 surgery date. Also advised pt to contact their PCP to schedule an appointment to be cleared for surgery. Then advised pt that someone will call them the day before surgery with the surgery arrival time and instructions. Pt advised to stop all OTC medication on 5/5. Pt verbalized understanding and call ended.

## 2025-05-05 ENCOUNTER — TELEPHONE (OUTPATIENT)
Dept: PODIATRY | Facility: CLINIC | Age: OVER 89
End: 2025-05-05
Payer: MEDICARE

## 2025-05-05 ENCOUNTER — TELEPHONE (OUTPATIENT)
Dept: INTERNAL MEDICINE | Facility: CLINIC | Age: OVER 89
End: 2025-05-05
Payer: MEDICARE

## 2025-05-05 RX ORDER — HYDROCHLOROTHIAZIDE 12.5 MG/1
TABLET ORAL
Qty: 90 TABLET | OUTPATIENT
Start: 2025-05-05

## 2025-05-05 NOTE — TELEPHONE ENCOUNTER
----- Message from Med Assistant Silverio sent at 5/5/2025  3:06 PM CDT -----  Regarding: surgery scheduler  Patient was seen in office on 5/1. Please update if patient is cleared for surgery on 5/16 thanks. Ernestine Luna is scheduled to have a BUNIONECTOMY, WITH METATARSAL OSTEOTOMY LeftLocal MAC OSTEOTOMY, AKIN Left Local MACCORRECTION, HAMMER TOE Left Local MACSYNDACTYLIZATION, TOE Left Local MAC with Dr. Jose E Sotomayor  on 5/16 .  We request surgical clearance.  The patient is scheduled for 5/1 . We request a  EKG, CBC, BMP, and any other testing deemed necessary by your office. When completed please note chart stating whether the patient is cleared., along with any results to attention Nusrat Garcia.  Please contact us if you have any questions.  Our office number is [phone].Sincerely,Nusrat Garcia, MAFoot and Ankle SurgeryOchsner Medical Center, Department of Podiatry   ----- Message from Michela Guaman sent at 11/19/2020  3:24 PM EST -----  Regarding: Dr. Chrystal Ramos telephone          high priority  General Message/Vendor Calls    Caller's first and last name: pt       Reason for call:  Pt states in the portal it showed Dr. Meli Husain denied request and stated it he sent her prescription into pharmacy.  However, pt states she called her pharmacy and never received the request.     Callback required yes/no and why: yes       Best contact number(s): [phone]      Details to clarify the request: ([facility] on file)       Michela Guaman

## 2025-05-05 NOTE — TELEPHONE ENCOUNTER
----- Message from Med Assistant Silverio sent at 5/5/2025  3:06 PM CDT -----  Regarding: surgery scheduler  Patient was seen in office on 5/1. Please update if patient is cleared for surgery on 5/16 thanks. Ernestine Luna is scheduled to have a BUNIONECTOMY, WITH METATARSAL OSTEOTOMY LeftLocal MAC OSTEOTOMY, AKIN Left Local MACCORRECTION, HAMMER TOE Left Local MACSYNDACTYLIZATION, TOE Left Local MAC with Dr. Jose E Sotomayor  on 5/16 .  We request surgical clearance.  The patient is scheduled for 5/1 . We request a  EKG, CBC, BMP, and any other testing deemed necessary by your office. When completed please note chart stating whether the patient is cleared., along with any results to attention Nusrat Garcia.  Please contact us if you have any questions.  Our office number is [phone].Sincerely,Nusrat Garcia, MAFoot and Ankle SurgeryOchsner Medical Center, Department of Podiatry

## 2025-05-05 NOTE — TELEPHONE ENCOUNTER
Care Due:                  Date            Visit Type   Department     Provider  --------------------------------------------------------------------------------                                EP -                              PRIMARY      [facility] INTERNAL  Last Visit: 05-      CARE ([facility])   MEDICINE       Kev Macias  Next Visit: None Scheduled  None         None Found                                                            Last  Test          Frequency    Reason                     Performed    Due Date  --------------------------------------------------------------------------------    Mg Level....  12 months..  alendronate..............  Not Found    Overdue    Phosphate...  12 months..  alendronate..............  Not Found    Overdue    [employer] Embedded Care Due Messages. Reference number: [id number].   5/05/2025 2:17:26 PM CDT

## 2025-05-05 NOTE — TELEPHONE ENCOUNTER
----- Message from Leti sent at 5/5/2025  1:57 PM CDT -----  Contact: [employer] [phone]  1MEDICALADVICE Patient is calling for Medical Advice regarding:Medication Patient wants a call back or thru [facility]:Call backComments:[facility] would like a call back from nurse regarding pt blood pressure medication. Please advise patient replies from provider may take up to 48 hours.

## 2025-05-05 NOTE — TELEPHONE ENCOUNTER
I spoke with Ms. Guzman, and she want to know if you can put a note in pt chart to say whether or not she is cleared for surgery.

## 2025-05-06 NOTE — TELEPHONE ENCOUNTER
Refill Routing Note   Medication(s) are not appropriate for processing by [facility] for the following reason(s):        Required vitals abnormal    [facility] action(s):  Quick Discontinue  Defer      Medication Therapy Plan: HCTZ:Receipt confirmed by pharmacy (5/5/2025  5:11 PM CDT)      Appointments  past 12m or future 3m with PCP    Date Provider   Last Visit   5/1/2025 Kev Macias MD   Next Visit   Visit date not found Kev Macias MD   ED visits in past 90 days: 0        Note composed:10:21 PM 05/05/2025                sharp/shooting

## 2025-05-06 NOTE — TELEPHONE ENCOUNTER
----- Message from Jeannette sent at 5/6/2025 11:44 AM CDT -----  .1MEDICALADVICE Patient is calling for Medical Advice regarding: pt is calling to find out which medications were called in for her to the pharmacy because they aren't able to tell her How long has patient had these symptoms:Pharmacy name and phone#:Patient wants a call back or thru [facility]:Comments:Please advise patient replies from provider may take up to 48 hours.

## 2025-05-08 ENCOUNTER — TELEPHONE (OUTPATIENT)
Dept: INTERNAL MEDICINE | Facility: CLINIC | Age: OVER 89
End: 2025-05-08
Payer: MEDICARE

## 2025-05-08 RX ORDER — OLMESARTAN MEDOXOMIL 40 MG/1
40 TABLET ORAL
Qty: 30 TABLET | Refills: 0 | Status: SHIPPED | OUTPATIENT
Start: 2025-05-08

## 2025-05-08 RX ORDER — OLMESARTAN MEDOXOMIL 40 MG/1
40 TABLET ORAL DAILY
Qty: 90 TABLET | Refills: 3 | Status: SHIPPED | OUTPATIENT
Start: 2025-05-08

## 2025-05-08 NOTE — TELEPHONE ENCOUNTER
----- Message from Anna sent at 5/8/2025  9:12 AM CDT -----  Contact: [facility] [employer] [phone]  Requesting an RX refill or new RX.Is this a refill or new RX: newRX name and strength (copy/paste from chart):  olmesartan (BENICAR) 40 MG tablet 90 tabletIs this a 30 day or 90 day RX: Pharmacy name and phone # (copy/paste from chart):  [id number] - [place], [place] - [address] AT [facility] [id number] [address]Dqayk: [phone] Fax: [phone] The doctors have asked that we provide their patients with the following 2 reminders -- prescription refills can take up to 72 hours, and a friendly reminder that in the future you can use your [facility] account to request refills

## 2025-05-08 NOTE — TELEPHONE ENCOUNTER
No care due was identified.  [facility] Embedded Care Due Messages. Reference number: [id number].   5/08/2025 10:13:55 AM CDT

## 2025-05-08 NOTE — TELEPHONE ENCOUNTER
----- Message from Anna sent at 5/8/2025  9:14 AM CDT -----  Contact: [facility] [employer] [phone]  .1MEDICALADVICE Patient is calling for Medical Advice regarding:[facility] [employer] [phone] is calling as a follow up on the medication for a refill olmesartan (BENICAR) 40 MG tablet 90 tabletHow long has patient had these symptoms:Pharmacy name and phone#:Patient wants a call back or thru [facility]:callbackComments:Please advise patient replies from provider may take up to 48 hours.

## 2025-05-08 NOTE — TELEPHONE ENCOUNTER
Refill Routing Note   Medication(s) are not appropriate for processing by [facility] for the following reason(s):        Required vitals abnormal    [facility] action(s):  Defer               Appointments  past 12m or future 3m with PCP    Date Provider   Last Visit   5/1/2025 Kev Macias MD   Next Visit   Visit date not found Kev Macias MD   ED visits in past 90 days: 0        Note composed:12:39 PM 05/08/2025

## 2025-05-13 ENCOUNTER — DOCUMENTATION ONLY (OUTPATIENT)
Dept: INTERNAL MEDICINE | Facility: CLINIC | Age: OVER 89
End: 2025-05-13
Payer: MEDICARE

## 2025-05-13 ENCOUNTER — TELEPHONE (OUTPATIENT)
Dept: INTERNAL MEDICINE | Facility: CLINIC | Age: OVER 89
End: 2025-05-13
Payer: MEDICARE

## 2025-05-13 NOTE — TELEPHONE ENCOUNTER
Copied from CRM #[id number]. Topic: General Inquiry - Patient Advice  >> May 13, 2025  3:22 PM Alondra wrote:  Patient would like to get medical advice.  Symptoms (please be specific):   Pt daughter Denisse is asking if the pt is cleared or approved by Dr Macias for her foot surgery on 05/16/2025. Pt daughter is asking about the pt results of the pt labs and EKG done on 05/01/2025. Pt daughter is also asking if Dr Macias approves the test the podiatry provider ordered.   How long have you had these symptoms: N/A  Would you like a call back, or a response through your [facility] portal?: call back to Denisse/pt daughter  [phone]    Pharmacy name and phone # (copy from chart):   N/A  Comments:

## 2025-05-14 ENCOUNTER — TELEPHONE (OUTPATIENT)
Dept: INTERNAL MEDICINE | Facility: CLINIC | Age: OVER 89
End: 2025-05-14
Payer: MEDICARE

## 2025-05-14 NOTE — TELEPHONE ENCOUNTER
I called and spoke with pt, and she said her energy is down to zero.  I offered to give her appointment to come in, but she declined.  She would like to know is there anything that you can do?

## 2025-05-14 NOTE — TELEPHONE ENCOUNTER
----- Message from Savana sent at 5/14/2025 12:52 PM CDT -----  Contact: [phone]  .1MEDICALADVICE Patient is calling for Medical Advice regarding:speak to the nurse How long has patient had these symptoms:she states she has no energy for like the last 2 weeks Pharmacy name and phone#:Patient wants a call back or thru [facility]:call back Comments:Please advise patient replies from provider may take up to 48 hours.

## 2025-05-15 ENCOUNTER — TELEPHONE (OUTPATIENT)
Dept: PODIATRY | Facility: CLINIC | Age: OVER 89
End: 2025-05-15
Payer: MEDICARE

## 2025-05-15 NOTE — TELEPHONE ENCOUNTER
Spoke to denisse  and relayed their 7 am  arrival time for surgery. 2nd floor day of surgery at [facility]. Also informed pt to not eat or drink after midnight including gum, hard candy or mints. On morning of surgery she can take the the medications of atenolol, olmesartan, amlodipine. To hold the hydrochlorothiazide.  Also that the pt must have a ride home from surgery, they will not be allowed to drive after anesthesia. Post/ op 5/21 at 11:15 am Denisse verbalized understanding and call ended.

## 2025-05-16 ENCOUNTER — ANESTHESIA EVENT (OUTPATIENT)
Dept: SURGERY | Facility: HOSPITAL | Age: OVER 89
End: 2025-05-16
Payer: MEDICARE

## 2025-05-16 ENCOUNTER — HOSPITAL ENCOUNTER (OUTPATIENT)
Facility: HOSPITAL | Age: OVER 89
Discharge: HOME | End: 2025-05-16
Attending: STUDENT IN AN ORGANIZED HEALTH CARE EDUCATION/TRAINING PROGRAM | Admitting: STUDENT IN AN ORGANIZED HEALTH CARE EDUCATION/TRAINING PROGRAM
Payer: MEDICARE

## 2025-05-16 ENCOUNTER — ANESTHESIA (OUTPATIENT)
Dept: SURGERY | Facility: HOSPITAL | Age: OVER 89
End: 2025-05-16
Payer: MEDICARE

## 2025-05-16 VITALS
HEART RATE: 82 BPM | TEMPERATURE: 98 F | RESPIRATION RATE: 17 BRPM | SYSTOLIC BLOOD PRESSURE: 137 MMHG | DIASTOLIC BLOOD PRESSURE: 67 MMHG | OXYGEN SATURATION: 96 %

## 2025-05-16 DIAGNOSIS — M20.62 ACQUIRED TOE DEFORMITY, LEFT: ICD-10-CM

## 2025-05-16 DIAGNOSIS — G89.29 CHRONIC PAIN IN LEFT FOOT: ICD-10-CM

## 2025-05-16 DIAGNOSIS — M79.672 CHRONIC PAIN IN LEFT FOOT: ICD-10-CM

## 2025-05-16 DIAGNOSIS — M21.612 BUNION OF LEFT FOOT: ICD-10-CM

## 2025-05-16 DIAGNOSIS — M21.619 BUNION OF GREAT TOE: ICD-10-CM

## 2025-05-16 PROCEDURE — 71000015 HC POSTOP RECOV 1ST HR: Performed by: STUDENT IN AN ORGANIZED HEALTH CARE EDUCATION/TRAINING PROGRAM

## 2025-05-16 PROCEDURE — 63600175 PHARM REV CODE 636 W HCPCS: Performed by: NURSE ANESTHETIST, CERTIFIED REGISTERED

## 2025-05-16 PROCEDURE — 25000003 PHARM REV CODE 250: Performed by: NURSE ANESTHETIST, CERTIFIED REGISTERED

## 2025-05-16 PROCEDURE — 28270 RELEASE OF FOOT CONTRACTURE: CPT | Mod: 51,T3,T4,XS | Performed by: STUDENT IN AN ORGANIZED HEALTH CARE EDUCATION/TRAINING PROGRAM

## 2025-05-16 PROCEDURE — 28288 PARTIAL REMOVAL OF FOOT BONE: CPT | Mod: 51,T1,XS, | Performed by: STUDENT IN AN ORGANIZED HEALTH CARE EDUCATION/TRAINING PROGRAM

## 2025-05-16 PROCEDURE — 14040 TIS TRNFR F/C/C/M/N/A/G/H/F: CPT | Mod: XS,,, | Performed by: STUDENT IN AN ORGANIZED HEALTH CARE EDUCATION/TRAINING PROGRAM

## 2025-05-16 PROCEDURE — 88305 TISSUE EXAM BY PATHOLOGIST: CPT | Mod: TC | Performed by: STUDENT IN AN ORGANIZED HEALTH CARE EDUCATION/TRAINING PROGRAM

## 2025-05-16 PROCEDURE — C1769 GUIDE WIRE: HCPCS | Performed by: STUDENT IN AN ORGANIZED HEALTH CARE EDUCATION/TRAINING PROGRAM

## 2025-05-16 PROCEDURE — 36000706: Performed by: STUDENT IN AN ORGANIZED HEALTH CARE EDUCATION/TRAINING PROGRAM

## 2025-05-16 PROCEDURE — 37000009 HC ANESTHESIA EA ADD 15 MINS: Performed by: STUDENT IN AN ORGANIZED HEALTH CARE EDUCATION/TRAINING PROGRAM

## 2025-05-16 PROCEDURE — 37000008 HC ANESTHESIA 1ST 15 MINUTES: Performed by: STUDENT IN AN ORGANIZED HEALTH CARE EDUCATION/TRAINING PROGRAM

## 2025-05-16 PROCEDURE — 36000707: Performed by: STUDENT IN AN ORGANIZED HEALTH CARE EDUCATION/TRAINING PROGRAM

## 2025-05-16 PROCEDURE — C1713 ANCHOR/SCREW BN/BN,TIS/BN: HCPCS | Performed by: STUDENT IN AN ORGANIZED HEALTH CARE EDUCATION/TRAINING PROGRAM

## 2025-05-16 PROCEDURE — 28296 COR HLX VLGS DSTL MTAR OSTEO: CPT | Mod: 51,TA,, | Performed by: STUDENT IN AN ORGANIZED HEALTH CARE EDUCATION/TRAINING PROGRAM

## 2025-05-16 PROCEDURE — 71000044 HC DOSC ROUTINE RECOVERY FIRST HOUR: Performed by: STUDENT IN AN ORGANIZED HEALTH CARE EDUCATION/TRAINING PROGRAM

## 2025-05-16 PROCEDURE — 63600175 PHARM REV CODE 636 W HCPCS: Performed by: STUDENT IN AN ORGANIZED HEALTH CARE EDUCATION/TRAINING PROGRAM

## 2025-05-16 PROCEDURE — 28285 REPAIR OF HAMMERTOE: CPT | Mod: 51,T3,T4,XS | Performed by: STUDENT IN AN ORGANIZED HEALTH CARE EDUCATION/TRAINING PROGRAM

## 2025-05-16 PROCEDURE — 27201423 OPTIME MED/SURG SUP & DEVICES STERILE SUPPLY: Performed by: STUDENT IN AN ORGANIZED HEALTH CARE EDUCATION/TRAINING PROGRAM

## 2025-05-16 DEVICE — IMPLANTABLE DEVICE: Type: IMPLANTABLE DEVICE | Site: FOOT | Status: FUNCTIONAL

## 2025-05-16 RX ORDER — OXYCODONE HYDROCHLORIDE 5 MG/1
5 TABLET ORAL EVERY 6 HOURS PRN
Refills: 0 | Status: DISCONTINUED | OUTPATIENT
Start: 2025-05-16 | End: 2025-05-16 | Stop reason: HOSPADM

## 2025-05-16 RX ORDER — SODIUM CHLORIDE 9 MG/ML
INJECTION, SOLUTION INTRAVENOUS CONTINUOUS
Status: DISCONTINUED | OUTPATIENT
Start: 2025-05-16 | End: 2025-05-16 | Stop reason: HOSPADM

## 2025-05-16 RX ORDER — SODIUM CHLORIDE 0.9 % (FLUSH) 0.9 %
3 SYRINGE (ML) INJECTION
Status: DISCONTINUED | OUTPATIENT
Start: 2025-05-16 | End: 2025-05-16 | Stop reason: HOSPADM

## 2025-05-16 RX ORDER — BUPIVACAINE HYDROCHLORIDE 2.5 MG/ML
INJECTION, SOLUTION EPIDURAL; INFILTRATION; INTRACAUDAL; PERINEURAL
Status: DISCONTINUED | OUTPATIENT
Start: 2025-05-16 | End: 2025-05-16 | Stop reason: HOSPADM

## 2025-05-16 RX ORDER — LIDOCAINE HYDROCHLORIDE 20 MG/ML
INJECTION INTRAVENOUS
Status: DISCONTINUED | OUTPATIENT
Start: 2025-05-16 | End: 2025-05-16

## 2025-05-16 RX ORDER — DEXMEDETOMIDINE HYDROCHLORIDE 100 UG/ML
INJECTION, SOLUTION INTRAVENOUS
Status: DISCONTINUED | OUTPATIENT
Start: 2025-05-16 | End: 2025-05-16

## 2025-05-16 RX ORDER — LIDOCAINE HYDROCHLORIDE 10 MG/ML
INJECTION, SOLUTION INFILTRATION; PERINEURAL
Status: DISCONTINUED | OUTPATIENT
Start: 2025-05-16 | End: 2025-05-16 | Stop reason: HOSPADM

## 2025-05-16 RX ORDER — CEFAZOLIN SODIUM 1 G/3ML
INJECTION, POWDER, FOR SOLUTION INTRAMUSCULAR; INTRAVENOUS
Status: DISCONTINUED | OUTPATIENT
Start: 2025-05-16 | End: 2025-05-16

## 2025-05-16 RX ORDER — OXYCODONE HYDROCHLORIDE 5 MG/1
5 TABLET ORAL EVERY 4 HOURS PRN
Qty: 28 TABLET | Refills: 0 | Status: SHIPPED | OUTPATIENT
Start: 2025-05-16 | End: 2025-05-21 | Stop reason: SDUPTHER

## 2025-05-16 RX ORDER — HYDRALAZINE HYDROCHLORIDE 20 MG/ML
INJECTION INTRAMUSCULAR; INTRAVENOUS
Status: DISCONTINUED | OUTPATIENT
Start: 2025-05-16 | End: 2025-05-16

## 2025-05-16 RX ORDER — PROPOFOL 10 MG/ML
VIAL (ML) INTRAVENOUS CONTINUOUS PRN
Status: DISCONTINUED | OUTPATIENT
Start: 2025-05-16 | End: 2025-05-16

## 2025-05-16 RX ORDER — SODIUM CHLORIDE 0.9 % (FLUSH) 0.9 %
10 SYRINGE (ML) INJECTION
Status: DISCONTINUED | OUTPATIENT
Start: 2025-05-16 | End: 2025-05-16 | Stop reason: HOSPADM

## 2025-05-16 RX ORDER — ONDANSETRON HYDROCHLORIDE 2 MG/ML
4 INJECTION, SOLUTION INTRAVENOUS ONCE AS NEEDED
Status: DISCONTINUED | OUTPATIENT
Start: 2025-05-16 | End: 2025-05-16 | Stop reason: HOSPADM

## 2025-05-16 RX ORDER — FENTANYL CITRATE 50 UG/ML
INJECTION, SOLUTION INTRAMUSCULAR; INTRAVENOUS
Status: DISCONTINUED | OUTPATIENT
Start: 2025-05-16 | End: 2025-05-16

## 2025-05-16 RX ORDER — GLUCAGON 1 MG
1 KIT INJECTION
Status: DISCONTINUED | OUTPATIENT
Start: 2025-05-16 | End: 2025-05-16 | Stop reason: HOSPADM

## 2025-05-16 RX ORDER — LIDOCAINE HYDROCHLORIDE 10 MG/ML
INJECTION, SOLUTION EPIDURAL; INFILTRATION; INTRACAUDAL; PERINEURAL
Status: DISCONTINUED | OUTPATIENT
Start: 2025-05-16 | End: 2025-05-16 | Stop reason: HOSPADM

## 2025-05-16 RX ORDER — FENTANYL CITRATE 50 UG/ML
25 INJECTION, SOLUTION INTRAMUSCULAR; INTRAVENOUS EVERY 5 MIN PRN
Status: DISCONTINUED | OUTPATIENT
Start: 2025-05-16 | End: 2025-05-16 | Stop reason: HOSPADM

## 2025-05-16 RX ORDER — ONDANSETRON 8 MG/1
8 TABLET, ORALLY DISINTEGRATING ORAL EVERY 6 HOURS PRN
Qty: 30 TABLET | Refills: 0 | Status: SHIPPED | OUTPATIENT
Start: 2025-05-16

## 2025-05-16 RX ADMIN — FENTANYL CITRATE 12.5 MCG: 50 INJECTION, SOLUTION INTRAMUSCULAR; INTRAVENOUS at 09:05

## 2025-05-16 RX ADMIN — DEXMEDETOMIDINE 4 MCG: 100 INJECTION, SOLUTION, CONCENTRATE INTRAVENOUS at 09:05

## 2025-05-16 RX ADMIN — FENTANYL CITRATE 25 MCG: 50 INJECTION, SOLUTION INTRAMUSCULAR; INTRAVENOUS at 11:05

## 2025-05-16 RX ADMIN — HYDRALAZINE HYDROCHLORIDE 4 MG: 20 INJECTION, SOLUTION INTRAMUSCULAR; INTRAVENOUS at 11:05

## 2025-05-16 RX ADMIN — FENTANYL CITRATE 25 MCG: 50 INJECTION, SOLUTION INTRAMUSCULAR; INTRAVENOUS at 09:05

## 2025-05-16 RX ADMIN — PROPOFOL 20 MG: 10 INJECTION, EMULSION INTRAVENOUS at 09:05

## 2025-05-16 RX ADMIN — LIDOCAINE HYDROCHLORIDE 20 MG: 20 INJECTION INTRAVENOUS at 09:05

## 2025-05-16 RX ADMIN — CEFAZOLIN 2 G: 330 INJECTION, POWDER, FOR SOLUTION INTRAMUSCULAR; INTRAVENOUS at 09:05

## 2025-05-16 RX ADMIN — HYDRALAZINE HYDROCHLORIDE 2 MG: 20 INJECTION, SOLUTION INTRAMUSCULAR; INTRAVENOUS at 11:05

## 2025-05-16 RX ADMIN — PROPOFOL 125 MCG/KG/MIN: 10 INJECTION, EMULSION INTRAVENOUS at 09:05

## 2025-05-16 RX ADMIN — SODIUM CHLORIDE: 0.9 INJECTION, SOLUTION INTRAVENOUS at 09:05

## 2025-05-16 NOTE — DISCHARGE SUMMARY
[facility] - [facility] (2nd Fl)  Discharge Note  Short Stay    Procedure(s) (LRB):  BUNIONECTOMY, WITH METATARSAL OSTEOTOMY (Left)  CORRECTION, HAMMER TOE (Left)      OUTCOME: Patient tolerated treatment/procedure well without complication and is now ready for discharge.    DISPOSITION: Home or Self Care    FINAL DIAGNOSIS:  <principal problem not specified>    FOLLOWUP: In clinic    DISCHARGE INSTRUCTIONS:  No discharge procedures on file.     TIME SPENT ON DISCHARGE: 15 minutes

## 2025-05-16 NOTE — ANESTHESIA POSTPROCEDURE EVALUATION
Anesthesia Post Evaluation    Patient: Ernestine Luna    Procedure(s) Performed: Procedure(s) (LRB):  BUNIONECTOMY, WITH METATARSAL OSTEOTOMY (Left)  CORRECTION, HAMMER TOE (Left)    Final Anesthesia Type: general      Patient location during evaluation: PACU  Patient participation: Yes- Able to Participate  Level of consciousness: awake and alert and oriented  Post-procedure vital signs: reviewed and stable  Pain management: adequate  Airway patency: patent    PONV status at discharge: No PONV  Anesthetic complications: no      Cardiovascular status: hemodynamically stable  Respiratory status: unassisted and spontaneous ventilation  Hydration status: euvolemic  Follow-up not needed.            Vitals Value Taken Time   /63 05/16/25 11:31   Pulse 75 05/16/25 11:32   Resp 17 05/16/25 11:32   SpO2 97 % 05/16/25 11:32   Vitals shown include unfiled device data.      No case tracking events are documented in the log.      Pain/Javed Score: No data recorded

## 2025-05-16 NOTE — PROGRESS NOTES
Discharge instructions given and explained to patient and family with verbalized understanding. VSS. Denies N/V, tolerating PO fluids. Rates pain level as tolerable. IV removed. Prescriptions delivered to bedside by pharmacy. Boot delivered to bedside by DME. Daughter at bedside helping pt get dressed. Wheelchair available for transport to vehicle.

## 2025-05-16 NOTE — DISCHARGE INSTRUCTIONS
-Weighbearing as tolerated in surgical shoe.  -Pain medication as prescribed, wean off as tolerated.   -Keep dressing clean, dry, intact until followup appointment.   -Use cast cover (can purchase from a pharmacy) to cover surgical dressings while showering. If dressing gets soaked then remove and replace with clean bandages.   -In case of pain: elevate lower limb, loosen outer layers of dressing/bandage.  -In case of bleeding: elevate lower limb, tighten outer layers of dressing or reinforce with another elastic bandage.    -Followup in clinic next week.

## 2025-05-16 NOTE — BRIEF OP NOTE
[facility] - [facility] (2nd Fl)  Brief Operative Note    Surgery Date: 5/16/2025     Surgeons and Role:     * Jose E Sotomayor DPM - Primary     * Bladimir Limon MD - Resident - Assisting 1     * Kadi Vasquez DPM - Resident - 2     Pre-op Diagnosis:  Chronic pain in left foot [M79.672, G89.29]  Bunion of left foot [M21.612]  Acquired toe deformity, left [M20.62]    Post-op Diagnosis:  Post-Op Diagnosis Codes:     * Chronic pain in left foot [M79.672, G89.29]     * Bunion of left foot [M21.612]     * Acquired toe deformity, left [M20.62]    Procedure(s) (LRB):  BUNIONECTOMY, WITH METATARSAL OSTEOTOMY (Left)  CORRECTION, HAMMER TOE (Left)    Anesthesia: Local MAC    Operative Findings: Todd chevron bunionectomy performed of first metatarsal with skin plasty, excision of callus. 2nd metatarsal head dorsal exostectomy. Skin lesion excised from distal aspect of 4th digit, sent to pathology. Elliptical skin plasty performed of plantar 4th digit. 4th and 5th digit extensor tendon lengthening    Estimated Blood Loss: * No values recorded between 5/16/2025  9:14 AM and 5/16/2025 11:19 AM *         Specimens:   Specimen (24h ago, onward)       Start     Ordered    05/16/25 1026  Specimen to Pathology Podiatry  RELEASE UPON ORDERING        Comments: Specimen 1: 1. Left 4th toe mass- Permanent     References:    Click here for ordering Quick Tip   Question:  Release to patient  Answer:  Immediate    05/16/25 1026                    ID Type Source Tests Collected by Time Destination   1 :  Tissue Foot, Left SPECIMEN TO PATHOLOGY Jose E Sotomayor DPM 5/16/2025 1024            Discharge Note    OUTCOME: Patient tolerated treatment/procedure well without complication and is now ready for discharge.    DISPOSITION: Home or Self Care    FINAL DIAGNOSIS:  <principal problem not specified>    FOLLOWUP: In clinic    DISCHARGE INSTRUCTIONS:  No discharge procedures on file.

## 2025-05-16 NOTE — TRANSFER OF CARE
Anesthesia Transfer of Care Note    Patient: Ernestine Luna    Procedure(s) Performed: Procedure(s) (LRB):  BUNIONECTOMY, WITH METATARSAL OSTEOTOMY (Left)  CORRECTION, HAMMER TOE (Left)    Patient location: [facility]    Anesthesia Type: MAC and general    Transport from OR: Transported from OR on room air with adequate spontaneous ventilation    Post pain: adequate analgesia    Post assessment: no apparent anesthetic complications and tolerated procedure well    Post vital signs: stable    Level of consciousness: awake    Nausea/Vomiting: no nausea/vomiting    Complications: none    Transfer of care protocol was followed      Last vitals: Visit Vitals  BP (!) 189/93 (BP Location: Right arm, Patient Position: Lying)   Pulse 74   Temp 36.7 °C (98.1 °F) (Temporal)   Resp 18   LMP  (LMP Unknown)   SpO2 99%   Breastfeeding No

## 2025-05-16 NOTE — ANESTHESIA PREPROCEDURE EVALUATION
05/16/2025  Ernestine Luna is a 89 y.o., female.  Pre-operative evaluation for Procedure(s) (LRB):  BUNIONECTOMY, WITH METATARSAL OSTEOTOMY (Left)  OSTEOTOMY, AKIN (Left)  CORRECTION, HAMMER TOE (Left)  SYNDACTYLIZATION, TOE (Left)    Ernestine Luna is a 89 y.o. female     Problem List[1]    Review of patient's allergies indicates:   Allergen Reactions    Tramadol Shortness Of Breath, Diarrhea and Nausea And Vomiting    Acetaminophen Nausea And Vomiting    Aspirin Nausea And Vomiting    Sulfa (sulfonamide antibiotics) Nausea And Vomiting    Prednisone Other (See Comments)     Stomach problems, no issue with injection       Medications Ordered Prior to Encounter[2]    Past Surgical History:   Procedure Laterality Date    BREAST BIOPSY Right     BREAST LUMPECTOMY Right     BUNIONECTOMY      Left foot (x2)    CARPAL TUNNEL RELEASE Right     38 years old    CATARACT EXTRACTION Left     with lens     CATARACT EXTRACTION W/  INTRAOCULAR LENS IMPLANT Right 10/12/2015    Dr. Wilkins    COLONOSCOPY N/A 10/1/2019    Procedure: COLONOSCOPY;  Surgeon: Jarad Chavira MD;  Location: [facility] (4TH [facility]);  Service: Endoscopy;  Laterality: N/A;  miralax prep (used miralax for last colonoscopy) - ERW    COLONOSCOPY W/ POLYPECTOMY  08/08/2013    RASHEED   06/24/2014    EYE SURGERY      HYSTERECTOMY  age 38    ALISTAIR; ovaries in place    INJECTION FOR SENTINEL NODE IDENTIFICATION Left 2/20/2023    Procedure: INJECTION, FOR SENTINEL NODE IDENTIFICATION-Left;  Surgeon: ROSIBEL Belcher MD;  Location: [facility] OR [address] [place] [address];  Service: General;  Laterality: Left;    JOINT REPLACEMENT      bilateral knees    MASTECTOMY, PARTIAL Left 2/20/2023    Procedure: MASTECTOMY, PARTIAL-Left with radiological marker;  Surgeon: ROSIBEL Belcher MD;  Location: [facility] OR 2ND FLR;  Service: General;  Laterality: Left;    MASTECTOMY, PARTIAL Left  4/18/2023    Procedure: MASTECTOMY, PARTIAL-left re-excision;  Surgeon: ROSIBEL Belcher MD;  [facility];  Service: General;  Laterality: Left;    SENTINEL LYMPH NODE BIOPSY Left 2/20/2023    Procedure: BIOPSY, LYMPH NODE, SENTINEL-Left;  Surgeon: ORSIBEL Belcher MD;  [facility];  Service: General;  Laterality: Left;    TOTAL KNEE ARTHROPLASTY      Bilateral           Pre-op Assessment    I have reviewed the Patient Summary Reports.     I have reviewed the Nursing Notes. I have reviewed the NPO Status.   I have reviewed the Medications.     Review of Systems  Anesthesia Hx:  No problems with previous Anesthesia                Cardiovascular:     Hypertension                                          Pulmonary:     Denies Asthma.                    Hepatic/GI:      Denies GERD.                Neurological:  Denies TIA.  Denies CVA.    Denies Seizures.                                Endocrine:  Denies Diabetes.           2021 TTE Summary  The left ventricle is normal in size with concentric remodeling and normal systolic function. The estimated ejection fraction is 65%.  Normal right ventricular size with normal right ventricular systolic function.  Grade II left ventricular diastolic dysfunction.  Mild to moderate tricuspid regurgitation.  Severe left atrial enlargement.  The estimated PA systolic pressure is 58 mmHg.  Normal central venous pressure (3 mmHg).    Lab Results   Component Value Date    WBC 6.20 05/01/2025    HGB 13.6 05/01/2025    HCT 42.0 05/01/2025    MCV 96 05/01/2025     05/01/2025       BMP  Lab Results   Component Value Date     05/01/2025    K 3.8 05/01/2025     05/01/2025    CO2 25 05/01/2025    BUN 20 05/01/2025    CREATININE 0.6 05/01/2025    CALCIUM 9.8 05/01/2025    ANIONGAP 10 05/01/2025    EGFRNORACEVR >60 05/01/2025         Physical Exam  General: Cooperative    Airway:  Mallampati: III / II  Mouth Opening: Small, but > 3cm  TM Distance:  Normal  Tongue: Normal  Neck ROM: Normal ROM    Dental:      Anesthesia Plan  Type of Anesthesia, risks & benefits discussed:    Anesthesia Type: Gen ETT, Gen Supraglottic Airway, Gen Natural Airway  Intra-op Monitoring Plan: Standard ASA Monitors  Post Op Pain Control Plan: multimodal analgesia  Induction:  IV  Airway Plan: Direct  Informed Consent: Informed consent signed with the Patient and all parties understand the risks and agree with anesthesia plan.  All questions answered.   ASA Score: 3  Day of Surgery Review of History & Physical: H&P Update referred to the surgeon/provider.    Ready For Surgery From Anesthesia Perspective.   .             [1]  Patient Active Problem List  Diagnosis    Primary hypertension    Osteoarthritis    DDD (degenerative disc disease), lumbar    History of breast cancer    Ectatic thoracic aorta    Atherosclerosis of aorta    Mild carotid artery disease    Inflammatory arthritis    Vaginal vault prolapse, posthysterectomy    Prolapse of anterior vaginal wall    Facet arthropathy    Vaginal atrophy    Urinary incontinence, urge    Nocturia    Colon polyps    Depressed mood    Abdominal aortic pulsation    Aortic aneurysm without rupture    Posterior vitreous detachment of both eyes    Pseudophakia of both eyes    Dry eye syndrome of both eyes    Impaired functional mobility, balance, gait, and endurance    Decreased strength of lower extremity    Invasive ductal carcinoma of breast, left    Decreased range of motion of neck    Postural imbalance    Osteoporosis    Psychiatric problem    Acute cystitis    Suicidal ideation    Calcified granuloma of lung - CT 2023    Nonrheumatic mitral valve regurgitation    Pulmonary hypertension   [2]  Current Facility-Administered Medications on File Prior to Encounter   Medication Dose Route Frequency Provider Last Rate Last Admin    fentaNYL 50 mcg/mL injection  mcg   mcg Intravenous PRN Michael  MD Ilana   25 mcg at 04/18/23 0813    midazolam (VERSED) 1 mg/mL injection 0.5-4 mg  0.5-4 mg Intravenous PRN Ilana Rodriguez MD         Current Outpatient Medications on File Prior to Encounter   Medication Sig Dispense Refill    albuterol (VENTOLIN HFA) 90 mcg/actuation inhaler Inhale 2 puffs into the lungs every 6 (six) hours as needed for Wheezing. Rescue 6.7 g 0    alendronate (FOSAMAX) 70 MG tablet TAKE 1 TABLET(70 MG) BY MOUTH EVERY 7 DAYS 12 tablet 3    amLODIPine (NORVASC) 10 MG tablet TAKE 1 TABLET(10 MG) BY MOUTH EVERY DAY 90 tablet 3    ammonium lactate 12 % Crea Apply 1 application  topically once daily. To dry skin on the feet. 140 g 6    atenoloL (TENORMIN) 25 MG tablet TAKE 1 TABLET(25 MG) BY MOUTH EVERY DAY 90 tablet 3    calcium carbonate (OS-DHRUV) 600 mg calcium (1,500 mg) Tab Take 600 mg by mouth once daily.      cetirizine (ZYRTEC) 10 MG tablet Take 1 tablet (10 mg total) by mouth daily as needed for Allergies or Rhinitis (runny nose). 10 tablet 0    ciclopirox 0.77 % Gel Apply topically once daily. To thickened discolored toenails. 45 g 6    guaiFENesin 100 mg/5 ml (ROBITUSSIN) 100 mg/5 mL syrup Take 100 mg by mouth 3 (three) times daily as needed for Cough.      MULTIVITAMIN W-MINERALS/LUTEIN (CENTRUM SILVER ORAL) Take 1 tablet by mouth Daily.      omega 3-calcium-vit D3-FA-mv13 [phone] mg Cmpk Take by mouth once daily.      trospium (SANCTURA) 20 mg Tab tablet Take 1 tablet (20 mg total) by mouth 2 (two) times daily. (Patient not taking: Reported on 5/13/2025) 60 tablet 11    vitamin D 1000 units Tab Take 185 mg by mouth once daily.

## 2025-05-16 NOTE — PLAN OF CARE
Preop assessment complete. Daughter at bedside. All questions and concerns addressed. No apparent distress noted. All belongings sent home with daughter.

## 2025-05-16 NOTE — OP NOTE
[facility] - [facility] (2nd Fl)  Operative Note      Procedure Date:   5/16/2025     Surgeons:   Surgeons and Role:     * Jose E Sotomayor, DEVYNM - Primary     * Bladimir Limon DPM - Resident - Assist 1       Melvin Vasquez DPM - Assist 2      Pre-Operative Diagnosis:   Chronic pain in left foot [M79.672, G89.29]  Bunion of left foot [M21.612]  Acquired toe deformity, left [M20.62]    Indications:  Chronic pain in left foot related to callus, bunion, lesser toe deformities despite conservative care; surgical intervention indicated    Consent:  Indications, potential risks, potential benefits, available alternatives reviewed. Patient was given the opportunity to ask questions, all questions answered. Written consent obtained.     Procedure:    Procedure(s) (LRB):  BUNIONECTOMY, WITH METATARSAL OSTEOTOMY (Left)  CORRECTION, HAMMER TOE (Left) x2  Skin advancement/rearrangement [60582] (Left)  Ostectomy 2nd metatarsal head [26585] (Left)      Post-Operative Diagnosis:   Same as preoperative diagnosis    Surgery:     Anesthesia:   Local MAC    Procedure in Detail:   The patient was transported to the operating room on a stretcher and was transferred to the OR table in supine position. Anesthesia was induced.  A tourniquet was applied to the left calf. 20 mL of a 1:1 mixture of 0.25% bupivacaine plain and 1% lidocaine plain was locally infiltrated. The left lower limb was prepped and draped in a sterile manner. Tourniquet inflated to 200 mmHg.     Bunionectomy with metatarsal osteotomy  An incision was made medially at the 1st ray, the incision was elliptically surrounding the painful callus; skin island with callus undermined and callus was excised. Dissection carried down to bone. Soft tissues elevated and lateral release of 1st MTPJ performed include lateral capsulotomy and release of adductor tendons. The medial eminence of the 1st metatarsal was shaved using a sagittal saw to reduce the prominence. A chevron osteotomy was  cut into the neck of the 1st metatarsal with a sagittal saw and the head was shifted laterally. The Mo-DV ISO plate system was used to fixate the osteotomy site with an IM implant, 2 locking screws into the head, and 1 nonlocking screw into the shaft. Adequate correction confirmed with fluoroscopy, osteotomy stable on loading. Proximal shelf of the metatarsal was shaved down using sagittal saw and rongeur.     2nd metatarsal ostectomy  A dorsal incision was made at the head of the 2nd MTPJ where the 2nd metatarsal head was very prominent. Dissection carried down to bone. Dorsal partial ostectomy of the 2nd metatarsal head performed with sagittal saw, prominence adequately reduced.     4th and 5th hammertoe repairs  Dorsal incisions were made overlying the 4th and 5th MTPJs, dissection carried down to tendon. Z lengthening cuts were made in the extensor tendons at both 4th and 5th rays, followed by dorsal MTPJ capsulotomies at both sites. Toe position adequately improved at 5th ray and significantly improved at 4th ray. Extensor tendons were repaired with 3-0 vicryl in lengthened positions to complete the tendon lengthenings.     4th toe skin advancement/rearrangement  A soft tissue mass at the tip of the 4th toe was excised with a 15 blade down to the level of subcutaneous tissue, sent to pathology; appears benign. At the base of the 4th toe plantarly a 1 cm transverse elliptical incision was made, skin island undermined and excised. Using 4-0 nylon suture the edges of the flap were closed down longitudinally to effectively shorten the skin plantarly, this plantarflexed the 4th MTPJ to improve the position of the toe. 4th toe position now adequate.     Sites irrigated with saline. Layered closure performed with 3-0 vicryl, 4-0 monocryl, 4-0 nylon suture. Tourniquet deflated. Dressed with xeroform, 4x4 gauze, cast padding, ACE wraps.      Complications:  None    Estimated Blood Loss (EBL):   1 mL            Vendors:  [employer]    Implants:   Implant Name Type Inv. Item Serial No.  Lot No. LRB No. Used Action   ISO PLATE    TRACY  Left 1 Implanted   2.7 X 16 LOCKING    TRACY  Left 1 Implanted   2.7 X 8 LOCKING    TRACY  Left 1 Implanted   2.7 X 8 CORTEX SCREW    TRACY  Left 1 Implanted              Condition:   Good    Disposition:   PACU - hemodynamically stable.    Attestation:   I was present and scrubbed for the entire procedure.    Discharge:     OUTCOME:   Patient tolerated treatment/procedure well without complication and is now ready for discharge.    DISPOSITION:   Patient tolerated the procedure well.     DISCHARGE INSTRUCTIONS:

## 2025-05-19 LAB
ESTROGEN SERPL-MCNC: NORMAL PG/ML
INSULIN SERPL-ACNC: NORMAL U[IU]/ML
LAB AP CLINICAL INFORMATION: NORMAL
LAB AP GROSS DESCRIPTION: NORMAL
LAB AP PERFORMING LOCATION(S): NORMAL
LAB AP REPORT FOOTNOTES: NORMAL
T3RU NFR SERPL: NORMAL %

## 2025-05-21 ENCOUNTER — OFFICE VISIT (OUTPATIENT)
Dept: PODIATRY | Facility: CLINIC | Age: OVER 89
End: 2025-05-21
Payer: MEDICARE

## 2025-05-21 VITALS
SYSTOLIC BLOOD PRESSURE: 152 MMHG | HEIGHT: 59 IN | WEIGHT: 144.38 LBS | HEART RATE: 75 BPM | DIASTOLIC BLOOD PRESSURE: 77 MMHG | BODY MASS INDEX: 29.11 KG/M2

## 2025-05-21 DIAGNOSIS — Z98.890 POST-OPERATIVE STATE: Primary | ICD-10-CM

## 2025-05-21 DIAGNOSIS — M21.612 BUNION OF LEFT FOOT: ICD-10-CM

## 2025-05-21 PROCEDURE — 99999 PR PBB SHADOW E&M-EST. PATIENT-LVL III: CPT | Mod: PBBFAC,,, | Performed by: STUDENT IN AN ORGANIZED HEALTH CARE EDUCATION/TRAINING PROGRAM

## 2025-05-21 RX ORDER — OXYCODONE HYDROCHLORIDE 5 MG/1
5 TABLET ORAL EVERY 4 HOURS PRN
Qty: 28 TABLET | Refills: 0 | Status: SHIPPED | OUTPATIENT
Start: 2025-05-21

## 2025-05-21 NOTE — PROGRESS NOTES
Subjective:     Patient    Ernestine Luna is a 89 y.o. female.    Problem    04/30/25: Previously followed by Dr Pappas and Dr Rodas. Last seen by Dr Rodas 2 months ago; last seen for nail fungus and chronic foot pain, started on topical ciclopirox for nails and debrided nails. Has had multiple surgeries of the feet that she was not fully happy with. Currently with left foot chronic pain primarily at residual bunion deformity, plantarflexed 3rd toe and dorsiflexed 4th and 5th toes. Has a callus at bunion due to rubbing. Has attempted change in footwear, callus debridements, topical medications, prior surgeries, pads/straps without resolution. Requesting surgery.      05/21/25: Returns 5 days s/p left forefoot reconstruction. Significant pain, but controlled with current pain meds.       History    History obtained from patient and review of medical records.     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 Wilkins    COLONOSCOPY N/A 10/1/2019    Procedure: COLONOSCOPY;  Surgeon: Jarad Chavira MD;  Location: [facility] ([address] [place] [address]);  Service: Endoscopy;  Laterality: N/A;  miralax prep (used miralax for last colonoscopy) - ERW    COLONOSCOPY W/ POLYPECTOMY  08/08/2013    CORRECTION OF HAMMER TOE Left 5/16/2025    Procedure:  CORRECTION, HAMMER TOE;  Surgeon: Jose E Sotomayor DPM;  [facility]: [facility] OR [facility] FLR;  Service: Podiatry;  Laterality: Left;  x    RASHEED   06/24/2014    EYE SURGERY      HYSTERECTOMY  age 38    ALISTAIR; ovaries in place    INJECTION FOR SENTINEL NODE IDENTIFICATION Left 2/20/2023    Procedure: INJECTION, FOR SENTINEL NODE IDENTIFICATION-Left;  Surgeon: ROSIBEL Belcher MD;  [facility];  Service: General;  Laterality: Left;    JOINT REPLACEMENT      bilateral knees    MASTECTOMY, PARTIAL Left 2/20/2023    Procedure: MASTECTOMY, PARTIAL-Left with radiological marker;  Surgeon: ROSIBEL Belcher MD;  [facility];  Service: General;  Laterality: Left;    MASTECTOMY, PARTIAL Left 4/18/2023    Procedure: MASTECTOMY, PARTIAL-left re-excision;  Surgeon: ROSIBEL Belcher MD;  [facility];  Service: General;  Laterality: Left;    SENTINEL LYMPH NODE BIOPSY Left 2/20/2023    Procedure: BIOPSY, LYMPH NODE, SENTINEL-Left;  Surgeon: ROSIBEL Belcher MD;  [facility];  Service: General;  Laterality: Left;    SURGICAL REMOVAL OF BUNION WITH OSTEOTOMY OF METATARSAL BONE Left 5/16/2025    Procedure: BUNIONECTOMY, WITH METATARSAL OSTEOTOMY;  Surgeon: Jose E Sotomayor DPM;  Location: [facility] OR [address] [place] [address];  Service: Podiatry;  Laterality: Left;    TOTAL KNEE ARTHROPLASTY      Bilateral        Objective:     Vitals  Wt Readings from Last 1 Encounters:   05/21/25 65.5 kg (144 lb 6.4 oz)     Temp Readings from Last 1 Encounters:   05/16/25 97.9 °F (36.6 °C) (Temporal)     BP Readings from Last 1 Encounters:   05/21/25 (!) 152/77     Pulse Readings from Last 1 Encounters:   05/21/25 75       Dermatological Exam    Skin:  Pedal hair growth diminished and Pedal skin thin and shiny on right  Pedal hair growth diminished and Pedal skin thin and shiny on left; incisions healing    Nails:  10 nail(s) thickened    Vascular Exam    Arteries:  Posterior tibial artery palpable on right  Dorsalis pedis artery  palpable on right  Posterior tibial artery palpable on left  Dorsalis pedis artery palpable on left    Veins:  Telangectasia on right  Telangectasia on left    Swellin+ nonpitting on right  1+ nonpitting on left    Neurological Exam    Drewsey touch test:  6/6 sites sensed, light touch intact     Musculoskeletal Exam    Footwear:  Casual on right  Casual on left    Gait Exam:   Ambulatory Status: Ambulatory  Gait: Antalgic  Assistive Devices: None    Foot Progression Angle:  Normal on right  Normal on left    Right Lower Extremity Additional Findings:  Mild bunion  Right foot and ankle function, strength, and range of motion unremarkable except as noted above.     Left Lower Extremity Additional Findings:  1st ray rectus, 4th and 5th toes rectus; moderate tenderness at surgical sites   3rd MTPJ plantarflexion deformity with tenderness on palpation, limited function and PROM  4th and 5th MTPJ dorsiflexion deformities with tenderness on palpation, limited function and PROM   Left foot and ankle function, strength, and range of motion unremarkable except as noted above.    Imaging and Other Tests    Imaging:  Independently reviewed and interpreted imaging, findings are as follows: N/A     Assessment:     Encounter Diagnoses   Name Primary?    Post-operative state Yes    Bunion of left foot           Plan:     I counseled the patient on her conditions, their implications and medical management.    S/p left forefoot reconstruction   -Changed football dressing.   -LLE protected WB in surgical shoe.   -Pain meds as prescribed, reordered.       Return to clinic in 1.5 weeks for suture removal, call sooner PRN.

## 2025-05-23 ENCOUNTER — TELEPHONE (OUTPATIENT)
Dept: PODIATRY | Facility: CLINIC | Age: OVER 89
End: 2025-05-23
Payer: MEDICARE

## 2025-05-23 NOTE — TELEPHONE ENCOUNTER
I spoke regina  Ms Luna does not want to wear her shoe I will call patient and advise her to wear her shoe, when she moving around.

## 2025-05-23 NOTE — TELEPHONE ENCOUNTER
I spoke with regina Ms Luna does not want to wear her shoe I will call patient and advise her to wear her shoe, when she moving around.

## 2025-06-03 ENCOUNTER — OFFICE VISIT (OUTPATIENT)
Dept: PODIATRY | Facility: CLINIC | Age: OVER 89
End: 2025-06-03
Payer: MEDICARE

## 2025-06-03 VITALS
SYSTOLIC BLOOD PRESSURE: 125 MMHG | BODY MASS INDEX: 29.17 KG/M2 | HEART RATE: 62 BPM | DIASTOLIC BLOOD PRESSURE: 69 MMHG | HEIGHT: 59 IN

## 2025-06-03 DIAGNOSIS — M21.612 BUNION OF LEFT FOOT: ICD-10-CM

## 2025-06-03 DIAGNOSIS — M79.672 CHRONIC PAIN IN LEFT FOOT: ICD-10-CM

## 2025-06-03 DIAGNOSIS — G89.29 CHRONIC PAIN IN LEFT FOOT: ICD-10-CM

## 2025-06-03 DIAGNOSIS — Z98.890 POST-OPERATIVE STATE: Primary | ICD-10-CM

## 2025-06-03 PROCEDURE — 99999 PR PBB SHADOW E&M-EST. PATIENT-LVL III: CPT | Mod: PBBFAC,,, | Performed by: STUDENT IN AN ORGANIZED HEALTH CARE EDUCATION/TRAINING PROGRAM

## 2025-06-06 NOTE — TELEPHONE ENCOUNTER
No care due was identified.  [facility] Embedded Care Due Messages. Reference number: [id number].   6/06/2025 10:15:01 AM CDT

## 2025-06-07 NOTE — TELEPHONE ENCOUNTER
Refill Routing Note   Medication(s) are not appropriate for processing by [facility] for the following reason(s):        New or recently adjusted medication    [facility] action(s):  Defer             Appointments  past 12m or future 3m with PCP    Date Provider   Last Visit   5/1/2025 Kev Macias MD   Next Visit   Visit date not found Kev Macias MD   ED visits in past 90 days: 0        Note composed:7:06 PM 06/06/2025

## 2025-06-10 ENCOUNTER — PATIENT MESSAGE (OUTPATIENT)
Dept: SURGERY | Facility: CLINIC | Age: OVER 89
End: 2025-06-10
Payer: MEDICARE

## 2025-06-16 ENCOUNTER — TELEPHONE (OUTPATIENT)
Dept: PODIATRY | Facility: CLINIC | Age: OVER 89
End: 2025-06-16
Payer: MEDICARE

## 2025-06-16 NOTE — TELEPHONE ENCOUNTER
Copied from CRM #[id number]. Topic: Appointments - Appointment Scheduling  >> Jun 16, 2025 11:49 AM Arnav wrote:  Scheduling Request    Appt Type:  Ep    Date/Time Preference: Next available    Caller Name:Pt daughtermia    Contact Preference: [phone]     Comment: pt need to schedule post op visit, pt was not scheduled at last appt , pt daughter states nurse had to go to lunch and nurse who took over for the nurse who went to lunch did not schedule her mother's appt as she said she would        Please Call to Advise,Thank you.      Patient scheduled for 6/27 at 11 with Dr. Sotomayor

## 2025-06-27 ENCOUNTER — OFFICE VISIT (OUTPATIENT)
Dept: PODIATRY | Facility: CLINIC | Age: OVER 89
End: 2025-06-27
Payer: MEDICARE

## 2025-06-27 VITALS
DIASTOLIC BLOOD PRESSURE: 77 MMHG | BODY MASS INDEX: 29.17 KG/M2 | HEIGHT: 59 IN | SYSTOLIC BLOOD PRESSURE: 157 MMHG | HEART RATE: 73 BPM

## 2025-06-27 DIAGNOSIS — Z98.890 POST-OPERATIVE STATE: Primary | ICD-10-CM

## 2025-06-27 PROCEDURE — 99999 PR PBB SHADOW E&M-EST. PATIENT-LVL IV: CPT | Mod: PBBFAC,,, | Performed by: STUDENT IN AN ORGANIZED HEALTH CARE EDUCATION/TRAINING PROGRAM

## 2025-06-27 NOTE — PROGRESS NOTES
Subjective:     Patient    Ernestine Luna is a 89 y.o. female.    Problem    04/30/25: Previously followed by Dr Pappas and Dr Rodas. Last seen by Dr Rodas 2 months ago; last seen for nail fungus and chronic foot pain, started on topical ciclopirox for nails and debrided nails. Has had multiple surgeries of the feet that she was not fully happy with. Currently with left foot chronic pain primarily at residual bunion deformity, plantarflexed 3rd toe and dorsiflexed 4th and 5th toes. Has a callus at bunion due to rubbing. Has attempted change in footwear, callus debridements, topical medications, prior surgeries, pads/straps without resolution. Requesting surgery.      05/21/25: Returns 5 days s/p left forefoot reconstruction. Significant pain, but controlled with current pain meds.     06/03/25: Returns about 2.5 weeks s/p left forefoot reconstruction. Minimal pain, no new concerns.     06/27/25: Returns 1.5 months s/p left forefoot reconstruction. Minimal pain. No new concerns.       History    History obtained from patient and review of medical records.     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 Wilkins    COLONOSCOPY N/A 10/1/2019    Procedure: COLONOSCOPY;  Surgeon: Jarad Chavira MD;  Location: [facility] ([address] [place] [address]);   Service: Endoscopy;  Laterality: N/A;  miralax prep (used miralax for last colonoscopy) - ERW    COLONOSCOPY W/ POLYPECTOMY  08/08/2013    CORRECTION OF HAMMER TOE Left 5/16/2025    Procedure: CORRECTION, HAMMER TOE;  Surgeon: Jose E Sotomayor DPM;  [facility];  Service: Podiatry;  Laterality: Left;  x    RASHEED   06/24/2014    EYE SURGERY      HYSTERECTOMY  age 38    ALISTAIR; ovaries in place    INJECTION FOR SENTINEL NODE IDENTIFICATION Left 2/20/2023    Procedure: INJECTION, FOR SENTINEL NODE IDENTIFICATION-Left;  Surgeon: ROSIBEL Belcher MD;  [facility];  Service: General;  Laterality: Left;    JOINT REPLACEMENT      bilateral knees    MASTECTOMY, PARTIAL Left 2/20/2023    Procedure: MASTECTOMY, PARTIAL-Left with radiological marker;  Surgeon: ROSIBEL Belcher MD;  [facility];  Service: General;  Laterality: Left;    MASTECTOMY, PARTIAL Left 4/18/2023    Procedure: MASTECTOMY, PARTIAL-left re-excision;  Surgeon: ROSIBEL Belchre MD;  [facility];  Service: General;  Laterality: Left;    SENTINEL LYMPH NODE BIOPSY Left 2/20/2023    Procedure: BIOPSY, LYMPH NODE, SENTINEL-Left;  Surgeon: ROSIBEL Belcher MD;  [facility];  Service: General;  Laterality: Left;    SURGICAL REMOVAL OF BUNION WITH OSTEOTOMY OF METATARSAL BONE Left 5/16/2025    Procedure: BUNIONECTOMY, WITH METATARSAL OSTEOTOMY;  Surgeon: Jose E Sotomayor DPM;  Location: [facility] OR [address] [place] [address];  Service: Podiatry;  Laterality: Left;    TOTAL KNEE ARTHROPLASTY      Bilateral        Objective:     Vitals  Wt Readings from Last 1 Encounters:   05/21/25 65.5 kg (144 lb 6.4 oz)     Temp Readings from Last 1 Encounters:   05/16/25 97.9 °F (36.6 °C) (Temporal)     BP Readings from Last 1 Encounters:   06/27/25 (!) 157/77     Pulse Readings from Last 1 Encounters:   06/27/25 73       Dermatological Exam    Skin:  Pedal hair growth diminished and Pedal skin thin and shiny on right  Pedal hair growth  diminished and Pedal skin thin and shiny on left; incisions near healed; small residual wound to fat at dorsal to 2nd MTPJ and at proximal 1st ray incision    Nails:  10 nail(s) thickened    Vascular Exam    Arteries:  Posterior tibial artery palpable on right  Dorsalis pedis artery palpable on right  Posterior tibial artery palpable on left  Dorsalis pedis artery palpable on left    Veins:  Telangectasia on right  Telangectasia on left    Swellin+ nonpitting on right  1+ nonpitting on left    Neurological Exam    [place] touch test:  6/6 sites sensed, light touch intact     Musculoskeletal Exam    Footwear:  Casual on right  Casual on left    Gait Exam:   Ambulatory Status: Ambulatory  Gait: Normal, apropulsive  Assistive Devices: None    Foot Progression Angle:  Normal on right  Normal on left    Right Lower Extremity Additional Findings:  Mild bunion  Right foot and ankle function, strength, and range of motion unremarkable except as noted above.     Left Lower Extremity Additional Findings:  1st ray rectus, 4th and 5th toes rectus; mild tenderness at surgical sites    Left foot and ankle function, strength, and range of motion unremarkable except as noted above.    Imaging and Other Tests    Imaging:  Independently reviewed and interpreted imaging, findings are as follows: N/A     Assessment:     Encounter Diagnosis   Name Primary?    Post-operative state Yes              Plan:     I counseled the patient on her conditions, their implications and medical management.    S/p left forefoot reconstruction   -Removed remaining sutures.   -Cleanse with soap and water, apply adhesive bandages to residual wounds PRN.    -LLE protected WB in surgical shoe. Can trial roomy shoe.   -Pain meds as prescribed, reordered.       Return to clinic in 3 weeks for skin check, X ray prior, call sooner PRN.

## 2025-07-07 RX ORDER — HYDROCHLOROTHIAZIDE 12.5 MG/1
TABLET ORAL
Qty: 90 TABLET | Refills: 3 | Status: SHIPPED | OUTPATIENT
Start: 2025-07-07

## 2025-07-21 ENCOUNTER — OFFICE VISIT (OUTPATIENT)
Dept: PODIATRY | Facility: CLINIC | Age: OVER 89
End: 2025-07-21
Payer: MEDICARE

## 2025-07-21 ENCOUNTER — HOSPITAL ENCOUNTER (OUTPATIENT)
Dept: RADIOLOGY | Facility: HOSPITAL | Age: OVER 89
Discharge: HOME | End: 2025-07-21
Attending: STUDENT IN AN ORGANIZED HEALTH CARE EDUCATION/TRAINING PROGRAM
Payer: MEDICARE

## 2025-07-21 VITALS — DIASTOLIC BLOOD PRESSURE: 78 MMHG | SYSTOLIC BLOOD PRESSURE: 181 MMHG | HEART RATE: 69 BPM

## 2025-07-21 DIAGNOSIS — M21.612 BUNION OF LEFT FOOT: Primary | ICD-10-CM

## 2025-07-21 DIAGNOSIS — Z98.890 POST-OPERATIVE STATE: ICD-10-CM

## 2025-07-21 PROCEDURE — 1126F AMNT PAIN NOTED NONE PRSNT: CPT | Mod: CPTII,S$GLB,, | Performed by: STUDENT IN AN ORGANIZED HEALTH CARE EDUCATION/TRAINING PROGRAM

## 2025-07-21 PROCEDURE — 73630 X-RAY EXAM OF FOOT: CPT | Mod: TC,LT

## 2025-07-21 PROCEDURE — 1101F PT FALLS ASSESS-DOCD LE1/YR: CPT | Mod: CPTII,S$GLB,, | Performed by: STUDENT IN AN ORGANIZED HEALTH CARE EDUCATION/TRAINING PROGRAM

## 2025-07-21 PROCEDURE — 1159F MED LIST DOCD IN RCRD: CPT | Mod: CPTII,S$GLB,, | Performed by: STUDENT IN AN ORGANIZED HEALTH CARE EDUCATION/TRAINING PROGRAM

## 2025-07-21 PROCEDURE — 3288F FALL RISK ASSESSMENT DOCD: CPT | Mod: CPTII,S$GLB,, | Performed by: STUDENT IN AN ORGANIZED HEALTH CARE EDUCATION/TRAINING PROGRAM

## 2025-07-21 PROCEDURE — 99999 PR PBB SHADOW E&M-EST. PATIENT-LVL III: CPT | Mod: PBBFAC,,, | Performed by: STUDENT IN AN ORGANIZED HEALTH CARE EDUCATION/TRAINING PROGRAM

## 2025-07-21 PROCEDURE — 73630 X-RAY EXAM OF FOOT: CPT | Mod: 26,LT,, | Performed by: RADIOLOGY

## 2025-07-21 PROCEDURE — 99024 POSTOP FOLLOW-UP VISIT: CPT | Mod: S$GLB,,, | Performed by: STUDENT IN AN ORGANIZED HEALTH CARE EDUCATION/TRAINING PROGRAM

## 2025-07-21 NOTE — PROGRESS NOTES
Subjective:     Patient    Ernestine Luna is a 89 y.o. female.    Problem    04/30/25: Previously followed by Dr Pappas and Dr Rodas. Last seen by Dr Rodas 2 months ago; last seen for nail fungus and chronic foot pain, started on topical ciclopirox for nails and debrided nails. Has had multiple surgeries of the feet that she was not fully happy with. Currently with left foot chronic pain primarily at residual bunion deformity, plantarflexed 3rd toe and dorsiflexed 4th and 5th toes. Has a callus at bunion due to rubbing. Has attempted change in footwear, callus debridements, topical medications, prior surgeries, pads/straps without resolution. Requesting surgery.      05/21/25: Returns 5 days s/p left forefoot reconstruction. Significant pain, but controlled with current pain meds.     06/03/25: Returns about 2.5 weeks s/p left forefoot reconstruction. Minimal pain, no new concerns.     06/27/25: Returns 1.5 months s/p left forefoot reconstruction. Minimal pain. No new concerns.     07/21/25: Returns >2 months s/p left forefoot reconstruction. Minimal pain, pain is occasional and at 1st ray surgery site. Overall better than before surgery. Having trouble getting into shoes mostly due to swelling.       History    History obtained from patient and review of medical records.     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 Wilkins    COLONOSCOPY N/A 10/1/2019    Procedure: COLONOSCOPY;  Surgeon: Jarad Chavira MD;  Location: [facility] ENDO (4TH FLR);  Service: Endoscopy;  Laterality: N/A;  miralax prep (used miralax for last colonoscopy) - ERW    COLONOSCOPY W/ POLYPECTOMY  08/08/2013    CORRECTION OF HAMMER TOE Left 5/16/2025    Procedure: CORRECTION, HAMMER TOE;  Surgeon: Jose E Sotomayor DPM;  Location: [facility] OR 2ND FLR;  Service: Podiatry;  Laterality: Left;  x    RASEHED   06/24/2014    EYE SURGERY      HYSTERECTOMY  age 38    ALISTAIR; ovaries in place    INJECTION FOR SENTINEL NODE IDENTIFICATION Left 2/20/2023    Procedure: INJECTION, FOR SENTINEL NODE IDENTIFICATION-Left;  Surgeon: ROSIBEL Belcher MD;  [facility];  Service: General;  Laterality: Left;    JOINT REPLACEMENT      bilateral knees    MASTECTOMY, PARTIAL Left 2/20/2023    Procedure: MASTECTOMY, PARTIAL-Left with radiological marker;  Surgeon: ROSIBEL Belcher MD;  [facility];  Service: General;  Laterality: Left;    MASTECTOMY, PARTIAL Left 4/18/2023    Procedure: MASTECTOMY, PARTIAL-left re-excision;  Surgeon: ROSIBEL Belcher MD;  [facility];  Service: General;  Laterality: Left;    SENTINEL LYMPH NODE BIOPSY Left 2/20/2023    Procedure: BIOPSY, LYMPH NODE, SENTINEL-Left;  Surgeon: ROSIBEL Belcher MD;  [facility];  Service: General;  Laterality: Left;    SURGICAL REMOVAL OF BUNION WITH OSTEOTOMY OF METATARSAL BONE Left 5/16/2025    Procedure: BUNIONECTOMY, WITH METATARSAL OSTEOTOMY;  Surgeon: Jose E Sotomayor DPM;  Location: [facility] OR [address] [place] [address];  Service: Podiatry;  Laterality: Left;    TOTAL KNEE ARTHROPLASTY      Bilateral        Objective:     Vitals  Wt Readings from Last 1 Encounters:   05/21/25 65.5 kg (144 lb 6.4 oz)     Temp Readings from Last 1 Encounters:   05/16/25 97.9 °F (36.6 °C) (Temporal)     BP Readings  from Last 1 Encounters:   25 (!) 181/78     Pulse Readings from Last 1 Encounters:   25 69       Dermatological Exam    Skin:  Pedal hair growth diminished and Pedal skin thin and shiny on right  Pedal hair growth diminished and Pedal skin thin and shiny on left; incisions healed, some sutures intact    Nails:  10 nail(s) thickened    Vascular Exam    Arteries:  Posterior tibial artery palpable on right  Dorsalis pedis artery palpable on right  Posterior tibial artery palpable on left  Dorsalis pedis artery palpable on left    Veins:  Telangectasia on right  Telangectasia on left    Swellin+ nonpitting on right  1+ nonpitting on left    Neurological Exam    [place] touch test:  6/6 sites sensed, light touch intact     Musculoskeletal Exam    Footwear:  Casual on right  Casual on left    Gait Exam:   Ambulatory Status: Ambulatory  Gait: Normal, apropulsive  Assistive Devices: None    Foot Progression Angle:  Normal on right  Normal on left    Right Lower Extremity Additional Findings:  Mild bunion  Right foot and ankle function, strength, and range of motion unremarkable except as noted above.     Left Lower Extremity Additional Findings:  1st ray rectus, 4th and 5th toes rectus; mild tenderness at 1st ray surgical site only   Left foot and ankle function, strength, and range of motion unremarkable except as noted above.    Imaging and Other Tests    Imaging:  Independently reviewed and interpreted imaging, findings are as follows:     25 left foot X rays: 1st MTPJ severe degeneration with bunion deformity with metadductus, elevation deformities of 4th and 5th toes     Assessment:     Encounter Diagnoses   Name Primary?    Bunion of left foot Yes    Post-operative state             Plan:     I counseled the patient on her conditions, their implications and medical management.    S/p left forefoot reconstruction   -Updated X rays pending.   -Removed remaining sutures.   -Treat as normal  skin.   -Transition to standard footwear as tolerated.        Return to clinic in 2 months, call sooner PRN.

## (undated) DEVICE — SUT VICRYL PLUS 3-0 SH 27IN

## (undated) DEVICE — BANDAGE GAUZE 6PLY FLUFF 2X3

## (undated) DEVICE — APPLIER CLIP LIAGCLIP 9.375IN

## (undated) DEVICE — COVERS PROBE NR-48 STERILE

## (undated) DEVICE — TRAY MINOR GEN SURG OMC

## (undated) DEVICE — SUT VICRYL 3-0 27 SH

## (undated) DEVICE — NEOGUARD COVER 4X30CM STERILE

## (undated) DEVICE — ADHESIVE DERMABOND ADVANCED

## (undated) DEVICE — PACK UNIVERSAL SPLIT II

## (undated) DEVICE — BRA POST SURGICAL WHT 36-38IN

## (undated) DEVICE — COVER PROBE NL STRL 3.6X96IN

## (undated) DEVICE — TRAY MINOR ORTHO OMC

## (undated) DEVICE — DRAPE T EXTRM SURG 121X128X90

## (undated) DEVICE — TOURNIQUET SB QC DP 18X4IN

## (undated) DEVICE — SUT SILK 0 STRANDS 30IN BLK

## (undated) DEVICE — BRA POST SURGICAL WHT 48-50IN

## (undated) DEVICE — BRA POST SURGICAL WHT 40-42IN

## (undated) DEVICE — ELECTRODE BLADE INSULATED 1 IN

## (undated) DEVICE — Device

## (undated) DEVICE — SPONGE GAUZE 16PLY 4X4

## (undated) DEVICE — DRAPE HALF SURGICAL 40X58IN

## (undated) DEVICE — SPONGE COTTON TRAY 4X4IN

## (undated) DEVICE — GUIDEWIRE FIXOS UNTHRD 1.4X150
Type: IMPLANTABLE DEVICE | Site: FOOT | Status: NON-FUNCTIONAL
Removed: 2025-05-16

## (undated) DEVICE — BANDAGE ELAS SOFTWRAP ST 4X5YD

## (undated) DEVICE — ELECTRODE MEGADYNE RETURN DUAL

## (undated) DEVICE — MARGIN MARKER STANDARD 6 COLOR

## (undated) DEVICE — SUT VICRYL PLUS 4-0 P3 18IN

## (undated) DEVICE — PENCIL ROCKER SWITCH 10FT CORD

## (undated) DEVICE — ELECTRODE REM PLYHSV RETURN 9

## (undated) DEVICE — SOL 0.9% NACL IRRI.IN STERIL

## (undated) DEVICE — TOWEL OR DISP STRL BLUE 4/PK

## (undated) DEVICE — STOCKINET 4INX48

## (undated) DEVICE — SUT MCRYL PLUS 4-0 PS2 27IN

## (undated) DEVICE — NDL 18GA X1 1/2 REG BEVEL

## (undated) DEVICE — BANDAGE ESMARK ELASTIC ST 4X9

## (undated) DEVICE — TIP YANKAUERS BULB NO VENT

## (undated) DEVICE — BRA PST-SURGICAL WHITE 34-36IN

## (undated) DEVICE — GAUZE FLUFF XXLG 36X36 2 PLY

## (undated) DEVICE — CONTAINER SPECIMEN STRL 4OZ

## (undated) DEVICE — TAPE SILK 3IN

## (undated) DEVICE — GAUZE CNFRM STRL 4INX4.1YD

## (undated) DEVICE — BLADE SAGITTA 5/BX

## (undated) DEVICE — PAD CAST SPECIALIST STRL 4

## (undated) DEVICE — PENCIL SMK EVAC CONNECTOR 10FT

## (undated) DEVICE — BRA SURGICAL LG 38-40

## (undated) DEVICE — SUT CTD VICRYL 3-0 CR/SH

## (undated) DEVICE — DRAPE STERI INSTRUMENT 1018

## (undated) DEVICE — COUNTERSINK

## (undated) DEVICE — DRAPE THREE-QTR REINF 53X77IN

## (undated) DEVICE — GOWN POLY REINF BRTH SLV XL

## (undated) DEVICE — CONTAINER SPECIMEN OR STER 4OZ

## (undated) DEVICE — BRA POST SURGICAL WHT 44-46IN

## (undated) DEVICE — PACK DRAPE UNIVERSAL CONVERTOR

## (undated) DEVICE — BLADE MICRO REC SMALL CROSS

## (undated) DEVICE — DRESSING XEROFORM NONADH 1X8IN

## (undated) DEVICE — DRAPE THREE-QUARTER 53X77IN

## (undated) DEVICE — 1.6 DRILL

## (undated) DEVICE — SUT 2-0 VICRYL / SH (J417)